# Patient Record
Sex: FEMALE | Race: ASIAN | NOT HISPANIC OR LATINO | Employment: OTHER | URBAN - METROPOLITAN AREA
[De-identification: names, ages, dates, MRNs, and addresses within clinical notes are randomized per-mention and may not be internally consistent; named-entity substitution may affect disease eponyms.]

---

## 2019-09-23 ENCOUNTER — APPOINTMENT (OUTPATIENT)
Dept: CT IMAGING | Facility: HOSPITAL | Age: 79
End: 2019-09-23

## 2019-09-23 ENCOUNTER — HOSPITAL ENCOUNTER (EMERGENCY)
Facility: HOSPITAL | Age: 79
Discharge: HOME | End: 2019-09-23
Attending: EMERGENCY MEDICINE | Admitting: EMERGENCY MEDICINE

## 2019-09-23 VITALS
TEMPERATURE: 98 F | BODY MASS INDEX: 23.91 KG/M2 | HEART RATE: 47 BPM | RESPIRATION RATE: 14 BRPM | SYSTOLIC BLOOD PRESSURE: 149 MMHG | WEIGHT: 134.92 LBS | DIASTOLIC BLOOD PRESSURE: 64 MMHG | OXYGEN SATURATION: 99 % | HEIGHT: 63 IN

## 2019-09-23 DIAGNOSIS — S00.03XA CONTUSION OF SCALP, INITIAL ENCOUNTER: Primary | ICD-10-CM

## 2019-09-23 PROCEDURE — 99284 EMERGENCY DEPT VISIT MOD MDM: CPT

## 2019-09-23 PROCEDURE — 70450 CT HEAD/BRAIN W/O DYE: CPT

## 2019-09-23 RX ORDER — CLONIDINE HYDROCHLORIDE 0.1 MG/1
0.2 TABLET ORAL ONCE
Status: COMPLETED | OUTPATIENT
Start: 2019-09-23 | End: 2019-09-23

## 2019-09-23 RX ADMIN — CLONIDINE HYDROCHLORIDE 0.2 MG: 0.1 TABLET ORAL at 13:55

## 2019-09-23 NOTE — ED NOTES
Pt fell and hit her head before her flight pt is from [facility]. Pt got on her flight and pain is still bad even after 3 days     Boris Betancourt, NISHN  09/23/19 2177

## 2019-09-23 NOTE — ED PROVIDER NOTES
Subjective   History and physical through patient's daughter as the  as the patient does not speak English chief complaint is head injury    78-year-old female who is visiting her daughter from [facility] states she was at home in [facility] 5 days ago when she rolled out of bed and struck her head on a hard floor.  She had no loss of consciousness patient just arrived here after her a several hour flight from [facility].  She still has some pain to her head and a large contusion to her left head and her daughter want to get checked.  There is moderate headache worse with movement better with rest no vomiting no mental status changes but no paralysis is been on the drink walking otherwise function okay denies any neck pain no numbness or tingling.  Patient is not on blood thinners            Review of Systems   Constitutional: Negative for chills and fever.   Eyes: Negative for photophobia and visual disturbance.   Respiratory: Negative for chest tightness and shortness of breath.    Gastrointestinal: Negative for abdominal pain and vomiting.   Genitourinary: Negative for difficulty urinating and dysuria.   Musculoskeletal: Negative for back pain and neck pain.   Neurological: Positive for headaches. Negative for dizziness.   Psychiatric/Behavioral: Negative for agitation and behavioral problems.       Past Medical History:   Diagnosis Date   • Hyperlipidemia    • Hypertension        No Known Allergies    History reviewed. No pertinent surgical history.    History reviewed. No pertinent family history.    Social History     Socioeconomic History   • Marital status:      Spouse name: Not on file   • Number of children: Not on file   • Years of education: Not on file   • Highest education level: Not on file   Tobacco Use   • Smoking status: Never Smoker   • Smokeless tobacco: Never Used   Substance and Sexual Activity   • Alcohol use: No     Frequency: Never   • Drug use: No   • Sexual activity: Defer        Patient is on medications for high blood pressure and cholesterol but she does not have it with her and she does not know the names they are filled in [employer]    Objective   Physical Exam  78-year-old female awake alert no acute distress HEENT extraocular muscles intact pupils equal react there is a large contusion of the left side of her head but no step-off or deformity.  There is no raccoon or adame sign no photophobia disks are sharp there is no surface lumbar spine tenderness noted trachea midline lungs clear no retractions heart regular without murmur and was soft without tenderness extremities and hips pelvis all full range of motion no deformities she is awake alert follows commands motor strength normal Floridalma Coma Scale 15  Procedures           ED Course      Results for orders placed or performed during the hospital encounter of 04/30/19   Comprehensive Metabolic Panel   Result Value Ref Range    Sodium 139 136 - 144 mmol/L    Potassium 3.4 (L) 3.6 - 5.1 mmol/L    Chloride 105 101 - 111 mmol/L    CO2 22 22 - 32 mmol/L    Glucose 86 65 - 99 mg/dL    BUN 19 8 - 20 mg/dL    Creatinine 1.1 (H) 0.4 - 1.0 mg/dl    Calcium 8.8 (L) 8.9 - 10.3 mg/dL    Total Protein 6.3 6.1 - 7.9 g/dL    Albumin 3.3 (L) 3.5 - 4.8 g/dL    Total Bilirubin 0.1 (L) 0.3 - 1.2 mg/dL    Alkaline Phosphatase 49 32 - 91 IU/L    AST (SGOT) 47 (H) 15 - 41 IU/L    ALT (SGPT) 32 14 - 54 IU/L    Anion Gap 15.4 10 - 20    BUN/Creatinine Ratio 17.3 5.4 - 26.2    GFR MDRD Non African American 48 (L) >60 mL/min/1.73m2    GFR MDRD African American 58 (L) >60 mL/min/1.73m2    Globulin 3.0 2.5 - 3.8 G/dL    A/G Ratio 1.1 1.0 - 1.7   CBC & Differential   Result Value Ref Range    WBC 4.9 4.5 - 11.5 10*3/uL    RBC 4.04 4.00 - 5.40 10*6/uL    Hemoglobin 11.5 (L) 12.0 - 15.0 g/dL    Hematocrit 34.1 (L) 35 - 49 %    MCV 84.5 80 - 94 fL    MCH 28.5 26 - 32 pg    MCHC 33.7 32 - 36 g/dL    RDW 15.6 (H) 11.5 - 14.5 %    Platelets 196 150 - 450 10*3/uL     MPV 8.4 7.4 - 10.4 fL    Differential Type AUTO     Neutrophils Absolute 1.8 (L) 2.3 - 8.6 10*3/uL    Lymphocytes Absolute 2.2 0.8 - 4.8 10*3/uL    Monocytes Absolute 0.5 0.1 - 1.3 10*3/uL    Eosinophils Absolute 0.3 0.0 - 0.3 10*3/uL    Basophils Absolute 0.0 0 - 0.2 10*3/uL    Neutrophil Rel % 38 (L) 50 - 75 %    Lymphocyte Rel % 44 (H) 18 - 42 %    Monocyte Rel % 11 2 - 11 %    Eosinophil Rel % 6 (H) 0 - 3 %    Basophil Rel % 1 0 - 2 %    nRBC 0 0 /100[WBCs]    Absolute nRBC 0 10*3/uL     Ct Head Without Contrast    Result Date: 9/23/2019  No acute brain abnormality is appreciated.       Electronically Signed By-Dr. Ramses Arenas MD On:9/23/2019 1:13 PM This report was finalized on [id number] by Dr. Ramses Arenas MD.    Medications - No data to display                MDM  Number of Diagnoses or Management Options  Contusion of scalp, initial encounter:   Diagnosis management comments: Medical decision making.  Patient had the above exam and evaluation the patient CT head without was negative.  Repeat exam blood pressure was still elevated by 218 systolic.  But family states that she takes medication for blood pressure and it is always elevated.  Patient is given clonidine 0.2 mg p.o.  The patient on repeat exam was awake alert Arthur Coma Scale 15.  We talked about the findings and she will be discharged home for outpatient management stable otherwise unremarkable ER course no evidence of hemorrhage or fracture at this time.      Final diagnoses:   Contusion of scalp, initial encounter              Renato Venegas MD  09/23/19 2390

## 2019-09-23 NOTE — DISCHARGE INSTRUCTIONS
Monitor blood pressure closely.  Follow-up with your primary care doctor 1 week for recheck.  Return for increasing pain fever vomiting mental status changes weakness to one side or the other facial asymmetry speech difficulty or any other new or worse problems or concerns

## 2020-11-05 ENCOUNTER — TRANSCRIBE ORDERS (OUTPATIENT)
Dept: RESPIRATORY THERAPY | Facility: HOSPITAL | Age: 80
End: 2020-11-05

## 2020-11-05 DIAGNOSIS — N18.4 CHRONIC KIDNEY DISEASE, STAGE 4 (SEVERE) (HCC): Primary | ICD-10-CM

## 2020-11-11 ENCOUNTER — HOSPITAL ENCOUNTER (OUTPATIENT)
Dept: ULTRASOUND IMAGING | Facility: HOSPITAL | Age: 80
Discharge: HOME | End: 2020-11-11

## 2020-11-11 ENCOUNTER — TRANSCRIBE ORDERS (OUTPATIENT)
Dept: ADMINISTRATIVE | Facility: HOSPITAL | Age: 80
End: 2020-11-11

## 2020-11-11 ENCOUNTER — LAB (OUTPATIENT)
Dept: LAB | Facility: HOSPITAL | Age: 80
End: 2020-11-11

## 2020-11-11 DIAGNOSIS — N18.4 CHRONIC KIDNEY DISEASE, STAGE 4 (SEVERE) (HCC): ICD-10-CM

## 2020-11-11 DIAGNOSIS — N18.4 CHRONIC KIDNEY DISEASE, STAGE IV (SEVERE) (HCC): ICD-10-CM

## 2020-11-11 DIAGNOSIS — N18.4 CHRONIC KIDNEY DISEASE, STAGE IV (SEVERE) (HCC): Primary | ICD-10-CM

## 2020-11-11 LAB
ANION GAP SERPL CALCULATED.3IONS-SCNC: 12 MMOL/L (ref 5–15)
BACTERIA UR QL AUTO: ABNORMAL /HPF
BASOPHILS # BLD AUTO: 0.03 10*3/MM3 (ref 0–0.2)
BASOPHILS NFR BLD AUTO: 0.5 % (ref 0–1.5)
BILIRUB UR QL STRIP: NEGATIVE
BUN SERPL-MCNC: 34 MG/DL (ref 8–23)
BUN/CREAT SERPL: 16.8 (ref 7–25)
CALCIUM SPEC-SCNC: 8.8 MG/DL (ref 8.6–10.5)
CHLORIDE SERPL-SCNC: 106 MMOL/L (ref 98–107)
CLARITY UR: ABNORMAL
CO2 SERPL-SCNC: 25 MMOL/L (ref 22–29)
COLOR UR: YELLOW
CREAT SERPL-MCNC: 2.02 MG/DL (ref 0.57–1)
DEPRECATED RDW RBC AUTO: 46.9 FL (ref 37–54)
EOSINOPHIL # BLD AUTO: 0.21 10*3/MM3 (ref 0–0.4)
EOSINOPHIL NFR BLD AUTO: 3.6 % (ref 0.3–6.2)
ERYTHROCYTE [DISTWIDTH] IN BLOOD BY AUTOMATED COUNT: 14.6 % (ref 12.3–15.4)
GFR SERPL CREATININE-BSD FRML MDRD: 24 ML/MIN/1.73
GFR SERPL CREATININE-BSD FRML MDRD: 29 ML/MIN/1.73
GLUCOSE SERPL-MCNC: 92 MG/DL (ref 65–99)
GLUCOSE UR STRIP-MCNC: NEGATIVE MG/DL
HCT VFR BLD AUTO: 33 % (ref 34–46.6)
HGB BLD-MCNC: 10.2 G/DL (ref 12–15.9)
HGB UR QL STRIP.AUTO: NEGATIVE
HYALINE CASTS UR QL AUTO: ABNORMAL /LPF
IMM GRANULOCYTES # BLD AUTO: 0.02 10*3/MM3 (ref 0–0.05)
IMM GRANULOCYTES NFR BLD AUTO: 0.3 % (ref 0–0.5)
KETONES UR QL STRIP: ABNORMAL
LEUKOCYTE ESTERASE UR QL STRIP.AUTO: ABNORMAL
LYMPHOCYTES # BLD AUTO: 1.95 10*3/MM3 (ref 0.7–3.1)
LYMPHOCYTES NFR BLD AUTO: 33.1 % (ref 19.6–45.3)
MCH RBC QN AUTO: 26.7 PG (ref 26.6–33)
MCHC RBC AUTO-ENTMCNC: 30.9 G/DL (ref 31.5–35.7)
MCV RBC AUTO: 86.4 FL (ref 79–97)
MONOCYTES # BLD AUTO: 0.45 10*3/MM3 (ref 0.1–0.9)
MONOCYTES NFR BLD AUTO: 7.6 % (ref 5–12)
NEUTROPHILS NFR BLD AUTO: 3.23 10*3/MM3 (ref 1.7–7)
NEUTROPHILS NFR BLD AUTO: 54.9 % (ref 42.7–76)
NITRITE UR QL STRIP: NEGATIVE
NRBC BLD AUTO-RTO: 0.2 /100 WBC (ref 0–0.2)
PH UR STRIP.AUTO: 6 [PH] (ref 5–8)
PHOSPHATE SERPL-MCNC: 3.5 MG/DL (ref 2.5–4.5)
PLATELET # BLD AUTO: 252 10*3/MM3 (ref 140–450)
PMV BLD AUTO: 10.5 FL (ref 6–12)
POTASSIUM SERPL-SCNC: 4 MMOL/L (ref 3.5–5.2)
PROT UR QL STRIP: ABNORMAL
PTH-INTACT SERPL-MCNC: 101 PG/ML (ref 15–65)
RBC # BLD AUTO: 3.82 10*6/MM3 (ref 3.77–5.28)
RBC # UR: ABNORMAL /HPF
REF LAB TEST METHOD: ABNORMAL
SODIUM SERPL-SCNC: 143 MMOL/L (ref 136–145)
SP GR UR STRIP: 1.02 (ref 1–1.03)
SQUAMOUS #/AREA URNS HPF: ABNORMAL /HPF
UROBILINOGEN UR QL STRIP: ABNORMAL
WBC # BLD AUTO: 5.89 10*3/MM3 (ref 3.4–10.8)
WBC UR QL AUTO: ABNORMAL /HPF

## 2020-11-11 PROCEDURE — 85025 COMPLETE CBC W/AUTO DIFF WBC: CPT

## 2020-11-11 PROCEDURE — 36415 COLL VENOUS BLD VENIPUNCTURE: CPT

## 2020-11-11 PROCEDURE — 87077 CULTURE AEROBIC IDENTIFY: CPT

## 2020-11-11 PROCEDURE — 80048 BASIC METABOLIC PNL TOTAL CA: CPT

## 2020-11-11 PROCEDURE — 76775 US EXAM ABDO BACK WALL LIM: CPT

## 2020-11-11 PROCEDURE — 87086 URINE CULTURE/COLONY COUNT: CPT

## 2020-11-11 PROCEDURE — 84100 ASSAY OF PHOSPHORUS: CPT

## 2020-11-11 PROCEDURE — 81001 URINALYSIS AUTO W/SCOPE: CPT

## 2020-11-11 PROCEDURE — 87186 SC STD MICRODIL/AGAR DIL: CPT

## 2020-11-11 PROCEDURE — 83970 ASSAY OF PARATHORMONE: CPT

## 2020-11-13 LAB — BACTERIA SPEC AEROBE CULT: ABNORMAL

## 2021-04-15 ENCOUNTER — APPOINTMENT (OUTPATIENT)
Dept: GENERAL RADIOLOGY | Facility: HOSPITAL | Age: 81
End: 2021-04-15

## 2021-04-15 ENCOUNTER — LAB (OUTPATIENT)
Dept: LAB | Facility: HOSPITAL | Age: 81
End: 2021-04-15

## 2021-04-15 ENCOUNTER — TRANSCRIBE ORDERS (OUTPATIENT)
Dept: ADMINISTRATIVE | Facility: HOSPITAL | Age: 81
End: 2021-04-15

## 2021-04-15 ENCOUNTER — HOSPITAL ENCOUNTER (INPATIENT)
Facility: HOSPITAL | Age: 81
LOS: 4 days | Discharge: HOME | End: 2021-04-21
Attending: INTERNAL MEDICINE | Admitting: INTERNAL MEDICINE

## 2021-04-15 ENCOUNTER — APPOINTMENT (OUTPATIENT)
Dept: CT IMAGING | Facility: HOSPITAL | Age: 81
End: 2021-04-15

## 2021-04-15 DIAGNOSIS — R94.31 ABNORMAL EKG: ICD-10-CM

## 2021-04-15 DIAGNOSIS — I50.9 CONGESTIVE HEART FAILURE, UNSPECIFIED HF CHRONICITY, UNSPECIFIED HEART FAILURE TYPE (HCC): ICD-10-CM

## 2021-04-15 DIAGNOSIS — I49.5 SICK SINUS SYNDROME (HCC): Primary | ICD-10-CM

## 2021-04-15 DIAGNOSIS — N18.9 CHRONIC KIDNEY DISEASE, UNSPECIFIED CKD STAGE: ICD-10-CM

## 2021-04-15 DIAGNOSIS — N18.4 CHRONIC KIDNEY DISEASE, STAGE IV (SEVERE) (HCC): Primary | ICD-10-CM

## 2021-04-15 DIAGNOSIS — N18.4 CHRONIC KIDNEY DISEASE, STAGE IV (SEVERE) (HCC): ICD-10-CM

## 2021-04-15 DIAGNOSIS — R11.2 NON-INTRACTABLE VOMITING WITH NAUSEA, UNSPECIFIED VOMITING TYPE: ICD-10-CM

## 2021-04-15 DIAGNOSIS — R06.00 DYSPNEA, UNSPECIFIED TYPE: ICD-10-CM

## 2021-04-15 DIAGNOSIS — R00.1 JUNCTIONAL BRADYCARDIA: ICD-10-CM

## 2021-04-15 PROBLEM — E78.5 HYPERLIPIDEMIA: Chronic | Status: ACTIVE | Noted: 2021-04-15

## 2021-04-15 PROBLEM — N18.30 CKD (CHRONIC KIDNEY DISEASE) STAGE 3, GFR 30-59 ML/MIN (HCC): Chronic | Status: ACTIVE | Noted: 2021-04-15

## 2021-04-15 PROBLEM — E11.9 TYPE 2 DIABETES MELLITUS: Chronic | Status: ACTIVE | Noted: 2021-04-15

## 2021-04-15 PROBLEM — I10 ESSENTIAL HYPERTENSION: Chronic | Status: ACTIVE | Noted: 2021-04-15

## 2021-04-15 PROBLEM — D64.9 ANEMIA: Status: ACTIVE | Noted: 2021-04-15

## 2021-04-15 PROBLEM — I16.1 HYPERTENSIVE EMERGENCY: Status: ACTIVE | Noted: 2021-04-15

## 2021-04-15 LAB
ALBUMIN SERPL-MCNC: 3.5 G/DL (ref 3.5–5.2)
ALP SERPL-CCNC: 91 U/L (ref 39–117)
ALT SERPL W P-5'-P-CCNC: 28 U/L (ref 1–33)
ANION GAP SERPL CALCULATED.3IONS-SCNC: 11.5 MMOL/L (ref 5–15)
AST SERPL-CCNC: 31 U/L (ref 1–32)
B PARAPERT DNA SPEC QL NAA+PROBE: NOT DETECTED
B PERT DNA SPEC QL NAA+PROBE: NOT DETECTED
BACTERIA UR QL AUTO: NORMAL /HPF
BASOPHILS # BLD AUTO: 0.02 10*3/MM3 (ref 0–0.2)
BASOPHILS NFR BLD AUTO: 0.4 % (ref 0–1.5)
BILIRUB CONJ SERPL-MCNC: <0.2 MG/DL (ref 0–0.3)
BILIRUB INDIRECT SERPL-MCNC: NORMAL MG/DL
BILIRUB SERPL-MCNC: 0.3 MG/DL (ref 0–1.2)
BILIRUB UR QL STRIP: NEGATIVE
BUN SERPL-MCNC: 38 MG/DL (ref 8–23)
BUN/CREAT SERPL: 19.6 (ref 7–25)
C PNEUM DNA NPH QL NAA+NON-PROBE: NOT DETECTED
CALCIUM SPEC-SCNC: 9.4 MG/DL (ref 8.6–10.5)
CHLORIDE SERPL-SCNC: 111 MMOL/L (ref 98–107)
CLARITY UR: CLEAR
CO2 SERPL-SCNC: 21.5 MMOL/L (ref 22–29)
COLOR UR: YELLOW
CREAT SERPL-MCNC: 1.94 MG/DL (ref 0.57–1)
DEPRECATED RDW RBC AUTO: 43.5 FL (ref 37–54)
EOSINOPHIL # BLD AUTO: 0.21 10*3/MM3 (ref 0–0.4)
EOSINOPHIL NFR BLD AUTO: 4.2 % (ref 0.3–6.2)
ERYTHROCYTE [DISTWIDTH] IN BLOOD BY AUTOMATED COUNT: 14.1 % (ref 12.3–15.4)
FLUAV SUBTYP SPEC NAA+PROBE: NOT DETECTED
FLUBV RNA ISLT QL NAA+PROBE: NOT DETECTED
GFR SERPL CREATININE-BSD FRML MDRD: 25 ML/MIN/1.73
GFR SERPL CREATININE-BSD FRML MDRD: 30 ML/MIN/1.73
GLUCOSE SERPL-MCNC: 108 MG/DL (ref 65–99)
GLUCOSE UR STRIP-MCNC: ABNORMAL MG/DL
HADV DNA SPEC NAA+PROBE: NOT DETECTED
HCOV 229E RNA SPEC QL NAA+PROBE: NOT DETECTED
HCOV HKU1 RNA SPEC QL NAA+PROBE: NOT DETECTED
HCOV NL63 RNA SPEC QL NAA+PROBE: NOT DETECTED
HCOV OC43 RNA SPEC QL NAA+PROBE: NOT DETECTED
HCT VFR BLD AUTO: 36.4 % (ref 34–46.6)
HGB BLD-MCNC: 11.6 G/DL (ref 12–15.9)
HGB UR QL STRIP.AUTO: NEGATIVE
HMPV RNA NPH QL NAA+NON-PROBE: NOT DETECTED
HOLD SPECIMEN: NORMAL
HPIV1 RNA SPEC QL NAA+PROBE: NOT DETECTED
HPIV2 RNA SPEC QL NAA+PROBE: NOT DETECTED
HPIV3 RNA NPH QL NAA+PROBE: NOT DETECTED
HPIV4 P GENE NPH QL NAA+PROBE: NOT DETECTED
HYALINE CASTS UR QL AUTO: NORMAL /LPF
IMM GRANULOCYTES # BLD AUTO: 0.01 10*3/MM3 (ref 0–0.05)
IMM GRANULOCYTES NFR BLD AUTO: 0.2 % (ref 0–0.5)
KETONES UR QL STRIP: NEGATIVE
LEUKOCYTE ESTERASE UR QL STRIP.AUTO: NEGATIVE
LIPASE SERPL-CCNC: 146 U/L (ref 13–60)
LYMPHOCYTES # BLD AUTO: 2.16 10*3/MM3 (ref 0.7–3.1)
LYMPHOCYTES NFR BLD AUTO: 43.5 % (ref 19.6–45.3)
M PNEUMO IGG SER IA-ACNC: NOT DETECTED
MCH RBC QN AUTO: 27.2 PG (ref 26.6–33)
MCHC RBC AUTO-ENTMCNC: 31.9 G/DL (ref 31.5–35.7)
MCV RBC AUTO: 85.4 FL (ref 79–97)
MONOCYTES # BLD AUTO: 0.32 10*3/MM3 (ref 0.1–0.9)
MONOCYTES NFR BLD AUTO: 6.4 % (ref 5–12)
NEUTROPHILS NFR BLD AUTO: 2.25 10*3/MM3 (ref 1.7–7)
NEUTROPHILS NFR BLD AUTO: 45.3 % (ref 42.7–76)
NITRITE UR QL STRIP: NEGATIVE
NRBC BLD AUTO-RTO: 0 /100 WBC (ref 0–0.2)
NT-PROBNP SERPL-MCNC: 3125 PG/ML (ref 0–1800)
PH UR STRIP.AUTO: 6.5 [PH] (ref 5–8)
PHOSPHATE SERPL-MCNC: 3.7 MG/DL (ref 2.5–4.5)
PLATELET # BLD AUTO: 274 10*3/MM3 (ref 140–450)
PMV BLD AUTO: 10.6 FL (ref 6–12)
POTASSIUM SERPL-SCNC: 4.3 MMOL/L (ref 3.5–5.2)
PROCALCITONIN SERPL-MCNC: 0.08 NG/ML (ref 0–0.25)
PROT SERPL-MCNC: 6.8 G/DL (ref 6–8.5)
PROT UR QL STRIP: ABNORMAL
PTH-INTACT SERPL-MCNC: 53.5 PG/ML (ref 15–65)
RBC # BLD AUTO: 4.26 10*6/MM3 (ref 3.77–5.28)
RBC # UR: NORMAL /HPF
REF LAB TEST METHOD: NORMAL
RHINOVIRUS RNA SPEC NAA+PROBE: NOT DETECTED
RSV RNA NPH QL NAA+NON-PROBE: NOT DETECTED
SARS-COV-2 RNA NPH QL NAA+NON-PROBE: NOT DETECTED
SODIUM SERPL-SCNC: 144 MMOL/L (ref 136–145)
SP GR UR STRIP: 1.01 (ref 1–1.03)
SQUAMOUS #/AREA URNS HPF: NORMAL /HPF
TROPONIN T SERPL-MCNC: 0.02 NG/ML (ref 0–0.03)
UROBILINOGEN UR QL STRIP: ABNORMAL
WBC # BLD AUTO: 4.97 10*3/MM3 (ref 3.4–10.8)
WBC UR QL AUTO: NORMAL /HPF
WHOLE BLOOD HOLD SPECIMEN: NORMAL
WHOLE BLOOD HOLD SPECIMEN: NORMAL

## 2021-04-15 PROCEDURE — G0378 HOSPITAL OBSERVATION PER HR: HCPCS

## 2021-04-15 PROCEDURE — 80048 BASIC METABOLIC PNL TOTAL CA: CPT

## 2021-04-15 PROCEDURE — 85025 COMPLETE CBC W/AUTO DIFF WBC: CPT

## 2021-04-15 PROCEDURE — 84484 ASSAY OF TROPONIN QUANT: CPT | Performed by: NURSE PRACTITIONER

## 2021-04-15 PROCEDURE — 83690 ASSAY OF LIPASE: CPT | Performed by: NURSE PRACTITIONER

## 2021-04-15 PROCEDURE — 93005 ELECTROCARDIOGRAM TRACING: CPT

## 2021-04-15 PROCEDURE — 81001 URINALYSIS AUTO W/SCOPE: CPT

## 2021-04-15 PROCEDURE — 99222 1ST HOSP IP/OBS MODERATE 55: CPT | Performed by: PHYSICIAN ASSISTANT

## 2021-04-15 PROCEDURE — 74176 CT ABD & PELVIS W/O CONTRAST: CPT

## 2021-04-15 PROCEDURE — 99284 EMERGENCY DEPT VISIT MOD MDM: CPT

## 2021-04-15 PROCEDURE — 93005 ELECTROCARDIOGRAM TRACING: CPT | Performed by: INTERNAL MEDICINE

## 2021-04-15 PROCEDURE — 71045 X-RAY EXAM CHEST 1 VIEW: CPT

## 2021-04-15 PROCEDURE — 80076 HEPATIC FUNCTION PANEL: CPT | Performed by: NURSE PRACTITIONER

## 2021-04-15 PROCEDURE — 36415 COLL VENOUS BLD VENIPUNCTURE: CPT

## 2021-04-15 PROCEDURE — 83880 ASSAY OF NATRIURETIC PEPTIDE: CPT | Performed by: NURSE PRACTITIONER

## 2021-04-15 PROCEDURE — 0202U NFCT DS 22 TRGT SARS-COV-2: CPT | Performed by: NURSE PRACTITIONER

## 2021-04-15 PROCEDURE — 25010000002 ONDANSETRON PER 1 MG: Performed by: NURSE PRACTITIONER

## 2021-04-15 PROCEDURE — 25010000002 FUROSEMIDE PER 20 MG: Performed by: NURSE PRACTITIONER

## 2021-04-15 PROCEDURE — 83970 ASSAY OF PARATHORMONE: CPT

## 2021-04-15 PROCEDURE — 84145 PROCALCITONIN (PCT): CPT | Performed by: NURSE PRACTITIONER

## 2021-04-15 PROCEDURE — 84100 ASSAY OF PHOSPHORUS: CPT

## 2021-04-15 RX ORDER — ONDANSETRON 2 MG/ML
4 INJECTION INTRAMUSCULAR; INTRAVENOUS ONCE
Status: COMPLETED | OUTPATIENT
Start: 2021-04-15 | End: 2021-04-15

## 2021-04-15 RX ORDER — ASPIRIN 325 MG
325 TABLET ORAL ONCE
Status: COMPLETED | OUTPATIENT
Start: 2021-04-15 | End: 2021-04-15

## 2021-04-15 RX ORDER — GLIPIZIDE 5 MG/1
2.5 TABLET ORAL DAILY
COMMUNITY

## 2021-04-15 RX ORDER — FUROSEMIDE 10 MG/ML
40 INJECTION INTRAMUSCULAR; INTRAVENOUS ONCE
Status: COMPLETED | OUTPATIENT
Start: 2021-04-15 | End: 2021-04-15

## 2021-04-15 RX ORDER — AMLODIPINE BESYLATE 10 MG/1
10 TABLET ORAL DAILY
COMMUNITY
End: 2022-06-13 | Stop reason: SDUPTHER

## 2021-04-15 RX ORDER — ATORVASTATIN CALCIUM 80 MG/1
80 TABLET, FILM COATED ORAL DAILY
COMMUNITY
End: 2022-06-13 | Stop reason: SDUPTHER

## 2021-04-15 RX ORDER — SODIUM CHLORIDE 0.9 % (FLUSH) 0.9 %
10 SYRINGE (ML) INJECTION AS NEEDED
Status: DISCONTINUED | OUTPATIENT
Start: 2021-04-15 | End: 2021-04-21 | Stop reason: HOSPADM

## 2021-04-15 RX ORDER — HYDRALAZINE HYDROCHLORIDE 25 MG/1
25 TABLET, FILM COATED ORAL 3 TIMES DAILY
COMMUNITY
End: 2021-04-21 | Stop reason: HOSPADM

## 2021-04-15 RX ORDER — LOSARTAN POTASSIUM 100 MG/1
100 TABLET ORAL DAILY
COMMUNITY
End: 2021-11-15

## 2021-04-15 RX ORDER — ASPIRIN 81 MG/1
81 TABLET, CHEWABLE ORAL DAILY
COMMUNITY
End: 2022-06-13 | Stop reason: SDUPTHER

## 2021-04-15 RX ORDER — CLONIDINE HYDROCHLORIDE 0.1 MG/1
0.1 TABLET ORAL ONCE
Status: COMPLETED | OUTPATIENT
Start: 2021-04-15 | End: 2021-04-15

## 2021-04-15 RX ADMIN — ASPIRIN 325 MG ORAL TABLET 325 MG: 325 PILL ORAL at 19:00

## 2021-04-15 RX ADMIN — CLONIDINE HYDROCHLORIDE 0.1 MG: 0.1 TABLET ORAL at 18:42

## 2021-04-15 RX ADMIN — NITROGLYCERIN 1 INCH: 20 OINTMENT TOPICAL at 20:11

## 2021-04-15 RX ADMIN — FUROSEMIDE 40 MG: 10 INJECTION, SOLUTION INTRAMUSCULAR; INTRAVENOUS at 20:11

## 2021-04-15 RX ADMIN — ONDANSETRON 4 MG: 2 INJECTION INTRAMUSCULAR; INTRAVENOUS at 18:42

## 2021-04-15 NOTE — ED PROVIDER NOTES
Subjective   Patient is an 80-year-old female non-English-speaking who presents today with her granddaughter at the bedside.  Granddaughter translates for the patient.  She reports that the patient has complained of having chills, shortness of breath and nausea for the last couple of days.  She states she was here today to have some outpatient labs drawn when she vomited and was then instructed to come to the ED for further evaluation.  Patient denies any pain.  She states her blood pressure has been elevated recently and 1 week ago had adjustments made to her hydralazine from 10 mg 3 times a day to 25 mg 3 times a day.  Granddaughter reports patient has had an increase in fatigue since then as well.  Patient denies any headache dizziness or visual changes.  She denies any chest pain but states she has felt intermittently short of breath.  She denies any abdominal pain but does report nausea and 2 episodes of vomiting today.  She states she has not had a bowel movement in 3 to 4 days.  She denies any hematemesis black or bloody stool.  She denies any fever.  She denies any urinary issues.  She denies any ill contacts or recent travel.          Review of Systems   Constitutional: Positive for chills and fatigue. Negative for fever.   HENT: Negative for congestion.    Eyes: Negative for visual disturbance.   Respiratory: Positive for shortness of breath. Negative for cough.    Cardiovascular: Negative for chest pain and leg swelling.   Gastrointestinal: Positive for constipation, nausea and vomiting. Negative for abdominal pain, blood in stool and diarrhea.   Genitourinary: Negative for decreased urine volume, difficulty urinating, dysuria, frequency and urgency.   Musculoskeletal: Negative for neck pain and neck stiffness.   Skin: Negative for rash.   Neurological: Positive for weakness. Negative for dizziness, facial asymmetry, speech difficulty, light-headedness, numbness and headaches.       Past Medical History:    Diagnosis Date   • Diabetes mellitus (CMS/[facility])    • Hyperlipidemia    • Hypertension        No Known Allergies    History reviewed. No pertinent surgical history.    History reviewed. No pertinent family history.    Social History     Socioeconomic History   • Marital status:      Spouse name: Not on file   • Number of children: Not on file   • Years of education: Not on file   • Highest education level: Not on file   Tobacco Use   • Smoking status: Never Smoker   • Smokeless tobacco: Never Used   Substance and Sexual Activity   • Alcohol use: No   • Drug use: No   • Sexual activity: Defer           Objective   Physical Exam  Vital signs and triage nurse note reviewed.  Constitutional: Awake, alert; well-developed and well-nourished. No acute distress is noted.  HEENT: Normocephalic, atraumatic; pupils are PERRL with intact EOM; oropharynx is pink and moist without exudate or erythema.  No drooling or pooling of oral secretions.  Neck: Supple, full range of motion without pain; no cervical lymphadenopathy. Normal phonation.  Cardiovascular: Regular rate and rhythm, normal S1-S2.  Systolic murmur noted.  Pulmonary: Respiratory effort regular nonlabored, breath sounds clear to auscultation all fields.  Abdomen: Soft, nontender, nondistended with normoactive bowel sounds; no rebound or guarding.  No CVAT.  Musculoskeletal: Independent range of motion of all extremities with no palpable tenderness or edema.  Calves are symmetric and nontender.  Neuro: Alert oriented x3, speech is clear and appropriate, GCS 15.    Skin: Flesh tone, warm, dry, intact; no erythematous or petechial rash or lesion.      Procedures           ED Course      Labs Reviewed   LIPASE - Abnormal; Notable for the following components:       Result Value    Lipase 146 (*)     All other components within normal limits   BNP (IN-HOUSE) - Abnormal; Notable for the following components:    proBNP 3,125.0 (*)     All other components within  "normal limits    Narrative:     Among patients with dyspnea, NT-proBNP is highly sensitive for the detection of acute congestive heart failure. In addition NT-proBNP of <300 pg/ml effectively rules out acute congestive heart failure with 99% negative predictive value.    Results may be falsely decreased if patient taking Biotin.     RESPIRATORY PANEL PCR W/ COVID-19 (SARS-COV-2) VÍCTOR/DAYNA/JADYN/PAD/COR/[facility] IN-HOUSE, NP SWAB IN UTM/VTP, 3-4 HR TAT - Normal    Narrative:     Fact sheet for providers: https://docs.[employer]/wp-content/uploads/VRX3536-7337-RI1.1-EUA-Provider-Fact-Sheet-3.pdf    Fact sheet for patients: https://docs.[employer]/wp-content/uploads/OIZ2129-6189-KD6.1-EUA-Patient-Fact-Sheet-1.pdf    Test performed by PCR.   TROPONIN (IN-HOUSE) - Normal    Narrative:     Troponin T Reference Range:  <= 0.03 ng/mL-   Negative for AMI  >0.03 ng/mL-     Abnormal for myocardial necrosis.  Clinicians would have to utilize clinical acumen, EKG, Troponin and serial changes to determine if it is an Acute Myocardial Infarction or myocardial injury due to an underlying chronic condition.       Results may be falsely decreased if patient taking Biotin.     PROCALCITONIN - Normal    Narrative:     As a Marker for Sepsis (Non-Neonates):     1. <0.5 ng/mL represents a low risk of severe sepsis and/or septic shock.  2. >2 ng/mL represents a high risk of severe sepsis and/or septic shock.    As a Marker for Lower Respiratory Tract Infections that require antibiotic therapy:  PCT on Admission     Antibiotic Therapy             6-12 Hrs later  >0.5                          Strongly Recommended            >0.25 - <0.5             Recommended  0.1 - 0.25                  Discouraged                       Remeasure/reassess PCT  <0.1                         Strongly Discouraged         Remeasure/reassess PCT      As 28 day mortality risk marker: \"Change in Procalcitonin Result\" (>80% or <=80%) if Day 0 (or Day 1) and Day 4 " values are available. Refer to http://www.[facility]-pct-calculator.com/    Change in PCT <=80 %   A decrease of PCT levels below or equal to 80% defines a positive change in PCT test result representing a higher risk for 28-day all-cause mortality of patients diagnosed with severe sepsis or septic shock.    Change in PCT >80 %   A decrease of PCT levels of more than 80% defines a negative change in PCT result representing a lower risk for 28-day all-cause mortality of patients diagnosed with severe sepsis or septic shock.              Results may be falsely decreased if patient taking Biotin.    HEPATIC FUNCTION PANEL   EXTRA TUBES    Narrative:     The following orders were created for panel order Extra Tubes.  Procedure                               Abnormality         Status                     ---------                               -----------         ------                     Light Blue Top[[id number]]                                   Final result               Lavender Top[[id number]]                                     Final result               Gold Top - SST[[id number]]                                   Final result                 Please view results for these tests on the individual orders.   LIGHT BLUE TOP   LAVENDER TOP   GOLD TOP - SST     CT Abdomen Pelvis Without Contrast    Result Date: 4/15/2021  1.Infiltrates are noted throughout the lung bases suggesting developing multifocal pneumonia versus pulmonary edema. Cardiomegaly is noted. 2.No evidence for significant nephrolithiasis. There is no evidence for obstructive uropathy. 3.No evidence for acute abnormality throughout the abdomen or pelvis on this noncontrast exam.  Electronically Signed By-Jim Montano MD On:4/15/2021 7:45 PM This report was finalized on [id number] by  Jim Montano MD.    XR Chest 1 View    Result Date: 4/15/2021  1.No definitive evidence for acute cardiopulmonary process. 2.Note is made of cardiomegaly.  Electronically  Signed By-Jim Montano MD On:4/15/2021 6:47 PM This report was finalized on [id number] by  Jim Montano MD.    Medications   sodium chloride 0.9 % flush 10 mL (has no administration in time range)   ondansetron (ZOFRAN) injection 4 mg (4 mg Intravenous Given 4/15/21 1842)   cloNIDine (CATAPRES) tablet 0.1 mg (0.1 mg Oral Given 4/15/21 1842)   aspirin tablet 325 mg (325 mg Oral Given 4/15/21 1900)   furosemide (LASIX) injection 40 mg (40 mg Intravenous Given 4/15/21 2011)   nitroglycerin (NITROSTAT) ointment 1 inch (1 inch Topical Given 4/15/21 2011)                                          MDM  Number of Diagnoses or Management Options  Abnormal EKG  Chronic kidney disease, unspecified CKD stage  Congestive heart failure, unspecified HF chronicity, unspecified heart failure type (CMS/HCC)  Dyspnea, unspecified type  Non-intractable vomiting with nausea, unspecified vomiting type  Diagnosis management comments: Comorbidities: Hypertension, high cholesterol, diabetes, chronic kidney disease  Differentials: Cardiac ischemia, ACS, CHF, pneumonia, pneumothorax, pleural effusion, Covid;this list is not all inclusive and does not constitute the entirety of considered causes  Discussion with provider:  Radiology interpretation: X-rays reviewed by me and interpreted by radiologist: As above  Lab interpretation: Labs viewed by me significant for: As above    Patient is placed on continuous cardiac monitor.  She had IV established.  She had labs, EKG and chest x-ray obtained.  She was given clonidine for her elevated blood pressure which initially is 215/75.  She is also given Zofran for nausea.     Work-up: EKG reviewed by me, interpreted by Dr. Gresham, shows sinus rhythm with ventricular rate of 72, prolonged IL interval, inferolateral ST depression.  No ST elevation, inverted T waves lateral leads.  CBC collected earlier today was reviewed and is grossly unremarkable.  Normal white blood cell count, normal  differential.  Metabolic panel shows a creatinine of 1.9.  Urinalysis collected earlier today shows no blood or sign of infection.  Troponin 0 0.018.  BNP 3125.  Lipase 146.  Chest x-ray shows cardiomegaly with no other acute abnormality.  CT abdomen pelvis shows infiltrates noted throughout the lung bases suggesting developing multifocal pneumonia versus pulmonary edema.  Cardiomegaly is noted.  No evidence for significant nephrolithiasis.  No obstructive uropathy.  No acute abnormality throughout the abdomen or pelvis.    Patient was given an aspirin.  She was also given a dose of Lasix and had Nitropaste applied.    Her blood pressure did improve with clonidine and is currently 163/62.  Heart rate 74.  She is afebrile.  She is in no acute distress and is resting comfortably.    She will be admitted to the hospital for further observation and treatment.  She was discussed with the hospitalist PA.    Diagnosis and treatment plan discussed with patient.  Patient agreeable to plan.          Amount and/or Complexity of Data Reviewed  Clinical lab tests: ordered and reviewed  Tests in the radiology section of CPT®: ordered and reviewed    Patient Progress  Patient progress: stable      Final diagnoses:   Dyspnea, unspecified type   Non-intractable vomiting with nausea, unspecified vomiting type   Congestive heart failure, unspecified HF chronicity, unspecified heart failure type (CMS/[facility])   Abnormal EKG   Chronic kidney disease, unspecified CKD stage       ED Disposition  ED Disposition     ED Disposition Condition Comment    Decision to Admit  Level of Care: Telemetry [5]   Diagnosis: Dyspnea, unspecified type [[id number]]   Admitting Physician: DAIMON CAMARGO [[id number]]   Attending Physician: DAMION CAMARGO [[id number]]            No follow-up provider specified.       Medication List      No changes were made to your prescriptions during this visit.          Carol Donnelly, CECE  04/15/21 2045

## 2021-04-16 ENCOUNTER — APPOINTMENT (OUTPATIENT)
Dept: CARDIOLOGY | Facility: HOSPITAL | Age: 81
End: 2021-04-16

## 2021-04-16 LAB
ANION GAP SERPL CALCULATED.3IONS-SCNC: 11 MMOL/L (ref 5–15)
BASOPHILS # BLD AUTO: 0 10*3/MM3 (ref 0–0.2)
BASOPHILS NFR BLD AUTO: 0.5 % (ref 0–1.5)
BH CV ECHO MEAS - ACS: 2 CM
BH CV ECHO MEAS - AO MAX PG (FULL): 6.8 MMHG
BH CV ECHO MEAS - AO MAX PG: 24.1 MMHG
BH CV ECHO MEAS - AO MEAN PG (FULL): 2.3 MMHG
BH CV ECHO MEAS - AO MEAN PG: 13.7 MMHG
BH CV ECHO MEAS - AO ROOT AREA (BSA CORRECTED): 2
BH CV ECHO MEAS - AO ROOT AREA: 9.9 CM^2
BH CV ECHO MEAS - AO ROOT DIAM: 3.6 CM
BH CV ECHO MEAS - AO V2 MAX: 245.1 CM/SEC
BH CV ECHO MEAS - AO V2 MEAN: 173.5 CM/SEC
BH CV ECHO MEAS - AO V2 VTI: 48.6 CM
BH CV ECHO MEAS - ASC AORTA: 3.3 CM
BH CV ECHO MEAS - AVA(I,A): 2.7 CM^2
BH CV ECHO MEAS - AVA(I,D): 2.7 CM^2
BH CV ECHO MEAS - AVA(V,A): 2.5 CM^2
BH CV ECHO MEAS - AVA(V,D): 2.5 CM^2
BH CV ECHO MEAS - BSA(HAYCOCK): 1.8 M^2
BH CV ECHO MEAS - BSA: 1.8 M^2
BH CV ECHO MEAS - BZI_BMI: 26.3 KILOGRAMS/M^2
BH CV ECHO MEAS - BZI_METRIC_HEIGHT: 165.1 CM
BH CV ECHO MEAS - BZI_METRIC_WEIGHT: 71.7 KG
BH CV ECHO MEAS - EDV(CUBED): 44.6 ML
BH CV ECHO MEAS - EDV(MOD-SP4): 71.7 ML
BH CV ECHO MEAS - EDV(TEICH): 52.5 ML
BH CV ECHO MEAS - EF(CUBED): 57.1 %
BH CV ECHO MEAS - EF(MOD-SP4): 81 %
BH CV ECHO MEAS - EF(TEICH): 49.7 %
BH CV ECHO MEAS - ESV(CUBED): 19.1 ML
BH CV ECHO MEAS - ESV(MOD-SP4): 13.7 ML
BH CV ECHO MEAS - ESV(TEICH): 26.4 ML
BH CV ECHO MEAS - FS: 24.6 %
BH CV ECHO MEAS - IVS/LVPW: 1
BH CV ECHO MEAS - IVSD: 2.1 CM
BH CV ECHO MEAS - LA DIMENSION(2D): 3.1 CM
BH CV ECHO MEAS - LV DIASTOLIC VOL/BSA (35-75): 40.1 ML/M^2
BH CV ECHO MEAS - LV MASS(C)D: 340.3 GRAMS
BH CV ECHO MEAS - LV MASS(C)DI: 190.2 GRAMS/M^2
BH CV ECHO MEAS - LV MAX PG: 17.3 MMHG
BH CV ECHO MEAS - LV MEAN PG: 11.4 MMHG
BH CV ECHO MEAS - LV SYSTOLIC VOL/BSA (12-30): 7.6 ML/M^2
BH CV ECHO MEAS - LV V1 MAX: 208 CM/SEC
BH CV ECHO MEAS - LV V1 MEAN: 159.5 CM/SEC
BH CV ECHO MEAS - LV V1 VTI: 43.6 CM
BH CV ECHO MEAS - LVIDD: 3.5 CM
BH CV ECHO MEAS - LVIDS: 2.7 CM
BH CV ECHO MEAS - LVOT AREA: 3 CM^2
BH CV ECHO MEAS - LVOT DIAM: 1.9 CM
BH CV ECHO MEAS - LVPWD: 2 CM
BH CV ECHO MEAS - MR MAX PG: 118.7 MMHG
BH CV ECHO MEAS - MR MAX VEL: 524.6 CM/SEC
BH CV ECHO MEAS - MV A MAX VEL: 121.4 CM/SEC
BH CV ECHO MEAS - MV DEC SLOPE: 161.2 CM/SEC^2
BH CV ECHO MEAS - MV DEC TIME: 0.45 SEC
BH CV ECHO MEAS - MV E MAX VEL: 72.9 CM/SEC
BH CV ECHO MEAS - MV E/A: 0.6
BH CV ECHO MEAS - MV MAX PG: 7.6 MMHG
BH CV ECHO MEAS - MV MEAN PG: 1.7 MMHG
BH CV ECHO MEAS - MV V2 MAX: 138 CM/SEC
BH CV ECHO MEAS - MV V2 MEAN: 57.8 CM/SEC
BH CV ECHO MEAS - MV V2 VTI: 33 CM
BH CV ECHO MEAS - MVA(VTI): 3.9 CM^2
BH CV ECHO MEAS - PA MAX PG (FULL): 3.6 MMHG
BH CV ECHO MEAS - PA MAX PG: 7.4 MMHG
BH CV ECHO MEAS - PA MEAN PG (FULL): 1.7 MMHG
BH CV ECHO MEAS - PA MEAN PG: 3.8 MMHG
BH CV ECHO MEAS - PA V2 MAX: 136.2 CM/SEC
BH CV ECHO MEAS - PA V2 MEAN: 91.7 CM/SEC
BH CV ECHO MEAS - PA V2 VTI: 31.4 CM
BH CV ECHO MEAS - PI END-D VEL: 129.5 CM/SEC
BH CV ECHO MEAS - PI MAX PG: 18.5 MMHG
BH CV ECHO MEAS - PI MAX VEL: 214.9 CM/SEC
BH CV ECHO MEAS - RAP SYSTOLE: 3 MMHG
BH CV ECHO MEAS - RV MAX PG: 3.8 MMHG
BH CV ECHO MEAS - RV MEAN PG: 2.1 MMHG
BH CV ECHO MEAS - RV V1 MAX: 97.5 CM/SEC
BH CV ECHO MEAS - RV V1 MEAN: 68.6 CM/SEC
BH CV ECHO MEAS - RV V1 VTI: 19 CM
BH CV ECHO MEAS - RVDD: 2.8 CM
BH CV ECHO MEAS - SI(AO): 269.4 ML/M^2
BH CV ECHO MEAS - SI(CUBED): 14.2 ML/M^2
BH CV ECHO MEAS - SI(LVOT): 72.1 ML/M^2
BH CV ECHO MEAS - SI(MOD-SP4): 32.5 ML/M^2
BH CV ECHO MEAS - SI(TEICH): 14.6 ML/M^2
BH CV ECHO MEAS - SV(AO): 482.1 ML
BH CV ECHO MEAS - SV(CUBED): 25.5 ML
BH CV ECHO MEAS - SV(LVOT): 129.1 ML
BH CV ECHO MEAS - SV(MOD-SP4): 58.1 ML
BH CV ECHO MEAS - SV(TEICH): 26.1 ML
BUN SERPL-MCNC: 35 MG/DL (ref 8–23)
BUN/CREAT SERPL: 17.3 (ref 7–25)
CALCIUM SPEC-SCNC: 8.4 MG/DL (ref 8.6–10.5)
CHLORIDE SERPL-SCNC: 106 MMOL/L (ref 98–107)
CHOLEST SERPL-MCNC: 208 MG/DL (ref 0–200)
CO2 SERPL-SCNC: 22 MMOL/L (ref 22–29)
CREAT SERPL-MCNC: 2.02 MG/DL (ref 0.57–1)
DEPRECATED RDW RBC AUTO: 45.1 FL (ref 37–54)
EOSINOPHIL # BLD AUTO: 0 10*3/MM3 (ref 0–0.4)
EOSINOPHIL NFR BLD AUTO: 1 % (ref 0.3–6.2)
ERYTHROCYTE [DISTWIDTH] IN BLOOD BY AUTOMATED COUNT: 15.2 % (ref 12.3–15.4)
GFR SERPL CREATININE-BSD FRML MDRD: 24 ML/MIN/1.73
GFR SERPL CREATININE-BSD FRML MDRD: 29 ML/MIN/1.73
GLUCOSE BLDC GLUCOMTR-MCNC: 131 MG/DL (ref 70–105)
GLUCOSE BLDC GLUCOMTR-MCNC: 161 MG/DL (ref 70–105)
GLUCOSE BLDC GLUCOMTR-MCNC: 165 MG/DL (ref 70–105)
GLUCOSE BLDC GLUCOMTR-MCNC: 92 MG/DL (ref 70–105)
GLUCOSE BLDC GLUCOMTR-MCNC: 99 MG/DL (ref 70–105)
GLUCOSE SERPL-MCNC: 174 MG/DL (ref 65–99)
HBA1C MFR BLD: 5.9 % (ref 3.5–5.6)
HCT VFR BLD AUTO: 29.5 % (ref 34–46.6)
HDLC SERPL-MCNC: 71 MG/DL (ref 40–60)
HGB BLD-MCNC: 9.6 G/DL (ref 12–15.9)
LDLC SERPL CALC-MCNC: 120 MG/DL (ref 0–100)
LDLC/HDLC SERPL: 1.66 {RATIO}
LYMPHOCYTES # BLD AUTO: 1.2 10*3/MM3 (ref 0.7–3.1)
LYMPHOCYTES NFR BLD AUTO: 30.9 % (ref 19.6–45.3)
MCH RBC QN AUTO: 27.3 PG (ref 26.6–33)
MCHC RBC AUTO-ENTMCNC: 32.4 G/DL (ref 31.5–35.7)
MCV RBC AUTO: 84.3 FL (ref 79–97)
MONOCYTES # BLD AUTO: 0.3 10*3/MM3 (ref 0.1–0.9)
MONOCYTES NFR BLD AUTO: 6.8 % (ref 5–12)
NEUTROPHILS NFR BLD AUTO: 2.3 10*3/MM3 (ref 1.7–7)
NEUTROPHILS NFR BLD AUTO: 60.8 % (ref 42.7–76)
NRBC BLD AUTO-RTO: 0.3 /100 WBC (ref 0–0.2)
PLATELET # BLD AUTO: 199 10*3/MM3 (ref 140–450)
PMV BLD AUTO: 7.7 FL (ref 6–12)
POTASSIUM SERPL-SCNC: 3.9 MMOL/L (ref 3.5–5.2)
QT INTERVAL: 360 MS
QT INTERVAL: 363 MS
RBC # BLD AUTO: 3.5 10*6/MM3 (ref 3.77–5.28)
SODIUM SERPL-SCNC: 139 MMOL/L (ref 136–145)
TRIGL SERPL-MCNC: 94 MG/DL (ref 0–150)
TROPONIN T SERPL-MCNC: 0.02 NG/ML (ref 0–0.03)
VLDLC SERPL-MCNC: 17 MG/DL (ref 5–40)
WBC # BLD AUTO: 3.8 10*3/MM3 (ref 3.4–10.8)

## 2021-04-16 PROCEDURE — 25010000002 ENOXAPARIN PER 10 MG: Performed by: PHYSICIAN ASSISTANT

## 2021-04-16 PROCEDURE — 25010000002 CEFTRIAXONE PER 250 MG: Performed by: INTERNAL MEDICINE

## 2021-04-16 PROCEDURE — 93005 ELECTROCARDIOGRAM TRACING: CPT | Performed by: PHYSICIAN ASSISTANT

## 2021-04-16 PROCEDURE — 94799 UNLISTED PULMONARY SVC/PX: CPT

## 2021-04-16 PROCEDURE — 99232 SBSQ HOSP IP/OBS MODERATE 35: CPT | Performed by: INTERNAL MEDICINE

## 2021-04-16 PROCEDURE — 25010000002 AZITHROMYCIN PER 500 MG: Performed by: INTERNAL MEDICINE

## 2021-04-16 PROCEDURE — 85025 COMPLETE CBC W/AUTO DIFF WBC: CPT | Performed by: PHYSICIAN ASSISTANT

## 2021-04-16 PROCEDURE — G0378 HOSPITAL OBSERVATION PER HR: HCPCS

## 2021-04-16 PROCEDURE — 82962 GLUCOSE BLOOD TEST: CPT

## 2021-04-16 PROCEDURE — 97162 PT EVAL MOD COMPLEX 30 MIN: CPT

## 2021-04-16 PROCEDURE — 83036 HEMOGLOBIN GLYCOSYLATED A1C: CPT | Performed by: PHYSICIAN ASSISTANT

## 2021-04-16 PROCEDURE — 80061 LIPID PANEL: CPT | Performed by: PHYSICIAN ASSISTANT

## 2021-04-16 PROCEDURE — 94640 AIRWAY INHALATION TREATMENT: CPT

## 2021-04-16 PROCEDURE — 93306 TTE W/DOPPLER COMPLETE: CPT

## 2021-04-16 PROCEDURE — 93010 ELECTROCARDIOGRAM REPORT: CPT | Performed by: INTERNAL MEDICINE

## 2021-04-16 PROCEDURE — 80048 BASIC METABOLIC PNL TOTAL CA: CPT | Performed by: PHYSICIAN ASSISTANT

## 2021-04-16 PROCEDURE — 93306 TTE W/DOPPLER COMPLETE: CPT | Performed by: INTERNAL MEDICINE

## 2021-04-16 PROCEDURE — 84484 ASSAY OF TROPONIN QUANT: CPT | Performed by: PHYSICIAN ASSISTANT

## 2021-04-16 PROCEDURE — 25010000002 ONDANSETRON PER 1 MG: Performed by: PHYSICIAN ASSISTANT

## 2021-04-16 RX ORDER — HYDRALAZINE HYDROCHLORIDE 10 MG/1
10 TABLET, FILM COATED ORAL 3 TIMES DAILY
Status: DISCONTINUED | OUTPATIENT
Start: 2021-04-16 | End: 2021-04-17

## 2021-04-16 RX ORDER — ACETAMINOPHEN 160 MG/5ML
650 SOLUTION ORAL EVERY 4 HOURS PRN
Status: DISCONTINUED | OUTPATIENT
Start: 2021-04-16 | End: 2021-04-21 | Stop reason: HOSPADM

## 2021-04-16 RX ORDER — ACETAMINOPHEN 650 MG/1
650 SUPPOSITORY RECTAL EVERY 4 HOURS PRN
Status: DISCONTINUED | OUTPATIENT
Start: 2021-04-16 | End: 2021-04-21 | Stop reason: HOSPADM

## 2021-04-16 RX ORDER — ASPIRIN 81 MG/1
81 TABLET, CHEWABLE ORAL DAILY
Status: DISCONTINUED | OUTPATIENT
Start: 2021-04-16 | End: 2021-04-21 | Stop reason: HOSPADM

## 2021-04-16 RX ORDER — LOSARTAN POTASSIUM 50 MG/1
100 TABLET ORAL DAILY
Status: DISCONTINUED | OUTPATIENT
Start: 2021-04-16 | End: 2021-04-16

## 2021-04-16 RX ORDER — INSULIN LISPRO 100 [IU]/ML
0-9 INJECTION, SOLUTION INTRAVENOUS; SUBCUTANEOUS AS NEEDED
Status: DISCONTINUED | OUTPATIENT
Start: 2021-04-16 | End: 2021-04-21 | Stop reason: HOSPADM

## 2021-04-16 RX ORDER — ONDANSETRON 2 MG/ML
4 INJECTION INTRAMUSCULAR; INTRAVENOUS EVERY 6 HOURS PRN
Status: DISCONTINUED | OUTPATIENT
Start: 2021-04-16 | End: 2021-04-21 | Stop reason: HOSPADM

## 2021-04-16 RX ORDER — ONDANSETRON 4 MG/1
4 TABLET, FILM COATED ORAL EVERY 6 HOURS PRN
Status: DISCONTINUED | OUTPATIENT
Start: 2021-04-16 | End: 2021-04-21 | Stop reason: HOSPADM

## 2021-04-16 RX ORDER — ATORVASTATIN CALCIUM 40 MG/1
80 TABLET, FILM COATED ORAL DAILY
Status: DISCONTINUED | OUTPATIENT
Start: 2021-04-16 | End: 2021-04-21 | Stop reason: HOSPADM

## 2021-04-16 RX ORDER — IPRATROPIUM BROMIDE AND ALBUTEROL SULFATE 2.5; .5 MG/3ML; MG/3ML
3 SOLUTION RESPIRATORY (INHALATION)
Status: DISCONTINUED | OUTPATIENT
Start: 2021-04-16 | End: 2021-04-21 | Stop reason: HOSPADM

## 2021-04-16 RX ORDER — CHOLECALCIFEROL (VITAMIN D3) 125 MCG
5 CAPSULE ORAL NIGHTLY PRN
Status: DISCONTINUED | OUTPATIENT
Start: 2021-04-16 | End: 2021-04-21 | Stop reason: HOSPADM

## 2021-04-16 RX ORDER — NITROGLYCERIN 0.4 MG/1
0.4 TABLET SUBLINGUAL
Status: DISCONTINUED | OUTPATIENT
Start: 2021-04-16 | End: 2021-04-21 | Stop reason: HOSPADM

## 2021-04-16 RX ORDER — NICOTINE POLACRILEX 4 MG
15 LOZENGE BUCCAL
Status: DISCONTINUED | OUTPATIENT
Start: 2021-04-16 | End: 2021-04-21 | Stop reason: HOSPADM

## 2021-04-16 RX ORDER — LABETALOL HYDROCHLORIDE 5 MG/ML
10 INJECTION, SOLUTION INTRAVENOUS ONCE
Status: COMPLETED | OUTPATIENT
Start: 2021-04-16 | End: 2021-04-18

## 2021-04-16 RX ORDER — AZITHROMYCIN 250 MG/1
500 TABLET, FILM COATED ORAL DAILY
Status: DISCONTINUED | OUTPATIENT
Start: 2021-04-17 | End: 2021-04-16

## 2021-04-16 RX ORDER — INSULIN LISPRO 100 [IU]/ML
0-9 INJECTION, SOLUTION INTRAVENOUS; SUBCUTANEOUS
Status: DISCONTINUED | OUTPATIENT
Start: 2021-04-16 | End: 2021-04-21 | Stop reason: HOSPADM

## 2021-04-16 RX ORDER — GLIPIZIDE 5 MG/1
2.5 TABLET ORAL
Status: DISCONTINUED | OUTPATIENT
Start: 2021-04-16 | End: 2021-04-17

## 2021-04-16 RX ORDER — SODIUM CHLORIDE 0.9 % (FLUSH) 0.9 %
10 SYRINGE (ML) INJECTION EVERY 12 HOURS SCHEDULED
Status: DISCONTINUED | OUTPATIENT
Start: 2021-04-16 | End: 2021-04-21 | Stop reason: HOSPADM

## 2021-04-16 RX ORDER — ACETAMINOPHEN 325 MG/1
650 TABLET ORAL EVERY 4 HOURS PRN
Status: DISCONTINUED | OUTPATIENT
Start: 2021-04-16 | End: 2021-04-21 | Stop reason: HOSPADM

## 2021-04-16 RX ORDER — AMLODIPINE BESYLATE 5 MG/1
10 TABLET ORAL DAILY
Status: DISCONTINUED | OUTPATIENT
Start: 2021-04-16 | End: 2021-04-21 | Stop reason: HOSPADM

## 2021-04-16 RX ORDER — SODIUM CHLORIDE 0.9 % (FLUSH) 0.9 %
10 SYRINGE (ML) INJECTION AS NEEDED
Status: DISCONTINUED | OUTPATIENT
Start: 2021-04-16 | End: 2021-04-21 | Stop reason: HOSPADM

## 2021-04-16 RX ORDER — DEXTROSE MONOHYDRATE 25 G/50ML
25 INJECTION, SOLUTION INTRAVENOUS
Status: DISCONTINUED | OUTPATIENT
Start: 2021-04-16 | End: 2021-04-21 | Stop reason: HOSPADM

## 2021-04-16 RX ORDER — HYDRALAZINE HYDROCHLORIDE 25 MG/1
25 TABLET, FILM COATED ORAL 3 TIMES DAILY
Status: DISCONTINUED | OUTPATIENT
Start: 2021-04-16 | End: 2021-04-16

## 2021-04-16 RX ADMIN — HYDRALAZINE HYDROCHLORIDE 10 MG: 10 TABLET, FILM COATED ORAL at 15:39

## 2021-04-16 RX ADMIN — ATORVASTATIN CALCIUM 80 MG: 40 TABLET, FILM COATED ORAL at 10:03

## 2021-04-16 RX ADMIN — IPRATROPIUM BROMIDE AND ALBUTEROL SULFATE 3 ML: 2.5; .5 SOLUTION RESPIRATORY (INHALATION) at 21:08

## 2021-04-16 RX ADMIN — Medication 10 ML: at 01:56

## 2021-04-16 RX ADMIN — IPRATROPIUM BROMIDE AND ALBUTEROL SULFATE 3 ML: 2.5; .5 SOLUTION RESPIRATORY (INHALATION) at 14:43

## 2021-04-16 RX ADMIN — ASPIRIN 81 MG CHEWABLE TABLET 81 MG: 81 TABLET CHEWABLE at 10:03

## 2021-04-16 RX ADMIN — HYDRALAZINE HYDROCHLORIDE 25 MG: 25 TABLET, FILM COATED ORAL at 10:03

## 2021-04-16 RX ADMIN — AMLODIPINE BESYLATE 10 MG: 5 TABLET ORAL at 10:03

## 2021-04-16 RX ADMIN — HYDRALAZINE HYDROCHLORIDE 10 MG: 10 TABLET, FILM COATED ORAL at 20:30

## 2021-04-16 RX ADMIN — SODIUM CHLORIDE 500 MG: 900 INJECTION, SOLUTION INTRAVENOUS at 10:04

## 2021-04-16 RX ADMIN — ENOXAPARIN SODIUM 30 MG: 30 INJECTION SUBCUTANEOUS at 15:40

## 2021-04-16 RX ADMIN — Medication 10 ML: at 10:05

## 2021-04-16 RX ADMIN — CEFTRIAXONE SODIUM 1 G: 1 INJECTION, POWDER, FOR SOLUTION INTRAMUSCULAR; INTRAVENOUS at 12:20

## 2021-04-16 RX ADMIN — Medication 10 ML: at 20:30

## 2021-04-16 RX ADMIN — IPRATROPIUM BROMIDE AND ALBUTEROL SULFATE 3 ML: 2.5; .5 SOLUTION RESPIRATORY (INHALATION) at 11:00

## 2021-04-16 RX ADMIN — GLIPIZIDE 2.5 MG: 5 TABLET ORAL at 10:06

## 2021-04-16 RX ADMIN — ONDANSETRON 4 MG: 2 INJECTION INTRAMUSCULAR; INTRAVENOUS at 10:58

## 2021-04-16 NOTE — PLAN OF CARE
79 yo female, admitting dx of dyspnea and HTN.  Pt admitted and oriented to room and unit.  Pt non-English speaking, family member at bedside at all times for interpretation with success.  Upon arrival to unit, no external s/s of respiratory distress, pt calm and relaxed, O2 sats within normal limits, pt does not take home O2 at home.  Pt's BP on arrival to unit 145/63, close monitoring in place.  Pt resting well in bed, family at bedside, no further acute issues, will continue to monitor and observe.    Problem: Adult Inpatient Plan of Care  Goal: Plan of Care Review  Outcome: Ongoing, Progressing  Flowsheets (Taken 4/16/2021 0810)  Progress: no change  Plan of Care Reviewed With: family     Problem: Hypertension Comorbidity  Goal: Blood Pressure in Desired Range  Outcome: Ongoing, Progressing

## 2021-04-16 NOTE — PROGRESS NOTES
[facility] Medicine Services Daily Progress Note      Hospitalist Team  LOS 1 days      Patient Care Team:  Brian Nazario APRN as PCP - General    Patient Location: 374/1      Subjective   Subjective     Chief Complaint / Subjective  Chief Complaint   Patient presents with   • Shortness of Breath     SOA, Chills, not as active as normal. sleeping alot. sent in by PMDANII kerr at beside to translate.         Brief Synopsis of Hospital Course/HPI    Ms. Woodward is a 80 y.o. female with past medical history of hypertension, diabetes, hyperlipidemia and CKD who presents to [facility] complaining of dyspnea and generalized weakness/lethargy.  Patient is non-English speaking,but  Her   Family   Who  Speaks  English  Is  At   The bedside     She reports that for the past 2 days she has had increasing weakness as well as dyspnea specifically dyspnea on exertion along with 2 episodes of nausea and nonbloody vomiting and occasional sensation of palpitations.  Family reports that patient was recently seen by her nephrologist and found to have poorly controlled hypertension and hydralazine dosing was increased. She denies chest pain, cough or fever, peripheral edema, syncope or near syncope are reported.  They do note some chronic constipation which is unchanged from baseline and patient reports no changes in her urinary habits.  She does not smoke or drink alcohol and patient and family confirm compliance with all of her outpatient medical therapies.     Initial vital signs: Blood pressure: 211/67, heart rate: 72, respirations: 24, O2 sat 99% on room air, temp 97.8.  In the ED patient did have lab significant for troponin of 0.018, proBNP: 3125.0 9 CO2: 21.5, chloride: 111, creatinine: 1.94, BUN: 38, hemoglobin: 11.6. Lipase: 146, procalcitonin: 0.08. UA shows 3+ protein with trace glucose but is otherwise unremarkable. EKG shows sinus rhythm at 72 with some ST depression in lead I, II,  "V4, V5 and V6 with no noted ectopy, what appears to be a left bundle branch block and a QTC of 386 ms with no EKG for comparison.     CT of abdomen and pelvis shows infiltrates noted throughout the lung bases suggesting developing multifocal pneumonia versus pulmonary edema with cardiomegaly also present. No evidence of significant nephrolithiasis or obstructive uropathy is noted. No acute abdominal abnormality is reported. Chest x-ray reported with cardiomegaly but no definitive evidence of cardiopulmonary process. Respiratory viral panel including COVID-19 is negative.    Patient was admitted for further management    Date:: 4/16/2021  Patient seen today at bedside she reports generalized weakness.  She denies any chest pains no shortness of breath.  Blood pressure is better controlled.  Her family who speaks English is at bedside doing translation.  She has no new complaints.        Review of Systems   Constitutional: Positive for malaise/fatigue.   HENT: Negative.    Cardiovascular: Negative.    Respiratory: Negative.    Skin: Negative.    Gastrointestinal: Negative.    Neurological: Negative.          Objective   Objective      Vital Signs  Temp:  [97.8 °F (36.6 °C)-98.7 °F (37.1 °C)] 98.7 °F (37.1 °C)  Heart Rate:  [60-76] 60  Resp:  [14-24] 18  BP: (144-215)/(55-76) 145/56  Oxygen Therapy  SpO2: 93 %  Pulse Oximetry Type: Intermittent  Device (Oxygen Therapy): room air  Flowsheet Rows      First Filed Value   Admission Height  162 cm (63.78\") Documented at 04/15/2021 1816   Admission Weight  68.7 kg (151 lb 7.3 oz) Documented at 04/15/2021 1816        Intake & Output (last 3 days)       04/13 0701 - 04/14 0700 04/14 0701 - 04/15 0700 04/15 0701 - 04/16 0700 04/16 0701 - 04/17 0700            Urine Unmeasured Occurrence   0 x         Lines, Drains & Airways    Active LDAs     Name:   Placement date:   Placement time:   Site:   Days:    Peripheral IV 04/15/21 1835 Right Hand   04/15/21    1835    Hand   less " than 1                  Physical Exam:    Physical Exam  Vitals reviewed.   Constitutional:       Appearance: Normal appearance.   HENT:      Head: Normocephalic and atraumatic.      Mouth/Throat:      Mouth: Mucous membranes are moist.   Eyes:      Pupils: Pupils are equal, round, and reactive to light.   Cardiovascular:      Rate and Rhythm: Normal rate and regular rhythm.      Heart sounds: Normal heart sounds.   Pulmonary:      Effort: Pulmonary effort is normal.      Breath sounds: Normal breath sounds.   Abdominal:      General: Abdomen is flat. Bowel sounds are normal.      Palpations: Abdomen is soft.   Musculoskeletal:         General: Normal range of motion.      Cervical back: Normal range of motion and neck supple.   Skin:     General: Skin is warm and dry.      Capillary Refill: Capillary refill takes less than 2 seconds.   Neurological:      General: No focal deficit present.      Mental Status: She is alert and oriented to person, place, and time. Mental status is at baseline.             Results Review:     I reviewed the patient's new clinical results.      Lab Results (last 24 hours)     Procedure Component Value Units Date/Time    POC Glucose Once [[id number]]  (Normal) Collected: 04/16/21 0719    Specimen: Blood Updated: 04/16/21 0720     Glucose 99 mg/dL      Comment: Serial Number: [id number]:  [id number]       Basic Metabolic Panel [[id number]]  (Abnormal) Collected: 04/16/21 0042    Specimen: Blood Updated: 04/16/21 0122     Glucose 174 mg/dL      BUN 35 mg/dL      Creatinine 2.02 mg/dL      Sodium 139 mmol/L      Potassium 3.9 mmol/L      Chloride 106 mmol/L      CO2 22.0 mmol/L      Calcium 8.4 mg/dL      eGFR  African Amer 29 mL/min/1.73      eGFR Non African Amer 24 mL/min/1.73      BUN/Creatinine Ratio 17.3     Anion Gap 11.0 mmol/L     Narrative:      GFR Normal >60  Chronic Kidney Disease <60  Kidney Failure <15      Lipid Panel [[id number]]  (Abnormal) Collected: 04/16/21 0042     Specimen: Blood Updated: 04/16/21 0122     Total Cholesterol 208 mg/dL      Triglycerides 94 mg/dL      HDL Cholesterol 71 mg/dL      LDL Cholesterol  120 mg/dL      VLDL Cholesterol 17 mg/dL      LDL/HDL Ratio 1.66    Narrative:      Cholesterol Reference Ranges  (U.S. Department of Health and Human Services ATP III Classifications)    Desirable          <200 mg/dL  Borderline High    200-239 mg/dL  High Risk          >240 mg/dL      Triglyceride Reference Ranges  (U.S. Department of Health and Human Services ATP III Classifications)    Normal           <150 mg/dL  Borderline High  150-199 mg/dL  High             200-499 mg/dL  Very High        >500 mg/dL    HDL Reference Ranges  (U.S. Department of Health and Human Services ATP III Classifcations)    Low     <40 mg/dl (major risk factor for CHD)  High    >60 mg/dl ('negative' risk factor for CHD)        LDL Reference Ranges  (U.S. Department of Health and Human Services ATP III Classifcations)    Optimal          <100 mg/dL  Near Optimal     100-129 mg/dL  Borderline High  130-159 mg/dL  High             160-189 mg/dL  Very High        >189 mg/dL    Troponin [[id number]]  (Normal) Collected: 04/16/21 0042    Specimen: Blood Updated: 04/16/21 0122     Troponin T 0.016 ng/mL     Narrative:      Troponin T Reference Range:  <= 0.03 ng/mL-   Negative for AMI  >0.03 ng/mL-     Abnormal for myocardial necrosis.  Clinicians would have to utilize clinical acumen, EKG, Troponin and serial changes to determine if it is an Acute Myocardial Infarction or myocardial injury due to an underlying chronic condition.       Results may be falsely decreased if patient taking Biotin.      CBC & Differential [[id number]]  (Abnormal) Collected: 04/16/21 0042    Specimen: Blood Updated: 04/16/21 0110    Narrative:      The following orders were created for panel order CBC & Differential.  Procedure                               Abnormality         Status                     ---------                                -----------         ------                     CBC Auto Differential[[id number]]        Abnormal            Final result                 Please view results for these tests on the individual orders.    CBC Auto Differential [[id number]]  (Abnormal) Collected: 04/16/21 0042    Specimen: Blood Updated: 04/16/21 0110     WBC 3.80 10*3/mm3      RBC 3.50 10*6/mm3      Hemoglobin 9.6 g/dL      Comment: Result checked         Hematocrit 29.5 %      MCV 84.3 fL      MCH 27.3 pg      MCHC 32.4 g/dL      RDW 15.2 %      RDW-SD 45.1 fl      MPV 7.7 fL      Platelets 199 10*3/mm3      Neutrophil % 60.8 %      Lymphocyte % 30.9 %      Monocyte % 6.8 %      Eosinophil % 1.0 %      Basophil % 0.5 %      Neutrophils, Absolute 2.30 10*3/mm3      Lymphocytes, Absolute 1.20 10*3/mm3      Monocytes, Absolute 0.30 10*3/mm3      Eosinophils, Absolute 0.00 10*3/mm3      Basophils, Absolute 0.00 10*3/mm3      nRBC 0.3 /100 WBC     Hemoglobin A1c [[id number]] Collected: 04/16/21 0042    Specimen: Blood Updated: 04/16/21 0054    POC Glucose Once [[id number]]  (Abnormal) Collected: 04/16/21 0039    Specimen: Blood Updated: 04/16/21 0040     Glucose 161 mg/dL      Comment: Serial Number: [id number]:  [id number]       Procalcitonin [[id number]]  (Normal) Collected: 04/15/21 1836    Specimen: Blood Updated: 04/15/21 2024     Procalcitonin 0.08 ng/mL     Narrative:      As a Marker for Sepsis (Non-Neonates):     1. <0.5 ng/mL represents a low risk of severe sepsis and/or septic shock.  2. >2 ng/mL represents a high risk of severe sepsis and/or septic shock.    As a Marker for Lower Respiratory Tract Infections that require antibiotic therapy:  PCT on Admission     Antibiotic Therapy             6-12 Hrs later  >0.5                          Strongly Recommended            >0.25 - <0.5             Recommended  0.1 - 0.25                  Discouraged                       Remeasure/reassess PCT  <0.1                          "Strongly Discouraged         Remeasure/reassess PCT      As 28 day mortality risk marker: \"Change in Procalcitonin Result\" (>80% or <=80%) if Day 0 (or Day 1) and Day 4 values are available. Refer to [email or web address]-[email or web address]    Change in PCT <=80 %   A decrease of PCT levels below or equal to 80% defines a positive change in PCT test result representing a higher risk for 28-day all-cause mortality of patients diagnosed with severe sepsis or septic shock.    Change in PCT >80 %   A decrease of PCT levels of more than 80% defines a negative change in PCT result representing a lower risk for 28-day all-cause mortality of patients diagnosed with severe sepsis or septic shock.              Results may be falsely decreased if patient taking Biotin.     Respiratory Panel PCR w/COVID-19(SARS-CoV-2) VÍCTOR/DAYNA/JADYN/PAD/COR/MAD/CHIRAG In-House, NP Swab in UTM/VTM, 3-4 HR TAT - Swab, Nasopharynx [[id number]]  (Normal) Collected: 04/15/21 1836    Specimen: Swab from Nasopharynx Updated: 04/15/21 2016     ADENOVIRUS, PCR Not Detected     Coronavirus 229E Not Detected     Coronavirus HKU1 Not Detected     Coronavirus NL63 Not Detected     Coronavirus OC43 Not Detected     COVID19 Not Detected     Human Metapneumovirus Not Detected     Human Rhinovirus/Enterovirus Not Detected     Influenza A PCR Not Detected     Influenza B PCR Not Detected     Parainfluenza Virus 1 Not Detected     Parainfluenza Virus 2 Not Detected     Parainfluenza Virus 3 Not Detected     Parainfluenza Virus 4 Not Detected     RSV, PCR Not Detected     Bordetella pertussis pcr Not Detected     Bordetella parapertussis PCR Not Detected     Chlamydophila pneumoniae PCR Not Detected     Mycoplasma pneumo by PCR Not Detected    Narrative:      Fact sheet for providers: https://docs.[employer]/wp-content/uploads/WAA7716-0815-KR4.1-EUA-Provider-Fact-Sheet-3.pdf    Fact sheet for patients: " https://docs.[employer]/wp-content/uploads/IUY1533-2844-NJ2.1-EUA-Patient-Fact-Sheet-1.pdf    Test performed by PCR.    Extra Tubes [[id number]] Collected: 04/15/21 1836    Specimen: Blood, Venous Line Updated: 04/15/21 1945    Narrative:      The following orders were created for panel order Extra Tubes.  Procedure                               Abnormality         Status                     ---------                               -----------         ------                     Light Blue Top[[id number]]                                   Final result               Lavender Top[[id number]]                                     Final result               Gold Top - SST[[id number]]                                   Final result                 Please view results for these tests on the individual orders.    Lavender Top [[id number]] Collected: 04/15/21 1836    Specimen: Blood Updated: 04/15/21 1945     Extra Tube hold for add-on     Comment: Auto resulted       Light Blue Top [[id number]] Collected: 04/15/21 1836    Specimen: Blood Updated: 04/15/21 1945     Extra Tube hold for add-on     Comment: Auto resulted       Gold Top - SST [[id number]] Collected: 04/15/21 1836    Specimen: Blood Updated: 04/15/21 1945     Extra Tube Hold for add-ons.     Comment: Auto resulted.       BNP [[id number]]  (Abnormal) Collected: 04/15/21 1836    Specimen: Blood Updated: 04/15/21 1904     proBNP 3,125.0 pg/mL     Narrative:      Among patients with dyspnea, NT-proBNP is highly sensitive for the detection of acute congestive heart failure. In addition NT-proBNP of <300 pg/ml effectively rules out acute congestive heart failure with 99% negative predictive value.    Results may be falsely decreased if patient taking Biotin.      Troponin [[id number]]  (Normal) Collected: 04/15/21 1836    Specimen: Blood Updated: 04/15/21 1904     Troponin T 0.018 ng/mL     Narrative:      Troponin T Reference Range:  <= 0.03 ng/mL-   Negative for AMI  >0.03  ng/mL-     Abnormal for myocardial necrosis.  Clinicians would have to utilize clinical acumen, EKG, Troponin and serial changes to determine if it is an Acute Myocardial Infarction or myocardial injury due to an underlying chronic condition.       Results may be falsely decreased if patient taking Biotin.      Hepatic Function Panel [[id number]] Collected: 04/15/21 1836    Specimen: Blood Updated: 04/15/21 1902     Total Protein 6.8 g/dL      Albumin 3.50 g/dL      ALT (SGPT) 28 U/L      AST (SGOT) 31 U/L      Alkaline Phosphatase 91 U/L      Total Bilirubin 0.3 mg/dL      Bilirubin, Direct <0.2 mg/dL      Bilirubin, Indirect --     Comment: Unable to calculate       Lipase [[id number]]  (Abnormal) Collected: 04/15/21 1836    Specimen: Blood Updated: 04/15/21 1902     Lipase 146 U/L         No results found for: HGBA1C            Lab Results   Component Value Date    LIPASE 146 (H) 04/15/2021     Lab Results   Component Value Date    CHOL 208 (H) 04/16/2021    TRIG 94 04/16/2021    HDL 71 (H) 04/16/2021     (H) 04/16/2021       No results found for: INTRAOP, PREDX, FINALDX, COMDX    Microbiology Results (last 10 days)     Procedure Component Value - Date/Time    Respiratory Panel PCR w/COVID-19(SARS-CoV-2) VÍCTOR/DAYNA/JADYN/PAD/COR/MAD/CHIRAG In-House, NP Swab in UTM/VTM, 3-4 HR TAT - Swab, Nasopharynx [[id number]]  (Normal) Collected: 04/15/21 1836    Lab Status: Final result Specimen: Swab from Nasopharynx Updated: 04/15/21 2016     ADENOVIRUS, PCR Not Detected     Coronavirus 229E Not Detected     Coronavirus HKU1 Not Detected     Coronavirus NL63 Not Detected     Coronavirus OC43 Not Detected     COVID19 Not Detected     Human Metapneumovirus Not Detected     Human Rhinovirus/Enterovirus Not Detected     Influenza A PCR Not Detected     Influenza B PCR Not Detected     Parainfluenza Virus 1 Not Detected     Parainfluenza Virus 2 Not Detected     Parainfluenza Virus 3 Not Detected     Parainfluenza Virus 4 Not  Detected     RSV, PCR Not Detected     Bordetella pertussis pcr Not Detected     Bordetella parapertussis PCR Not Detected     Chlamydophila pneumoniae PCR Not Detected     Mycoplasma pneumo by PCR Not Detected    Narrative:      Fact sheet for providers: https://docs."[employer]/wp-content/uploads/EIQ5162-7442-SL9.1-EUA-Provider-Fact-Sheet-3.pdf    Fact sheet for patients: https://docs."[employer]/wp-content/uploads/XTF5634-4849-WP7.1-EUA-Patient-Fact-Sheet-1.pdf    Test performed by PCR.          ECG/EMG Results (most recent)     Procedure Component Value Units Date/Time    ECG 12 Lead [[id number]] Collected: 04/15/21 1848     Updated: 04/15/21 1850     QT Interval 363 ms     Narrative:      HEART RATE= 72  bpm  RR Interval= 832  ms  WI Interval= 226  ms  P Horizontal Axis= -58  deg  P Front Axis= 40  deg  QRSD Interval= 94  ms  QT Interval= 363  ms  QRS Axis= 48  deg  T Wave Axis= 251  deg  - ABNORMAL ECG -  Sinus rhythm  Prolonged WI interval  LVH with secondary repolarization abnormality  No previous ECG available for comparison  Electronically Signed By:   Date and Time of Study: 2021-04-15 18:48:44    ECG 12 Lead [[id number]] Collected: 04/16/21 0305     Updated: 04/16/21 0306     QT Interval 360 ms     Narrative:      HEART RATE= 59  bpm  RR Interval= 1012  ms  WI Interval= 224  ms  P Horizontal Axis= -75  deg  P Front Axis= 50  deg  QRSD Interval= 94  ms  QT Interval= 360  ms  QRS Axis= 63  deg  T Wave Axis= 247  deg  - ABNORMAL ECG -  Sinus bradycardia  Prolonged WI interval  Probable left ventricular hypertrophy  Anterior Q waves, possibly due to LVH  Abnormal T, consider ischemia, diffuse leads  Electronically Signed By:   Date and Time of Study: 2021-04-16 03:05:03                  CT Abdomen Pelvis Without Contrast    Result Date: 4/15/2021  1.Infiltrates are noted throughout the lung bases suggesting developing multifocal pneumonia versus pulmonary edema. Cardiomegaly is noted. 2.No evidence for  significant nephrolithiasis. There is no evidence for obstructive uropathy. 3.No evidence for acute abnormality throughout the abdomen or pelvis on this noncontrast exam.  Electronically Signed By-Jim Montano MD On:4/15/2021 7:45 PM This report was finalized on [id number] by  Jim Montano MD.    XR Chest 1 View    Result Date: 4/15/2021  1.No definitive evidence for acute cardiopulmonary process. 2.Note is made of cardiomegaly.  Electronically Signed By-Jim Montano MD On:4/15/2021 6:47 PM This report was finalized on [id number] by  Jim Montano MD.          Xrays, labs reviewed personally by physician.    Medication Review:   I have reviewed the patient's current medication list      Scheduled Meds  amLODIPine, 10 mg, Oral, Daily  aspirin, 81 mg, Oral, Daily  atorvastatin, 80 mg, Oral, Daily  enoxaparin, 30 mg, Subcutaneous, Q24H  hydrALAZINE, 25 mg, Oral, TID  insulin lispro, 0-9 Units, Subcutaneous, TID AC  labetalol, 10 mg, Intravenous, Once  losartan, 100 mg, Oral, Daily  sodium chloride, 10 mL, Intravenous, Q12H        Meds Infusions       Meds PRN  •  acetaminophen **OR** acetaminophen **OR** acetaminophen  •  dextrose  •  dextrose  •  glucagon (human recombinant)  •  insulin lispro **AND** insulin lispro  •  melatonin  •  nitroglycerin  •  ondansetron **OR** ondansetron  •  [COMPLETED] Insert peripheral IV **AND** sodium chloride  •  sodium chloride        Assessment/Plan   Assessment/Plan     Active Hospital Problems    Diagnosis  POA   • **Hypertensive emergency [I16.1]  Yes   • Hyperlipidemia [E78.5]  Yes   • Essential hypertension [I10]  Yes   • Type 2 diabetes mellitus (CMS/[facility]) [E11.9]  Yes   • Anemia [D64.9]  Yes   • CKD (chronic kidney disease) stage 3, GFR 30-59 ml/min (CMS/[facility]) [N18.30]  Yes   • Dyspnea [R06.00]  Yes      Resolved Hospital Problems   No resolved problems to display.       MEDICAL DECISION MAKING COMPLEXITY BY PROBLEM:       Ms. Woodward is a 80 y.o. female with past  medical history of hypertension, diabetes, hyperlipidemia and CKD who presents to [facility] complaining of dyspnea and generalized weakness/lethargy.  Patient is non-English speaking,but  Her   Family   Who  Speaks  English  Is  At   The bedside     She reports that for the past 2 days she has had increasing weakness as well as dyspnea specifically dyspnea on exertion along with 2 episodes of nausea and nonbloody vomiting and occasional sensation of palpitations.  Family reports that patient was recently seen by her nephrologist and found to have poorly controlled hypertension and hydralazine dosing was increased. She denies chest pain, cough or fever, peripheral edema, syncope or near syncope are reported.  They do note some chronic constipation which is unchanged from baseline and patient reports no changes in her urinary habits.  She does not smoke or drink alcohol and patient and family confirm compliance with all of her outpatient medical therapies.     Initial vital signs: Blood pressure: 211/67, heart rate: 72, respirations: 24, O2 sat 99% on room air, temp 97.8.  In the ED patient did have lab significant for troponin of 0.018, proBNP: 3125.0 9 CO2: 21.5, chloride: 111, creatinine: 1.94, BUN: 38, hemoglobin: 11.6. Lipase: 146, procalcitonin: 0.08. UA shows 3+ protein with trace glucose but is otherwise unremarkable. EKG shows sinus rhythm at 72 with some ST depression in lead I, II, V4, V5 and V6 with no noted ectopy, what appears to be a left bundle branch block and a QTC of 386 ms with no EKG for comparison.     CT of abdomen and pelvis shows infiltrates noted throughout the lung bases suggesting developing multifocal pneumonia versus pulmonary edema with cardiomegaly also present. No evidence of significant nephrolithiasis or obstructive uropathy is noted. No acute abdominal abnormality is reported. Chest x-ray reported with cardiomegaly but no definitive evidence of cardiopulmonary process.  Respiratory viral panel including COVID-19 is negative.    Patient was admitted for further management    Assessment  Hypertensive emergency with dyspnea and recent chest pain   Blood pressure 211/67 on admission  Troponin: 0.018->>0.016  ProBNP: 3125.0  EKG shows sinus rhythm at 72 with some ST depression in lead I, II, V4, V5 and V6 with no noted ectopy, what appears to be a left bundle branch block and a QTC of 386 ms with no EKG for comparison.  Chest x-ray reported with cardiomegaly but no definitive evidence of cardiopulmonary process.   Respiratory viral panel including COVID-19 is negative.  S/P 325 mg aspirin, 0.1 mg clonidine, Lasix and 1 inch of Nitropaste in the ED  Fu  Echo   2 g sodium diet  Resume hydralazine  Continue cardiac monitoring and pulse oximetry  Hold  Losartan in light of CKD     CAP  Chest CT  IMPRESSION:  1.Infiltrates are noted throughout the lung bases suggesting developing  multifocal pneumonia versus pulmonary edema. Cardiomegaly is noted.  2.No evidence for significant nephrolithiasis. There is no evidence for  obstructive uropathy.  3.No evidence for acute abnormality throughout the abdomen or pelvis on  this noncontrast exam.  Start ceftriaxone  And   Azithromycin   DUO  Nebs  Prn  COVID -19  Ruled out   Resp panel negative           Anemia  Hemoglobin: 11.6 with a normal MCV and MCHC (stable from previous admissions)  Monitor CBC      Diabetes mellitus  Well controlled with glucose of 108 on admission  FU  A1c  Resume glipizide at lower dose and monitor  Sliding scale insulin  diabetic diet  monitor AC and HS     ERIN on CKD  Creatinine: 1.94, BUN: 38, eGFR: 25 (creatinine: 2.02 on 11/11/2020)  Losartan on hold  UA shows 3+ protein with trace glucose  S/P 40 mg Lasix in ED  Nephrology cx         Hypertension  Poorly controlled with a blood pressure on admission of 211/67  Continue hydralazine, Norvasc   Losartan on hold due to ERIN on CKD       Hyperlipidemia  Continue  statin        VTE Prophylaxis -   Mechanical Order History:     None      Pharmalogical Order History:      Ordered     Dose Route Frequency Stop    04/16/21 0033  enoxaparin (LOVENOX) syringe 30 mg      30 mg SC Every 24 Hours --                  Code Status -   Code Status and Medical Interventions:   Ordered at: 04/15/21 2147     Code Status:    CPR     Medical Interventions (Level of Support Prior to Arrest):    Full       This patient has been examined wearing appropriate Personal Protective Equipment         Discharge Planning  home        Electronically signed by Nadege Ruiz MD, 04/16/21, 08:54 EDT.  [facility] Team

## 2021-04-16 NOTE — CASE MANAGEMENT/SOCIAL WORK
Discharge Planning Assessment   Zach     Patient Name: Brad Woodward  MRN: [id number]  Today's Date: 4/16/2021    Admit Date: 4/15/2021    Discharge Needs Assessment     Row Name 04/16/21 1415       Living Environment    Lives With  child(geoffrey), adult    Current Living Arrangements  home/apartment/condo    Potentially Unsafe Housing Conditions  unable to assess    Primary Care Provided by  self    Provides Primary Care For  no one    Family Caregiver if Needed  child(geoffrey), adult;grandchild(geoffrey), adult    Quality of Family Relationships  supportive    Able to Return to Prior Arrangements  yes       Resource/Environmental Concerns    Resource/Environmental Concerns  none    Transportation Concerns  car, none       Transition Planning    Patient/Family Anticipated Services at Transition      Transportation Anticipated  family or friend will provide       Discharge Needs Assessment    Readmission Within the Last 30 Days  no previous admission in last 30 days    Equipment Currently Used at Home  cane, straight    Concerns to be Addressed  discharge planning         Plan    Plan  from home with family pending clinical corse    Patient/Family in Agreement with Plan  yes    Plan Comments  spoke to grand daughter paxton in room with ppe(mask and goggles) staying 6 feet and less than 15 mins; per grand daughter she will translate and does not want i pad use for  used; she states that patient is independent at home and lives with her daughter; she states that patient has no dme and has a primary md; she uses pharmacy in [facility] ; barrier to discharge is pending nephrology consult         General Information    Admission Type  observation    Arrived From  emergency department    Preferred Language  Khmer     Used During This Interaction  no grand daughter jone states she will translate for patient and to not use the i-pad to assist         Functional Status    Usual Activity Tolerance  good     Current Activity Tolerance  good       Functional Status, IADL    Medications  independent    Meal Preparation  independent       Carol naegele rn  Case management  Office number [phone]  Cell phone [phone]

## 2021-04-16 NOTE — CONSULTS
RENAL/K[facility]:    Referring Provider: Dr. 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  Her baseline Cr runs around 2.  She presents with N/V, Weakness and fatigue and SBP over 200.  Family states she started feeling poorly after her Hydralazine was increased from 10 TID to 25 TID recently.  She has not been able to keep food down due to severe nausea.  On admission she was given Clonidine and a dose of Lasix.  She has been started on Amlodipine and her home Losartan has been held.  Her BP has improved to the 140's-150's systolic.  She still is having nausea and is fatigued.  No CP.  No hematuria or dysuria.    History  Past Medical History:   Diagnosis Date   • Diabetes mellitus (CMS/[facility])    • Hyperlipidemia    • Hypertension    , History reviewed. No pertinent surgical history., History reviewed. No pertinent family history.,   Social History     Socioeconomic History   • Marital status:      Spouse name: Not on file   • Number of children: Not on file   • Years of education: Not on file   • Highest education level: Not on file   Tobacco Use   • Smoking status: Never Smoker   • Smokeless tobacco: Never Used   Substance and Sexual Activity   • Alcohol use: No   • Drug use: No   • Sexual activity: Defer     E-cigarette/Vaping     E-cigarette/Vaping Substances     E-cigarette/Vaping Devices       ,   Medications Prior to Admission   Medication Sig Dispense Refill Last Dose   • amLODIPine (NORVASC) 10 MG tablet Take 10 mg by mouth Daily.      • aspirin 81 MG chewable tablet Chew 81 mg Daily.      • atorvastatin (LIPITOR) 80 MG tablet Take 80 mg by mouth Daily.      • glipizide (GLUCOTROL) 5 MG tablet Take 2.5 mg by mouth Daily.      • hydrALAZINE (APRESOLINE) 25 MG tablet Take 25 mg by mouth 3 (Three) Times a Day.      • losartan (COZAAR) 100 MG tablet  Take 100 mg by mouth Daily.      , Scheduled Meds:  amLODIPine, 10 mg, Oral, Daily  aspirin, 81 mg, Oral, Daily  atorvastatin, 80 mg, Oral, Daily  cefTRIAXone, 1 g, Intravenous, Q24H  enoxaparin, 30 mg, Subcutaneous, Q24H  glipizide, 2.5 mg, Oral, QAM AC  hydrALAZINE, 10 mg, Oral, TID  insulin lispro, 0-9 Units, Subcutaneous, TID AC  ipratropium-albuterol, 3 mL, Nebulization, 4x Daily - RT  labetalol, 10 mg, Intravenous, Once  sodium chloride, 10 mL, Intravenous, Q12H    , Continuous Infusions:   , PRN Meds:  •  acetaminophen **OR** acetaminophen **OR** acetaminophen  •  dextrose  •  dextrose  •  glucagon (human recombinant)  •  insulin lispro **AND** insulin lispro  •  melatonin  •  nitroglycerin  •  ondansetron **OR** ondansetron  •  [COMPLETED] Insert peripheral IV **AND** sodium chloride  •  sodium chloride and Allergies:  Patient has no known allergies.    Review of Systems  Pertinent items are noted in HPI    Objective     Vital Signs  Temp:  [97.8 °F (36.6 °C)-98.7 °F (37.1 °C)] 98 °F (36.7 °C)  Heart Rate:  [60-76] 60  Resp:  [14-24] 18  BP: (144-215)/(55-76) 157/62    No intake/output data recorded.  No intake/output data recorded.    Physical Exam:  GEN: Awake, NAD  ENT: PERRL, EOMI, MMM  NECK: Supple, no JVD  CHEST: CTAB, no W/R/C  CV: RRR, no M/G/R  ABD: Soft, NT, +BS  SKIN: Warm and Dry  NEURO: CN's intact      Results Review:   I reviewed the patient's new clinical results.    WBC WBC   Date Value Ref Range Status   04/16/2021 3.80 3.40 - 10.80 10*3/mm3 Final   04/15/2021 4.97 3.40 - 10.80 10*3/mm3 Final      HGB Hemoglobin   Date Value Ref Range Status   04/16/2021 9.6 (L) 12.0 - 15.9 g/dL Final     Comment:     Result checked    04/15/2021 11.6 (L) 12.0 - 15.9 g/dL Final      HCT Hematocrit   Date Value Ref Range Status   04/16/2021 29.5 (L) 34.0 - 46.6 % Final   04/15/2021 36.4 34.0 - 46.6 % Final      Platlets No results found for: LABPLAT   MCV MCV   Date Value Ref Range Status   04/16/2021 84.3  79.0 - 97.0 fL Final   04/15/2021 85.4 79.0 - 97.0 fL Final          Sodium Sodium   Date Value Ref Range Status   04/16/2021 139 136 - 145 mmol/L Final   04/15/2021 144 136 - 145 mmol/L Final      Potassium Potassium   Date Value Ref Range Status   04/16/2021 3.9 3.5 - 5.2 mmol/L Final   04/15/2021 4.3 3.5 - 5.2 mmol/L Final      Chloride Chloride   Date Value Ref Range Status   04/16/2021 106 98 - 107 mmol/L Final   04/15/2021 111 (H) 98 - 107 mmol/L Final      CO2 CO2   Date Value Ref Range Status   04/16/2021 22.0 22.0 - 29.0 mmol/L Final   04/15/2021 21.5 (L) 22.0 - 29.0 mmol/L Final      BUN BUN   Date Value Ref Range Status   04/16/2021 35 (H) 8 - 23 mg/dL Final   04/15/2021 38 (H) 8 - 23 mg/dL Final      Creatinine Creatinine   Date Value Ref Range Status   04/16/2021 2.02 (H) 0.57 - 1.00 mg/dL Final   04/15/2021 1.94 (H) 0.57 - 1.00 mg/dL Final      Calcium Calcium   Date Value Ref Range Status   04/16/2021 8.4 (L) 8.6 - 10.5 mg/dL Final   04/15/2021 9.4 8.6 - 10.5 mg/dL Final      PO4 No results found for: CAPO4   Albumin Albumin   Date Value Ref Range Status   04/15/2021 3.50 3.50 - 5.20 g/dL Final      Magnesium No results found for: MG   Uric Acid No results found for: URICACID         amLODIPine, 10 mg, Oral, Daily  aspirin, 81 mg, Oral, Daily  atorvastatin, 80 mg, Oral, Daily  cefTRIAXone, 1 g, Intravenous, Q24H  enoxaparin, 30 mg, Subcutaneous, Q24H  glipizide, 2.5 mg, Oral, QAM AC  hydrALAZINE, 10 mg, Oral, TID  insulin lispro, 0-9 Units, Subcutaneous, TID AC  ipratropium-albuterol, 3 mL, Nebulization, 4x Daily - RT  labetalol, 10 mg, Intravenous, Once  sodium chloride, 10 mL, Intravenous, Q12H           Assessment/Plan     CKD4  HTN emergency  Proteinuria  DM  Weakness  N/V  Anemia    PLAN: Patient's renal function is at baseline and her BP is improving.  I think it is reasonable to cut her Hydralazine back to the 10 mg TID and continue the added Amlodipine.  Her Losartan is on hold and will  consider resuming it tomorrow but do not want to drop her BP too abruptly (diastolic is already in the 60's as well).  Unclear if all her symptoms are related to her BP.  Will follow.    Jim Farooq MD   [facility]  04/16/21  @NOW

## 2021-04-16 NOTE — THERAPY EVALUATION
Patient Name: Brad Woodward  : 1940    MRN: [id number]                              Today's Date: 2021       Admit Date: 4/15/2021    Visit Dx:     ICD-10-CM ICD-9-CM   1. Dyspnea, unspecified type  R06.00 786.09   2. Non-intractable vomiting with nausea, unspecified vomiting type  R11.2 787.01   3. Congestive heart failure, unspecified HF chronicity, unspecified heart failure type (CMS/[facility])  I50.9 428.0   4. Abnormal EKG  R94.31 794.31   5. Chronic kidney disease, unspecified CKD stage  N18.9 585.9     Patient Active Problem List   Diagnosis   • Hyperlipidemia   • Essential hypertension   • Type 2 diabetes mellitus (CMS/[facility])   • Anemia   • CKD (chronic kidney disease) stage 3, GFR 30-59 ml/min (CMS/[facility])   • Hypertensive emergency   • Dyspnea     Past Medical History:   Diagnosis Date   • Diabetes mellitus (CMS/[facility])    • Hyperlipidemia    • Hypertension      History reviewed. No pertinent surgical history.  General Information     Row Name 21 [facility]9          Physical Therapy Time and Intention    Document Type  evaluation  -EL     Mode of Treatment  individual therapy;physical therapy  -     Row Name 21 141          General Information    Patient Profile Reviewed  yes  -EL     Prior Level of Function  independent:;all household mobility;community mobility;ADL's Typically very active  -     Row Name 21 141          Living Environment    Lives With  child(geoffrey), adult;grandchild(geoffrey)  -     Row Name 21 1419          Home Main Entrance    Number of Stairs, Main Entrance  two  -     Row Name 21 1419          Stairs Within Home, Primary    Number of Stairs, Within Home, Primary  ten;other (see comments) To basement  -     Stair Railings, Within Home, Primary  railings safe and in good condition  -     Row Name 21 1419          Cognition    Orientation Status (Cognition)  unable/difficult to assess translating through granddaughter, followed commands appropriately   -EL     Row Name 04/16/21 1419          Safety Issues, Functional Mobility    Impairments Affecting Function (Mobility)  balance;endurance/activity tolerance;strength  -EL       User Key  (r) = Recorded By, (t) = Taken By, (c) = Cosigned By    Initials Name Provider Type    Hernan Landa, PT Physical Therapist        Mobility     Row Name 04/16/21 1424          Bed Mobility    Bed Mobility  supine-sit;sit-supine  -EL     Supine-Sit [place] (Bed Mobility)  minimum assist (75% patient effort)  -EL     Sit-Supine [place] (Bed Mobility)  minimum assist (75% patient effort)  -EL     Row Name 04/16/21 1424          Sit-Stand Transfer    Sit-Stand [place] (Transfers)  maximum assist (25% patient effort)  -EL     Row Name 04/16/21 1424          Gait/Stairs (Locomotion)    [place] Level (Gait)  moderate assist (50% patient effort)  -EL     Distance in Feet (Gait)  30  -EL     Deviations/Abnormal Patterns (Gait)  gait speed decreased;stride length decreased  -EL     Comment (Gait/Stairs)  Noted lateral sway  -EL       User Key  (r) = Recorded By, (t) = Taken By, (c) = Cosigned By    Initials Name Provider Type    Hernan Landa, JOAO Physical Therapist        Obj/Interventions     Row Name 04/16/21 1426          Range of Motion Comprehensive    General Range of Motion  bilateral lower extremity ROM WFL  -     Row Name 04/16/21 1426          Strength Comprehensive (MMT)    General Manual Muscle Testing (MMT) Assessment  lower extremity strength deficits identified  -EL     Comment, General Manual Muscle Testing (MMT) Assessment  Jasvir hips functionally 3/5 difficult to assess due to language barrier. Remainder 4/5 gross  -EL     Row Name 04/16/21 1426          Balance    Balance Assessment  sitting static balance;standing static balance;standing dynamic balance  -EL     Static Sitting Balance  WFL  -EL     Static Standing Balance  mild impairment  -EL     Dynamic Standing Balance  moderate impairment  -EL      Row Name 04/16/21 1426          Sensory Assessment (Somatosensory)    Sensory Assessment (Somatosensory)  sensation intact  -EL       User Key  (r) = Recorded By, (t) = Taken By, (c) = Cosigned By    Initials Name Provider Type    Hernan Landa PT Physical Therapist        Goals/Plan     Row Name 04/16/21 1432          Bed Mobility Goal 1 (PT)    Activity/Assistive Device (Bed Mobility Goal 1, PT)  bed mobility activities, all  -EL     [place] Level/Cues Needed (Bed Mobility Goal 1, PT)  modified independence  -EL     Time Frame (Bed Mobility Goal 1, PT)  long term goal (LTG);2 weeks  -EL     Row Name 04/16/21 1432          Transfer Goal 1 (PT)    Activity/Assistive Device (Transfer Goal 1, PT)  transfers, all  -EL     [place] Level/Cues Needed (Transfer Goal 1, PT)  contact guard assist  -EL     Time Frame (Transfer Goal 1, PT)  long term goal (LTG);2 weeks  -EL     Row Name 04/16/21 1432          Gait Training Goal 1 (PT)    Activity/Assistive Device (Gait Training Goal 1, PT)  gait (walking locomotion)  -EL     [place] Level (Gait Training Goal 1, PT)  contact guard assist  -EL     Time Frame (Gait Training Goal 1, PT)  long term goal (LTG);2 weeks  -EL       User Key  (r) = Recorded By, (t) = Taken By, (c) = Cosigned By    Initials Name Provider Type    Hernan Landa PT Physical Therapist        Clinical Impression     Row Name 04/16/21 1427          Pain    Additional Documentation  Pain Scale: FACES Pre/Post-Treatment (Group)  -EL     Row Name 04/16/21 1427          Pain Scale: FACES Pre/Post-Treatment    Pain: FACES Scale, Pretreatment  0-->no hurt  -EL     Posttreatment Pain Rating  0-->no hurt  -EL     Row Name 04/16/21 1427          Plan of Care Review    Plan of Care Reviewed With  patient;family  -EL     Outcome Summary  Pt is an 81 YO F who lives with family (children and grandchildren). Pt does not speak english, but family at bedside and assist with translation. Pt granddaughter  reports pt is typically very independent, performing all ADLs, ambulation without AD, no recent falls and is active with community mobility. Pt this date requires significant assistance to come to standing and ambulate in room. Pt appears very fatigued and returned self to supine with MIN A for BLE into bed. Recommendation is IP rehab pending progress. PPE worn includes gloves and mask with goggles.  -EL     Row Name 04/16/21 1427          Therapy Assessment/Plan (PT)    Rehab Potential (PT)  good, to achieve stated therapy goals  -EL     Criteria for Skilled Interventions Met (PT)  yes  -EL     Predicted Duration of Therapy Intervention (PT)  Until d/c  -EL     Row Name 04/16/21 1427          Vital Signs    O2 Delivery Pre Treatment  room air  -EL     O2 Delivery Intra Treatment  room air  -EL     O2 Delivery Post Treatment  room air  -EL     Pre Patient Position  Supine  -EL     Intra Patient Position  Standing  -EL     Post Patient Position  Supine  -EL     Row Name 04/16/21 1427          Positioning and Restraints    Pre-Treatment Position  in bed  -EL     Post Treatment Position  bed  -EL     In Bed  notified nsg;supine;call light within reach;encouraged to call for assist;exit alarm on;with family/caregiver  -EL       User Key  (r) = Recorded By, (t) = Taken By, (c) = Cosigned By    Initials Name Provider Type    EL Hernan Hayes, PT Physical Therapist        Outcome Measures     Row Name 04/16/21 1434          How much help from another person do you currently need...    Turning from your back to your side while in flat bed without using bedrails?  3  -EL     Moving from lying on back to sitting on the side of a flat bed without bedrails?  3  -EL     Moving to and from a bed to a chair (including a wheelchair)?  2  -EL     Standing up from a chair using your arms (e.g., wheelchair, bedside chair)?  2  -EL     Climbing 3-5 steps with a railing?  1  -EL     To walk in hospital room?  2  -EL     AM-PAC 6 Clicks  Score (PT)  13  -EL     Row Name 04/16/21 1434          Functional Assessment    Outcome Measure Options  AM-PAC 6 Clicks Basic Mobility (PT)  -EL       User Key  (r) = Recorded By, (t) = Taken By, (c) = Cosigned By    Initials Name Provider Type    EL Hernan Hayes PT Physical Therapist        Physical Therapy Education                 Title: PT OT SLP Therapies (Done)     Topic: Physical Therapy (Done)     Point: Mobility training (Done)     Learning Progress Summary           Patient Acceptance, E,TB, VU by  at 4/16/2021 1436                   Point: Precautions (Done)     Learning Progress Summary           Patient Acceptance, E,TB, VU by  at 4/16/2021 1436                               User Key     Initials Effective Dates Name Provider Type Discipline     06/23/20 -  Hernan Hayes PT Physical Therapist PT              PT Recommendation and Plan  Planned Therapy Interventions (PT): balance training, neuromuscular re-education, bed mobility training, transfer training, gait training, patient/family education, strengthening  Plan of Care Reviewed With: patient, family  Outcome Summary: Pt is an 79 YO F who lives with family (children and grandchildren). Pt does not speak english, but family at bedside and assist with translation. Pt granddaughter reports pt is typically very independent, performing all ADLs, ambulation without AD, no recent falls and is active with community mobility. Pt this date requires significant assistance to come to standing and ambulate in room. Pt appears very fatigued and returned self to supine with MIN A for BLE into bed. Recommendation is IP rehab pending progress. PPE worn includes gloves and mask with goggles.     Time Calculation:   PT Charges     Row Name 04/16/21 1437             Time Calculation    Start Time  1321  -EL      Stop Time  1335  -EL      Time Calculation (min)  14 min  -EL      PT Received On  04/16/21  -EL      PT - Next Appointment  04/18/21  -EL      PT Goal  Re-Cert Due Date  04/30/21  -ADALGISA        User Key  (r) = Recorded By, (t) = Taken By, (c) = Cosigned By    Initials Name Provider Type    Hernan Landa, PT Physical Therapist        Therapy Charges for Today     Code Description Service Date Service Provider Modifiers Qty    69808545319 HC PT EVAL MOD COMPLEXITY 3 4/16/2021 Hernan Hayes, PT GP 1          PT G-Codes  Outcome Measure Options: AM-PAC 6 Clicks Basic Mobility (PT)  AM-PAC 6 Clicks Score (PT): 13    Hernan Hayes PT  4/16/2021

## 2021-04-16 NOTE — H&P
[facility] Medicine Services      Patient Name: Brad Wodoward  : 1940  MRN: [id number]  Primary Care Physician: Brian Nazario APRN  Date of admission: 4/15/2021    Patient Care Team:  Brian Nazario APRN as PCP - General          Subjective   History Present Illness     Chief Complaint:   Chief Complaint   Patient presents with   • Shortness of Breath     SOA, Chills, not as active as normal. sleeping alot. [facility].         Ms. Woodward is a 80 y.o. female with past medical history of hypertension, diabetes, hyperlipidemia and CKD who presents to [facility] complaining of dyspnea and generalized weakness/lethargy.  Patient is non-English speaking with family at bedside interpreting throughout the exam.  She reports that for the past 2 days she has had increasing weakness as well as dyspnea specifically dyspnea on exertion along with 2 episodes of nausea and nonbloody vomiting and occasional sensation of palpitations.  Family reports that patient was recently seen by her nephrologist and found to have poorly controlled hypertension and hydralazine dosing was increased.  They note symptoms seem to have began near the time that dose adjustments were made to her blood pressure medication.  No pain including chest pain, cough or fever, peripheral edema, syncope or near syncope are reported.  They do note some chronic constipation which is unchanged from baseline and patient reports no changes in her urinary habits.  She does not smoke or drink alcohol and patient and family confirm compliance with all of her outpatient medical therapies.    Initial vital signs: Blood pressure: 211/67, heart rate: 72, respirations: 24, O2 sat 99% on room air, temp 97.8.  In the ED patient did have lab significant for troponin of 0.018, proBNP: 3125.0 9 CO2: 21.5, chloride: 111, creatinine: 1.94, BUN: 38, hemoglobin: 11.6. Lipase: 146, procalcitonin:  0.08. UA shows 3+ protein with trace glucose but is otherwise unremarkable. EKG shows sinus rhythm at 72 with some ST depression in lead I, II, V4, V5 and V6 with no noted ectopy, what appears to be a left bundle branch block and a QTC of 386 ms with no EKG for comparison. CT of abdomen and pelvis shows infiltrates noted throughout the lung bases suggesting developing multifocal pneumonia versus pulmonary edema with cardiomegaly also present. No evidence of significant nephrolithiasis or obstructive uropathy is noted. No acute abdominal abnormality is reported. Chest x-ray reported with cardiomegaly but no definitive evidence of cardiopulmonary process. Respiratory viral panel including COVID-19 is negative.    History of Present Illness    Review of Systems   Constitutional: Positive for chills and malaise/fatigue. Negative for fever.   HENT: Negative.    Eyes: Negative.    Cardiovascular: Positive for dyspnea on exertion and palpitations. Negative for chest pain, irregular heartbeat, leg swelling, near-syncope, orthopnea and syncope.   Respiratory: Positive for shortness of breath. Negative for cough and sputum production.    Endocrine: Negative.    Skin: Negative.    Musculoskeletal: Negative.    Gastrointestinal: Positive for constipation, nausea and vomiting. Negative for abdominal pain, diarrhea, hematemesis, hematochezia and melena.   Genitourinary: Negative.    Neurological: Negative.    Psychiatric/Behavioral: Negative.            Personal History     Past Medical History:   Past Medical History:   Diagnosis Date   • Diabetes mellitus (CMS/HCC)    • Hyperlipidemia    • Hypertension        Surgical History:    History reviewed. No pertinent surgical history.        Family History: family history is not on file. Otherwise pertinent FHx was reviewed and unremarkable.     Social History:  reports that she has never smoked. She has never used smokeless tobacco. She reports that she does not drink alcohol and  does not use drugs.      Medications:  Prior to Admission medications    Not on File       Allergies:  No Known Allergies    Objective   Objective     Vital Signs  Temp:  [97.8 °F (36.6 °C)] 97.8 °F (36.6 °C)  Heart Rate:  [70-76] 73  Resp:  [16-24] 16  BP: (163-215)/(55-76) 188/64  SpO2:  [96 %-100 %] 98 %  on   ;      Body mass index is 26.18 kg/m².    Physical Exam  Vitals reviewed.   Constitutional:       General: She is not in acute distress.     Appearance: Normal appearance. She is normal weight. She is not ill-appearing or toxic-appearing.   HENT:      Head: Normocephalic and atraumatic.      Right Ear: External ear normal.      Left Ear: External ear normal.      Nose: Nose normal.      Mouth/Throat:      Mouth: Mucous membranes are moist.   Eyes:      Extraocular Movements: Extraocular movements intact.   Cardiovascular:      Rate and Rhythm: Normal rate and regular rhythm.      Pulses: Normal pulses.      Heart sounds: Murmur heard.     Pulmonary:      Effort: Pulmonary effort is normal. No respiratory distress.      Breath sounds: No wheezing.   Abdominal:      General: Bowel sounds are normal. There is no distension.      Tenderness: There is no abdominal tenderness.   Musculoskeletal:         General: Normal range of motion.      Cervical back: Normal range of motion.   Skin:     General: Skin is warm and dry.   Neurological:      General: No focal deficit present.      Mental Status: She is alert and oriented to person, place, and time.   Psychiatric:         Mood and Affect: Mood normal.         Behavior: Behavior normal.         Thought Content: Thought content normal.         Judgment: Judgment normal.           Results Review:  I have personally reviewed most recent cardiac tracings, lab results and radiology images and interpretations and agree with findings, most notably: Troponin, proBNP, CBC, CMP, lipase, procalcitonin, chest x-ray, CT of abdomen and pelvis, EKG and COVID-19 test.    Results  from last 7 days   Lab Units 04/15/21  1116   WBC 10*3/mm3 4.97   HEMOGLOBIN g/dL 11.6*   HEMATOCRIT % 36.4   PLATELETS 10*3/mm3 274     Results from last 7 days   Lab Units 04/15/21  1836 04/15/21  1116   SODIUM mmol/L  --  144   POTASSIUM mmol/L  --  4.3   CHLORIDE mmol/L  --  111*   CO2 mmol/L  --  21.5*   BUN mg/dL  --  38*   CREATININE mg/dL  --  1.94*   GLUCOSE mg/dL  --  108*   CALCIUM mg/dL  --  9.4   ALT (SGPT) U/L 28  --    AST (SGOT) U/L 31  --    TROPONIN T ng/mL 0.018  --    PROBNP pg/mL 3,125.0*  --    PROCALCITONIN ng/mL 0.08  --      Estimated Creatinine Clearance: 21.9 mL/min (A) (by C-G formula based on SCr of 1.94 mg/dL (H)).  Brief Urine Lab Results  (Last result in the past 365 days)      Color   Clarity   Blood   Leuk Est   Nitrite   Protein   CREAT   Urine HCG        04/15/21 1116 Yellow Clear Negative Negative Negative >=300 mg/dL (3+)               Microbiology Results (last 10 days)     Procedure Component Value - Date/Time    Respiratory Panel PCR w/COVID-19(SARS-CoV-2) VÍCTOR/DAYNA/JADYN/PAD/COR/MAD/CHIRAG In-House, NP Swab in UTM/VTM, 3-4 HR TAT - Swab, Nasopharynx [[id number]]  (Normal) Collected: 04/15/21 1836    Lab Status: Final result Specimen: Swab from Nasopharynx Updated: 04/15/21 2016     ADENOVIRUS, PCR Not Detected     Coronavirus 229E Not Detected     Coronavirus HKU1 Not Detected     Coronavirus NL63 Not Detected     Coronavirus OC43 Not Detected     COVID19 Not Detected     Human Metapneumovirus Not Detected     Human Rhinovirus/Enterovirus Not Detected     Influenza A PCR Not Detected     Influenza B PCR Not Detected     Parainfluenza Virus 1 Not Detected     Parainfluenza Virus 2 Not Detected     Parainfluenza Virus 3 Not Detected     Parainfluenza Virus 4 Not Detected     RSV, PCR Not Detected     Bordetella pertussis pcr Not Detected     Bordetella parapertussis PCR Not Detected     Chlamydophila pneumoniae PCR Not Detected     Mycoplasma pneumo by PCR Not Detected    Narrative:       Fact sheet for providers: https://docs.[employer]/wp-content/uploads/HVK2074-1307-PZ2.1-EUA-Provider-Fact-Sheet-3.pdf    Fact sheet for patients: https://docs.[employer]/wp-content/uploads/NTM0020-0031-JB2.1-EUA-Patient-Fact-Sheet-1.pdf    Test performed by PCR.          ECG/EMG Results (most recent)     Procedure Component Value Units Date/Time    ECG 12 Lead [[id number]] Collected: 04/15/21 1848     Updated: 04/15/21 1850     QT Interval 363 ms     Narrative:      HEART RATE= 72  bpm  RR Interval= 832  ms  SC Interval= 226  ms  P Horizontal Axis= -58  deg  P Front Axis= 40  deg  QRSD Interval= 94  ms  QT Interval= 363  ms  QRS Axis= 48  deg  T Wave Axis= 251  deg  - ABNORMAL ECG -  Sinus rhythm  Prolonged SC interval  LVH with secondary repolarization abnormality  No previous ECG available for comparison  Electronically Signed By:   Date and Time of Study: 2021-04-15 18:48:44                  CT Abdomen Pelvis Without Contrast    Result Date: 4/15/2021  1.Infiltrates are noted throughout the lung bases suggesting developing multifocal pneumonia versus pulmonary edema. Cardiomegaly is noted. 2.No evidence for significant nephrolithiasis. There is no evidence for obstructive uropathy. 3.No evidence for acute abnormality throughout the abdomen or pelvis on this noncontrast exam.  Electronically Signed By-Jim Montano MD On:4/15/2021 7:45 PM This report was finalized on [id number] by  Jim Montano MD.    XR Chest 1 View    Result Date: 4/15/2021  1.No definitive evidence for acute cardiopulmonary process. 2.Note is made of cardiomegaly.  Electronically Signed By-Jim Montano MD On:4/15/2021 6:47 PM This report was finalized on [id number] by  Jim Montano MD.        Estimated Creatinine Clearance: 21.9 mL/min (A) (by C-G formula based on SCr of 1.94 mg/dL (H)).    Assessment/Plan   Assessment/Plan       Active Hospital Problems    Diagnosis  POA   • **Hypertensive emergency [I16.1]  Yes      Priority: High   • Dyspnea [R06.00]  Yes     Priority: High   • Type 2 diabetes mellitus (CMS/[facility]) [E11.9]  Yes     Priority: Medium   • Anemia [D64.9]  Yes     Priority: Medium   • CKD (chronic kidney disease) stage 3, GFR 30-59 ml/min (CMS/[facility]) [N18.30]  Yes     Priority: Medium   • Hyperlipidemia [E78.5]  Yes     Priority: Low   • Essential hypertension [I10]  Yes     Priority: Low      Resolved Hospital Problems   No resolved problems to display.     Hypertensive emergency with dyspnea and recent chest pain  -Patient presents with 2-day history of increasing lethargy/weakness as well as dyspnea, initial blood pressure: 211/67.  Repeat blood pressure following treatment: 174/59  -Troponin: 0.018, trend  -proBNP: 3125.0  -EKG shows sinus rhythm at 72 with some ST depression in lead I, II, V4, V5 and V6 with no noted ectopy, what appears to be a left bundle branch block and a QTC of 386 ms with no EKG for comparison.  -Chest x-ray reported with cardiomegaly but no definitive evidence of cardiopulmonary process. Respiratory viral panel including COVID-19 is negative.  -Patient given 325 mg aspirin, 0.1 mg clonidine, Lasix and 1 inch of Nitropaste in the ED  -Check echocardiogram  -2 g sodium diet  -Monitor strict I's and O's, daily weights  -Recheck EKG at 6 hours  -Continue cardiac monitoring and pulse oximetry  -Labetalol as needed    Anemia  -Hemoglobin: 11.6 with a normal MCV and MCHC (stable from previous admissions)  -Monitor CBC while admitted    Diabetes mellitus  -Well controlled with glucose of 108 on admission  - Check A1c  - Hold glipizide  -Sliding scale insulin  -diabetic diet  -monitor AC and HS    CKD  -Creatinine: 1.94, BUN: 38, eGFR: 25 (creatinine: 2.02 on 11/11/2020)  -Continue losartan for now  -UA shows 3+ protein with trace glucose  -Patient given 40 mg Lasix in ED  - Avoid nephrotoxic medication and IV dye unless urgently needed  - Monitor BMP and I's and O's  -Pt seees Dr. Farooq as an  outpt, may benefit from consult given recent diuresis as well as hypertensive emergency noted above    Hypertension  -Poorly controlled with a blood pressure on admission of 211/67  - Continue hydralazine, Norvasc and losartan  -Acute treatment noted above  - Monitor while admitted    Hyperlipidemia  -Check lipid panel  -Continue statin            VTE Prophylaxis -Lovenox  Mechanical Order History:     None      Pharmalogical Order History:     None          CODE STATUS: Full  There are no questions and answers to display.         I discussed the patient's findings and my recommendations with patient, family and nursing staff.      Signature:Electronically signed by Shaq Shah PA-C, 04/16/21, 3:48 AM EDT.    [facility] Hospitalist Team

## 2021-04-16 NOTE — PLAN OF CARE
Goal Outcome Evaluation:  Plan of Care Reviewed With: patient, family     Outcome Summary: Pt is an 79 YO F who lives with family (children and grandchildren). Pt does not speak english, but family at bedside and assist with translation. Pt granddaughter reports pt is typically very independent, performing all ADLs, ambulation without AD, no recent falls and is active with community mobility. Pt this date requires significant assistance to come to standing and ambulate in room. Pt appears very fatigued and returned self to supine with MIN A for BLE into bed. Recommendation is IP rehab pending progress. PPE worn includes gloves and mask with goggles.

## 2021-04-16 NOTE — PLAN OF CARE
Goal Outcome Evaluation:     Progress: no change  Outcome Summary: Patient admitted to hospital with chest pain and elevated blood pressure. Pt is non english speaking. Ipad  on unit to assist with communication. Family at bedside. All questions and conserns addressed. Will continue with current plan of care.

## 2021-04-17 PROBLEM — I50.20 HFREF (HEART FAILURE WITH REDUCED EJECTION FRACTION) (HCC): Chronic | Status: ACTIVE | Noted: 2021-04-17

## 2021-04-17 PROBLEM — E78.2 MIXED HYPERLIPIDEMIA: Chronic | Status: ACTIVE | Noted: 2021-04-15

## 2021-04-17 PROBLEM — N18.31 ANEMIA DUE TO STAGE 3A CHRONIC KIDNEY DISEASE (HCC): Chronic | Status: ACTIVE | Noted: 2021-04-15

## 2021-04-17 PROBLEM — D63.1 ANEMIA DUE TO STAGE 3A CHRONIC KIDNEY DISEASE: Chronic | Status: ACTIVE | Noted: 2021-04-15

## 2021-04-17 PROBLEM — R06.00 DYSPNEA: Status: ACTIVE | Noted: 2021-04-17

## 2021-04-17 LAB
ANION GAP SERPL CALCULATED.3IONS-SCNC: 9 MMOL/L (ref 5–15)
BASOPHILS # BLD AUTO: 0 10*3/MM3 (ref 0–0.2)
BASOPHILS NFR BLD AUTO: 0.5 % (ref 0–1.5)
BUN SERPL-MCNC: 35 MG/DL (ref 8–23)
BUN/CREAT SERPL: 17.7 (ref 7–25)
CALCIUM SPEC-SCNC: 8.7 MG/DL (ref 8.6–10.5)
CHLORIDE SERPL-SCNC: 106 MMOL/L (ref 98–107)
CO2 SERPL-SCNC: 24 MMOL/L (ref 22–29)
CREAT SERPL-MCNC: 1.98 MG/DL (ref 0.57–1)
DEPRECATED RDW RBC AUTO: 43.3 FL (ref 37–54)
EOSINOPHIL # BLD AUTO: 0.1 10*3/MM3 (ref 0–0.4)
EOSINOPHIL NFR BLD AUTO: 2.4 % (ref 0.3–6.2)
ERYTHROCYTE [DISTWIDTH] IN BLOOD BY AUTOMATED COUNT: 14.6 % (ref 12.3–15.4)
GFR SERPL CREATININE-BSD FRML MDRD: 24 ML/MIN/1.73
GFR SERPL CREATININE-BSD FRML MDRD: 29 ML/MIN/1.73
GLUCOSE BLDC GLUCOMTR-MCNC: 103 MG/DL (ref 70–105)
GLUCOSE BLDC GLUCOMTR-MCNC: 114 MG/DL (ref 70–105)
GLUCOSE BLDC GLUCOMTR-MCNC: 118 MG/DL (ref 70–105)
GLUCOSE BLDC GLUCOMTR-MCNC: 155 MG/DL (ref 70–105)
GLUCOSE SERPL-MCNC: 105 MG/DL (ref 65–99)
HCT VFR BLD AUTO: 28.5 % (ref 34–46.6)
HGB BLD-MCNC: 9.6 G/DL (ref 12–15.9)
HOLD SPECIMEN: NORMAL
LYMPHOCYTES # BLD AUTO: 1.8 10*3/MM3 (ref 0.7–3.1)
LYMPHOCYTES NFR BLD AUTO: 45 % (ref 19.6–45.3)
MCH RBC QN AUTO: 27.8 PG (ref 26.6–33)
MCHC RBC AUTO-ENTMCNC: 33.7 G/DL (ref 31.5–35.7)
MCV RBC AUTO: 82.5 FL (ref 79–97)
MONOCYTES # BLD AUTO: 0.3 10*3/MM3 (ref 0.1–0.9)
MONOCYTES NFR BLD AUTO: 8.3 % (ref 5–12)
NEUTROPHILS NFR BLD AUTO: 1.8 10*3/MM3 (ref 1.7–7)
NEUTROPHILS NFR BLD AUTO: 43.8 % (ref 42.7–76)
NRBC BLD AUTO-RTO: 0.1 /100 WBC (ref 0–0.2)
PLATELET # BLD AUTO: 205 10*3/MM3 (ref 140–450)
PMV BLD AUTO: 8.4 FL (ref 6–12)
POTASSIUM SERPL-SCNC: 3.6 MMOL/L (ref 3.5–5.2)
RBC # BLD AUTO: 3.46 10*6/MM3 (ref 3.77–5.28)
SODIUM SERPL-SCNC: 139 MMOL/L (ref 136–145)
WBC # BLD AUTO: 4.1 10*3/MM3 (ref 3.4–10.8)

## 2021-04-17 PROCEDURE — 99233 SBSQ HOSP IP/OBS HIGH 50: CPT | Performed by: INTERNAL MEDICINE

## 2021-04-17 PROCEDURE — 80048 BASIC METABOLIC PNL TOTAL CA: CPT | Performed by: PHYSICIAN ASSISTANT

## 2021-04-17 PROCEDURE — 82088 ASSAY OF ALDOSTERONE: CPT | Performed by: INTERNAL MEDICINE

## 2021-04-17 PROCEDURE — 94799 UNLISTED PULMONARY SVC/PX: CPT

## 2021-04-17 PROCEDURE — 25010000002 CEFTRIAXONE PER 250 MG: Performed by: INTERNAL MEDICINE

## 2021-04-17 PROCEDURE — 25010000002 ENOXAPARIN PER 10 MG: Performed by: PHYSICIAN ASSISTANT

## 2021-04-17 PROCEDURE — 63710000001 INSULIN LISPRO (HUMAN) PER 5 UNITS: Performed by: PHYSICIAN ASSISTANT

## 2021-04-17 PROCEDURE — 82962 GLUCOSE BLOOD TEST: CPT

## 2021-04-17 PROCEDURE — 84244 ASSAY OF RENIN: CPT | Performed by: INTERNAL MEDICINE

## 2021-04-17 PROCEDURE — 85025 COMPLETE CBC W/AUTO DIFF WBC: CPT | Performed by: PHYSICIAN ASSISTANT

## 2021-04-17 RX ORDER — METOPROLOL TARTRATE 50 MG/1
50 TABLET, FILM COATED ORAL EVERY 12 HOURS SCHEDULED
Status: DISCONTINUED | OUTPATIENT
Start: 2021-04-17 | End: 2021-04-17

## 2021-04-17 RX ORDER — HYDRALAZINE HYDROCHLORIDE 10 MG/1
10 TABLET, FILM COATED ORAL 3 TIMES DAILY
Status: DISCONTINUED | OUTPATIENT
Start: 2021-04-17 | End: 2021-04-18

## 2021-04-17 RX ORDER — HYDRALAZINE HYDROCHLORIDE 25 MG/1
50 TABLET, FILM COATED ORAL 3 TIMES DAILY
Status: DISCONTINUED | OUTPATIENT
Start: 2021-04-17 | End: 2021-04-17

## 2021-04-17 RX ORDER — LOSARTAN POTASSIUM 50 MG/1
100 TABLET ORAL
Status: DISCONTINUED | OUTPATIENT
Start: 2021-04-17 | End: 2021-04-21 | Stop reason: HOSPADM

## 2021-04-17 RX ORDER — METOPROLOL TARTRATE 50 MG/1
100 TABLET, FILM COATED ORAL EVERY 12 HOURS SCHEDULED
Status: DISCONTINUED | OUTPATIENT
Start: 2021-04-17 | End: 2021-04-17

## 2021-04-17 RX ORDER — CHLORTHALIDONE 25 MG/1
50 TABLET ORAL DAILY
Status: DISCONTINUED | OUTPATIENT
Start: 2021-04-17 | End: 2021-04-21 | Stop reason: HOSPADM

## 2021-04-17 RX ORDER — POLYETHYLENE GLYCOL 3350 17 G/17G
17 POWDER, FOR SOLUTION ORAL DAILY
Status: DISCONTINUED | OUTPATIENT
Start: 2021-04-17 | End: 2021-04-18

## 2021-04-17 RX ADMIN — POLYETHYLENE GLYCOL 3350 17 G: 17 POWDER, FOR SOLUTION ORAL at 21:03

## 2021-04-17 RX ADMIN — IPRATROPIUM BROMIDE AND ALBUTEROL SULFATE 3 ML: 2.5; .5 SOLUTION RESPIRATORY (INHALATION) at 08:36

## 2021-04-17 RX ADMIN — IPRATROPIUM BROMIDE AND ALBUTEROL SULFATE 3 ML: 2.5; .5 SOLUTION RESPIRATORY (INHALATION) at 15:37

## 2021-04-17 RX ADMIN — ATORVASTATIN CALCIUM 80 MG: 40 TABLET, FILM COATED ORAL at 09:13

## 2021-04-17 RX ADMIN — ENOXAPARIN SODIUM 30 MG: 30 INJECTION SUBCUTANEOUS at 18:01

## 2021-04-17 RX ADMIN — METOPROLOL TARTRATE 50 MG: 50 TABLET, FILM COATED ORAL at 10:52

## 2021-04-17 RX ADMIN — CANAGLIFLOZIN 100 MG: 300 TABLET, FILM COATED ORAL at 18:01

## 2021-04-17 RX ADMIN — CEFTRIAXONE SODIUM 1 G: 1 INJECTION, POWDER, FOR SOLUTION INTRAMUSCULAR; INTRAVENOUS at 10:52

## 2021-04-17 RX ADMIN — HYDRALAZINE HYDROCHLORIDE 10 MG: 10 TABLET, FILM COATED ORAL at 18:01

## 2021-04-17 RX ADMIN — CHLORTHALIDONE 50 MG: 25 TABLET ORAL at 10:52

## 2021-04-17 RX ADMIN — Medication 10 ML: at 21:03

## 2021-04-17 RX ADMIN — ASPIRIN 81 MG CHEWABLE TABLET 81 MG: 81 TABLET CHEWABLE at 09:13

## 2021-04-17 RX ADMIN — GLIPIZIDE 2.5 MG: 5 TABLET ORAL at 08:30

## 2021-04-17 RX ADMIN — HYDRALAZINE HYDROCHLORIDE 10 MG: 10 TABLET, FILM COATED ORAL at 21:03

## 2021-04-17 RX ADMIN — AMLODIPINE BESYLATE 10 MG: 5 TABLET ORAL at 09:13

## 2021-04-17 RX ADMIN — Medication 10 ML: at 09:14

## 2021-04-17 RX ADMIN — INSULIN LISPRO 2 UNITS: 100 INJECTION, SOLUTION INTRAVENOUS; SUBCUTANEOUS at 18:01

## 2021-04-17 RX ADMIN — IPRATROPIUM BROMIDE AND ALBUTEROL SULFATE 3 ML: 2.5; .5 SOLUTION RESPIRATORY (INHALATION) at 11:50

## 2021-04-17 RX ADMIN — IPRATROPIUM BROMIDE AND ALBUTEROL SULFATE 3 ML: 2.5; .5 SOLUTION RESPIRATORY (INHALATION) at 18:48

## 2021-04-17 RX ADMIN — LOSARTAN POTASSIUM 100 MG: 50 TABLET, FILM COATED ORAL at 10:52

## 2021-04-17 RX ADMIN — HYDRALAZINE HYDROCHLORIDE 10 MG: 10 TABLET, FILM COATED ORAL at 09:13

## 2021-04-17 NOTE — PLAN OF CARE
Goal Outcome Evaluation:     Progress: improving  Outcome Summary: Pt has rested well tonight with no complaints. BP's have remained stable this shift. Will continue to monitor.

## 2021-04-17 NOTE — PLAN OF CARE
Goal Outcome Evaluation:        Outcome Summary: patient had high bp most of the day and asymptomatic bradycardia. MD notified meds adjusted.

## 2021-04-17 NOTE — PROGRESS NOTES
"RENAL/KCC:     LOS: 1 day    Patient Care Team:  Brian Nazario APRN as PCP - General    Chief Complaint:  HTN, CKD3-4    Subjective     Interval History:   Chart reviewed.  BP was much better yesterday afternoon but has jumped up to over 200 this AM.  She is more fatigues and having nausea as a result.  Discussed with family at bedside.    Objective     Vital Sign Min/Max for last 24 hours  Temp  Min: 98 °F (36.7 °C)  Max: 98.6 °F (37 °C)   BP  Min: 114/74  Max: 212/69   Pulse  Min: 58  Max: 73   Resp  Min: 16  Max: 22   SpO2  Min: 93 %  Max: 100 %   No data recorded   Weight  Min: 67.9 kg (149 lb 12.8 oz)  Max: 67.9 kg (149 lb 12.8 oz)     Flowsheet Rows      First Filed Value   Admission Height  162 cm (63.78\") Documented at 04/15/2021 1816   Admission Weight  68.7 kg (151 lb 7.3 oz) Documented at 04/15/2021 1816          No intake/output data recorded.  I/O last 3 completed shifts:  In: 440 [P.O.:440]  Out: -     Physical Exam:  GEN: Awake, NAD  ENT: PERRL, EOMI, MMM  NECK: Supple, no JVD  CHEST: CTAB, no W/R/C  CV: RRR, no M/G/R  ABD: Soft, NT, +BS  SKIN: Warm and Dry  NEURO: CN's intact      WBC WBC   Date Value Ref Range Status   04/17/2021 4.10 3.40 - 10.80 10*3/mm3 Final   04/16/2021 3.80 3.40 - 10.80 10*3/mm3 Final   04/15/2021 4.97 3.40 - 10.80 10*3/mm3 Final      HGB Hemoglobin   Date Value Ref Range Status   04/17/2021 9.6 (L) 12.0 - 15.9 g/dL Final   04/16/2021 9.6 (L) 12.0 - 15.9 g/dL Final     Comment:     Result checked    04/15/2021 11.6 (L) 12.0 - 15.9 g/dL Final      HCT Hematocrit   Date Value Ref Range Status   04/17/2021 28.5 (L) 34.0 - 46.6 % Final   04/16/2021 29.5 (L) 34.0 - 46.6 % Final   04/15/2021 36.4 34.0 - 46.6 % Final      Platlets No results found for: LABPLAT   MCV MCV   Date Value Ref Range Status   04/17/2021 82.5 79.0 - 97.0 fL Final   04/16/2021 84.3 79.0 - 97.0 fL Final   04/15/2021 85.4 79.0 - 97.0 fL Final          Sodium Sodium   Date Value Ref Range Status   04/17/2021 " 139 136 - 145 mmol/L Final   04/16/2021 139 136 - 145 mmol/L Final   04/15/2021 144 136 - 145 mmol/L Final      Potassium Potassium   Date Value Ref Range Status   04/17/2021 3.6 3.5 - 5.2 mmol/L Final   04/16/2021 3.9 3.5 - 5.2 mmol/L Final   04/15/2021 4.3 3.5 - 5.2 mmol/L Final      Chloride Chloride   Date Value Ref Range Status   04/17/2021 106 98 - 107 mmol/L Final   04/16/2021 106 98 - 107 mmol/L Final   04/15/2021 111 (H) 98 - 107 mmol/L Final      CO2 CO2   Date Value Ref Range Status   04/17/2021 24.0 22.0 - 29.0 mmol/L Final   04/16/2021 22.0 22.0 - 29.0 mmol/L Final   04/15/2021 21.5 (L) 22.0 - 29.0 mmol/L Final      BUN BUN   Date Value Ref Range Status   04/17/2021 35 (H) 8 - 23 mg/dL Final   04/16/2021 35 (H) 8 - 23 mg/dL Final   04/15/2021 38 (H) 8 - 23 mg/dL Final      Creatinine Creatinine   Date Value Ref Range Status   04/17/2021 1.98 (H) 0.57 - 1.00 mg/dL Final   04/16/2021 2.02 (H) 0.57 - 1.00 mg/dL Final   04/15/2021 1.94 (H) 0.57 - 1.00 mg/dL Final      Calcium Calcium   Date Value Ref Range Status   04/17/2021 8.7 8.6 - 10.5 mg/dL Final   04/16/2021 8.4 (L) 8.6 - 10.5 mg/dL Final   04/15/2021 9.4 8.6 - 10.5 mg/dL Final      PO4 No results found for: CAPO4   Albumin Albumin   Date Value Ref Range Status   04/15/2021 3.50 3.50 - 5.20 g/dL Final      Magnesium No results found for: MG   Uric Acid No results found for: URICACID        Results Review:     I reviewed the patient's new clinical results.    amLODIPine, 10 mg, Oral, Daily  aspirin, 81 mg, Oral, Daily  atorvastatin, 80 mg, Oral, Daily  cefTRIAXone, 1 g, Intravenous, Q24H  chlorthalidone, 50 mg, Oral, Daily  enoxaparin, 30 mg, Subcutaneous, Q24H  glipizide, 2.5 mg, Oral, QAM AC  hydrALAZINE, 50 mg, Oral, TID  insulin lispro, 0-9 Units, Subcutaneous, TID AC  ipratropium-albuterol, 3 mL, Nebulization, 4x Daily - RT  labetalol, 10 mg, Intravenous, Once  losartan, 100 mg, Oral, Q24H  metoprolol tartrate, 100 mg, Oral, Q12H  sodium  chloride, 10 mL, Intravenous, Q12H           Medication Review: Reviewed    Assessment/Plan     CKD4  HTN emergency  Proteinuria  DM  Weakness  N/V  Anemia    Plan: Renal function is stable at baseline.  BP has jumped back up this morning.  Will resume home Losartan.  Keep Hydralazine at current dose as patient and family concerned that when it was increased it made her feel poorly.  Will check work-up for resistant HTN.    Jim Farooq MD   [facility]  04/17/21  10:39 EDT

## 2021-04-17 NOTE — PROGRESS NOTES
[facility] Medicine Services  INPATIENT PROGRESS NOTE  374/1   Hospitalist Team  LOS 1 days      Patient Care Team:  Brian Nazario APRN as PCP - General        Chief Complaint / Subjective  Chief Complaint   Patient presents with   • Shortness of Breath     SOA, Chills, not as active as normal. sleeping alot. [facility]       HPI (4 hpi elements or status of 3 chronic)  Ms. Woodward is a 80 y.o. female with past medical history of hypertension, diabetes, hyperlipidemia and CKD who presents to [facility] complaining of dyspnea and generalized weakness/lethargy.  Patient is non-English speaking with family at bedside interpreting throughout the exam.  She reports that for the past 2 days she has had increasing weakness as well as dyspnea specifically dyspnea on exertion along with 2 episodes of nausea and nonbloody vomiting and occasional sensation of palpitations.  Family reports that patient was recently seen by her nephrologist and found to have poorly controlled hypertension and hydralazine dosing was increased.  They note symptoms seem to have began near the time that dose adjustments were made to her blood pressure medication.  No pain including chest pain, cough or fever, peripheral edema, syncope or near syncope are reported.  They do note some chronic constipation which is unchanged from baseline and patient reports no changes in her urinary habits.  She does not smoke or drink alcohol and patient and family confirm compliance with all of her outpatient medical therapies.     Initial vital signs: Blood pressure: 211/67, heart rate: 72, respirations: 24, O2 sat 99% on room air, temp 97.8.  In the ED patient did have lab significant for troponin of 0.018, proBNP: 3125.0 9 CO2: 21.5, chloride: 111, creatinine: 1.94, BUN: 38, hemoglobin: 11.6. Lipase: 146, procalcitonin: 0.08. UA shows 3+ protein with trace glucose but is otherwise  unremarkable. EKG shows sinus rhythm at 72 with some ST depression in lead I, II, V4, V5 and V6 with no noted ectopy, what appears to be a left bundle branch block and a QTC of 386 ms with no EKG for comparison. CT of abdomen and pelvis shows infiltrates noted throughout the lung bases suggesting developing multifocal pneumonia versus pulmonary edema with cardiomegaly also present. No evidence of significant nephrolithiasis or obstructive uropathy is noted. No acute abdominal abnormality is reported. Chest x-ray reported with cardiomegaly but no definitive evidence of cardiopulmonary process. Respiratory viral panel including COVID-19 is negative.     Noted.  Renal function at baseline.  Diabetic  Non-smoker        History taken from: chart family    ROS  Constitutional: Positive for chills and malaise/fatigue. Negative for fever.   HENT: Negative.    Eyes: Negative.    Cardiovascular: Positive for dyspnea on exertion and palpitations. Negative for chest pain, irregular heartbeat, leg swelling, near-syncope, orthopnea and syncope.   Respiratory: Positive for shortness of breath. Negative for cough and sputum production.    Endocrine: Negative.    Skin: Negative.    Musculoskeletal: Negative.    Gastrointestinal: Positive for constipation, nausea and vomiting. Negative for abdominal pain, diarrhea, hematemesis, hematochezia and melena.   Genitourinary: Negative.    Neurological: Negative.    Psychiatric/Behavioral: Negative.               History reviewed. No pertinent family history.    Past Medical History:   Diagnosis Date   • Diabetes mellitus (CMS/HCC)    • Hyperlipidemia    • Hypertension        Social History     Socioeconomic History   • Marital status:      Spouse name: Not on file   • Number of children: Not on file   • Years of education: Not on file   • Highest education level: Not on file   Tobacco Use   • Smoking status: Never Smoker   • Smokeless tobacco: Never Used   Substance and Sexual  "Activity   • Alcohol use: No   • Drug use: No   • Sexual activity: Defer       Prior to Admission medications    Medication Sig Start Date End Date Taking? Authorizing Provider   amLODIPine (NORVASC) 10 MG tablet Take 10 mg by mouth Daily.   Yes Harry Hernández MD   aspirin 81 MG chewable tablet Chew 81 mg Daily.   Yes Harry Hernández MD   atorvastatin (LIPITOR) 80 MG tablet Take 80 mg by mouth Daily.   Yes Harry Hernández MD   glipizide (GLUCOTROL) 5 MG tablet Take 2.5 mg by mouth Daily.   Yes Harry Hernández MD   hydrALAZINE (APRESOLINE) 25 MG tablet Take 25 mg by mouth 3 (Three) Times a Day.   Yes Harry Hernández MD   losartan (COZAAR) 100 MG tablet Take 100 mg by mouth Daily.   Yes Harry Hernández MD        Objective    Physical Exam     Vital Signs  Temp:  [98.2 °F (36.8 °C)-98.6 °F (37 °C)] 98.6 °F (37 °C)  Heart Rate:  [58-73] 73  Resp:  [16-22] 16  BP: (114-212)/(62-74) 198/68    Oxygen Therapy  SpO2: 97 %  Pulse Oximetry Type: Intermittent  Device (Oxygen Therapy): room air  Flowsheet Rows      First Filed Value   Admission Height  162 cm (63.78\") Documented at 04/15/2021 1816   Admission Weight  68.7 kg (151 lb 7.3 oz) Documented at 04/15/2021 1816        Weight change: 2.968 kg (6 lb 8.7 oz)   Intake & Output (last 3 days)       04/14 0701 - 04/15 0700 04/15 0701 - 04/16 0700 04/16 0701 - 04/17 0700 04/17 0701 - 04/18 0700    P.O.   440     Total Intake(mL/kg)   440 (6.5)     Net   +440             Urine Unmeasured Occurrence  0 x          Lines, Drains & Airways    Active LDAs     Name:   Placement date:   Placement time:   Site:   Days:    Peripheral IV 04/15/21 1835 Right Hand   04/15/21    1835    Hand   1                Physical Exam:    Physical Exam  Vitals and nursing note reviewed.   Constitutional:       General: She is not in acute distress.     Appearance: Normal appearance. She is well-developed. She is not ill-appearing, toxic-appearing or diaphoretic. "   HENT:      Head: Normocephalic and atraumatic.      Right Ear: Ear canal and external ear normal.      Left Ear: Ear canal and external ear normal.      Nose: Nose normal. No congestion or rhinorrhea.      Mouth/Throat:      Mouth: Mucous membranes are moist.      Pharynx: No oropharyngeal exudate.   Eyes:      General: No scleral icterus.        Right eye: No discharge.         Left eye: No discharge.      Extraocular Movements: Extraocular movements intact.      Conjunctiva/sclera: Conjunctivae normal.      Pupils: Pupils are equal, round, and reactive to light.   Neck:      Thyroid: No thyromegaly.      Vascular: No carotid bruit or JVD.      Trachea: No tracheal deviation.   Cardiovascular:      Rate and Rhythm: Normal rate and regular rhythm.      Pulses: Normal pulses.      Heart sounds: Normal heart sounds. No murmur heard.   No friction rub. No gallop.    Pulmonary:      Effort: Pulmonary effort is normal. No respiratory distress.      Breath sounds: Normal breath sounds. No stridor. No wheezing, rhonchi or rales.   Chest:      Chest wall: No tenderness.   Abdominal:      General: Bowel sounds are normal. There is no distension.      Palpations: Abdomen is soft. There is no mass.      Tenderness: There is no abdominal tenderness. There is no guarding or rebound.      Hernia: No hernia is present.   Musculoskeletal:         General: No swelling, tenderness, deformity or signs of injury. Normal range of motion.      Cervical back: Normal range of motion and neck supple. No rigidity. No muscular tenderness.      Right lower leg: No edema.      Left lower leg: No edema.   Lymphadenopathy:      Cervical: No cervical adenopathy.   Skin:     General: Skin is warm and dry.      Coloration: Skin is not jaundiced or pale.      Findings: No bruising, erythema or rash.   Neurological:      General: No focal deficit present.      Mental Status: She is alert and oriented to person, place, and time. Mental status is at  baseline.      Cranial Nerves: No cranial nerve deficit.      Sensory: No sensory deficit.      Motor: No weakness or abnormal muscle tone.      Coordination: Coordination normal.   Psychiatric:         Mood and Affect: Mood normal.         Behavior: Behavior normal.         Thought Content: Thought content normal.         Judgment: Judgment normal.               PT Recommendation and Plan             Procedures:    * No surgery found *     Assessment/Plan with Problem wise       Active Hospital Problems    Diagnosis  POA   • **Hypertensive emergency [I16.1]  Yes     Priority: High   • HFrEF (heart failure with reduced ejection fraction) (CMS/[facility]) [I50.20]  Yes     Priority: Medium   • Mixed hyperlipidemia [E78.2]  Yes   • Essential hypertension [I10]  Yes   • Type 2 diabetes mellitus (CMS/[facility]) [E11.9]  Yes   • Anemia due to stage 3a chronic kidney disease (CMS/[facility]) [N18.31, D63.1]  Yes   • CKD (chronic kidney disease) stage 3, GFR 30-59 ml/min (CMS/[facility]) [N18.30]  Yes      Resolved Hospital Problems   No resolved problems to display.        Estimated Creatinine Clearance: 24.3 mL/min (A) (by C-G formula based on SCr of 1.98 mg/dL (H)).    Code Status and Medical Interventions:   Ordered at: 04/15/21 2147     Code Status:    CPR     Medical Interventions (Level of Support Prior to Arrest):    Full       MEDICAL DECISION MAKING COMPLEXITY BY PROBLEM:       Hypertension:  Continue home medications   Options include-  beta-blockers  Calcium channel blockers  ACE inhibitor  Vasodilators  Low-dose diuretics  PRN medications have not been shown to affect outcomes-to be avoided  Secondary hypertension work-up in progress      CHF:  Diuresis-loop diuretics  Spironolactone if tolerated  Thiazides if tolerated  ACE inhibitor if tolerated  Beta-blocker  Possible SGLT2 inhibitor trial if EF<40%  Check echocardiogram      Renal failure/insufficiency/injury:  Hydration  Supportive treatment  Cut down or hold ACE inhibitors  Avoid  nephrotoxic medications   Address diuretics  Add canagliflozin to replace sulfonylurea    Anemia due to CKD  Supportive      Hyperlipidemia:  Check lipid panel  Statins if indicated  Add Ezetimibe if appropriate  Only Icosapent Ethyl (omega-3) demonstrated efficacy in Hypertriglyceridemia.    I see no evidence of pneumonia.  CT changes probably result of her CHF        Care coordination with nursing, Dr. Farooq, pharmacy regarding her current medications and current care.  Discussed with family at bedside.  EM more than 35 minutes.  More than 50% spent on care coordination and counseling.  04/17/21 11:35 AM EDT.    Plan for disposition:  anticipate pt will need 30 days or less of rehab        SLP OP Goals     Row Name 04/16/21 1437          Time Calculation    PT Goal Re-Cert Due Date  04/30/21  -ADALGISA       User Key  (r) = Recorded By, (t) = Taken By, (c) = Cosigned By    Initials Name Provider Type    Hernan Landa, PT Physical Therapist          Continued Care and Services - Admitted Since 4/15/2021    Coordination has not been started for this encounter.        Historical & Objective Data     Results Review:    I reviewed the patient's new lab and radiology results. 04/17/21     Results from last 7 days   Lab Units 04/17/21  0438 04/16/21  0042 04/15/21  1116   WBC 10*3/mm3 4.10 3.80 4.97   HEMOGLOBIN g/dL 9.6* 9.6* 11.6*   HEMATOCRIT % 28.5* 29.5* 36.4   MCV fL 82.5 84.3 85.4   MCH pg 27.8 27.3 27.2   MPV fL 8.4 7.7 10.6   RDW % 14.6 15.2 14.1   PLATELETS 10*3/mm3 205 199 274     Results from last 7 days   Lab Units 04/17/21  0438 04/16/21  0042 04/15/21  1836 04/15/21  1116   SODIUM mmol/L 139 139  --  144   POTASSIUM mmol/L 3.6 3.9  --  4.3   CHLORIDE mmol/L 106 106  --  111*   CO2 mmol/L 24.0 22.0  --  21.5*   BUN mg/dL 35* 35*  --  38*   CREATININE mg/dL 1.98* 2.02*  --  1.94*   CALCIUM mg/dL 8.7 8.4*  --  9.4   BILIRUBIN mg/dL  --   --  0.3  --    ALK PHOS U/L  --   --  91  --    ALT (SGPT) U/L  --   --  28   --    AST (SGOT) U/L  --   --  31  --    GLUCOSE mg/dL 105* 174*  --  108*     Inflammatory Biomarkers        Invalid input(s): ESR, D-DIMER QUANTITATIVE,  PROCALCITONIN,  alllabslink(lactate:5)@ )@  Lab Results   Component Value Date    PHOS 3.7 04/15/2021     Hemoglobin A1C   Date Value Ref Range Status   04/16/2021 5.9 (H) 3.5 - 5.6 % Final     Lab Results   Component Value Date    CHOL 208 (H) 04/16/2021    TRIG 94 04/16/2021    HDL 71 (H) 04/16/2021     (H) 04/16/2021     Lab Results   Component Value Date    LIPASE 146 (H) 04/15/2021               Pathology  No results found for: INTRAOP, PREDX, FINALDX, COMDX  COVID19   Date Value Ref Range Status   04/15/2021 Not Detected Not Detected - Ref. Range Final        Microbiology Results (last 10 days)     Procedure Component Value - Date/Time    Respiratory Panel PCR w/COVID-19(SARS-CoV-2) V[facility]CTOR/DAYNA/JADYN/PAD/COR/MAD/[facility]IRAG In-House, NP Swab in UTM/VTM, 3-4 HR TAT - Swab, Nasopharynx [[id number]]  (Normal) Collected: 04/15/21 1836    Lab Status: Final result Specimen: Swab from Nasopharynx Updated: 04/15/21 2016     ADENOVIRUS, PCR Not Detected     Coronavirus 229E Not Detected     Coronavirus HKU1 Not Detected     Coronavirus NL63 Not Detected     Coronavirus OC43 Not Detected     COVID19 Not Detected     Human Metapneumovirus Not Detected     Human Rhinovirus/Enterovirus Not Detected     Influenza A PCR Not Detected     Influenza B PCR Not Detected     Parainfluenza Virus 1 Not Detected     Parainfluenza Virus 2 Not Detected     Parainfluenza Virus 3 Not Detected     Parainfluenza Virus 4 Not Detected     RSV, PCR Not Detected     Bordetella pertussis pcr Not Detected     Bordetella parapertussis PCR Not Detected     Chlamydophila pneumoniae PCR Not Detected     Mycoplasma pneumo by PCR Not Detected    Narrative:      Fact sheet for providers: https://docs.[employer]/wp-content/uploads/BIR7783-9725-GH9.1-EUA-Provider-Fact-Sheet-3.pdf    Fact sheet for  patients: https://docs.[employer]/wp-content/uploads/KRG0017-8883-JG4.1-EUA-Patient-Fact-Sheet-1.pdf    Test performed by PCR.          ECG/EMG Results (most recent)     Procedure Component Value Units Date/Time    ECG 12 Lead [[id number]] Collected: 04/15/21 1848     Updated: 04/16/21 1549     QT Interval 363 ms     Narrative:      HEART RATE= 72  bpm  RR Interval= 832  ms  SC Interval= 226  ms  P Horizontal Axis= -58  deg  P Front Axis= 40  deg  QRSD Interval= 94  ms  QT Interval= 363  ms  QRS Axis= 48  deg  T Wave Axis= 251  deg  - ABNORMAL ECG -  Sinus rhythm  inferolat st depression  Prolonged SC interval  LVH with secondary repolarization abnormality  No previous ECG available for comparison  Electronically Signed By: Mitchel Gresham (Virgilio) 16-Apr-2021 15:49:31  Date and Time of Study: 2021-04-15 18:48:44    Adult Transthoracic Echo Complete W/ Cont if Necessary Per Protocol [[id number]] Collected: 04/16/21 0902     Updated: 04/16/21 2019     BSA 1.8 m^2      RVIDd 2.8 cm      IVSd 2.1 cm      LVIDd 3.5 cm      LVIDs 2.7 cm      LVPWd 2.0 cm      IVS/LVPW 1.0     FS 24.6 %      EDV(Teich) 52.5 ml      ESV(Teich) 26.4 ml      EF(Teich) 49.7 %      EDV(cubed) 44.6 ml      ESV(cubed) 19.1 ml      EF(cubed) 57.1 %      LV mass(C)d 340.3 grams      LV mass(C)dI 190.2 grams/m^2      SV(Teich) 26.1 ml      SI(Teich) 14.6 ml/m^2      SV(cubed) 25.5 ml      SI(cubed) 14.2 ml/m^2      Ao root diam 3.6 cm      Ao root area 9.9 cm^2      ACS 2.0 cm      asc Aorta Diam 3.3 cm      LVOT diam 1.9 cm      LVOT area 3.0 cm^2      EDV(MOD-sp4) 71.7 ml      ESV(MOD-sp4) 13.7 ml      EF(MOD-sp4) 81.0 %      SV(MOD-sp4) 58.1 ml      SI(MOD-sp4) 32.5 ml/m^2      Ao root area (BSA corrected) 2.0     LV Curry Vol (BSA corrected) 40.1 ml/m^2      LV Sys Vol (BSA corrected) 7.6 ml/m^2      MV E max joy 72.9 cm/sec      MV A max joy 121.4 cm/sec      MV E/A 0.6     MV V2 max 138.0 cm/sec      MV max PG 7.6 mmHg      MV V2 mean 57.8 cm/sec       MV mean PG 1.7 mmHg      MV V2 VTI 33.0 cm      MVA(VTI) 3.9 cm^2      MV dec slope 161.2 cm/sec^2      MV dec time 0.45 sec      Ao pk joy 245.1 cm/sec      Ao max PG 24.1 mmHg      Ao max PG (full) 6.8 mmHg      Ao V2 mean 173.5 cm/sec      Ao mean PG 13.7 mmHg      Ao mean PG (full) 2.3 mmHg      Ao V2 VTI 48.6 cm      WINSTON(I,A) 2.7 cm^2      WINSTON(I,D) 2.7 cm^2      WINSTON(V,A) 2.5 cm^2      WINSTON(V,D) 2.5 cm^2      LV V1 max PG 17.3 mmHg      LV V1 mean PG 11.4 mmHg      LV V1 max 208.0 cm/sec      LV V1 mean 159.5 cm/sec      LV V1 VTI 43.6 cm      MR max joy 524.6 cm/sec      MR max .7 mmHg      SV(Ao) 482.1 ml      SI(Ao) 269.4 ml/m^2      SV(LVOT) 129.1 ml      SI(LVOT) 72.1 ml/m^2      PA V2 max 136.2 cm/sec      PA max PG 7.4 mmHg      PA max PG (full) 3.6 mmHg      PA V2 mean 91.7 cm/sec      PA mean PG 3.8 mmHg      PA mean PG (full) 1.7 mmHg      PA V2 VTI 31.4 cm      PI end-d joy 129.5 cm/sec      PI max joy 214.9 cm/sec      PI max PG 18.5 mmHg      RV V1 max PG 3.8 mmHg      RV V1 mean PG 2.1 mmHg      RV V1 max 97.5 cm/sec      RV V1 mean 68.6 cm/sec      RV V1 VTI 19.0 cm      RAP systole 3.0 mmHg       CV ECHO ISRAEL - BZI_BMI 26.3 kilograms/m^2       CV ECHO [place] - BSA(HAYCOCK) 1.8 m^2       CV ECHO [place]RA[place] - BZI_METRIC_WEIGHT 71.7 kg       CV ECHO [place] - BZI_METRIC_HEIGHT 165.1 cm      LA dimension(2D) 3.1 cm     Narrative:      · Left ventricular ejection fraction appears to be 61 - 65%.  · Left ventricular wall thickness is consistent with moderate basal   asymmetric hypertrophy.  · The right ventricular cavity is mildly dilated.  · Mild to moderate aortic valve stenosis is present.  · Moderate mitral valve regurgitation is present.  · No pericardial effusion noted       ECG 12 Lead [[id number]] Collected: 04/16/21 0305     Updated: 04/16/21 2102     QT Interval 360 ms     Narrative:      HEART RATE= 59  bpm  RR Interval= 1012  ms  NJ Interval= 224  ms  P Horizontal Axis= -75  deg  P  Front Axis= 50  deg  QRSD Interval= 94  ms  QT Interval= 360  ms  QRS Axis= 63  deg  T Wave Axis= 247  deg  - ABNORMAL ECG -  Sinus bradycardia  Prolonged NY interval  Probable left ventricular hypertrophy  Anterior Q waves, possibly due to LVH  Abnormal T, consider ischemia, diffuse leads  When compared with ECG of 15-Apr-2021 18:48:44,  New or worsened ischemia or infarction  Electronically Signed By: Zane Aldridge ([facility]) 16-Apr-2021 20:59:53  Date and Time of Study: 2021-04-16 03:05:03              Results for orders placed during the hospital encounter of 04/15/21    Adult Transthoracic Echo Complete W/ Cont if Necessary Per Protocol    Interpretation Summary  · Left ventricular ejection fraction appears to be 61 - 65%.  · Left ventricular wall thickness is consistent with moderate basal asymmetric hypertrophy.  · The right ventricular cavity is mildly dilated.  · Mild to moderate aortic valve stenosis is present.  · Moderate mitral valve regurgitation is present.  · No pericardial effusion noted      CT Abdomen Pelvis Without Contrast    Result Date: 4/15/2021  1.Infiltrates are noted throughout the lung bases suggesting developing multifocal pneumonia versus pulmonary edema. Cardiomegaly is noted. 2.No evidence for significant nephrolithiasis. There is no evidence for obstructive uropathy. 3.No evidence for acute abnormality throughout the abdomen or pelvis on this noncontrast exam.  Electronically Signed By-Jim Montano MD On:4/15/2021 7:45 PM This report was finalized on [id number] by  Jim Montano MD.    XR Chest 1 View    Result Date: 4/15/2021  1.No definitive evidence for acute cardiopulmonary process. 2.Note is made of cardiomegaly.  Electronically Signed By-Jim Montano MD On:4/15/2021 6:47 PM This report was finalized on [id number] by  Jim Montano MD.      I personally reviewed patient's x-ray films and my findings are: Chest x-ray film reviewed cardiomegaly.  Some congestion.  More  evident on CT scan.  No infiltrates    I personally reviewed patient's EKG strip and my findings are: 0    Medication Review:   I have reviewed the patient's current medication list 04/17/21     Scheduled Meds  amLODIPine, 10 mg, Oral, Daily  aspirin, 81 mg, Oral, Daily  atorvastatin, 80 mg, Oral, Daily  Canagliflozin, 100 mg, Oral, Daily  chlorthalidone, 50 mg, Oral, Daily  enoxaparin, 30 mg, Subcutaneous, Q24H  hydrALAZINE, 10 mg, Oral, TID  insulin lispro, 0-9 Units, Subcutaneous, TID AC  ipratropium-albuterol, 3 mL, Nebulization, 4x Daily - RT  labetalol, 10 mg, Intravenous, Once  losartan, 100 mg, Oral, Q24H  metoprolol tartrate, 50 mg, Oral, Q12H  sodium chloride, 10 mL, Intravenous, Q12H        Meds Infusions       DVT prophylaxis  Mechanical Order History:     None      Pharmalogical Order History:      Ordered     Dose Route Frequency Stop    04/16/21 0033  enoxaparin (LOVENOX) syringe 30 mg      30 mg SC Every 24 Hours --                Meds PRN  •  acetaminophen **OR** acetaminophen **OR** acetaminophen  •  dextrose  •  dextrose  •  glucagon (human recombinant)  •  insulin lispro **AND** insulin lispro  •  melatonin  •  nitroglycerin  •  ondansetron **OR** ondansetron  •  [COMPLETED] Insert peripheral IV **AND** sodium chloride  •  sodium chloride      Rick Sánchez MD  04/17/21  11:39 EDT

## 2021-04-18 ENCOUNTER — APPOINTMENT (OUTPATIENT)
Dept: CARDIOLOGY | Facility: HOSPITAL | Age: 81
End: 2021-04-18

## 2021-04-18 LAB
ANION GAP SERPL CALCULATED.3IONS-SCNC: 9 MMOL/L (ref 5–15)
BASOPHILS # BLD AUTO: 0 10*3/MM3 (ref 0–0.2)
BASOPHILS NFR BLD AUTO: 0.7 % (ref 0–1.5)
BH CV ECHO MEAS - DIST REN A PSV LEFT: 97 CM/S
BH CV ECHO MEAS - MID REN A PSV LEFT: 87 CM/S
BH CV ECHO MEAS - PROX REN A PSV LEFT: 124 CM/S
BH CV VAS RENAL AORTIC MID PSV: 96 CM/S
BH CV XLRA MEAS - KID L LEFT: 9.7 CM
BH CV XLRA MEAS DIST REN A PSV RIGHT: 65 CM/S
BH CV XLRA MEAS KID L RIGHT: 8.7 CM
BH CV XLRA MEAS KID W RIGHT: 4.8 CM
BH CV XLRA MEAS MID REN A PSV RIGHT: 90 CM/S
BH CV XLRA MEAS PROX REN A PSV RIGHT: 72 CM/S
BH CV XLRA MEAS RAR LEFT: 1.29
BH CV XLRA MEAS RAR RIGHT: 0.94
BUN SERPL-MCNC: 31 MG/DL (ref 8–23)
BUN/CREAT SERPL: 16.5 (ref 7–25)
CALCIUM SPEC-SCNC: 9.1 MG/DL (ref 8.6–10.5)
CHLORIDE SERPL-SCNC: 106 MMOL/L (ref 98–107)
CO2 SERPL-SCNC: 25 MMOL/L (ref 22–29)
CORTIS SERPL-MCNC: 9.13 MCG/DL
CREAT SERPL-MCNC: 1.88 MG/DL (ref 0.57–1)
DEPRECATED RDW RBC AUTO: 44.2 FL (ref 37–54)
EOSINOPHIL # BLD AUTO: 0.2 10*3/MM3 (ref 0–0.4)
EOSINOPHIL NFR BLD AUTO: 3.6 % (ref 0.3–6.2)
ERYTHROCYTE [DISTWIDTH] IN BLOOD BY AUTOMATED COUNT: 14.9 % (ref 12.3–15.4)
GFR SERPL CREATININE-BSD FRML MDRD: 26 ML/MIN/1.73
GFR SERPL CREATININE-BSD FRML MDRD: 31 ML/MIN/1.73
GLUCOSE BLDC GLUCOMTR-MCNC: 106 MG/DL (ref 70–105)
GLUCOSE BLDC GLUCOMTR-MCNC: 121 MG/DL (ref 70–105)
GLUCOSE BLDC GLUCOMTR-MCNC: 161 MG/DL (ref 70–105)
GLUCOSE BLDC GLUCOMTR-MCNC: 241 MG/DL (ref 70–105)
GLUCOSE SERPL-MCNC: 119 MG/DL (ref 65–99)
HCT VFR BLD AUTO: 30.3 % (ref 34–46.6)
HGB BLD-MCNC: 9.7 G/DL (ref 12–15.9)
LEFT KIDNEY WIDTH: 4.6 CM
LYMPHOCYTES # BLD AUTO: 1.9 10*3/MM3 (ref 0.7–3.1)
LYMPHOCYTES NFR BLD AUTO: 36.4 % (ref 19.6–45.3)
MCH RBC QN AUTO: 27 PG (ref 26.6–33)
MCHC RBC AUTO-ENTMCNC: 32.1 G/DL (ref 31.5–35.7)
MCV RBC AUTO: 84.1 FL (ref 79–97)
MONOCYTES # BLD AUTO: 0.5 10*3/MM3 (ref 0.1–0.9)
MONOCYTES NFR BLD AUTO: 9.3 % (ref 5–12)
NEUTROPHILS NFR BLD AUTO: 2.6 10*3/MM3 (ref 1.7–7)
NEUTROPHILS NFR BLD AUTO: 50 % (ref 42.7–76)
NRBC BLD AUTO-RTO: 0.1 /100 WBC (ref 0–0.2)
PLATELET # BLD AUTO: 191 10*3/MM3 (ref 140–450)
PMV BLD AUTO: 8.2 FL (ref 6–12)
POTASSIUM SERPL-SCNC: 3.7 MMOL/L (ref 3.5–5.2)
QT INTERVAL: 422 MS
RBC # BLD AUTO: 3.6 10*6/MM3 (ref 3.77–5.28)
SODIUM SERPL-SCNC: 140 MMOL/L (ref 136–145)
TSH SERPL DL<=0.05 MIU/L-ACNC: 2.94 UIU/ML (ref 0.27–4.2)
WBC # BLD AUTO: 5.3 10*3/MM3 (ref 3.4–10.8)

## 2021-04-18 PROCEDURE — 83835 ASSAY OF METANEPHRINES: CPT | Performed by: INTERNAL MEDICINE

## 2021-04-18 PROCEDURE — 85025 COMPLETE CBC W/AUTO DIFF WBC: CPT | Performed by: PHYSICIAN ASSISTANT

## 2021-04-18 PROCEDURE — 80048 BASIC METABOLIC PNL TOTAL CA: CPT | Performed by: PHYSICIAN ASSISTANT

## 2021-04-18 PROCEDURE — 82533 TOTAL CORTISOL: CPT | Performed by: INTERNAL MEDICINE

## 2021-04-18 PROCEDURE — 82962 GLUCOSE BLOOD TEST: CPT

## 2021-04-18 PROCEDURE — 94799 UNLISTED PULMONARY SVC/PX: CPT

## 2021-04-18 PROCEDURE — 93975 VASCULAR STUDY: CPT

## 2021-04-18 PROCEDURE — 25010000002 ENOXAPARIN PER 10 MG: Performed by: PHYSICIAN ASSISTANT

## 2021-04-18 PROCEDURE — 81050 URINALYSIS VOLUME MEASURE: CPT | Performed by: INTERNAL MEDICINE

## 2021-04-18 PROCEDURE — 99233 SBSQ HOSP IP/OBS HIGH 50: CPT | Performed by: INTERNAL MEDICINE

## 2021-04-18 PROCEDURE — 93005 ELECTROCARDIOGRAM TRACING: CPT | Performed by: INTERNAL MEDICINE

## 2021-04-18 PROCEDURE — 99222 1ST HOSP IP/OBS MODERATE 55: CPT | Performed by: INTERNAL MEDICINE

## 2021-04-18 PROCEDURE — 82384 ASSAY THREE CATECHOLAMINES: CPT | Performed by: INTERNAL MEDICINE

## 2021-04-18 PROCEDURE — 84443 ASSAY THYROID STIM HORMONE: CPT | Performed by: INTERNAL MEDICINE

## 2021-04-18 RX ORDER — CLONIDINE HYDROCHLORIDE 0.1 MG/1
0.2 TABLET ORAL EVERY 8 HOURS SCHEDULED
Status: DISCONTINUED | OUTPATIENT
Start: 2021-04-18 | End: 2021-04-18

## 2021-04-18 RX ORDER — CLONIDINE HYDROCHLORIDE 0.1 MG/1
0.1 TABLET ORAL EVERY 8 HOURS SCHEDULED
Status: DISCONTINUED | OUTPATIENT
Start: 2021-04-18 | End: 2021-04-18

## 2021-04-18 RX ORDER — DOCUSATE SODIUM 100 MG/1
100 CAPSULE, LIQUID FILLED ORAL NIGHTLY PRN
Status: DISCONTINUED | OUTPATIENT
Start: 2021-04-18 | End: 2021-04-21 | Stop reason: HOSPADM

## 2021-04-18 RX ORDER — CLONIDINE HYDROCHLORIDE 0.1 MG/1
0.2 TABLET ORAL EVERY 12 HOURS SCHEDULED
Status: DISCONTINUED | OUTPATIENT
Start: 2021-04-18 | End: 2021-04-21 | Stop reason: HOSPADM

## 2021-04-18 RX ORDER — CLONIDINE HYDROCHLORIDE 0.1 MG/1
0.3 TABLET ORAL EVERY 8 HOURS SCHEDULED
Status: DISCONTINUED | OUTPATIENT
Start: 2021-04-18 | End: 2021-04-18

## 2021-04-18 RX ORDER — POLYETHYLENE GLYCOL 3350 17 G/17G
17 POWDER, FOR SOLUTION ORAL 2 TIMES DAILY
Status: DISCONTINUED | OUTPATIENT
Start: 2021-04-18 | End: 2021-04-21 | Stop reason: HOSPADM

## 2021-04-18 RX ORDER — MAGNESIUM CARB/ALUMINUM HYDROX 105-160MG
296 TABLET,CHEWABLE ORAL ONCE
Status: COMPLETED | OUTPATIENT
Start: 2021-04-18 | End: 2021-04-18

## 2021-04-18 RX ADMIN — AMLODIPINE BESYLATE 10 MG: 5 TABLET ORAL at 08:40

## 2021-04-18 RX ADMIN — LOSARTAN POTASSIUM 100 MG: 50 TABLET, FILM COATED ORAL at 08:40

## 2021-04-18 RX ADMIN — POLYETHYLENE GLYCOL 3350 17 G: 17 POWDER, FOR SOLUTION ORAL at 08:40

## 2021-04-18 RX ADMIN — Medication 10 ML: at 08:40

## 2021-04-18 RX ADMIN — METOPROLOL TARTRATE 25 MG: 25 TABLET, FILM COATED ORAL at 08:40

## 2021-04-18 RX ADMIN — Medication 296 ML: at 13:03

## 2021-04-18 RX ADMIN — CLONIDINE HYDROCHLORIDE 0.2 MG: 0.1 TABLET ORAL at 22:53

## 2021-04-18 RX ADMIN — IPRATROPIUM BROMIDE AND ALBUTEROL SULFATE 3 ML: 2.5; .5 SOLUTION RESPIRATORY (INHALATION) at 19:44

## 2021-04-18 RX ADMIN — IPRATROPIUM BROMIDE AND ALBUTEROL SULFATE 3 ML: 2.5; .5 SOLUTION RESPIRATORY (INHALATION) at 12:26

## 2021-04-18 RX ADMIN — CHLORTHALIDONE 50 MG: 25 TABLET ORAL at 08:40

## 2021-04-18 RX ADMIN — HYDRALAZINE HYDROCHLORIDE 10 MG: 10 TABLET, FILM COATED ORAL at 06:02

## 2021-04-18 RX ADMIN — ATORVASTATIN CALCIUM 80 MG: 40 TABLET, FILM COATED ORAL at 08:40

## 2021-04-18 RX ADMIN — CLONIDINE HYDROCHLORIDE 0.1 MG: 0.1 TABLET ORAL at 08:40

## 2021-04-18 RX ADMIN — CLONIDINE HYDROCHLORIDE 0.2 MG: 0.1 TABLET ORAL at 09:40

## 2021-04-18 RX ADMIN — Medication 10 ML: at 22:56

## 2021-04-18 RX ADMIN — ASPIRIN 81 MG CHEWABLE TABLET 81 MG: 81 TABLET CHEWABLE at 08:40

## 2021-04-18 RX ADMIN — CANAGLIFLOZIN 100 MG: 300 TABLET, FILM COATED ORAL at 08:40

## 2021-04-18 RX ADMIN — ENOXAPARIN SODIUM 30 MG: 30 INJECTION SUBCUTANEOUS at 17:03

## 2021-04-18 RX ADMIN — LABETALOL 20 MG/4 ML (5 MG/ML) INTRAVENOUS SYRINGE 10 MG: at 04:16

## 2021-04-18 RX ADMIN — IPRATROPIUM BROMIDE AND ALBUTEROL SULFATE 3 ML: 2.5; .5 SOLUTION RESPIRATORY (INHALATION) at 16:45

## 2021-04-18 RX ADMIN — POLYETHYLENE GLYCOL 3350 17 G: 17 POWDER, FOR SOLUTION ORAL at 22:56

## 2021-04-18 RX ADMIN — IPRATROPIUM BROMIDE AND ALBUTEROL SULFATE 3 ML: 2.5; .5 SOLUTION RESPIRATORY (INHALATION) at 08:39

## 2021-04-18 NOTE — CONSULTS
Referring Provider: Hospitalist  Reason for Consultation: Weakness and lethargy    Patient Care Team:  Brian Nazario APRN as PCP - General    Chief complaint weakness    Subjective .     History of present illness:  Brad Woodward is a 80 y.o. female with history of diabetes hypertension hyperlipidemia presented to the hospital complains of shortness of breath and weakness and lethargy.  Most of the history was obtained from the family through interpretation.  Patient does not speak English and hence family helped interpretation.  Patient was noted to have weakness and fatigue and also shortness of breath with exertion for the last few days.  Patient has been taking her medicines for her diabetes and hypertension.  Her blood pressure was not very well controlled.  She has developed renal insufficiency and is seen by the nephrologist.  Patient medications have been adjusted.  She is not having any chest pains.  No palpitations or dizziness or syncope.  No swelling of the feet.  In the ER patient was noted to have a BUN of 38 and a creatinine of 1.9.  Patient had an EKG which showed nonspecific ST segment abnormality.  She was Covid negative.  She had CT of the chest and abdomen and pelvis which showed probable pneumonia and was treated with antibiotics.  Patient blood pressure was high and was placed on beta-blockers and heart rates were very low and hence cardiology consultation was called.    Review of Systems   Constitutional: Positive for malaise/fatigue. Negative for fever.   HENT: Negative for ear pain and nosebleeds.    Eyes: Negative for blurred vision and double vision.   Cardiovascular: Negative for chest pain, dyspnea on exertion and palpitations.   Respiratory: Positive for shortness of breath. Negative for cough.    Skin: Negative for rash.   Musculoskeletal: Negative for joint pain.   Gastrointestinal: Negative for abdominal pain, nausea and vomiting.   Neurological: Negative for focal weakness and  headaches.   Psychiatric/Behavioral: Negative for depression. The patient is not nervous/anxious.    All other systems reviewed and are negative.      History  Past Medical History:   Diagnosis Date   • Diabetes mellitus (CMS/HCC)    • Hyperlipidemia    • Hypertension        History reviewed. No pertinent surgical history.    History reviewed. No pertinent family history.    Social History     Tobacco Use   • Smoking status: Never Smoker   • Smokeless tobacco: Never Used   Substance Use Topics   • Alcohol use: No   • Drug use: No        Medications Prior to Admission   Medication Sig Dispense Refill Last Dose   • amLODIPine (NORVASC) 10 MG tablet Take 10 mg by mouth Daily.      • aspirin 81 MG chewable tablet Chew 81 mg Daily.      • atorvastatin (LIPITOR) 80 MG tablet Take 80 mg by mouth Daily.      • glipizide (GLUCOTROL) 5 MG tablet Take 2.5 mg by mouth Daily.      • hydrALAZINE (APRESOLINE) 25 MG tablet Take 25 mg by mouth 3 (Three) Times a Day.      • losartan (COZAAR) 100 MG tablet Take 100 mg by mouth Daily.            Patient has no known allergies.    Scheduled Meds:amLODIPine, 10 mg, Oral, Daily  aspirin, 81 mg, Oral, Daily  atorvastatin, 80 mg, Oral, Daily  Canagliflozin, 100 mg, Oral, Daily  chlorthalidone, 50 mg, Oral, Daily  cloNIDine, 0.2 mg, Oral, Q12H  enoxaparin, 30 mg, Subcutaneous, Q24H  insulin lispro, 0-9 Units, Subcutaneous, TID AC  ipratropium-albuterol, 3 mL, Nebulization, 4x Daily - RT  losartan, 100 mg, Oral, Q24H  polyethylene glycol, 17 g, Oral, BID  sodium chloride, 10 mL, Intravenous, Q12H      Continuous Infusions:   PRN Meds:.•  acetaminophen **OR** acetaminophen **OR** acetaminophen  •  dextrose  •  dextrose  •  docusate sodium  •  glucagon (human recombinant)  •  insulin lispro **AND** insulin lispro  •  melatonin  •  nitroglycerin  •  ondansetron **OR** ondansetron  •  [COMPLETED] Insert peripheral IV **AND** sodium chloride  •  sodium chloride    Objective     VITAL  "SIGNS  Vitals:    04/18/21 1226 04/18/21 1230 04/18/21 1645 04/18/21 1648   BP:       BP Location:       Patient Position:       Pulse: (!) 46 (!) 42 (!) 44 (!) 43   Resp: 18 18 16 16   Temp:       TempSrc:       SpO2: 98%  100% 100%   Weight:       Height:           Flowsheet Rows      First Filed Value   Admission Height  162 cm (63.78\") Documented at 04/15/2021 1816   Admission Weight  68.7 kg (151 lb 7.3 oz) Documented at 04/15/2021 1816           TELEMETRY: Sinus bradycardia with nonspecific ST segment abnormality    Physical Exam:  Constitutional:       Appearance: Well-developed.   Eyes:      General: No scleral icterus.     Conjunctiva/sclera: Conjunctivae normal.      Pupils: Pupils are equal, round, and reactive to light.   HENT:      Head: Normocephalic and atraumatic.   Neck:      Vascular: No carotid bruit or JVD.   Pulmonary:      Effort: Pulmonary effort is normal.      Breath sounds: Normal breath sounds. No wheezing. No rales.   Cardiovascular:      Normal rate. Regular rhythm.   Pulses:     Intact distal pulses.   Abdominal:      General: Bowel sounds are normal.      Palpations: Abdomen is soft.   Musculoskeletal: Normal range of motion.      Cervical back: Normal range of motion and neck supple. Skin:     General: Skin is warm and dry.      Findings: No rash.   Neurological:      Mental Status: Alert.      Comments: No focal deficits          Results Review:   I reviewed the patient's new clinical results.  Lab Results (last 24 hours)     Procedure Component Value Units Date/Time    POC Glucose Once [[id number]]  (Abnormal) Collected: 04/18/21 1625    Specimen: Blood Updated: 04/18/21 1628     Glucose 121 mg/dL      Comment: Serial Number: [id number]:  [id number]       POC Glucose Once [[id number]]  (Abnormal) Collected: 04/18/21 1141    Specimen: Blood Updated: 04/18/21 1142     Glucose 161 mg/dL      Comment: Serial Number: [id number]:  [id number]       POC Glucose Once [[id number]]  " (Abnormal) Collected: 04/18/21 0711    Specimen: Blood Updated: 04/18/21 0713     Glucose 106 mg/dL      Comment: Serial Number: [id number]:  [id number]       Cortisol [[id number]] Collected: 04/18/21 0515    Specimen: Blood Updated: 04/18/21 0636     Cortisol 9.13 mcg/dL     Narrative:      Cortisol Reference Ranges:    Cortisol 6AM - 10AM Range: 6.02-18.40 mcg/dl  Cortisol 4PM - 8PM Range: 2.68-10.50 mcg/dl      Results may be falsely increased if patient taking Biotin.      Basic Metabolic Panel [[id number]]  (Abnormal) Collected: 04/18/21 0515    Specimen: Blood Updated: 04/18/21 0601     Glucose 119 mg/dL      BUN 31 mg/dL      Creatinine 1.88 mg/dL      Sodium 140 mmol/L      Potassium 3.7 mmol/L      Comment: Slight hemolysis detected by analyzer. Results may be affected.        Chloride 106 mmol/L      CO2 25.0 mmol/L      Calcium 9.1 mg/dL      eGFR   Amer 31 mL/min/1.73      eGFR Non African Amer 26 mL/min/1.73      BUN/Creatinine Ratio 16.5     Anion Gap 9.0 mmol/L     Narrative:      GFR Normal >60  Chronic Kidney Disease <60  Kidney Failure <15      CBC & Differential [[id number]]  (Abnormal) Collected: 04/18/21 0515    Specimen: Blood Updated: 04/18/21 0532    Narrative:      The following orders were created for panel order CBC & Differential.  Procedure                               Abnormality         Status                     ---------                               -----------         ------                     CBC Auto Differential[[id number]]        Abnormal            Final result                 Please view results for these tests on the individual orders.    CBC Auto Differential [[id number]]  (Abnormal) Collected: 04/18/21 0515    Specimen: Blood Updated: 04/18/21 0532     WBC 5.30 10*3/mm3      RBC 3.60 10*6/mm3      Hemoglobin 9.7 g/dL      Hematocrit 30.3 %      MCV 84.1 fL      MCH 27.0 pg      MCHC 32.1 g/dL      RDW 14.9 %      RDW-SD 44.2 fl      MPV 8.2 fL      Platelets 191  10*3/mm3      Neutrophil % 50.0 %      Lymphocyte % 36.4 %      Monocyte % 9.3 %      Eosinophil % 3.6 %      Basophil % 0.7 %      Neutrophils, Absolute 2.60 10*3/mm3      Lymphocytes, Absolute 1.90 10*3/mm3      Monocytes, Absolute 0.50 10*3/mm3      Eosinophils, Absolute 0.20 10*3/mm3      Basophils, Absolute 0.00 10*3/mm3      nRBC 0.1 /100 WBC           Imaging Results (Last 24 Hours)     ** No results found for the last 24 hours. **          EKG      I personally viewed and interpreted the patient's EKG/Telemetry data:    ECHOCARDIOGRAM:      STRESS MYOVIEW:    CARDIAC CATHETERIZATION:    OTHER:         Assessment/Plan     Principal Problem:    Hypertensive emergency  Active Problems:    Mixed hyperlipidemia    Essential hypertension    Type 2 diabetes mellitus (CMS/[facility])    Anemia due to stage 3a chronic kidney disease (CMS/[facility])    CKD (chronic kidney disease) stage 3, GFR 30-59 ml/min (CMS/HCC)    HFrEF (heart failure with reduced ejection fraction) (CMS/HCC)    Dyspnea  Bradycardia    Patient presented with shortness of breath and fatigue and tiredness  Patient is ruled out for MI by EKG and enzymes  Patient had echocardiogram which showed mild to moderate aortic stenosis and moderate mitral regurgitation  Patient blood pressure was high and hence she was started on beta-blockers but she developed significant bradycardia and hence the beta-blockers will be stopped   her heart rates will be watched  Patient's electrolytes were within normal limits but will have a TSH level also checked.  Patient's renal function is being watched by the nephrologist      I discussed the patients findings and my recommendations with patient and nurse    Zane Aldridge MD  04/18/21  19:12 EDT

## 2021-04-18 NOTE — PROGRESS NOTES
"RENAL/KCC:     LOS: 2 days    Patient Care Team:  Brian Nazario APRN as PCP - General    Chief Complaint:  HTN, CKD3-4    Subjective     Interval History:   Chart reviewed.  BP back up this AM.  No BM for several days.  Having nausea.  No HA or vision changes.    Objective     Vital Sign Min/Max for last 24 hours  Temp  Min: 98.8 °F (37.1 °C)  Max: 99 °F (37.2 °C)   BP  Min: 170/57  Max: 229/75   Pulse  Min: 47  Max: 94   Resp  Min: 16  Max: 18   SpO2  Min: 93 %  Max: 100 %   No data recorded   Weight  Min: 70.9 kg (156 lb 4.8 oz)  Max: 70.9 kg (156 lb 4.8 oz)     Flowsheet Rows      First Filed Value   Admission Height  162 cm (63.78\") Documented at 04/15/2021 1816   Admission Weight  68.7 kg (151 lb 7.3 oz) Documented at 04/15/2021 1816          No intake/output data recorded.  I/O last 3 completed shifts:  In: 590 [P.O.:590]  Out: 600 [Urine:600]    Physical Exam:  GEN: Awake, NAD  ENT: PERRL, EOMI, MMM  NECK: Supple, no JVD  CHEST: CTAB, no W/R/C  CV: RRR, no M/G/R  ABD: Soft, NT, +BS  SKIN: Warm and Dry  NEURO: CN's intact      WBC WBC   Date Value Ref Range Status   04/18/2021 5.30 3.40 - 10.80 10*3/mm3 Final   04/17/2021 4.10 3.40 - 10.80 10*3/mm3 Final   04/16/2021 3.80 3.40 - 10.80 10*3/mm3 Final   04/15/2021 4.97 3.40 - 10.80 10*3/mm3 Final      HGB Hemoglobin   Date Value Ref Range Status   04/18/2021 9.7 (L) 12.0 - 15.9 g/dL Final   04/17/2021 9.6 (L) 12.0 - 15.9 g/dL Final   04/16/2021 9.6 (L) 12.0 - 15.9 g/dL Final     Comment:     Result checked    04/15/2021 11.6 (L) 12.0 - 15.9 g/dL Final      HCT Hematocrit   Date Value Ref Range Status   04/18/2021 30.3 (L) 34.0 - 46.6 % Final   04/17/2021 28.5 (L) 34.0 - 46.6 % Final   04/16/2021 29.5 (L) 34.0 - 46.6 % Final   04/15/2021 36.4 34.0 - 46.6 % Final      Platlets No results found for: LABPLAT   MCV MCV   Date Value Ref Range Status   04/18/2021 84.1 79.0 - 97.0 fL Final   04/17/2021 82.5 79.0 - 97.0 fL Final   04/16/2021 84.3 79.0 - 97.0 fL " Final   04/15/2021 85.4 79.0 - 97.0 fL Final          Sodium Sodium   Date Value Ref Range Status   04/18/2021 140 136 - 145 mmol/L Final   04/17/2021 139 136 - 145 mmol/L Final   04/16/2021 139 136 - 145 mmol/L Final   04/15/2021 144 136 - 145 mmol/L Final      Potassium Potassium   Date Value Ref Range Status   04/18/2021 3.7 3.5 - 5.2 mmol/L Final     Comment:     Slight hemolysis detected by analyzer. Results may be affected.   04/17/2021 3.6 3.5 - 5.2 mmol/L Final   04/16/2021 3.9 3.5 - 5.2 mmol/L Final   04/15/2021 4.3 3.5 - 5.2 mmol/L Final      Chloride Chloride   Date Value Ref Range Status   04/18/2021 106 98 - 107 mmol/L Final   04/17/2021 106 98 - 107 mmol/L Final   04/16/2021 106 98 - 107 mmol/L Final   04/15/2021 111 (H) 98 - 107 mmol/L Final      CO2 CO2   Date Value Ref Range Status   04/18/2021 25.0 22.0 - 29.0 mmol/L Final   04/17/2021 24.0 22.0 - 29.0 mmol/L Final   04/16/2021 22.0 22.0 - 29.0 mmol/L Final   04/15/2021 21.5 (L) 22.0 - 29.0 mmol/L Final      BUN BUN   Date Value Ref Range Status   04/18/2021 31 (H) 8 - 23 mg/dL Final   04/17/2021 35 (H) 8 - 23 mg/dL Final   04/16/2021 35 (H) 8 - 23 mg/dL Final   04/15/2021 38 (H) 8 - 23 mg/dL Final      Creatinine Creatinine   Date Value Ref Range Status   04/18/2021 1.88 (H) 0.57 - 1.00 mg/dL Final   04/17/2021 1.98 (H) 0.57 - 1.00 mg/dL Final   04/16/2021 2.02 (H) 0.57 - 1.00 mg/dL Final   04/15/2021 1.94 (H) 0.57 - 1.00 mg/dL Final      Calcium Calcium   Date Value Ref Range Status   04/18/2021 9.1 8.6 - 10.5 mg/dL Final   04/17/2021 8.7 8.6 - 10.5 mg/dL Final   04/16/2021 8.4 (L) 8.6 - 10.5 mg/dL Final   04/15/2021 9.4 8.6 - 10.5 mg/dL Final      PO4 No results found for: CAPO4   Albumin Albumin   Date Value Ref Range Status   04/15/2021 3.50 3.50 - 5.20 g/dL Final      Magnesium No results found for: MG   Uric Acid No results found for: URICACID        Results Review:     I reviewed the patient's new clinical results.    amLODIPine, 10 mg,  Oral, Daily  aspirin, 81 mg, Oral, Daily  atorvastatin, 80 mg, Oral, Daily  Canagliflozin, 100 mg, Oral, Daily  chlorthalidone, 50 mg, Oral, Daily  cloNIDine, 0.2 mg, Oral, Q8H  enoxaparin, 30 mg, Subcutaneous, Q24H  insulin lispro, 0-9 Units, Subcutaneous, TID AC  ipratropium-albuterol, 3 mL, Nebulization, 4x Daily - RT  losartan, 100 mg, Oral, Q24H  metoprolol tartrate, 25 mg, Oral, Q12H  polyethylene glycol, 17 g, Oral, Daily  sodium chloride, 10 mL, Intravenous, Q12H           Medication Review: Reviewed    Assessment/Plan     CKD4  HTN emergency  Proteinuria  DM  Weakness  N/V  Anemia    Plan: Renal function is stable at baseline (or below).  BP has jumped back up this morning.  D/C Hydralazine as family not wanting to titrate it anyway.  Start Clonidine and titrate as needed.  Checking work-up for resistant HTN.  Agree with Invokana in setting of proteinuric DN.  Bowel regimen per primary.    Jim Farooq MD   [facility]  04/18/21  10:02 EDT

## 2021-04-18 NOTE — PROGRESS NOTES
[facility] Medicine Services  INPATIENT PROGRESS NOTE  374/1   Hospitalist Team  LOS 2 days      Patient Care Team:  Brian Nazario APRN as PCP - General        Chief Complaint / Subjective  Chief Complaint   Patient presents with   • Shortness of Breath     SOA, Chills, not as active as normal. sleeping alot. [facility]       HPI (4 hpi elements or status of 3 chronic)  Ms. Woodward is a 80 y.o. female with past medical history of hypertension, diabetes, hyperlipidemia and CKD who presents to [facility] complaining of dyspnea and generalized weakness/lethargy.  Patient is non-English speaking with family at bedside interpreting throughout the exam.  She reports that for the past 2 days she has had increasing weakness as well as dyspnea specifically dyspnea on exertion along with 2 episodes of nausea and nonbloody vomiting and occasional sensation of palpitations.  Family reports that patient was recently seen by her nephrologist and found to have poorly controlled hypertension and hydralazine dosing was increased.  They note symptoms seem to have began near the time that dose adjustments were made to her blood pressure medication.  No pain including chest pain, cough or fever, peripheral edema, syncope or near syncope are reported.  They do note some chronic constipation which is unchanged from baseline and patient reports no changes in her urinary habits.  She does not smoke or drink alcohol and patient and family confirm compliance with all of her outpatient medical therapies.     Initial vital signs: Blood pressure: 211/67, heart rate: 72, respirations: 24, O2 sat 99% on room air, temp 97.8.  In the ED patient did have lab significant for troponin of 0.018, proBNP: 3125.0 9 CO2: 21.5, chloride: 111, creatinine: 1.94, BUN: 38, hemoglobin: 11.6. Lipase: 146, procalcitonin: 0.08. UA shows 3+ protein with trace glucose but is otherwise  unremarkable. EKG shows sinus rhythm at 72 with some ST depression in lead I, II, V4, V5 and V6 with no noted ectopy, what appears to be a left bundle branch block and a QTC of 386 ms with no EKG for comparison. CT of abdomen and pelvis shows infiltrates noted throughout the lung bases suggesting developing multifocal pneumonia versus pulmonary edema with cardiomegaly also present. No evidence of significant nephrolithiasis or obstructive uropathy is noted. No acute abdominal abnormality is reported. Chest x-ray reported with cardiomegaly but no definitive evidence of cardiopulmonary process. Respiratory viral panel including COVID-19 is negative.     Noted.  Renal function at baseline.  Diabetic  Non-smoker    Nausea 04/18/21, 11:03 AM EDT      Shortness of Breath          History taken from: chart family    Review of Systems   Respiratory: Positive for shortness of breath.      Constitutional: Positive for chills and malaise/fatigue. Negative for fever.   HENT: Negative.    Eyes: Negative.    Cardiovascular: Positive for dyspnea on exertion and palpitations. Negative for chest pain, irregular heartbeat, leg swelling, near-syncope, orthopnea and syncope.   Respiratory: Positive for shortness of breath. Negative for cough and sputum production.    Endocrine: Negative.    Skin: Negative.    Musculoskeletal: Negative.    Gastrointestinal: Positive for constipation, nausea and vomiting. Negative for abdominal pain, diarrhea, hematemesis, hematochezia and melena.   Genitourinary: Negative.    Neurological: Negative.    Psychiatric/Behavioral: Negative.     Noted          History reviewed. No pertinent family history.    Past Medical History:   Diagnosis Date   • Diabetes mellitus (CMS/HCC)    • Hyperlipidemia    • Hypertension        Social History     Socioeconomic History   • Marital status:      Spouse name: Not on file   • Number of children: Not on file   • Years of education: Not on file   • Highest  "education level: Not on file   Tobacco Use   • Smoking status: Never Smoker   • Smokeless tobacco: Never Used   Substance and Sexual Activity   • Alcohol use: No   • Drug use: No   • Sexual activity: Defer       Prior to Admission medications    Medication Sig Start Date End Date Taking? Authorizing Provider   amLODIPine (NORVASC) 10 MG tablet Take 10 mg by mouth Daily.   Yes Harry Hernández MD   aspirin 81 MG chewable tablet Chew 81 mg Daily.   Yes Harry Hernández MD   atorvastatin (LIPITOR) 80 MG tablet Take 80 mg by mouth Daily.   Yes Harry Hernández MD   glipizide (GLUCOTROL) 5 MG tablet Take 2.5 mg by mouth Daily.   Yes Harry Hernández MD   hydrALAZINE (APRESOLINE) 25 MG tablet Take 25 mg by mouth 3 (Three) Times a Day.   Yes Harry Hernández MD   losartan (COZAAR) 100 MG tablet Take 100 mg by mouth Daily.   Yes Harry Hernández MD        Objective    Physical Exam     Vital Signs  Temp:  [98.8 °F (37.1 °C)-99 °F (37.2 °C)] 99 °F (37.2 °C)  Heart Rate:  [43-94] 43  Resp:  [16-18] 18  BP: (149-229)/(56-76) 149/62    Oxygen Therapy  SpO2: 100 %  Pulse Oximetry Type: Intermittent  Device (Oxygen Therapy): room air  Flowsheet Rows      First Filed Value   Admission Height  162 cm (63.78\") Documented at 04/15/2021 1816   Admission Weight  68.7 kg (151 lb 7.3 oz) Documented at 04/15/2021 1816        Weight change: -0.771 kg (-1 lb 11.2 oz)   Intake & Output (last 3 days)       04/15 0701 - 04/16 0700 04/16 0701 - 04/17 0700 04/17 0701 - 04/18 0700 04/18 0701 - 04/19 0700    P.O.  440 350     Total Intake(mL/kg)  440 (6.5) 350 (4.9)     Urine (mL/kg/hr)   600 (0.4)     Total Output   600     Net  +440 -250             Urine Unmeasured Occurrence 0 x           Lines, Drains & Airways    Active LDAs     Name:   Placement date:   Placement time:   Site:   Days:    Peripheral IV 04/15/21 1835 Right Hand   04/15/21    1835    Hand   1                Physical Exam:    Physical Exam  Vitals " and nursing note reviewed.   Constitutional:       General: She is not in acute distress.     Appearance: Normal appearance. She is well-developed. She is not ill-appearing, toxic-appearing or diaphoretic.   HENT:      Head: Normocephalic and atraumatic.      Right Ear: Ear canal and external ear normal.      Left Ear: Ear canal and external ear normal.      Nose: Nose normal. No congestion or rhinorrhea.      Mouth/Throat:      Mouth: Mucous membranes are moist.      Pharynx: No oropharyngeal exudate.   Eyes:      General: No scleral icterus.        Right eye: No discharge.         Left eye: No discharge.      Extraocular Movements: Extraocular movements intact.      Conjunctiva/sclera: Conjunctivae normal.      Pupils: Pupils are equal, round, and reactive to light.   Neck:      Thyroid: No thyromegaly.      Vascular: No carotid bruit or JVD.      Trachea: No tracheal deviation.   Cardiovascular:      Rate and Rhythm: Normal rate and regular rhythm.      Pulses: Normal pulses.      Heart sounds: Normal heart sounds. No murmur heard.   No friction rub. No gallop.    Pulmonary:      Effort: Pulmonary effort is normal. No respiratory distress.      Breath sounds: Normal breath sounds. No stridor. No wheezing, rhonchi or rales.   Chest:      Chest wall: No tenderness.   Abdominal:      General: Bowel sounds are normal. There is no distension.      Palpations: Abdomen is soft. There is no mass.      Tenderness: There is no abdominal tenderness. There is no guarding or rebound.      Hernia: No hernia is present.   Musculoskeletal:         General: No swelling, tenderness, deformity or signs of injury. Normal range of motion.      Cervical back: Normal range of motion and neck supple. No rigidity. No muscular tenderness.      Right lower leg: No edema.      Left lower leg: No edema.   Lymphadenopathy:      Cervical: No cervical adenopathy.   Skin:     General: Skin is warm and dry.      Coloration: Skin is not jaundiced  or pale.      Findings: No bruising, erythema or rash.   Neurological:      General: No focal deficit present.      Mental Status: She is alert and oriented to person, place, and time. Mental status is at baseline.      Cranial Nerves: No cranial nerve deficit.      Sensory: No sensory deficit.      Motor: No weakness or abnormal muscle tone.      Coordination: Coordination normal.   Psychiatric:         Mood and Affect: Mood normal.         Behavior: Behavior normal.         Thought Content: Thought content normal.         Judgment: Judgment normal.     Reviewed, no change in above data from the prior day.          PT Recommendation and Plan             Procedures:    * No surgery found *     Assessment/Plan with Problem wise       Active Hospital Problems    Diagnosis  POA   • **Hypertensive emergency [I16.1]  Yes     Priority: High   • HFrEF (heart failure with reduced ejection fraction) (CMS/[facility], located within [facility]) [I50.20]  Yes     Priority: Medium   • Dyspnea [R06.00]  Yes   • Mixed hyperlipidemia [E78.2]  Yes   • Essential hypertension [I10]  Yes   • Type 2 diabetes mellitus (CMS/[facility], located within [facility]) [E11.9]  Yes   • Anemia due to stage 3a chronic kidney disease (CMS/[facility], located within [facility]) [N18.31, D63.1]  Yes   • CKD (chronic kidney disease) stage 3, GFR 30-59 ml/min (CMS/[facility]AC, located within [facility]) [N18.30]  Yes      Resolved Hospital Problems   No resolved problems to display.        Estimated Creatinine Clearance: 23.6 mL/min (A) (by C-G formula based on SCr of 1.88 mg/dL (H)).    Code Status and Medical Interventions:   Ordered at: 04/15/21 3332     Code Status:    CPR     Medical Interventions (Level of Support Prior to Arrest):    Full       MEDICAL DECISION MAKING COMPLEXITY BY PROBLEM:       Hypertension:  Continue home medications   Options include-  beta-blockers  Calcium channel blockers  ACE inhibitor  Vasodilators  Low-dose diuretics  PRN medications have not been shown to affect outcomes-to be avoided  Secondary hypertension work-up in progress      CHF:  Diuresis-loop  diuretics  Spironolactone if tolerated  Thiazides if tolerated  ACE inhibitor if tolerated  Beta-blocker  Possible SGLT2 inhibitor trial if EF<40%  Check echocardiogram      Renal failure/insufficiency/injury:  Hydration  Supportive treatment  Cut down or hold ACE inhibitors  Avoid nephrotoxic medications   Address diuretics  Add canagliflozin to replace sulfonylurea    Anemia due to CKD  Supportive      Hyperlipidemia:  Check lipid panel  Statins if indicated  Add Ezetimibe if appropriate  Only Icosapent Ethyl (omega-3) demonstrated efficacy in Hypertriglyceridemia.    I see no evidence of pneumonia.  CT changes probably result of her CHF        Care coordination with nursing, Dr. Farooq, pharmacy regarding her current medications and current care.  Discussed with family at bedside.  EM more than 35 minutes.  More than 50% spent on care coordination and counseling.  04/17/21 11:35 AM EDT.    Blood pressure still uncontrolled.  Care coordination with Dr. Farooq and nursing.  Discussed with daughter at bedside.  Regarding uncontrolled blood pressure.  EM 35 minutes more than 50% on care coordination and counseling    Plan for disposition:  anticipate pt will need 30 days or less of rehab            Continued Care and Services - Admitted Since 4/15/2021    Coordination has not been started for this encounter.        Historical & Objective Data     Results Review:    I reviewed the patient's new lab and radiology results. 04/18/21     Results from last 7 days   Lab Units 04/18/21  0515 04/17/21  0438 04/16/21  0042   WBC 10*3/mm3 5.30 4.10 3.80   HEMOGLOBIN g/dL 9.7* 9.6* 9.6*   HEMATOCRIT % 30.3* 28.5* 29.5*   MCV fL 84.1 82.5 84.3   MCH pg 27.0 27.8 27.3   MPV fL 8.2 8.4 7.7   RDW % 14.9 14.6 15.2   PLATELETS 10*3/mm3 191 205 199     Results from last 7 days   Lab Units 04/18/21  0515 04/17/21  0438 04/16/21  0042 04/15/21  1836   SODIUM mmol/L 140 139 139  --    POTASSIUM mmol/L 3.7 3.6 3.9  --    CHLORIDE mmol/L  106 106 106  --    CO2 mmol/L 25.0 24.0 22.0  --    BUN mg/dL 31* 35* 35*  --    CREATININE mg/dL 1.88* 1.98* 2.02*  --    CALCIUM mg/dL 9.1 8.7 8.4*  --    BILIRUBIN mg/dL  --   --   --  0.3   ALK PHOS U/L  --   --   --  91   ALT (SGPT) U/L  --   --   --  28   AST (SGOT) U/L  --   --   --  31   GLUCOSE mg/dL 119* 105* 174*  --      Inflammatory Biomarkers        Invalid input(s): ESR, D-DIMER QUANTITATIVE,  PROCALCITONIN,  alllabslink(lactate:5)@ )@  Lab Results   Component Value Date    PHOS 3.7 04/15/2021     Hemoglobin A1C   Date Value Ref Range Status   04/16/2021 5.9 (H) 3.5 - 5.6 % Final     Lab Results   Component Value Date    CHOL 208 (H) 04/16/2021    TRIG 94 04/16/2021    HDL 71 (H) 04/16/2021     (H) 04/16/2021     Lab Results   Component Value Date    LIPASE 146 (H) 04/15/2021               Pathology  No results found for: INTRAOP, PREDX, FINALDX, COMDX  COVID19   Date Value Ref Range Status   04/15/2021 Not Detected Not Detected - Ref. Range Final        Microbiology Results (last 10 days)     Procedure Component Value - Date/Time    Respiratory Panel PCR w/COVID-19(SARS-CoV-2) VÍCTOR/DAYNA/JADYN/PAD/COR/MAD/CHIRAG In-House, NP Swab in [facility], 3-4 HR TAT - Swab, Nasopharynx [[id number]]  (Normal) Collected: 04/15/21 1836    Lab Status: Final result Specimen: Swab from Nasopharynx Updated: 04/15/21 2016     ADENOVIRUS, PCR Not Detected     Coronavirus 229E Not Detected     Coronavirus HKU1 Not Detected     Coronavirus NL63 Not Detected     Coronavirus OC43 Not Detected     COVID19 Not Detected     Human Metapneumovirus Not Detected     Human Rhinovirus/Enterovirus Not Detected     Influenza A PCR Not Detected     Influenza B PCR Not Detected     Parainfluenza Virus 1 Not Detected     Parainfluenza Virus 2 Not Detected     Parainfluenza Virus 3 Not Detected     Parainfluenza Virus 4 Not Detected     RSV, PCR Not Detected     Bordetella pertussis pcr Not Detected     Bordetella parapertussis PCR Not  Detected     Chlamydophila pneumoniae PCR Not Detected     Mycoplasma pneumo by PCR Not Detected    Narrative:      Fact sheet for providers: https://docs.[employer]/wp-content/uploads/JYJ6764-8914-HC9.1-EUA-Provider-Fact-Sheet-3.pdf    Fact sheet for patients: https://docs.[employer]/wp-content/uploads/ZAJ2249-4403-MF8.1-EUA-Patient-Fact-Sheet-1.pdf    Test performed by PCR.          ECG/EMG Results (most recent)     Procedure Component Value Units Date/Time    ECG 12 Lead [[id number]] Collected: 04/15/21 1848     Updated: 04/16/21 1549     QT Interval 363 ms     Narrative:      HEART RATE= 72  bpm  RR Interval= 832  ms  NY Interval= 226  ms  P Horizontal Axis= -58  deg  P Front Axis= 40  deg  QRSD Interval= 94  ms  QT Interval= 363  ms  QRS Axis= 48  deg  T Wave Axis= 251  deg  - ABNORMAL ECG -  Sinus rhythm  inferolat st depression  Prolonged NY interval  LVH with secondary repolarization abnormality  No previous ECG available for comparison  Electronically Signed By: Mitchel Gresham (Virgilio) 16-Apr-2021 15:49:31  Date and Time of Study: 2021-04-15 18:48:44    Adult Transthoracic Echo Complete W/ Cont if Necessary Per Protocol [[id number]] Collected: 04/16/21 0902     Updated: 04/16/21 2019     BSA 1.8 m^2      RVIDd 2.8 cm      IVSd 2.1 cm      LVIDd 3.5 cm      LVIDs 2.7 cm      LVPWd 2.0 cm      IVS/LVPW 1.0     FS 24.6 %      EDV(Teich) 52.5 ml      ESV(Teich) 26.4 ml      EF(Teich) 49.7 %      EDV(cubed) 44.6 ml      ESV(cubed) 19.1 ml      EF(cubed) 57.1 %      LV mass(C)d 340.3 grams      LV mass(C)dI 190.2 grams/m^2      SV(Teich) 26.1 ml      SI(Teich) 14.6 ml/m^2      SV(cubed) 25.5 ml      SI(cubed) 14.2 ml/m^2      Ao root diam 3.6 cm      Ao root area 9.9 cm^2      ACS 2.0 cm      asc Aorta Diam 3.3 cm      LVOT diam 1.9 cm      LVOT area 3.0 cm^2      EDV(MOD-sp4) 71.7 ml      ESV(MOD-sp4) 13.7 ml      EF(MOD-sp4) 81.0 %      SV(MOD-sp4) 58.1 ml      SI(MOD-sp4) 32.5 ml/m^2      Ao root area (BSA  corrected) 2.0     LV Curry Vol (BSA corrected) 40.1 ml/m^2      LV Sys Vol (BSA corrected) 7.6 ml/m^2      MV E max joy 72.9 cm/sec      MV A max joy 121.4 cm/sec      MV E/A 0.6     MV V2 max 138.0 cm/sec      MV max PG 7.6 mmHg      MV V2 mean 57.8 cm/sec      MV mean PG 1.7 mmHg      MV V2 VTI 33.0 cm      MVA(VTI) 3.9 cm^2      MV dec slope 161.2 cm/sec^2      MV dec time 0.45 sec      Ao pk joy 245.1 cm/sec      Ao max PG 24.1 mmHg      Ao max PG (full) 6.8 mmHg      Ao V2 mean 173.5 cm/sec      Ao mean PG 13.7 mmHg      Ao mean PG (full) 2.3 mmHg      Ao V2 VTI 48.6 cm      WINSTON(I,A) 2.7 cm^2      WINSTON(I,D) 2.7 cm^2      WINSTON(V,A) 2.5 cm^2      WINSTON(V,D) 2.5 cm^2      LV V1 max PG 17.3 mmHg      LV V1 mean PG 11.4 mmHg      LV V1 max 208.0 cm/sec      LV V1 mean 159.5 cm/sec      LV V1 VTI 43.6 cm      MR max joy 524.6 cm/sec      MR max .7 mmHg      SV(Ao) 482.1 ml      SI(Ao) 269.4 ml/m^2      SV(LVOT) 129.1 ml      SI(LVOT) 72.1 ml/m^2      PA V2 max 136.2 cm/sec      PA max PG 7.4 mmHg      PA max PG (full) 3.6 mmHg      PA V2 mean 91.7 cm/sec      PA mean PG 3.8 mmHg      PA mean PG (full) 1.7 mmHg      PA V2 VTI 31.4 cm      PI end-d joy 129.5 cm/sec      PI max joy 214.9 cm/sec      PI max PG 18.5 mmHg      RV V1 max PG 3.8 mmHg      RV V1 mean PG 2.1 mmHg      RV V1 max 97.5 cm/sec      RV V1 mean 68.6 cm/sec      RV V1 VTI 19.0 cm      RAP systole 3.0 mmHg       CV ECHO [place]RA[place] - BZI_BMI 26.3 kilograms/m^2       CV [facility]CHO [place] - BSA[facility] 1.8 m^2       CV ECHO [place]RA[place] - BZI_METRIC_WEIGHT 71.7 kg      BH CV ECHO [place] - BZI_METRIC_HEIGHT 165.1 cm      LA dimension(2D) 3.1 cm     Narrative:      · Left ventricular ejection fraction appears to be 61 - 65%.  · Left ventricular wall thickness is consistent with moderate basal   asymmetric hypertrophy.  · The right ventricular cavity is mildly dilated.  · Mild to moderate aortic valve stenosis is present.  · Moderate mitral valve regurgitation is  present.  · No pericardial effusion noted       ECG 12 Lead [[id number]] Collected: 04/16/21 0305     Updated: 04/16/21 2102     QT Interval 360 ms     Narrative:      HEART RATE= 59  bpm  RR Interval= 1012  ms  OH Interval= 224  ms  P Horizontal Axis= -75  deg  P Front Axis= 50  deg  QRSD Interval= 94  ms  QT Interval= 360  ms  QRS Axis= 63  deg  T Wave Axis= 247  deg  - ABNORMAL ECG -  Sinus bradycardia  Prolonged OH interval  Probable left ventricular hypertrophy  Anterior Q waves, possibly due to LVH  Abnormal T, consider ischemia, diffuse leads  When compared with ECG of 15-Apr-2021 18:48:44,  New or worsened ischemia or infarction  Electronically Signed By: Zane Aldridge ([facility]) 16-Apr-2021 20:59:53  Date and Time of Study: 2021-04-16 03:05:03          Results for orders placed during the hospital encounter of 04/15/21    Duplex Renal Artery - Bilateral Complete CAR    Interpretation Summary  · Normal right renal artery.  · Normal left renal artery.  · Difficult study due to patient tolerance and body habitus.      Results for orders placed during the hospital encounter of 04/15/21    Adult Transthoracic Echo Complete W/ Cont if Necessary Per Protocol    Interpretation Summary  · Left ventricular ejection fraction appears to be 61 - 65%.  · Left ventricular wall thickness is consistent with moderate basal asymmetric hypertrophy.  · The right ventricular cavity is mildly dilated.  · Mild to moderate aortic valve stenosis is present.  · Moderate mitral valve regurgitation is present.  · No pericardial effusion noted      CT Abdomen Pelvis Without Contrast    Result Date: 4/15/2021  1.Infiltrates are noted throughout the lung bases suggesting developing multifocal pneumonia versus pulmonary edema. Cardiomegaly is noted. 2.No evidence for significant nephrolithiasis. There is no evidence for obstructive uropathy. 3.No evidence for acute abnormality throughout the abdomen or pelvis on this noncontrast exam.   Electronically Signed By-Jim Montano MD On:4/15/2021 7:45 PM This report was finalized on [id number] by  Jim Montano MD.    XR Chest 1 View    Result Date: 4/15/2021  1.No definitive evidence for acute cardiopulmonary process. 2.Note is made of cardiomegaly.  Electronically Signed By-Jim Montano MD On:4/15/2021 6:47 PM This report was finalized on [id number] by  Jim Montano MD.      I personally reviewed patient's x-ray films and my findings are: Chest x-ray film reviewed cardiomegaly.  Some congestion.  More evident on CT scan.  No infiltrates    I personally reviewed patient's EKG strip and my findings are: 0    Medication Review:   I have reviewed the patient's current medication list 04/18/21     Scheduled Meds  amLODIPine, 10 mg, Oral, Daily  aspirin, 81 mg, Oral, Daily  atorvastatin, 80 mg, Oral, Daily  Canagliflozin, 100 mg, Oral, Daily  chlorthalidone, 50 mg, Oral, Daily  cloNIDine, 0.2 mg, Oral, Q8H  enoxaparin, 30 mg, Subcutaneous, Q24H  insulin lispro, 0-9 Units, Subcutaneous, TID AC  ipratropium-albuterol, 3 mL, Nebulization, 4x Daily - RT  losartan, 100 mg, Oral, Q24H  metoprolol tartrate, 25 mg, Oral, Q12H  polyethylene glycol, 17 g, Oral, BID  sodium chloride, 10 mL, Intravenous, Q12H        Meds Infusions       DVT prophylaxis  Mechanical Order History:     None      Pharmalogical Order History:      Ordered     Dose Route Frequency Stop    04/16/21 0033  enoxaparin (LOVENOX) syringe 30 mg      30 mg SC Every 24 Hours --                Meds PRN  •  acetaminophen **OR** acetaminophen **OR** acetaminophen  •  dextrose  •  dextrose  •  glucagon (human recombinant)  •  insulin lispro **AND** insulin lispro  •  melatonin  •  nitroglycerin  •  ondansetron **OR** ondansetron  •  [COMPLETED] Insert peripheral IV **AND** sodium chloride  •  sodium chloride      Rick Sánchez MD  04/18/21  11:03 EDT

## 2021-04-18 NOTE — PLAN OF CARE
Goal Outcome Evaluation:  Plan of Care Reviewed With: patient     Outcome Summary: BP's still remain elevated overnight

## 2021-04-18 NOTE — PLAN OF CARE
Goal Outcome Evaluation:        Outcome Summary: patients BP was still elevated through most of the morning. MD ordered new meds. BP is better however heart rate is now low with a rhythm change. MD notified and cardiology consult ordered.

## 2021-04-18 NOTE — PROGRESS NOTES
Nutrition Services    Patient Name: Brad Woodward  YOB: 1940  MRN: [id number]  Admission date: 4/15/2021    PPE Documentation        PPE Worn By Provider mask and eye protection   PPE Worn By Patient  none     PROGRESS NOTE      Encounter Information: 4/18: Met with patient's family at bedside, patient sleeping. Family states patient eats very well at home and is very active, goes on walks outside and is independent. Family is currently providing food from home for patient. Discussed current diet and that [facility] could accommodate requests, family would like to provide her food as they know exactly what she likes. Encouraged family to let [facility] know how we could help with food components including beverages and sides.     Family states patient eats very well at home and has not had any weight changes (current weight is up from what family report as UBW, 140's).     Denies any chewing/swallowing issues. Only concern was that she has not had a BM in 5 days per their report. Noted bowel meds have been added, monitor.       PO Diet: Diet Cardiac, Diabetic/Consistent Carbs; 2gm Na+; Diabetic - Consistent Carb   PO Supplements: -    PO Intake:  -100% x2 meals provided by family       Labs (reviewed below): -BUN/Creat elev       GI Function:  -last BM 4/13, bowel meds added       Nutrition Intervention: Continue current diet with family providing food per their request.    [facility] to accommodate requests as able.    No current interventions, will follow up for full assessment.       Results from last 7 days   Lab Units 04/18/21  0515 04/17/21  0438 04/16/21  0042 04/15/21  1836   SODIUM mmol/L 140 139 139  --    POTASSIUM mmol/L 3.7 3.6 3.9  --    CHLORIDE mmol/L 106 106 106  --    CO2 mmol/L 25.0 24.0 22.0  --    BUN mg/dL 31* 35* 35*  --    CREATININE mg/dL 1.88* 1.98* 2.02*  --    CALCIUM mg/dL 9.1 8.7 8.4*  --    BILIRUBIN mg/dL  --   --   --  0.3   ALK PHOS U/L  --   --   --  91   ALT (SGPT) U/L  --   --   --  28    AST (SGOT) U/L  --   --   --  31   GLUCOSE mg/dL 119* 105* 174*  --      Results from last 7 days   Lab Units 04/18/21  0515 04/16/21  0042 04/15/21  1116   PHOSPHORUS mg/dL  --   --  3.7   HEMOGLOBIN g/dL 9.7* 9.6* 11.6*   HEMATOCRIT % 30.3* 29.5* 36.4   TRIGLYCERIDES mg/dL  --  94  --      COVID19   Date Value Ref Range Status   04/15/2021 Not Detected Not Detected - Ref. Range Final     Lab Results   Component Value Date    HGBA1C 5.9 (H) 04/16/2021       RD to follow up per protocol.    Electronically signed by:  Saritha Churchill RD  04/18/21 15:27 EDT

## 2021-04-19 LAB
ANION GAP SERPL CALCULATED.3IONS-SCNC: 12 MMOL/L (ref 5–15)
BASOPHILS # BLD AUTO: 0 10*3/MM3 (ref 0–0.2)
BASOPHILS NFR BLD AUTO: 0.7 % (ref 0–1.5)
BUN SERPL-MCNC: 37 MG/DL (ref 8–23)
BUN/CREAT SERPL: 16.7 (ref 7–25)
CALCIUM SPEC-SCNC: 8.4 MG/DL (ref 8.6–10.5)
CHLORIDE SERPL-SCNC: 102 MMOL/L (ref 98–107)
CO2 SERPL-SCNC: 23 MMOL/L (ref 22–29)
CREAT SERPL-MCNC: 2.21 MG/DL (ref 0.57–1)
DEPRECATED RDW RBC AUTO: 41.6 FL (ref 37–54)
EOSINOPHIL # BLD AUTO: 0.3 10*3/MM3 (ref 0–0.4)
EOSINOPHIL NFR BLD AUTO: 5.6 % (ref 0.3–6.2)
ERYTHROCYTE [DISTWIDTH] IN BLOOD BY AUTOMATED COUNT: 14.3 % (ref 12.3–15.4)
GFR SERPL CREATININE-BSD FRML MDRD: 21 ML/MIN/1.73
GFR SERPL CREATININE-BSD FRML MDRD: 26 ML/MIN/1.73
GLUCOSE BLDC GLUCOMTR-MCNC: 103 MG/DL (ref 70–105)
GLUCOSE BLDC GLUCOMTR-MCNC: 136 MG/DL (ref 70–105)
GLUCOSE BLDC GLUCOMTR-MCNC: 146 MG/DL (ref 70–105)
GLUCOSE BLDC GLUCOMTR-MCNC: 204 MG/DL (ref 70–105)
GLUCOSE SERPL-MCNC: 108 MG/DL (ref 65–99)
HCT VFR BLD AUTO: 29.5 % (ref 34–46.6)
HGB BLD-MCNC: 9.8 G/DL (ref 12–15.9)
LYMPHOCYTES # BLD AUTO: 2.5 10*3/MM3 (ref 0.7–3.1)
LYMPHOCYTES NFR BLD AUTO: 44.9 % (ref 19.6–45.3)
MCH RBC QN AUTO: 27.4 PG (ref 26.6–33)
MCHC RBC AUTO-ENTMCNC: 33.2 G/DL (ref 31.5–35.7)
MCV RBC AUTO: 82.5 FL (ref 79–97)
MONOCYTES # BLD AUTO: 0.6 10*3/MM3 (ref 0.1–0.9)
MONOCYTES NFR BLD AUTO: 10.6 % (ref 5–12)
NEUTROPHILS NFR BLD AUTO: 2.1 10*3/MM3 (ref 1.7–7)
NEUTROPHILS NFR BLD AUTO: 38.2 % (ref 42.7–76)
NRBC BLD AUTO-RTO: 0.1 /100 WBC (ref 0–0.2)
PLATELET # BLD AUTO: 163 10*3/MM3 (ref 140–450)
PMV BLD AUTO: 8.3 FL (ref 6–12)
POTASSIUM SERPL-SCNC: 3.3 MMOL/L (ref 3.5–5.2)
RBC # BLD AUTO: 3.57 10*6/MM3 (ref 3.77–5.28)
SODIUM SERPL-SCNC: 137 MMOL/L (ref 136–145)
WBC # BLD AUTO: 5.6 10*3/MM3 (ref 3.4–10.8)

## 2021-04-19 PROCEDURE — 82962 GLUCOSE BLOOD TEST: CPT

## 2021-04-19 PROCEDURE — 97116 GAIT TRAINING THERAPY: CPT

## 2021-04-19 PROCEDURE — 80048 BASIC METABOLIC PNL TOTAL CA: CPT | Performed by: PHYSICIAN ASSISTANT

## 2021-04-19 PROCEDURE — 25010000002 ENOXAPARIN PER 10 MG: Performed by: PHYSICIAN ASSISTANT

## 2021-04-19 PROCEDURE — 85025 COMPLETE CBC W/AUTO DIFF WBC: CPT | Performed by: PHYSICIAN ASSISTANT

## 2021-04-19 PROCEDURE — 99232 SBSQ HOSP IP/OBS MODERATE 35: CPT | Performed by: INTERNAL MEDICINE

## 2021-04-19 PROCEDURE — 94799 UNLISTED PULMONARY SVC/PX: CPT

## 2021-04-19 PROCEDURE — 63710000001 INSULIN LISPRO (HUMAN) PER 5 UNITS: Performed by: PHYSICIAN ASSISTANT

## 2021-04-19 RX ORDER — POTASSIUM CHLORIDE 20 MEQ/1
20 TABLET, EXTENDED RELEASE ORAL ONCE
Status: COMPLETED | OUTPATIENT
Start: 2021-04-19 | End: 2021-04-19

## 2021-04-19 RX ADMIN — ASPIRIN 81 MG CHEWABLE TABLET 81 MG: 81 TABLET CHEWABLE at 10:06

## 2021-04-19 RX ADMIN — POTASSIUM CHLORIDE 20 MEQ: 1500 TABLET, EXTENDED RELEASE ORAL at 17:33

## 2021-04-19 RX ADMIN — LOSARTAN POTASSIUM 100 MG: 50 TABLET, FILM COATED ORAL at 10:06

## 2021-04-19 RX ADMIN — Medication 10 ML: at 20:24

## 2021-04-19 RX ADMIN — INSULIN LISPRO 4 UNITS: 100 INJECTION, SOLUTION INTRAVENOUS; SUBCUTANEOUS at 17:30

## 2021-04-19 RX ADMIN — Medication 10 ML: at 10:08

## 2021-04-19 RX ADMIN — CLONIDINE HYDROCHLORIDE 0.2 MG: 0.1 TABLET ORAL at 10:06

## 2021-04-19 RX ADMIN — CLONIDINE HYDROCHLORIDE 0.2 MG: 0.1 TABLET ORAL at 21:59

## 2021-04-19 RX ADMIN — IPRATROPIUM BROMIDE AND ALBUTEROL SULFATE 3 ML: 2.5; .5 SOLUTION RESPIRATORY (INHALATION) at 14:46

## 2021-04-19 RX ADMIN — CHLORTHALIDONE 50 MG: 25 TABLET ORAL at 10:06

## 2021-04-19 RX ADMIN — IPRATROPIUM BROMIDE AND ALBUTEROL SULFATE 3 ML: 2.5; .5 SOLUTION RESPIRATORY (INHALATION) at 07:23

## 2021-04-19 RX ADMIN — ENOXAPARIN SODIUM 30 MG: 30 INJECTION SUBCUTANEOUS at 17:33

## 2021-04-19 RX ADMIN — IPRATROPIUM BROMIDE AND ALBUTEROL SULFATE 3 ML: 2.5; .5 SOLUTION RESPIRATORY (INHALATION) at 19:52

## 2021-04-19 RX ADMIN — POLYETHYLENE GLYCOL 3350 17 G: 17 POWDER, FOR SOLUTION ORAL at 20:24

## 2021-04-19 RX ADMIN — ATORVASTATIN CALCIUM 80 MG: 40 TABLET, FILM COATED ORAL at 10:06

## 2021-04-19 RX ADMIN — IPRATROPIUM BROMIDE AND ALBUTEROL SULFATE 3 ML: 2.5; .5 SOLUTION RESPIRATORY (INHALATION) at 11:26

## 2021-04-19 RX ADMIN — AMLODIPINE BESYLATE 10 MG: 5 TABLET ORAL at 10:06

## 2021-04-19 RX ADMIN — CANAGLIFLOZIN 100 MG: 300 TABLET, FILM COATED ORAL at 10:08

## 2021-04-19 NOTE — H&P (VIEW-ONLY)
"Referring Provider: hospitalist    Reason for follow-up: bradycardia     Patient Care Team:  Brian Nazario APRN as PCP - General    Subjective . No symptoms    Objective  Lying in bed comfortably     Review of Systems   Constitutional: Negative for fever and malaise/fatigue.   Cardiovascular: Negative for chest pain, dyspnea on exertion and palpitations.   Respiratory: Negative for cough and shortness of breath.    Skin: Negative for rash.   Gastrointestinal: Negative for abdominal pain, nausea and vomiting.   Neurological: Negative for focal weakness and headaches.   All other systems reviewed and are negative.      Patient has no known allergies.    Scheduled Meds:amLODIPine, 10 mg, Oral, Daily  aspirin, 81 mg, Oral, Daily  atorvastatin, 80 mg, Oral, Daily  Canagliflozin, 100 mg, Oral, Daily  chlorthalidone, 50 mg, Oral, Daily  cloNIDine, 0.2 mg, Oral, Q12H  enoxaparin, 30 mg, Subcutaneous, Q24H  insulin lispro, 0-9 Units, Subcutaneous, TID AC  ipratropium-albuterol, 3 mL, Nebulization, 4x Daily - RT  losartan, 100 mg, Oral, Q24H  polyethylene glycol, 17 g, Oral, BID  sodium chloride, 10 mL, Intravenous, Q12H      Continuous Infusions:   PRN Meds:.•  acetaminophen **OR** acetaminophen **OR** acetaminophen  •  dextrose  •  dextrose  •  docusate sodium  •  glucagon (human recombinant)  •  insulin lispro **AND** insulin lispro  •  melatonin  •  nitroglycerin  •  ondansetron **OR** ondansetron  •  [COMPLETED] Insert peripheral IV **AND** sodium chloride  •  sodium chloride        VITAL SIGNS  Vitals:    04/19/21 1131 04/19/21 1203 04/19/21 1446 04/19/21 1452   BP:  164/61     BP Location:  Right arm     Patient Position:  Lying     Pulse: (!) 39 (!) 42 (!) 42 (!) 40   Resp: 16 16 16    Temp:  97.3 °F (36.3 °C)     TempSrc:  Oral     SpO2:  96% 95%    Weight:       Height:           Flowsheet Rows      First Filed Value   Admission Height  162 cm (63.78\") Documented at 04/15/2021 1816   Admission Weight  68.7 kg " (151 lb 7.3 oz) Documented at 04/15/2021 1816           TELEMETRY: Sinus bradycardia    Physical Exam:  Constitutional:       Appearance: Well-developed.   Eyes:      General: No scleral icterus.     Conjunctiva/sclera: Conjunctivae normal.   HENT:      Head: Normocephalic and atraumatic.   Neck:      Vascular: No carotid bruit or JVD.   Pulmonary:      Effort: Pulmonary effort is normal.      Breath sounds: Normal breath sounds. No wheezing. No rales.   Cardiovascular:      Normal rate. Regular rhythm.   Pulses:     Intact distal pulses.   Abdominal:      General: Bowel sounds are normal.      Palpations: Abdomen is soft.   Musculoskeletal:      Cervical back: Normal range of motion and neck supple. Skin:     General: Skin is warm and dry.      Findings: No rash.   Neurological:      Mental Status: Alert.          Results Review:   I reviewed the patient's new clinical results.  Lab Results (last 24 hours)     Procedure Component Value Units Date/Time    POC Glucose Once [[id number]]  (Abnormal) Collected: 04/19/21 1630    Specimen: Blood Updated: 04/19/21 1631     Glucose 204 mg/dL      Comment: Serial Number: [id number]:  [id number]       POC Glucose Once [[id number]]  (Abnormal) Collected: 04/19/21 1200    Specimen: Blood Updated: 04/19/21 1204     Glucose 146 mg/dL      Comment: Serial Number: [id number]:  [id number]       POC Glucose Once [[id number]]  (Normal) Collected: 04/19/21 0718    Specimen: Blood Updated: 04/19/21 0719     Glucose 103 mg/dL      Comment: Serial Number: [id number]:  [id number]       Basic Metabolic Panel [[id number]]  (Abnormal) Collected: 04/19/21 0327    Specimen: Blood Updated: 04/19/21 0500     Glucose 108 mg/dL      BUN 37 mg/dL      Creatinine 2.21 mg/dL      Sodium 137 mmol/L      Potassium 3.3 mmol/L      Chloride 102 mmol/L      CO2 23.0 mmol/L      Calcium 8.4 mg/dL      eGFR  African Amer 26 mL/min/1.73      eGFR Non African Amer 21 mL/min/1.73       BUN/Creatinine Ratio 16.7     Anion Gap 12.0 mmol/L     Narrative:      GFR Normal >60  Chronic Kidney Disease <60  Kidney Failure <15      CBC & Differential [[id number]]  (Abnormal) Collected: 04/19/21 0327    Specimen: Blood Updated: 04/19/21 0436    Narrative:      The following orders were created for panel order CBC & Differential.  Procedure                               Abnormality         Status                     ---------                               -----------         ------                     CBC Auto Differential[[id number]]        Abnormal            Final result                 Please view results for these tests on the individual orders.    CBC Auto Differential [[id number]]  (Abnormal) Collected: 04/19/21 0327    Specimen: Blood Updated: 04/19/21 0436     WBC 5.60 10*3/mm3      RBC 3.57 10*6/mm3      Hemoglobin 9.8 g/dL      Hematocrit 29.5 %      MCV 82.5 fL      MCH 27.4 pg      MCHC 33.2 g/dL      RDW 14.3 %      RDW-SD 41.6 fl      MPV 8.3 fL      Platelets 163 10*3/mm3      Neutrophil % 38.2 %      Lymphocyte % 44.9 %      Monocyte % 10.6 %      Eosinophil % 5.6 %      Basophil % 0.7 %      Neutrophils, Absolute 2.10 10*3/mm3      Lymphocytes, Absolute 2.50 10*3/mm3      Monocytes, Absolute 0.60 10*3/mm3      Eosinophils, Absolute 0.30 10*3/mm3      Basophils, Absolute 0.00 10*3/mm3      nRBC 0.1 /100 WBC     Metanephrines, Urine, 24 Hour - Urine, Clean Catch [[id number]] Collected: 04/18/21 2321    Specimen: 24 Hour Urine from Urine, Clean Catch Updated: 04/18/21 2321    Catecholamines, Fractionated, Urine, 24 Hour - Urine, Clean Catch [[id number]] Collected: 04/18/21 2320    Specimen: 24 Hour Urine from Urine, Clean Catch Updated: 04/18/21 2320          Imaging Results (Last 24 Hours)     ** No results found for the last 24 hours. **          EKG      I personally viewed and interpreted the patient's EKG/Telemetry data:    ECHOCARDIOGRAM:    STRESS MYOVIEW:    CARDIAC  CATHETERIZATION:    OTHER:         Assessment/Plan     Principal Problem:    Hypertensive emergency  Active Problems:    Mixed hyperlipidemia    Essential hypertension    Type 2 diabetes mellitus (CMS/[facility])    Anemia due to stage 3a chronic kidney disease (CMS/HCC)    CKD (chronic kidney disease) stage 3, GFR 30-59 ml/min (CMS/HCC)    HFrEF (heart failure with reduced ejection fraction) (CMS/HCC)    Dyspnea       Patient presented with lethargy weakness and shortness of breath and had very high blood pressure  Patient blood pressure is getting better with the multiple medications  However patient has significant bradycardia with beta-blockers and his beta-blockers have been stopped and the bradycardia did not improve so far  We will watch the rhythm  Patient also is developing acute renal failure and the diuretics will be held at this time  Echocardiogram showed normal LV systolic function but with mild to moderate valvular heart disease    I discussed the patients findings and my recommendations with patient and nurse    Zane Aldridge MD  04/19/21  19:09 EDT

## 2021-04-19 NOTE — CASE MANAGEMENT/SOCIAL WORK
Discharge Planning Assessment   Zach     Patient Name: Brad Woodward  MRN: [id number]  Today's Date: 4/19/2021    Admit Date: 4/15/2021          Plan    Plan  return home with family    Patient/Family in Agreement with Plan  yes    Plan Comments  spoke to grand matta in room with patient staying 6 feet away and less than 15 mins; grand daughter does not want to use  device; she states that patient is to return home with family and denies any needs for discharge       Carol naegele rn  Case management  Office number [phone]  Cell phone [phone]

## 2021-04-19 NOTE — PLAN OF CARE
Goal Outcome Evaluation:     Bed mobility - Independent  Supine to sit  Transfers - Independent  Ambulation - 50 feet Supervision (pt in her room) no ad needed.       Pt would benefit from Home with family assist at discharge from facility and requires no DME at discharge.   Pt desires Home with family assist at discharge. Pt cooperative; agreeable to therapeutic recommendations and plan of care.

## 2021-04-19 NOTE — PROGRESS NOTES
"RENAL/KCC:     LOS: 3 days    Patient Care Team:  Brian Nazario APRN as PCP - General    Chief Complaint:  HTN, CKD3-4    Subjective     Interval History:   Chart reviewed.  bp improving, tolerating po, denies any nausea or cp today   Son at bedside       ROS   No chest pain/palpataions   No cough congestion   No syncope or seizure   No rash   No diarrhea/vomiting/melena             Objective     Vital Sign Min/Max for last 24 hours  Temp  Min: 97.3 °F (36.3 °C)  Max: 97.6 °F (36.4 °C)   BP  Min: 158/69  Max: 164/61   Pulse  Min: 39  Max: 52   Resp  Min: 16  Max: 20   SpO2  Min: 95 %  Max: 100 %   No data recorded   Weight  Min: 69.9 kg (154 lb 3.2 oz)  Max: 69.9 kg (154 lb 3.2 oz)     Flowsheet Rows      First Filed Value   Admission Height  162 cm (63.78\") Documented at 04/15/2021 1816   Admission Weight  68.7 kg (151 lb 7.3 oz) Documented at 04/15/2021 1816          I/O this shift:  In: 120 [P.O.:120]  Out: -   I/O last 3 completed shifts:  In: 360 [P.O.:360]  Out: 600 [Urine:600]    Physical Exam:  Alert follows commands  NAD   eomi no icterus   Moist mucus membranes   No jvd reg s1s2 no murmur   Clear bilaterally no rales/rhonchi/wheeze   Soft NTND + bs   5/5 strength bilaterally  trace edema   Warm dry no rash   Normal mood and judgement         WBC WBC   Date Value Ref Range Status   04/19/2021 5.60 3.40 - 10.80 10*3/mm3 Final   04/18/2021 5.30 3.40 - 10.80 10*3/mm3 Final   04/17/2021 4.10 3.40 - 10.80 10*3/mm3 Final      HGB Hemoglobin   Date Value Ref Range Status   04/19/2021 9.8 (L) 12.0 - 15.9 g/dL Final   04/18/2021 9.7 (L) 12.0 - 15.9 g/dL Final   04/17/2021 9.6 (L) 12.0 - 15.9 g/dL Final      HCT Hematocrit   Date Value Ref Range Status   04/19/2021 29.5 (L) 34.0 - 46.6 % Final   04/18/2021 30.3 (L) 34.0 - 46.6 % Final   04/17/2021 28.5 (L) 34.0 - 46.6 % Final      Platlets No results found for: LABPLAT   MCV MCV   Date Value Ref Range Status   04/19/2021 82.5 79.0 - 97.0 fL Final   04/18/2021 " 84.1 79.0 - 97.0 fL Final   04/17/2021 82.5 79.0 - 97.0 fL Final          Sodium Sodium   Date Value Ref Range Status   04/19/2021 137 136 - 145 mmol/L Final   04/18/2021 140 136 - 145 mmol/L Final   04/17/2021 139 136 - 145 mmol/L Final      Potassium Potassium   Date Value Ref Range Status   04/19/2021 3.3 (L) 3.5 - 5.2 mmol/L Final   04/18/2021 3.7 3.5 - 5.2 mmol/L Final     Comment:     Slight hemolysis detected by analyzer. Results may be affected.   04/17/2021 3.6 3.5 - 5.2 mmol/L Final      Chloride Chloride   Date Value Ref Range Status   04/19/2021 102 98 - 107 mmol/L Final   04/18/2021 106 98 - 107 mmol/L Final   04/17/2021 106 98 - 107 mmol/L Final      CO2 CO2   Date Value Ref Range Status   04/19/2021 23.0 22.0 - 29.0 mmol/L Final   04/18/2021 25.0 22.0 - 29.0 mmol/L Final   04/17/2021 24.0 22.0 - 29.0 mmol/L Final      BUN BUN   Date Value Ref Range Status   04/19/2021 37 (H) 8 - 23 mg/dL Final   04/18/2021 31 (H) 8 - 23 mg/dL Final   04/17/2021 35 (H) 8 - 23 mg/dL Final      Creatinine Creatinine   Date Value Ref Range Status   04/19/2021 2.21 (H) 0.57 - 1.00 mg/dL Final   04/18/2021 1.88 (H) 0.57 - 1.00 mg/dL Final   04/17/2021 1.98 (H) 0.57 - 1.00 mg/dL Final      Calcium Calcium   Date Value Ref Range Status   04/19/2021 8.4 (L) 8.6 - 10.5 mg/dL Final   04/18/2021 9.1 8.6 - 10.5 mg/dL Final   04/17/2021 8.7 8.6 - 10.5 mg/dL Final      PO4 No results found for: CAPO4   Albumin No results found for: ALBUMIN   Magnesium No results found for: MG   Uric Acid No results found for: URICACID        Results Review:     I reviewed the patient's new clinical results.    amLODIPine, 10 mg, Oral, Daily  aspirin, 81 mg, Oral, Daily  atorvastatin, 80 mg, Oral, Daily  Canagliflozin, 100 mg, Oral, Daily  chlorthalidone, 50 mg, Oral, Daily  cloNIDine, 0.2 mg, Oral, Q12H  enoxaparin, 30 mg, Subcutaneous, Q24H  insulin lispro, 0-9 Units, Subcutaneous, TID AC  ipratropium-albuterol, 3 mL, Nebulization, 4x Daily -  RT  losartan, 100 mg, Oral, Q24H  polyethylene glycol, 17 g, Oral, BID  sodium chloride, 10 mL, Intravenous, Q12H           Medication Review: Reviewed    Assessment/Plan     CKD4  HTN emergency  Proteinuria  DM  Weakness  N/V  Anemia    Plan:   Cr slightly up today at 2.2 but still below baseline   bp improved with clonidine   Continue on Invokana, norvasc, and cozaar   Keep even with oral intake   Workup for resistant HTN pending   Give 20meq Kcl today     Dose all medications for GFR <50   Minimize all nephrotoxic agents including IV dye and NSAIDs  Monitor electrolytes and volume   Call with any questions or concerns   Greta Hills MD   Office phone number [phone]  Answering service phone number: [phone]          Greta Hills MD   [facility]  04/19/21  13:45 EDT

## 2021-04-19 NOTE — THERAPY TREATMENT NOTE
.Subjective: Pt agreeable to therapeutic plan of care.    Objective:     Bed mobility - Independent  Supine to sit  Transfers - Independent  Ambulation - 50 feet Supervision (pt in her room) no ad needed.     Pain: 0 VAS  Education: Provided education on importance of mobility and skilled verbal / tactile cueing throughout intervention.     Assessment: Brad Woodward presents with functional mobility impairments which indicate the need for skilled intervention. Pt's daughter interpreted for pt.  Pt feeling significantly better today.  Pt transfered and ambulated independently today.  Pt has met PT mobility goals. No more need for IP rehab. Pt will be safe to return home with her family.  Tolerating session today without incident. Will continue to follow and progress as tolerated.     Plan/Recommendations:   Pt would benefit from Home with family assist at discharge from facility and requires no DME at discharge.   Pt desires Home with family assist at discharge. Pt cooperative; agreeable to therapeutic recommendations and plan of care.     Basic Mobility 6-click:  Rollin = Total, A lot = 2, A little = 3; 4 = None  Supine>Sit:   1 = Total, A lot = 2, A little = 3; 4 = None   Sit>Stand with arms:  1 = Total, A lot = 2, A little = 3; 4 = None  Bed>Chair:   1 = Total, A lot = 2, A little = 3; 4 = None  Ambulate in room:  1 = Total, A lot = 2, A little = 3; 4 = None  3-5 Steps with railin = Total, A lot = 2, A little = 3; 4 = None  Score: 22    Modified Russel: 2 = Slight disability (Able to look after own affairs without assistance, but unable to carry out all previous activities )2    Post-Tx Position: In bathroom and Call light and personal items within reach  Pt's daughter present  PPE: gloves, surgical mask, eyewear protection

## 2021-04-19 NOTE — PLAN OF CARE
Goal Outcome Evaluation:  Plan of Care Reviewed With: patient  Progress: no change  Outcome Summary: Pt slept well throughout the night. HR continues to remain in lower range. Continue to monitor heart rate. No complaints at this time. Will continue to monitor.

## 2021-04-19 NOTE — PAYOR COMM NOTE
"PA FORM WITH CLINICALS FOR INPATIENT PRECERT AUTHORIZATION. PLEASE SEE PA FORM AT THE END OF THIS FAX. THANKS      Alie Montoya (80 y.o. Female) 1940  MEMBER ID # [id number]      FROM THE ER IN OBSERVATION STATUS ON 04/15/2021 AND CHANGED TO INPATIENT ON 04/17/2021.      AUTHORIZATION PENDING:   PLEASE CALL OR FAX DETERMINATION TO CONTACT BELOW. THANK YOU.        Monika De Jesus RN MSN  /UR  [facility]  [phone] office  [phone] fax  [email or web address]    [facility]  NPI: [id number]  Tax: 676-        Alie Montoya (80 y.o. Female)     Date of Birth Social Security Number Address Home Phone MRN    1940  [address] [phone] [id number]    Congregational Marital Status          Samar[employer]        Admission Date Admission Type Admitting Provider Attending Provider Department, Room/Bed    4/15/21 Emergency Leonarda Yepez MD Lackey, Diana, MD [facility] 3C MEDICAL INPATIENT, 374/1    Discharge Date Discharge Disposition Discharge Destination                       Attending Provider: Leonarda Yepez MD    Allergies: No Known Allergies    Isolation: None   Infection: None   Code Status: CPR    Ht: 165.1 cm (65\")   Wt: 69.9 kg (154 lb 3.2 oz)    Admission Cmt: None   Principal Problem: Hypertensive emergency [I16.1]                 Active Insurance as of 4/15/2021     Primary Coverage     Payor Plan Insurance Group Employer/Plan Group    [facility] -[place] [employer] - [id number]      Payor Plan Address Payor Plan Phone Number Payor Plan Fax Number Effective Dates    [address]   1/1/2019 - None Entered    [facility] [phone]       Subscriber Name Subscriber Birth Date Member ID       ALIE MONTOYA 1940 [id number]                 Emergency Contacts      (Rel.) Home Phone Work Phone Mobile Phone    JADYN RODRIGUEZ (Son) -- -- [phone]    MOSES GARCIA (Grandchild) [phone] -- -- "    CARLITO RODRIGUEZ (Grandchild) [phone] -- [phone]        21 1143  Inpatient Admission Once    Completed   Level of Care: Med/Surg    Diagnosis: Dyspnea, unspecified type [8457169]    Admitting Physician: NADEGE RUIZ [[id number]]    Attending Physician: NADEGE RUIZ [[id number]]    Certification: I Certify That Inpatient Hospital Services Are Medically Necessary For Greater Than 2 Midnights        21 1142         04/15/21 2045  Initiate Observation Status Once    Completed   Level of Care: Telemetry    Diagnosis: Dyspnea, unspecified type [8992218]    Admitting Physician: DAMION CAMARGO [[id number]]    Attending Physician: DAMION CAMARGO [[id number]]             Criteria Review: [place]      Patient's blood pressure elevated again 21.   212/69   198/68   Medication changes per Hospitalist and Nephrology.   INPT order placed per MD 21 @ 11:42.   Needs submitted 21.      Hypertension RRG  RRG: M-197-RRG ([facility])  Link to Codes  Admission is indicated for 1 or more of the following:  Hypertensive emergency indicated by SBP greater than 180 mm Hg or DBP greater than 120 mm Hg with evidence of acute or worsening target organ damage, as indicated by 1 or more of the following:  Heart failure              History & Physical      Shaq Shah PA-C at 04/15/21 2031     Attestation signed by Nadege Ruiz MD at 21 0854    Patient was admitted yesterday  She was evaluated today by me at bedside  All labs and images were reviewed by me  I Agree with assessment and plan                        [facility] Medicine Services      Patient Name: Brad Woodward  : 1940  MRN: [id number]  Primary Care Physician: Brian Nazario APRN  Date of admission: 4/15/2021    Patient Care Team:  Brian Nazario APRN as PCP - General          Subjective   History Present Illness     Chief Complaint:   Chief Complaint   Patient presents with   • Shortness of Breath     SOA,  Chills, not as active as normal. sleeping alot. sent in by STEFANO kerr at beside to translate.         Ms. Woodward is a 80 y.o. female with past medical history of hypertension, diabetes, hyperlipidemia and CKD who presents to [facility] complaining of dyspnea and generalized weakness/lethargy.  Patient is non-English speaking with family at bedside interpreting throughout the exam.  She reports that for the past 2 days she has had increasing weakness as well as dyspnea specifically dyspnea on exertion along with 2 episodes of nausea and nonbloody vomiting and occasional sensation of palpitations.  Family reports that patient was recently seen by her nephrologist and found to have poorly controlled hypertension and hydralazine dosing was increased.  They note symptoms seem to have began near the time that dose adjustments were made to her blood pressure medication.  No pain including chest pain, cough or fever, peripheral edema, syncope or near syncope are reported.  They do note some chronic constipation which is unchanged from baseline and patient reports no changes in her urinary habits.  She does not smoke or drink alcohol and patient and family confirm compliance with all of her outpatient medical therapies.    Initial vital signs: Blood pressure: 211/67, heart rate: 72, respirations: 24, O2 sat 99% on room air, temp 97.8.  In the ED patient did have lab significant for troponin of 0.018, proBNP: 3125.0 9 CO2: 21.5, chloride: 111, creatinine: 1.94, BUN: 38, hemoglobin: 11.6. Lipase: 146, procalcitonin: 0.08. UA shows 3+ protein with trace glucose but is otherwise unremarkable. EKG shows sinus rhythm at 72 with some ST depression in lead I, II, V4, V5 and V6 with no noted ectopy, what appears to be a left bundle branch block and a QTC of 386 ms with no EKG for comparison. CT of abdomen and pelvis shows infiltrates noted throughout the lung bases suggesting developing multifocal pneumonia versus  pulmonary edema with cardiomegaly also present. No evidence of significant nephrolithiasis or obstructive uropathy is noted. No acute abdominal abnormality is reported. Chest x-ray reported with cardiomegaly but no definitive evidence of cardiopulmonary process. Respiratory viral panel including COVID-19 is negative.    History of Present Illness    Review of Systems   Constitutional: Positive for chills and malaise/fatigue. Negative for fever.   HENT: Negative.    Eyes: Negative.    Cardiovascular: Positive for dyspnea on exertion and palpitations. Negative for chest pain, irregular heartbeat, leg swelling, near-syncope, orthopnea and syncope.   Respiratory: Positive for shortness of breath. Negative for cough and sputum production.    Endocrine: Negative.    Skin: Negative.    Musculoskeletal: Negative.    Gastrointestinal: Positive for constipation, nausea and vomiting. Negative for abdominal pain, diarrhea, hematemesis, hematochezia and melena.   Genitourinary: Negative.    Neurological: Negative.    Psychiatric/Behavioral: Negative.            Personal History     Past Medical History:   Past Medical History:   Diagnosis Date   • Diabetes mellitus (CMS/[facility])    • Hyperlipidemia    • Hypertension        Surgical History:    History reviewed. No pertinent surgical history.        Family History: family history is not on file. Otherwise pertinent FHx was reviewed and unremarkable.     Social History:  reports that she has never smoked. She has never used smokeless tobacco. She reports that she does not drink alcohol and does not use drugs.      Medications:  Prior to Admission medications    Not on File       Allergies:  No Known Allergies    Objective   Objective     Vital Signs  Temp:  [97.8 °F (36.6 °C)] 97.8 °F (36.6 °C)  Heart Rate:  [70-76] 73  Resp:  [16-24] 16  BP: (163-215)/(55-76) 188/64  SpO2:  [96 %-100 %] 98 %  on   ;      Body mass index is 26.18 kg/m².    Physical Exam  Vitals reviewed.    Constitutional:       General: She is not in acute distress.     Appearance: Normal appearance. She is normal weight. She is not ill-appearing or toxic-appearing.   HENT:      Head: Normocephalic and atraumatic.      Right Ear: External ear normal.      Left Ear: External ear normal.      Nose: Nose normal.      Mouth/Throat:      Mouth: Mucous membranes are moist.   Eyes:      Extraocular Movements: Extraocular movements intact.   Cardiovascular:      Rate and Rhythm: Normal rate and regular rhythm.      Pulses: Normal pulses.      Heart sounds: Murmur heard.     Pulmonary:      Effort: Pulmonary effort is normal. No respiratory distress.      Breath sounds: No wheezing.   Abdominal:      General: Bowel sounds are normal. There is no distension.      Tenderness: There is no abdominal tenderness.   Musculoskeletal:         General: Normal range of motion.      Cervical back: Normal range of motion.   Skin:     General: Skin is warm and dry.   Neurological:      General: No focal deficit present.      Mental Status: She is alert and oriented to person, place, and time.   Psychiatric:         Mood and Affect: Mood normal.         Behavior: Behavior normal.         Thought Content: Thought content normal.         Judgment: Judgment normal.           Results Review:  I have personally reviewed most recent cardiac tracings, lab results and radiology images and interpretations and agree with findings, most notably: Troponin, proBNP, CBC, CMP, lipase, procalcitonin, chest x-ray, CT of abdomen and pelvis, EKG and COVID-19 test.    Results from last 7 days   Lab Units 04/15/21  1116   WBC 10*3/mm3 4.97   HEMOGLOBIN g/dL 11.6*   HEMATOCRIT % 36.4   PLATELETS 10*3/mm3 274     Results from last 7 days   Lab Units 04/15/21  1836 04/15/21  1116   SODIUM mmol/L  --  144   POTASSIUM mmol/L  --  4.3   CHLORIDE mmol/L  --  111*   CO2 mmol/L  --  21.5*   BUN mg/dL  --  38*   CREATININE mg/dL  --  1.94*   GLUCOSE mg/dL  --  108*    CALCIUM mg/dL  --  9.4   ALT (SGPT) U/L 28  --    AST (SGOT) U/L 31  --    TROPONIN T ng/mL 0.018  --    PROBNP pg/mL 3,125.0*  --    PROCALCITONIN ng/mL 0.08  --      Estimated Creatinine Clearance: 21.9 mL/min (A) (by C-G formula based on SCr of 1.94 mg/dL (H)).  Brief Urine Lab Results  (Last result in the past 365 days)      Color   Clarity   Blood   Leuk Est   Nitrite   Protein   CREAT   Urine HCG        04/15/21 1116 Yellow Clear Negative Negative Negative >=300 mg/dL (3+)               Microbiology Results (last 10 days)     Procedure Component Value - Date/Time    Respiratory Panel PCR w/COVID-19(SARS-CoV-2) VÍCTOR/ADYNA/JADYN/PAD/COR/MAD/CHIRAG In-House, NP Swab in UTM/VTM, 3-4 HR TAT - Swab, Nasopharynx [[id number]]  (Normal) Collected: 04/15/21 1836    Lab Status: Final result Specimen: Swab from Nasopharynx Updated: 04/15/21 2016     ADENOVIRUS, PCR Not Detected     Coronavirus 229E Not Detected     Coronavirus HKU1 Not Detected     Coronavirus NL63 Not Detected     Coronavirus OC43 Not Detected     COVID19 Not Detected     Human Metapneumovirus Not Detected     Human Rhinovirus/Enterovirus Not Detected     Influenza A PCR Not Detected     Influenza B PCR Not Detected     Parainfluenza Virus 1 Not Detected     Parainfluenza Virus 2 Not Detected     Parainfluenza Virus 3 Not Detected     Parainfluenza Virus 4 Not Detected     RSV, PCR Not Detected     Bordetella pertussis pcr Not Detected     Bordetella parapertussis PCR Not Detected     Chlamydophila pneumoniae PCR Not Detected     Mycoplasma pneumo by PCR Not Detected    Narrative:      Fact sheet for providers: https://docs.[employer]/wp-content/uploads/XDH2093-7449-BP0.1-EUA-Provider-Fact-Sheet-3.pdf    Fact sheet for patients: https://docs.[employer]/wp-content/uploads/UES9304-6984-SE1.1-EUA-Patient-Fact-Sheet-1.pdf    Test performed by PCR.          ECG/EMG Results (most recent)     Procedure Component Value Units Date/Time    ECG 12 Lead [[id number]]  Collected: 04/15/21 1848     Updated: 04/15/21 1850     QT Interval 363 ms     Narrative:      HEART RATE= 72  bpm  RR Interval= 832  ms  AR Interval= 226  ms  P Horizontal Axis= -58  deg  P Front Axis= 40  deg  QRSD Interval= 94  ms  QT Interval= 363  ms  QRS Axis= 48  deg  T Wave Axis= 251  deg  - ABNORMAL ECG -  Sinus rhythm  Prolonged AR interval  LVH with secondary repolarization abnormality  No previous ECG available for comparison  Electronically Signed By:   Date and Time of Study: 2021-04-15 18:48:44                  CT Abdomen Pelvis Without Contrast    Result Date: 4/15/2021  1.Infiltrates are noted throughout the lung bases suggesting developing multifocal pneumonia versus pulmonary edema. Cardiomegaly is noted. 2.No evidence for significant nephrolithiasis. There is no evidence for obstructive uropathy. 3.No evidence for acute abnormality throughout the abdomen or pelvis on this noncontrast exam.  Electronically Signed By-Jim Montano MD On:4/15/2021 7:45 PM This report was finalized on [id number] by  Jim Montano MD.    XR Chest 1 View    Result Date: 4/15/2021  1.No definitive evidence for acute cardiopulmonary process. 2.Note is made of cardiomegaly.  Electronically Signed By-Jim Montano MD On:4/15/2021 6:47 PM This report was finalized on [id number] by  Jim Montano MD.        Estimated Creatinine Clearance: 21.9 mL/min (A) (by C-G formula based on SCr of 1.94 mg/dL (H)).    Assessment/Plan   Assessment/Plan       Active Hospital Problems    Diagnosis  POA   • **Hypertensive emergency [I16.1]  Yes     Priority: High   • Dyspnea [R06.00]  Yes     Priority: High   • Type 2 diabetes mellitus (CMS/HCC) [E11.9]  Yes     Priority: Medium   • Anemia [D64.9]  Yes     Priority: Medium   • CKD (chronic kidney disease) stage 3, GFR 30-59 ml/min (CMS/HCC) [N18.30]  Yes     Priority: Medium   • Hyperlipidemia [E78.5]  Yes     Priority: Low   • Essential hypertension [I10]  Yes     Priority: Low       Resolved Hospital Problems   No resolved problems to display.     Hypertensive emergency with dyspnea and recent chest pain  -Patient presents with 2-day history of increasing lethargy/weakness as well as dyspnea, initial blood pressure: 211/67.  Repeat blood pressure following treatment: 174/59  -Troponin: 0.018, trend  -proBNP: 3125.0  -EKG shows sinus rhythm at 72 with some ST depression in lead I, II, V4, V5 and V6 with no noted ectopy, what appears to be a left bundle branch block and a QTC of 386 ms with no EKG for comparison.  -Chest x-ray reported with cardiomegaly but no definitive evidence of cardiopulmonary process. Respiratory viral panel including COVID-19 is negative.  -Patient given 325 mg aspirin, 0.1 mg clonidine, Lasix and 1 inch of Nitropaste in the ED  -Check echocardiogram  -2 g sodium diet  -Monitor strict I's and O's, daily weights  -Recheck EKG at 6 hours  -Continue cardiac monitoring and pulse oximetry  -Labetalol as needed    Anemia  -Hemoglobin: 11.6 with a normal MCV and MCHC (stable from previous admissions)  -Monitor CBC while admitted    Diabetes mellitus  -Well controlled with glucose of 108 on admission  - Check A1c  - Hold glipizide  -Sliding scale insulin  -diabetic diet  -monitor AC and HS    CKD  -Creatinine: 1.94, BUN: 38, eGFR: 25 (creatinine: 2.02 on 11/11/2020)  -Continue losartan for now  -UA shows 3+ protein with trace glucose  -Patient given 40 mg Lasix in ED  - Avoid nephrotoxic medication and IV dye unless urgently needed  - Monitor BMP and I's and O's  -Pt seees Dr. Farooq as an outpt, may benefit from consult given recent diuresis as well as hypertensive emergency noted above    Hypertension  -Poorly controlled with a blood pressure on admission of 211/67  - Continue hydralazine, Norvasc and losartan  -Acute treatment noted above  - Monitor while admitted    Hyperlipidemia  -Check lipid panel  -Continue statin            VTE Prophylaxis -Lovenox  Mechanical Order  History:     None      Pharmalogical Order History:     None          CODE STATUS: Full  There are no questions and answers to display.         I discussed the patient's findings and my recommendations with patient, family and nursing staff.      Signature:Electronically signed by Shaq Shah PA-C, 04/16/21, 3:48 AM EDT.    [facility] Hospitalist Team          Electronically signed by Nadege Ruiz MD at 04/16/21 0854          Emergency Department Notes      Carol Donnelly APRN at 04/15/21 1835     Attestation signed by Renato Venegas MD at 04/16/21 0031          For this patient encounter, I reviewed the NP or PA documentation, treatment plan, and medical decision making. Renato Venegas MD 4/16/2021 00:31 EDT                  Subjective   Patient is an 80-year-old female non-English-speaking who presents today with her granddaughter at the bedside.  Granddaughter translates for the patient.  She reports that the patient has complained of having chills, shortness of breath and nausea for the last couple of days.  She states she was here today to have some outpatient labs drawn when she vomited and was then instructed to come to the ED for further evaluation.  Patient denies any pain.  She states her blood pressure has been elevated recently and 1 week ago had adjustments made to her hydralazine from 10 mg 3 times a day to 25 mg 3 times a day.  Granddaughter reports patient has had an increase in fatigue since then as well.  Patient denies any headache dizziness or visual changes.  She denies any chest pain but states she has felt intermittently short of breath.  She denies any abdominal pain but does report nausea and 2 episodes of vomiting today.  She states she has not had a bowel movement in 3 to 4 days.  She denies any hematemesis black or bloody stool.  She denies any fever.  She denies any urinary issues.  She denies any ill contacts or recent travel.          Review of Systems    Constitutional: Positive for chills and fatigue. Negative for fever.   HENT: Negative for congestion.    Eyes: Negative for visual disturbance.   Respiratory: Positive for shortness of breath. Negative for cough.    Cardiovascular: Negative for chest pain and leg swelling.   Gastrointestinal: Positive for constipation, nausea and vomiting. Negative for abdominal pain, blood in stool and diarrhea.   Genitourinary: Negative for decreased urine volume, difficulty urinating, dysuria, frequency and urgency.   Musculoskeletal: Negative for neck pain and neck stiffness.   Skin: Negative for rash.   Neurological: Positive for weakness. Negative for dizziness, facial asymmetry, speech difficulty, light-headedness, numbness and headaches.       Past Medical History:   Diagnosis Date   • Diabetes mellitus (CMS/[facility])    • Hyperlipidemia    • Hypertension        No Known Allergies    History reviewed. No pertinent surgical history.    History reviewed. No pertinent family history.    Social History     Socioeconomic History   • Marital status:      Spouse name: Not on file   • Number of children: Not on file   • Years of education: Not on file   • Highest education level: Not on file   Tobacco Use   • Smoking status: Never Smoker   • Smokeless tobacco: Never Used   Substance and Sexual Activity   • Alcohol use: No   • Drug use: No   • Sexual activity: Defer           Objective   Physical Exam  Vital signs and triage nurse note reviewed.  Constitutional: Awake, alert; well-developed and well-nourished. No acute distress is noted.  HEENT: Normocephalic, atraumatic; pupils are PERRL with intact EOM; oropharynx is pink and moist without exudate or erythema.  No drooling or pooling of oral secretions.  Neck: Supple, full range of motion without pain; no cervical lymphadenopathy. Normal phonation.  Cardiovascular: Regular rate and rhythm, normal S1-S2.  Systolic murmur noted.  Pulmonary: Respiratory effort regular nonlabored,  breath sounds clear to auscultation all fields.  Abdomen: Soft, nontender, nondistended with normoactive bowel sounds; no rebound or guarding.  No CVAT.  Musculoskeletal: Independent range of motion of all extremities with no palpable tenderness or edema.  Calves are symmetric and nontender.  Neuro: Alert oriented x3, speech is clear and appropriate, GCS 15.    Skin: Flesh tone, warm, dry, intact; no erythematous or petechial rash or lesion.      Procedures          ED Course      Labs Reviewed   LIPASE - Abnormal; Notable for the following components:       Result Value    Lipase 146 (*)     All other components within normal limits   BNP (IN-HOUSE) - Abnormal; Notable for the following components:    proBNP 3,125.0 (*)     All other components within normal limits    Narrative:     Among patients with dyspnea, NT-proBNP is highly sensitive for the detection of acute congestive heart failure. In addition NT-proBNP of <300 pg/ml effectively rules out acute congestive heart failure with 99% negative predictive value.    Results may be falsely decreased if patient taking Biotin.     RESPIRATORY PANEL PCR W/ COVID-19 (SARS-COV-2) VÍCTOR/DAYNA/JADYN/PAD/COR/MAD/[facility]AG IN-HOUSE, NP SWAB IN [facility] [place], 3-4 HR TAT - Normal    Narrative:     Fact sheet for providers: https://docs.[employer]/wp-content/uploads/UHU3025-0856-LQ5.1-EUA-Provider-Fact-Sheet-3.pdf    Fact sheet for patients: https://docs.[employer]/wp-content/uploads/TIM3547-1046-SN0.1-EUA-Patient-Fact-Sheet-1.pdf    Test performed by PCR.   TROPONIN (IN-HOUSE) - Normal    Narrative:     Troponin T Reference Range:  <= 0.03 ng/mL-   Negative for AMI  >0.03 ng/mL-     Abnormal for myocardial necrosis.  Clinicians would have to utilize clinical acumen, EKG, Troponin and serial changes to determine if it is an Acute Myocardial Infarction or myocardial injury due to an underlying chronic condition.       Results may be falsely decreased if patient taking Biotin.    "  PROCALCITONIN - Normal    Narrative:     As a Marker for Sepsis (Non-Neonates):     1. <0.5 ng/mL represents a low risk of severe sepsis and/or septic shock.  2. >2 ng/mL represents a high risk of severe sepsis and/or septic shock.    As a Marker for Lower Respiratory Tract Infections that require antibiotic therapy:  PCT on Admission     Antibiotic Therapy             6-12 Hrs later  >0.5                          Strongly Recommended            >0.25 - <0.5             Recommended  0.1 - 0.25                  Discouraged                       Remeasure/reassess PCT  <0.1                         Strongly Discouraged         Remeasure/reassess PCT      As 28 day mortality risk marker: \"Change in Procalcitonin Result\" (>80% or <=80%) if Day 0 (or Day 1) and Day 4 values are available. Refer to http://www.GFRANQpct-calculator.com/    Change in PCT <=80 %   A decrease of PCT levels below or equal to 80% defines a positive change in PCT test result representing a higher risk for 28-day all-cause mortality of patients diagnosed with severe sepsis or septic shock.    Change in PCT >80 %   A decrease of PCT levels of more than 80% defines a negative change in PCT result representing a lower risk for 28-day all-cause mortality of patients diagnosed with severe sepsis or septic shock.              Results may be falsely decreased if patient taking Biotin.    HEPATIC FUNCTION PANEL   EXTRA TUBES    Narrative:     The following orders were created for panel order Extra Tubes.  Procedure                               Abnormality         Status                     ---------                               -----------         ------                     Light Blue Top[[id number]]                                   Final result               Lavender Top[[id number]]                                     Final result               Gold Top - [facility][[id number]]                                   Final result                 Please view results " for these tests on the individual orders.   LIGHT BLUE TOP   LAVENDER TOP   GOLD TOP - SST     CT Abdomen Pelvis Without Contrast    Result Date: 4/15/2021  1.Infiltrates are noted throughout the lung bases suggesting developing multifocal pneumonia versus pulmonary edema. Cardiomegaly is noted. 2.No evidence for significant nephrolithiasis. There is no evidence for obstructive uropathy. 3.No evidence for acute abnormality throughout the abdomen or pelvis on this noncontrast exam.  Electronically Signed By-Jim Montano MD On:4/15/2021 7:45 PM This report was finalized on [id number] by  Jim Montano MD.    XR Chest 1 View    Result Date: 4/15/2021  1.No definitive evidence for acute cardiopulmonary process. 2.Note is made of cardiomegaly.  Electronically Signed By-Jim Montano MD On:4/15/2021 6:47 PM This report was finalized on [id number] by  Jim Montano MD.    Medications   sodium chloride 0.9 % flush 10 mL (has no administration in time range)   ondansetron (ZOFRAN) injection 4 mg (4 mg Intravenous Given 4/15/21 1842)   cloNIDine (CATAPRES) tablet 0.1 mg (0.1 mg Oral Given 4/15/21 1842)   aspirin tablet 325 mg (325 mg Oral Given 4/15/21 1900)   furosemide (LASIX) injection 40 mg (40 mg Intravenous Given 4/15/21 2011)   nitroglycerin (NITROSTAT) ointment 1 inch (1 inch Topical Given 4/15/21 2011)                                          MDM  Number of Diagnoses or Management Options  Abnormal EKG  Chronic kidney disease, unspecified CKD stage  Congestive heart failure, unspecified HF chronicity, unspecified heart failure type (CMS/[facility])  Dyspnea, unspecified type  Non-intractable vomiting with nausea, unspecified vomiting type  Diagnosis management comments: Comorbidities: Hypertension, high cholesterol, diabetes, chronic kidney disease  Differentials: Cardiac ischemia, ACS, CHF, pneumonia, pneumothorax, pleural effusion, Covid;this list is not all inclusive and does not constitute the entirety of  considered causes  Discussion with provider:  Radiology interpretation: X-rays reviewed by me and interpreted by radiologist: As above  Lab interpretation: Labs viewed by me significant for: As above    Patient is placed on continuous cardiac monitor.  She had IV established.  She had labs, EKG and chest x-ray obtained.  She was given clonidine for her elevated blood pressure which initially is 215/75.  She is also given Zofran for nausea.     Work-up: EKG reviewed by me, interpreted by Dr. Gresham, shows sinus rhythm with ventricular rate of 72, prolonged NC interval, inferolateral ST depression.  No ST elevation, inverted T waves lateral leads.  CBC collected earlier today was reviewed and is grossly unremarkable.  Normal white blood cell count, normal differential.  Metabolic panel shows a creatinine of 1.9.  Urinalysis collected earlier today shows no blood or sign of infection.  Troponin 0 0.018.  BNP 3125.  Lipase 146.  Chest x-ray shows cardiomegaly with no other acute abnormality.  CT abdomen pelvis shows infiltrates noted throughout the lung bases suggesting developing multifocal pneumonia versus pulmonary edema.  Cardiomegaly is noted.  No evidence for significant nephrolithiasis.  No obstructive uropathy.  No acute abnormality throughout the abdomen or pelvis.    Patient was given an aspirin.  She was also given a dose of Lasix and had Nitropaste applied.    Her blood pressure did improve with clonidine and is currently 163/62.  Heart rate 74.  She is afebrile.  She is in no acute distress and is resting comfortably.    She will be admitted to the hospital for further observation and treatment.  She was discussed with the hospitalist PA.    Diagnosis and treatment plan discussed with patient.  Patient agreeable to plan.          Amount and/or Complexity of Data Reviewed  Clinical lab tests: ordered and reviewed  Tests in the radiology section of CPT®: ordered and reviewed    Patient Progress  Patient  progress: stable      Final diagnoses:   Dyspnea, unspecified type   Non-intractable vomiting with nausea, unspecified vomiting type   Congestive heart failure, unspecified HF chronicity, unspecified heart failure type (CMS/HCC)   Abnormal EKG   Chronic kidney disease, unspecified CKD stage       ED Disposition  ED Disposition     ED Disposition Condition Comment    Decision to Admit  Level of Care: Telemetry [5]   Diagnosis: Dyspnea, unspecified type [[id number]]   Admitting Physician: DAMION CAMARGO [[id number]]   Attending Physician: DAMION CAMARGO [[id number]]            No follow-up provider specified.       Medication List      No changes were made to your prescriptions during this visit.          Carol Donnelly APRN  04/15/21 2045      Electronically signed by Renato Venegas MD at 04/16/21 0031          Physician Progress Notes (all)      Rick Sánchez MD at 04/18/21 1103                [facility] Medicine Services  INPATIENT PROGRESS NOTE  374/1   Hospitalist Team  LOS 2 days      Patient Care Team:  Brian Nazario APRN as PCP - General        Chief Complaint / Subjective  Chief Complaint   Patient presents with   • Shortness of Breath     SOA, Chills, not as active as normal. sleeping alot. sent in by STEFANO kerr at beside to translate.       HPI (4 hpi elements or status of 3 chronic)  Ms. Woodward is a 80 y.o. female with past medical history of hypertension, diabetes, hyperlipidemia and CKD who presents to [facility] complaining of dyspnea and generalized weakness/lethargy.  Patient is non-English speaking with family at bedside interpreting throughout the exam.  She reports that for the past 2 days she has had increasing weakness as well as dyspnea specifically dyspnea on exertion along with 2 episodes of nausea and nonbloody vomiting and occasional sensation of palpitations.  Family reports that patient was recently seen by her nephrologist and found to have poorly controlled  hypertension and hydralazine dosing was increased.  They note symptoms seem to have began near the time that dose adjustments were made to her blood pressure medication.  No pain including chest pain, cough or fever, peripheral edema, syncope or near syncope are reported.  They do note some chronic constipation which is unchanged from baseline and patient reports no changes in her urinary habits.  She does not smoke or drink alcohol and patient and family confirm compliance with all of her outpatient medical therapies.     Initial vital signs: Blood pressure: 211/67, heart rate: 72, respirations: 24, O2 sat 99% on room air, temp 97.8.  In the ED patient did have lab significant for troponin of 0.018, proBNP: 3125.0 9 CO2: 21.5, chloride: 111, creatinine: 1.94, BUN: 38, hemoglobin: 11.6. Lipase: 146, procalcitonin: 0.08. UA shows 3+ protein with trace glucose but is otherwise unremarkable. EKG shows sinus rhythm at 72 with some ST depression in lead I, II, V4, V5 and V6 with no noted ectopy, what appears to be a left bundle branch block and a QTC of 386 ms with no EKG for comparison. CT of abdomen and pelvis shows infiltrates noted throughout the lung bases suggesting developing multifocal pneumonia versus pulmonary edema with cardiomegaly also present. No evidence of significant nephrolithiasis or obstructive uropathy is noted. No acute abdominal abnormality is reported. Chest x-ray reported with cardiomegaly but no definitive evidence of cardiopulmonary process. Respiratory viral panel including COVID-19 is negative.     Noted.  Renal function at baseline.  Diabetic  Non-smoker    Nausea 04/18/21, 11:03 AM EDT      Shortness of Breath          History taken from: chart family    Review of Systems   Respiratory: Positive for shortness of breath.      Constitutional: Positive for chills and malaise/fatigue. Negative for fever.   HENT: Negative.    Eyes: Negative.    Cardiovascular: Positive for dyspnea on exertion  and palpitations. Negative for chest pain, irregular heartbeat, leg swelling, near-syncope, orthopnea and syncope.   Respiratory: Positive for shortness of breath. Negative for cough and sputum production.    Endocrine: Negative.    Skin: Negative.    Musculoskeletal: Negative.    Gastrointestinal: Positive for constipation, nausea and vomiting. Negative for abdominal pain, diarrhea, hematemesis, hematochezia and melena.   Genitourinary: Negative.    Neurological: Negative.    Psychiatric/Behavioral: Negative.     Noted          History reviewed. No pertinent family history.    Past Medical History:   Diagnosis Date   • Diabetes mellitus (CMS/[facility])    • Hyperlipidemia    • Hypertension        Social History     Socioeconomic History   • Marital status:      Spouse name: Not on file   • Number of children: Not on file   • Years of education: Not on file   • Highest education level: Not on file   Tobacco Use   • Smoking status: Never Smoker   • Smokeless tobacco: Never Used   Substance and Sexual Activity   • Alcohol use: No   • Drug use: No   • Sexual activity: Defer       Prior to Admission medications    Medication Sig Start Date End Date Taking? Authorizing Provider   amLODIPine (NORVASC) 10 MG tablet Take 10 mg by mouth Daily.   Yes Harry Hernández MD   aspirin 81 MG chewable tablet Chew 81 mg Daily.   Yes Harry Hernández MD   atorvastatin (LIPITOR) 80 MG tablet Take 80 mg by mouth Daily.   Yes Harry Hernández MD   glipizide (GLUCOTROL) 5 MG tablet Take 2.5 mg by mouth Daily.   Yes Harry Hernández MD   hydrALAZINE (APRESOLINE) 25 MG tablet Take 25 mg by mouth 3 (Three) Times a Day.   Yes Harry Hernández MD   losartan (COZAAR) 100 MG tablet Take 100 mg by mouth Daily.   Yes Harry Hernández MD        Objective    Physical Exam     Vital Signs  Temp:  [98.8 °F (37.1 °C)-99 °F (37.2 °C)] 99 °F (37.2 °C)  Heart Rate:  [43-94] 43  Resp:  [16-18] 18  BP: (149-229)/(56-76)  "149/62    Oxygen Therapy  SpO2: 100 %  Pulse Oximetry Type: Intermittent  Device (Oxygen Therapy): room air  Flowsheet Rows      First Filed Value   Admission Height  162 cm (63.78\") Documented at 04/15/2021 1816   Admission Weight  68.7 kg (151 lb 7.3 oz) Documented at 04/15/2021 1816        Weight change: -0.771 kg (-1 lb 11.2 oz)   Intake & Output (last 3 days)       04/15 0701 - 04/16 0700 04/16 0701 - 04/17 0700 04/17 0701 - 04/18 0700 04/18 0701 - 04/19 0700    P.O.  440 350     Total Intake(mL/kg)  440 (6.5) 350 (4.9)     Urine (mL/kg/hr)   600 (0.4)     Total Output   600     Net  +440 -250             Urine Unmeasured Occurrence 0 x           Lines, Drains & Airways    Active LDAs     Name:   Placement date:   Placement time:   Site:   Days:    Peripheral IV 04/15/21 1835 Right Hand   04/15/21    1835    Hand   1                Physical Exam:    Physical Exam  Vitals and nursing note reviewed.   Constitutional:       General: She is not in acute distress.     Appearance: Normal appearance. She is well-developed. She is not ill-appearing, toxic-appearing or diaphoretic.   HENT:      Head: Normocephalic and atraumatic.      Right Ear: Ear canal and external ear normal.      Left Ear: Ear canal and external ear normal.      Nose: Nose normal. No congestion or rhinorrhea.      Mouth/Throat:      Mouth: Mucous membranes are moist.      Pharynx: No oropharyngeal exudate.   Eyes:      General: No scleral icterus.        Right eye: No discharge.         Left eye: No discharge.      Extraocular Movements: Extraocular movements intact.      Conjunctiva/sclera: Conjunctivae normal.      Pupils: Pupils are equal, round, and reactive to light.   Neck:      Thyroid: No thyromegaly.      Vascular: No carotid bruit or JVD.      Trachea: No tracheal deviation.   Cardiovascular:      Rate and Rhythm: Normal rate and regular rhythm.      Pulses: Normal pulses.      Heart sounds: Normal heart sounds. No murmur heard.   No " friction rub. No gallop.    Pulmonary:      Effort: Pulmonary effort is normal. No respiratory distress.      Breath sounds: Normal breath sounds. No stridor. No wheezing, rhonchi or rales.   Chest:      Chest wall: No tenderness.   Abdominal:      General: Bowel sounds are normal. There is no distension.      Palpations: Abdomen is soft. There is no mass.      Tenderness: There is no abdominal tenderness. There is no guarding or rebound.      Hernia: No hernia is present.   Musculoskeletal:         General: No swelling, tenderness, deformity or signs of injury. Normal range of motion.      Cervical back: Normal range of motion and neck supple. No rigidity. No muscular tenderness.      Right lower leg: No edema.      Left lower leg: No edema.   Lymphadenopathy:      Cervical: No cervical adenopathy.   Skin:     General: Skin is warm and dry.      Coloration: Skin is not jaundiced or pale.      Findings: No bruising, erythema or rash.   Neurological:      General: No focal deficit present.      Mental Status: She is alert and oriented to person, place, and time. Mental status is at baseline.      Cranial Nerves: No cranial nerve deficit.      Sensory: No sensory deficit.      Motor: No weakness or abnormal muscle tone.      Coordination: Coordination normal.   Psychiatric:         Mood and Affect: Mood normal.         Behavior: Behavior normal.         Thought Content: Thought content normal.         Judgment: Judgment normal.     Reviewed, no change in above data from the prior day.          PT Recommendation and Plan             Procedures:    * No surgery found *     Assessment/Plan with Problem wise       Active Hospital Problems    Diagnosis  POA   • **Hypertensive emergency [I16.1]  Yes     Priority: High   • HFrEF (heart failure with reduced ejection fraction) (CMS/HCC) [I50.20]  Yes     Priority: Medium   • Dyspnea [R06.00]  Yes   • Mixed hyperlipidemia [E78.2]  Yes   • Essential hypertension [I10]  Yes   •  Type 2 diabetes mellitus (CMS/[facility]) [E11.9]  Yes   • Anemia due to stage 3a chronic kidney disease (CMS/[facility]) [N18.31, D63.1]  Yes   • CKD (chronic kidney disease) stage 3, GFR 30-59 ml/min (CMS/[facility]) [N18.30]  Yes      Resolved Hospital Problems   No resolved problems to display.        Estimated Creatinine Clearance: 23.6 mL/min (A) (by C-G formula based on SCr of 1.88 mg/dL (H)).    Code Status and Medical Interventions:   Ordered at: 04/15/21 2149     Code Status:    CPR     Medical Interventions (Level of Support Prior to Arrest):    Full       MEDICAL DECISION MAKING COMPLEXITY BY PROBLEM:       Hypertension:  Continue home medications   Options include-  beta-blockers  Calcium channel blockers  ACE inhibitor  Vasodilators  Low-dose diuretics  PRN medications have not been shown to affect outcomes-to be avoided  Secondary hypertension work-up in progress      CHF:  Diuresis-loop diuretics  Spironolactone if tolerated  Thiazides if tolerated  ACE inhibitor if tolerated  Beta-blocker  Possible SGLT2 inhibitor trial if EF<40%  Check echocardiogram      Renal failure/insufficiency/injury:  Hydration  Supportive treatment  Cut down or hold ACE inhibitors  Avoid nephrotoxic medications   Address diuretics  Add canagliflozin to replace sulfonylurea    Anemia due to CKD  Supportive      Hyperlipidemia:  Check lipid panel  Statins if indicated  Add Ezetimibe if appropriate  Only Icosapent Ethyl (omega-3) demonstrated efficacy in Hypertriglyceridemia.    I see no evidence of pneumonia.  CT changes probably result of her CHF        Care coordination with nursing, Dr. Farooq, pharmacy regarding her current medications and current care.  Discussed with family at bedside.  EM more than 35 minutes.  More than 50% spent on care coordination and counseling.  04/17/21 11:35 AM EDT.    Blood pressure still uncontrolled.  Care coordination with Dr. Farooq and nursing.  Discussed with daughter at bedside.  Regarding uncontrolled  blood pressure.  EM 35 minutes more than 50% on care coordination and counseling    Plan for disposition:  anticipate pt will need 30 days or less of rehab            Continued Care and Services - Admitted Since 4/15/2021    Coordination has not been started for this encounter.        Historical & Objective Data     Results Review:    I reviewed the patient's new lab and radiology results. 04/18/21     Results from last 7 days   Lab Units 04/18/21 0515 04/17/21 0438 04/16/21 0042   WBC 10*3/mm3 5.30 4.10 3.80   HEMOGLOBIN g/dL 9.7* 9.6* 9.6*   HEMATOCRIT % 30.3* 28.5* 29.5*   MCV fL 84.1 82.5 84.3   MCH pg 27.0 27.8 27.3   MPV fL 8.2 8.4 7.7   RDW % 14.9 14.6 15.2   PLATELETS 10*3/mm3 191 205 199     Results from last 7 days   Lab Units 04/18/21  0515 04/17/21  0438 04/16/21  0042 04/15/21  1836   SODIUM mmol/L 140 139 139  --    POTASSIUM mmol/L 3.7 3.6 3.9  --    CHLORIDE mmol/L 106 106 106  --    CO2 mmol/L 25.0 24.0 22.0  --    BUN mg/dL 31* 35* 35*  --    CREATININE mg/dL 1.88* 1.98* 2.02*  --    CALCIUM mg/dL 9.1 8.7 8.4*  --    BILIRUBIN mg/dL  --   --   --  0.3   ALK PHOS U/L  --   --   --  91   ALT (SGPT) U/L  --   --   --  28   AST (SGOT) U/L  --   --   --  31   GLUCOSE mg/dL 119* 105* 174*  --      Inflammatory Biomarkers        Invalid input(s): ESR, D-DIMER QUANTITATIVE,  PROCALCITONIN,  alllabslink(lactate:5)@ )@  Lab Results   Component Value Date    PHOS 3.7 04/15/2021     Hemoglobin A1C   Date Value Ref Range Status   04/16/2021 5.9 (H) 3.5 - 5.6 % Final     Lab Results   Component Value Date    CHOL 208 (H) 04/16/2021    TRIG 94 04/16/2021    HDL 71 (H) 04/16/2021     (H) 04/16/2021     Lab Results   Component Value Date    LIPASE 146 (H) 04/15/2021               Pathology  No results found for: INTRAOP, PREDX, FINALDX, COMDX  COVID19   Date Value Ref Range Status   04/15/2021 Not Detected Not Detected - Ref. Range Final        Microbiology Results (last 10 days)     Procedure Component  Value - Date/Time    Respiratory Panel PCR w/COVID-19(SARS-CoV-2) V[facility]CTOR/DAYNA/JADYN/PAD/[facility] In-House, NP Swab in UTM/VTM, 3-4 HR TAT - Swab, Nasopharynx [[id number]]  (Normal) Collected: 04/15/21 1836    Lab Status: Final result Specimen: Swab from Nasopharynx Updated: 04/15/21 2016     ADENOVIRUS, PCR Not Detected     Coronavirus 229E Not Detected     Coronavirus HKU1 Not Detected     Coronavirus NL63 Not Detected     Coronavirus OC43 Not Detected     COVID19 Not Detected     Human Metapneumovirus Not Detected     Human Rhinovirus/Enterovirus Not Detected     Influenza A PCR Not Detected     Influenza B PCR Not Detected     Parainfluenza Virus 1 Not Detected     Parainfluenza Virus 2 Not Detected     Parainfluenza Virus 3 Not Detected     Parainfluenza Virus 4 Not Detected     RSV, PCR Not Detected     Bordetella pertussis pcr Not Detected     Bordetella parapertussis PCR Not Detected     Chlamydophila pneumoniae PCR Not Detected     Mycoplasma pneumo by PCR Not Detected    Narrative:      Fact sheet for providers: https://docs.[employer]/wp-content/uploads/PQV9192-1447-CE7.1-EUA-Provider-Fact-Sheet-3.pdf    Fact sheet for patients: https://docs.[employer]/wp-content/uploads/BTE5040-8074-UR0.1-EUA-Patient-Fact-Sheet-1.pdf    Test performed by PCR.          ECG/EMG Results (most recent)     Procedure Component Value Units Date/Time    ECG 12 Lead [[id number]] Collected: 04/15/21 1848     Updated: 04/16/21 1549     QT Interval 363 ms     Narrative:      HEART RATE= 72  bpm  RR Interval= 832  ms  MS Interval= 226  ms  P Horizontal Axis= -58  deg  P Front Axis= 40  deg  QRSD Interval= 94  ms  QT Interval= 363  ms  QRS Axis= 48  deg  T Wave Axis= 251  deg  - ABNORMAL ECG -  Sinus rhythm  inferolat st depression  Prolonged MS interval  LVH with secondary repolarization abnormality  No previous ECG available for comparison  Electronically Signed By: Mitchel Gresham (Jadyn) 16-Apr-2021 15:49:31  Date and Time of Study:  2021-04-15 18:48:44    Adult Transthoracic Echo Complete W/ Cont if Necessary Per Protocol [[id number]] Collected: 04/16/21 0902     Updated: 04/16/21 2019     BSA 1.8 m^2      RVIDd 2.8 cm      IVSd 2.1 cm      LVIDd 3.5 cm      LVIDs 2.7 cm      LVPWd 2.0 cm      IVS/LVPW 1.0     FS 24.6 %      EDV(Teich) 52.5 ml      ESV(Teich) 26.4 ml      EF(Teich) 49.7 %      EDV(cubed) 44.6 ml      ESV(cubed) 19.1 ml      EF(cubed) 57.1 %      LV mass(C)d 340.3 grams      LV mass(C)dI 190.2 grams/m^2      SV(Teich) 26.1 ml      SI(Teich) 14.6 ml/m^2      SV(cubed) 25.5 ml      SI(cubed) 14.2 ml/m^2      Ao root diam 3.6 cm      Ao root area 9.9 cm^2      ACS 2.0 cm      asc Aorta Diam 3.3 cm      LVOT diam 1.9 cm      LVOT area 3.0 cm^2      EDV(MOD-sp4) 71.7 ml      ESV(MOD-sp4) 13.7 ml      EF(MOD-sp4) 81.0 %      SV(MOD-sp4) 58.1 ml      SI(MOD-sp4) 32.5 ml/m^2      Ao root area (BSA corrected) 2.0     LV Curry Vol (BSA corrected) 40.1 ml/m^2      LV Sys Vol (BSA corrected) 7.6 ml/m^2      MV E max joy 72.9 cm/sec      MV A max joy 121.4 cm/sec      MV E/A 0.6     MV V2 max 138.0 cm/sec      MV max PG 7.6 mmHg      MV V2 mean 57.8 cm/sec      MV mean PG 1.7 mmHg      MV V2 VTI 33.0 cm      MVA(VTI) 3.9 cm^2      MV dec slope 161.2 cm/sec^2      MV dec time 0.45 sec      Ao pk joy 245.1 cm/sec      Ao max PG 24.1 mmHg      Ao max PG (full) 6.8 mmHg      Ao V2 mean 173.5 cm/sec      Ao mean PG 13.7 mmHg      Ao mean PG (full) 2.3 mmHg      Ao V2 VTI 48.6 cm      WINSTON(I,A) 2.7 cm^2      WINSTON(I,D) 2.7 cm^2      WINSTON(V,A) 2.5 cm^2      WINSTON(V,D) 2.5 cm^2      LV V1 max PG 17.3 mmHg      LV V1 mean PG 11.4 mmHg      LV V1 max 208.0 cm/sec      LV V1 mean 159.5 cm/sec      LV V1 VTI 43.6 cm      MR max joy 524.6 cm/sec      MR max .7 mmHg      SV(Ao) 482.1 ml      SI(Ao) 269.4 ml/m^2      SV(LVOT) 129.1 ml      SI(LVOT) 72.1 ml/m^2      PA V2 max 136.2 cm/sec      PA max PG 7.4 mmHg      PA max PG (full) 3.6 mmHg      PA V2 mean  91.7 cm/sec      PA mean PG 3.8 mmHg      PA mean PG (full) 1.7 mmHg      PA V2 VTI 31.4 cm      PI end-d joy 129.5 cm/sec      PI max joy 214.9 cm/sec      PI max PG 18.5 mmHg      RV V1 max PG 3.8 mmHg      RV V1 mean PG 2.1 mmHg      RV V1 max 97.5 cm/sec      RV V1 mean 68.6 cm/sec      RV V1 VTI 19.0 cm      RAP systole 3.0 mmHg       CV ECHO ISRA[place] - BZI_BMI 26.3 kilograms/m^2       CV ECHO [place] - BSA(HAYCOCK) 1.8 m^2       CV ECHO [place] - BZI_METRIC_WEIGHT 71.7 kg       CV ECHO [place] - BZI_METRIC_HEIGHT 165.1 cm      LA dimension(2D) 3.1 cm     Narrative:      · Left ventricular ejection fraction appears to be 61 - 65%.  · Left ventricular wall thickness is consistent with moderate basal   asymmetric hypertrophy.  · The right ventricular cavity is mildly dilated.  · Mild to moderate aortic valve stenosis is present.  · Moderate mitral valve regurgitation is present.  · No pericardial effusion noted       ECG 12 Lead [[id number]] Collected: 04/16/21 0305     Updated: 04/16/21 2102     QT Interval 360 ms     Narrative:      HEART RATE= 59  bpm  RR Interval= 1012  ms  MN Interval= 224  ms  P Horizontal Axis= -75  deg  P Front Axis= 50  deg  QRSD Interval= 94  ms  QT Interval= 360  ms  QRS Axis= 63  deg  T Wave Axis= 247  deg  - ABNORMAL ECG -  Sinus bradycardia  Prolonged MN interval  Probable left ventricular hypertrophy  Anterior Q waves, possibly due to LVH  Abnormal T, consider ischemia, diffuse leads  When compared with ECG of 15-Apr-2021 18:48:44,  New or worsened ischemia or infarction  Electronically Signed By: Zane Aldridge ([facility]) 16-Apr-2021 20:59:53  Date and Time of Study: 2021-04-16 03:05:03          Results for orders placed during the hospital encounter of 04/15/21    Duplex Renal Artery - Bilateral Complete CAR    Interpretation Summary  · Normal right renal artery.  · Normal left renal artery.  · Difficult study due to patient tolerance and body habitus.      Results for orders placed during  the hospital encounter of 04/15/21    Adult Transthoracic Echo Complete W/ Cont if Necessary Per Protocol    Interpretation Summary  · Left ventricular ejection fraction appears to be 61 - 65%.  · Left ventricular wall thickness is consistent with moderate basal asymmetric hypertrophy.  · The right ventricular cavity is mildly dilated.  · Mild to moderate aortic valve stenosis is present.  · Moderate mitral valve regurgitation is present.  · No pericardial effusion noted      CT Abdomen Pelvis Without Contrast    Result Date: 4/15/2021  1.Infiltrates are noted throughout the lung bases suggesting developing multifocal pneumonia versus pulmonary edema. Cardiomegaly is noted. 2.No evidence for significant nephrolithiasis. There is no evidence for obstructive uropathy. 3.No evidence for acute abnormality throughout the abdomen or pelvis on this noncontrast exam.  Electronically Signed By-Jim Montano MD On:4/15/2021 7:45 PM This report was finalized on [id number] by  Jim Montano MD.    XR Chest 1 View    Result Date: 4/15/2021  1.No definitive evidence for acute cardiopulmonary process. 2.Note is made of cardiomegaly.  Electronically Signed By-Jim Montano MD On:4/15/2021 6:47 PM This report was finalized on [id number] by  Jim Montano MD.      I personally reviewed patient's x-ray films and my findings are: Chest x-ray film reviewed cardiomegaly.  Some congestion.  More evident on CT scan.  No infiltrates    I personally reviewed patient's EKG strip and my findings are: 0    Medication Review:   I have reviewed the patient's current medication list 04/18/21     Scheduled Meds  amLODIPine, 10 mg, Oral, Daily  aspirin, 81 mg, Oral, Daily  atorvastatin, 80 mg, Oral, Daily  Canagliflozin, 100 mg, Oral, Daily  chlorthalidone, 50 mg, Oral, Daily  cloNIDine, 0.2 mg, Oral, Q8H  enoxaparin, 30 mg, Subcutaneous, Q24H  insulin lispro, 0-9 Units, Subcutaneous, TID AC  ipratropium-albuterol, 3 mL, Nebulization, 4x  "Daily - RT  losartan, 100 mg, Oral, Q24H  metoprolol tartrate, 25 mg, Oral, Q12H  polyethylene glycol, 17 g, Oral, BID  sodium chloride, 10 mL, Intravenous, Q12H        Meds Infusions       DVT prophylaxis  Mechanical Order History:     None      Pharmalogical Order History:      Ordered     Dose Route Frequency Stop    04/16/21 0033  enoxaparin (LOVENOX) syringe 30 mg      30 mg SC Every 24 Hours --                Meds PRN  •  acetaminophen **OR** acetaminophen **OR** acetaminophen  •  dextrose  •  dextrose  •  glucagon (human recombinant)  •  insulin lispro **AND** insulin lispro  •  melatonin  •  nitroglycerin  •  ondansetron **OR** ondansetron  •  [COMPLETED] Insert peripheral IV **AND** sodium chloride  •  sodium chloride      Rick Sánchez MD  04/18/21  11:03 EDT      Electronically signed by Rick Sánchez MD at 04/18/21 1104     Jim Farooq MD at 04/18/21 1002          RENAL/KCC:     LOS: 2 days    Patient Care Team:  Brian Nazario APRN as PCP - General    Chief Complaint:  HTN, CKD3-4    Subjective     Interval History:   Chart reviewed.  BP back up this AM.  No BM for several days.  Having nausea.  No HA or vision changes.    Objective     Vital Sign Min/Max for last 24 hours  Temp  Min: 98.8 °F (37.1 °C)  Max: 99 °F (37.2 °C)   BP  Min: 170/57  Max: 229/75   Pulse  Min: 47  Max: 94   Resp  Min: 16  Max: 18   SpO2  Min: 93 %  Max: 100 %   No data recorded   Weight  Min: 70.9 kg (156 lb 4.8 oz)  Max: 70.9 kg (156 lb 4.8 oz)     Flowsheet Rows      First Filed Value   Admission Height  162 cm (63.78\") Documented at 04/15/2021 1816   Admission Weight  68.7 kg (151 lb 7.3 oz) Documented at 04/15/2021 1816          No intake/output data recorded.  I/O last 3 completed shifts:  In: 590 [P.O.:590]  Out: 600 [Urine:600]    Physical Exam:  GEN: Awake, NAD  ENT: PERRL, EOMI, MMM  NECK: Supple, no JVD  CHEST: CTAB, no W/R/C  CV: RRR, no M/G/R  ABD: Soft, NT, +BS  SKIN: Warm and Dry  NEURO: CN's " intact      WBC WBC   Date Value Ref Range Status   04/18/2021 5.30 3.40 - 10.80 10*3/mm3 Final   04/17/2021 4.10 3.40 - 10.80 10*3/mm3 Final   04/16/2021 3.80 3.40 - 10.80 10*3/mm3 Final   04/15/2021 4.97 3.40 - 10.80 10*3/mm3 Final      HGB Hemoglobin   Date Value Ref Range Status   04/18/2021 9.7 (L) 12.0 - 15.9 g/dL Final   04/17/2021 9.6 (L) 12.0 - 15.9 g/dL Final   04/16/2021 9.6 (L) 12.0 - 15.9 g/dL Final     Comment:     Result checked    04/15/2021 11.6 (L) 12.0 - 15.9 g/dL Final      HCT Hematocrit   Date Value Ref Range Status   04/18/2021 30.3 (L) 34.0 - 46.6 % Final   04/17/2021 28.5 (L) 34.0 - 46.6 % Final   04/16/2021 29.5 (L) 34.0 - 46.6 % Final   04/15/2021 36.4 34.0 - 46.6 % Final      Platlets No results found for: LABPLAT   MCV MCV   Date Value Ref Range Status   04/18/2021 84.1 79.0 - 97.0 fL Final   04/17/2021 82.5 79.0 - 97.0 fL Final   04/16/2021 84.3 79.0 - 97.0 fL Final   04/15/2021 85.4 79.0 - 97.0 fL Final          Sodium Sodium   Date Value Ref Range Status   04/18/2021 140 136 - 145 mmol/L Final   04/17/2021 139 136 - 145 mmol/L Final   04/16/2021 139 136 - 145 mmol/L Final   04/15/2021 144 136 - 145 mmol/L Final      Potassium Potassium   Date Value Ref Range Status   04/18/2021 3.7 3.5 - 5.2 mmol/L Final     Comment:     Slight hemolysis detected by analyzer. Results may be affected.   04/17/2021 3.6 3.5 - 5.2 mmol/L Final   04/16/2021 3.9 3.5 - 5.2 mmol/L Final   04/15/2021 4.3 3.5 - 5.2 mmol/L Final      Chloride Chloride   Date Value Ref Range Status   04/18/2021 106 98 - 107 mmol/L Final   04/17/2021 106 98 - 107 mmol/L Final   04/16/2021 106 98 - 107 mmol/L Final   04/15/2021 111 (H) 98 - 107 mmol/L Final      CO2 CO2   Date Value Ref Range Status   04/18/2021 25.0 22.0 - 29.0 mmol/L Final   04/17/2021 24.0 22.0 - 29.0 mmol/L Final   04/16/2021 22.0 22.0 - 29.0 mmol/L Final   04/15/2021 21.5 (L) 22.0 - 29.0 mmol/L Final      BUN BUN   Date Value Ref Range Status   04/18/2021 31  (H) 8 - 23 mg/dL Final   04/17/2021 35 (H) 8 - 23 mg/dL Final   04/16/2021 35 (H) 8 - 23 mg/dL Final   04/15/2021 38 (H) 8 - 23 mg/dL Final      Creatinine Creatinine   Date Value Ref Range Status   04/18/2021 1.88 (H) 0.57 - 1.00 mg/dL Final   04/17/2021 1.98 (H) 0.57 - 1.00 mg/dL Final   04/16/2021 2.02 (H) 0.57 - 1.00 mg/dL Final   04/15/2021 1.94 (H) 0.57 - 1.00 mg/dL Final      Calcium Calcium   Date Value Ref Range Status   04/18/2021 9.1 8.6 - 10.5 mg/dL Final   04/17/2021 8.7 8.6 - 10.5 mg/dL Final   04/16/2021 8.4 (L) 8.6 - 10.5 mg/dL Final   04/15/2021 9.4 8.6 - 10.5 mg/dL Final      PO4 No results found for: CAPO4   Albumin Albumin   Date Value Ref Range Status   04/15/2021 3.50 3.50 - 5.20 g/dL Final      Magnesium No results found for: MG   Uric Acid No results found for: URICACID        Results Review:     I reviewed the patient's new clinical results.    amLODIPine, 10 mg, Oral, Daily  aspirin, 81 mg, Oral, Daily  atorvastatin, 80 mg, Oral, Daily  Canagliflozin, 100 mg, Oral, Daily  chlorthalidone, 50 mg, Oral, Daily  cloNIDine, 0.2 mg, Oral, Q8H  enoxaparin, 30 mg, Subcutaneous, Q24H  insulin lispro, 0-9 Units, Subcutaneous, TID AC  ipratropium-albuterol, 3 mL, Nebulization, 4x Daily - RT  losartan, 100 mg, Oral, Q24H  metoprolol tartrate, 25 mg, Oral, Q12H  polyethylene glycol, 17 g, Oral, Daily  sodium chloride, 10 mL, Intravenous, Q12H           Medication Review: Reviewed    Assessment/Plan     CKD4  HTN emergency  Proteinuria  DM  Weakness  N/V  Anemia    Plan: Renal function is stable at baseline (or below).  BP has jumped back up this morning.  D/C Hydralazine as family not wanting to titrate it anyway.  Start Clonidine and titrate as needed.  Checking work-up for resistant HTN.  Agree with Invokana in setting of proteinuric DN.  Bowel regimen per primary.    Jim Farooq MD   [facility]  04/18/21  10:02 EDT      Electronically signed by Jim Farooq MD at  04/18/21 1005     Rick Sánchez MD at 04/17/21 1135                [facility]  INPATIENT PROGRESS NOTE  374/1   Hospitalist Team  LOS 1 days      Patient Care Team:  Brian Nazario APRN as PCP - General        Chief Complaint / Subjective  Chief Complaint   Patient presents with   • Shortness of Breath     SOA, Chills, not as active as normal. sleeping alot. sent in by [facility] at beside to translate.       HPI (4 hpi elements or status of 3 chronic)  Ms. Woodward is a 80 y.o. female with past medical history of hypertension, diabetes, hyperlipidemia and CKD who [facility] to [facility] complaining of dyspnea and generalized weakness/lethargy.  Patient is non-English speaking with family at bedside interpreting throughout the exam.  She reports that for the past 2 days she has had increasing weakness as well as dyspnea specifically dyspnea on exertion along with 2 episodes of nausea and nonbloody vomiting and occasional sensation of palpitations.  Family reports that patient was recently seen by her nephrologist and found to have poorly controlled hypertension and hydralazine dosing was increased.  They note symptoms seem to have began near the time that dose adjustments were made to her blood pressure medication.  No pain including chest pain, cough or fever, peripheral edema, syncope or near syncope are reported.  They do note some chronic constipation which is unchanged from baseline and patient reports no changes in her urinary habits.  She does not smoke or drink alcohol and patient and family confirm compliance with all of her outpatient medical therapies.     Initial vital signs: Blood pressure: 211/67, heart rate: 72, respirations: 24, O2 sat 99% on room air, temp 97.8.  In the ED patient did have lab significant for troponin of 0.018, proBNP: 3125.0 9 CO2: 21.5, chloride: 111, creatinine: 1.94, BUN: 38, hemoglobin: 11.6. Lipase: 146, procalcitonin: 0.08. UA shows  3+ protein with trace glucose but is otherwise unremarkable. EKG shows sinus rhythm at 72 with some ST depression in lead I, II, V4, V5 and V6 with no noted ectopy, what appears to be a left bundle branch block and a QTC of 386 ms with no EKG for comparison. CT of abdomen and pelvis shows infiltrates noted throughout the lung bases suggesting developing multifocal pneumonia versus pulmonary edema with cardiomegaly also present. No evidence of significant nephrolithiasis or obstructive uropathy is noted. No acute abdominal abnormality is reported. Chest x-ray reported with cardiomegaly but no definitive evidence of cardiopulmonary process. Respiratory viral panel including COVID-19 is negative.     Noted.  Renal function at baseline.  Diabetic  Non-smoker        History taken from: chart family    ROS  Constitutional: Positive for chills and malaise/fatigue. Negative for fever.   HENT: Negative.    Eyes: Negative.    Cardiovascular: Positive for dyspnea on exertion and palpitations. Negative for chest pain, irregular heartbeat, leg swelling, near-syncope, orthopnea and syncope.   Respiratory: Positive for shortness of breath. Negative for cough and sputum production.    Endocrine: Negative.    Skin: Negative.    Musculoskeletal: Negative.    Gastrointestinal: Positive for constipation, nausea and vomiting. Negative for abdominal pain, diarrhea, hematemesis, hematochezia and melena.   Genitourinary: Negative.    Neurological: Negative.    Psychiatric/Behavioral: Negative.               History reviewed. No pertinent family history.    Past Medical History:   Diagnosis Date   • Diabetes mellitus (CMS/HCC)    • Hyperlipidemia    • Hypertension        Social History     Socioeconomic History   • Marital status:      Spouse name: Not on file   • Number of children: Not on file   • Years of education: Not on file   • Highest education level: Not on file   Tobacco Use   • Smoking status: Never Smoker   • Smokeless  "tobacco: Never Used   Substance and Sexual Activity   • Alcohol use: No   • Drug use: No   • Sexual activity: Defer       Prior to Admission medications    Medication Sig Start Date End Date Taking? Authorizing Provider   amLODIPine (NORVASC) 10 MG tablet Take 10 mg by mouth Daily.   Yes Harry Hernández MD   aspirin 81 MG chewable tablet Chew 81 mg Daily.   Yes Harry Hernández MD   atorvastatin (LIPITOR) 80 MG tablet Take 80 mg by mouth Daily.   Yes Harry Hernández MD   glipizide (GLUCOTROL) 5 MG tablet Take 2.5 mg by mouth Daily.   Yes Harry Hernández MD   hydrALAZINE (APRESOLINE) 25 MG tablet Take 25 mg by mouth 3 (Three) Times a Day.   Yes Harry Hernández MD   losartan (COZAAR) 100 MG tablet Take 100 mg by mouth Daily.   Yes Harry Hernández MD        Objective    Physical Exam     Vital Signs  Temp:  [98.2 °F (36.8 °C)-98.6 °F (37 °C)] 98.6 °F (37 °C)  Heart Rate:  [58-73] 73  Resp:  [16-22] 16  BP: (114-212)/(62-74) 198/68    Oxygen Therapy  SpO2: 97 %  Pulse Oximetry Type: Intermittent  Device (Oxygen Therapy): room air  Flowsheet Rows      First Filed Value   Admission Height  162 cm (63.78\") Documented at 04/15/2021 1816   Admission Weight  68.7 kg (151 lb 7.3 oz) Documented at 04/15/2021 1816        Weight change: 2.968 kg (6 lb 8.7 oz)   Intake & Output (last 3 days)       04/14 0701 - 04/15 0700 04/15 0701 - 04/16 0700 04/16 0701 - 04/17 0700 04/17 0701 - 04/18 0700    P.O.   440     Total Intake(mL/kg)   440 (6.5)     Net   +440             Urine Unmeasured Occurrence  0 x          Lines, Drains & Airways    Active LDAs     Name:   Placement date:   Placement time:   Site:   Days:    Peripheral IV 04/15/21 1835 Right Hand   04/15/21    1835    Hand   1                Physical Exam:    Physical Exam  Vitals and nursing note reviewed.   Constitutional:       General: She is not in acute distress.     Appearance: Normal appearance. She is well-developed. She is not " ill-appearing, toxic-appearing or diaphoretic.   HENT:      Head: Normocephalic and atraumatic.      Right Ear: Ear canal and external ear normal.      Left Ear: Ear canal and external ear normal.      Nose: Nose normal. No congestion or rhinorrhea.      Mouth/Throat:      Mouth: Mucous membranes are moist.      Pharynx: No oropharyngeal exudate.   Eyes:      General: No scleral icterus.        Right eye: No discharge.         Left eye: No discharge.      Extraocular Movements: Extraocular movements intact.      Conjunctiva/sclera: Conjunctivae normal.      Pupils: Pupils are equal, round, and reactive to light.   Neck:      Thyroid: No thyromegaly.      Vascular: No carotid bruit or JVD.      Trachea: No tracheal deviation.   Cardiovascular:      Rate and Rhythm: Normal rate and regular rhythm.      Pulses: Normal pulses.      Heart sounds: Normal heart sounds. No murmur heard.   No friction rub. No gallop.    Pulmonary:      Effort: Pulmonary effort is normal. No respiratory distress.      Breath sounds: Normal breath sounds. No stridor. No wheezing, rhonchi or rales.   Chest:      Chest wall: No tenderness.   Abdominal:      General: Bowel sounds are normal. There is no distension.      Palpations: Abdomen is soft. There is no mass.      Tenderness: There is no abdominal tenderness. There is no guarding or rebound.      Hernia: No hernia is present.   Musculoskeletal:         General: No swelling, tenderness, deformity or signs of injury. Normal range of motion.      Cervical back: Normal range of motion and neck supple. No rigidity. No muscular tenderness.      Right lower leg: No edema.      Left lower leg: No edema.   Lymphadenopathy:      Cervical: No cervical adenopathy.   Skin:     General: Skin is warm and dry.      Coloration: Skin is not jaundiced or pale.      Findings: No bruising, erythema or rash.   Neurological:      General: No focal deficit present.      Mental Status: She is alert and oriented to  person, place, and time. Mental status is at baseline.      Cranial Nerves: No cranial nerve deficit.      Sensory: No sensory deficit.      Motor: No weakness or abnormal muscle tone.      Coordination: Coordination normal.   Psychiatric:         Mood and Affect: Mood normal.         Behavior: Behavior normal.         Thought Content: Thought content normal.         Judgment: Judgment normal.               PT Recommendation and Plan             Procedures:    * No surgery found *     Assessment/Plan with Problem wise       Active Hospital Problems    Diagnosis  POA   • **Hypertensive emergency [I16.1]  Yes     Priority: High   • HFrEF (heart failure with reduced ejection fraction) (CMS/[facility]) [I50.20]  Yes     Priority: Medium   • Mixed hyperlipidemia [E78.2]  Yes   • Essential hypertension [I10]  Yes   • Type 2 diabetes mellitus (CMS/[facility]) [E11.9]  Yes   • Anemia due to stage 3a chronic kidney disease (CMS/[facility]) [N18.31, D63.1]  Yes   • CKD (chronic kidney disease) stage 3, GFR 30-59 ml/min (CMS/[facility]) [N18.30]  Yes      Resolved Hospital Problems   No resolved problems to display.        Estimated Creatinine Clearance: 24.3 mL/min (A) (by C-G formula based on SCr of 1.98 mg/dL (H)).    Code Status and Medical Interventions:   Ordered at: 04/15/21 8854     Code Status:    CPR     Medical Interventions (Level of Support Prior to Arrest):    Full       MEDICAL DECISION MAKING COMPLEXITY BY PROBLEM:       Hypertension:  Continue home medications   Options include-  beta-blockers  Calcium channel blockers  ACE inhibitor  Vasodilators  Low-dose diuretics  PRN medications have not been shown to affect outcomes-to be avoided  Secondary hypertension work-up in progress      CHF:  Diuresis-loop diuretics  Spironolactone if tolerated  Thiazides if tolerated  ACE inhibitor if tolerated  Beta-blocker  Possible SGLT2 inhibitor trial if EF<40%  Check echocardiogram      Renal failure/insufficiency/injury:  Hydration  Supportive  treatment  Cut down or hold ACE inhibitors  Avoid nephrotoxic medications   Address diuretics  Add canagliflozin to replace sulfonylurea    Anemia due to CKD  Supportive      Hyperlipidemia:  Check lipid panel  Statins if indicated  Add Ezetimibe if appropriate  Only Icosapent Ethyl (omega-3) demonstrated efficacy in Hypertriglyceridemia.    I see no evidence of pneumonia.  CT changes probably result of her CHF        Care coordination with nursing, Dr. Farooq, pharmacy regarding her current medications and current care.  Discussed with family at bedside.  EM more than 35 minutes.  More than 50% spent on care coordination and counseling.  04/17/21 11:35 AM EDT.    Plan for disposition:  anticipate pt will need 30 days or less of rehab        SLP OP Goals     Row Name 04/16/21 1437          Time Calculation    PT Goal Re-Cert Due Date  04/30/21  -ADALGISA       User Key  (r) = Recorded By, (t) = Taken By, (c) = Cosigned By    Initials Name Provider Type    Hernan Landa, PT Physical Therapist          Continued Care and Services - Admitted Since 4/15/2021    Coordination has not been started for this encounter.        Historical & Objective Data     Results Review:    I reviewed the patient's new lab and radiology results. 04/17/21     Results from last 7 days   Lab Units 04/17/21  0438 04/16/21  0042 04/15/21  1116   WBC 10*3/mm3 4.10 3.80 4.97   HEMOGLOBIN g/dL 9.6* 9.6* 11.6*   HEMATOCRIT % 28.5* 29.5* 36.4   MCV fL 82.5 84.3 85.4   MCH pg 27.8 27.3 27.2   MPV fL 8.4 7.7 10.6   RDW % 14.6 15.2 14.1   PLATELETS 10*3/mm3 205 199 274     Results from last 7 days   Lab Units 04/17/21  0438 04/16/21  0042 04/15/21  1836 04/15/21  1116   SODIUM mmol/L 139 139  --  144   POTASSIUM mmol/L 3.6 3.9  --  4.3   CHLORIDE mmol/L 106 106  --  111*   CO2 mmol/L 24.0 22.0  --  21.5*   BUN mg/dL 35* 35*  --  38*   CREATININE mg/dL 1.98* 2.02*  --  1.94*   CALCIUM mg/dL 8.7 8.4*  --  9.4   BILIRUBIN mg/dL  --   --  0.3  --    ALK PHOS  U/L  --   --  91  --    ALT (SGPT) U/L  --   --  28  --    AST (SGOT) U/L  --   --  31  --    GLUCOSE mg/dL 105* 174*  --  108*     Inflammatory Biomarkers        Invalid input(s): ESR, D-DIMER QUANTITATIVE,  PROCALCITONIN,  alllabslink(lactate:5)@ )@  Lab Results   Component Value Date    PHOS 3.7 04/15/2021     Hemoglobin A1C   Date Value Ref Range Status   04/16/2021 5.9 (H) 3.5 - 5.6 % Final     Lab Results   Component Value Date    CHOL 208 (H) 04/16/2021    TRIG 94 04/16/2021    HDL 71 (H) 04/16/2021     (H) 04/16/2021     Lab Results   Component Value Date    LIPASE 146 (H) 04/15/2021               Pathology  No results found for: INTRAOP, PREDX, FINALDX, COMDX  COVID19   Date Value Ref Range Status   04/15/2021 Not Detected Not Detected - Ref. Range Final        Microbiology Results (last 10 days)     Procedure Component Value - Date/Time    Respiratory Panel PCR w/COVID-19(SARS-CoV-2) VÍCTOR/DAYNA/JADYN/PAD/COR/M[facility] In-House, NP Swab in UTM/VTM, 3-4 HR TAT - Swab, Nasopharynx [[id number]]  (Normal) Collected: 04/15/21 1836    Lab Status: Final result Specimen: Swab from Nasopharynx Updated: 04/15/21 2016     ADENOVIRUS, PCR Not Detected     Coronavirus 229E Not Detected     Coronavirus HKU1 Not Detected     Coronavirus NL63 Not Detected     Coronavirus OC43 Not Detected     COVID19 Not Detected     Human Metapneumovirus Not Detected     Human Rhinovirus/Enterovirus Not Detected     Influenza A PCR Not Detected     Influenza B PCR Not Detected     Parainfluenza Virus 1 Not Detected     Parainfluenza Virus 2 Not Detected     Parainfluenza Virus 3 Not Detected     Parainfluenza Virus 4 Not Detected     RSV, PCR Not Detected     Bordetella pertussis pcr Not Detected     Bordetella parapertussis PCR Not Detected     Chlamydophila pneumoniae PCR Not Detected     Mycoplasma pneumo by PCR Not Detected    Narrative:      Fact sheet for providers:  https://docs.[employer]/wp-content/uploads/ZDQ7523-0957-DU4.1-EUA-Provider-Fact-Sheet-3.pdf    Fact sheet for patients: https://docs.[employer]/wp-content/uploads/TWD3962-5210-SA0.1-EUA-Patient-Fact-Sheet-1.pdf    Test performed by PCR.          ECG/EMG Results (most recent)     Procedure Component Value Units Date/Time    ECG 12 Lead [[id number]] Collected: 04/15/21 1848     Updated: 04/16/21 1549     QT Interval 363 ms     Narrative:      HEART RATE= 72  bpm  RR Interval= 832  ms  NH Interval= 226  ms  P Horizontal Axis= -58  deg  P Front Axis= 40  deg  QRSD Interval= 94  ms  QT Interval= 363  ms  QRS Axis= 48  deg  T Wave Axis= 251  deg  - ABNORMAL ECG -  Sinus rhythm  inferolat st depression  Prolonged NH interval  LVH with secondary repolarization abnormality  No previous ECG available for comparison  Electronically Signed By: Mitchel Gresham ([facility]) 16-Apr-2021 15:49:31  Date and Time of Study: 2021-04-15 18:48:44    Adult Transthoracic Echo Complete W/ Cont if Necessary Per Protocol [[id number]] Collected: 04/16/21 0902     Updated: 04/16/21 2019     BSA 1.8 m^2      RVIDd 2.8 cm      IVSd 2.1 cm      LVIDd 3.5 cm      LVIDs 2.7 cm      LVPWd 2.0 cm      IVS/LVPW 1.0     FS 24.6 %      EDV(Teich) 52.5 ml      ESV(Teich) 26.4 ml      EF(Teich) 49.7 %      EDV(cubed) 44.6 ml      ESV(cubed) 19.1 ml      EF(cubed) 57.1 %      LV mass(C)d 340.3 grams      LV mass(C)dI 190.2 grams/m^2      SV(Teich) 26.1 ml      SI(Teich) 14.6 ml/m^2      SV(cubed) 25.5 ml      SI(cubed) 14.2 ml/m^2      Ao root diam 3.6 cm      Ao root area 9.9 cm^2      ACS 2.0 cm      asc Aorta Diam 3.3 cm      LVOT diam 1.9 cm      LVOT area 3.0 cm^2      EDV(MOD-sp4) 71.7 ml      ESV(MOD-sp4) 13.7 ml      EF(MOD-sp4) 81.0 %      SV(MOD-sp4) 58.1 ml      SI(MOD-sp4) 32.5 ml/m^2      Ao root area (BSA corrected) 2.0     LV Curry Vol (BSA corrected) 40.1 ml/m^2      LV Sys Vol (BSA corrected) 7.6 ml/m^2      MV E max joy 72.9 cm/sec      MV A  max joy 121.4 cm/sec      MV E/A 0.6     MV V2 max 138.0 cm/sec      MV max PG 7.6 mmHg      MV V2 mean 57.8 cm/sec      MV mean PG 1.7 mmHg      MV V2 VTI 33.0 cm      MVA(VTI) 3.9 cm^2      MV dec slope 161.2 cm/sec^2      MV dec time 0.45 sec      Ao pk joy 245.1 cm/sec      Ao max PG 24.1 mmHg      Ao max PG (full) 6.8 mmHg      Ao V2 mean 173.5 cm/sec      Ao mean PG 13.7 mmHg      Ao mean PG (full) 2.3 mmHg      Ao V2 VTI 48.6 cm      WINSTON(I,A) 2.7 cm^2      WINSTON(I,D) 2.7 cm^2      WINSTON(V,A) 2.5 cm^2      WINSTON(V,D) 2.5 cm^2      LV V1 max PG 17.3 mmHg      LV V1 mean PG 11.4 mmHg      LV V1 max 208.0 cm/sec      LV V1 mean 159.5 cm/sec      LV V1 VTI 43.6 cm      MR max joy 524.6 cm/sec      MR max .7 mmHg      SV(Ao) 482.1 ml      SI(Ao) 269.4 ml/m^2      SV(LVOT) 129.1 ml      SI(LVOT) 72.1 ml/m^2      PA V2 max 136.2 cm/sec      PA max PG 7.4 mmHg      PA max PG (full) 3.6 mmHg      PA V2 mean 91.7 cm/sec      PA mean PG 3.8 mmHg      PA mean PG (full) 1.7 mmHg      PA V2 VTI 31.4 cm      PI end-d joy 129.5 cm/sec      PI max joy 214.9 cm/sec      PI max PG 18.5 mmHg      RV V1 max PG 3.8 mmHg      RV V1 mean PG 2.1 mmHg      RV V1 max 97.5 cm/sec      RV V1 mean 68.6 cm/sec      RV V1 VTI 19.0 cm      RAP systole 3.0 mmHg       CV ECHO ISRAEL - BZI_BMI 26.3 kilograms/m^2       CV ECHO [place] - BSA([facility]) 1.8 m^2       CV ECHO ISAREL - BZI_METRIC_WEIGHT 71.7 kg       CV ECHO [place] - BZI_METRIC_HEIGHT 165.1 cm      LA dimension(2D) 3.1 cm     Narrative:      · Left ventricular ejection fraction appears to be 61 - 65%.  · Left ventricular wall thickness is consistent with moderate basal   asymmetric hypertrophy.  · The right ventricular cavity is mildly dilated.  · Mild to moderate aortic valve stenosis is present.  · Moderate mitral valve regurgitation is present.  · No pericardial effusion noted       ECG 12 Lead [[id number]] Collected: 04/16/21 0305     Updated: 04/16/21 2102     QT Interval 360 ms      Narrative:      HEART RATE= 59  bpm  RR Interval= 1012  ms  DC Interval= 224  ms  P Horizontal Axis= -75  deg  P Front Axis= 50  deg  QRSD Interval= 94  ms  QT Interval= 360  ms  QRS Axis= 63  deg  T Wave Axis= 247  deg  - ABNORMAL ECG -  Sinus bradycardia  Prolonged DC interval  Probable left ventricular hypertrophy  Anterior Q waves, possibly due to LVH  Abnormal T, consider ischemia, diffuse leads  When compared with ECG of 15-Apr-2021 18:48:44,  New or worsened ischemia or infarction  Electronically Signed By: Zane Aldridge ([facility]) 16-Apr-2021 20:59:53  Date and Time of Study: 2021-04-16 03:05:03              Results for orders placed during the hospital encounter of 04/15/21    Adult Transthoracic Echo Complete W/ Cont if Necessary Per Protocol    Interpretation Summary  · Left ventricular ejection fraction appears to be 61 - 65%.  · Left ventricular wall thickness is consistent with moderate basal asymmetric hypertrophy.  · The right ventricular cavity is mildly dilated.  · Mild to moderate aortic valve stenosis is present.  · Moderate mitral valve regurgitation is present.  · No pericardial effusion noted      CT Abdomen Pelvis Without Contrast    Result Date: 4/15/2021  1.Infiltrates are noted throughout the lung bases suggesting developing multifocal pneumonia versus pulmonary edema. Cardiomegaly is noted. 2.No evidence for significant nephrolithiasis. There is no evidence for obstructive uropathy. 3.No evidence for acute abnormality throughout the abdomen or pelvis on this noncontrast exam.  Electronically Signed By-Jim Montano MD On:4/15/2021 7:45 PM This report was finalized on [id number] by  Jim Montano MD.    XR Chest 1 View    Result Date: 4/15/2021  1.No definitive evidence for acute cardiopulmonary process. 2.Note is made of cardiomegaly.  Electronically Signed By-Jim Montano MD On:4/15/2021 6:47 PM This report was finalized on [id number] by  Jim Montano MD.      I personally  reviewed patient's x-ray films and my findings are: Chest x-ray film reviewed cardiomegaly.  Some congestion.  More evident on CT scan.  No infiltrates    I personally reviewed patient's EKG strip and my findings are: 0    Medication Review:   I have reviewed the patient's current medication list 04/17/21     Scheduled Meds  amLODIPine, 10 mg, Oral, Daily  aspirin, 81 mg, Oral, Daily  atorvastatin, 80 mg, Oral, Daily  Canagliflozin, 100 mg, Oral, Daily  chlorthalidone, 50 mg, Oral, Daily  enoxaparin, 30 mg, Subcutaneous, Q24H  hydrALAZINE, 10 mg, Oral, TID  insulin lispro, 0-9 Units, Subcutaneous, TID AC  ipratropium-albuterol, 3 mL, Nebulization, 4x Daily - RT  labetalol, 10 mg, Intravenous, Once  losartan, 100 mg, Oral, Q24H  metoprolol tartrate, 50 mg, Oral, Q12H  sodium chloride, 10 mL, Intravenous, Q12H        Meds Infusions       DVT prophylaxis  Mechanical Order History:     None      Pharmalogical Order History:      Ordered     Dose Route Frequency Stop    04/16/21 0033  enoxaparin (LOVENOX) syringe 30 mg      30 mg SC Every 24 Hours --                Meds PRN  •  acetaminophen **OR** acetaminophen **OR** acetaminophen  •  dextrose  •  dextrose  •  glucagon (human recombinant)  •  insulin lispro **AND** insulin lispro  •  melatonin  •  nitroglycerin  •  ondansetron **OR** ondansetron  •  [COMPLETED] Insert peripheral IV **AND** sodium chloride  •  sodium chloride      Rick Sánchez MD  04/17/21  11:39 EDT      Electronically signed by Rick Sánchez MD at 04/18/21 1103     Jim Farooq MD at 04/17/21 1039          RENAL/KCC:     LOS: 1 day    Patient Care Team:  Brian Nazario APRN as PCP - General    Chief Complaint:  HTN, CKD3-4    Subjective     Interval History:   Chart reviewed.  BP was much better yesterday afternoon but has jumped up to over 200 this AM.  She is more fatigues and having nausea as a result.  Discussed with family at bedside.    Objective     Vital Sign Min/Max for last 24  "hours  Temp  Min: 98 °F (36.7 °C)  Max: 98.6 °F (37 °C)   BP  Min: 114/74  Max: 212/69   Pulse  Min: 58  Max: 73   Resp  Min: 16  Max: 22   SpO2  Min: 93 %  Max: 100 %   No data recorded   Weight  Min: 67.9 kg (149 lb 12.8 oz)  Max: 67.9 kg (149 lb 12.8 oz)     Flowsheet Rows      First Filed Value   Admission Height  162 cm (63.78\") Documented at 04/15/2021 1816   Admission Weight  68.7 kg (151 lb 7.3 oz) Documented at 04/15/2021 1816          No intake/output data recorded.  I/O last 3 completed shifts:  In: 440 [P.O.:440]  Out: -     Physical Exam:  GEN: Awake, NAD  ENT: PERRL, EOMI, MMM  NECK: Supple, no JVD  CHEST: CTAB, no W/R/C  CV: RRR, no M/G/R  ABD: Soft, NT, +BS  SKIN: Warm and Dry  NEURO: CN's intact      WBC WBC   Date Value Ref Range Status   04/17/2021 4.10 3.40 - 10.80 10*3/mm3 Final   04/16/2021 3.80 3.40 - 10.80 10*3/mm3 Final   04/15/2021 4.97 3.40 - 10.80 10*3/mm3 Final      HGB Hemoglobin   Date Value Ref Range Status   04/17/2021 9.6 (L) 12.0 - 15.9 g/dL Final   04/16/2021 9.6 (L) 12.0 - 15.9 g/dL Final     Comment:     Result checked    04/15/2021 11.6 (L) 12.0 - 15.9 g/dL Final      HCT Hematocrit   Date Value Ref Range Status   04/17/2021 28.5 (L) 34.0 - 46.6 % Final   04/16/2021 29.5 (L) 34.0 - 46.6 % Final   04/15/2021 36.4 34.0 - 46.6 % Final      Platlets No results found for: LABPLAT   MCV MCV   Date Value Ref Range Status   04/17/2021 82.5 79.0 - 97.0 fL Final   04/16/2021 84.3 79.0 - 97.0 fL Final   04/15/2021 85.4 79.0 - 97.0 fL Final          Sodium Sodium   Date Value Ref Range Status   04/17/2021 139 136 - 145 mmol/L Final   04/16/2021 139 136 - 145 mmol/L Final   04/15/2021 144 136 - 145 mmol/L Final      Potassium Potassium   Date Value Ref Range Status   04/17/2021 3.6 3.5 - 5.2 mmol/L Final   04/16/2021 3.9 3.5 - 5.2 mmol/L Final   04/15/2021 4.3 3.5 - 5.2 mmol/L Final      Chloride Chloride   Date Value Ref Range Status   04/17/2021 106 98 - 107 mmol/L Final   04/16/2021 106 " 98 - 107 mmol/L Final   04/15/2021 111 (H) 98 - 107 mmol/L Final      CO2 CO2   Date Value Ref Range Status   04/17/2021 24.0 22.0 - 29.0 mmol/L Final   04/16/2021 22.0 22.0 - 29.0 mmol/L Final   04/15/2021 21.5 (L) 22.0 - 29.0 mmol/L Final      BUN BUN   Date Value Ref Range Status   04/17/2021 35 (H) 8 - 23 mg/dL Final   04/16/2021 35 (H) 8 - 23 mg/dL Final   04/15/2021 38 (H) 8 - 23 mg/dL Final      Creatinine Creatinine   Date Value Ref Range Status   04/17/2021 1.98 (H) 0.57 - 1.00 mg/dL Final   04/16/2021 2.02 (H) 0.57 - 1.00 mg/dL Final   04/15/2021 1.94 (H) 0.57 - 1.00 mg/dL Final      Calcium Calcium   Date Value Ref Range Status   04/17/2021 8.7 8.6 - 10.5 mg/dL Final   04/16/2021 8.4 (L) 8.6 - 10.5 mg/dL Final   04/15/2021 9.4 8.6 - 10.5 mg/dL Final      PO4 No results found for: CAPO4   Albumin Albumin   Date Value Ref Range Status   04/15/2021 3.50 3.50 - 5.20 g/dL Final      Magnesium No results found for: MG   Uric Acid No results found for: URICACID        Results Review:     I reviewed the patient's new clinical results.    amLODIPine, 10 mg, Oral, Daily  aspirin, 81 mg, Oral, Daily  atorvastatin, 80 mg, Oral, Daily  cefTRIAXone, 1 g, Intravenous, Q24H  chlorthalidone, 50 mg, Oral, Daily  enoxaparin, 30 mg, Subcutaneous, Q24H  glipizide, 2.5 mg, Oral, QAM AC  hydrALAZINE, 50 mg, Oral, TID  insulin lispro, 0-9 Units, Subcutaneous, TID AC  ipratropium-albuterol, 3 mL, Nebulization, 4x Daily - RT  labetalol, 10 mg, Intravenous, Once  losartan, 100 mg, Oral, Q24H  metoprolol tartrate, 100 mg, Oral, Q12H  sodium chloride, 10 mL, Intravenous, Q12H           Medication Review: Reviewed    Assessment/Plan     CKD4  HTN emergency  Proteinuria  DM  Weakness  N/V  Anemia    Plan: Renal function is stable at baseline.  BP has jumped back up this morning.  Will resume home Losartan.  Keep Hydralazine at current dose as patient and family concerned that when it was increased it made her feel poorly.  Will check  work-up for resistant HTN.    Jim Farooq MD   [facility]  04/17/21  10:39 EDT        Electronically signed by Jim Farooq MD at 04/17/21 104     Nadege Ruiz MD at 04/16/21 0854                [facility] Medicine Services Daily Progress Note      Hospitalist Team  LOS 1 days      Patient Care Team:  Brian Nazario APRN as PCP - General    Patient Location: [facility]/1      Subjective   Subjective     Chief Complaint / Subjective  Chief Complaint   Patient presents with   • Shortness of Breath     SOA, Chills, not as active as normal. sleeping alot. sent in by PMDANII kerr at beside to translate.         Brief Synopsis of Hospital Course/HPI    Ms. Woodward is a 80 y.o. female with past medical history of hypertension, diabetes, hyperlipidemia and CKD who presents to [facility] complaining of dyspnea and generalized weakness/lethargy.  Patient is non-English speaking,but  Her   Family   Who  Speaks  English  Is  At   The bedside     She reports that for the past 2 days she has had increasing weakness as well as dyspnea specifically dyspnea on exertion along with 2 episodes of nausea and nonbloody vomiting and occasional sensation of palpitations.  Family reports that patient was recently seen by her nephrologist and found to have poorly controlled hypertension and hydralazine dosing was increased. She denies chest pain, cough or fever, peripheral edema, syncope or near syncope are reported.  They do note some chronic constipation which is unchanged from baseline and patient reports no changes in her urinary habits.  She does not smoke or drink alcohol and patient and family confirm compliance with all of her outpatient medical therapies.     Initial vital signs: Blood pressure: 211/67, heart rate: 72, respirations: 24, O2 sat 99% on room air, temp 97.8.  In the ED patient did have lab significant for troponin of 0.018, proBNP: 3125.0 9 CO2:  "21.5, chloride: 111, creatinine: 1.94, BUN: 38, hemoglobin: 11.6. Lipase: 146, procalcitonin: 0.08. UA shows 3+ protein with trace glucose but is otherwise unremarkable. EKG shows sinus rhythm at 72 with some ST depression in lead I, II, V4, V5 and V6 with no noted ectopy, what appears to be a left bundle branch block and a QTC of 386 ms with no EKG for comparison.     CT of abdomen and pelvis shows infiltrates noted throughout the lung bases suggesting developing multifocal pneumonia versus pulmonary edema with cardiomegaly also present. No evidence of significant nephrolithiasis or obstructive uropathy is noted. No acute abdominal abnormality is reported. Chest x-ray reported with cardiomegaly but no definitive evidence of cardiopulmonary process. Respiratory viral panel including COVID-19 is negative.    Patient was admitted for further management    Date:: 4/16/2021  Patient seen today at bedside she reports generalized weakness.  She denies any chest pains no shortness of breath.  Blood pressure is better controlled.  Her family who speaks English is at bedside doing translation.  She has no new complaints.        Review of Systems   Constitutional: Positive for malaise/fatigue.   HENT: Negative.    Cardiovascular: Negative.    Respiratory: Negative.    Skin: Negative.    Gastrointestinal: Negative.    Neurological: Negative.          Objective   Objective      Vital Signs  Temp:  [97.8 °F (36.6 °C)-98.7 °F (37.1 °C)] 98.7 °F (37.1 °C)  Heart Rate:  [60-76] 60  Resp:  [14-24] 18  BP: (144-215)/(55-76) 145/56  Oxygen Therapy  SpO2: 93 %  Pulse Oximetry Type: Intermittent  Device (Oxygen Therapy): room air  Flowsheet Rows      First Filed Value   Admission Height  162 cm (63.78\") Documented at 04/15/2021 1816   Admission Weight  68.7 kg (151 lb 7.3 oz) Documented at 04/15/2021 1816        Intake & Output (last 3 days)       04/13 0701 - 04/14 0700 04/14 0701 - 04/15 0700 04/15 0701 - 04/16 0700 04/16 0701 - 04/17 " 0700            Urine Unmeasured Occurrence   0 x         Lines, Drains & Airways    Active LDAs     Name:   Placement date:   Placement time:   Site:   Days:    Peripheral IV 04/15/21 1835 Right Hand   04/15/21    1835    Hand   less than 1                  Physical Exam:    Physical Exam  Vitals reviewed.   Constitutional:       Appearance: Normal appearance.   HENT:      Head: Normocephalic and atraumatic.      Mouth/Throat:      Mouth: Mucous membranes are moist.   Eyes:      Pupils: Pupils are equal, round, and reactive to light.   Cardiovascular:      Rate and Rhythm: Normal rate and regular rhythm.      Heart sounds: Normal heart sounds.   Pulmonary:      Effort: Pulmonary effort is normal.      Breath sounds: Normal breath sounds.   Abdominal:      General: Abdomen is flat. Bowel sounds are normal.      Palpations: Abdomen is soft.   Musculoskeletal:         General: Normal range of motion.      Cervical back: Normal range of motion and neck supple.   Skin:     General: Skin is warm and dry.      Capillary Refill: Capillary refill takes less than 2 seconds.   Neurological:      General: No focal deficit present.      Mental Status: She is alert and oriented to person, place, and time. Mental status is at baseline.             Results Review:     I reviewed the patient's new clinical results.      Lab Results (last 24 hours)     Procedure Component Value Units Date/Time    POC Glucose Once [[id number]]  (Normal) Collected: 04/16/21 0719    Specimen: Blood Updated: 04/16/21 0720     Glucose 99 mg/dL      Comment: Serial Number: [id number]:  [id number]       Basic Metabolic Panel [[id number]]  (Abnormal) Collected: 04/16/21 0042    Specimen: Blood Updated: 04/16/21 0122     Glucose 174 mg/dL      BUN 35 mg/dL      Creatinine 2.02 mg/dL      Sodium 139 mmol/L      Potassium 3.9 mmol/L      Chloride 106 mmol/L      CO2 22.0 mmol/L      Calcium 8.4 mg/dL      eGFR  African Amer 29 mL/min/1.73      eGFR Non   Amer 24 mL/min/1.73      BUN/Creatinine Ratio 17.3     Anion Gap 11.0 mmol/L     Narrative:      GFR Normal >60  Chronic Kidney Disease <60  Kidney Failure <15      Lipid Panel [[id number]]  (Abnormal) Collected: 04/16/21 0042    Specimen: Blood Updated: 04/16/21 0122     Total Cholesterol 208 mg/dL      Triglycerides 94 mg/dL      HDL Cholesterol 71 mg/dL      LDL Cholesterol  120 mg/dL      VLDL Cholesterol 17 mg/dL      LDL/HDL Ratio 1.66    Narrative:      Cholesterol Reference Ranges  (U.S. Department of Health and Human Services ATP III Classifications)    Desirable          <200 mg/dL  Borderline High    200-239 mg/dL  High Risk          >240 mg/dL      Triglyceride Reference Ranges  (U.S. Department of Health and Human Services ATP III Classifications)    Normal           <150 mg/dL  Borderline High  150-199 mg/dL  High             200-499 mg/dL  Very High        >500 mg/dL    HDL Reference Ranges  (U.S. Department of Health and Human Services ATP III Classifcations)    Low     <40 mg/dl (major risk factor for CHD)  High    >60 mg/dl ('negative' risk factor for CHD)        LDL Reference Ranges  (U.S. Department of Health and Human Services ATP III Classifcations)    Optimal          <100 mg/dL  Near Optimal     100-129 mg/dL  Borderline High  130-159 mg/dL  High             160-189 mg/dL  Very High        >189 mg/dL    Troponin [[id number]]  (Normal) Collected: 04/16/21 0042    Specimen: Blood Updated: 04/16/21 0122     Troponin T 0.016 ng/mL     Narrative:      Troponin T Reference Range:  <= 0.03 ng/mL-   Negative for AMI  >0.03 ng/mL-     Abnormal for myocardial necrosis.  Clinicians would have to utilize clinical acumen, EKG, Troponin and serial changes to determine if it is an Acute Myocardial Infarction or myocardial injury due to an underlying chronic condition.       Results may be falsely decreased if patient taking Biotin.      CBC & Differential [[id number]]  (Abnormal) Collected: 04/16/21  0042    Specimen: Blood Updated: 04/16/21 0110    Narrative:      The following orders were created for panel order CBC & Differential.  Procedure                               Abnormality         Status                     ---------                               -----------         ------                     CBC Auto Differential[[id number]]        Abnormal            Final result                 Please view results for these tests on the individual orders.    CBC Auto Differential [[id number]]  (Abnormal) Collected: 04/16/21 0042    Specimen: Blood Updated: 04/16/21 0110     WBC 3.80 10*3/mm3      RBC 3.50 10*6/mm3      Hemoglobin 9.6 g/dL      Comment: Result checked         Hematocrit 29.5 %      MCV 84.3 fL      MCH 27.3 pg      MCHC 32.4 g/dL      RDW 15.2 %      RDW-SD 45.1 fl      MPV 7.7 fL      Platelets 199 10*3/mm3      Neutrophil % 60.8 %      Lymphocyte % 30.9 %      Monocyte % 6.8 %      Eosinophil % 1.0 %      Basophil % 0.5 %      Neutrophils, Absolute 2.30 10*3/mm3      Lymphocytes, Absolute 1.20 10*3/mm3      Monocytes, Absolute 0.30 10*3/mm3      Eosinophils, Absolute 0.00 10*3/mm3      Basophils, Absolute 0.00 10*3/mm3      nRBC 0.3 /100 WBC     Hemoglobin A1c [[id number]] Collected: 04/16/21 0042    Specimen: Blood Updated: 04/16/21 0054    POC Glucose Once [[id number]]  (Abnormal) Collected: 04/16/21 0039    Specimen: Blood Updated: 04/16/21 0040     Glucose 161 mg/dL      Comment: Serial Number: [id number]:  [id number]       Procalcitonin [[id number]]  (Normal) Collected: 04/15/21 1836    Specimen: Blood Updated: 04/15/21 2024     Procalcitonin 0.08 ng/mL     Narrative:      As a Marker for Sepsis (Non-Neonates):     1. <0.5 ng/mL represents a low risk of severe sepsis and/or septic shock.  2. >2 ng/mL represents a high risk of severe sepsis and/or septic shock.    As a Marker for Lower Respiratory Tract Infections that require antibiotic therapy:  PCT on Admission     Antibiotic Therapy      "        6-12 Hrs later  >0.5                          Strongly Recommended            >0.25 - <0.5             Recommended  0.1 - 0.25                  Discouraged                       Remeasure/reassess PCT  <0.1                         Strongly Discouraged         Remeasure/reassess PCT      As 28 day mortality risk marker: \"Change in Procalcitonin Result\" (>80% or <=80%) if Day 0 (or Day 1) and Day 4 values are available. Refer to http://www.[facility] GuidesJackson [facility]-pct-calculator.com/    Change in PCT <=80 %   A decrease of PCT levels below or equal to 80% defines a positive change in PCT test result representing a higher risk for 28-day all-cause mortality of patients diagnosed with severe sepsis or septic shock.    Change in PCT >80 %   A decrease of PCT levels of more than 80% defines a negative change in PCT result representing a lower risk for 28-day all-cause mortality of patients diagnosed with severe sepsis or septic shock.              Results may be falsely decreased if patient taking Biotin.     Respiratory Panel PCR w/COVID-19(SARS-CoV-2) VÍCTOR/DAYNA/JADYN/PAD/COR/MAD/CHIRAG In-House, NP Swab in UTM/VTM, 3-4 HR TAT - Swab, Nasopharynx [[id number]]  (Normal) Collected: 04/15/21 1836    Specimen: Swab from Nasopharynx Updated: 04/15/21 2016     ADENOVIRUS, PCR Not Detected     Coronavirus 229E Not Detected     Coronavirus HKU1 Not Detected     Coronavirus NL63 Not Detected     Coronavirus OC43 Not Detected     COVID19 Not Detected     Human Metapneumovirus Not Detected     Human Rhinovirus/Enterovirus Not Detected     Influenza A PCR Not Detected     Influenza B PCR Not Detected     Parainfluenza Virus 1 Not Detected     Parainfluenza Virus 2 Not Detected     Parainfluenza Virus 3 Not Detected     Parainfluenza Virus 4 Not Detected     RSV, PCR Not Detected     Bordetella pertussis pcr Not Detected     Bordetella parapertussis PCR Not Detected     Chlamydophila pneumoniae PCR Not Detected     Mycoplasma pneumo by PCR Not " Detected    Narrative:      Fact sheet for providers: https://docs.trustedsafe/wp-content/uploads/JZB9025-0185-QD8.1-EUA-Provider-Fact-Sheet-3.pdf    Fact sheet for patients: https://docs.trustedsafe/wp-content/uploads/XHI3889-5739-PJ4.1-EUA-Patient-Fact-Sheet-1.pdf    Test performed by PCR.    Extra Tubes [[id number]] Collected: 04/15/21 1836    Specimen: Blood, Venous Line Updated: 04/15/21 1945    Narrative:      The following orders were created for panel order Extra Tubes.  Procedure                               Abnormality         Status                     ---------                               -----------         ------                     Light Blue Top[[id number]]                                   Final result               Lavender Top[[id number]]                                     Final result               Gold Top - SST[[id number]]                                   Final result                 Please view results for these tests on the individual orders.    Lavender Top [[id number]] Collected: 04/15/21 1836    Specimen: Blood Updated: 04/15/21 1945     Extra Tube hold for add-on     Comment: Auto resulted       Light Blue Top [[id number]] Collected: 04/15/21 1836    Specimen: Blood Updated: 04/15/21 1945     Extra Tube hold for add-on     Comment: Auto resulted       Gold Top - SST [[id number]] Collected: 04/15/21 1836    Specimen: Blood Updated: 04/15/21 1945     Extra Tube Hold for add-ons.     Comment: Auto resulted.       BNP [[id number]]  (Abnormal) Collected: 04/15/21 1836    Specimen: Blood Updated: 04/15/21 1904     proBNP 3,125.0 pg/mL     Narrative:      Among patients with dyspnea, NT-proBNP is highly sensitive for the detection of acute congestive heart failure. In addition NT-proBNP of <300 pg/ml effectively rules out acute congestive heart failure with 99% negative predictive value.    Results may be falsely decreased if patient taking Biotin.      Troponin [[id number]]  (Normal) Collected:  04/15/21 1836    Specimen: Blood Updated: 04/15/21 1904     Troponin T 0.018 ng/mL     Narrative:      Troponin T Reference Range:  <= 0.03 ng/mL-   Negative for AMI  >0.03 ng/mL-     Abnormal for myocardial necrosis.  Clinicians would have to utilize clinical acumen, EKG, Troponin and serial changes to determine if it is an Acute Myocardial Infarction or myocardial injury due to an underlying chronic condition.       Results may be falsely decreased if patient taking Biotin.      Hepatic Function Panel [[id number]] Collected: 04/15/21 1836    Specimen: Blood Updated: 04/15/21 1902     Total Protein 6.8 g/dL      Albumin 3.50 g/dL      ALT (SGPT) 28 U/L      AST (SGOT) 31 U/L      Alkaline Phosphatase 91 U/L      Total Bilirubin 0.3 mg/dL      Bilirubin, Direct <0.2 mg/dL      Bilirubin, Indirect --     Comment: Unable to calculate       Lipase [[id number]]  (Abnormal) Collected: 04/15/21 1836    Specimen: Blood Updated: 04/15/21 1902     Lipase 146 U/L         No results found for: HGBA1C            Lab Results   Component Value Date    LIPASE 146 (H) 04/15/2021     Lab Results   Component Value Date    CHOL 208 (H) 04/16/2021    TRIG 94 04/16/2021    HDL 71 (H) 04/16/2021     (H) 04/16/2021       No results found for: INTRAOP, PREDX, FINALDX, COMDX    Microbiology Results (last 10 days)     Procedure Component Value - Date/Time    Respiratory Panel PCR w/COVID-19(SARS-CoV-2) VÍCTOR/DAYNA/JADYN/PAD/COR/MAD/CHIRAG In-House, NP Swab in UTM/VTM, 3-4 HR TAT - Swab, Nasopharynx [[id number]]  (Normal) Collected: 04/15/21 1836    Lab Status: Final result Specimen: Swab from Nasopharynx Updated: 04/15/21 2016     ADENOVIRUS, PCR Not Detected     Coronavirus 229E Not Detected     Coronavirus HKU1 Not Detected     Coronavirus NL63 Not Detected     Coronavirus OC43 Not Detected     COVID19 Not Detected     Human Metapneumovirus Not Detected     Human Rhinovirus/Enterovirus Not Detected     Influenza A PCR Not Detected     Influenza  B PCR Not Detected     Parainfluenza Virus 1 Not Detected     Parainfluenza Virus 2 Not Detected     Parainfluenza Virus 3 Not Detected     Parainfluenza Virus 4 Not Detected     RSV, PCR Not Detected     Bordetella pertussis pcr Not Detected     Bordetella parapertussis PCR Not Detected     Chlamydophila pneumoniae PCR Not Detected     Mycoplasma pneumo by PCR Not Detected    Narrative:      Fact sheet for providers: https://docs.[employer]/wp-content/uploads/AZX9362-8167-UY6.1-EUA-Provider-Fact-Sheet-3.pdf    Fact sheet for patients: https://docs.[employer]/wp-content/uploads/WQG5406-3317-QO1.1-EUA-Patient-Fact-Sheet-1.pdf    Test performed by PCR.          ECG/EMG Results (most recent)     Procedure Component Value Units Date/Time    ECG 12 Lead [[id number]] Collected: 04/15/21 1848     Updated: 04/15/21 1850     QT Interval 363 ms     Narrative:      HEART RATE= 72  bpm  RR Interval= 832  ms  RI Interval= 226  ms  P Horizontal Axis= -58  deg  P Front Axis= 40  deg  QRSD Interval= 94  ms  QT Interval= 363  ms  QRS Axis= 48  deg  T Wave Axis= 251  deg  - ABNORMAL ECG -  Sinus rhythm  Prolonged RI interval  LVH with secondary repolarization abnormality  No previous ECG available for comparison  Electronically Signed By:   Date and Time of Study: 2021-04-15 18:48:44    ECG 12 Lead [[id number]] Collected: 04/16/21 0305     Updated: 04/16/21 0306     QT Interval 360 ms     Narrative:      HEART RATE= 59  bpm  RR Interval= 1012  ms  RI Interval= 224  ms  P Horizontal Axis= -75  deg  P Front Axis= 50  deg  QRSD Interval= 94  ms  QT Interval= 360  ms  QRS Axis= 63  deg  T Wave Axis= 247  deg  - ABNORMAL ECG -  Sinus bradycardia  Prolonged RI interval  Probable left ventricular hypertrophy  Anterior Q waves, possibly due to LVH  Abnormal T, consider ischemia, diffuse leads  Electronically Signed By:   Date and Time of Study: 2021-04-16 03:05:03                  CT Abdomen Pelvis Without Contrast    Result Date:  4/15/2021  1.Infiltrates are noted throughout the lung bases suggesting developing multifocal pneumonia versus pulmonary edema. Cardiomegaly is noted. 2.No evidence for significant nephrolithiasis. There is no evidence for obstructive uropathy. 3.No evidence for acute abnormality throughout the abdomen or pelvis on this noncontrast exam.  Electronically Signed By-Jim Montano MD On:4/15/2021 7:45 PM This report was finalized on [id number] by  Jim Montano MD.    XR Chest 1 View    Result Date: 4/15/2021  1.No definitive evidence for acute cardiopulmonary process. 2.Note is made of cardiomegaly.  Electronically Signed By-Jim Montano MD On:4/15/2021 6:47 PM This report was finalized on [id number] by  Jim Montano MD.          Xrays, labs reviewed personally by physician.    Medication Review:   I have reviewed the patient's current medication list      Scheduled Meds  amLODIPine, 10 mg, Oral, Daily  aspirin, 81 mg, Oral, Daily  atorvastatin, 80 mg, Oral, Daily  enoxaparin, 30 mg, Subcutaneous, Q24H  hydrALAZINE, 25 mg, Oral, TID  insulin lispro, 0-9 Units, Subcutaneous, TID AC  labetalol, 10 mg, Intravenous, Once  losartan, 100 mg, Oral, Daily  sodium chloride, 10 mL, Intravenous, Q12H        Meds Infusions       Meds PRN  •  acetaminophen **OR** acetaminophen **OR** acetaminophen  •  dextrose  •  dextrose  •  glucagon (human recombinant)  •  insulin lispro **AND** insulin lispro  •  melatonin  •  nitroglycerin  •  ondansetron **OR** ondansetron  •  [COMPLETED] Insert peripheral IV **AND** sodium chloride  •  sodium chloride        Assessment/Plan   Assessment/Plan     Active Hospital Problems    Diagnosis  POA   • **Hypertensive emergency [I16.1]  Yes   • Hyperlipidemia [E78.5]  Yes   • Essential hypertension [I10]  Yes   • Type 2 diabetes mellitus (CMS/HCC) [E11.9]  Yes   • Anemia [D64.9]  Yes   • CKD (chronic kidney disease) stage 3, GFR 30-59 ml/min (CMS/HCC) [N18.30]  Yes   • Dyspnea [R06.00]   Yes      Resolved Hospital Problems   No resolved problems to display.       MEDICAL DECISION MAKING COMPLEXITY BY PROBLEM:       Ms. Woodward is a 80 y.o. female with past medical history of hypertension, diabetes, hyperlipidemia and CKD who presents to [facility] complaining of dyspnea and generalized weakness/lethargy.  Patient is non-English speaking,but  Her   Family   Who  Speaks  English  Is  At   The bedside     She reports that for the past 2 days she has had increasing weakness as well as dyspnea specifically dyspnea on exertion along with 2 episodes of nausea and nonbloody vomiting and occasional sensation of palpitations.  Family reports that patient was recently seen by her nephrologist and found to have poorly controlled hypertension and hydralazine dosing was increased. She denies chest pain, cough or fever, peripheral edema, syncope or near syncope are reported.  They do note some chronic constipation which is unchanged from baseline and patient reports no changes in her urinary habits.  She does not smoke or drink alcohol and patient and family confirm compliance with all of her outpatient medical therapies.     Initial vital signs: Blood pressure: 211/67, heart rate: 72, respirations: 24, O2 sat 99% on room air, temp 97.8.  In the ED patient did have lab significant for troponin of 0.018, proBNP: 3125.0 9 CO2: 21.5, chloride: 111, creatinine: 1.94, BUN: 38, hemoglobin: 11.6. Lipase: 146, procalcitonin: 0.08. UA shows 3+ protein with trace glucose but is otherwise unremarkable. EKG shows sinus rhythm at 72 with some ST depression in lead I, II, V4, V5 and V6 with no noted ectopy, what appears to be a left bundle branch block and a QTC of 386 ms with no EKG for comparison.     CT of abdomen and pelvis shows infiltrates noted throughout the lung bases suggesting developing multifocal pneumonia versus pulmonary edema with cardiomegaly also present. No evidence of significant nephrolithiasis or  obstructive uropathy is noted. No acute abdominal abnormality is reported. Chest x-ray reported with cardiomegaly but no definitive evidence of cardiopulmonary process. Respiratory viral panel including COVID-19 is negative.    Patient was admitted for further management    Assessment  Hypertensive emergency with dyspnea and recent chest pain   Blood pressure 211/67 on admission  Troponin: 0.018->>0.016  ProBNP: 3125.0  EKG shows sinus rhythm at 72 with some ST depression in lead I, II, V4, V5 and V6 with no noted ectopy, what appears to be a left bundle branch block and a QTC of 386 ms with no EKG for comparison.  Chest x-ray reported with cardiomegaly but no definitive evidence of cardiopulmonary process.   Respiratory viral panel including COVID-19 is negative.  S/P 325 mg aspirin, 0.1 mg clonidine, Lasix and 1 inch of Nitropaste in the ED  Fu  Echo   2 g sodium diet  Resume hydralazine  Continue cardiac monitoring and pulse oximetry  Hold  Losartan in light of CKD     CAP  Chest CT  IMPRESSION:  1.Infiltrates are noted throughout the lung bases suggesting developing  multifocal pneumonia versus pulmonary edema. Cardiomegaly is noted.  2.No evidence for significant nephrolithiasis. There is no evidence for  obstructive uropathy.  3.No evidence for acute abnormality throughout the abdomen or pelvis on  this noncontrast exam.  Start ceftriaxone  And   Azithromycin   DUO  Nebs  Prn  COVID -19  Ruled out   Resp panel negative           Anemia  Hemoglobin: 11.6 with a normal MCV and MCHC (stable from previous admissions)  Monitor CBC      Diabetes mellitus  Well controlled with glucose of 108 on admission  FU  A1c  Resume glipizide at lower dose and monitor  Sliding scale insulin  diabetic diet  monitor AC and HS     ERIN on CKD  Creatinine: 1.94, BUN: 38, eGFR: 25 (creatinine: 2.02 on 11/11/2020)  Losartan on hold  UA shows 3+ protein with trace glucose  S/P 40 mg Lasix in ED  Nephrology  cx         Hypertension  Poorly controlled with a blood pressure on admission of 211/67  Continue hydralazine, Norvasc   Losartan on hold due to ERIN on CKD       Hyperlipidemia  Continue statin        VTE Prophylaxis -   Mechanical Order History:     None      Pharmalogical Order History:      Ordered     Dose Route Frequency Stop    04/16/21 0033  enoxaparin (LOVENOX) syringe 30 mg      30 mg SC Every 24 Hours --                  Code Status -   Code Status and Medical Interventions:   Ordered at: 04/15/21 2147     Code Status:    CPR     Medical Interventions (Level of Support Prior to Arrest):    Full       This patient has been examined wearing appropriate Personal Protective Equipment         Discharge Planning  home        Electronically signed by Nadege Ruiz MD, 04/16/21, 08:54 EDT.  [facility] Hospitalist Team        Electronically signed by Nadege Ruiz MD at 04/16/21 0910          Consult Notes (last 24 hours) (Notes from 04/18/21 1214 through 04/19/21 1214)      Zane Aldridge MD at 04/18/21 1911      Consult Orders    1. Inpatient Cardiology Consult [[id number]] ordered by Rick Sánchez MD at 04/18/21 1514                 Referring Provider: Hospitalist  Reason for Consultation: Weakness and lethargy    Patient Care Team:  Brian Nazario APRN as PCP - General    Chief complaint weakness    Subjective .     History of present illness:  Brad Woodward is a 80 y.o. female with history of diabetes hypertension hyperlipidemia presented to the hospital complains of shortness of breath and weakness and lethargy.  Most of the history was obtained from the family through interpretation.  Patient does not speak English and hence family helped interpretation.  Patient was noted to have weakness and fatigue and also shortness of breath with exertion for the last few days.  Patient has been taking her medicines for her diabetes and hypertension.  Her blood pressure was not very well controlled.  She has  developed renal insufficiency and is seen by the nephrologist.  Patient medications have been adjusted.  She is not having any chest pains.  No palpitations or dizziness or syncope.  No swelling of the feet.  In the ER patient was noted to have a BUN of 38 and a creatinine of 1.9.  Patient had an EKG which showed nonspecific ST segment abnormality.  She was Covid negative.  She had CT of the chest and abdomen and pelvis which showed probable pneumonia and was treated with antibiotics.  Patient blood pressure was high and was placed on beta-blockers and heart rates were very low and hence cardiology consultation was called.    Review of Systems   Constitutional: Positive for malaise/fatigue. Negative for fever.   HENT: Negative for ear pain and nosebleeds.    Eyes: Negative for blurred vision and double vision.   Cardiovascular: Negative for chest pain, dyspnea on exertion and palpitations.   Respiratory: Positive for shortness of breath. Negative for cough.    Skin: Negative for rash.   Musculoskeletal: Negative for joint pain.   Gastrointestinal: Negative for abdominal pain, nausea and vomiting.   Neurological: Negative for focal weakness and headaches.   Psychiatric/Behavioral: Negative for depression. The patient is not nervous/anxious.    All other systems reviewed and are negative.      History  Past Medical History:   Diagnosis Date   • Diabetes mellitus (CMS/HCC)    • Hyperlipidemia    • Hypertension        History reviewed. No pertinent surgical history.    History reviewed. No pertinent family history.    Social History     Tobacco Use   • Smoking status: Never Smoker   • Smokeless tobacco: Never Used   Substance Use Topics   • Alcohol use: No   • Drug use: No        Medications Prior to Admission   Medication Sig Dispense Refill Last Dose   • amLODIPine (NORVASC) 10 MG tablet Take 10 mg by mouth Daily.      • aspirin 81 MG chewable tablet Chew 81 mg Daily.      • atorvastatin (LIPITOR) 80 MG tablet Take 80  "mg by mouth Daily.      • glipizide (GLUCOTROL) 5 MG tablet Take 2.5 mg by mouth Daily.      • hydrALAZINE (APRESOLINE) 25 MG tablet Take 25 mg by mouth 3 (Three) Times a Day.      • losartan (COZAAR) 100 MG tablet Take 100 mg by mouth Daily.            Patient has no known allergies.    Scheduled Meds:amLODIPine, 10 mg, Oral, Daily  aspirin, 81 mg, Oral, Daily  atorvastatin, 80 mg, Oral, Daily  Canagliflozin, 100 mg, Oral, Daily  chlorthalidone, 50 mg, Oral, Daily  cloNIDine, 0.2 mg, Oral, Q12H  enoxaparin, 30 mg, Subcutaneous, Q24H  insulin lispro, 0-9 Units, Subcutaneous, TID AC  ipratropium-albuterol, 3 mL, Nebulization, 4x Daily - RT  losartan, 100 mg, Oral, Q24H  polyethylene glycol, 17 g, Oral, BID  sodium chloride, 10 mL, Intravenous, Q12H      Continuous Infusions:   PRN Meds:.•  acetaminophen **OR** acetaminophen **OR** acetaminophen  •  dextrose  •  dextrose  •  docusate sodium  •  glucagon (human recombinant)  •  insulin lispro **AND** insulin lispro  •  melatonin  •  nitroglycerin  •  ondansetron **OR** ondansetron  •  [COMPLETED] Insert peripheral IV **AND** sodium chloride  •  sodium chloride    Objective     VITAL SIGNS  Vitals:    04/18/21 1226 04/18/21 1230 04/18/21 1645 04/18/21 1648   BP:       BP Location:       Patient Position:       Pulse: (!) 46 (!) 42 (!) 44 (!) 43   Resp: 18 18 16 16   Temp:       TempSrc:       SpO2: 98%  100% 100%   Weight:       Height:           Flowsheet Rows      First Filed Value   Admission Height  162 cm (63.78\") Documented at 04/15/2021 1816   Admission Weight  68.7 kg (151 lb 7.3 oz) Documented at 04/15/2021 1816           TELEMETRY: Sinus bradycardia with nonspecific ST segment abnormality    Physical Exam:  Constitutional:       Appearance: Well-developed.   Eyes:      General: No scleral icterus.     Conjunctiva/sclera: Conjunctivae normal.      Pupils: Pupils are equal, round, and reactive to light.   HENT:      Head: Normocephalic and atraumatic.   Neck:    "   Vascular: No carotid bruit or JVD.   Pulmonary:      Effort: Pulmonary effort is normal.      Breath sounds: Normal breath sounds. No wheezing. No rales.   Cardiovascular:      Normal rate. Regular rhythm.   Pulses:     Intact distal pulses.   Abdominal:      General: Bowel sounds are normal.      Palpations: Abdomen is soft.   Musculoskeletal: Normal range of motion.      Cervical back: Normal range of motion and neck supple. Skin:     General: Skin is warm and dry.      Findings: No rash.   Neurological:      Mental Status: Alert.      Comments: No focal deficits          Results Review:   I reviewed the patient's new clinical results.  Lab Results (last 24 hours)     Procedure Component Value Units Date/Time    POC Glucose Once [[id number]]  (Abnormal) Collected: 04/18/21 1625    Specimen: Blood Updated: 04/18/21 1628     Glucose 121 mg/dL      Comment: Serial Number: [id number]:  [id number]       POC Glucose Once [[id number]]  (Abnormal) Collected: 04/18/21 1141    Specimen: Blood Updated: 04/18/21 1142     Glucose 161 mg/dL      Comment: Serial Number: [id number]:  [id number]       POC Glucose Once [[id number]]  (Abnormal) Collected: 04/18/21 0711    Specimen: Blood Updated: 04/18/21 0713     Glucose 106 mg/dL      Comment: Serial Number: [id number]:  [id number]       Cortisol [[id number]] Collected: 04/18/21 0515    Specimen: Blood Updated: 04/18/21 0636     Cortisol 9.13 mcg/dL     Narrative:      Cortisol Reference Ranges:    Cortisol 6AM - 10AM Range: 6.02-18.40 mcg/dl  Cortisol 4PM - 8PM Range: 2.68-10.50 mcg/dl      Results may be falsely increased if patient taking Biotin.      Basic Metabolic Panel [[id number]]  (Abnormal) Collected: 04/18/21 0515    Specimen: Blood Updated: 04/18/21 0601     Glucose 119 mg/dL      BUN 31 mg/dL      Creatinine 1.88 mg/dL      Sodium 140 mmol/L      Potassium 3.7 mmol/L      Comment: Slight hemolysis detected by analyzer. Results may be affected.         Chloride 106 mmol/L      CO2 25.0 mmol/L      Calcium 9.1 mg/dL      eGFR   Amer 31 mL/min/1.73      eGFR Non African Amer 26 mL/min/1.73      BUN/Creatinine Ratio 16.5     Anion Gap 9.0 mmol/L     Narrative:      GFR Normal >60  Chronic Kidney Disease <60  Kidney Failure <15      CBC & Differential [[id number]]  (Abnormal) Collected: 04/18/21 0515    Specimen: Blood Updated: 04/18/21 0532    Narrative:      The following orders were created for panel order CBC & Differential.  Procedure                               Abnormality         Status                     ---------                               -----------         ------                     CBC Auto Differential[[id number]]        Abnormal            Final result                 Please view results for these tests on the individual orders.    CBC Auto Differential [[id number]]  (Abnormal) Collected: 04/18/21 0515    Specimen: Blood Updated: 04/18/21 0532     WBC 5.30 10*3/mm3      RBC 3.60 10*6/mm3      Hemoglobin 9.7 g/dL      Hematocrit 30.3 %      MCV 84.1 fL      MCH 27.0 pg      MCHC 32.1 g/dL      RDW 14.9 %      RDW-SD 44.2 fl      MPV 8.2 fL      Platelets 191 10*3/mm3      Neutrophil % 50.0 %      Lymphocyte % 36.4 %      Monocyte % 9.3 %      Eosinophil % 3.6 %      Basophil % 0.7 %      Neutrophils, Absolute 2.60 10*3/mm3      Lymphocytes, Absolute 1.90 10*3/mm3      Monocytes, Absolute 0.50 10*3/mm3      Eosinophils, Absolute 0.20 10*3/mm3      Basophils, Absolute 0.00 10*3/mm3      nRBC 0.1 /100 WBC           Imaging Results (Last 24 Hours)     ** No results found for the last 24 hours. **          EKG      I personally viewed and interpreted the patient's EKG/Telemetry data:    ECHOCARDIOGRAM:      STRESS MYOVIEW:    CARDIAC CATHETERIZATION:    OTHER:         Assessment/Plan     Principal Problem:    Hypertensive emergency  Active Problems:    Mixed hyperlipidemia    Essential hypertension    Type 2 diabetes mellitus (CMS/HCC)    Anemia due  to stage 3a chronic kidney disease (CMS/[facility])    CKD (chronic kidney disease) stage 3, GFR 30-59 ml/min (CMS/[facility])    HFrEF (heart failure with reduced ejection fraction) (CMS/[facility])    Dyspnea  Bradycardia    Patient presented with shortness of breath and fatigue and tiredness  Patient is ruled out for MI by EKG and enzymes  Patient had echocardiogram which showed mild to moderate aortic stenosis and moderate mitral regurgitation  Patient blood pressure was high and hence she was started on beta-blockers but she developed significant bradycardia and hence the beta-blockers will be stopped   her heart rates will be watched  Patient's electrolytes were within normal limits but will have a TSH level also checked.  Patient's renal function is being watched by the nephrologist      I discussed the patients findings and my recommendations with patient and nurse    Zane Aldridge MD  04/18/21  19:12 EDT              Electronically signed by Zane Aldridge MD at 04/18/21 7261

## 2021-04-19 NOTE — SIGNIFICANT NOTE
Pt d/c from PT at this time, due to significant improvement, return to baseline function. Will require new orders if status changes.   Hernan Hayes PT, DPT

## 2021-04-19 NOTE — PROGRESS NOTES
[facility] Medicine Services  INPATIENT PROGRESS NOTE  374/1   Hospitalist Team  LOS 3 days      Patient Care Team:  Brian Nazario APRN as PCP - General        Chief Complaint / Subjective  Chief Complaint   Patient presents with   • Shortness of Breath     SOA, Chills, not as active as normal. sleeping alot. [facility]       HPI (4 hpi elements or status of 3 chronic)  Ms. Woodward is a 80 y.o. female with past medical history of hypertension, diabetes, hyperlipidemia and CKD who presents to [facility] complaining of dyspnea and generalized weakness/lethargy.  Patient is non-English speaking with family at bedside interpreting throughout the exam.  She reports that for the past 2 days she has had increasing weakness as well as dyspnea specifically dyspnea on exertion along with 2 episodes of nausea and nonbloody vomiting and occasional sensation of palpitations.  Family reports that patient was recently seen by her nephrologist and found to have poorly controlled hypertension and hydralazine dosing was increased.  They note symptoms seem to have began near the time that dose adjustments were made to her blood pressure medication.  No pain including chest pain, cough or fever, peripheral edema, syncope or near syncope are reported.  They do note some chronic constipation which is unchanged from baseline and patient reports no changes in her urinary habits.  She does not smoke or drink alcohol and patient and family confirm compliance with all of her outpatient medical therapies.     Today the patient's daughter is at the bedside at the time of my visit.  She states that her mother is feeling much better.  She reports that she has been better able to walk and get around with minimal assistance.  She does remain rather bradycardic.         Nausea 04/18/21, 11:03 AM EDT      Shortness of Breath          History taken from: chart family    Review of  Systems   Respiratory: Positive for shortness of breath.      Constitutional: Positive for chills and malaise/fatigue. Negative for fever.   HENT: Negative.    Eyes: Negative.    Cardiovascular: Positive for dyspnea on exertion and palpitations. Negative for chest pain, irregular heartbeat, leg swelling, near-syncope, orthopnea and syncope.   Respiratory: Positive for shortness of breath. Negative for cough and sputum production.    Endocrine: Negative.    Skin: Negative.    Musculoskeletal: Negative.  Improved ability to ambulate.  Gastrointestinal: Positive for constipation, nausea and vomiting. Negative for abdominal pain, diarrhea, hematemesis, hematochezia and melena.   Genitourinary: Negative.    Neurological: Negative.    Psychiatric/Behavioral: Negative.     Noted          History reviewed. No pertinent family history.    Past Medical History:   Diagnosis Date   • Diabetes mellitus (CMS/[facility])    • Hyperlipidemia    • Hypertension        Social History     Socioeconomic History   • Marital status:      Spouse name: Not on file   • Number of children: Not on file   • Years of education: Not on file   • Highest education level: Not on file   Tobacco Use   • Smoking status: Never Smoker   • Smokeless tobacco: Never Used   Substance and Sexual Activity   • Alcohol use: No   • Drug use: No   • Sexual activity: Defer       Prior to Admission medications    Medication Sig Start Date End Date Taking? Authorizing Provider   amLODIPine (NORVASC) 10 MG tablet Take 10 mg by mouth Daily.   Yes ProviderHarry MD   aspirin 81 MG chewable tablet Chew 81 mg Daily.   Yes ProviderHarry MD   atorvastatin (LIPITOR) 80 MG tablet Take 80 mg by mouth Daily.   Yes Harry Hernández MD   glipizide (GLUCOTROL) 5 MG tablet Take 2.5 mg by mouth Daily.   Yes Harry Hernández MD   hydrALAZINE (APRESOLINE) 25 MG tablet Take 25 mg by mouth 3 (Three) Times a Day.   Yes ProviderHarry MD   losartan (COZAAR)  "100 MG tablet Take 100 mg by mouth Daily.   Yes Provider, MD Harry        Objective    Physical Exam     Vital Signs  Temp:  [97.3 °F (36.3 °C)-97.6 °F (36.4 °C)] 97.3 °F (36.3 °C)  Heart Rate:  [39-52] 40  Resp:  [16-20] 16  BP: (158-164)/(61-72) 164/61    Oxygen Therapy  SpO2: 95 %  Pulse Oximetry Type: Intermittent  Device (Oxygen Therapy): room air  Flowsheet Rows      First Filed Value   Admission Height  162 cm (63.78\") Documented at 04/15/2021 1816   Admission Weight  68.7 kg (151 lb 7.3 oz) Documented at 04/15/2021 1816        Weight change: -0.953 kg (-2 lb 1.6 oz)   Intake & Output (last 3 days)       04/17 0701 - 04/18 0700 04/18 0701 - 04/19 0700 04/19 0701 - 04/20 0700    P.O. 350 360 240    Total Intake(mL/kg) 350 (4.9) 360 (5.2) 240 (3.4)    Urine (mL/kg/hr) 600 (0.4)      Total Output 600      Net -250 +360 +240           Urine Unmeasured Occurrence  2 x 4 x    Stool Unmeasured Occurrence  1 x 1 x        Lines, Drains & Airways    Active LDAs     Name:   Placement date:   Placement time:   Site:   Days:    Peripheral IV 04/15/21 1835 Right Hand   04/15/21    1835    Hand   1                Physical Exam:    Physical Exam  Vitals and nursing note reviewed.   Constitutional:       General: She is not in acute distress.     Appearance: Normal appearance. She is well-developed. She is not ill-appearing, toxic-appearing or diaphoretic.   HENT:      Head: Normocephalic and atraumatic.      Right Ear: Ear canal and external ear normal.      Left Ear: Ear canal and external ear normal.      Nose: Nose normal. No congestion or rhinorrhea.      Mouth/Throat:      Mouth: Mucous membranes are moist.      Pharynx: No oropharyngeal exudate.   Eyes:      General: No scleral icterus.        Right eye: No discharge.         Left eye: No discharge.      Extraocular Movements: Extraocular movements intact.      Conjunctiva/sclera: Conjunctivae normal.      Pupils: Pupils are equal, round, and reactive to light. "   Neck:      Thyroid: No thyromegaly.      Vascular: No carotid bruit or JVD.      Trachea: No tracheal deviation.   Cardiovascular:      Rate and Rhythm: Normal rate and regular rhythm.      Pulses: Normal pulses.      Heart sounds: Normal heart sounds. No murmur heard.   No friction rub. No gallop.    Pulmonary:      Effort: Pulmonary effort is normal. No respiratory distress.      Breath sounds: Normal breath sounds. No stridor. No wheezing, rhonchi or rales.   Chest:      Chest wall: No tenderness.   Abdominal:      General: Bowel sounds are normal. There is no distension.      Palpations: Abdomen is soft. There is no mass.      Tenderness: There is no abdominal tenderness. There is no guarding or rebound.      Hernia: No hernia is present.   Musculoskeletal:         General: No swelling, tenderness, deformity or signs of injury. Normal range of motion.      Cervical back: Normal range of motion and neck supple. No rigidity. No muscular tenderness.      Right lower leg: No edema.      Left lower leg: No edema.      Comments: Patient seems more active than previously listed.   Lymphadenopathy:      Cervical: No cervical adenopathy.   Skin:     General: Skin is warm and dry.      Coloration: Skin is not jaundiced or pale.      Findings: No bruising, erythema or rash.   Neurological:      General: No focal deficit present.      Mental Status: She is alert and oriented to person, place, and time. Mental status is at baseline.      Cranial Nerves: No cranial nerve deficit.      Sensory: No sensory deficit.      Motor: No weakness or abnormal muscle tone.      Coordination: Coordination normal.   Psychiatric:         Mood and Affect: Mood normal.         Behavior: Behavior normal.         Thought Content: Thought content normal.         Judgment: Judgment normal.     Reviewed, no change in above data from the prior day.          PT Recommendation and Plan             Procedures:    * No surgery found *      Assessment/Plan with Problem wise       Active Hospital Problems    Diagnosis  POA   • **Hypertensive emergency [I16.1]  Yes   • HFrEF (heart failure with reduced ejection fraction) (CMS/[facility]) [I50.20]  Yes   • Dyspnea [R06.00]  Yes   • Mixed hyperlipidemia [E78.2]  Yes   • Essential hypertension [I10]  Yes   • Type 2 diabetes mellitus (CMS/[facility]) [E11.9]  Yes   • Anemia due to stage 3a chronic kidney disease (CMS/[facility]) [N18.31, D63.1]  Yes   • CKD (chronic kidney disease) stage 3, GFR 30-59 ml/min (CMS/[facility]) [N18.30]  Yes      Resolved Hospital Problems   No resolved problems to display.        Estimated Creatinine Clearance: 19.9 mL/min (A) (by C-G formula based on SCr of 2.21 mg/dL (H)).    Code Status and Medical Interventions:   Ordered at: 04/15/21 1739     Code Status:    CPR     Medical Interventions (Level of Support Prior to Arrest):    Full       MEDICAL DECISION MAKING COMPLEXITY BY PROBLEM:       1.  Hypertension:  -Discontinue beta-blockers  -Appreciate cardiology's help  -Consider discontinuing either the Catapres or the Norvasc as this is an alpha-blocker as well as a calcium channel blocker and could also be the etiology of her bradycardia.    -Her hypertension seems fairly well controlled.    2.  CHF:  -The patient's ejection fraction is 61 to 65%  -The patient has mild to moderate both mitral and aortic regurgitation.  -Improved bilateral lower extremity edema.    3.  Renal failure/insufficiency/injury:  -Cautious hydration       -Decrease IV fluids to 50 mL an hour    4.  Anemia due to CKD  -Supportive  -Monitor BMP daily    5.  Hyperlipidemia:  -Check lipid panel  -Patient is already on Lipitor        Plan for disposition:  anticipate pt will need 30 days or less of rehab            Continued Care and Services - Admitted Since 4/15/2021    Coordination has not been started for this encounter.        Historical & Objective Data     Results Review:    I reviewed the patient's new lab and radiology  results. 04/19/21     Results from last 7 days   Lab Units 04/19/21 0327 04/18/21 0515 04/17/21  0438   WBC 10*3/mm3 5.60 5.30 4.10   HEMOGLOBIN g/dL 9.8* 9.7* 9.6*   HEMATOCRIT % 29.5* 30.3* 28.5*   MCV fL 82.5 84.1 82.5   MCH pg 27.4 27.0 27.8   MPV fL 8.3 8.2 8.4   RDW % 14.3 14.9 14.6   PLATELETS 10*3/mm3 163 191 205     Results from last 7 days   Lab Units 04/19/21 0327 04/18/21 0515 04/17/21  0438 04/15/21  1836   SODIUM mmol/L 137 140 139  --    POTASSIUM mmol/L 3.3* 3.7 3.6  --    CHLORIDE mmol/L 102 106 106  --    CO2 mmol/L 23.0 25.0 24.0  --    BUN mg/dL 37* 31* 35*  --    CREATININE mg/dL 2.21* 1.88* 1.98*  --    CALCIUM mg/dL 8.4* 9.1 8.7  --    BILIRUBIN mg/dL  --   --   --  0.3   ALK PHOS U/L  --   --   --  91   ALT (SGPT) U/L  --   --   --  28   AST (SGOT) U/L  --   --   --  31   GLUCOSE mg/dL 108* 119* 105*  --      Inflammatory Biomarkers        Invalid input(s): ESR, D-DIMER QUANTITATIVE,  PROCALCITONIN,  alllabslink(lactate:5)@ )@  Lab Results   Component Value Date    PHOS 3.7 04/15/2021     No results found for: HGBA1C  Lab Results   Component Value Date    CHOL 208 (H) 04/16/2021    TRIG 94 04/16/2021    HDL 71 (H) 04/16/2021     (H) 04/16/2021     Lab Results   Component Value Date    LIPASE 146 (H) 04/15/2021               Pathology  No results found for: INTRAOP, PREDX, FINALDX, COMDX  COVID19   Date Value Ref Range Status   04/15/2021 Not Detected Not Detected - Ref. Range Final        Microbiology Results (last 10 days)     Procedure Component Value - Date/Time    Respiratory Panel PCR w/COVID-19(SARS-CoV-2) VÍCTOR/DAYNA/JADYN/PAD/COR/MAD/[facility]IR[facility] In-House, NP Swab in UTM/VTM, 3-4 HR TAT - Swab, Nasopharynx [[id number]]  (Normal) Collected: 04/15/21 1836    Lab Status: Final result Specimen: Swab from Nasopharynx Updated: 04/15/21 2016     ADENOVIRUS, PCR Not Detected     Coronavirus 229E Not Detected     Coronavirus HKU1 Not Detected     Coronavirus NL63 Not Detected     Coronavirus OC43  Not Detected     COVID19 Not Detected     Human Metapneumovirus Not Detected     Human Rhinovirus/Enterovirus Not Detected     Influenza A PCR Not Detected     Influenza B PCR Not Detected     Parainfluenza Virus 1 Not Detected     Parainfluenza Virus 2 Not Detected     Parainfluenza Virus 3 Not Detected     Parainfluenza Virus 4 Not Detected     RSV, PCR Not Detected     Bordetella pertussis pcr Not Detected     Bordetella parapertussis PCR Not Detected     Chlamydophila pneumoniae PCR Not Detected     Mycoplasma pneumo by PCR Not Detected    Narrative:      Fact sheet for providers: https://docs.[employer]/wp-content/uploads/KUE9018-5399-CG2.1-EUA-Provider-Fact-Sheet-3.pdf    Fact sheet for patients: https://docs.[employer]/wp-content/uploads/OPL2435-7047-CH3.1-EUA-Patient-Fact-Sheet-1.pdf    Test performed by PCR.          ECG/EMG Results (most recent)     Procedure Component Value Units Date/Time    ECG 12 Lead [[id number]] Collected: 04/15/21 1848     Updated: 04/16/21 1549     QT Interval 363 ms     Narrative:      HEART RATE= 72  bpm  RR Interval= 832  ms  SC Interval= 226  ms  P Horizontal Axis= -58  deg  P Front Axis= 40  deg  QRSD Interval= 94  ms  QT Interval= 363  ms  QRS Axis= 48  deg  T Wave Axis= 251  deg  - ABNORMAL ECG -  Sinus rhythm  inferolat st depression  Prolonged SC interval  LVH with secondary repolarization abnormality  No previous ECG available for comparison  Electronically Signed By: Mitchel Gresham ([facility]) 16-Apr-2021 15:49:31  Date and Time of Study: 2021-04-15 18:48:44    Adult Transthoracic Echo Complete W/ Cont if Necessary Per Protocol [[id number]] Collected: 04/16/21 0902     Updated: 04/16/21 2019     BSA 1.8 m^2      RVIDd 2.8 cm      IVSd 2.1 cm      LVIDd 3.5 cm      LVIDs 2.7 cm      LVPWd 2.0 cm      IVS/LVPW 1.0     FS 24.6 %      EDV(Teich) 52.5 ml      ESV(Teich) 26.4 ml      EF(Teich) 49.7 %      EDV(cubed) 44.6 ml      ESV(cubed) 19.1 ml      EF(cubed) 57.1 %      LV  mass(C)d 340.3 grams      LV mass(C)dI 190.2 grams/m^2      SV(Teich) 26.1 ml      SI(Teich) 14.6 ml/m^2      SV(cubed) 25.5 ml      SI(cubed) 14.2 ml/m^2      Ao root diam 3.6 cm      Ao root area 9.9 cm^2      ACS 2.0 cm      asc Aorta Diam 3.3 cm      LVOT diam 1.9 cm      LVOT area 3.0 cm^2      EDV(MOD-sp4) 71.7 ml      ESV(MOD-sp4) 13.7 ml      EF(MOD-sp4) 81.0 %      SV(MOD-sp4) 58.1 ml      SI(MOD-sp4) 32.5 ml/m^2      Ao root area (BSA corrected) 2.0     LV Curry Vol (BSA corrected) 40.1 ml/m^2      LV Sys Vol (BSA corrected) 7.6 ml/m^2      MV E max joy 72.9 cm/sec      MV A max joy 121.4 cm/sec      MV E/A 0.6     MV V2 max 138.0 cm/sec      MV max PG 7.6 mmHg      MV V2 mean 57.8 cm/sec      MV mean PG 1.7 mmHg      MV V2 VTI 33.0 cm      MVA(VTI) 3.9 cm^2      MV dec slope 161.2 cm/sec^2      MV dec time 0.45 sec      Ao pk joy 245.1 cm/sec      Ao max PG 24.1 mmHg      Ao max PG (full) 6.8 mmHg      Ao V2 mean 173.5 cm/sec      Ao mean PG 13.7 mmHg      Ao mean PG (full) 2.3 mmHg      Ao V2 VTI 48.6 cm      WINSTON(I,A) 2.7 cm^2      WINSTON(I,D) 2.7 cm^2      WINSTON(V,A) 2.5 cm^2      WINSTON(V,D) 2.5 cm^2      LV V1 max PG 17.3 mmHg      LV V1 mean PG 11.4 mmHg      LV V1 max 208.0 cm/sec      LV V1 mean 159.5 cm/sec      LV V1 VTI 43.6 cm      MR max joy 524.6 cm/sec      MR max .7 mmHg      SV(Ao) 482.1 ml      SI(Ao) 269.4 ml/m^2      SV(LVOT) 129.1 ml      SI(LVOT) 72.1 ml/m^2      PA V2 max 136.2 cm/sec      PA max PG 7.4 mmHg      PA max PG (full) 3.6 mmHg      PA V2 mean 91.7 cm/sec      PA mean PG 3.8 mmHg      PA mean PG (full) 1.7 mmHg      PA V2 VTI 31.4 cm      PI end-d joy 129.5 cm/sec      PI max joy 214.9 cm/sec      PI max PG 18.5 mmHg      RV V1 max PG 3.8 mmHg      RV V1 mean PG 2.1 mmHg      RV V1 max 97.5 cm/sec      RV V1 mean 68.6 cm/sec      RV V1 VTI 19.0 cm      RAP systole 3.0 mmHg       CV ECHO [place] - BZI_BMI 26.3 kilograms/m^2       CV ECHO [place] - BSA(HAYCOCK) 1.8 m^2       CV  ECHO [place] - BZI_METRIC_WEIGHT 71.7 kg      BH CV ECHO [place] - BZI_METRIC_HEIGHT 165.1 cm      LA dimension(2D) 3.1 cm     Narrative:      · Left ventricular ejection fraction appears to be 61 - 65%.  · Left ventricular wall thickness is consistent with moderate basal   asymmetric hypertrophy.  · The right ventricular cavity is mildly dilated.  · Mild to moderate aortic valve stenosis is present.  · Moderate mitral valve regurgitation is present.  · No pericardial effusion noted       ECG 12 Lead [[id number]] Collected: 04/16/21 0305     Updated: 04/16/21 2102     QT Interval 360 ms     Narrative:      HEART RATE= 59  bpm  RR Interval= 1012  ms  IN Interval= 224  ms  P Horizontal Axis= -75  deg  P Front Axis= 50  deg  QRSD Interval= 94  ms  QT Interval= 360  ms  QRS Axis= 63  deg  T Wave Axis= 247  deg  - ABNORMAL ECG -  Sinus bradycardia  Prolonged IN interval  Probable left ventricular hypertrophy  Anterior Q waves, possibly due to LVH  Abnormal T, consider ischemia, diffuse leads  When compared with ECG of 15-Apr-2021 18:48:44,  New or worsened ischemia or infarction  Electronically Signed By: Zane Aldridge ([facility]) 16-Apr-2021 20:59:53  Date and Time of Study: 2021-04-16 03:05:03    ECG 12 Lead [[id number]] Collected: 04/18/21 1230     Updated: 04/18/21 1233     QT Interval 422 ms     Narrative:      HEART RATE= 42  bpm  RR Interval= 1424  ms  IN Interval=   ms  P Horizontal Axis=   deg  P Front Axis=   deg  QRSD Interval= 89  ms  QT Interval= 422  ms  QRS Axis= 50  deg  T Wave Axis= 213  deg  - ABNORMAL ECG -  Junctional rhythm  LVH with secondary repolarization abnormality  When compared with ECG of 16-Apr-2021 3:05:03,  Significant repolarization change  Electronically Signed By:   Date and Time of Study: 2021-04-18 12:30:40          Results for orders placed during the hospital encounter of 04/15/21    Duplex Renal Artery - Bilateral Complete CAR    Interpretation Summary  · Normal right renal artery.  · Normal  left renal artery.  · Difficult study due to patient tolerance and body habitus.      Results for orders placed during the hospital encounter of 04/15/21    Adult Transthoracic Echo Complete W/ Cont if Necessary Per Protocol    Interpretation Summary  · Left ventricular ejection fraction appears to be 61 - 65%.  · Left ventricular wall thickness is consistent with moderate basal asymmetric hypertrophy.  · The right ventricular cavity is mildly dilated.  · Mild to moderate aortic valve stenosis is present.  · Moderate mitral valve regurgitation is present.  · No pericardial effusion noted      CT Abdomen Pelvis Without Contrast    Result Date: 4/15/2021  1.Infiltrates are noted throughout the lung bases suggesting developing multifocal pneumonia versus pulmonary edema. Cardiomegaly is noted. 2.No evidence for significant nephrolithiasis. There is no evidence for obstructive uropathy. 3.No evidence for acute abnormality throughout the abdomen or pelvis on this noncontrast exam.  Electronically Signed By-Jim Montano MD On:4/15/2021 7:45 PM This report was finalized on [id number] by  Jim Montano MD.    XR Chest 1 View    Result Date: 4/15/2021  1.No definitive evidence for acute cardiopulmonary process. 2.Note is made of cardiomegaly.  Electronically Signed By-Jim Montano MD On:4/15/2021 6:47 PM This report was finalized on [id number] by  Jim Montano MD.      I personally reviewed patient's x-ray films and my findings are: Chest x-ray film reviewed cardiomegaly.  Some congestion.  More evident on CT scan.  No infiltrates    I personally reviewed patient's EKG strip and my findings are: 0    Medication Review:   I have reviewed the patient's current medication list 04/19/21     Scheduled Meds  amLODIPine, 10 mg, Oral, Daily  aspirin, 81 mg, Oral, Daily  atorvastatin, 80 mg, Oral, Daily  Canagliflozin, 100 mg, Oral, Daily  chlorthalidone, 50 mg, Oral, Daily  cloNIDine, 0.2 mg, Oral, Q12H  enoxaparin,  30 mg, Subcutaneous, Q24H  insulin lispro, 0-9 Units, Subcutaneous, TID AC  ipratropium-albuterol, 3 mL, Nebulization, 4x Daily - RT  losartan, 100 mg, Oral, Q24H  polyethylene glycol, 17 g, Oral, BID  sodium chloride, 10 mL, Intravenous, Q12H        Meds Infusions       DVT prophylaxis  Mechanical Order History:     None      Pharmalogical Order History:      Ordered     Dose Route Frequency Stop    04/16/21 0033  enoxaparin (LOVENOX) syringe 30 mg      30 mg SC Every 24 Hours --                Meds PRN  •  acetaminophen **OR** acetaminophen **OR** acetaminophen  •  dextrose  •  dextrose  •  docusate sodium  •  glucagon (human recombinant)  •  insulin lispro **AND** insulin lispro  •  melatonin  •  nitroglycerin  •  ondansetron **OR** ondansetron  •  [COMPLETED] Insert peripheral IV **AND** sodium chloride  •  sodium chloride      Leonarda Yepez MD  04/19/21  19:23 EDT

## 2021-04-19 NOTE — PROGRESS NOTES
"Referring Provider: hospitalist    Reason for follow-up: bradycardia     Patient Care Team:  Brian Nazario APRN as PCP - General    Subjective . No symptoms    Objective  Lying in bed comfortably     Review of Systems   Constitutional: Negative for fever and malaise/fatigue.   Cardiovascular: Negative for chest pain, dyspnea on exertion and palpitations.   Respiratory: Negative for cough and shortness of breath.    Skin: Negative for rash.   Gastrointestinal: Negative for abdominal pain, nausea and vomiting.   Neurological: Negative for focal weakness and headaches.   All other systems reviewed and are negative.      Patient has no known allergies.    Scheduled Meds:amLODIPine, 10 mg, Oral, Daily  aspirin, 81 mg, Oral, Daily  atorvastatin, 80 mg, Oral, Daily  Canagliflozin, 100 mg, Oral, Daily  chlorthalidone, 50 mg, Oral, Daily  cloNIDine, 0.2 mg, Oral, Q12H  enoxaparin, 30 mg, Subcutaneous, Q24H  insulin lispro, 0-9 Units, Subcutaneous, TID AC  ipratropium-albuterol, 3 mL, Nebulization, 4x Daily - RT  losartan, 100 mg, Oral, Q24H  polyethylene glycol, 17 g, Oral, BID  sodium chloride, 10 mL, Intravenous, Q12H      Continuous Infusions:   PRN Meds:.•  acetaminophen **OR** acetaminophen **OR** acetaminophen  •  dextrose  •  dextrose  •  docusate sodium  •  glucagon (human recombinant)  •  insulin lispro **AND** insulin lispro  •  melatonin  •  nitroglycerin  •  ondansetron **OR** ondansetron  •  [COMPLETED] Insert peripheral IV **AND** sodium chloride  •  sodium chloride        VITAL SIGNS  Vitals:    04/19/21 1131 04/19/21 1203 04/19/21 1446 04/19/21 1452   BP:  164/61     BP Location:  Right arm     Patient Position:  Lying     Pulse: (!) 39 (!) 42 (!) 42 (!) 40   Resp: 16 16 16    Temp:  97.3 °F (36.3 °C)     TempSrc:  Oral     SpO2:  96% 95%    Weight:       Height:           Flowsheet Rows      First Filed Value   Admission Height  162 cm (63.78\") Documented at 04/15/2021 1816   Admission Weight  68.7 kg " (151 lb 7.3 oz) Documented at 04/15/2021 1816           TELEMETRY: Sinus bradycardia    Physical Exam:  Constitutional:       Appearance: Well-developed.   Eyes:      General: No scleral icterus.     Conjunctiva/sclera: Conjunctivae normal.   HENT:      Head: Normocephalic and atraumatic.   Neck:      Vascular: No carotid bruit or JVD.   Pulmonary:      Effort: Pulmonary effort is normal.      Breath sounds: Normal breath sounds. No wheezing. No rales.   Cardiovascular:      Normal rate. Regular rhythm.   Pulses:     Intact distal pulses.   Abdominal:      General: Bowel sounds are normal.      Palpations: Abdomen is soft.   Musculoskeletal:      Cervical back: Normal range of motion and neck supple. Skin:     General: Skin is warm and dry.      Findings: No rash.   Neurological:      Mental Status: Alert.          Results Review:   I reviewed the patient's new clinical results.  Lab Results (last 24 hours)     Procedure Component Value Units Date/Time    POC Glucose Once [[id number]]  (Abnormal) Collected: 04/19/21 1630    Specimen: Blood Updated: 04/19/21 1631     Glucose 204 mg/dL      Comment: Serial Number: [id number]:  [id number]       POC Glucose Once [[id number]]  (Abnormal) Collected: 04/19/21 1200    Specimen: Blood Updated: 04/19/21 1204     Glucose 146 mg/dL      Comment: Serial Number: [id number]:  [id number]       POC Glucose Once [[id number]]  (Normal) Collected: 04/19/21 0718    Specimen: Blood Updated: 04/19/21 0719     Glucose 103 mg/dL      Comment: Serial Number: [id number]:  [id number]       Basic Metabolic Panel [[id number]]  (Abnormal) Collected: 04/19/21 0327    Specimen: Blood Updated: 04/19/21 0500     Glucose 108 mg/dL      BUN 37 mg/dL      Creatinine 2.21 mg/dL      Sodium 137 mmol/L      Potassium 3.3 mmol/L      Chloride 102 mmol/L      CO2 23.0 mmol/L      Calcium 8.4 mg/dL      eGFR  African Amer 26 mL/min/1.73      eGFR Non African Amer 21 mL/min/1.73       BUN/Creatinine Ratio 16.7     Anion Gap 12.0 mmol/L     Narrative:      GFR Normal >60  Chronic Kidney Disease <60  Kidney Failure <15      CBC & Differential [[id number]]  (Abnormal) Collected: 04/19/21 0327    Specimen: Blood Updated: 04/19/21 0436    Narrative:      The following orders were created for panel order CBC & Differential.  Procedure                               Abnormality         Status                     ---------                               -----------         ------                     CBC Auto Differential[[id number]]        Abnormal            Final result                 Please view results for these tests on the individual orders.    CBC Auto Differential [[id number]]  (Abnormal) Collected: 04/19/21 0327    Specimen: Blood Updated: 04/19/21 0436     WBC 5.60 10*3/mm3      RBC 3.57 10*6/mm3      Hemoglobin 9.8 g/dL      Hematocrit 29.5 %      MCV 82.5 fL      MCH 27.4 pg      MCHC 33.2 g/dL      RDW 14.3 %      RDW-SD 41.6 fl      MPV 8.3 fL      Platelets 163 10*3/mm3      Neutrophil % 38.2 %      Lymphocyte % 44.9 %      Monocyte % 10.6 %      Eosinophil % 5.6 %      Basophil % 0.7 %      Neutrophils, Absolute 2.10 10*3/mm3      Lymphocytes, Absolute 2.50 10*3/mm3      Monocytes, Absolute 0.60 10*3/mm3      Eosinophils, Absolute 0.30 10*3/mm3      Basophils, Absolute 0.00 10*3/mm3      nRBC 0.1 /100 WBC     Metanephrines, Urine, 24 Hour - Urine, Clean Catch [[id number]] Collected: 04/18/21 2321    Specimen: 24 Hour Urine from Urine, Clean Catch Updated: 04/18/21 2321    Catecholamines, Fractionated, Urine, 24 Hour - Urine, Clean Catch [[id number]] Collected: 04/18/21 2320    Specimen: 24 Hour Urine from Urine, Clean Catch Updated: 04/18/21 2320          Imaging Results (Last 24 Hours)     ** No results found for the last 24 hours. **          EKG      I personally viewed and interpreted the patient's EKG/Telemetry data:    ECHOCARDIOGRAM:    STRESS MYOVIEW:    CARDIAC  CATHETERIZATION:    OTHER:         Assessment/Plan     Principal Problem:    Hypertensive emergency  Active Problems:    Mixed hyperlipidemia    Essential hypertension    Type 2 diabetes mellitus (CMS/[facility])    Anemia due to stage 3a chronic kidney disease (CMS/HCC)    CKD (chronic kidney disease) stage 3, GFR 30-59 ml/min (CMS/HCC)    HFrEF (heart failure with reduced ejection fraction) (CMS/HCC)    Dyspnea       Patient presented with lethargy weakness and shortness of breath and had very high blood pressure  Patient blood pressure is getting better with the multiple medications  However patient has significant bradycardia with beta-blockers and his beta-blockers have been stopped and the bradycardia did not improve so far  We will watch the rhythm  Patient also is developing acute renal failure and the diuretics will be held at this time  Echocardiogram showed normal LV systolic function but with mild to moderate valvular heart disease    I discussed the patients findings and my recommendations with patient and nurse    Zane Aldridge MD  04/19/21  19:09 EDT

## 2021-04-20 ENCOUNTER — APPOINTMENT (OUTPATIENT)
Dept: GENERAL RADIOLOGY | Facility: HOSPITAL | Age: 81
End: 2021-04-20

## 2021-04-20 PROBLEM — I49.5 SICK SINUS SYNDROME (HCC): Status: ACTIVE | Noted: 2021-04-15

## 2021-04-20 PROBLEM — R00.1 JUNCTIONAL BRADYCARDIA: Status: ACTIVE | Noted: 2021-04-15

## 2021-04-20 LAB
ANION GAP SERPL CALCULATED.3IONS-SCNC: 10 MMOL/L (ref 5–15)
BASOPHILS # BLD AUTO: 0 10*3/MM3 (ref 0–0.2)
BASOPHILS NFR BLD AUTO: 0.6 % (ref 0–1.5)
BUN SERPL-MCNC: 36 MG/DL (ref 8–23)
BUN/CREAT SERPL: 17.1 (ref 7–25)
CALCIUM SPEC-SCNC: 8.4 MG/DL (ref 8.6–10.5)
CHLORIDE SERPL-SCNC: 101 MMOL/L (ref 98–107)
CO2 SERPL-SCNC: 25 MMOL/L (ref 22–29)
CREAT SERPL-MCNC: 2.1 MG/DL (ref 0.57–1)
DEPRECATED RDW RBC AUTO: 44.2 FL (ref 37–54)
EOSINOPHIL # BLD AUTO: 0.5 10*3/MM3 (ref 0–0.4)
EOSINOPHIL NFR BLD AUTO: 8.9 % (ref 0.3–6.2)
ERYTHROCYTE [DISTWIDTH] IN BLOOD BY AUTOMATED COUNT: 14.7 % (ref 12.3–15.4)
GFR SERPL CREATININE-BSD FRML MDRD: 23 ML/MIN/1.73
GFR SERPL CREATININE-BSD FRML MDRD: 27 ML/MIN/1.73
GLUCOSE BLDC GLUCOMTR-MCNC: 107 MG/DL (ref 70–105)
GLUCOSE BLDC GLUCOMTR-MCNC: 128 MG/DL (ref 70–105)
GLUCOSE BLDC GLUCOMTR-MCNC: 175 MG/DL (ref 70–105)
GLUCOSE SERPL-MCNC: 109 MG/DL (ref 65–99)
HCT VFR BLD AUTO: 28.3 % (ref 34–46.6)
HGB BLD-MCNC: 9.3 G/DL (ref 12–15.9)
LYMPHOCYTES # BLD AUTO: 1.9 10*3/MM3 (ref 0.7–3.1)
LYMPHOCYTES NFR BLD AUTO: 37.5 % (ref 19.6–45.3)
MAGNESIUM SERPL-MCNC: 2.9 MG/DL (ref 1.6–2.4)
MCH RBC QN AUTO: 27.5 PG (ref 26.6–33)
MCHC RBC AUTO-ENTMCNC: 32.8 G/DL (ref 31.5–35.7)
MCV RBC AUTO: 83.8 FL (ref 79–97)
MONOCYTES # BLD AUTO: 0.5 10*3/MM3 (ref 0.1–0.9)
MONOCYTES NFR BLD AUTO: 9.6 % (ref 5–12)
NEUTROPHILS NFR BLD AUTO: 2.2 10*3/MM3 (ref 1.7–7)
NEUTROPHILS NFR BLD AUTO: 43.4 % (ref 42.7–76)
NRBC BLD AUTO-RTO: 0.1 /100 WBC (ref 0–0.2)
PLATELET # BLD AUTO: 173 10*3/MM3 (ref 140–450)
PMV BLD AUTO: 7.9 FL (ref 6–12)
POTASSIUM SERPL-SCNC: 3.7 MMOL/L (ref 3.5–5.2)
RBC # BLD AUTO: 3.38 10*6/MM3 (ref 3.77–5.28)
SODIUM SERPL-SCNC: 136 MMOL/L (ref 136–145)
WBC # BLD AUTO: 5.1 10*3/MM3 (ref 3.4–10.8)

## 2021-04-20 PROCEDURE — C1898 LEAD, PMKR, OTHER THAN TRANS: HCPCS | Performed by: INTERNAL MEDICINE

## 2021-04-20 PROCEDURE — 93005 ELECTROCARDIOGRAM TRACING: CPT | Performed by: INTERNAL MEDICINE

## 2021-04-20 PROCEDURE — 94799 UNLISTED PULMONARY SVC/PX: CPT

## 2021-04-20 PROCEDURE — 02H63JZ INSERTION OF PACEMAKER LEAD INTO RIGHT ATRIUM, PERCUTANEOUS APPROACH: ICD-10-PCS | Performed by: INTERNAL MEDICINE

## 2021-04-20 PROCEDURE — C1894 INTRO/SHEATH, NON-LASER: HCPCS | Performed by: INTERNAL MEDICINE

## 2021-04-20 PROCEDURE — 25010000002 MIDAZOLAM PER 1 MG: Performed by: INTERNAL MEDICINE

## 2021-04-20 PROCEDURE — 0 IOPAMIDOL PER 1 ML: Performed by: INTERNAL MEDICINE

## 2021-04-20 PROCEDURE — 85025 COMPLETE CBC W/AUTO DIFF WBC: CPT | Performed by: INTERNAL MEDICINE

## 2021-04-20 PROCEDURE — 80048 BASIC METABOLIC PNL TOTAL CA: CPT | Performed by: PHYSICIAN ASSISTANT

## 2021-04-20 PROCEDURE — 99231 SBSQ HOSP IP/OBS SF/LOW 25: CPT | Performed by: INTERNAL MEDICINE

## 2021-04-20 PROCEDURE — C1785 PMKR, DUAL, RATE-RESP: HCPCS | Performed by: INTERNAL MEDICINE

## 2021-04-20 PROCEDURE — 02HK3JZ INSERTION OF PACEMAKER LEAD INTO RIGHT VENTRICLE, PERCUTANEOUS APPROACH: ICD-10-PCS | Performed by: INTERNAL MEDICINE

## 2021-04-20 PROCEDURE — 33208 INSRT HEART PM ATRIAL & VENT: CPT | Performed by: INTERNAL MEDICINE

## 2021-04-20 PROCEDURE — 0JH606Z INSERTION OF PACEMAKER, DUAL CHAMBER INTO CHEST SUBCUTANEOUS TISSUE AND FASCIA, OPEN APPROACH: ICD-10-PCS | Performed by: INTERNAL MEDICINE

## 2021-04-20 PROCEDURE — 83735 ASSAY OF MAGNESIUM: CPT | Performed by: INTERNAL MEDICINE

## 2021-04-20 PROCEDURE — 82962 GLUCOSE BLOOD TEST: CPT

## 2021-04-20 PROCEDURE — 99291 CRITICAL CARE FIRST HOUR: CPT | Performed by: INTERNAL MEDICINE

## 2021-04-20 PROCEDURE — 99153 MOD SED SAME PHYS/QHP EA: CPT | Performed by: INTERNAL MEDICINE

## 2021-04-20 PROCEDURE — 94760 N-INVAS EAR/PLS OXIMETRY 1: CPT

## 2021-04-20 PROCEDURE — B5171ZZ FLUOROSCOPY OF LEFT SUBCLAVIAN VEIN USING LOW OSMOLAR CONTRAST: ICD-10-PCS | Performed by: INTERNAL MEDICINE

## 2021-04-20 PROCEDURE — 99233 SBSQ HOSP IP/OBS HIGH 50: CPT | Performed by: INTERNAL MEDICINE

## 2021-04-20 PROCEDURE — 71045 X-RAY EXAM CHEST 1 VIEW: CPT

## 2021-04-20 PROCEDURE — 25010000002 VANCOMYCIN 1 G RECONSTITUTED SOLUTION 1 EACH VIAL: Performed by: INTERNAL MEDICINE

## 2021-04-20 PROCEDURE — 99152 MOD SED SAME PHYS/QHP 5/>YRS: CPT | Performed by: INTERNAL MEDICINE

## 2021-04-20 PROCEDURE — 63710000001 INSULIN LISPRO (HUMAN) PER 5 UNITS: Performed by: PHYSICIAN ASSISTANT

## 2021-04-20 PROCEDURE — 25010000002 FENTANYL CITRATE (PF) 100 MCG/2ML SOLUTION: Performed by: INTERNAL MEDICINE

## 2021-04-20 DEVICE — IMPLANTABLE DEVICE: Type: IMPLANTABLE DEVICE | Site: CHEST | Status: FUNCTIONAL

## 2021-04-20 DEVICE — PACE/SENSE LEAD
Type: IMPLANTABLE DEVICE | Site: CHEST | Status: FUNCTIONAL
Brand: INGEVITY™+

## 2021-04-20 DEVICE — PACEMAKER
Type: IMPLANTABLE DEVICE | Site: CHEST | Status: FUNCTIONAL
Brand: ACCOLADE™ MRI DR

## 2021-04-20 RX ORDER — FENTANYL CITRATE 50 UG/ML
INJECTION, SOLUTION INTRAMUSCULAR; INTRAVENOUS AS NEEDED
Status: DISCONTINUED | OUTPATIENT
Start: 2021-04-20 | End: 2021-04-20 | Stop reason: HOSPADM

## 2021-04-20 RX ORDER — HYDROCODONE BITARTRATE AND ACETAMINOPHEN 5; 325 MG/1; MG/1
1 TABLET ORAL EVERY 4 HOURS PRN
Status: DISCONTINUED | OUTPATIENT
Start: 2021-04-20 | End: 2021-04-21 | Stop reason: HOSPADM

## 2021-04-20 RX ORDER — SODIUM CHLORIDE 0.9 % (FLUSH) 0.9 %
10 SYRINGE (ML) INJECTION AS NEEDED
Status: DISCONTINUED | OUTPATIENT
Start: 2021-04-20 | End: 2021-04-21 | Stop reason: HOSPADM

## 2021-04-20 RX ORDER — SODIUM CHLORIDE 0.9 % (FLUSH) 0.9 %
3 SYRINGE (ML) INJECTION EVERY 12 HOURS SCHEDULED
Status: DISCONTINUED | OUTPATIENT
Start: 2021-04-20 | End: 2021-04-21 | Stop reason: HOSPADM

## 2021-04-20 RX ORDER — ENALAPRILAT 2.5 MG/2ML
2.5 INJECTION INTRAVENOUS ONCE
Status: COMPLETED | OUTPATIENT
Start: 2021-04-20 | End: 2021-04-20

## 2021-04-20 RX ORDER — LIDOCAINE HYDROCHLORIDE 20 MG/ML
INJECTION, SOLUTION INFILTRATION; PERINEURAL AS NEEDED
Status: DISCONTINUED | OUTPATIENT
Start: 2021-04-20 | End: 2021-04-20 | Stop reason: HOSPADM

## 2021-04-20 RX ORDER — MIDAZOLAM HYDROCHLORIDE 1 MG/ML
INJECTION INTRAMUSCULAR; INTRAVENOUS AS NEEDED
Status: DISCONTINUED | OUTPATIENT
Start: 2021-04-20 | End: 2021-04-20 | Stop reason: HOSPADM

## 2021-04-20 RX ADMIN — AMLODIPINE BESYLATE 10 MG: 5 TABLET ORAL at 09:24

## 2021-04-20 RX ADMIN — CLONIDINE HYDROCHLORIDE 0.2 MG: 0.1 TABLET ORAL at 09:23

## 2021-04-20 RX ADMIN — POLYETHYLENE GLYCOL 3350 17 G: 17 POWDER, FOR SOLUTION ORAL at 22:29

## 2021-04-20 RX ADMIN — CLONIDINE HYDROCHLORIDE 0.2 MG: 0.1 TABLET ORAL at 22:29

## 2021-04-20 RX ADMIN — IPRATROPIUM BROMIDE AND ALBUTEROL SULFATE 3 ML: 2.5; .5 SOLUTION RESPIRATORY (INHALATION) at 19:35

## 2021-04-20 RX ADMIN — CHLORTHALIDONE 50 MG: 25 TABLET ORAL at 09:24

## 2021-04-20 RX ADMIN — IPRATROPIUM BROMIDE AND ALBUTEROL SULFATE 3 ML: 2.5; .5 SOLUTION RESPIRATORY (INHALATION) at 11:05

## 2021-04-20 RX ADMIN — INSULIN LISPRO 2 UNITS: 100 INJECTION, SOLUTION INTRAVENOUS; SUBCUTANEOUS at 12:39

## 2021-04-20 RX ADMIN — Medication 10 ML: at 09:25

## 2021-04-20 RX ADMIN — Medication 10 ML: at 22:29

## 2021-04-20 RX ADMIN — ASPIRIN 81 MG CHEWABLE TABLET 81 MG: 81 TABLET CHEWABLE at 09:25

## 2021-04-20 RX ADMIN — SODIUM CHLORIDE 1000 MG: 900 INJECTION, SOLUTION INTRAVENOUS at 16:28

## 2021-04-20 RX ADMIN — ENALAPRILAT 2.5 MG: 2.5 INJECTION INTRAVENOUS at 02:41

## 2021-04-20 RX ADMIN — Medication 3 ML: at 22:29

## 2021-04-20 RX ADMIN — IPRATROPIUM BROMIDE AND ALBUTEROL SULFATE 3 ML: 2.5; .5 SOLUTION RESPIRATORY (INHALATION) at 15:23

## 2021-04-20 RX ADMIN — LOSARTAN POTASSIUM 100 MG: 50 TABLET, FILM COATED ORAL at 09:25

## 2021-04-20 RX ADMIN — IPRATROPIUM BROMIDE AND ALBUTEROL SULFATE 3 ML: 2.5; .5 SOLUTION RESPIRATORY (INHALATION) at 07:58

## 2021-04-20 RX ADMIN — CANAGLIFLOZIN 100 MG: 300 TABLET, FILM COATED ORAL at 09:23

## 2021-04-20 RX ADMIN — ATORVASTATIN CALCIUM 80 MG: 40 TABLET, FILM COATED ORAL at 09:24

## 2021-04-20 NOTE — PROGRESS NOTES
Referring Provider: hospitalist    Reason for follow-up: bradycardia     Patient Care Team:  Brian Nazario APRN as PCP - General    Subjective . No symptoms    Objective  Lying in bed comfortably     Review of Systems   Constitutional: Negative for fever and malaise/fatigue.   HENT: Negative for ear pain and nosebleeds.    Eyes: Negative for blurred vision and double vision.   Cardiovascular: Negative for chest pain, dyspnea on exertion and palpitations.   Respiratory: Negative for cough and shortness of breath.    Skin: Negative for rash.   Musculoskeletal: Negative for joint pain.   Gastrointestinal: Negative for abdominal pain, nausea and vomiting.   Neurological: Negative for focal weakness and headaches.   Psychiatric/Behavioral: Negative for depression. The patient is not nervous/anxious.    All other systems reviewed and are negative.      Patient has no known allergies.    Scheduled Meds:amLODIPine, 10 mg, Oral, Daily  aspirin, 81 mg, Oral, Daily  atorvastatin, 80 mg, Oral, Daily  Canagliflozin, 100 mg, Oral, Daily  chlorthalidone, 50 mg, Oral, Daily  cloNIDine, 0.2 mg, Oral, Q12H  enoxaparin, 30 mg, Subcutaneous, Q24H  insulin lispro, 0-9 Units, Subcutaneous, TID AC  ipratropium-albuterol, 3 mL, Nebulization, 4x Daily - RT  losartan, 100 mg, Oral, Q24H  polyethylene glycol, 17 g, Oral, BID  sodium chloride, 10 mL, Intravenous, Q12H      Continuous Infusions:   PRN Meds:.•  acetaminophen **OR** acetaminophen **OR** acetaminophen  •  dextrose  •  dextrose  •  docusate sodium  •  glucagon (human recombinant)  •  insulin lispro **AND** insulin lispro  •  melatonin  •  nitroglycerin  •  ondansetron **OR** ondansetron  •  [COMPLETED] Insert peripheral IV **AND** sodium chloride  •  sodium chloride        VITAL SIGNS  Vitals:    04/20/21 0918 04/20/21 1021 04/20/21 1105 04/20/21 1108   BP: 180/73      BP Location: Right arm      Patient Position: Lying      Pulse: 50 (!) 42 (!) 43 (!) 44   Resp: 16  18 18  "  Temp: 97.6 °F (36.4 °C)      TempSrc: Oral      SpO2: 97%  100%    Weight:       Height:           Flowsheet Rows      First Filed Value   Admission Height  162 cm (63.78\") Documented at 04/15/2021 1816   Admission Weight  68.7 kg (151 lb 7.3 oz) Documented at 04/15/2021 1816           TELEMETRY: Sinus bradycardia    Physical Exam:  Constitutional:       Appearance: Well-developed.   Eyes:      General: No scleral icterus.     Conjunctiva/sclera: Conjunctivae normal.      Pupils: Pupils are equal, round, and reactive to light.   HENT:      Head: Normocephalic and atraumatic.   Neck:      Vascular: No carotid bruit or JVD.   Pulmonary:      Effort: Pulmonary effort is normal.      Breath sounds: Normal breath sounds. No wheezing. No rales.   Cardiovascular:      Normal rate. Regular rhythm.      Murmurs: There is a systolic murmur.   Pulses:     Intact distal pulses.   Abdominal:      General: Bowel sounds are normal.      Palpations: Abdomen is soft.   Musculoskeletal: Normal range of motion.      Cervical back: Normal range of motion and neck supple. Skin:     General: Skin is warm and dry.      Findings: No rash.   Neurological:      Mental Status: Alert.      Comments: No focal deficits          Results Review:   I reviewed the patient's new clinical results.  Lab Results (last 24 hours)     Procedure Component Value Units Date/Time    POC Glucose Once [[id number]]  (Abnormal) Collected: 04/20/21 1132    Specimen: Blood Updated: 04/20/21 1142     Glucose 175 mg/dL      Comment: Serial Number: [id number]:  [id number]       POC Glucose Once [[id number]]  (Abnormal) Collected: 04/20/21 0732    Specimen: Blood Updated: 04/20/21 0733     Glucose 107 mg/dL      Comment: Serial Number: [id number]:  [id number]       Basic Metabolic Panel [[id number]]  (Abnormal) Collected: 04/20/21 0532    Specimen: Blood Updated: 04/20/21 0630     Glucose 109 mg/dL      BUN 36 mg/dL      Creatinine 2.10 mg/dL      Sodium 136 " mmol/L      Potassium 3.7 mmol/L      Chloride 101 mmol/L      CO2 25.0 mmol/L      Calcium 8.4 mg/dL      eGFR  African Amer 27 mL/min/1.73      eGFR Non African Amer 23 mL/min/1.73      BUN/Creatinine Ratio 17.1     Anion Gap 10.0 mmol/L     Narrative:      GFR Normal >60  Chronic Kidney Disease <60  Kidney Failure <15      Magnesium [[id number]]  (Abnormal) Collected: 04/20/21 0532    Specimen: Blood Updated: 04/20/21 0630     Magnesium 2.9 mg/dL     CBC & Differential [[id number]]  (Abnormal) Collected: 04/20/21 0532    Specimen: Blood Updated: 04/20/21 0601    Narrative:      The following orders were created for panel order CBC & Differential.  Procedure                               Abnormality         Status                     ---------                               -----------         ------                     CBC Auto Differential[[id number]]        Abnormal            Final result                 Please view results for these tests on the individual orders.    CBC Auto Differential [[id number]]  (Abnormal) Collected: 04/20/21 0532    Specimen: Blood Updated: 04/20/21 0601     WBC 5.10 10*3/mm3      RBC 3.38 10*6/mm3      Hemoglobin 9.3 g/dL      Hematocrit 28.3 %      MCV 83.8 fL      MCH 27.5 pg      MCHC 32.8 g/dL      RDW 14.7 %      RDW-SD 44.2 fl      MPV 7.9 fL      Platelets 173 10*3/mm3      Neutrophil % 43.4 %      Lymphocyte % 37.5 %      Monocyte % 9.6 %      Eosinophil % 8.9 %      Basophil % 0.6 %      Neutrophils, Absolute 2.20 10*3/mm3      Lymphocytes, Absolute 1.90 10*3/mm3      Monocytes, Absolute 0.50 10*3/mm3      Eosinophils, Absolute 0.50 10*3/mm3      Basophils, Absolute 0.00 10*3/mm3      nRBC 0.1 /100 WBC     POC Glucose Once [[id number]]  (Abnormal) Collected: 04/19/21 2119    Specimen: Blood Updated: 04/19/21 2120     Glucose 136 mg/dL      Comment: Serial Number: [id number]:  [id number]             Imaging Results (Last 24 Hours)     ** No results found for the last 24  hours. **          EKG      I personally viewed and interpreted the patient's EKG/Telemetry data:    ECHOCARDIOGRAM:    STRESS MYOVIEW:    CARDIAC CATHETERIZATION:    OTHER:         Assessment/Plan     Principal Problem:    Hypertensive emergency  Active Problems:    Mixed hyperlipidemia    Essential hypertension    Type 2 diabetes mellitus (CMS/[facility])    Anemia due to stage 3a chronic kidney disease (CMS/[facility])    CKD (chronic kidney disease) stage 3, GFR 30-59 ml/min (CMS/[facility])    HFrEF (heart failure with reduced ejection fraction) (CMS/[facility])    Dyspnea       Patient presented with lethargy weakness and shortness of breath and had very high blood pressure  Patient blood pressure is getting better with the multiple medications  However patient has significant bradycardia with beta-blockers and his beta-blockers have been stopped and the bradycardia did not improve so far  Patient will be seen by Dr. Navarrete for possible pacemaker placement  Discussed with patient and family and they are agreeing for pacemaker if needed.  Patient also has acute renal failure and is followed by the nephrologist  Echocardiogram showed normal LV systolic function but with mild to moderate valvular heart disease    I discussed the patients findings and my recommendations with patient and nurse    Zane Aldridge MD  04/20/21  12:42 EDT

## 2021-04-20 NOTE — PROGRESS NOTES
Referring Provider: Leonarda Yepez MD    Reason for follow-up:  Severe bradycardia  Junctional bradycardia with heart rates of 40/min.     Patient Care Team:  Brian Nazario APRN as PCP - General    Subjective .      ROS  Patient is extremely lethargic and not able to provide much history.  Most of the history was obtained from patient's son at bedside.  Also patient does not speak English.    History  Past Medical History:   Diagnosis Date   • Diabetes mellitus (CMS/HCC)    • Hyperlipidemia    • Hypertension        History reviewed. No pertinent surgical history.    History reviewed. No pertinent family history.    Social History     Tobacco Use   • Smoking status: Never Smoker   • Smokeless tobacco: Never Used   Substance Use Topics   • Alcohol use: No   • Drug use: No        Medications Prior to Admission   Medication Sig Dispense Refill Last Dose   • amLODIPine (NORVASC) 10 MG tablet Take 10 mg by mouth Daily.      • aspirin 81 MG chewable tablet Chew 81 mg Daily.      • atorvastatin (LIPITOR) 80 MG tablet Take 80 mg by mouth Daily.      • glipizide (GLUCOTROL) 5 MG tablet Take 2.5 mg by mouth Daily.      • hydrALAZINE (APRESOLINE) 25 MG tablet Take 25 mg by mouth 3 (Three) Times a Day.      • losartan (COZAAR) 100 MG tablet Take 100 mg by mouth Daily.          Allergies  Patient has no known allergies.    Scheduled Meds:amLODIPine, 10 mg, Oral, Daily  [MAR Hold] aspirin, 81 mg, Oral, Daily  [MAR Hold] atorvastatin, 80 mg, Oral, Daily  [MAR Hold] Canagliflozin, 100 mg, Oral, Daily  [MAR Hold] chlorthalidone, 50 mg, Oral, Daily  cloNIDine, 0.2 mg, Oral, Q12H  [MAR Hold] enoxaparin, 30 mg, Subcutaneous, Q24H  [MAR Hold] insulin lispro, 0-9 Units, Subcutaneous, TID AC  [MAR Hold] ipratropium-albuterol, 3 mL, Nebulization, 4x Daily - RT  losartan, 100 mg, Oral, Q24H  [MAR Hold] polyethylene glycol, 17 g, Oral, BID  [MAR Hold] sodium chloride, 10 mL, Intravenous, Q12H      Continuous Infusions:   PRN Meds:.•   "[MAR Hold] acetaminophen **OR** [MAR Hold] acetaminophen **OR** [MAR Hold] acetaminophen  •  [MAR Hold] dextrose  •  [MAR Hold] dextrose  •  [MAR Hold] docusate sodium  •  [MAR Hold] glucagon (human recombinant)  •  [MAR Hold] insulin lispro **AND** [MAR Hold] insulin lispro  •  [MAR Hold] melatonin  •  [MAR Hold] nitroglycerin  •  [MAR Hold] ondansetron **OR** [MAR Hold] ondansetron  •  [COMPLETED] Insert peripheral IV **AND** [MAR Hold] sodium chloride  •  [MAR Hold] sodium chloride    Objective     VITAL SIGNS  Vitals:    04/20/21 1523 04/20/21 1528 04/20/21 1655 04/20/21 1803   BP:   174/68 170/64   BP Location:       Patient Position:       Pulse: (!) 42 (!) 41 55 60   Resp: 20 20 20 20   Temp:       TempSrc:       SpO2: 97%  100% 100%   Weight:       Height:           Flowsheet Rows      First Filed Value   Admission Height  162 cm (63.78\") Documented at 04/15/2021 1816   Admission Weight  68.7 kg (151 lb 7.3 oz) Documented at 04/15/2021 1816            Intake/Output Summary (Last 24 hours) at 4/20/2021 1828  Last data filed at 4/20/2021 1021  Gross per 24 hour   Intake 240 ml   Output --   Net 240 ml        TELEMETRY: Junctional bradycardia rate of 35 to 40/min.  Physical Exam:  The patient is extremely lethargic and weak.  Vital signs as noted above.  Head and neck revealed no carotid bruits or jugular venous distention.  No thyromegaly or lymphadenopathy is present  Lungs clear.  No wheezing.  Breath sounds are normal bilaterally.  Heart normal first and second heart sounds.  No murmur. No precordial rub is present.  No gallop is present.  Abdomen soft and nontender.  No organomegaly is present.  Extremities with good peripheral pulses without any pedal edema.  Skin warm and dry.  Musculoskeletal system is grossly normal  CNS-extremely weak and lethargic.    Results Review:   I reviewed the patient's new clinical results.  Lab Results (last 24 hours)     Procedure Component Value Units Date/Time    POC " Glucose Once [[id number]]  (Abnormal) Collected: 04/20/21 1132    Specimen: Blood Updated: 04/20/21 1142     Glucose 175 mg/dL      Comment: Serial Number: [id number]:  [id number]       POC Glucose Once [[id number]]  (Abnormal) Collected: 04/20/21 0732    Specimen: Blood Updated: 04/20/21 0733     Glucose 107 mg/dL      Comment: Serial Number: [id number]:  [id number]       Basic Metabolic Panel [[id number]]  (Abnormal) Collected: 04/20/21 0532    Specimen: Blood Updated: 04/20/21 0630     Glucose 109 mg/dL      BUN 36 mg/dL      Creatinine 2.10 mg/dL      Sodium 136 mmol/L      Potassium 3.7 mmol/L      Chloride 101 mmol/L      CO2 25.0 mmol/L      Calcium 8.4 mg/dL      eGFR  African Amer 27 mL/min/1.73      eGFR Non African Amer 23 mL/min/1.73      BUN/Creatinine Ratio 17.1     Anion Gap 10.0 mmol/L     Narrative:      GFR Normal >60  Chronic Kidney Disease <60  Kidney Failure <15      Magnesium [[id number]]  (Abnormal) Collected: 04/20/21 0532    Specimen: Blood Updated: 04/20/21 0630     Magnesium 2.9 mg/dL     CBC & Differential [[id number]]  (Abnormal) Collected: 04/20/21 0532    Specimen: Blood Updated: 04/20/21 0601    Narrative:      The following orders were created for panel order CBC & Differential.  Procedure                               Abnormality         Status                     ---------                               -----------         ------                     CBC Auto Differential[[id number]]        Abnormal            Final result                 Please view results for these tests on the individual orders.    CBC Auto Differential [[id number]]  (Abnormal) Collected: 04/20/21 0532    Specimen: Blood Updated: 04/20/21 0601     WBC 5.10 10*3/mm3      RBC 3.38 10*6/mm3      Hemoglobin 9.3 g/dL      Hematocrit 28.3 %      MCV 83.8 fL      MCH 27.5 pg      MCHC 32.8 g/dL      RDW 14.7 %      RDW-SD 44.2 fl      MPV 7.9 fL      Platelets 173 10*3/mm3      Neutrophil % 43.4 %      Lymphocyte %  37.5 %      Monocyte % 9.6 %      Eosinophil % 8.9 %      Basophil % 0.6 %      Neutrophils, Absolute 2.20 10*3/mm3      Lymphocytes, Absolute 1.90 10*3/mm3      Monocytes, Absolute 0.50 10*3/mm3      Eosinophils, Absolute 0.50 10*3/mm3      Basophils, Absolute 0.00 10*3/mm3      nRBC 0.1 /100 WBC           Imaging Results (Last 24 Hours)     ** No results found for the last 24 hours. **      LAB RESULTS (LAST 7 DAYS)    CBC  Results from last 7 days   Lab Units 04/20/21  0532 04/19/21  0327 04/18/21  0515 04/17/21  0438 04/16/21  0042 04/15/21  1116   WBC 10*3/mm3 5.10 5.60 5.30 4.10 3.80 4.97   RBC 10*6/mm3 3.38* 3.57* 3.60* 3.46* 3.50* 4.26   HEMOGLOBIN g/dL 9.3* 9.8* 9.7* 9.6* 9.6* 11.6*   HEMATOCRIT % 28.3* 29.5* 30.3* 28.5* 29.5* 36.4   MCV fL 83.8 82.5 84.1 82.5 84.3 85.4   PLATELETS 10*3/mm3 173 163 191 205 199 274       BMP  Results from last 7 days   Lab Units 04/20/21  0532 04/19/21 0327 04/18/21  0515 04/17/21  0438 04/16/21  0042 04/15/21  1116   SODIUM mmol/L 136 137 140 139 139 144   POTASSIUM mmol/L 3.7 3.3* 3.7 3.6 3.9 4.3   CHLORIDE mmol/L 101 102 106 106 106 111*   CO2 mmol/L 25.0 23.0 25.0 24.0 22.0 21.5*   BUN mg/dL 36* 37* 31* 35* 35* 38*   CREATININE mg/dL 2.10* 2.21* 1.88* 1.98* 2.02* 1.94*   GLUCOSE mg/dL 109* 108* 119* 105* 174* 108*   MAGNESIUM mg/dL 2.9*  --   --   --   --   --    PHOSPHORUS mg/dL  --   --   --   --   --  3.7       CMP   Results from last 7 days   Lab Units 04/20/21  0532 04/19/21  0327 04/18/21  0515 04/17/21  0438 04/16/21  0042 04/15/21  1836 04/15/21  1116   SODIUM mmol/L 136 137 140 139 139  --  144   POTASSIUM mmol/L 3.7 3.3* 3.7 3.6 3.9  --  4.3   CHLORIDE mmol/L 101 102 106 106 106  --  111*   CO2 mmol/L 25.0 23.0 25.0 24.0 22.0  --  21.5*   BUN mg/dL 36* 37* 31* 35* 35*  --  38*   CREATININE mg/dL 2.10* 2.21* 1.88* 1.98* 2.02*  --  1.94*   GLUCOSE mg/dL 109* 108* 119* 105* 174*  --  108*   ALBUMIN g/dL  --   --   --   --   --  3.50  --    BILIRUBIN mg/dL  --    --   --   --   --  0.3  --    ALK PHOS U/L  --   --   --   --   --  91  --    AST (SGOT) U/L  --   --   --   --   --  31  --    ALT (SGPT) U/L  --   --   --   --   --  28  --    LIPASE U/L  --   --   --   --   --  146*  --          BNP        TROPONIN  Results from last 7 days   Lab Units 04/16/21  0042   TROPONIN T ng/mL 0.016       CoAg        Creatinine Clearance  Estimated Creatinine Clearance: 21.2 mL/min (A) (by C-G formula based on SCr of 2.1 mg/dL (H)).    ABG        Radiology  No radiology results for the last day            EKG      I personally viewed and interpreted the patient's EKG/Telemetry data:    ECHOCARDIOGRAM:    Results for orders placed during the hospital encounter of 04/15/21    Adult Transthoracic Echo Complete W/ Cont if Necessary Per Protocol    Interpretation Summary  · Left ventricular ejection fraction appears to be 61 - 65%.  · Left ventricular wall thickness is consistent with moderate basal asymmetric hypertrophy.  · The right ventricular cavity is mildly dilated.  · Mild to moderate aortic valve stenosis is present.  · Moderate mitral valve regurgitation is present.  · No pericardial effusion noted          STRESS MYOVIEW:    Cardiolite (Tc-99m Sestamibi) stress test    CARDIAC CATHETERIZATION:            OTHER:         Assessment/Plan     Principal Problem:    Hypertensive emergency  Active Problems:    Sick sinus syndrome (CMS/[facility])    Junctional bradycardia    Mixed hyperlipidemia    Essential hypertension    Type 2 diabetes mellitus (CMS/[facility])    Anemia due to stage 3a chronic kidney disease (CMS/[facility])    CKD (chronic kidney disease) stage 3, GFR 30-59 ml/min (CMS/[facility])    HFrEF (heart failure with reduced ejection fraction) (CMS/[facility])    Dyspnea      Patient has history of hypertension diabetes dyslipidemia and CKD.  She presented to the hospital with severe weakness and lethargy.  Patient does not speak English.  Most of the history is obtained through patient's son who is at  bedside.  Patient was noted to have severe bradycardia and beta-blocker was discontinued.  However patient continued to be bradycardic with heart rates of 30 to 40/min with sinus and junctional bradycardia.  I was asked to see the patient and discussed regarding permanent dual-chamber pacemaker implantation by primary cardiologist Dr. Aldridge.  Chart was reviewed and patient has been off any bradycardia producing medications.  Patient has significant weakness and tiredness probably due to bradycardia.  I have discussed the risks and benefits pros and cons of the procedure with patient's son at bedside and he interpreted my conversation with the patient.  They have consented to proceed with permanent dual-chamber pacemaker implantation.  Patient also has severe and difficult to control hypertension and would benefit from beta-blocker.  Patient cannot be on increased ACE inhibitor due to renal dysfunction.  Patient is already on 10 mg of Norvasc and losartan 100 mg.  Further plan will depend on patient's progress.    Critical care time of 32 minutes were spent providing above service discussing and coordinating care with other physicians.        Yovany Navarrete MD  04/20/21  18:28 EDT

## 2021-04-20 NOTE — CASE MANAGEMENT/SOCIAL WORK
Discharge Planning Assessment   Zach     Patient Name: Brad Woodward  MRN: [id number]  Today's Date: 4/20/2021    Admit Date: 4/15/2021          Plan    Plan Comments  barrier to discharge is pending cardiology consult with possible pacemaker needed       Carol naegele rn  Case management  Office number [phone]  Cell phone [phone]

## 2021-04-20 NOTE — PROGRESS NOTES
[facility] Medicine Services  INPATIENT PROGRESS NOTE  Pool/Cath   Hospitalist Team  LOS 4 days      Patient Care Team:  Brian Nazario APRN as PCP - General        Chief Complaint / Subjective  Chief Complaint   Patient presents with   • Shortness of Breath     SOA, Chills, not as active as normal. sleeping alot. sent in by PMDANII kerr at beside to translate.       HPI (4 hpi elements or status of 3 chronic)  Ms. Woodward is a 80 y.o. female with past medical history of hypertension, diabetes, hyperlipidemia and CKD who presents to [facility] complaining of dyspnea and generalized weakness/lethargy.  Patient is non-English speaking with family at bedside interpreting throughout the exam.  She reports that for the past 2 days she has had increasing weakness as well as dyspnea specifically dyspnea on exertion along with 2 episodes of nausea and nonbloody vomiting and occasional sensation of palpitations.  Family reports that patient was recently seen by her nephrologist and found to have poorly controlled hypertension and hydralazine dosing was increased.  They note symptoms seem to have began near the time that dose adjustments were made to her blood pressure medication.  No pain including chest pain, cough or fever, peripheral edema, syncope or near syncope are reported.  They do note some chronic constipation which is unchanged from baseline and patient reports no changes in her urinary habits.  She does not smoke or drink alcohol and patient and family confirm compliance with all of her outpatient medical therapies.     Today the patient's daughter is at the bedside at the time of my visit.  She states that her mother is feeling much better.  She reports that she has been better able to walk and get around with minimal assistance.  She does remain rather bradycardic.         Nausea 04/18/21, 11:03 AM EDT  4/28/2021  Today the patient is going for pacemaker placement later on in  the afternoon.  The family is all around her and she says that she is doing well.  No new issues overnight.      Shortness of Breath          History taken from: chart family    Review of Systems   Respiratory: Positive for shortness of breath.      Constitutional: Positive for chills and malaise/fatigue. Negative for fever.   HENT: Negative.    Eyes: Negative.    Cardiovascular: Positive for dyspnea on exertion and palpitations. Negative for chest pain, irregular heartbeat, leg swelling, near-syncope, orthopnea and syncope.   Respiratory: Positive for shortness of breath. Negative for cough and sputum production.    Endocrine: Negative.    Skin: Negative.    Musculoskeletal: Negative.  Improved ability to ambulate.  Gastrointestinal: Positive for constipation, nausea and vomiting. Negative for abdominal pain, diarrhea, hematemesis, hematochezia and melena.   Genitourinary: Negative.    Neurological: Negative.    Psychiatric/Behavioral: Negative.     Noted          History reviewed. No pertinent family history.    Past Medical History:   Diagnosis Date   • Diabetes mellitus (CMS/Formerly [facility])    • Hyperlipidemia    • Hypertension        Social History     Socioeconomic History   • Marital status:      Spouse name: Not on file   • Number of children: Not on file   • Years of education: Not on file   • Highest education level: Not on file   Tobacco Use   • Smoking status: Never Smoker   • Smokeless tobacco: Never Used   Substance and Sexual Activity   • Alcohol use: No   • Drug use: No   • Sexual activity: Defer       Prior to Admission medications    Medication Sig Start Date End Date Taking? Authorizing Provider   amLODIPine (NORVASC) 10 MG tablet Take 10 mg by mouth Daily.   Yes Harry Hernández MD   aspirin 81 MG chewable tablet Chew 81 mg Daily.   Yes ProviderHarry MD   atorvastatin (LIPITOR) 80 MG tablet Take 80 mg by mouth Daily.   Yes Harry Hernández MD   glipizide (GLUCOTROL) 5 MG tablet Take  "2.5 mg by mouth Daily.   Yes ProviderHarry MD   hydrALAZINE (APRESOLINE) 25 MG tablet Take 25 mg by mouth 3 (Three) Times a Day.   Yes Harry Hernández MD   losartan (COZAAR) 100 MG tablet Take 100 mg by mouth Daily.   Yes Edgar, MD Harry        Objective    Physical Exam     Vital Signs  Temp:  [95 °F (35 °C)-97.6 °F (36.4 °C)] 97.6 °F (36.4 °C)  Heart Rate:  [40-55] 55  Resp:  [16-20] 20  BP: (133-186)/(66-75) 174/68    Oxygen Therapy  SpO2: 100 %  Pulse Oximetry Type: Intermittent  Device (Oxygen Therapy): room air  Flowsheet Rows      First Filed Value   Admission Height  162 cm (63.78\") Documented at 04/15/2021 1816   Admission Weight  68.7 kg (151 lb 7.3 oz) Documented at 04/15/2021 1816        Weight change: 1.497 kg (3 lb 4.8 oz)   Intake & Output (last 3 days)       04/17 0701 - 04/18 0700 04/18 0701 - 04/19 0700 04/19 0701 - 04/20 0700 04/20 0701 - 04/21 0700    P.O. 350 360 240 240    Total Intake(mL/kg) 350 (4.9) 360 (5.2) 240 (3.4) 240 (3.4)    Urine (mL/kg/hr) 600 (0.4)       Total Output 600       Net -250 +360 +240 +240            Urine Unmeasured Occurrence  2 x 4 x     Stool Unmeasured Occurrence  1 x 1 x         Lines, Drains & Airways    Active LDAs     Name:   Placement date:   Placement time:   Site:   Days:    Peripheral IV 04/15/21 1835 Right Hand   04/15/21    1835    Hand   1                Physical Exam:    Physical Exam  Vitals and nursing note reviewed.   Constitutional:       General: She is not in acute distress.     Appearance: Normal appearance. She is well-developed. She is not ill-appearing, toxic-appearing or diaphoretic.   HENT:      Head: Normocephalic and atraumatic.      Right Ear: Ear canal and external ear normal.      Left Ear: Ear canal and external ear normal.      Nose: Nose normal. No congestion or rhinorrhea.      Mouth/Throat:      Mouth: Mucous membranes are moist.      Pharynx: No oropharyngeal exudate.   Eyes:      General: No scleral icterus. "        Right eye: No discharge.         Left eye: No discharge.      Extraocular Movements: Extraocular movements intact.      Conjunctiva/sclera: Conjunctivae normal.      Pupils: Pupils are equal, round, and reactive to light.   Neck:      Thyroid: No thyromegaly.      Vascular: No carotid bruit or JVD.      Trachea: No tracheal deviation.   Cardiovascular:      Rate and Rhythm: Normal rate and regular rhythm.      Pulses: Normal pulses.      Heart sounds: Normal heart sounds. No murmur heard.   No friction rub. No gallop.    Pulmonary:      Effort: Pulmonary effort is normal. No respiratory distress.      Breath sounds: Normal breath sounds. No stridor. No wheezing, rhonchi or rales.   Chest:      Chest wall: No tenderness.   Abdominal:      General: Bowel sounds are normal. There is no distension.      Palpations: Abdomen is soft. There is no mass.      Tenderness: There is no abdominal tenderness. There is no guarding or rebound.      Hernia: No hernia is present.   Musculoskeletal:         General: No swelling, tenderness, deformity or signs of injury. Normal range of motion.      Cervical back: Normal range of motion and neck supple. No rigidity. No muscular tenderness.      Right lower leg: No edema.      Left lower leg: No edema.      Comments: Patient seems more active than previously listed.   Lymphadenopathy:      Cervical: No cervical adenopathy.   Skin:     General: Skin is warm and dry.      Coloration: Skin is not jaundiced or pale.      Findings: No bruising, erythema or rash.   Neurological:      General: No focal deficit present.      Mental Status: She is alert and oriented to person, place, and time. Mental status is at baseline.      Cranial Nerves: No cranial nerve deficit.      Sensory: No sensory deficit.      Motor: No weakness or abnormal muscle tone.      Coordination: Coordination normal.   Psychiatric:         Mood and Affect: Mood normal.         Behavior: Behavior normal.          Thought Content: Thought content normal.         Judgment: Judgment normal.     Reviewed, no change in above data from the prior day.          PT Recommendation and Plan             Procedures:    Procedure(s):  Pacemaker DC new (N/A)     Assessment/Plan with Problem wise       Active Hospital Problems    Diagnosis  POA   • **Hypertensive emergency [I16.1]  Yes   • HFrEF (heart failure with reduced ejection fraction) (CMS/Formerly [facility]) [I50.20]  Yes   • Dyspnea [R06.00]  Yes   • Mixed hyperlipidemia [E78.2]  Yes   • Essential hypertension [I10]  Yes   • Type 2 diabetes mellitus (CMS/[facility]ly [facility]) [E11.9]  Yes   • Anemia due to stage 3a chronic kidney disease (CMS/Formerly [facility]) [N18.31, D63.1]  Yes   • CKD (chronic kidney disease) stage 3, GFR 30-59 ml/min (CMS/Formerly [facility]) [N18.30]  Yes   • Sick sinus syndrome (CMS/Formerly [facility]) [I49.5]  Unknown   • Junctional bradycardia [R00.1]  Unknown      Resolved Hospital Problems   No resolved problems to display.        Estimated Creatinine Clearance: 21.2 mL/min (A) (by C-G formula based on SCr of 2.1 mg/dL (H)).    Code Status and Medical Interventions:   Ordered at: 04/15/21 0914     Code Status:    CPR     Medical Interventions (Level of Support Prior to Arrest):    Full       MEDICAL DECISION MAKING COMPLEXITY BY PROBLEM:       1.  Hypertension:  -Discontinue beta-blockers  -Appreciate cardiology's help  -Consider discontinuing either the Catapres or the Norvasc as this is an alpha-blocker as well as a calcium channel blocker and could also be the etiology of her bradycardia.    -Her hypertension seems fairly well controlled.    2.  CHF:  -The patient's ejection fraction is 61 to 65%  -The patient has mild to moderate both mitral and aortic regurgitation.  -Improved bilateral lower extremity edema.    3.  Renal failure/insufficiency/injury:  -Cautious hydration       -Decrease IV fluids to 50 mL an hour    4.  Anemia due to CKD  -Supportive  -Monitor BMP daily    5.  Hyperlipidemia:  -Check lipid panel  -Patient  is already on Lipitor    6.  Bradycardia  -patient is in need of a pacemaker  -To the OR later today for placement    Plan for disposition:  anticipate pt will need 30 days or less of rehab            Continued Care and Services - Admitted Since 4/15/2021    Coordination has not been started for this encounter.        Historical & Objective Data     Results Review:    I reviewed the patient's new lab and radiology results. 04/20/21     Results from last 7 days   Lab Units 04/20/21  0532 04/19/21 0327 04/18/21 0515   WBC 10*3/mm3 5.10 5.60 5.30   HEMOGLOBIN g/dL 9.3* 9.8* 9.7*   HEMATOCRIT % 28.3* 29.5* 30.3*   MCV fL 83.8 82.5 84.1   MCH pg 27.5 27.4 27.0   MPV fL 7.9 8.3 8.2   RDW % 14.7 14.3 14.9   PLATELETS 10*3/mm3 173 163 191     Results from last 7 days   Lab Units 04/20/21  0532 04/19/21 0327 04/18/21  0515 04/15/21  1836   SODIUM mmol/L 136 137 140  --    POTASSIUM mmol/L 3.7 3.3* 3.7  --    CHLORIDE mmol/L 101 102 106  --    CO2 mmol/L 25.0 23.0 25.0  --    BUN mg/dL 36* 37* 31*  --    CREATININE mg/dL 2.10* 2.21* 1.88*  --    CALCIUM mg/dL 8.4* 8.4* 9.1  --    MAGNESIUM mg/dL 2.9*  --   --   --    BILIRUBIN mg/dL  --   --   --  0.3   ALK PHOS U/L  --   --   --  91   ALT (SGPT) U/L  --   --   --  28   AST (SGOT) U/L  --   --   --  31   GLUCOSE mg/dL 109* 108* 119*  --      Inflammatory Biomarkers        Invalid input(s): ESR, D-DIMER QUANTITATIVE,  PROCALCITONIN,  alllabslink(lactate:5)@ )@  Lab Results   Component Value Date    PHOS 3.7 04/15/2021     No results found for: HGBA1C  Lab Results   Component Value Date    CHOL 208 (H) 04/16/2021    TRIG 94 04/16/2021    HDL 71 (H) 04/16/2021     (H) 04/16/2021     Lab Results   Component Value Date    LIPASE 146 (H) 04/15/2021               Pathology  No results found for: INTRAOP, PREDX, FINALDX, COMDX  COVID19   Date Value Ref Range Status   04/15/2021 Not Detected Not Detected - Ref. Range Final        Microbiology Results (last 10 days)      Procedure Component Value - Date/Time    Respiratory Panel PCR w/COVID-19(SARS-CoV-2) V[facility]CTOR/DAYNA/JADYN/PAD/COR/MAD/[facility]IRAG In-House, NP Swab in UTM/VTM, 3-4 HR TAT - Swab, Nasopharynx [[id number]]  (Normal) Collected: 04/15/21 1836    Lab Status: Final result Specimen: Swab from Nasopharynx Updated: 04/15/21 2016     ADENOVIRUS, PCR Not Detected     Coronavirus 229E Not Detected     Coronavirus HKU1 Not Detected     Coronavirus NL63 Not Detected     Coronavirus OC43 Not Detected     COVID19 Not Detected     Human Metapneumovirus Not Detected     Human Rhinovirus/Enterovirus Not Detected     Influenza A PCR Not Detected     Influenza B PCR Not Detected     Parainfluenza Virus 1 Not Detected     Parainfluenza Virus 2 Not Detected     Parainfluenza Virus 3 Not Detected     Parainfluenza Virus 4 Not Detected     RSV, PCR Not Detected     Bordetella pertussis pcr Not Detected     Bordetella parapertussis PCR Not Detected     Chlamydophila pneumoniae PCR Not Detected     Mycoplasma pneumo by PCR Not Detected    Narrative:      Fact sheet for providers: https://docs.[employer]/wp-content/uploads/SLQ3125-7570-IA7.1-EUA-Provider-Fact-Sheet-3.pdf    Fact sheet for patients: https://docs.[employer]/wp-content/uploads/TBY4784-0822-PP0.1-EUA-Patient-Fact-Sheet-1.pdf    Test performed by PCR.          ECG/EMG Results (most recent)     Procedure Component Value Units Date/Time    ECG 12 Lead [[id number]] Collected: 04/15/21 1848     Updated: 04/16/21 1549     QT Interval 363 ms     Narrative:      HEART RATE= 72  bpm  RR Interval= 832  ms  WI Interval= 226  ms  P Horizontal Axis= -58  deg  P Front Axis= 40  deg  QRSD Interval= 94  ms  QT Interval= 363  ms  QRS Axis= 48  deg  T Wave Axis= 251  deg  - ABNORMAL ECG -  Sinus rhythm  inferolat st depression  Prolonged WI interval  LVH with secondary repolarization abnormality  No previous ECG available for comparison  Electronically Signed By: Mitchel Gresham (Jadyn) 16-Apr-2021 15:49:31  Date  and Time of Study: 2021-04-15 18:48:44    Adult Transthoracic Echo Complete W/ Cont if Necessary Per Protocol [[id number]] Collected: 04/16/21 0902     Updated: 04/16/21 2019     BSA 1.8 m^2      RVIDd 2.8 cm      IVSd 2.1 cm      LVIDd 3.5 cm      LVIDs 2.7 cm      LVPWd 2.0 cm      IVS/LVPW 1.0     FS 24.6 %      EDV(Teich) 52.5 ml      ESV(Teich) 26.4 ml      EF(Teich) 49.7 %      EDV(cubed) 44.6 ml      ESV(cubed) 19.1 ml      EF(cubed) 57.1 %      LV mass(C)d 340.3 grams      LV mass(C)dI 190.2 grams/m^2      SV(Teich) 26.1 ml      SI(Teich) 14.6 ml/m^2      SV(cubed) 25.5 ml      SI(cubed) 14.2 ml/m^2      Ao root diam 3.6 cm      Ao root area 9.9 cm^2      ACS 2.0 cm      asc Aorta Diam 3.3 cm      LVOT diam 1.9 cm      LVOT area 3.0 cm^2      EDV(MOD-sp4) 71.7 ml      ESV(MOD-sp4) 13.7 ml      EF(MOD-sp4) 81.0 %      SV(MOD-sp4) 58.1 ml      SI(MOD-sp4) 32.5 ml/m^2      Ao root area (BSA corrected) 2.0     LV Curry Vol (BSA corrected) 40.1 ml/m^2      LV Sys Vol (BSA corrected) 7.6 ml/m^2      MV E max joy 72.9 cm/sec      MV A max joy 121.4 cm/sec      MV E/A 0.6     MV V2 max 138.0 cm/sec      MV max PG 7.6 mmHg      MV V2 mean 57.8 cm/sec      MV mean PG 1.7 mmHg      MV V2 VTI 33.0 cm      MVA(VTI) 3.9 cm^2      MV dec slope 161.2 cm/sec^2      MV dec time 0.45 sec      Ao pk joy 245.1 cm/sec      Ao max PG 24.1 mmHg      Ao max PG (full) 6.8 mmHg      Ao V2 mean 173.5 cm/sec      Ao mean PG 13.7 mmHg      Ao mean PG (full) 2.3 mmHg      Ao V2 VTI 48.6 cm      WINSTON(I,A) 2.7 cm^2      WINSTON(I,D) 2.7 cm^2      WINSTON(V,A) 2.5 cm^2      WINSTON(V,D) 2.5 cm^2      LV V1 max PG 17.3 mmHg      LV V1 mean PG 11.4 mmHg      LV V1 max 208.0 cm/sec      LV V1 mean 159.5 cm/sec      LV V1 VTI 43.6 cm      MR max joy 524.6 cm/sec      MR max .7 mmHg      SV(Ao) 482.1 ml      SI(Ao) 269.4 ml/m^2      SV(LVOT) 129.1 ml      SI(LVOT) 72.1 ml/m^2      PA V2 max 136.2 cm/sec      PA max PG 7.4 mmHg      PA max PG (full) 3.6  mmHg      PA V2 mean 91.7 cm/sec      PA mean PG 3.8 mmHg      PA mean PG (full) 1.7 mmHg      PA V2 VTI 31.4 cm      PI end-d joy 129.5 cm/sec      PI max joy 214.9 cm/sec      PI max PG 18.5 mmHg      RV V1 max PG 3.8 mmHg      RV V1 mean PG 2.1 mmHg      RV V1 max 97.5 cm/sec      RV V1 mean 68.6 cm/sec      RV V1 VTI 19.0 cm      RAP systole 3.0 mmHg       CV ECHO ISRAEL - BZI_BMI 26.3 kilograms/m^2       CV ECHO [place]RA[place] - BSA(HAYCO) 1.8 m^2       CV ECHO [place]RA[place] - BZI_METRIC_WEIGHT 71.7 kg       CV ECHO [place] - BZI_METRIC_HEIGHT 165.1 cm      LA dimension(2D) 3.1 cm     Narrative:      · Left ventricular ejection fraction appears to be 61 - 65%.  · Left ventricular wall thickness is consistent with moderate basal   asymmetric hypertrophy.  · The right ventricular cavity is mildly dilated.  · Mild to moderate aortic valve stenosis is present.  · Moderate mitral valve regurgitation is present.  · No pericardial effusion noted       ECG 12 Lead [[id number]] Collected: 04/16/21 0305     Updated: 04/16/21 2102     QT Interval 360 ms     Narrative:      HEART RATE= 59  bpm  RR Interval= 1012  ms  AK Interval= 224  ms  P Horizontal Axis= -75  deg  P Front Axis= 50  deg  QRSD Interval= 94  ms  QT Interval= 360  ms  QRS Axis= 63  deg  T Wave Axis= 247  deg  - ABNORMAL ECG -  Sinus bradycardia  Prolonged AK interval  Probable left ventricular hypertrophy  Anterior Q waves, possibly due to LVH  Abnormal T, consider ischemia, diffuse leads  When compared with ECG of 15-Apr-2021 18:48:44,  New or worsened ischemia or infarction  Electronically Signed By: Zane Aldridge ([facility]) 16-Apr-2021 20:59:53  Date and Time of Study: 2021-04-16 03:05:03    ECG 12 Lead [[id number]] Collected: 04/18/21 1230     Updated: 04/18/21 1233     QT Interval 422 ms     Narrative:      HEART RATE= 42  bpm  RR Interval= 1424  ms  AK Interval=   ms  P Horizontal Axis=   deg  P Front Axis=   deg  QRSD Interval= 89  ms  QT Interval= 422  ms  QRS Axis= 50   deg  T Wave Axis= 213  deg  - ABNORMAL ECG -  Junctional rhythm  LVH with secondary repolarization abnormality  When compared with ECG of 16-Apr-2021 3:05:03,  Significant repolarization change  Electronically Signed By:   Date and Time of Study: 2021-04-18 12:30:40    EP/CRM Study [[id number]] Resulted: 04/20/21 1648     Updated: 04/20/21 1648          Results for orders placed during the hospital encounter of 04/15/21    Duplex Renal Artery - Bilateral Complete CAR    Interpretation Summary  · Normal right renal artery.  · Normal left renal artery.  · Difficult study due to patient tolerance and body habitus.      Results for orders placed during the hospital encounter of 04/15/21    Adult Transthoracic Echo Complete W/ Cont if Necessary Per Protocol    Interpretation Summary  · Left ventricular ejection fraction appears to be 61 - 65%.  · Left ventricular wall thickness is consistent with moderate basal asymmetric hypertrophy.  · The right ventricular cavity is mildly dilated.  · Mild to moderate aortic valve stenosis is present.  · Moderate mitral valve regurgitation is present.  · No pericardial effusion noted      CT Abdomen Pelvis Without Contrast    Result Date: 4/15/2021  1.Infiltrates are noted throughout the lung bases suggesting developing multifocal pneumonia versus pulmonary edema. Cardiomegaly is noted. 2.No evidence for significant nephrolithiasis. There is no evidence for obstructive uropathy. 3.No evidence for acute abnormality throughout the abdomen or pelvis on this noncontrast exam.  Electronically Signed By-Jim Montano MD On:4/15/2021 7:45 PM This report was finalized on [id number] by  Jim Montano MD.    XR Chest 1 View    Result Date: 4/15/2021  1.No definitive evidence for acute cardiopulmonary process. 2.Note is made of cardiomegaly.  Electronically Signed By-Jim Montano MD On:4/15/2021 6:47 PM This report was finalized on [id number] by  Jim Montano MD.      I personally  reviewed patient's x-ray films and my findings are: Chest x-ray film reviewed cardiomegaly.  Some congestion.  More evident on CT scan.  No infiltrates    I personally reviewed patient's EKG strip and my findings are: 0    Medication Review:   I have reviewed the patient's current medication list 04/20/21     Scheduled Meds  amLODIPine, 10 mg, Oral, Daily  [MAR Hold] aspirin, 81 mg, Oral, Daily  [MAR Hold] atorvastatin, 80 mg, Oral, Daily  [MAR Hold] Canagliflozin, 100 mg, Oral, Daily  [MAR Hold] chlorthalidone, 50 mg, Oral, Daily  cloNIDine, 0.2 mg, Oral, Q12H  [MAR Hold] enoxaparin, 30 mg, Subcutaneous, Q24H  [MAR Hold] insulin lispro, 0-9 Units, Subcutaneous, TID AC  [MAR Hold] ipratropium-albuterol, 3 mL, Nebulization, 4x Daily - RT  losartan, 100 mg, Oral, Q24H  [MAR Hold] polyethylene glycol, 17 g, Oral, BID  [MAR Hold] sodium chloride, 10 mL, Intravenous, Q12H  vancomycin, 1,000 mg, Intravenous, Once        Meds Infusions       DVT prophylaxis  Mechanical Order History:     None      Pharmalogical Order History:      Ordered     Dose Route Frequency Stop    04/16/21 0033  enoxaparin (LOVENOX) syringe 30 mg      30 mg SC Every 24 Hours --                Meds PRN  •  [MAR Hold] acetaminophen **OR** [MAR Hold] acetaminophen **OR** [MAR Hold] acetaminophen  •  [MAR Hold] dextrose  •  [MAR Hold] dextrose  •  [MAR Hold] docusate sodium  •  fentaNYL citrate (PF)  •  [MAR Hold] glucagon (human recombinant)  •  [MAR Hold] insulin lispro **AND** [MAR Hold] insulin lispro  •  [MAR Hold] melatonin  •  midazolam  •  [MAR Hold] nitroglycerin  •  [MAR Hold] ondansetron **OR** [MAR Hold] ondansetron  •  [COMPLETED] Insert peripheral IV **AND** [MAR Hold] sodium chloride  •  [MAR Hold] sodium chloride      Leonarda Yepez MD  04/20/21  17:18 EDT

## 2021-04-20 NOTE — PROGRESS NOTES
[facility]   Progress Note    Patient Name: Brad Woodward  : 1940  MRN: [id number]  Primary Care Physician: Brian Nazario APRN  Date of admission: 4/15/2021    Subjective   Subjective     Chief Complaint: ckd IV    History of Present Illness        Review of Systems    Objective   Objective     Vitals:  Temp:  [95 °F (35 °C)-97.6 °F (36.4 °C)] 95 °F (35 °C)  Heart Rate:  [39-52] 43  Resp:  [16-20] 16  BP: (133-186)/(61-75) 154/66    Physical Exam   General Appearance:  In no acute distress.    HEENT: Normal HEENT exam.     Extremities: .  There is no deformity, effusion or dependent edema.    Neurological: Patient is alert  Skin:  Warm and dry.  No rash.       Result Review    Result Review:  I have personally reviewed the results from the time of this admission to 21 7:22 AM EDT and agree with these findings:  []  Laboratory  []  Microbiology  []  Radiology  []  EKG/Telemetry   []  Cardiology/Vascular   []  Pathology  []  Old records  []  Other:      Assessment/Plan   Assessment / Plan     Brief Patient Summary:  Brad Woodward is a 80 y.o. female who has ckd IV admitted with hypertension    Active Hospital Problems:  Active Hospital Problems    Diagnosis    • **Hypertensive emergency    • HFrEF (heart failure with reduced ejection fraction) (CMS/Formerly [facility])    • Dyspnea    • Mixed hyperlipidemia    • Essential hypertension    • Type 2 diabetes mellitus (CMS/[facility])    • Anemia due to stage 3a chronic kidney disease (CMS/[facility]ly [facility])    • CKD (chronic kidney disease) stage 3, GFR 30-59 ml/min (CMS/Formerly [facility])        Plan:   CKD4  HTN emergency  Proteinuria  DM  Weakness  N/V  Anemia    Patient seen and examined. Family at bedside. Labs reviewed. Renal function improved and below baseline. Possible pacemaker today. Will f/u with labs in am    CODE STATUS:    Code Status and Medical Interventions:   Ordered at: 04/15/21 8375     Code Status:    CPR     Medical Interventions (Level of Support Prior to Arrest):    Full            Electronically signed by Christy Low MD, 04/20/21, 7:22 AM EDT.

## 2021-04-21 VITALS
HEART RATE: 61 BPM | WEIGHT: 156.53 LBS | SYSTOLIC BLOOD PRESSURE: 151 MMHG | TEMPERATURE: 97.7 F | HEIGHT: 65 IN | BODY MASS INDEX: 26.08 KG/M2 | RESPIRATION RATE: 18 BRPM | DIASTOLIC BLOOD PRESSURE: 58 MMHG | OXYGEN SATURATION: 99 %

## 2021-04-21 LAB
ANION GAP SERPL CALCULATED.3IONS-SCNC: 10 MMOL/L (ref 5–15)
BASOPHILS # BLD AUTO: 0 10*3/MM3 (ref 0–0.2)
BASOPHILS NFR BLD AUTO: 0.5 % (ref 0–1.5)
BUN SERPL-MCNC: 41 MG/DL (ref 8–23)
BUN/CREAT SERPL: 20.4 (ref 7–25)
CALCIUM SPEC-SCNC: 7.7 MG/DL (ref 8.6–10.5)
CHLORIDE SERPL-SCNC: 102 MMOL/L (ref 98–107)
CO2 SERPL-SCNC: 26 MMOL/L (ref 22–29)
CREAT SERPL-MCNC: 2.01 MG/DL (ref 0.57–1)
DEPRECATED RDW RBC AUTO: 42 FL (ref 37–54)
EOSINOPHIL # BLD AUTO: 0.3 10*3/MM3 (ref 0–0.4)
EOSINOPHIL NFR BLD AUTO: 4.7 % (ref 0.3–6.2)
ERYTHROCYTE [DISTWIDTH] IN BLOOD BY AUTOMATED COUNT: 14.5 % (ref 12.3–15.4)
GFR SERPL CREATININE-BSD FRML MDRD: 24 ML/MIN/1.73
GFR SERPL CREATININE-BSD FRML MDRD: 29 ML/MIN/1.73
GLUCOSE BLDC GLUCOMTR-MCNC: 104 MG/DL (ref 70–105)
GLUCOSE BLDC GLUCOMTR-MCNC: 149 MG/DL (ref 70–105)
GLUCOSE SERPL-MCNC: 103 MG/DL (ref 65–99)
HCT VFR BLD AUTO: 27.7 % (ref 34–46.6)
HGB BLD-MCNC: 9.4 G/DL (ref 12–15.9)
LYMPHOCYTES # BLD AUTO: 1 10*3/MM3 (ref 0.7–3.1)
LYMPHOCYTES NFR BLD AUTO: 17.5 % (ref 19.6–45.3)
MCH RBC QN AUTO: 27.5 PG (ref 26.6–33)
MCHC RBC AUTO-ENTMCNC: 34 G/DL (ref 31.5–35.7)
MCV RBC AUTO: 80.9 FL (ref 79–97)
MONOCYTES # BLD AUTO: 0.5 10*3/MM3 (ref 0.1–0.9)
MONOCYTES NFR BLD AUTO: 8.5 % (ref 5–12)
NEUTROPHILS NFR BLD AUTO: 4 10*3/MM3 (ref 1.7–7)
NEUTROPHILS NFR BLD AUTO: 68.8 % (ref 42.7–76)
NRBC BLD AUTO-RTO: 0.1 /100 WBC (ref 0–0.2)
PLATELET # BLD AUTO: 176 10*3/MM3 (ref 140–450)
PMV BLD AUTO: 8.1 FL (ref 6–12)
POTASSIUM SERPL-SCNC: 3.4 MMOL/L (ref 3.5–5.2)
RBC # BLD AUTO: 3.43 10*6/MM3 (ref 3.77–5.28)
SODIUM SERPL-SCNC: 138 MMOL/L (ref 136–145)
WBC # BLD AUTO: 5.8 10*3/MM3 (ref 3.4–10.8)

## 2021-04-21 PROCEDURE — 25010000002 VANCOMYCIN 1 G RECONSTITUTED SOLUTION 1 EACH VIAL: Performed by: INTERNAL MEDICINE

## 2021-04-21 PROCEDURE — 97535 SELF CARE MNGMENT TRAINING: CPT

## 2021-04-21 PROCEDURE — 85025 COMPLETE CBC W/AUTO DIFF WBC: CPT | Performed by: INTERNAL MEDICINE

## 2021-04-21 PROCEDURE — 99232 SBSQ HOSP IP/OBS MODERATE 35: CPT | Performed by: INTERNAL MEDICINE

## 2021-04-21 PROCEDURE — 82962 GLUCOSE BLOOD TEST: CPT

## 2021-04-21 PROCEDURE — 99239 HOSP IP/OBS DSCHRG MGMT >30: CPT | Performed by: INTERNAL MEDICINE

## 2021-04-21 PROCEDURE — 94799 UNLISTED PULMONARY SVC/PX: CPT

## 2021-04-21 PROCEDURE — 97166 OT EVAL MOD COMPLEX 45 MIN: CPT

## 2021-04-21 PROCEDURE — 80048 BASIC METABOLIC PNL TOTAL CA: CPT | Performed by: INTERNAL MEDICINE

## 2021-04-21 RX ORDER — CLONIDINE HYDROCHLORIDE 0.2 MG/1
0.2 TABLET ORAL EVERY 12 HOURS SCHEDULED
Qty: 60 TABLET | Refills: 0 | Status: SHIPPED | OUTPATIENT
Start: 2021-04-21

## 2021-04-21 RX ORDER — POTASSIUM CHLORIDE 20 MEQ/1
20 TABLET, EXTENDED RELEASE ORAL ONCE
Status: COMPLETED | OUTPATIENT
Start: 2021-04-21 | End: 2021-04-21

## 2021-04-21 RX ORDER — NITROGLYCERIN 0.4 MG/1
0.4 TABLET SUBLINGUAL
Qty: 25 TABLET | Refills: 0 | Status: SHIPPED | OUTPATIENT
Start: 2021-04-21

## 2021-04-21 RX ORDER — HYDROCODONE BITARTRATE AND ACETAMINOPHEN 5; 325 MG/1; MG/1
1 TABLET ORAL EVERY 4 HOURS PRN
Qty: 10 TABLET | Refills: 0 | Status: SHIPPED | OUTPATIENT
Start: 2021-04-21 | End: 2021-04-27

## 2021-04-21 RX ORDER — CHLORTHALIDONE 50 MG/1
50 TABLET ORAL DAILY
Qty: 30 TABLET | Refills: 0 | Status: SHIPPED | OUTPATIENT
Start: 2021-04-22 | End: 2021-05-22

## 2021-04-21 RX ADMIN — ASPIRIN 81 MG CHEWABLE TABLET 81 MG: 81 TABLET CHEWABLE at 09:21

## 2021-04-21 RX ADMIN — POTASSIUM CHLORIDE 20 MEQ: 1500 TABLET, EXTENDED RELEASE ORAL at 09:30

## 2021-04-21 RX ADMIN — Medication 10 ML: at 09:21

## 2021-04-21 RX ADMIN — IPRATROPIUM BROMIDE AND ALBUTEROL SULFATE 3 ML: 2.5; .5 SOLUTION RESPIRATORY (INHALATION) at 10:28

## 2021-04-21 RX ADMIN — CLONIDINE HYDROCHLORIDE 0.2 MG: 0.1 TABLET ORAL at 09:21

## 2021-04-21 RX ADMIN — CHLORTHALIDONE 50 MG: 25 TABLET ORAL at 09:20

## 2021-04-21 RX ADMIN — ACETAMINOPHEN 650 MG: 325 TABLET ORAL at 02:15

## 2021-04-21 RX ADMIN — LOSARTAN POTASSIUM 100 MG: 50 TABLET, FILM COATED ORAL at 09:21

## 2021-04-21 RX ADMIN — IPRATROPIUM BROMIDE AND ALBUTEROL SULFATE 3 ML: 2.5; .5 SOLUTION RESPIRATORY (INHALATION) at 15:09

## 2021-04-21 RX ADMIN — Medication 3 ML: at 09:22

## 2021-04-21 RX ADMIN — SODIUM CHLORIDE 1000 MG: 900 INJECTION, SOLUTION INTRAVENOUS at 09:21

## 2021-04-21 RX ADMIN — POLYETHYLENE GLYCOL 3350 17 G: 17 POWDER, FOR SOLUTION ORAL at 09:22

## 2021-04-21 RX ADMIN — ATORVASTATIN CALCIUM 80 MG: 40 TABLET, FILM COATED ORAL at 09:20

## 2021-04-21 RX ADMIN — IPRATROPIUM BROMIDE AND ALBUTEROL SULFATE 3 ML: 2.5; .5 SOLUTION RESPIRATORY (INHALATION) at 07:14

## 2021-04-21 RX ADMIN — CANAGLIFLOZIN 100 MG: 300 TABLET, FILM COATED ORAL at 09:20

## 2021-04-21 RX ADMIN — AMLODIPINE BESYLATE 10 MG: 5 TABLET ORAL at 09:21

## 2021-04-21 NOTE — PLAN OF CARE
Goal Outcome Evaluation:  Plan of Care Reviewed With: patient, daughter, son, grandchild(geoffrey)     Outcome Summary: Pt is 81 yo female brought to hospital with hypertensive emergency, nausea, and dyspnea. Pt with bradycardia, and now s/p pacer placement 4/20. At baseline, pt resides with family and is indepenedent with ADLs. Pt supine in bed and family states only concern is constipation. Edu on importance of OOB activity and impact on digestive system. Family provides Min A for bed moblity, and CGA for ambulation to BR. Family very eager to provide Min-Mod A for ADLs for patient, and state she will have 24/7 care at home. OT provided education on importance of compliance with pacemaker precautions, use of gait belt for balance/safety, and importance of OOB activity. Family very receptive to education, and ask appropriate questions.  Anticipate d/c home with family this date, and pt states no concerns. No cognitive assessment completed due to language barrier, but family acts as  PRN.

## 2021-04-21 NOTE — PLAN OF CARE
Goal Outcome Evaluation:  Plan of Care Reviewed With: patient  Progress: improving  Outcome Summary: Patient doing well recovering from pacemaker yesterday. Doctors have signed off. Patient should discharge home today. Vitals stable. Will monitor.

## 2021-04-21 NOTE — CASE MANAGEMENT/SOCIAL WORK
Continued Stay Note  NATHANIEL Serrano     Patient Name: Brad Woodward  MRN: [id number]  Today's Date: 4/21/2021    Admit Date: 4/15/2021    Discharge Plan     Row Name 04/21/21 1305       Plan    Plan  DC Plan: Return home with family.    Plan Comments  s/p pacemaker placement 4/20      Chart review only.             Dipika Dunbar RN

## 2021-04-21 NOTE — DISCHARGE SUMMARY
[facility]  DISCHARGE SUMMARY        Prepared For PCP:  Brian Nazario APRN    Patient Name: Brad Woodward  : 1940  MRN: [id number]      Date of Admission:   4/15/2021    Date of Discharge:  2021    Length of stay:  LOS: 5 days     Hospital Course     Presenting Problem:   Abnormal EKG [R94.31]  Dyspnea, unspecified type [R06.00]  Non-intractable vomiting with nausea, unspecified vomiting type [R11.2]  Chronic kidney disease, unspecified CKD stage [N18.9]  Congestive heart failure, unspecified HF chronicity, unspecified heart failure type (CMS/HCC) [I50.9]      Active Hospital Problems    Diagnosis  POA   • **Hypertensive emergency [I16.1]  Yes     Priority: High   • Mixed hyperlipidemia [E78.2]  Yes     Priority: High   • Essential hypertension [I10]  Yes     Priority: High   • Type 2 diabetes mellitus (CMS/[facility]) [E11.9]  Yes     Priority: High   • CKD (chronic kidney disease) stage 3, GFR 30-59 ml/min (CMS/[facility]) [N18.30]  Yes     Priority: High   • Sick sinus syndrome (CMS/[facility]) [I49.5]  Unknown     Priority: High   • HFrEF (heart failure with reduced ejection fraction) (CMS/[facility]) [I50.20]  Yes     Priority: Medium   • Anemia due to stage 3a chronic kidney disease (CMS/HCC) [N18.31, D63.1]  Yes     Priority: Medium   • Dyspnea [R06.00]  Yes   • Junctional bradycardia [R00.1]  Unknown      Resolved Hospital Problems   No resolved problems to display.     Hospital Course:  Brad Woodward is a 80 y.o. female with a past history of hypertension, diabetes type 2, hyperlipidemia as well as chronic kidney disease who presented with shortness of breath and increased weakness and lethargy.  The patient was found to have sick sinus syndrome and was evaluated for a pacemaker during her hospital stay.  It was felt that she would benefit from that and underwent pacemaker placement on 2021.  The patient did well with the procedure and transferred back to the PCU for overnight  care.  In the morning the patient was doing extremely well and was cleared by cardiology for discharge in the afternoon.  The patient was not complaining of any pain and overall she was doing well.  The patient also had significant hypertension and her hypertensive medications were adjusted while she was in the hospital.    The patient is a full code at the time of discharge.  The patient on is on a consistent carbohydrate cardiac diet.  The patient should check her blood sugars before every meal and at bedtime.  The patient had pending studies of renin aldosterone, metanephrines and catecholamines that had not reported at the time of discharge.  The patient is to follow-up with nephrology Dr. Farooq who will review those with the patient once they become available.  The patient should follow-up with Dr. Aldridge as per his instruction within the next 1 to 2 days.  The patient should follow-up with her primary care provider in 3 to 5 days.  The patient's medications are as listed in that section of this report.  The patient is discharged in satisfactory condition.          Recommendation for Outpatient Providers:             Reasons For Change In Medications and Indications for New Medications:        Day of Discharge     HPI:   Patient is feeling very well today.    Vital Signs:   Temp:  [97.7 °F (36.5 °C)-98 °F (36.7 °C)] 97.7 °F (36.5 °C)  Heart Rate:  [41-62] 62  Resp:  [15-20] 18  BP: (143-174)/(50-68) 151/58     Physical Exam:  Physical Exam  Vitals and nursing note reviewed.   Constitutional:       General: She is not in acute distress.     Appearance: Normal appearance. She is well-developed. She is not ill-appearing, toxic-appearing or diaphoretic.      Comments: The patient is very thin and frail in her appearance.  Family is surrounding her and appears to care for her a great deal.   HENT:      Head: Normocephalic and atraumatic.      Right Ear: Ear canal and external ear normal.      Left Ear: Ear canal  and external ear normal.      Nose: Nose normal. No congestion or rhinorrhea.      Mouth/Throat:      Mouth: Mucous membranes are moist.      Pharynx: No oropharyngeal exudate.   Eyes:      General: No scleral icterus.        Right eye: No discharge.         Left eye: No discharge.      Extraocular Movements: Extraocular movements intact.      Conjunctiva/sclera: Conjunctivae normal.      Pupils: Pupils are equal, round, and reactive to light.   Neck:      Thyroid: No thyromegaly.      Vascular: No carotid bruit or JVD.      Trachea: No tracheal deviation.   Cardiovascular:      Rate and Rhythm: Normal rate and regular rhythm.      Pulses: Normal pulses.      Heart sounds: Normal heart sounds. No murmur heard.   No friction rub. No gallop.    Pulmonary:      Effort: Pulmonary effort is normal. No respiratory distress.      Breath sounds: Normal breath sounds. No stridor. No wheezing, rhonchi or rales.   Chest:      Chest wall: No tenderness.   Abdominal:      General: Bowel sounds are normal. There is no distension.      Palpations: Abdomen is soft. There is no mass.      Tenderness: There is no abdominal tenderness. There is no guarding or rebound.      Hernia: No hernia is present.   Musculoskeletal:         General: No swelling, tenderness, deformity or signs of injury. Normal range of motion.      Cervical back: Normal range of motion and neck supple. No rigidity. No muscular tenderness.      Right lower leg: No edema.      Left lower leg: No edema.   Lymphadenopathy:      Cervical: No cervical adenopathy.   Skin:     General: Skin is warm and dry.      Coloration: Skin is not jaundiced or pale.      Findings: No bruising, erythema or rash.   Neurological:      General: No focal deficit present.      Mental Status: She is alert and oriented to person, place, and time. Mental status is at baseline.      Cranial Nerves: No cranial nerve deficit.      Sensory: No sensory deficit.      Motor: No weakness or abnormal  muscle tone.      Coordination: Coordination normal.   Psychiatric:         Mood and Affect: Mood normal.         Behavior: Behavior normal.         Thought Content: Thought content normal.         Judgment: Judgment normal.         Pertinent  and/or Most Recent Results     Results from last 7 days   Lab Units 04/21/21  0347 04/20/21  0532 04/19/21  0327 04/18/21  0515 04/17/21  0438 04/16/21  0042 04/15/21  1116   WBC 10*3/mm3 5.80 5.10 5.60 5.30 4.10 3.80 4.97   HEMOGLOBIN g/dL 9.4* 9.3* 9.8* 9.7* 9.6* 9.6* 11.6*   HEMATOCRIT % 27.7* 28.3* 29.5* 30.3* 28.5* 29.5* 36.4   PLATELETS 10*3/mm3 176 173 163 191 205 199 274   SODIUM mmol/L 138 136 137 140 139 139 144   POTASSIUM mmol/L 3.4* 3.7 3.3* 3.7 3.6 3.9 4.3   CHLORIDE mmol/L 102 101 102 106 106 106 111*   CO2 mmol/L 26.0 25.0 23.0 25.0 24.0 22.0 21.5*   BUN mg/dL 41* 36* 37* 31* 35* 35* 38*   CREATININE mg/dL 2.01* 2.10* 2.21* 1.88* 1.98* 2.02* 1.94*   GLUCOSE mg/dL 103* 109* 108* 119* 105* 174* 108*   CALCIUM mg/dL 7.7* 8.4* 8.4* 9.1 8.7 8.4* 9.4     Results from last 7 days   Lab Units 04/15/21  1836   BILIRUBIN mg/dL 0.3   ALK PHOS U/L 91   ALT (SGPT) U/L 28   AST (SGOT) U/L 31     Results from last 7 days   Lab Units 04/16/21  0042   CHOLESTEROL mg/dL 208*   TRIGLYCERIDES mg/dL 94   HDL CHOL mg/dL 71*     Results from last 7 days   Lab Units 04/18/21  0515 04/16/21  0042 04/15/21  1836   TSH uIU/mL 2.940  --   --    CORTISOL mcg/dL 9.13  --   --    HEMOGLOBIN A1C %  --  5.9*  --    PROBNP pg/mL  --   --  3,125.0*   TROPONIN T ng/mL  --  0.016 0.018   PROCALCITONIN ng/mL  --   --  0.08       Brief Urine Lab Results  (Last result in the past 365 days)      Color   Clarity   Blood   Leuk Est   Nitrite   Protein   CREAT   Urine HCG        04/15/21 1116 Yellow Clear Negative Negative Negative >=300 mg/dL (3+)               Microbiology Results Abnormal     Procedure Component Value - Date/Time    Respiratory Panel PCR w/COVID-19(SARS-CoV-2)  V[facility]CTOR/DAYNA/JADYN/[facility] In-House, NP Swab in UTM/VTM, 3-4 HR TAT - Swab, Nasopharynx [[id number]]  (Normal) Collected: 04/15/21 1836    Lab Status: Final result Specimen: Swab from Nasopharynx Updated: 04/15/21 2016     ADENOVIRUS, PCR Not Detected     Coronavirus 229E Not Detected     Coronavirus HKU1 Not Detected     Coronavirus NL63 Not Detected     Coronavirus OC43 Not Detected     COVID19 Not Detected     Human Metapneumovirus Not Detected     Human Rhinovirus/Enterovirus Not Detected     Influenza A PCR Not Detected     Influenza B PCR Not Detected     Parainfluenza Virus 1 Not Detected     Parainfluenza Virus 2 Not Detected     Parainfluenza Virus 3 Not Detected     Parainfluenza Virus 4 Not Detected     RSV, PCR Not Detected     Bordetella pertussis pcr Not Detected     Bordetella parapertussis PCR Not Detected     Chlamydophila pneumoniae PCR Not Detected     Mycoplasma pneumo by PCR Not Detected    Narrative:      Fact sheet for providers: https://docs.Andela/wp-content/uploads/OXN1294-7771-ML3.1-EUA-Provider-Fact-Sheet-3.pdf    Fact sheet for patients: https://docs.Andela/wp-content/uploads/ONK7110-0272-IN6.1-EUA-Patient-Fact-Sheet-1.pdf    Test performed by PCR.          CT Abdomen Pelvis Without Contrast    Result Date: 4/15/2021  Impression: 1.Infiltrates are noted throughout the lung bases suggesting developing multifocal pneumonia versus pulmonary edema. Cardiomegaly is noted. 2.No evidence for significant nephrolithiasis. There is no evidence for obstructive uropathy. 3.No evidence for acute abnormality throughout the abdomen or pelvis on this noncontrast exam.  Electronically Signed By-Jim Montano MD On:4/15/2021 7:45 PM This report was finalized on [id number] by  Jim Montano MD.    XR Chest 1 View    Result Date: 4/20/2021  Impression: 1. Status post left-sided AICD placement. 2. No pneumothorax. 3. Stable cardiomegaly.  Electronically Signed By-Austin Kendrick MD  On:4/20/2021 7:41 PM This report was finalized on [id number] by  Austin Kendrick MD.    XR Chest 1 View    Result Date: 4/15/2021  Impression: 1.No definitive evidence for acute cardiopulmonary process. 2.Note is made of cardiomegaly.  Electronically Signed By-Jim Montano MD On:4/15/2021 6:47 PM This report was finalized on [id number] by  Jim Montano MD.      Results for orders placed during the hospital encounter of 04/15/21    Duplex Renal Artery - Bilateral Complete CAR    Interpretation Summary  · Normal right renal artery.  · Normal left renal artery.  · Difficult study due to patient tolerance and body habitus.      Results for orders placed during the hospital encounter of 04/15/21    Duplex Renal Artery - Bilateral Complete CAR    Interpretation Summary  · Normal right renal artery.  · Normal left renal artery.  · Difficult study due to patient tolerance and body habitus.      Results for orders placed during the hospital encounter of 04/15/21    Adult Transthoracic Echo Complete W/ Cont if Necessary Per Protocol    Interpretation Summary  · Left ventricular ejection fraction appears to be 61 - 65%.  · Left ventricular wall thickness is consistent with moderate basal asymmetric hypertrophy.  · The right ventricular cavity is mildly dilated.  · Mild to moderate aortic valve stenosis is present.  · Moderate mitral valve regurgitation is present.  · No pericardial effusion noted              Test Results Pending at Discharge  Pending Labs     Order Current Status    Aldosterone / Renin Ratio In process    Catecholamines, Fractionated, Urine, 24 Hour - Urine, Clean Catch In process    Metanephrines, Urine, 24 Hour - Urine, Clean Catch In process            Procedures Performed  Procedure(s):  Pacemaker DC new         Consults:   Consults     Date and Time Order Name Status Description    4/18/2021  3:14 PM Inpatient Cardiology Consult Completed     4/16/2021  9:07 AM Inpatient Nephrology Consult  Completed     4/16/2021  8:54 AM Inpatient Nephrology Consult      4/15/2021  8:18 PM Hospitalist (on-call MD unless specified) Completed             Discharge Details        Discharge Medications      New Medications      Instructions Start Date   Canagliflozin 300 MG tablet tablet  Commonly known as: INVOKANA   100 mg, Oral, Daily   Start Date: April 22, 2021     chlorthalidone 50 MG tablet  Commonly known as: HYGROTEN   50 mg, Oral, Daily   Start Date: April 22, 2021     cloNIDine 0.2 MG tablet  Commonly known as: CATAPRES   0.2 mg, Oral, Every 12 Hours Scheduled      HYDROcodone-acetaminophen 5-325 MG per tablet  Commonly known as: NORCO   1 tablet, Oral, Every 4 Hours PRN      nitroglycerin 0.4 MG SL tablet  Commonly known as: NITROSTAT   0.4 mg, Sublingual, Every 5 Minutes PRN, Take no more than 3 doses in 15 minutes.         Continue These Medications      Instructions Start Date   amLODIPine 10 MG tablet  Commonly known as: NORVASC   10 mg, Oral, Daily      aspirin 81 MG chewable tablet   81 mg, Oral, Daily      atorvastatin 80 MG tablet  Commonly known as: LIPITOR   80 mg, Oral, Daily      glipizide 5 MG tablet  Commonly known as: GLUCOTROL   2.5 mg, Oral, Daily      losartan 100 MG tablet  Commonly known as: COZAAR   100 mg, Oral, Daily         Stop These Medications    hydrALAZINE 25 MG tablet  Commonly known as: APRESOLINE            No Known Allergies      Discharge Disposition:  Home or Self Care    Diet:  Hospital:  Diet Order   Procedures   • Diet Cardiac, Diabetic/Consistent Carbs; 2gm Na+; Diabetic - Consistent Carb         Discharge Activity:   Activity Instructions     Activity as Tolerated              CODE STATUS:    Code Status and Medical Interventions:   Ordered at: 04/15/21 2147     Code Status:    CPR     Medical Interventions (Level of Support Prior to Arrest):    Full         Follow-up Appointments  Future Appointments   Date Time Provider Department Center   5/3/2021  1:00 PM [facility]   5/3/2021  1:20 PM Zane Aldridge MD [facility]       Additional Instructions for the Follow-ups that You Need to Schedule     Discharge Follow-up with PCP   As directed       Currently Documented PCP:    Brian Nazario APRN    PCP Phone Number:    [phone]     Follow Up Details: in 3-5 days         Discharge Follow-up with Specialty: follow up with cardiology within the week for a pacemaker check   As directed      Specialty: follow up with cardiology within the week for a pacemaker check                 Condition on Discharge:      Stable      This patient has been examined wearing appropriate Personal Protective Equipment and discussed with hospital infection control department. 04/21/21      Electronically signed by Leonarda Yepez MD, 04/21/21, 3:09 PM EDT.      Time: I spent  35  minutes on this discharge activity which included face-to-face encounter with the patient/reviewing the data in the system/coordination of the care with the nursing staff as well as consultants/documentation/entering orders.

## 2021-04-21 NOTE — PROGRESS NOTES
Referring Provider: hospitalist    Reason for follow-up: bradycardia     Patient Care Team:  Brian Nazario APRN as PCP - General    Subjective . No symptoms    Objective  Lying in bed comfortably     Review of Systems   Constitutional: Negative for fever and malaise/fatigue.   HENT: Negative for ear pain and nosebleeds.    Eyes: Negative for blurred vision and double vision.   Cardiovascular: Negative for chest pain, dyspnea on exertion and palpitations.   Respiratory: Negative for cough and shortness of breath.    Skin: Negative for rash.   Musculoskeletal: Negative for joint pain.   Gastrointestinal: Negative for abdominal pain, nausea and vomiting.   Neurological: Negative for focal weakness and headaches.   Psychiatric/Behavioral: Negative for depression. The patient is not nervous/anxious.    All other systems reviewed and are negative.      Patient has no known allergies.    Scheduled Meds:amLODIPine, 10 mg, Oral, Daily  aspirin, 81 mg, Oral, Daily  atorvastatin, 80 mg, Oral, Daily  Canagliflozin, 100 mg, Oral, Daily  chlorthalidone, 50 mg, Oral, Daily  cloNIDine, 0.2 mg, Oral, Q12H  enoxaparin, 30 mg, Subcutaneous, Q24H  insulin lispro, 0-9 Units, Subcutaneous, TID AC  ipratropium-albuterol, 3 mL, Nebulization, 4x Daily - RT  losartan, 100 mg, Oral, Q24H  polyethylene glycol, 17 g, Oral, BID  sodium chloride, 10 mL, Intravenous, Q12H  sodium chloride, 3 mL, Intravenous, Q12H      Continuous Infusions:   PRN Meds:.•  acetaminophen **OR** acetaminophen **OR** acetaminophen  •  dextrose  •  dextrose  •  docusate sodium  •  glucagon (human recombinant)  •  HYDROcodone-acetaminophen  •  insulin lispro **AND** insulin lispro  •  melatonin  •  nitroglycerin  •  ondansetron **OR** ondansetron  •  [COMPLETED] Insert peripheral IV **AND** sodium chloride  •  sodium chloride  •  sodium chloride        VITAL SIGNS  Vitals:    04/21/21 0447 04/21/21 0500 04/21/21 0714 04/21/21 0717   BP:  151/58     BP Location:   "Right arm     Patient Position:  Lying     Pulse:  61 61 60   Resp:  18 16 16   Temp:  97.7 °F (36.5 °C)     TempSrc:  Oral     SpO2:  98% 98% 97%   Weight: 71 kg (156 lb 8.4 oz)      Height:           Flowsheet Rows      First Filed Value   Admission Height  162 cm (63.78\") Documented at 04/15/2021 1816   Admission Weight  68.7 kg (151 lb 7.3 oz) Documented at 04/15/2021 1816           TELEMETRY: Sinus bradycardia    Physical Exam:  Constitutional:       Appearance: Well-developed.   Eyes:      General: No scleral icterus.     Conjunctiva/sclera: Conjunctivae normal.      Pupils: Pupils are equal, round, and reactive to light.   HENT:      Head: Normocephalic and atraumatic.   Neck:      Vascular: No carotid bruit or JVD.   Pulmonary:      Effort: Pulmonary effort is normal.      Breath sounds: Normal breath sounds. No wheezing. No rales.   Cardiovascular:      Normal rate. Regular rhythm.      Murmurs: There is a systolic murmur.   Pulses:     Intact distal pulses.   Abdominal:      General: Bowel sounds are normal.      Palpations: Abdomen is soft.   Musculoskeletal: Normal range of motion.      Cervical back: Normal range of motion and neck supple. Skin:     General: Skin is warm and dry.      Findings: No rash.   Neurological:      Mental Status: Alert.      Comments: No focal deficits          Results Review:   I reviewed the patient's new clinical results.  Lab Results (last 24 hours)     Procedure Component Value Units Date/Time    POC Glucose Once [[id number]]  (Normal) Collected: 04/21/21 0740    Specimen: Blood Updated: 04/21/21 0942     Glucose 104 mg/dL      Comment: Serial Number: [id number]:  [id number]       Basic Metabolic Panel [[id number]]  (Abnormal) Collected: 04/21/21 0347    Specimen: Blood Updated: 04/21/21 0525     Glucose 103 mg/dL      BUN 41 mg/dL      Creatinine 2.01 mg/dL      Sodium 138 mmol/L      Potassium 3.4 mmol/L      Chloride 102 mmol/L      CO2 26.0 mmol/L      Calcium 7.7 " mg/dL      eGFR  African Amer 29 mL/min/1.73      eGFR Non African Amer 24 mL/min/1.73      BUN/Creatinine Ratio 20.4     Anion Gap 10.0 mmol/L     Narrative:      GFR Normal >60  Chronic Kidney Disease <60  Kidney Failure <15      CBC & Differential [[id number]]  (Abnormal) Collected: 04/21/21 0347    Specimen: Blood Updated: 04/21/21 0459    Narrative:      The following orders were created for panel order CBC & Differential.  Procedure                               Abnormality         Status                     ---------                               -----------         ------                     CBC Auto Differential[[id number]]        Abnormal            Final result                 Please view results for these tests on the individual orders.    CBC Auto Differential [[id number]]  (Abnormal) Collected: 04/21/21 0347    Specimen: Blood Updated: 04/21/21 0459     WBC 5.80 10*3/mm3      RBC 3.43 10*6/mm3      Hemoglobin 9.4 g/dL      Hematocrit 27.7 %      MCV 80.9 fL      MCH 27.5 pg      MCHC 34.0 g/dL      RDW 14.5 %      RDW-SD 42.0 fl      MPV 8.1 fL      Platelets 176 10*3/mm3      Neutrophil % 68.8 %      Lymphocyte % 17.5 %      Monocyte % 8.5 %      Eosinophil % 4.7 %      Basophil % 0.5 %      Neutrophils, Absolute 4.00 10*3/mm3      Lymphocytes, Absolute 1.00 10*3/mm3      Monocytes, Absolute 0.50 10*3/mm3      Eosinophils, Absolute 0.30 10*3/mm3      Basophils, Absolute 0.00 10*3/mm3      nRBC 0.1 /100 WBC     POC Glucose Once [[id number]]  (Abnormal) Collected: 04/20/21 2006    Specimen: Blood Updated: 04/20/21 2008     Glucose 128 mg/dL      Comment: Serial Number: [id number]:  [id number]             Imaging Results (Last 24 Hours)     Procedure Component Value Units Date/Time    XR Chest 1 View [[id number]] Collected: 04/20/21 1937     Updated: 04/20/21 1943    Narrative:         DATE OF EXAM:   4/20/2021 7:24 PM     PROCEDURE:   XR CHEST 1 VW-     INDICATIONS:   Post ICD / Pacer Implant;  I49.5-Sick sinus syndrome; R06.00-Dyspnea,  unspecified; R11.2-Nausea with vomiting, unspecified; I50.9-Heart  failure, unspecified; R94.31-Abnormal electrocardiogram (ECG) (EKG);  N18.9-Chronic kidney disease, unspecified; R00.1-Bradycardia,  unspecified     COMPARISON:  04/15/2021     TECHNIQUE:   Portable chest radiograph.     FINDINGS:    Heart is enlarged. Interval placement of a left-sided AICD leads  overlying the right atrium and right ventricle. No pneumothorax.  Negative for pleural effusion. Osseous structures are intact.       Impression:      1. Status post left-sided AICD placement.  2. No pneumothorax.  3. Stable cardiomegaly.     Electronically Signed By-Austin Kendrick MD On:4/20/2021 7:41 PM  This report was finalized on [id number] by  Austin Kendrick MD.          EKG      I personally viewed and interpreted the patient's EKG/Telemetry data:    ECHOCARDIOGRAM:    STRESS MYOVIEW:    CARDIAC CATHETERIZATION:    OTHER:         Assessment/Plan     Principal Problem:    Hypertensive emergency  Active Problems:    Mixed hyperlipidemia    Essential hypertension    Type 2 diabetes mellitus (CMS/[facility])    Anemia due to stage 3a chronic kidney disease (CMS/[facility])    CKD (chronic kidney disease) stage 3, GFR 30-59 ml/min (CMS/[facility])    HFrEF (heart failure with reduced ejection fraction) (CMS/[facility])    Dyspnea    Sick sinus syndrome (CMS/[facility])    Junctional bradycardia       Patient presented with lethargy weakness and shortness of breath and had very high blood pressure  Patient blood pressure is getting better with the multiple medications  However patient has significant bradycardia with beta-blockers and his beta-blockers have been stopped and the bradycardia did not improve so far  Patient had a permanent pacemaker placed and is doing well at this time  Discussed with patient and family and they are agreeing for pacemaker if needed.  Patient also has acute renal failure and is followed by the  nephrologist  Echocardiogram showed normal LV systolic function but with mild to moderate valvular heart disease  We will follow her in the office  I discussed the patients findings and my recommendations with patient and nurse    Zane Aldridge MD  04/21/21  10:15 EDT

## 2021-04-21 NOTE — THERAPY EVALUATION
Patient Name: Brad Woodward  : 1940    MRN: [id number]                              Today's Date: 2021       Admit Date: 4/15/2021    Visit Dx:     ICD-10-CM ICD-9-CM   1. Sick sinus syndrome (CMS/HCC)  I49.5 427.81   2. Dyspnea, unspecified type  R06.00 786.09   3. Non-intractable vomiting with nausea, unspecified vomiting type  R11.2 787.01   4. Congestive heart failure, unspecified HF chronicity, unspecified heart failure type (CMS/HCC)  I50.9 428.0   5. Abnormal EKG  R94.31 794.31   6. Chronic kidney disease, unspecified CKD stage  N18.9 585.9   7. Junctional bradycardia  R00.1 427.89     Patient Active Problem List   Diagnosis   • Mixed hyperlipidemia   • Essential hypertension   • Type 2 diabetes mellitus (CMS/[facility])   • Anemia due to stage 3a chronic kidney disease (CMS/[facility])   • CKD (chronic kidney disease) stage 3, GFR 30-59 ml/min (CMS/[facility])   • Hypertensive emergency   • HFrEF (heart failure with reduced ejection fraction) (CMS/[facility])   • Dyspnea   • Sick sinus syndrome (CMS/[facility])   • Junctional bradycardia     Past Medical History:   Diagnosis Date   • Diabetes mellitus (CMS/[facility])    • Hyperlipidemia    • Hypertension      Past Surgical History:   Procedure Laterality Date   • CARDIAC ELECTROPHYSIOLOGY PROCEDURE N/A 2021    Procedure: Pacemaker DC new;  Surgeon: Yovany Navarrete MD;  Location: [facility] INVASIVE LOCATION;  Service: Cardiovascular;  Laterality: N/A;     General Information     Row Name 21 1536          OT Time and Intention    Document Type  evaluation  -ES     Mode of Treatment  occupational therapy  -ES     Row Name 21 1536          General Information    Patient Profile Reviewed  yes  -ES     Prior Level of Function  independent:;ADL's  -ES     Existing Precautions/Restrictions  fall;pacemaker  -ES     Barriers to Rehab  language barrier  -ES     Row Name 21 1536          Occupational Profile    Reason for Services/Referral (Occupational Profile)  Pt is 80  yo female brought to hospital with hypertensive emergency, nausea, and dyspnea. Pt with bradycardia, and now s/p pacer placement 4/20. At baseline, pt resides with family and is indepenedent with ADLs.  -ES     Row Name 04/21/21 1536          Living Environment    Lives With  child(geoffrey), adult;grandchild(geoffrey)  -ES     Row Name 04/21/21 1536          Home Main Entrance    Number of Stairs, Main Entrance  two  -ES     Row Name 04/21/21 1536          Stairs Within Home, Primary    Number of Stairs, Within Home, Primary  ten lower level  -ES     Row Name 04/21/21 1536          Cognition    Orientation Status (Cognition)  unable/difficult to assess unable to assess secondary to language barrier  -ES     Row Name 04/21/21 1536          Safety Issues, Functional Mobility    Impairments Affecting Function (Mobility)  range of motion (ROM)  -ES       User Key  (r) = Recorded By, (t) = Taken By, (c) = Cosigned By    Initials Name Provider Type    ES Monika Jack OT Occupational Therapist          Mobility/ADL's     Row Name 04/21/21 1542          Bed Mobility    Supine-Sit [place] (Bed Mobility)  minimum assist (75% patient effort)  -ES     Sit-Supine [place] (Bed Mobility)  minimum assist (75% patient effort)  -ES     Comment (Bed Mobility)  Family provides assistance  -ES     Row Name 04/21/21 1542          Transfers    Transfers  sit-stand transfer;toilet transfer;bed-chair transfer  -ES     Bed-Chair [place] (Transfers)  minimum assist (75% patient effort)  -ES     Sit-Stand [place] (Transfers)  minimum assist (75% patient effort)  -ES     [place] Level (Toilet Transfer)  minimum assist (75% patient effort)  -ES     Row Name 04/21/21 1542          Activities of Daily Living    BADL Assessment/Intervention  upper body dressing;lower body dressing;toileting  -ES     Row Name 04/21/21 1542          Upper Body Dressing Assessment/Training    [place] Level (Upper Body Dressing)  minimum  assist (75% patient effort)  -ES     Row Name 04/21/21 1542          Lower Body Dressing Assessment/Training    [place] Level (Lower Body Dressing)  moderate assist (50% patient effort)  -ES     Row Name 04/21/21 1542          Toileting Assessment/Training    [place] Level (Toileting)  minimum assist (75% patient effort)  -ES       User Key  (r) = Recorded By, (t) = Taken By, (c) = Cosigned By    Initials Name Provider Type    Monika Townsend OT Occupational Therapist        Obj/Interventions     Row Name 04/21/21 1542          Sensory Assessment (Somatosensory)    Sensory Assessment (Somatosensory)  sensation intact  -ES     Row Name 04/21/21 1542          Range of Motion Comprehensive    General Range of Motion  bilateral lower extremity ROM WFL  -ES     Comment, General Range of Motion  RUE WFL. LUE limited by pacer precautions  -ES     Row Name 04/21/21 1542          Balance    Balance Assessment  sitting static balance;sitting dynamic balance;sit to stand dynamic balance;standing dynamic balance;standing static balance  -ES     Static Sitting Balance  WNL  -ES     Dynamic Sitting Balance  WNL  -ES     Static Standing Balance  mild impairment  -ES     Dynamic Standing Balance  mild impairment  -ES       User Key  (r) = Recorded By, (t) = Taken By, (c) = Cosigned By    Initials Name Provider Type    Monika Townsend OT Occupational Therapist        Goals/Plan     Row Name 04/21/21 1551          Bathing Goal 1 (OT)    Activity/Device (Bathing Goal 1, OT)  bathing skills, all  -ES     [place] Level/Cues Needed (Bathing Goal 1, OT)  modified independence  -ES     Time Frame (Bathing Goal 1, OT)  2 weeks  -ES     Row Name 04/21/21 1555          Dressing Goal 1 (OT)    Activity/Device (Dressing Goal 1, OT)  dressing skills, all  -ES     [place]/Cues Needed (Dressing Goal 1, OT)  modified independence  -ES     Time Frame (Dressing Goal 1, OT)  2 weeks  -ES     Row Name 04/21/21 0754           Toileting Goal 1 (OT)    Activity/Device (Toileting Goal 1, OT)  toileting skills, all  -ES     [place] Level/Cues Needed (Toileting Goal 1, OT)  modified independence  -ES     Time Frame (Toileting Goal 1, OT)  2 weeks  -ES     Row Name 04/21/21 1555          Therapy Assessment/Plan (OT)    Planned Therapy Interventions (OT)  activity tolerance training;BADL retraining;functional balance retraining;manual therapy/joint mobilization;neuromuscular control/coordination retraining;strengthening exercise;occupation/activity based interventions;orthotic fabrication/fitting/training;patient/caregiver education/training  -ES       User Key  (r) = Recorded By, (t) = Taken By, (c) = Cosigned By    Initials Name Provider Type    Monika Townsend OT Occupational Therapist        Clinical Impression     Row Name 04/21/21 1546          Pain Scale: FACES Pre/Post-Treatment    Pain: FACES Scale, Pretreatment  0-->no hurt  -ES     Posttreatment Pain Rating  0-->no hurt  -ES     Row Name 04/21/21 1542          Plan of Care Review    Plan of Care Reviewed With  patient;daughter;son;grandchild(geoffrey)  -ES     Outcome Summary  Pt is 81 yo female brought to hospital with hypertensive emergency, nausea, and dyspnea. Pt with bradycardia, and now s/p pacer placement 4/20. At baseline, pt resides with family and is indepenedent with ADLs. Pt supine in bed and family states only concern is constipation. Edu on importance of OOB activity and impact on digestive system. Family provides Min A for bed moblity, and CGA for ambulation to BR. Family very eager to provide Min-Mod A for ADLs for patient, and state she will have 24/7 care at home. OT provided education on importance of compliance with pacemaker precautions, use of gait belt for balance/safety, and importance of OOB activity. Family very receptive to education, and ask appropriate questions.  Anticipate d/c home with family this date, and pt states no concerns. No  cognitive assessment completed due to language barrier, but family acts as  PRN.  -ES     Row Name 04/21/21 1548          Therapy Assessment/Plan (OT)    Criteria for Skilled Therapeutic Interventions Met (OT)  yes  -ES     Therapy Frequency (OT)  3 times/wk  -ES     Row Name 04/21/21 1548          Therapy Plan Review/Discharge Plan (OT)    Anticipated Discharge Disposition (OT)  home with 24/7 care  -ES     Row Name 04/21/21 1548          Vital Signs    O2 Delivery Pre Treatment  room air  -ES     O2 Delivery Intra Treatment  room air  -ES     O2 Delivery Post Treatment  room air  -ES     Pre Patient Position  Supine  -ES     Intra Patient Position  Standing  -ES     Post Patient Position  Sitting  -ES     Row Name 04/21/21 1548          Positioning and Restraints    Pre-Treatment Position  in bed  -ES     Post Treatment Position  -- EOB with family present  -ES       User Key  (r) = Recorded By, (t) = Taken By, (c) = Cosigned By    Initials Name Provider Type    Monika Townsend OT Occupational Therapist        Outcome Measures     Row Name 04/21/21 6646          How much help from another is currently needed...    Putting on and taking off regular lower body clothing?  2  -ES     Bathing (including washing, rinsing, and drying)  2  -ES     Toileting (which includes using toilet bed pan or urinal)  3  -ES     Putting on and taking off regular upper body clothing  3  -ES     Taking care of personal grooming (such as brushing teeth)  4  -ES     Eating meals  4  -ES     AM-PAC 6 Clicks Score (OT)  18  -ES     Row Name 04/21/21 1555          Functional Assessment    Outcome Measure Options  AM-PAC 6 Clicks Daily Activity (OT)  -ES       User Key  (r) = Recorded By, (t) = Taken By, (c) = Cosigned By    Initials Name Provider Type    Monika Townsend OT Occupational Therapist        Occupational Therapy Education                 Title: PT OT SLP Therapies (Done)     Topic: Occupational Therapy  (Done)     Point: ADL training (Done)     Description:   Instruct learner(s) on proper safety adaptation and remediation techniques during self care or transfers.   Instruct in proper use of assistive devices.              Learning Progress Summary           Patient Acceptance, E,TB, VU by ES at 4/21/2021 1556   Family Acceptance, E,TB, VU by ES at 4/21/2021 1556                   Point: Precautions (Done)     Description:   Instruct learner(s) on prescribed precautions during self-care and functional transfers.              Learning Progress Summary           Patient Acceptance, E,TB, VU by ES at 4/21/2021 1556   Family Acceptance, E,TB, VU by ES at 4/21/2021 1556                   Point: Body mechanics (Done)     Description:   Instruct learner(s) on proper positioning and spine alignment during self-care, functional mobility activities and/or exercises.              Learning Progress Summary           Patient Acceptance, E,TB, VU by ES at 4/21/2021 1556   Family Acceptance, E,TB, VU by ES at 4/21/2021 1556                               User Key     Initials Effective Dates Name Provider Type Discipline     03/01/19 -  Monika Jack OT Occupational Therapist OT              OT Recommendation and Plan  Planned Therapy Interventions (OT): activity tolerance training, BADL retraining, functional balance retraining, manual therapy/joint mobilization, neuromuscular control/coordination retraining, strengthening exercise, occupation/activity based interventions, orthotic fabrication/fitting/training, patient/caregiver education/training  Therapy Frequency (OT): 3 times/wk  Plan of Care Review  Plan of Care Reviewed With: patient, daughter, son, grandchild(geoffrey)  Outcome Summary: Pt is 81 yo female brought to hospital with hypertensive emergency, nausea, and dyspnea. Pt with bradycardia, and now s/p pacer placement 4/20. At baseline, pt resides with family and is indepenedent with ADLs. Pt supine in bed and family  states only concern is constipation. Edu on importance of OOB activity and impact on digestive system. Family provides Min A for bed moblity, and CGA for ambulation to BR. Family very eager to provide Min-Mod A for ADLs for patient, and state she will have 24/7 care at home. OT provided education on importance of compliance with pacemaker precautions, use of gait belt for balance/safety, and importance of OOB activity. Family very receptive to education, and ask appropriate questions.  Anticipate d/c home with family this date, and pt states no concerns. No cognitive assessment completed due to language barrier, but family acts as  PRN.     Time Calculation:   Time Calculation- OT     Row Name 04/21/21 1555             Time Calculation- OT    OT Start Time  1501  -ES      OT Stop Time  1529  -ES      OT Time Calculation (min)  28 min  -ES      Total Timed Code Minutes- OT  10 minute(s)  -ES      OT Received On  04/21/21  -ES      OT - Next Appointment  04/23/21  -ES      OT Goal Re-Cert Due Date  05/05/21  -ES        User Key  (r) = Recorded By, (t) = Taken By, (c) = Cosigned By    Initials Name Provider Type    ES Monika Jack OT Occupational Therapist        Therapy Charges for Today     Code Description Service Date Service Provider Modifiers Qty    94900639576 HC OT SELF CARE/MGMT/TRAIN EA 15 MIN 4/21/2021 Monika Jack OT GO 1    [id number]  OT EVAL MOD COMPLEXITY 3 4/21/2021 Monika Jack OT GO 1               Monika Jack OT  4/21/2021

## 2021-04-21 NOTE — PROGRESS NOTES
"RENAL/KCC:     LOS: 5 days    Patient Care Team:  Brian Nazario APRN as PCP - General    Chief Complaint:  HTN, CKD3-4    Subjective     Interval History:   Chart reviewed.  S/P Pacemaker and patient feeling much better.  BP at goal for advanced age this morning.    Objective     Vital Sign Min/Max for last 24 hours  Temp  Min: 97.7 °F (36.5 °C)  Max: 98 °F (36.7 °C)   BP  Min: 143/65  Max: 174/68   Pulse  Min: 41  Max: 61   Resp  Min: 15  Max: 20   SpO2  Min: 97 %  Max: 100 %   Flow (L/min)  Min: 2  Max: 2   Weight  Min: 71 kg (156 lb 8.4 oz)  Max: 71 kg (156 lb 8.4 oz)     Flowsheet Rows      First Filed Value   Admission Height  162 cm (63.78\") Documented at 04/15/2021 1816   Admission Weight  68.7 kg (151 lb 7.3 oz) Documented at 04/15/2021 1816          No intake/output data recorded.  I/O last 3 completed shifts:  In: 480 [P.O.:480]  Out: 700 [Urine:700]    Physical Exam:  GEN: Awake, NAD  ENT: PERRL, EOMI, MMM  NECK: Supple, no JVD  CHEST: CTAB, no W/R/C  CV: RRR, no M/G/R  ABD: Soft, NT, +BS  SKIN: Warm and Dry  NEURO: CN's intact      WBC WBC   Date Value Ref Range Status   04/21/2021 5.80 3.40 - 10.80 10*3/mm3 Final   04/20/2021 5.10 3.40 - 10.80 10*3/mm3 Final   04/19/2021 5.60 3.40 - 10.80 10*3/mm3 Final      HGB Hemoglobin   Date Value Ref Range Status   04/21/2021 9.4 (L) 12.0 - 15.9 g/dL Final   04/20/2021 9.3 (L) 12.0 - 15.9 g/dL Final   04/19/2021 9.8 (L) 12.0 - 15.9 g/dL Final      HCT Hematocrit   Date Value Ref Range Status   04/21/2021 27.7 (L) 34.0 - 46.6 % Final   04/20/2021 28.3 (L) 34.0 - 46.6 % Final   04/19/2021 29.5 (L) 34.0 - 46.6 % Final      Platlets No results found for: LABPLAT   MCV MCV   Date Value Ref Range Status   04/21/2021 80.9 79.0 - 97.0 fL Final   04/20/2021 83.8 79.0 - 97.0 fL Final   04/19/2021 82.5 79.0 - 97.0 fL Final          Sodium Sodium   Date Value Ref Range Status   04/21/2021 138 136 - 145 mmol/L Final   04/20/2021 136 136 - 145 mmol/L Final   04/19/2021 137 " 136 - 145 mmol/L Final      Potassium Potassium   Date Value Ref Range Status   04/21/2021 3.4 (L) 3.5 - 5.2 mmol/L Final   04/20/2021 3.7 3.5 - 5.2 mmol/L Final   04/19/2021 3.3 (L) 3.5 - 5.2 mmol/L Final      Chloride Chloride   Date Value Ref Range Status   04/21/2021 102 98 - 107 mmol/L Final   04/20/2021 101 98 - 107 mmol/L Final   04/19/2021 102 98 - 107 mmol/L Final      CO2 CO2   Date Value Ref Range Status   04/21/2021 26.0 22.0 - 29.0 mmol/L Final   04/20/2021 25.0 22.0 - 29.0 mmol/L Final   04/19/2021 23.0 22.0 - 29.0 mmol/L Final      BUN BUN   Date Value Ref Range Status   04/21/2021 41 (H) 8 - 23 mg/dL Final   04/20/2021 36 (H) 8 - 23 mg/dL Final   04/19/2021 37 (H) 8 - 23 mg/dL Final      Creatinine Creatinine   Date Value Ref Range Status   04/21/2021 2.01 (H) 0.57 - 1.00 mg/dL Final   04/20/2021 2.10 (H) 0.57 - 1.00 mg/dL Final   04/19/2021 2.21 (H) 0.57 - 1.00 mg/dL Final      Calcium Calcium   Date Value Ref Range Status   04/21/2021 7.7 (L) 8.6 - 10.5 mg/dL Final   04/20/2021 8.4 (L) 8.6 - 10.5 mg/dL Final   04/19/2021 8.4 (L) 8.6 - 10.5 mg/dL Final      PO4 No results found for: CAPO4   Albumin No results found for: ALBUMIN   Magnesium Magnesium   Date Value Ref Range Status   04/20/2021 2.9 (H) 1.6 - 2.4 mg/dL Final      Uric Acid No results found for: URICACID        Results Review:     I reviewed the patient's new clinical results.    amLODIPine, 10 mg, Oral, Daily  aspirin, 81 mg, Oral, Daily  atorvastatin, 80 mg, Oral, Daily  Canagliflozin, 100 mg, Oral, Daily  chlorthalidone, 50 mg, Oral, Daily  cloNIDine, 0.2 mg, Oral, Q12H  enoxaparin, 30 mg, Subcutaneous, Q24H  insulin lispro, 0-9 Units, Subcutaneous, TID AC  ipratropium-albuterol, 3 mL, Nebulization, 4x Daily - RT  losartan, 100 mg, Oral, Q24H  polyethylene glycol, 17 g, Oral, BID  sodium chloride, 10 mL, Intravenous, Q12H  sodium chloride, 3 mL, Intravenous, Q12H  vancomycin, 1,000 mg, Intravenous, Once           Medication Review:  Reviewed    Assessment/Plan     CKD4  HTN emergency  Hypokalemia  Symptomatic bradycardia - s/p pacemaker  Proteinuria  DM  Weakness  N/V  Anemia    Plan: Renal function is stable at baseline.  BP at goal for advanced age. Feels much better s/p pacemaker. Catecholamines and Metanephrines as well as Yao/Renin pending.  These can be followed up outpatient. Replace K. Will follow.      Jim Farooq MD   [facility]  04/21/21  09:18 EDT

## 2021-04-21 NOTE — PLAN OF CARE
Goal Outcome Evaluation:  Plan of Care Reviewed With: patient, daughter  Progress: improving  Outcome Summary: Patient transferred to PCU after Pacemaker placement. Daughter at bedside and attentive to patient. Patient remains in a paced rhythm. Will continue to monitor.

## 2021-04-22 ENCOUNTER — TELEPHONE (OUTPATIENT)
Dept: CARDIOLOGY | Facility: CLINIC | Age: 81
End: 2021-04-22

## 2021-04-22 ENCOUNTER — READMISSION MANAGEMENT (OUTPATIENT)
Dept: CALL CENTER | Facility: HOSPITAL | Age: 81
End: 2021-04-22

## 2021-04-22 NOTE — CASE MANAGEMENT/SOCIAL WORK
Case Management Discharge Note                Selected Continued Care - Discharged on 4/21/2021 Admission date: 4/15/2021 - Discharge disposition: Home or Self Care                     Final Discharge Disposition Code: 01 - home or self-care

## 2021-04-22 NOTE — PAYOR COMM NOTE
"Discharge notification for case# [id number]    --------  THANK YOU,    LASHAUN Holly, RN  Utilization Review  [facility]  Phone: [phone]  Fax: [phone]      NPI: [id number]  Tax ID: [id number]    Alie Montoya (80 y.o. Female)     Date of Birth Social Security Number Address Home Phone MRN    1940  [facility] [place] [address] [phone] [id number]    Shinto Marital Status          Episcopalian        Admission Date Admission Type Admitting Provider Attending Provider Department, Room/Bed    4/15/21 Emergency Leonarda Yepez MD  [facility] CARE,     Discharge Date Discharge Disposition Discharge Destination        2021 Home or Self Care              Attending Provider: (none)   Allergies: No Known Allergies    Isolation: None   Infection: None   Code Status: Prior    Ht: 165.1 cm (65\")   Wt: 71 kg (156 lb 8.4 oz)    Admission Cmt: None   Principal Problem: Hypertensive emergency [I16.1]                 Active Insurance as of 4/15/2021     Primary Coverage     Payor Plan Insurance Group Employer/Plan Group    [employer] [id number]      Payor Plan Address Payor Plan Phone Number Payor Plan Fax Number Effective Dates    [address]   2019 - None Entered    [facility] [address]       Subscriber Name Subscriber Birth Date Member ID       ALIE MONTOYA 1940 [id number]                 Emergency Contacts      (Rel.) Home Phone Work Phone Mobile Phone    JADYN RODRIGUEZ (Son) -- -- [phone]    MOSES GARCIA (Grandchild) [phone] -- --    CARLITO RODRIGUEZ (Grandchild) [phone] -- [phone]               Discharge Summary      Leonarda Yepez MD at 21 1509                [facility] Medicine Services  DISCHARGE SUMMARY        Prepared For PCP:  Brian Nazario APRN    Patient Name: Alie Montoya  : 1940  MRN: [id number]      Date of Admission:   " 4/15/2021    Date of Discharge:  4/21/2021    Length of stay:  LOS: 5 days     Hospital Course     Presenting Problem:   Abnormal EKG [R94.31]  Dyspnea, unspecified type [R06.00]  Non-intractable vomiting with nausea, unspecified vomiting type [R11.2]  Chronic kidney disease, unspecified CKD stage [N18.9]  Congestive heart failure, unspecified HF chronicity, unspecified heart failure type (CMS/[facility]) [I50.9]      Active Hospital Problems    Diagnosis  POA   • **Hypertensive emergency [I16.1]  Yes     Priority: High   • Mixed hyperlipidemia [E78.2]  Yes     Priority: High   • Essential hypertension [I10]  Yes     Priority: High   • Type 2 diabetes mellitus (CMS/[facility]) [E11.9]  Yes     Priority: High   • CKD (chronic kidney disease) stage 3, GFR 30-59 ml/min (CMS/[facility]) [N18.30]  Yes     Priority: High   • Sick sinus syndrome (CMS/[facility]) [I49.5]  Unknown     Priority: High   • HFrEF (heart failure with reduced ejection fraction) (CMS/[facility]) [I50.20]  Yes     Priority: Medium   • Anemia due to stage 3a chronic kidney disease (CMS/[facility]) [N18.31, D63.1]  Yes     Priority: Medium   • Dyspnea [R06.00]  Yes   • Junctional bradycardia [R00.1]  Unknown      Resolved Hospital Problems   No resolved problems to display.     Hospital Course:  Brad Woodward is a 80 y.o. female with a past history of hypertension, diabetes type 2, hyperlipidemia as well as chronic kidney disease who presented with shortness of breath and increased weakness and lethargy.  The patient was found to have sick sinus syndrome and was evaluated for a pacemaker during her hospital stay.  It was felt that she would benefit from that and underwent pacemaker placement on 4/20/2021.  The patient did well with the procedure and transferred back to the PCU for overnight care.  In the morning the patient was doing extremely well and was cleared by cardiology for discharge in the afternoon.  The patient was not complaining of any pain and overall she was doing well.  The patient  also had significant hypertension and her hypertensive medications were adjusted while she was in the hospital.    The patient is a full code at the time of discharge.  The patient on is on a consistent carbohydrate cardiac diet.  The patient should check her blood sugars before every meal and at bedtime.  The patient had pending studies of renin aldosterone, metanephrines and catecholamines that had not reported at the time of discharge.  The patient is to follow-up with nephrology Dr. Farooq who will review those with the patient once they become available.  The patient should follow-up with Dr. Aldridge as per his instruction within the next 1 to 2 days.  The patient should follow-up with her primary care provider in 3 to 5 days.  The patient's medications are as listed in that section of this report.  The patient is discharged in satisfactory condition.          Recommendation for Outpatient Providers:             Reasons For Change In Medications and Indications for New Medications:        Day of Discharge     HPI:   Patient is feeling very well today.    Vital Signs:   Temp:  [97.7 °F (36.5 °C)-98 °F (36.7 °C)] 97.7 °F (36.5 °C)  Heart Rate:  [41-62] 62  Resp:  [15-20] 18  BP: (143-174)/(50-68) 151/58     Physical Exam:  Physical Exam  Vitals and nursing note reviewed.   Constitutional:       General: She is not in acute distress.     Appearance: Normal appearance. She is well-developed. She is not ill-appearing, toxic-appearing or diaphoretic.      Comments: The patient is very thin and frail in her appearance.  Family is surrounding her and appears to care for her a great deal.   HENT:      Head: Normocephalic and atraumatic.      Right Ear: Ear canal and external ear normal.      Left Ear: Ear canal and external ear normal.      Nose: Nose normal. No congestion or rhinorrhea.      Mouth/Throat:      Mouth: Mucous membranes are moist.      Pharynx: No oropharyngeal exudate.   Eyes:      General: No scleral  icterus.        Right eye: No discharge.         Left eye: No discharge.      Extraocular Movements: Extraocular movements intact.      Conjunctiva/sclera: Conjunctivae normal.      Pupils: Pupils are equal, round, and reactive to light.   Neck:      Thyroid: No thyromegaly.      Vascular: No carotid bruit or JVD.      Trachea: No tracheal deviation.   Cardiovascular:      Rate and Rhythm: Normal rate and regular rhythm.      Pulses: Normal pulses.      Heart sounds: Normal heart sounds. No murmur heard.   No friction rub. No gallop.    Pulmonary:      Effort: Pulmonary effort is normal. No respiratory distress.      Breath sounds: Normal breath sounds. No stridor. No wheezing, rhonchi or rales.   Chest:      Chest wall: No tenderness.   Abdominal:      General: Bowel sounds are normal. There is no distension.      Palpations: Abdomen is soft. There is no mass.      Tenderness: There is no abdominal tenderness. There is no guarding or rebound.      Hernia: No hernia is present.   Musculoskeletal:         General: No swelling, tenderness, deformity or signs of injury. Normal range of motion.      Cervical back: Normal range of motion and neck supple. No rigidity. No muscular tenderness.      Right lower leg: No edema.      Left lower leg: No edema.   Lymphadenopathy:      Cervical: No cervical adenopathy.   Skin:     General: Skin is warm and dry.      Coloration: Skin is not jaundiced or pale.      Findings: No bruising, erythema or rash.   Neurological:      General: No focal deficit present.      Mental Status: She is alert and oriented to person, place, and time. Mental status is at baseline.      Cranial Nerves: No cranial nerve deficit.      Sensory: No sensory deficit.      Motor: No weakness or abnormal muscle tone.      Coordination: Coordination normal.   Psychiatric:         Mood and Affect: Mood normal.         Behavior: Behavior normal.         Thought Content: Thought content normal.         Judgment:  Judgment normal.         Pertinent  and/or Most Recent Results     Results from last 7 days   Lab Units 04/21/21  0347 04/20/21  0532 04/19/21  0327 04/18/21  0515 04/17/21  0438 04/16/21  0042 04/15/21  1116   WBC 10*3/mm3 5.80 5.10 5.60 5.30 4.10 3.80 4.97   HEMOGLOBIN g/dL 9.4* 9.3* 9.8* 9.7* 9.6* 9.6* 11.6*   HEMATOCRIT % 27.7* 28.3* 29.5* 30.3* 28.5* 29.5* 36.4   PLATELETS 10*3/mm3 176 173 163 191 205 199 274   SODIUM mmol/L 138 136 137 140 139 139 144   POTASSIUM mmol/L 3.4* 3.7 3.3* 3.7 3.6 3.9 4.3   CHLORIDE mmol/L 102 101 102 106 106 106 111*   CO2 mmol/L 26.0 25.0 23.0 25.0 24.0 22.0 21.5*   BUN mg/dL 41* 36* 37* 31* 35* 35* 38*   CREATININE mg/dL 2.01* 2.10* 2.21* 1.88* 1.98* 2.02* 1.94*   GLUCOSE mg/dL 103* 109* 108* 119* 105* 174* 108*   CALCIUM mg/dL 7.7* 8.4* 8.4* 9.1 8.7 8.4* 9.4     Results from last 7 days   Lab Units 04/15/21  1836   BILIRUBIN mg/dL 0.3   ALK PHOS U/L 91   ALT (SGPT) U/L 28   AST (SGOT) U/L 31     Results from last 7 days   Lab Units 04/16/21  0042   CHOLESTEROL mg/dL 208*   TRIGLYCERIDES mg/dL 94   HDL CHOL mg/dL 71*     Results from last 7 days   Lab Units 04/18/21  0515 04/16/21  0042 04/15/21  1836   TSH uIU/mL 2.940  --   --    CORTISOL mcg/dL 9.13  --   --    HEMOGLOBIN A1C %  --  5.9*  --    PROBNP pg/mL  --   --  3,125.0*   TROPONIN T ng/mL  --  0.016 0.018   PROCALCITONIN ng/mL  --   --  0.08       Brief Urine Lab Results  (Last result in the past 365 days)      Color   Clarity   Blood   Leuk Est   Nitrite   Protein   CREAT   Urine HCG        04/15/21 1116 Yellow Clear Negative Negative Negative >=300 mg/dL (3+)               Microbiology Results Abnormal     Procedure Component Value - Date/Time    Respiratory Panel PCR w/COVID-19(SARS-CoV-2) VÍCTOR/DAYNA/JADYN/PAD/[facility] In-House, NP Swab in [place], 3-4 HR TAT - Swab, Nasopharynx [[id number]]  (Normal) Collected: 04/15/21 1836    Lab Status: Final result Specimen: Swab from Nasopharynx Updated: 04/15/21 2016      ADENOVIRUS, PCR Not Detected     Coronavirus 229E Not Detected     Coronavirus HKU1 Not Detected     Coronavirus NL63 Not Detected     Coronavirus OC43 Not Detected     COVID19 Not Detected     Human Metapneumovirus Not Detected     Human Rhinovirus/Enterovirus Not Detected     Influenza A PCR Not Detected     Influenza B PCR Not Detected     Parainfluenza Virus 1 Not Detected     Parainfluenza Virus 2 Not Detected     Parainfluenza Virus 3 Not Detected     Parainfluenza Virus 4 Not Detected     RSV, PCR Not Detected     Bordetella pertussis pcr Not Detected     Bordetella parapertussis PCR Not Detected     Chlamydophila pneumoniae PCR Not Detected     Mycoplasma pneumo by PCR Not Detected    Narrative:      Fact sheet for providers: https://docs.[employer]/wp-content/uploads/PFZ8482-3278-VF4.1-EUA-Provider-Fact-Sheet-3.pdf    Fact sheet for patients: https://docs.[employer]/wp-content/uploads/JXR7678-6986-OP5.1-EUA-Patient-Fact-Sheet-1.pdf    Test performed by PCR.          CT Abdomen Pelvis Without Contrast    Result Date: 4/15/2021  Impression: 1.Infiltrates are noted throughout the lung bases suggesting developing multifocal pneumonia versus pulmonary edema. Cardiomegaly is noted. 2.No evidence for significant nephrolithiasis. There is no evidence for obstructive uropathy. 3.No evidence for acute abnormality throughout the abdomen or pelvis on this noncontrast exam.  Electronically Signed By-Jim Montano MD On:4/15/2021 7:45 PM This report was finalized on [id number] by  Jim Montano MD.    XR Chest 1 View    Result Date: 4/20/2021  Impression: 1. Status post left-sided AICD placement. 2. No pneumothorax. 3. Stable cardiomegaly.  Electronically Signed By-Austin Kendrick MD On:4/20/2021 7:41 PM This report was finalized on [id number] by  Austin Kendrick MD.    XR Chest 1 View    Result Date: 4/15/2021  Impression: 1.No definitive evidence for acute cardiopulmonary process. 2.Note is made of  cardiomegaly.  Electronically Signed By-Jim Montano MD On:4/15/2021 6:47 PM This report was finalized on [id number] by  Jim Montano MD.      Results for orders placed during the hospital encounter of 04/15/21    Duplex Renal Artery - Bilateral Complete CAR    Interpretation Summary  · Normal right renal artery.  · Normal left renal artery.  · Difficult study due to patient tolerance and body habitus.      Results for orders placed during the hospital encounter of 04/15/21    Duplex Renal Artery - Bilateral Complete CAR    Interpretation Summary  · Normal right renal artery.  · Normal left renal artery.  · Difficult study due to patient tolerance and body habitus.      Results for orders placed during the hospital encounter of 04/15/21    Adult Transthoracic Echo Complete W/ Cont if Necessary Per Protocol    Interpretation Summary  · Left ventricular ejection fraction appears to be 61 - 65%.  · Left ventricular wall thickness is consistent with moderate basal asymmetric hypertrophy.  · The right ventricular cavity is mildly dilated.  · Mild to moderate aortic valve stenosis is present.  · Moderate mitral valve regurgitation is present.  · No pericardial effusion noted              Test Results Pending at Discharge  Pending Labs     Order Current Status    Aldosterone / Renin Ratio In process    Catecholamines, Fractionated, Urine, 24 Hour - Urine, Clean Catch In process    Metanephrines, Urine, 24 Hour - Urine, Clean Catch In process            Procedures Performed  Procedure(s):  Pacemaker DC new         Consults:   Consults     Date and Time Order Name Status Description    4/18/2021  3:14 PM Inpatient Cardiology Consult Completed     4/16/2021  9:07 AM Inpatient Nephrology Consult Completed     4/16/2021  8:54 AM Inpatient Nephrology Consult      4/15/2021  8:18 PM Hospitalist (on-call MD unless specified) Completed             Discharge Details        Discharge Medications      New Medications       Instructions Start Date   Canagliflozin 300 MG tablet tablet  Commonly known as: INVOKANA   100 mg, Oral, Daily   Start Date: April 22, 2021     chlorthalidone 50 MG tablet  Commonly known as: HYGROTEN   50 mg, Oral, Daily   Start Date: April 22, 2021     cloNIDine 0.2 MG tablet  Commonly known as: CATAPRES   0.2 mg, Oral, Every 12 Hours Scheduled      HYDROcodone-acetaminophen 5-325 MG per tablet  Commonly known as: NORCO   1 tablet, Oral, Every 4 Hours PRN      nitroglycerin 0.4 MG SL tablet  Commonly known as: NITROSTAT   0.4 mg, Sublingual, Every 5 Minutes PRN, Take no more than 3 doses in 15 minutes.         Continue These Medications      Instructions Start Date   amLODIPine 10 MG tablet  Commonly known as: NORVASC   10 mg, Oral, Daily      aspirin 81 MG chewable tablet   81 mg, Oral, Daily      atorvastatin 80 MG tablet  Commonly known as: LIPITOR   80 mg, Oral, Daily      glipizide 5 MG tablet  Commonly known as: GLUCOTROL   2.5 mg, Oral, Daily      losartan 100 MG tablet  Commonly known as: COZAAR   100 mg, Oral, Daily         Stop These Medications    hydrALAZINE 25 MG tablet  Commonly known as: APRESOLINE            No Known Allergies      Discharge Disposition:  Home or Self Care    Diet:  Hospital:  Diet Order   Procedures   • Diet Cardiac, Diabetic/Consistent Carbs; 2gm Na+; Diabetic - Consistent Carb         Discharge Activity:   Activity Instructions     Activity as Tolerated              CODE STATUS:    Code Status and Medical Interventions:   Ordered at: 04/15/21 2147     Code Status:    CPR     Medical Interventions (Level of Support Prior to Arrest):    Full         Follow-up Appointments  Future Appointments   Date Time Provider Department Center   5/3/2021  1:00 PM LUIS MANUEL VALENCIA [facility]   5/3/2021  1:20 PM Zane Aldridge MD [facility]       Additional Instructions for the Follow-ups that You Need to Schedule     Discharge Follow-up with PCP   As  directed       Currently Documented PCP:    Brian Nazario APRN    PCP Phone Number:    [phone]     Follow Up Details: in 3-5 days         Discharge Follow-up with Specialty: follow up with cardiology within the week for a pacemaker check   As directed      Specialty: follow up with cardiology within the week for a pacemaker check                 Condition on Discharge:      Stable      This patient has been examined wearing appropriate Personal Protective Equipment and discussed with hospital infection control department. 04/21/21      Electronically signed by Leonarda Yepez MD, 04/21/21, 3:09 PM EDT.      Time: I spent  35  minutes on this discharge activity which included face-to-face encounter with the patient/reviewing the data in the system/coordination of the care with the nursing staff as well as consultants/documentation/entering orders.      Electronically signed by Leonarda Yepez MD at 04/21/21 9482

## 2021-04-22 NOTE — OUTREACH NOTE
Prep Survey      Responses   [facility] facility patient discharged from?  [facility]   Is LACE score < 7 ?  No   Emergency Room discharge w/ pulse ox?  No   Eligibility  Readm Mgmt   Discharge diagnosis  SSS/pacemaker,  HTN   Does the patient have one of the following disease processes/diagnoses(primary or secondary)?  General Surgery   Does the patient have Home health ordered?  No   Is there a DME ordered?  No   Comments regarding appointments  see AVS   Medication alerts for this patient  see AVS   General alerts for this patient  HX CHF   Prep survey completed?  Yes          Tara Mejia RN

## 2021-04-22 NOTE — TELEPHONE ENCOUNTER
Have her come to the office and check the site please.  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  Likely nothing to do with the pacemaker as you have indicated

## 2021-04-22 NOTE — TELEPHONE ENCOUNTER
Granddaughter called, patient D/C from hospital s/p PM placement. She sneezed x 4 and is having chills. Unable to assess temperature at home. Her pulse is strong at 74 bpm. Advised her HR is normal and family is very concerned about her. Advised PCP or ER for evaluation, symptoms do not sound PM related.

## 2021-04-23 ENCOUNTER — READMISSION MANAGEMENT (OUTPATIENT)
Dept: CALL CENTER | Facility: HOSPITAL | Age: 81
End: 2021-04-23

## 2021-04-23 LAB — QT INTERVAL: 461 MS

## 2021-04-23 NOTE — OUTREACH NOTE
General Surgery Week 1 Survey      Responses   [facility] patient discharged from?  [facility]   Does the patient have one of the following disease processes/diagnoses(primary or secondary)?  General Surgery   Week 1 attempt successful?  Yes   Call start time  1621   Call end time  1627   General alerts for this patient  Patient does not speak English. Son Dany speaks English.    Discharge diagnosis  SSS/pacemaker,  HTN   Meds reviewed with patient/caregiver?  Yes   Is the patient having any side effects they believe may be caused by any medication additions or changes?  No   Does the patient have all medications related to this admission filled (includes all antibiotics, pain medications, etc.)  No   What is keeping the patient from filling the prescriptions?  Financial   Nursing Interventions  Nurse provided patient education   Notified Case Management  Script issues   Is the patient taking all medications as directed (includes completed medication regime)?  No   What is preventing the patient from taking all medications as directed?  Other [Insurance will not cover Invokana. Son will speak with PCP at appt on 4/26/21.]   Nursing Interventions  Nurse provided patient education   Does the patient have a follow up appointment scheduled with their surgeon?  Yes [5/3/21]   Has the patient kept scheduled appointments due by today?  N/A   Comments  PCP appt is on 4/26/21   Psychosocial issues?  Yes   Psychosocial comments  Does not speak English   Did the patient receive a copy of their discharge instructions?  Yes   Nursing interventions  Reviewed instructions with patient   What is the patient's perception of their health status since discharge?  Improving   Nursing interventions  Nurse provided patient education   Is the patient /caregiver able to teach back basic post-op care?  Lifting as instructed by MD in discharge instructions   Is the patient/caregiver able to teach back signs and symptoms of incisional  infection?  Fever   Is the patient/caregiver able to teach back steps to recovery at home?  Rest and rebuild strength, gradually increase activity, Set small, achievable goals for return to baseline health   If the patient is a current smoker, are they able to teach back resources for cessation?  Not a smoker   Is the patient/caregiver able to teach back the hierarchy of who to call/visit for symptoms/problems? PCP, Specialist, Home health nurse, Urgent Care, ED, 911  Yes   Week 1 call completed?  Yes          Janet Catalan RN

## 2021-04-23 NOTE — TELEPHONE ENCOUNTER
"Spoke to family, patient is doing \"great\" today. No chills or sneezing, heart beat is normal. States incision site looks good as well. Sees PCP on Monday.  "

## 2021-04-24 LAB
METANEPH 24H UR-MRATE: 27 UG/24 HR (ref 36–209)
METANEPHS 24H UR-MCNC: 36 UG/L
NORMETANEPHRINE 24H UR-MCNC: 121 UG/L
NORMETANEPHRINE 24H UR-MRATE: 91 UG/24 HR (ref 131–612)

## 2021-04-25 LAB
ALDOST SERPL-MCNC: 7.2 NG/DL (ref 0–30)
ALDOST/RENIN PLAS-RTO: >43.1 {RATIO} (ref 0–30)
RENIN PLAS-CCNC: <0.167 NG/ML/HR (ref 0.17–5.38)

## 2021-04-26 NOTE — NURSING NOTE
Invokana was discontinued on Friday by Dr. Yepez, Patient can speak with PCP or endocrinologist to decide which medication is best. I personally [facility] and [facility] to [facility] the [facility]. Ivonne -Duong

## 2021-04-27 LAB
DOPAMINE 24H UR-MRATE: 26 UG/24 HR (ref 0–510)
DOPAMINE UR-MCNC: 35 UG/L
EPINEPH 24H UR-MRATE: 0 UG/24 HR (ref 0–20)
EPINEPH UR-MCNC: <1 UG/L
NOREPINEPH 24H UR-MRATE: 8 UG/24 HR (ref 0–135)
NOREPINEPH UR-MCNC: 10 UG/L

## 2021-04-29 ENCOUNTER — TRANSCRIBE ORDERS (OUTPATIENT)
Dept: ADMINISTRATIVE | Facility: HOSPITAL | Age: 81
End: 2021-04-29

## 2021-04-29 ENCOUNTER — LAB (OUTPATIENT)
Dept: LAB | Facility: HOSPITAL | Age: 81
End: 2021-04-29

## 2021-04-29 DIAGNOSIS — N18.4 CHRONIC KIDNEY DISEASE, STAGE IV (SEVERE) (HCC): ICD-10-CM

## 2021-04-29 DIAGNOSIS — N18.4 CHRONIC KIDNEY DISEASE, STAGE IV (SEVERE) (HCC): Primary | ICD-10-CM

## 2021-04-29 LAB
ANION GAP SERPL CALCULATED.3IONS-SCNC: 10.1 MMOL/L (ref 5–15)
BACTERIA UR QL AUTO: ABNORMAL /HPF
BASOPHILS # BLD AUTO: 0.03 10*3/MM3 (ref 0–0.2)
BASOPHILS NFR BLD AUTO: 0.5 % (ref 0–1.5)
BILIRUB UR QL STRIP: NEGATIVE
BUN SERPL-MCNC: 46 MG/DL (ref 8–23)
BUN/CREAT SERPL: 22.9 (ref 7–25)
CALCIUM SPEC-SCNC: 9 MG/DL (ref 8.6–10.5)
CHLORIDE SERPL-SCNC: 102 MMOL/L (ref 98–107)
CLARITY UR: CLEAR
CO2 SERPL-SCNC: 27.9 MMOL/L (ref 22–29)
COLOR UR: YELLOW
CREAT SERPL-MCNC: 2.01 MG/DL (ref 0.57–1)
DEPRECATED RDW RBC AUTO: 43.5 FL (ref 37–54)
EOSINOPHIL # BLD AUTO: 0.42 10*3/MM3 (ref 0–0.4)
EOSINOPHIL NFR BLD AUTO: 7.1 % (ref 0.3–6.2)
ERYTHROCYTE [DISTWIDTH] IN BLOOD BY AUTOMATED COUNT: 14.1 % (ref 12.3–15.4)
GFR SERPL CREATININE-BSD FRML MDRD: 24 ML/MIN/1.73
GFR SERPL CREATININE-BSD FRML MDRD: 29 ML/MIN/1.73
GLUCOSE SERPL-MCNC: 174 MG/DL (ref 65–99)
GLUCOSE UR STRIP-MCNC: ABNORMAL MG/DL
HCT VFR BLD AUTO: 33.6 % (ref 34–46.6)
HGB BLD-MCNC: 10.6 G/DL (ref 12–15.9)
HGB UR QL STRIP.AUTO: NEGATIVE
HYALINE CASTS UR QL AUTO: ABNORMAL /LPF
IMM GRANULOCYTES # BLD AUTO: 0.02 10*3/MM3 (ref 0–0.05)
IMM GRANULOCYTES NFR BLD AUTO: 0.3 % (ref 0–0.5)
KETONES UR QL STRIP: NEGATIVE
LEUKOCYTE ESTERASE UR QL STRIP.AUTO: ABNORMAL
LYMPHOCYTES # BLD AUTO: 1.66 10*3/MM3 (ref 0.7–3.1)
LYMPHOCYTES NFR BLD AUTO: 27.9 % (ref 19.6–45.3)
MCH RBC QN AUTO: 27 PG (ref 26.6–33)
MCHC RBC AUTO-ENTMCNC: 31.5 G/DL (ref 31.5–35.7)
MCV RBC AUTO: 85.5 FL (ref 79–97)
MONOCYTES # BLD AUTO: 0.49 10*3/MM3 (ref 0.1–0.9)
MONOCYTES NFR BLD AUTO: 8.2 % (ref 5–12)
NEUTROPHILS NFR BLD AUTO: 3.32 10*3/MM3 (ref 1.7–7)
NEUTROPHILS NFR BLD AUTO: 56 % (ref 42.7–76)
NITRITE UR QL STRIP: NEGATIVE
NRBC BLD AUTO-RTO: 0 /100 WBC (ref 0–0.2)
PH UR STRIP.AUTO: 6 [PH] (ref 5–8)
PHOSPHATE SERPL-MCNC: 4 MG/DL (ref 2.5–4.5)
PLATELET # BLD AUTO: 278 10*3/MM3 (ref 140–450)
PMV BLD AUTO: 10.8 FL (ref 6–12)
POTASSIUM SERPL-SCNC: 3.9 MMOL/L (ref 3.5–5.2)
PROT UR QL STRIP: ABNORMAL
PTH-INTACT SERPL-MCNC: 72.4 PG/ML (ref 15–65)
RBC # BLD AUTO: 3.93 10*6/MM3 (ref 3.77–5.28)
RBC # UR: ABNORMAL /HPF
REF LAB TEST METHOD: ABNORMAL
SODIUM SERPL-SCNC: 140 MMOL/L (ref 136–145)
SP GR UR STRIP: 1.01 (ref 1–1.03)
SQUAMOUS #/AREA URNS HPF: ABNORMAL /HPF
UROBILINOGEN UR QL STRIP: ABNORMAL
WBC # BLD AUTO: 5.94 10*3/MM3 (ref 3.4–10.8)
WBC UR QL AUTO: ABNORMAL /HPF

## 2021-04-29 PROCEDURE — 84100 ASSAY OF PHOSPHORUS: CPT

## 2021-04-29 PROCEDURE — 83970 ASSAY OF PARATHORMONE: CPT

## 2021-04-29 PROCEDURE — 81001 URINALYSIS AUTO W/SCOPE: CPT

## 2021-04-29 PROCEDURE — 36415 COLL VENOUS BLD VENIPUNCTURE: CPT

## 2021-04-29 PROCEDURE — 85025 COMPLETE CBC W/AUTO DIFF WBC: CPT

## 2021-04-29 PROCEDURE — 87086 URINE CULTURE/COLONY COUNT: CPT

## 2021-04-29 PROCEDURE — 80048 BASIC METABOLIC PNL TOTAL CA: CPT

## 2021-04-30 LAB — BACTERIA SPEC AEROBE CULT: NORMAL

## 2021-05-01 ENCOUNTER — READMISSION MANAGEMENT (OUTPATIENT)
Dept: CALL CENTER | Facility: HOSPITAL | Age: 81
End: 2021-05-01

## 2021-05-01 NOTE — OUTREACH NOTE
General Surgery Week 2 Survey      Responses   [facility] patient discharged from?  [facility]   Does the patient have one of the following disease processes/diagnoses(primary or secondary)?  General Surgery   Week 2 attempt successful?  Yes   Call start time  1626   Call end time  1633   Discharge diagnosis  SSS/pacemaker,  HTN   Person spoke with today (if not patient) and relationship  jose j Saenz reviewed with patient/caregiver?  Yes   Is the patient having any side effects they believe may be caused by any medication additions or changes?  No   Does the patient have all medications related to this admission filled (includes all antibiotics, pain medications, etc.)  No [insurance did not cover Invokana, discussed with MD, pt does not need, will not be replaced]   Nursing Interventions  No intervention needed   Is the patient taking all medications as directed (includes completed medication regime)?  Yes   Does the patient have a follow up appointment scheduled with their surgeon?  Yes   Has the patient kept scheduled appointments due by today?  Yes   Has home health visited the patient within 72 hours of discharge?  N/A   Psychosocial issues?  No   Did the patient receive a copy of their discharge instructions?  Yes   Nursing interventions  Reviewed instructions with patient   What is the patient's perception of their health status since discharge?  Improving   Nursing interventions  Nurse provided patient education   Is the patient /caregiver able to teach back basic post-op care?  Take showers only when approved by MD-sponge bathe until then, No tub bath, swimming, or hot tub until instructed by MD, Keep incision areas clean,dry and protected, Do not remove steri-strips, Lifting as instructed by MD in discharge instructions   Is the patient/caregiver able to teach back signs and symptoms of incisional infection?  Increased redness, swelling or pain at the incisonal site, Incisional warmth, Fever,  Increased drainage or bleeding, Pus or odor from incision   Is the patient/caregiver able to teach back steps to recovery at home?  Set small, achievable goals for return to baseline health, Rest and rebuild strength, gradually increase activity, Eat a well-balance diet   Is the patient/caregiver able to teach back the hierarchy of who to call/visit for symptoms/problems? PCP, Specialist, Home health nurse, Urgent Care, ED, 911  Yes   Additional teach back comments  son states pt improves every day, appetite good, strength not all the way back yet   Week 2 call completed?  Yes          Leonarda Bruce, RN

## 2021-05-03 ENCOUNTER — CLINICAL SUPPORT NO REQUIREMENTS (OUTPATIENT)
Dept: CARDIOLOGY | Facility: CLINIC | Age: 81
End: 2021-05-03

## 2021-05-03 ENCOUNTER — OFFICE VISIT (OUTPATIENT)
Dept: CARDIOLOGY | Facility: CLINIC | Age: 81
End: 2021-05-03

## 2021-05-03 VITALS
TEMPERATURE: 97.4 F | SYSTOLIC BLOOD PRESSURE: 185 MMHG | HEART RATE: 61 BPM | BODY MASS INDEX: 24.99 KG/M2 | HEIGHT: 65 IN | WEIGHT: 150 LBS | DIASTOLIC BLOOD PRESSURE: 103 MMHG

## 2021-05-03 DIAGNOSIS — Z95.0 PRESENCE OF CARDIAC PACEMAKER: ICD-10-CM

## 2021-05-03 DIAGNOSIS — I49.5 SICK SINUS SYNDROME (HCC): Primary | ICD-10-CM

## 2021-05-03 DIAGNOSIS — E11.9 TYPE 2 DIABETES MELLITUS WITHOUT COMPLICATION, WITHOUT LONG-TERM CURRENT USE OF INSULIN (HCC): ICD-10-CM

## 2021-05-03 DIAGNOSIS — I10 ESSENTIAL HYPERTENSION: ICD-10-CM

## 2021-05-03 DIAGNOSIS — Z95.0 PACEMAKER: ICD-10-CM

## 2021-05-03 DIAGNOSIS — E78.2 MIXED HYPERLIPIDEMIA: Primary | ICD-10-CM

## 2021-05-03 DIAGNOSIS — I49.5 SICK SINUS SYNDROME (HCC): ICD-10-CM

## 2021-05-03 DIAGNOSIS — R00.1 JUNCTIONAL BRADYCARDIA: ICD-10-CM

## 2021-05-03 PROCEDURE — 93280 PM DEVICE PROGR EVAL DUAL: CPT | Performed by: INTERNAL MEDICINE

## 2021-05-03 PROCEDURE — 99214 OFFICE O/P EST MOD 30 MIN: CPT | Performed by: INTERNAL MEDICINE

## 2021-05-03 RX ORDER — MONTELUKAST SODIUM 10 MG/1
10 TABLET ORAL DAILY
COMMUNITY
Start: 2021-03-31 | End: 2021-11-15

## 2021-05-03 NOTE — PROGRESS NOTES
"    Subjective:     Encounter Date:05/03/2021      Patient ID: Brad Woodward is a 80 y.o. female.    Chief Complaint:  History of Present Illness 80-year-old female with history of hyperlipidemia hypertension sick sinus syndrome status post pacemaker placement diabetes presents to my office for follow-up.  Patient is doing very well without any signs of chest pain or shortness of breath at rest on exertion.  No complaints any PND orthopnea.  No palpitation dizziness syncope or swelling of the feet.  She says that she is feeling better.  She is able to walk well.  Her pacemaker is working very well.    The following portions of the patient's history were reviewed and updated as appropriate: allergies, current medications, past family history, past medical history, past social history, past surgical history and problem list.  Past Medical History:   Diagnosis Date   • Diabetes mellitus (CMS/HCC)    • Hyperlipidemia    • Hypertension      Past Surgical History:   Procedure Laterality Date   • CARDIAC ELECTROPHYSIOLOGY PROCEDURE N/A 4/20/2021    Procedure: Pacemaker DC new;  Surgeon: Yovany Navarrete MD;  Location: [facility] CATH INVASIVE LOCATION;  Service: Cardiovascular;  Laterality: N/A;   Current outpatient and discharge medications have been reconciled for the patient.  Reviewed by: Zane Aldridge MD    BP (!) 185/103   Pulse 61   Temp 97.4 °F (36.3 °C)   Ht 165.1 cm (65\")   Wt 68 kg (150 lb)   BMI 24.96 kg/m²   History reviewed. No pertinent family history.    Current Outpatient Medications:   •  amLODIPine (NORVASC) 10 MG tablet, Take 10 mg by mouth Daily., Disp: , Rfl:   •  aspirin 81 MG chewable tablet, Chew 81 mg Daily., Disp: , Rfl:   •  atorvastatin (LIPITOR) 80 MG tablet, Take 80 mg by mouth Daily., Disp: , Rfl:   •  chlorthalidone (HYGROTEN) 50 MG tablet, Take 1 tablet by mouth Daily for 30 days., Disp: 30 tablet, Rfl: 0  •  cloNIDine (CATAPRES) 0.2 MG tablet, Take 1 tablet by mouth Every 12 (Twelve) Hours., " Disp: 60 tablet, Rfl: 0  •  glipizide (GLUCOTROL) 5 MG tablet, Take 2.5 mg by mouth Daily., Disp: , Rfl:   •  losartan (COZAAR) 100 MG tablet, Take 100 mg by mouth Daily., Disp: , Rfl:   •  montelukast (SINGULAIR) 10 MG tablet, Take 10 mg by mouth Daily., Disp: , Rfl:   •  nitroglycerin (NITROSTAT) 0.4 MG SL tablet, Place 1 tablet under the tongue Every 5 (Five) Minutes As Needed for Chest Pain (Only if SBP Greater Than 100). Take no more than 3 doses in 15 minutes., Disp: 25 tablet, Rfl: 0  No Known Allergies  Social History     Socioeconomic History   • Marital status:      Spouse name: Not on file   • Number of children: Not on file   • Years of education: Not on file   • Highest education level: Not on file   Tobacco Use   • Smoking status: Never Smoker   • Smokeless tobacco: Never Used   Substance and Sexual Activity   • Alcohol use: No   • Drug use: No   • Sexual activity: Defer     Review of Systems   Constitutional: Positive for malaise/fatigue. Negative for fever.   Cardiovascular: Negative for chest pain, dyspnea on exertion, leg swelling and palpitations.   Respiratory: Negative for cough and shortness of breath.    Skin: Negative for rash.   Gastrointestinal: Negative for abdominal pain, nausea and vomiting.   Neurological: Negative for focal weakness, headaches, light-headedness and numbness.   All other systems reviewed and are negative.             Objective:     Constitutional:       Appearance: Well-developed.   Eyes:      General: No scleral icterus.     Conjunctiva/sclera: Conjunctivae normal.   HENT:      Head: Normocephalic and atraumatic.   Neck:      Vascular: No carotid bruit or JVD.   Pulmonary:      Effort: Pulmonary effort is normal.      Breath sounds: Normal breath sounds. No wheezing. No rales.   Cardiovascular:      Normal rate. Regular rhythm.   Pulses:     Intact distal pulses.   Abdominal:      General: Bowel sounds are normal.      Palpations: Abdomen is soft.    Musculoskeletal:      Cervical back: Normal range of motion and neck supple. Skin:     General: Skin is warm and dry.      Findings: No rash.   Neurological:      Mental Status: Alert.       Procedures    Lab Review:         MDM  1.  Sick sinus syndrome status post pacemaker placement  Patient's pacemaker is working very well and is doing well now after the pacemaker is placed  2.  Hypertension  Patient blood pressure slightly high and she is on amlodipine chlorthalidone clonidine losartan and I will start her on low-dose beta-blockers if her blood pressure still high  3.  Hyperlipidemia  Patient lipid levels are followed by the primary care doctor and she is on statins  4.  Diabetes  Patient is on oral medicines and followed by the primary care doctor.

## 2021-05-11 ENCOUNTER — READMISSION MANAGEMENT (OUTPATIENT)
Dept: CALL CENTER | Facility: HOSPITAL | Age: 81
End: 2021-05-11

## 2021-05-11 NOTE — OUTREACH NOTE
"General Surgery Week 3 Survey      Responses   [facility] patient discharged from?  [facility]   Does the patient have one of the following disease processes/diagnoses(primary or secondary)?  General Surgery   Week 3 attempt successful?  Yes   Call start time  1315   Call end time  1321   General alerts for this patient  Patient does not speak English. Son Dany speaks English.    Discharge diagnosis  SSS/pacemaker,  HTN   Is patient permission given to speak with other caregiver?  Yes   Person spoke with today (if not patient) and relationship  Dany, son   Is the patient taking all medications as directed (includes completed medication regime)?  Yes   Does the patient have a follow up appointment scheduled with their surgeon?  Yes   Has the patient kept scheduled appointments due by today?  Yes   Has home health visited the patient within 72 hours of discharge?  N/A   Psychosocial issues?  No   Psychosocial comments  Does not speak English   What is the patient's perception of their health status since discharge?  Improving   Is the patient/caregiver able to teach back steps to recovery at home?  Set small, achievable goals for return to baseline health, Rest and rebuild strength, gradually increase activity, Make a list of questions for surgeon's appointment   If the patient is a current smoker, are they able to teach back resources for cessation?  Not a smoker   Additional teach back comments  States she is doing \"great\".  Energy is getting better every day.  He monitors her vitals 2xday.  He states she is going to travel back to [facility] for awhile then will come back here.  Advised to contact her doctors and to see if a 3 month prescription is possible so she will not be without meds.  Explained to look at bottles to see which drs prescribed medications.   Week 3 call completed?  Yes   Revoked  No further contact(revokes)-requires comment   Is the patient interested in additional calls from an ambulatory " ?  NOTE:  applies to high risk patients requiring additional follow-up.  No   Graduated/Revoked comments  Son to contact drs to get 3 month refills due to pt is going to travel to [facility]          Melissa Canales LPN

## 2021-07-20 PROCEDURE — 93294 REM INTERROG EVL PM/LDLS PM: CPT | Performed by: INTERNAL MEDICINE

## 2021-07-20 PROCEDURE — 93296 REM INTERROG EVL PM/IDS: CPT | Performed by: INTERNAL MEDICINE

## 2021-11-01 ENCOUNTER — TELEPHONE (OUTPATIENT)
Dept: CARDIOLOGY | Facility: CLINIC | Age: 81
End: 2021-11-01

## 2021-11-01 NOTE — TELEPHONE ENCOUNTER
The 15th would be our first available day, you can move patient up that day with appointment with us and Katya

## 2021-11-01 NOTE — TELEPHONE ENCOUNTER
PATIENT HAD PM PLACED IN MAY AND LEFT FOR [facility], HAS HAD DIFFICULTY RAISING ARM ABOVE SHOULDER, DISCOMFORT FOR THE PAST FEW MONTHS. CAN WE SEE HER SOONER THAN THE 18TH?

## 2021-11-04 ENCOUNTER — TELEPHONE (OUTPATIENT)
Dept: CARDIOLOGY | Facility: CLINIC | Age: 81
End: 2021-11-04

## 2021-11-04 NOTE — TELEPHONE ENCOUNTER
Had message from patients granddaughter. Patient  is experiencing really bad cold chills. Called back, and asked if they had contacted to PCP and they said no. They have apt with PCP 11/9/21. I asked if they thought the cold chills were heart related and they were not sure.  They just wanted this on her record for her upcoming apt and to see if Dr. Aldridge had any advice?

## 2021-11-09 ENCOUNTER — HOSPITAL ENCOUNTER (OUTPATIENT)
Dept: GENERAL RADIOLOGY | Facility: HOSPITAL | Age: 81
Discharge: HOME | End: 2021-11-09

## 2021-11-09 ENCOUNTER — TRANSCRIBE ORDERS (OUTPATIENT)
Dept: ADMINISTRATIVE | Facility: HOSPITAL | Age: 81
End: 2021-11-09

## 2021-11-09 DIAGNOSIS — M25.512 BILATERAL SHOULDER PAIN, UNSPECIFIED CHRONICITY: ICD-10-CM

## 2021-11-09 DIAGNOSIS — M25.511 BILATERAL SHOULDER PAIN, UNSPECIFIED CHRONICITY: ICD-10-CM

## 2021-11-09 DIAGNOSIS — M25.512 BILATERAL SHOULDER PAIN, UNSPECIFIED CHRONICITY: Primary | ICD-10-CM

## 2021-11-09 DIAGNOSIS — M25.511 BILATERAL SHOULDER PAIN, UNSPECIFIED CHRONICITY: Primary | ICD-10-CM

## 2021-11-09 PROCEDURE — 73030 X-RAY EXAM OF SHOULDER: CPT

## 2021-11-15 ENCOUNTER — CLINICAL SUPPORT NO REQUIREMENTS (OUTPATIENT)
Dept: CARDIOLOGY | Facility: CLINIC | Age: 81
End: 2021-11-15

## 2021-11-15 ENCOUNTER — OFFICE VISIT (OUTPATIENT)
Dept: CARDIOLOGY | Facility: CLINIC | Age: 81
End: 2021-11-15

## 2021-11-15 VITALS
HEIGHT: 65 IN | SYSTOLIC BLOOD PRESSURE: 114 MMHG | OXYGEN SATURATION: 99 % | DIASTOLIC BLOOD PRESSURE: 64 MMHG | BODY MASS INDEX: 25.16 KG/M2 | WEIGHT: 151 LBS | HEART RATE: 60 BPM

## 2021-11-15 DIAGNOSIS — I49.5 SICK SINUS SYNDROME (HCC): Primary | ICD-10-CM

## 2021-11-15 DIAGNOSIS — R00.1 JUNCTIONAL BRADYCARDIA: ICD-10-CM

## 2021-11-15 DIAGNOSIS — E78.2 MIXED HYPERLIPIDEMIA: ICD-10-CM

## 2021-11-15 DIAGNOSIS — E11.9 TYPE 2 DIABETES MELLITUS WITHOUT COMPLICATION, WITHOUT LONG-TERM CURRENT USE OF INSULIN (HCC): ICD-10-CM

## 2021-11-15 DIAGNOSIS — Z95.0 PACEMAKER: ICD-10-CM

## 2021-11-15 DIAGNOSIS — I10 ESSENTIAL HYPERTENSION: ICD-10-CM

## 2021-11-15 PROCEDURE — 93294 REM INTERROG EVL PM/LDLS PM: CPT | Performed by: INTERNAL MEDICINE

## 2021-11-15 PROCEDURE — 93296 REM INTERROG EVL PM/IDS: CPT | Performed by: INTERNAL MEDICINE

## 2021-11-15 PROCEDURE — 99214 OFFICE O/P EST MOD 30 MIN: CPT | Performed by: INTERNAL MEDICINE

## 2021-11-15 PROCEDURE — 93280 PM DEVICE PROGR EVAL DUAL: CPT | Performed by: INTERNAL MEDICINE

## 2021-11-15 RX ORDER — CHLORTHALIDONE 50 MG/1
50 TABLET ORAL DAILY
COMMUNITY

## 2021-11-15 RX ORDER — HYDRALAZINE HYDROCHLORIDE 25 MG/1
25 TABLET, FILM COATED ORAL 3 TIMES DAILY
COMMUNITY
End: 2022-06-13 | Stop reason: SDUPTHER

## 2021-11-15 NOTE — PROGRESS NOTES
"    Subjective:     Encounter Date:11/15/2021      Patient ID: Brad Woodward is a 81 y.o. female.    Chief Complaint:  History of Present Illness 81-year-old female with history of sick sinus syndrome status post pacemaker placement history of hypertension hyperlipidemia and diabetes presents to my office for follow-up.  Patient is currently stable without any symptoms of chest pain or shortness of breath at rest on exertion.  No complains any PND orthopnea.  No palpitation dizziness syncope or swelling of the feet but she is taking meds regularly her pacemaker is working very well.  She does not smoke.    The following portions of the patient's history were reviewed and updated as appropriate: allergies, current medications, past family history, past medical history, past social history, past surgical history and problem list.  Past Medical History:   Diagnosis Date   • Diabetes mellitus (HCC)    • Hyperlipidemia    • Hypertension      Past Surgical History:   Procedure Laterality Date   • CARDIAC ELECTROPHYSIOLOGY PROCEDURE N/A 4/20/2021    Procedure: Pacemaker DC new;  Surgeon: Yovany Navarrete MD;  Location: [facility] CATH INVASIVE LOCATION;  Service: Cardiovascular;  Laterality: N/A;     /64 (BP Location: Left arm, Patient Position: Sitting, Cuff Size: Adult)   Pulse 60   Ht 165.1 cm (65\")   Wt 68.5 kg (151 lb)   SpO2 99%   Breastfeeding No   BMI 25.13 kg/m²   Family History   Family history unknown: Yes       Current Outpatient Medications:   •  amLODIPine (NORVASC) 10 MG tablet, Take 10 mg by mouth Daily., Disp: , Rfl:   •  aspirin 81 MG chewable tablet, Chew 81 mg Daily., Disp: , Rfl:   •  atorvastatin (LIPITOR) 80 MG tablet, Take 80 mg by mouth Daily., Disp: , Rfl:   •  chlorthalidone (HYGROTEN) 50 MG tablet, Take 50 mg by mouth Daily., Disp: , Rfl:   •  cloNIDine (CATAPRES) 0.2 MG tablet, Take 1 tablet by mouth Every 12 (Twelve) Hours., Disp: 60 tablet, Rfl: 0  •  glipizide (GLUCOTROL) 5 MG tablet, " Take 2.5 mg by mouth Daily., Disp: , Rfl:   •  hydrALAZINE (APRESOLINE) 25 MG tablet, Take 25 mg by mouth 3 (Three) Times a Day., Disp: , Rfl:   •  nitroglycerin (NITROSTAT) 0.4 MG SL tablet, Place 1 tablet under the tongue Every 5 (Five) Minutes As Needed for Chest Pain (Only if SBP Greater Than 100). Take no more than 3 doses in 15 minutes., Disp: 25 tablet, Rfl: 0  •  chlorthalidone (HYGROTEN) 50 MG tablet, Take 1 tablet by mouth Daily for 30 days., Disp: 30 tablet, Rfl: 0  No Known Allergies  Social History     Socioeconomic History   • Marital status:    Tobacco Use   • Smoking status: Never Smoker   • Smokeless tobacco: Never Used   Vaping Use   • Vaping Use: Never used   Substance and Sexual Activity   • Alcohol use: No   • Drug use: No   • Sexual activity: Defer     Review of Systems   Constitutional: Positive for malaise/fatigue. Negative for fever.   Cardiovascular: Negative for chest pain, dyspnea on exertion and palpitations.   Respiratory: Negative for cough and shortness of breath.    Skin: Negative for rash.   Gastrointestinal: Negative for abdominal pain, nausea and vomiting.   Neurological: Negative for focal weakness and headaches.   All other systems reviewed and are negative.             Objective:     Constitutional:       Appearance: Well-developed.   Eyes:      General: No scleral icterus.     Conjunctiva/sclera: Conjunctivae normal.   HENT:      Head: Normocephalic and atraumatic.   Neck:      Vascular: No carotid bruit or JVD.   Pulmonary:      Effort: Pulmonary effort is normal.      Breath sounds: Normal breath sounds. No wheezing. No rales.   Cardiovascular:      Normal rate. Regular rhythm.   Pulses:     Intact distal pulses.   Abdominal:      General: Bowel sounds are normal.      Palpations: Abdomen is soft.   Musculoskeletal:      Cervical back: Normal range of motion and neck supple. Skin:     General: Skin is warm and dry.      Findings: No rash.   Neurological:      Mental  Status: Alert.       Procedures    Lab Review:         MDM  1.  Sick sinus syndrome status post pacemaker placement  Patient's pacemaker is working very well  2.  Hypertension  Patient blood pressure currently stable on hydralazine and amlodipine  3.  Hyperlipidemia  Patient is on Lipitor and the lipid levels are well within normal meds  4.  Diabetes  Patient is on oral medicines and followed by the primary care doctor.      Patient's previous medical records, labs, and EKG were reviewed and discussed with the patient at today's visit.

## 2021-11-29 ENCOUNTER — LAB (OUTPATIENT)
Dept: LAB | Facility: HOSPITAL | Age: 81
End: 2021-11-29

## 2021-11-29 ENCOUNTER — TRANSCRIBE ORDERS (OUTPATIENT)
Dept: ADMINISTRATIVE | Facility: HOSPITAL | Age: 81
End: 2021-11-29

## 2021-11-29 DIAGNOSIS — N18.4 CKD (CHRONIC KIDNEY DISEASE), STAGE IV (HCC): Primary | ICD-10-CM

## 2021-11-29 DIAGNOSIS — N18.4 CKD (CHRONIC KIDNEY DISEASE), STAGE IV (HCC): ICD-10-CM

## 2021-11-29 LAB
ANION GAP SERPL CALCULATED.3IONS-SCNC: 10.5 MMOL/L (ref 5–15)
BACTERIA UR QL AUTO: ABNORMAL /HPF
BASOPHILS # BLD AUTO: 0.02 10*3/MM3 (ref 0–0.2)
BASOPHILS NFR BLD AUTO: 0.4 % (ref 0–1.5)
BILIRUB UR QL STRIP: NEGATIVE
BUN SERPL-MCNC: 44 MG/DL (ref 8–23)
BUN/CREAT SERPL: 23.4 (ref 7–25)
CALCIUM SPEC-SCNC: 9.7 MG/DL (ref 8.6–10.5)
CHLORIDE SERPL-SCNC: 104 MMOL/L (ref 98–107)
CLARITY UR: ABNORMAL
CO2 SERPL-SCNC: 25.5 MMOL/L (ref 22–29)
COLOR UR: YELLOW
CREAT SERPL-MCNC: 1.88 MG/DL (ref 0.57–1)
CREAT UR-MCNC: 75.2 MG/DL
DEPRECATED RDW RBC AUTO: 43.6 FL (ref 37–54)
EOSINOPHIL # BLD AUTO: 0.17 10*3/MM3 (ref 0–0.4)
EOSINOPHIL NFR BLD AUTO: 3.1 % (ref 0.3–6.2)
ERYTHROCYTE [DISTWIDTH] IN BLOOD BY AUTOMATED COUNT: 13.6 % (ref 12.3–15.4)
GFR SERPL CREATININE-BSD FRML MDRD: 26 ML/MIN/1.73
GFR SERPL CREATININE-BSD FRML MDRD: 31 ML/MIN/1.73
GLUCOSE SERPL-MCNC: 136 MG/DL (ref 65–99)
GLUCOSE UR STRIP-MCNC: NEGATIVE MG/DL
HCT VFR BLD AUTO: 35.6 % (ref 34–46.6)
HGB BLD-MCNC: 11.3 G/DL (ref 12–15.9)
HGB UR QL STRIP.AUTO: NEGATIVE
HYALINE CASTS UR QL AUTO: ABNORMAL /LPF
IMM GRANULOCYTES # BLD AUTO: 0.02 10*3/MM3 (ref 0–0.05)
IMM GRANULOCYTES NFR BLD AUTO: 0.4 % (ref 0–0.5)
KETONES UR QL STRIP: NEGATIVE
LEUKOCYTE ESTERASE UR QL STRIP.AUTO: ABNORMAL
LYMPHOCYTES # BLD AUTO: 1.94 10*3/MM3 (ref 0.7–3.1)
LYMPHOCYTES NFR BLD AUTO: 35.7 % (ref 19.6–45.3)
MCH RBC QN AUTO: 27.9 PG (ref 26.6–33)
MCHC RBC AUTO-ENTMCNC: 31.7 G/DL (ref 31.5–35.7)
MCV RBC AUTO: 87.9 FL (ref 79–97)
MONOCYTES # BLD AUTO: 0.36 10*3/MM3 (ref 0.1–0.9)
MONOCYTES NFR BLD AUTO: 6.6 % (ref 5–12)
NEUTROPHILS NFR BLD AUTO: 2.93 10*3/MM3 (ref 1.7–7)
NEUTROPHILS NFR BLD AUTO: 53.8 % (ref 42.7–76)
NITRITE UR QL STRIP: POSITIVE
NRBC BLD AUTO-RTO: 0 /100 WBC (ref 0–0.2)
PH UR STRIP.AUTO: 6.5 [PH] (ref 5–8)
PHOSPHATE SERPL-MCNC: 3.3 MG/DL (ref 2.5–4.5)
PLATELET # BLD AUTO: 280 10*3/MM3 (ref 140–450)
PMV BLD AUTO: 10.1 FL (ref 6–12)
POTASSIUM SERPL-SCNC: 3.6 MMOL/L (ref 3.5–5.2)
PROT ?TM UR-MCNC: 93.5 MG/DL
PROT UR QL STRIP: ABNORMAL
PROT/CREAT UR: 1243.4 MG/G CREA (ref 0–200)
PTH-INTACT SERPL-MCNC: 78.9 PG/ML (ref 15–65)
RBC # BLD AUTO: 4.05 10*6/MM3 (ref 3.77–5.28)
RBC # UR STRIP: ABNORMAL /HPF
REF LAB TEST METHOD: ABNORMAL
SODIUM SERPL-SCNC: 140 MMOL/L (ref 136–145)
SP GR UR STRIP: 1.01 (ref 1–1.03)
SQUAMOUS #/AREA URNS HPF: ABNORMAL /HPF
UROBILINOGEN UR QL STRIP: ABNORMAL
WBC # UR STRIP: ABNORMAL /HPF
WBC NRBC COR # BLD: 5.44 10*3/MM3 (ref 3.4–10.8)

## 2021-11-29 PROCEDURE — 87186 SC STD MICRODIL/AGAR DIL: CPT

## 2021-11-29 PROCEDURE — 36415 COLL VENOUS BLD VENIPUNCTURE: CPT

## 2021-11-29 PROCEDURE — 83970 ASSAY OF PARATHORMONE: CPT

## 2021-11-29 PROCEDURE — 84100 ASSAY OF PHOSPHORUS: CPT

## 2021-11-29 PROCEDURE — 81001 URINALYSIS AUTO W/SCOPE: CPT

## 2021-11-29 PROCEDURE — 87086 URINE CULTURE/COLONY COUNT: CPT

## 2021-11-29 PROCEDURE — 85025 COMPLETE CBC W/AUTO DIFF WBC: CPT

## 2021-11-29 PROCEDURE — 80048 BASIC METABOLIC PNL TOTAL CA: CPT

## 2021-11-29 PROCEDURE — 82570 ASSAY OF URINE CREATININE: CPT

## 2021-11-29 PROCEDURE — 87077 CULTURE AEROBIC IDENTIFY: CPT

## 2021-11-29 PROCEDURE — 84156 ASSAY OF PROTEIN URINE: CPT

## 2021-12-01 LAB — BACTERIA SPEC AEROBE CULT: ABNORMAL

## 2022-05-17 ENCOUNTER — TRANSCRIBE ORDERS (OUTPATIENT)
Dept: ADMINISTRATIVE | Facility: HOSPITAL | Age: 82
End: 2022-05-17

## 2022-05-17 ENCOUNTER — LAB (OUTPATIENT)
Dept: LAB | Facility: HOSPITAL | Age: 82
End: 2022-05-17

## 2022-05-17 DIAGNOSIS — N18.4 CHRONIC KIDNEY DISEASE, STAGE IV (SEVERE): Primary | ICD-10-CM

## 2022-05-17 DIAGNOSIS — N18.4 CHRONIC KIDNEY DISEASE, STAGE IV (SEVERE): ICD-10-CM

## 2022-05-17 LAB
ANION GAP SERPL CALCULATED.3IONS-SCNC: 10.5 MMOL/L (ref 5–15)
BACTERIA UR QL AUTO: ABNORMAL /HPF
BASOPHILS # BLD AUTO: 0.02 10*3/MM3 (ref 0–0.2)
BASOPHILS NFR BLD AUTO: 0.3 % (ref 0–1.5)
BILIRUB UR QL STRIP: NEGATIVE
BUN SERPL-MCNC: 41 MG/DL (ref 8–23)
BUN/CREAT SERPL: 20.8 (ref 7–25)
CALCIUM SPEC-SCNC: 9.2 MG/DL (ref 8.6–10.5)
CHLORIDE SERPL-SCNC: 102 MMOL/L (ref 98–107)
CLARITY UR: CLEAR
CO2 SERPL-SCNC: 22.5 MMOL/L (ref 22–29)
COLOR UR: YELLOW
CREAT SERPL-MCNC: 1.97 MG/DL (ref 0.57–1)
DEPRECATED RDW RBC AUTO: 46.9 FL (ref 37–54)
EGFRCR SERPLBLD CKD-EPI 2021: 25.1 ML/MIN/1.73
EOSINOPHIL # BLD AUTO: 0.15 10*3/MM3 (ref 0–0.4)
EOSINOPHIL NFR BLD AUTO: 2.1 % (ref 0.3–6.2)
ERYTHROCYTE [DISTWIDTH] IN BLOOD BY AUTOMATED COUNT: 14.8 % (ref 12.3–15.4)
GLUCOSE SERPL-MCNC: 97 MG/DL (ref 65–99)
GLUCOSE UR STRIP-MCNC: NEGATIVE MG/DL
HCT VFR BLD AUTO: 33.1 % (ref 34–46.6)
HGB BLD-MCNC: 10.3 G/DL (ref 12–15.9)
HGB UR QL STRIP.AUTO: NEGATIVE
HYALINE CASTS UR QL AUTO: ABNORMAL /LPF
IMM GRANULOCYTES # BLD AUTO: 0.02 10*3/MM3 (ref 0–0.05)
IMM GRANULOCYTES NFR BLD AUTO: 0.3 % (ref 0–0.5)
KETONES UR QL STRIP: NEGATIVE
LEUKOCYTE ESTERASE UR QL STRIP.AUTO: ABNORMAL
LYMPHOCYTES # BLD AUTO: 2.54 10*3/MM3 (ref 0.7–3.1)
LYMPHOCYTES NFR BLD AUTO: 35.4 % (ref 19.6–45.3)
MCH RBC QN AUTO: 27.2 PG (ref 26.6–33)
MCHC RBC AUTO-ENTMCNC: 31.1 G/DL (ref 31.5–35.7)
MCV RBC AUTO: 87.3 FL (ref 79–97)
MONOCYTES # BLD AUTO: 0.5 10*3/MM3 (ref 0.1–0.9)
MONOCYTES NFR BLD AUTO: 7 % (ref 5–12)
NEUTROPHILS NFR BLD AUTO: 3.95 10*3/MM3 (ref 1.7–7)
NEUTROPHILS NFR BLD AUTO: 54.9 % (ref 42.7–76)
NITRITE UR QL STRIP: POSITIVE
NRBC BLD AUTO-RTO: 0.1 /100 WBC (ref 0–0.2)
PH UR STRIP.AUTO: 6 [PH] (ref 5–8)
PHOSPHATE SERPL-MCNC: 3.1 MG/DL (ref 2.5–4.5)
PLATELET # BLD AUTO: 258 10*3/MM3 (ref 140–450)
PMV BLD AUTO: 10.2 FL (ref 6–12)
POTASSIUM SERPL-SCNC: 4.1 MMOL/L (ref 3.5–5.2)
PROT UR QL STRIP: ABNORMAL
PTH-INTACT SERPL-MCNC: 71.1 PG/ML (ref 15–65)
RBC # BLD AUTO: 3.79 10*6/MM3 (ref 3.77–5.28)
RBC # UR STRIP: ABNORMAL /HPF
REF LAB TEST METHOD: ABNORMAL
SODIUM SERPL-SCNC: 135 MMOL/L (ref 136–145)
SP GR UR STRIP: 1.01 (ref 1–1.03)
SQUAMOUS #/AREA URNS HPF: ABNORMAL /HPF
UROBILINOGEN UR QL STRIP: ABNORMAL
WBC # UR STRIP: ABNORMAL /HPF
WBC NRBC COR # BLD: 7.18 10*3/MM3 (ref 3.4–10.8)

## 2022-05-17 PROCEDURE — 87077 CULTURE AEROBIC IDENTIFY: CPT

## 2022-05-17 PROCEDURE — 80048 BASIC METABOLIC PNL TOTAL CA: CPT

## 2022-05-17 PROCEDURE — 36415 COLL VENOUS BLD VENIPUNCTURE: CPT

## 2022-05-17 PROCEDURE — 85025 COMPLETE CBC W/AUTO DIFF WBC: CPT

## 2022-05-17 PROCEDURE — 87086 URINE CULTURE/COLONY COUNT: CPT

## 2022-05-17 PROCEDURE — 87186 SC STD MICRODIL/AGAR DIL: CPT

## 2022-05-17 PROCEDURE — 83970 ASSAY OF PARATHORMONE: CPT

## 2022-05-17 PROCEDURE — 81001 URINALYSIS AUTO W/SCOPE: CPT

## 2022-05-17 PROCEDURE — 84100 ASSAY OF PHOSPHORUS: CPT

## 2022-05-19 LAB — BACTERIA SPEC AEROBE CULT: ABNORMAL

## 2022-05-25 ENCOUNTER — TRANSCRIBE ORDERS (OUTPATIENT)
Dept: ADMINISTRATIVE | Facility: HOSPITAL | Age: 82
End: 2022-05-25

## 2022-05-25 DIAGNOSIS — Z12.31 VISIT FOR SCREENING MAMMOGRAM: Primary | ICD-10-CM

## 2022-06-01 ENCOUNTER — HOSPITAL ENCOUNTER (OUTPATIENT)
Dept: MAMMOGRAPHY | Facility: HOSPITAL | Age: 82
Discharge: HOME | End: 2022-06-01
Admitting: NURSE PRACTITIONER

## 2022-06-01 DIAGNOSIS — Z12.31 VISIT FOR SCREENING MAMMOGRAM: ICD-10-CM

## 2022-06-01 PROCEDURE — 77067 SCR MAMMO BI INCL CAD: CPT

## 2022-06-01 PROCEDURE — 77063 BREAST TOMOSYNTHESIS BI: CPT

## 2022-06-13 ENCOUNTER — CLINICAL SUPPORT NO REQUIREMENTS (OUTPATIENT)
Dept: CARDIOLOGY | Facility: CLINIC | Age: 82
End: 2022-06-13

## 2022-06-13 ENCOUNTER — OFFICE VISIT (OUTPATIENT)
Dept: CARDIOLOGY | Facility: CLINIC | Age: 82
End: 2022-06-13

## 2022-06-13 VITALS
HEART RATE: 60 BPM | HEIGHT: 65 IN | OXYGEN SATURATION: 97 % | SYSTOLIC BLOOD PRESSURE: 116 MMHG | WEIGHT: 144.5 LBS | BODY MASS INDEX: 24.07 KG/M2 | DIASTOLIC BLOOD PRESSURE: 69 MMHG

## 2022-06-13 DIAGNOSIS — Z95.0 PACEMAKER: ICD-10-CM

## 2022-06-13 DIAGNOSIS — I49.5 SICK SINUS SYNDROME: Primary | ICD-10-CM

## 2022-06-13 DIAGNOSIS — I10 ESSENTIAL HYPERTENSION: ICD-10-CM

## 2022-06-13 DIAGNOSIS — E11.9 TYPE 2 DIABETES MELLITUS WITHOUT COMPLICATION, WITHOUT LONG-TERM CURRENT USE OF INSULIN: ICD-10-CM

## 2022-06-13 DIAGNOSIS — E78.2 MIXED HYPERLIPIDEMIA: ICD-10-CM

## 2022-06-13 PROCEDURE — 99214 OFFICE O/P EST MOD 30 MIN: CPT | Performed by: INTERNAL MEDICINE

## 2022-06-13 PROCEDURE — 93280 PM DEVICE PROGR EVAL DUAL: CPT | Performed by: INTERNAL MEDICINE

## 2022-06-13 RX ORDER — HYDRALAZINE HYDROCHLORIDE 25 MG/1
25 TABLET, FILM COATED ORAL 3 TIMES DAILY
Qty: 540 TABLET | Refills: 2 | Status: SHIPPED | OUTPATIENT
Start: 2022-06-13

## 2022-06-13 RX ORDER — ASPIRIN 81 MG/1
81 TABLET, CHEWABLE ORAL DAILY
Qty: 180 TABLET | Refills: 2 | Status: SHIPPED | OUTPATIENT
Start: 2022-06-13

## 2022-06-13 RX ORDER — ATORVASTATIN CALCIUM 80 MG/1
80 TABLET, FILM COATED ORAL DAILY
Qty: 180 TABLET | Refills: 2 | Status: SHIPPED | OUTPATIENT
Start: 2022-06-13

## 2022-06-13 RX ORDER — AMLODIPINE BESYLATE 10 MG/1
10 TABLET ORAL DAILY
Qty: 180 TABLET | Refills: 2 | Status: SHIPPED | OUTPATIENT
Start: 2022-06-13

## 2022-06-13 NOTE — PROGRESS NOTES
"    Subjective:     Encounter Date:06/13/2022      Patient ID: Brad Woodward is a 81 y.o. female.    Chief Complaint:  History of Present Illness 81-year-old female with history of sick sinus syndrome status post pacemaker placement history of diabetes hypertension hyperlipidemia presents to office for follow-up.  Patient is currently stable without concerns of chest pain or shortness of breath at rest on exertion but no complains any PND orthopnea.  No palpitation dizziness syncope or swelling of the feet which she is taking her medicines regularly.  Her pacemaker is working very well.    The following portions of the patient's history were reviewed and updated as appropriate: allergies, current medications, past family history, past medical history, past social history, past surgical history and problem list.  Past Medical History:   Diagnosis Date   • Diabetes mellitus (HCC)    • Hyperlipidemia    • Hypertension      Past Surgical History:   Procedure Laterality Date   • CARDIAC ELECTROPHYSIOLOGY PROCEDURE N/A 4/20/2021    Procedure: Pacemaker DC new;  Surgeon: Yovany Navarrete MD;  Location: [facility] CATH INVASIVE LOCATION;  Service: Cardiovascular;  Laterality: N/A;     /69 (BP Location: Left arm, Patient Position: Sitting, Cuff Size: Adult)   Pulse 60   Ht 165.1 cm (65\")   Wt 65.5 kg (144 lb 8 oz)   SpO2 97%   BMI 24.05 kg/m²   Family History   Family history unknown: Yes       Current Outpatient Medications:   •  amLODIPine (NORVASC) 10 MG tablet, Take 1 tablet by mouth Daily., Disp: 180 tablet, Rfl: 2  •  aspirin 81 MG chewable tablet, Chew 1 tablet Daily., Disp: 180 tablet, Rfl: 2  •  atorvastatin (LIPITOR) 80 MG tablet, Take 1 tablet by mouth Daily., Disp: 180 tablet, Rfl: 2  •  chlorthalidone (HYGROTEN) 50 MG tablet, Take 50 mg by mouth Daily., Disp: , Rfl:   •  cloNIDine (CATAPRES) 0.2 MG tablet, Take 1 tablet by mouth Every 12 (Twelve) Hours., Disp: 60 tablet, Rfl: 0  •  glipizide (GLUCOTROL) 5 MG " tablet, Take 2.5 mg by mouth Daily., Disp: , Rfl:   •  hydrALAZINE (APRESOLINE) 25 MG tablet, Take 1 tablet by mouth 3 (Three) Times a Day., Disp: 540 tablet, Rfl: 2  •  nitroglycerin (NITROSTAT) 0.4 MG SL tablet, Place 1 tablet under the tongue Every 5 (Five) Minutes As Needed for Chest Pain (Only if SBP Greater Than 100). Take no more than 3 doses in 15 minutes., Disp: 25 tablet, Rfl: 0  •  chlorthalidone (HYGROTEN) 50 MG tablet, Take 1 tablet by mouth Daily for 30 days., Disp: 30 tablet, Rfl: 0  No Known Allergies  Social History     Socioeconomic History   • Marital status:    Tobacco Use   • Smoking status: Never Smoker   • Smokeless tobacco: Never Used   Vaping Use   • Vaping Use: Never used   Substance and Sexual Activity   • Alcohol use: No   • Drug use: No   • Sexual activity: Defer     Review of Systems   Constitutional: Positive for malaise/fatigue. Negative for fever.   Cardiovascular: Negative for chest pain, dyspnea on exertion and palpitations.   Respiratory: Negative for cough and shortness of breath.    Skin: Negative for rash.   Gastrointestinal: Negative for abdominal pain, nausea and vomiting.   Neurological: Negative for focal weakness and headaches.   All other systems reviewed and are negative.             Objective:     Constitutional:       Appearance: Well-developed.   Eyes:      General: No scleral icterus.     Conjunctiva/sclera: Conjunctivae normal.   HENT:      Head: Normocephalic and atraumatic.   Neck:      Vascular: No carotid bruit or JVD.   Pulmonary:      Effort: Pulmonary effort is normal.      Breath sounds: Normal breath sounds. No wheezing. No rales.   Cardiovascular:      Normal rate. Regular rhythm.      Murmurs: There is a systolic murmur.   Pulses:     Intact distal pulses.   Abdominal:      General: Bowel sounds are normal.      Palpations: Abdomen is soft.   Musculoskeletal:      Cervical back: Normal range of motion and neck supple. Skin:     General: Skin is  warm and dry.      Findings: No rash.   Neurological:      Mental Status: Alert.       Procedures    Lab Review:         MDM  1.  Symptomatic bradycardia status post pacemaker placement  Patient's pacemaker is working very well  2.  Hypertension  Patient blood pressure currently stable on amlodipine and clonidine  3.  Hyperlipidemia  Patient is on atorvastatin the lipid levels are well within normal limits  4.  Diabetes  Patient is on oral medicines and followed by the primary care doctor    Patient's previous medical records, labs, and EKG were reviewed and discussed with the patient at today's visit.

## 2023-01-19 ENCOUNTER — TELEPHONE (OUTPATIENT)
Dept: CARDIOLOGY | Facility: CLINIC | Age: 83
End: 2023-01-19
Payer: MEDICARE

## 2023-02-27 PROCEDURE — 93296 REM INTERROG EVL PM/IDS: CPT | Performed by: INTERNAL MEDICINE

## 2023-02-27 PROCEDURE — 93294 REM INTERROG EVL PM/LDLS PM: CPT | Performed by: INTERNAL MEDICINE

## 2023-03-20 ENCOUNTER — CLINICAL SUPPORT NO REQUIREMENTS (OUTPATIENT)
Dept: CARDIOLOGY | Facility: CLINIC | Age: 83
End: 2023-03-20
Payer: MEDICARE

## 2023-03-20 ENCOUNTER — OFFICE VISIT (OUTPATIENT)
Dept: CARDIOLOGY | Facility: CLINIC | Age: 83
End: 2023-03-20
Payer: MEDICARE

## 2023-03-20 VITALS
DIASTOLIC BLOOD PRESSURE: 74 MMHG | BODY MASS INDEX: 23.9 KG/M2 | OXYGEN SATURATION: 98 % | WEIGHT: 140 LBS | SYSTOLIC BLOOD PRESSURE: 133 MMHG | HEART RATE: 64 BPM | HEIGHT: 64 IN

## 2023-03-20 DIAGNOSIS — I10 ESSENTIAL HYPERTENSION: ICD-10-CM

## 2023-03-20 DIAGNOSIS — R00.1 JUNCTIONAL BRADYCARDIA: ICD-10-CM

## 2023-03-20 DIAGNOSIS — E11.9 TYPE 2 DIABETES MELLITUS WITHOUT COMPLICATION, WITHOUT LONG-TERM CURRENT USE OF INSULIN: ICD-10-CM

## 2023-03-20 DIAGNOSIS — E78.2 MIXED HYPERLIPIDEMIA: ICD-10-CM

## 2023-03-20 DIAGNOSIS — I49.5 SICK SINUS SYNDROME: Primary | ICD-10-CM

## 2023-03-20 DIAGNOSIS — Z95.0 PACEMAKER: ICD-10-CM

## 2023-03-20 PROCEDURE — 3075F SYST BP GE 130 - 139MM HG: CPT | Performed by: INTERNAL MEDICINE

## 2023-03-20 PROCEDURE — 1160F RVW MEDS BY RX/DR IN RCRD: CPT | Performed by: INTERNAL MEDICINE

## 2023-03-20 PROCEDURE — 3078F DIAST BP <80 MM HG: CPT | Performed by: INTERNAL MEDICINE

## 2023-03-20 PROCEDURE — 93280 PM DEVICE PROGR EVAL DUAL: CPT | Performed by: INTERNAL MEDICINE

## 2023-03-20 PROCEDURE — 1159F MED LIST DOCD IN RCRD: CPT | Performed by: INTERNAL MEDICINE

## 2023-03-20 PROCEDURE — 99214 OFFICE O/P EST MOD 30 MIN: CPT | Performed by: INTERNAL MEDICINE

## 2023-03-20 NOTE — PROGRESS NOTES
"    Subjective:     Encounter Date:03/20/2023      Patient ID: Brad Woodward is a 82 y.o. female.    Chief Complaint:  History of Present Illness 82-year-old female with history of sick sinus syndrome status post pacemaker placement history of hypertension hyperlipidemia diabetes presents to the office for follow-up.  Patient is currently stable without any symptoms of chest pain or shortness of breath at rest on exertion.  No complains any PND orthopnea.  No palpitations but has some dizziness on occasion no syncope or swelling of the feet which is taking her medicines regularly.  Her pacemaker is working very well    The following portions of the patient's history were reviewed and updated as appropriate: allergies, current medications, past family history, past medical history, past social history, past surgical history and problem list.  Past Medical History:   Diagnosis Date   • Diabetes mellitus (HCC)    • Hyperlipidemia    • Hypertension      Past Surgical History:   Procedure Laterality Date   • CARDIAC ELECTROPHYSIOLOGY PROCEDURE N/A 4/20/2021    Procedure: Pacemaker DC new;  Surgeon: Yovany Navarrete MD;  Location: [facility] CATH INVASIVE LOCATION;  Service: Cardiovascular;  Laterality: N/A;     /74   Pulse 64   Ht 162.6 cm (64\")   Wt 63.5 kg (140 lb)   SpO2 98%   BMI 24.03 kg/m²   Family History   Family history unknown: Yes       Current Outpatient Medications:   •  amLODIPine (NORVASC) 10 MG tablet, Take 1 tablet by mouth Daily., Disp: 180 tablet, Rfl: 2  •  aspirin 81 MG chewable tablet, Chew 1 tablet Daily., Disp: 180 tablet, Rfl: 2  •  atorvastatin (LIPITOR) 80 MG tablet, Take 1 tablet by mouth Daily., Disp: 180 tablet, Rfl: 2  •  chlorthalidone (HYGROTEN) 50 MG tablet, Take 1 tablet by mouth Daily., Disp: , Rfl:   •  cloNIDine (CATAPRES) 0.2 MG tablet, Take 1 tablet by mouth Every 12 (Twelve) Hours., Disp: 60 tablet, Rfl: 0  •  glipizide (GLUCOTROL) 5 MG tablet, Take 2.5 mg by mouth Daily., " Disp: , Rfl:   •  hydrALAZINE (APRESOLINE) 25 MG tablet, Take 1 tablet by mouth 3 (Three) Times a Day., Disp: 540 tablet, Rfl: 2  •  nitroglycerin (NITROSTAT) 0.4 MG SL tablet, Place 1 tablet under the tongue Every 5 (Five) Minutes As Needed for Chest Pain (Only if SBP Greater Than 100). Take no more than 3 doses in 15 minutes., Disp: 25 tablet, Rfl: 0  •  chlorthalidone (HYGROTEN) 50 MG tablet, Take 1 tablet by mouth Daily for 30 days., Disp: 30 tablet, Rfl: 0  No Known Allergies  Social History     Socioeconomic History   • Marital status:    Tobacco Use   • Smoking status: Never   • Smokeless tobacco: Never   Vaping Use   • Vaping Use: Never used   Substance and Sexual Activity   • Alcohol use: No   • Drug use: No   • Sexual activity: Defer     Review of Systems   Constitutional: Negative for malaise/fatigue.   Cardiovascular: Negative for chest pain, dyspnea on exertion, leg swelling and palpitations.   Respiratory: Negative for cough and shortness of breath.    Gastrointestinal: Negative for abdominal pain, nausea and vomiting.   Neurological: Positive for dizziness and light-headedness. Negative for focal weakness, headaches and numbness.   All other systems reviewed and are negative.             Objective:     Constitutional:       Appearance: Well-developed.   Eyes:      General: No scleral icterus.     Conjunctiva/sclera: Conjunctivae normal.   HENT:      Head: Normocephalic and atraumatic.   Neck:      Vascular: No carotid bruit or JVD.   Pulmonary:      Effort: Pulmonary effort is normal.      Breath sounds: Normal breath sounds. No wheezing. No rales.   Cardiovascular:      Normal rate. Regular rhythm.      Murmurs: There is a systolic murmur.   Pulses:     Intact distal pulses.   Abdominal:      General: Bowel sounds are normal.      Palpations: Abdomen is soft.   Musculoskeletal:      Cervical back: Normal range of motion and neck supple. Skin:     General: Skin is warm and dry.      Findings:  No rash.   Neurological:      Mental Status: Alert.       Procedures    Lab Review:         MDM  1.  Sick sinus syndrome  Patient had pacemaker placement is working very well with occasional atrial arrhythmias which only last 5 to 6 seconds  2.  Hypertension  Patient blood pressure is stable but he is on multiple medications and hence I will stop the clonidine at this time  3.  Diabetes  Patient is on oral medicines and followed by the primary care doctor  4.  Hyperlipidemia  Patient is on statins and the lipid levels are well within normal limits      Patient's previous medical records, labs, and EKG were reviewed and discussed with the patient at today's visit.

## 2024-03-21 ENCOUNTER — TRANSCRIBE ORDERS (OUTPATIENT)
Dept: ADMINISTRATIVE | Facility: HOSPITAL | Age: 84
End: 2024-03-21
Payer: MEDICARE

## 2024-03-21 ENCOUNTER — LAB (OUTPATIENT)
Dept: LAB | Facility: HOSPITAL | Age: 84
End: 2024-03-21
Payer: MEDICARE

## 2024-03-21 DIAGNOSIS — N18.4 CHRONIC KIDNEY DISEASE, STAGE IV (SEVERE): Primary | ICD-10-CM

## 2024-03-21 DIAGNOSIS — N18.4 CHRONIC KIDNEY DISEASE, STAGE IV (SEVERE): ICD-10-CM

## 2024-03-21 LAB
ANION GAP SERPL CALCULATED.3IONS-SCNC: 10.7 MMOL/L (ref 5–15)
BACTERIA UR QL AUTO: ABNORMAL /HPF
BASOPHILS # BLD AUTO: 0.03 10*3/MM3 (ref 0–0.2)
BASOPHILS NFR BLD AUTO: 0.7 % (ref 0–1.5)
BILIRUB UR QL STRIP: NEGATIVE
BUN SERPL-MCNC: 42 MG/DL (ref 8–23)
BUN/CREAT SERPL: 18.7 (ref 7–25)
CALCIUM SPEC-SCNC: 9.1 MG/DL (ref 8.6–10.5)
CHLORIDE SERPL-SCNC: 106 MMOL/L (ref 98–107)
CLARITY UR: ABNORMAL
CO2 SERPL-SCNC: 24.3 MMOL/L (ref 22–29)
COLOR UR: YELLOW
CREAT SERPL-MCNC: 2.25 MG/DL (ref 0.57–1)
CREAT UR-MCNC: 80.8 MG/DL
DEPRECATED RDW RBC AUTO: 42.7 FL (ref 37–54)
EGFRCR SERPLBLD CKD-EPI 2021: 21.2 ML/MIN/1.73
EOSINOPHIL # BLD AUTO: 0.36 10*3/MM3 (ref 0–0.4)
EOSINOPHIL NFR BLD AUTO: 7.9 % (ref 0.3–6.2)
ERYTHROCYTE [DISTWIDTH] IN BLOOD BY AUTOMATED COUNT: 13.8 % (ref 12.3–15.4)
GLUCOSE SERPL-MCNC: 169 MG/DL (ref 65–99)
GLUCOSE UR STRIP-MCNC: NEGATIVE MG/DL
HCT VFR BLD AUTO: 31.7 % (ref 34–46.6)
HGB BLD-MCNC: 9.7 G/DL (ref 12–15.9)
HGB UR QL STRIP.AUTO: ABNORMAL
HOLD SPECIMEN: NORMAL
HYALINE CASTS UR QL AUTO: ABNORMAL /LPF
IMM GRANULOCYTES # BLD AUTO: 0.01 10*3/MM3 (ref 0–0.05)
IMM GRANULOCYTES NFR BLD AUTO: 0.2 % (ref 0–0.5)
KETONES UR QL STRIP: NEGATIVE
LEUKOCYTE ESTERASE UR QL STRIP.AUTO: ABNORMAL
LYMPHOCYTES # BLD AUTO: 1.72 10*3/MM3 (ref 0.7–3.1)
LYMPHOCYTES NFR BLD AUTO: 37.6 % (ref 19.6–45.3)
MCH RBC QN AUTO: 26.1 PG (ref 26.6–33)
MCHC RBC AUTO-ENTMCNC: 30.6 G/DL (ref 31.5–35.7)
MCV RBC AUTO: 85.2 FL (ref 79–97)
MONOCYTES # BLD AUTO: 0.36 10*3/MM3 (ref 0.1–0.9)
MONOCYTES NFR BLD AUTO: 7.9 % (ref 5–12)
NEUTROPHILS NFR BLD AUTO: 2.09 10*3/MM3 (ref 1.7–7)
NEUTROPHILS NFR BLD AUTO: 45.7 % (ref 42.7–76)
NITRITE UR QL STRIP: NEGATIVE
NRBC BLD AUTO-RTO: 0 /100 WBC (ref 0–0.2)
PH UR STRIP.AUTO: 6.5 [PH] (ref 5–8)
PHOSPHATE SERPL-MCNC: 3.8 MG/DL (ref 2.5–4.5)
PLATELET # BLD AUTO: 218 10*3/MM3 (ref 140–450)
PMV BLD AUTO: 10.2 FL (ref 6–12)
POTASSIUM SERPL-SCNC: 3.7 MMOL/L (ref 3.5–5.2)
PROT ?TM UR-MCNC: 270.3 MG/DL
PROT UR QL STRIP: ABNORMAL
PROT/CREAT UR: 3345.3 MG/G CREA (ref 0–200)
PTH-INTACT SERPL-MCNC: 95.4 PG/ML (ref 15–65)
RBC # BLD AUTO: 3.72 10*6/MM3 (ref 3.77–5.28)
RBC # UR STRIP: ABNORMAL /HPF
REF LAB TEST METHOD: ABNORMAL
SODIUM SERPL-SCNC: 141 MMOL/L (ref 136–145)
SP GR UR STRIP: 1.01 (ref 1–1.03)
SQUAMOUS #/AREA URNS HPF: ABNORMAL /HPF
UROBILINOGEN UR QL STRIP: ABNORMAL
WBC # UR STRIP: ABNORMAL /HPF
WBC NRBC COR # BLD AUTO: 4.57 10*3/MM3 (ref 3.4–10.8)

## 2024-03-21 PROCEDURE — 87086 URINE CULTURE/COLONY COUNT: CPT

## 2024-03-21 PROCEDURE — 87077 CULTURE AEROBIC IDENTIFY: CPT

## 2024-03-21 PROCEDURE — 87186 SC STD MICRODIL/AGAR DIL: CPT

## 2024-03-21 PROCEDURE — 83970 ASSAY OF PARATHORMONE: CPT

## 2024-03-21 PROCEDURE — 82570 ASSAY OF URINE CREATININE: CPT

## 2024-03-21 PROCEDURE — 84100 ASSAY OF PHOSPHORUS: CPT

## 2024-03-21 PROCEDURE — 85025 COMPLETE CBC W/AUTO DIFF WBC: CPT

## 2024-03-21 PROCEDURE — 36415 COLL VENOUS BLD VENIPUNCTURE: CPT

## 2024-03-21 PROCEDURE — 81001 URINALYSIS AUTO W/SCOPE: CPT

## 2024-03-21 PROCEDURE — 80048 BASIC METABOLIC PNL TOTAL CA: CPT

## 2024-03-21 PROCEDURE — 84156 ASSAY OF PROTEIN URINE: CPT

## 2024-03-23 LAB — BACTERIA SPEC AEROBE CULT: ABNORMAL

## 2024-04-04 ENCOUNTER — OFFICE VISIT (OUTPATIENT)
Dept: CARDIOLOGY | Facility: CLINIC | Age: 84
End: 2024-04-04
Payer: MEDICARE

## 2024-04-04 ENCOUNTER — CLINICAL SUPPORT NO REQUIREMENTS (OUTPATIENT)
Dept: CARDIOLOGY | Facility: CLINIC | Age: 84
End: 2024-04-04
Payer: MEDICARE

## 2024-04-04 VITALS
HEIGHT: 64 IN | WEIGHT: 143 LBS | BODY MASS INDEX: 24.41 KG/M2 | HEART RATE: 62 BPM | OXYGEN SATURATION: 99 % | SYSTOLIC BLOOD PRESSURE: 145 MMHG | DIASTOLIC BLOOD PRESSURE: 71 MMHG

## 2024-04-04 DIAGNOSIS — I49.5 SICK SINUS SYNDROME: ICD-10-CM

## 2024-04-04 DIAGNOSIS — E78.00 PURE HYPERCHOLESTEROLEMIA: ICD-10-CM

## 2024-04-04 DIAGNOSIS — R00.1 JUNCTIONAL BRADYCARDIA: ICD-10-CM

## 2024-04-04 DIAGNOSIS — E11.9 TYPE 2 DIABETES MELLITUS WITHOUT COMPLICATION, WITHOUT LONG-TERM CURRENT USE OF INSULIN: ICD-10-CM

## 2024-04-04 DIAGNOSIS — I10 PRIMARY HYPERTENSION: Primary | ICD-10-CM

## 2024-04-04 DIAGNOSIS — Z95.0 PACEMAKER: Primary | ICD-10-CM

## 2024-04-04 DIAGNOSIS — Z95.0 PRESENCE OF CARDIAC PACEMAKER: ICD-10-CM

## 2024-04-04 RX ORDER — AMLODIPINE BESYLATE 10 MG/1
10 TABLET ORAL DAILY
Qty: 270 TABLET | Refills: 1 | Status: SHIPPED | OUTPATIENT
Start: 2024-04-04

## 2024-04-04 RX ORDER — ATORVASTATIN CALCIUM 80 MG/1
80 TABLET, FILM COATED ORAL DAILY
Qty: 270 TABLET | Refills: 1 | Status: SHIPPED | OUTPATIENT
Start: 2024-04-04

## 2024-04-04 RX ORDER — HYDRALAZINE HYDROCHLORIDE 25 MG/1
25 TABLET, FILM COATED ORAL 3 TIMES DAILY
Qty: 540 TABLET | Refills: 2 | Status: SHIPPED | OUTPATIENT
Start: 2024-04-04

## 2024-04-04 RX ORDER — ASPIRIN 81 MG/1
81 TABLET, CHEWABLE ORAL DAILY
Qty: 270 TABLET | Refills: 2 | Status: SHIPPED | OUTPATIENT
Start: 2024-04-04

## 2024-04-04 NOTE — PROGRESS NOTES
"    Subjective:     Encounter Date:04/04/2024      Patient ID: Brad Woodward is a 83 y.o. female.    Chief Complaint:  History of Present Illness 83-year-old male with history of sick sinus syndrome status post pacemaker placement history of hypertension hyperlipidemia and diabetes presents to my office for a follow-up.  Patient is currently stable without any symptoms of chest pain or shortness of breath at rest on exertion.  No complaint of any PND or orthopnea.  No palpitation dizziness syncope or swelling of the feet.  Patient is taking all her medicines regularly.  Patient does not smoke.    The following portions of the patient's history were reviewed and updated as appropriate: allergies, current medications, past family history, past medical history, past social history, past surgical history, and problem list.  Past Medical History:   Diagnosis Date    Diabetes mellitus     Hyperlipidemia     Hypertension      Past Surgical History:   Procedure Laterality Date    CARDIAC ELECTROPHYSIOLOGY PROCEDURE N/A 4/20/2021    Procedure: Pacemaker DC new;  Surgeon: Yovany Navarrete MD;  Location: [facility] INVASIVE LOCATION;  Service: Cardiovascular;  Laterality: N/A;     /71 Comment: RECHECK  Pulse 62   Ht 162.6 cm (64\")   Wt 64.9 kg (143 lb)   SpO2 99%   BMI 24.55 kg/m²   Family History   Family history unknown: Yes       Current Outpatient Medications:     amLODIPine (NORVASC) 10 MG tablet, Take 1 tablet by mouth Daily., Disp: 270 tablet, Rfl: 1    aspirin 81 MG chewable tablet, Chew 1 tablet Daily., Disp: 270 tablet, Rfl: 2    atorvastatin (LIPITOR) 80 MG tablet, Take 1 tablet by mouth Daily., Disp: 270 tablet, Rfl: 1    chlorthalidone (HYGROTEN) 50 MG tablet, Take 1 tablet by mouth Daily., Disp: , Rfl:     Cyanocobalamin (VITAMIN B-12 PO), Take  by mouth., Disp: , Rfl:     glipizide (GLUCOTROL) 5 MG tablet, Take 0.5 tablets by mouth Daily., Disp: , Rfl:     hydrALAZINE (APRESOLINE) 25 MG tablet, Take 1 " tablet by mouth 3 (Three) Times a Day., Disp: 540 tablet, Rfl: 2    nitroglycerin (NITROSTAT) 0.4 MG SL tablet, Place 1 tablet under the tongue Every 5 (Five) Minutes As Needed for Chest Pain (Only if SBP Greater Than 100). Take no more than 3 doses in 15 minutes., Disp: 25 tablet, Rfl: 0    chlorthalidone (HYGROTEN) 50 MG tablet, Take 1 tablet by mouth Daily for 30 days., Disp: 30 tablet, Rfl: 0    cloNIDine (CATAPRES) 0.2 MG tablet, Take 1 tablet by mouth Every 12 (Twelve) Hours. (Patient not taking: Reported on 4/4/2024), Disp: 60 tablet, Rfl: 0  No Known Allergies  Social History     Socioeconomic History    Marital status:    Tobacco Use    Smoking status: Never     Passive exposure: Never    Smokeless tobacco: Never   Vaping Use    Vaping status: Never Used   Substance and Sexual Activity    Alcohol use: No    Drug use: No    Sexual activity: Defer     Review of Systems   Constitutional: Negative for malaise/fatigue.   Cardiovascular:  Negative for chest pain, dyspnea on exertion, leg swelling and palpitations.   Respiratory:  Negative for cough and shortness of breath.    Gastrointestinal:  Negative for abdominal pain, nausea and vomiting.   Neurological:  Negative for dizziness, focal weakness, headaches, light-headedness and numbness.   All other systems reviewed and are negative.             Objective:     Constitutional:       Appearance: Well-developed.   Eyes:      General: No scleral icterus.     Conjunctiva/sclera: Conjunctivae normal.   HENT:      Head: Normocephalic and atraumatic.   Neck:      Vascular: No carotid bruit or JVD.   Pulmonary:      Effort: Pulmonary effort is normal.      Breath sounds: Normal breath sounds. No wheezing. No rales.   Cardiovascular:      Normal rate. Regular rhythm.   Pulses:     Intact distal pulses.   Abdominal:      General: Bowel sounds are normal.      Palpations: Abdomen is soft.   Musculoskeletal:      Cervical back: Normal range of motion and neck  supple. Skin:     General: Skin is warm and dry.      Findings: No rash.   Neurological:      Mental Status: Alert.       Procedures    Lab Review:         MDM    #1 sick sinus syndrome  Patient had a pacemaker placement which is working very well without any problems  2.  Hypertension  Patient blood pressure currently stable on hydralazine clonidine and amlodipine  3.  Hyperlipidemia  Patient is on Lipitor and the lipid levels are well within normal limits  4.  Diabetes  Patient is on oral medicines and followed by the primary care doctor.    Patient's previous medical records, labs, and EKG were reviewed and discussed with the patient at today's visit.

## 2025-01-08 ENCOUNTER — TELEPHONE (OUTPATIENT)
Dept: CARDIOLOGY | Facility: CLINIC | Age: 85
End: 2025-01-08
Payer: MEDICARE

## 2025-02-06 NOTE — PLAN OF CARE
Copied from CRM #[id number]. Topic: MW Messaging - MW Patient Request  >> Feb 6, 2025  3:55 PM Josefa KIRKPATRICK wrote:  Lonnie Barbosa called requesting to send a general message to clinician.   Verified issue is NOT regarding a symptom(s) requiring routine or emergent triage. Verified another message template type and CRM does not apply.    Selected 'Wrap Up CRM' and created new Telephone Encounter after clicking 'Convert to Clinical Call'. Selected appropriate Reason for Call.  Sent Pt message template and routed as routine priority per Clinician KB page to appropriate clinician pool. Readback full message.    -- DO NOT REPLY / DO NOT REPLY ALL --  -- This inbox is not monitored. If this was sent to the wrong provider or department, reroute message to P [facility] Reroute pool. --  -- Message is from Engagement Center Operations ([facility]) --  Reason for Appointment Message: Caller wants sooner back-to-back appointment for Family members named Frederick Bobsvetlana  offered other approved options    Reason for Visit: Medication follow up. She is asking that the appointment be set up together with his brother who also has a medication follow up for the same medication on 03/10. Please advise. Thanks     Is the patient currently scheduled? No    Preferred time to be seen: 03/10 at 1:40 with his brother.    Caller Information       Contact Date/Time Type Contact Phone/Fax    02/06/2025 03:51 PM CST Phone (Incoming) Lonnie Barbosa [phone]            Alternative phone number: none     Can a detailed message be left?  Yes - Voicemail   Patient has been advised the message will be addressed within 2-3 business days            Goal Outcome Evaluation:   Patient went for Pacemaker around 1600, she transferred to [facility].

## 2025-03-18 ENCOUNTER — TELEPHONE (OUTPATIENT)
Dept: CARDIOLOGY | Facility: CLINIC | Age: 85
End: 2025-03-18
Payer: MEDICARE

## 2025-03-18 NOTE — TELEPHONE ENCOUNTER
Hub staff attempted to follow warm transfer process and was unsuccessful     Caller: ELLA    Relationship to patient:SON    Best call back number: [phone]    Patient is needing: TO [facility] DEVICE CHECK AND APPT WITH DR EVERETT

## 2025-04-03 ENCOUNTER — TRANSCRIBE ORDERS (OUTPATIENT)
Dept: ADMINISTRATIVE | Facility: HOSPITAL | Age: 85
End: 2025-04-03
Payer: MEDICARE

## 2025-04-03 ENCOUNTER — LAB (OUTPATIENT)
Dept: LAB | Facility: HOSPITAL | Age: 85
End: 2025-04-03
Payer: MEDICARE

## 2025-04-03 DIAGNOSIS — N18.4 CHRONIC KIDNEY DISEASE, STAGE IV (SEVERE): ICD-10-CM

## 2025-04-03 DIAGNOSIS — N18.4 CHRONIC KIDNEY DISEASE, STAGE IV (SEVERE): Primary | ICD-10-CM

## 2025-04-03 LAB
ANION GAP SERPL CALCULATED.3IONS-SCNC: 13.7 MMOL/L (ref 5–15)
BACTERIA UR QL AUTO: ABNORMAL /HPF
BASOPHILS # BLD AUTO: 0.03 10*3/MM3 (ref 0–0.2)
BASOPHILS NFR BLD AUTO: 0.5 % (ref 0–1.5)
BILIRUB UR QL STRIP: NEGATIVE
BUN SERPL-MCNC: 40 MG/DL (ref 8–23)
BUN/CREAT SERPL: 14.6 (ref 7–25)
CALCIUM SPEC-SCNC: 9.1 MG/DL (ref 8.6–10.5)
CHLORIDE SERPL-SCNC: 104 MMOL/L (ref 98–107)
CLARITY UR: CLEAR
CO2 SERPL-SCNC: 22.3 MMOL/L (ref 22–29)
COLOR UR: YELLOW
CREAT SERPL-MCNC: 2.74 MG/DL (ref 0.57–1)
CREAT UR-MCNC: 55 MG/DL
DEPRECATED RDW RBC AUTO: 46.9 FL (ref 37–54)
EGFRCR SERPLBLD CKD-EPI 2021: 16.6 ML/MIN/1.73
EOSINOPHIL # BLD AUTO: 0.42 10*3/MM3 (ref 0–0.4)
EOSINOPHIL NFR BLD AUTO: 6.8 % (ref 0.3–6.2)
ERYTHROCYTE [DISTWIDTH] IN BLOOD BY AUTOMATED COUNT: 15 % (ref 12.3–15.4)
GLUCOSE SERPL-MCNC: 103 MG/DL (ref 65–99)
GLUCOSE UR STRIP-MCNC: NEGATIVE MG/DL
HCT VFR BLD AUTO: 25.9 % (ref 34–46.6)
HGB BLD-MCNC: 8.2 G/DL (ref 12–15.9)
HGB UR QL STRIP.AUTO: NEGATIVE
HYALINE CASTS UR QL AUTO: ABNORMAL /LPF
IMM GRANULOCYTES # BLD AUTO: 0.01 10*3/MM3 (ref 0–0.05)
IMM GRANULOCYTES NFR BLD AUTO: 0.2 % (ref 0–0.5)
KETONES UR QL STRIP: NEGATIVE
LEUKOCYTE ESTERASE UR QL STRIP.AUTO: ABNORMAL
LYMPHOCYTES # BLD AUTO: 1.93 10*3/MM3 (ref 0.7–3.1)
LYMPHOCYTES NFR BLD AUTO: 31.1 % (ref 19.6–45.3)
MCH RBC QN AUTO: 27 PG (ref 26.6–33)
MCHC RBC AUTO-ENTMCNC: 31.7 G/DL (ref 31.5–35.7)
MCV RBC AUTO: 85.2 FL (ref 79–97)
MONOCYTES # BLD AUTO: 0.47 10*3/MM3 (ref 0.1–0.9)
MONOCYTES NFR BLD AUTO: 7.6 % (ref 5–12)
NEUTROPHILS NFR BLD AUTO: 3.34 10*3/MM3 (ref 1.7–7)
NEUTROPHILS NFR BLD AUTO: 53.8 % (ref 42.7–76)
NITRITE UR QL STRIP: NEGATIVE
NRBC BLD AUTO-RTO: 0 /100 WBC (ref 0–0.2)
PH UR STRIP.AUTO: 7 [PH] (ref 5–8)
PHOSPHATE SERPL-MCNC: 3.9 MG/DL (ref 2.5–4.5)
PLATELET # BLD AUTO: 243 10*3/MM3 (ref 140–450)
PMV BLD AUTO: 9.3 FL (ref 6–12)
POTASSIUM SERPL-SCNC: 3.7 MMOL/L (ref 3.5–5.2)
PROT ?TM UR-MCNC: 319 MG/DL
PROT UR QL STRIP: ABNORMAL
PROT/CREAT UR: 5800 MG/G CREA (ref 0–200)
PTH-INTACT SERPL-MCNC: 166 PG/ML (ref 15–65)
RBC # BLD AUTO: 3.04 10*6/MM3 (ref 3.77–5.28)
RBC # UR STRIP: ABNORMAL /HPF
REF LAB TEST METHOD: ABNORMAL
SODIUM SERPL-SCNC: 140 MMOL/L (ref 136–145)
SP GR UR STRIP: 1.01 (ref 1–1.03)
SQUAMOUS #/AREA URNS HPF: ABNORMAL /HPF
UROBILINOGEN UR QL STRIP: ABNORMAL
WBC # UR STRIP: ABNORMAL /HPF
WBC NRBC COR # BLD AUTO: 6.2 10*3/MM3 (ref 3.4–10.8)

## 2025-04-03 PROCEDURE — 82570 ASSAY OF URINE CREATININE: CPT

## 2025-04-03 PROCEDURE — 87077 CULTURE AEROBIC IDENTIFY: CPT

## 2025-04-03 PROCEDURE — 84156 ASSAY OF PROTEIN URINE: CPT

## 2025-04-03 PROCEDURE — 85025 COMPLETE CBC W/AUTO DIFF WBC: CPT

## 2025-04-03 PROCEDURE — 84100 ASSAY OF PHOSPHORUS: CPT

## 2025-04-03 PROCEDURE — 36415 COLL VENOUS BLD VENIPUNCTURE: CPT

## 2025-04-03 PROCEDURE — 81001 URINALYSIS AUTO W/SCOPE: CPT

## 2025-04-03 PROCEDURE — 83970 ASSAY OF PARATHORMONE: CPT

## 2025-04-03 PROCEDURE — 87186 SC STD MICRODIL/AGAR DIL: CPT

## 2025-04-03 PROCEDURE — 87086 URINE CULTURE/COLONY COUNT: CPT

## 2025-04-03 PROCEDURE — 80048 BASIC METABOLIC PNL TOTAL CA: CPT

## 2025-04-05 LAB — BACTERIA SPEC AEROBE CULT: ABNORMAL

## 2025-04-15 ENCOUNTER — TRANSCRIBE ORDERS (OUTPATIENT)
Dept: LAB | Facility: HOSPITAL | Age: 85
End: 2025-04-15
Payer: MEDICARE

## 2025-04-15 ENCOUNTER — HOSPITAL ENCOUNTER (INPATIENT)
Facility: HOSPITAL | Age: 85
LOS: 8 days | Discharge: HOME | End: 2025-04-24
Attending: FAMILY MEDICINE | Admitting: FAMILY MEDICINE
Payer: MEDICAID

## 2025-04-15 ENCOUNTER — APPOINTMENT (OUTPATIENT)
Dept: GENERAL RADIOLOGY | Facility: HOSPITAL | Age: 85
End: 2025-04-15
Payer: MEDICAID

## 2025-04-15 ENCOUNTER — LAB (OUTPATIENT)
Dept: LAB | Facility: HOSPITAL | Age: 85
End: 2025-04-15
Payer: MEDICARE

## 2025-04-15 DIAGNOSIS — E87.6 HYPOKALEMIA: ICD-10-CM

## 2025-04-15 DIAGNOSIS — R53.1 GENERALIZED WEAKNESS: Primary | ICD-10-CM

## 2025-04-15 DIAGNOSIS — R06.00 DYSPNEA, UNSPECIFIED TYPE: ICD-10-CM

## 2025-04-15 DIAGNOSIS — N18.4 CHRONIC KIDNEY DISEASE, STAGE IV (SEVERE): Primary | ICD-10-CM

## 2025-04-15 DIAGNOSIS — R11.0 NAUSEA: ICD-10-CM

## 2025-04-15 DIAGNOSIS — N18.4 CHRONIC KIDNEY DISEASE, STAGE IV (SEVERE): ICD-10-CM

## 2025-04-15 DIAGNOSIS — I16.1 HYPERTENSIVE EMERGENCY: ICD-10-CM

## 2025-04-15 DIAGNOSIS — D63.1 ANEMIA DUE TO CHRONIC KIDNEY DISEASE, UNSPECIFIED CKD STAGE: ICD-10-CM

## 2025-04-15 DIAGNOSIS — N18.9 ANEMIA DUE TO CHRONIC KIDNEY DISEASE, UNSPECIFIED CKD STAGE: ICD-10-CM

## 2025-04-15 DIAGNOSIS — R07.89 CHEST TIGHTNESS: ICD-10-CM

## 2025-04-15 PROBLEM — D64.9 ANEMIA: Status: ACTIVE | Noted: 2025-04-15

## 2025-04-15 LAB
25(OH)D3 SERPL-MCNC: 29.5 NG/ML (ref 30–100)
ABO GROUP BLD: NORMAL
ALBUMIN SERPL-MCNC: 3.7 G/DL (ref 3.5–5.2)
ALBUMIN/GLOB SERPL: 1.3 G/DL
ALP SERPL-CCNC: 90 U/L (ref 39–117)
ALT SERPL W P-5'-P-CCNC: 53 U/L (ref 1–33)
ANION GAP SERPL CALCULATED.3IONS-SCNC: 10.2 MMOL/L (ref 5–15)
ANION GAP SERPL CALCULATED.3IONS-SCNC: 11.5 MMOL/L (ref 5–15)
ANION GAP SERPL CALCULATED.3IONS-SCNC: 12 MMOL/L (ref 5–15)
AST SERPL-CCNC: 79 U/L (ref 1–32)
B PARAPERT DNA SPEC QL NAA+PROBE: NOT DETECTED
B PERT DNA SPEC QL NAA+PROBE: NOT DETECTED
BACTERIA UR QL AUTO: ABNORMAL /HPF
BASOPHILS # BLD AUTO: 0.02 10*3/MM3 (ref 0–0.2)
BASOPHILS NFR BLD AUTO: 0.5 % (ref 0–1.5)
BILIRUB SERPL-MCNC: 0.3 MG/DL (ref 0–1.2)
BILIRUB UR QL STRIP: NEGATIVE
BLD GP AB SCN SERPL QL: NEGATIVE
BUN SERPL-MCNC: 39 MG/DL (ref 8–23)
BUN/CREAT SERPL: 12.9 (ref 7–25)
BUN/CREAT SERPL: 12.9 (ref 7–25)
BUN/CREAT SERPL: 13.1 (ref 7–25)
C PNEUM DNA NPH QL NAA+NON-PROBE: NOT DETECTED
CALCIUM SPEC-SCNC: 7.9 MG/DL (ref 8.6–10.5)
CALCIUM SPEC-SCNC: 8.5 MG/DL (ref 8.6–10.5)
CALCIUM SPEC-SCNC: 8.5 MG/DL (ref 8.6–10.5)
CHLORIDE SERPL-SCNC: 91 MMOL/L (ref 98–107)
CHLORIDE SERPL-SCNC: 93 MMOL/L (ref 98–107)
CHLORIDE SERPL-SCNC: 95 MMOL/L (ref 98–107)
CLARITY UR: CLEAR
CO2 SERPL-SCNC: 19.5 MMOL/L (ref 22–29)
CO2 SERPL-SCNC: 19.8 MMOL/L (ref 22–29)
CO2 SERPL-SCNC: 20 MMOL/L (ref 22–29)
COLOR UR: YELLOW
CREAT SERPL-MCNC: 2.98 MG/DL (ref 0.57–1)
CREAT SERPL-MCNC: 3.02 MG/DL (ref 0.57–1)
CREAT SERPL-MCNC: 3.03 MG/DL (ref 0.57–1)
CREAT UR-MCNC: 50.1 MG/DL
DEPRECATED RDW RBC AUTO: 41.2 FL (ref 37–54)
EGFRCR SERPLBLD CKD-EPI 2021: 14.7 ML/MIN/1.73
EGFRCR SERPLBLD CKD-EPI 2021: 14.8 ML/MIN/1.73
EGFRCR SERPLBLD CKD-EPI 2021: 15 ML/MIN/1.73
EOSINOPHIL # BLD AUTO: 0.15 10*3/MM3 (ref 0–0.4)
EOSINOPHIL NFR BLD AUTO: 3.6 % (ref 0.3–6.2)
ERYTHROCYTE [DISTWIDTH] IN BLOOD BY AUTOMATED COUNT: 14.1 % (ref 12.3–15.4)
FERRITIN SERPL-MCNC: 226 NG/ML (ref 13–150)
FLUAV RNA RESP QL NAA+PROBE: NOT DETECTED
FLUAV SUBTYP SPEC NAA+PROBE: NOT DETECTED
FLUBV RNA ISLT QL NAA+PROBE: NOT DETECTED
FLUBV RNA RESP QL NAA+PROBE: NOT DETECTED
GEN 5 1HR TROPONIN T REFLEX: 67 NG/L
GLOBULIN UR ELPH-MCNC: 2.9 GM/DL
GLUCOSE BLDC GLUCOMTR-MCNC: 125 MG/DL (ref 70–105)
GLUCOSE SERPL-MCNC: 112 MG/DL (ref 65–99)
GLUCOSE SERPL-MCNC: 119 MG/DL (ref 65–99)
GLUCOSE SERPL-MCNC: 127 MG/DL (ref 65–99)
GLUCOSE UR STRIP-MCNC: NEGATIVE MG/DL
HADV DNA SPEC NAA+PROBE: NOT DETECTED
HBA1C MFR BLD: 5.4 % (ref 4.8–5.6)
HCOV 229E RNA SPEC QL NAA+PROBE: NOT DETECTED
HCOV HKU1 RNA SPEC QL NAA+PROBE: NOT DETECTED
HCOV NL63 RNA SPEC QL NAA+PROBE: NOT DETECTED
HCOV OC43 RNA SPEC QL NAA+PROBE: NOT DETECTED
HCT VFR BLD AUTO: 21.3 % (ref 34–46.6)
HCT VFR BLD AUTO: 24.4 % (ref 34–46.6)
HGB BLD-MCNC: 7.2 G/DL (ref 12–15.9)
HGB BLD-MCNC: 8.1 G/DL (ref 12–15.9)
HGB UR QL STRIP.AUTO: NEGATIVE
HMPV RNA NPH QL NAA+NON-PROBE: NOT DETECTED
HOLD SPECIMEN: NORMAL
HPIV1 RNA ISLT QL NAA+PROBE: NOT DETECTED
HPIV2 RNA SPEC QL NAA+PROBE: NOT DETECTED
HPIV3 RNA NPH QL NAA+PROBE: NOT DETECTED
HPIV4 P GENE NPH QL NAA+PROBE: NOT DETECTED
HYALINE CASTS UR QL AUTO: ABNORMAL /LPF
IMM GRANULOCYTES # BLD AUTO: 0.01 10*3/MM3 (ref 0–0.05)
IMM GRANULOCYTES NFR BLD AUTO: 0.2 % (ref 0–0.5)
IRON 24H UR-MRATE: 61 MCG/DL (ref 37–145)
IRON SATN MFR SERPL: 20 % (ref 20–50)
KETONES UR QL STRIP: NEGATIVE
LEUKOCYTE ESTERASE UR QL STRIP.AUTO: NEGATIVE
LYMPHOCYTES # BLD AUTO: 1.59 10*3/MM3 (ref 0.7–3.1)
LYMPHOCYTES NFR BLD AUTO: 38.7 % (ref 19.6–45.3)
M PNEUMO IGG SER IA-ACNC: NOT DETECTED
MAGNESIUM SERPL-MCNC: 1.6 MG/DL (ref 1.6–2.4)
MCH RBC QN AUTO: 27.5 PG (ref 26.6–33)
MCHC RBC AUTO-ENTMCNC: 33.8 G/DL (ref 31.5–35.7)
MCV RBC AUTO: 81.3 FL (ref 79–97)
MONOCYTES # BLD AUTO: 0.49 10*3/MM3 (ref 0.1–0.9)
MONOCYTES NFR BLD AUTO: 11.9 % (ref 5–12)
NEUTROPHILS NFR BLD AUTO: 1.85 10*3/MM3 (ref 1.7–7)
NEUTROPHILS NFR BLD AUTO: 45.1 % (ref 42.7–76)
NITRITE UR QL STRIP: NEGATIVE
NRBC BLD AUTO-RTO: 0 /100 WBC (ref 0–0.2)
NT-PROBNP SERPL-MCNC: 5094 PG/ML (ref 0–1800)
OSMOLALITY UR: 205 MOSM/KG (ref 300–800)
PH UR STRIP.AUTO: 6.5 [PH] (ref 5–8)
PHOSPHATE SERPL-MCNC: 4.2 MG/DL (ref 2.5–4.5)
PLATELET # BLD AUTO: 228 10*3/MM3 (ref 140–450)
PMV BLD AUTO: 9 FL (ref 6–12)
POTASSIUM SERPL-SCNC: 3.3 MMOL/L (ref 3.5–5.2)
POTASSIUM SERPL-SCNC: 3.4 MMOL/L (ref 3.5–5.2)
POTASSIUM SERPL-SCNC: 3.7 MMOL/L (ref 3.5–5.2)
PROT ?TM UR-MCNC: 266 MG/DL
PROT SERPL-MCNC: 6.6 G/DL (ref 6–8.5)
PROT UR QL STRIP: ABNORMAL
PROT/CREAT UR: 5309.4 MG/G CREA (ref 0–200)
PTH-INTACT SERPL-MCNC: 179 PG/ML (ref 15–65)
RBC # BLD AUTO: 2.62 10*6/MM3 (ref 3.77–5.28)
RBC # UR STRIP: ABNORMAL /HPF
REF LAB TEST METHOD: ABNORMAL
RH BLD: POSITIVE
RHINOVIRUS RNA SPEC NAA+PROBE: NOT DETECTED
RSV RNA NPH QL NAA+NON-PROBE: NOT DETECTED
RSV RNA RESP QL NAA+PROBE: NOT DETECTED
SARS-COV-2 RNA RESP QL NAA+PROBE: NOT DETECTED
SARS-COV-2 RNA RESP QL NAA+PROBE: NOT DETECTED
SODIUM SERPL-SCNC: 121 MMOL/L (ref 136–145)
SODIUM SERPL-SCNC: 124 MMOL/L (ref 136–145)
SODIUM SERPL-SCNC: 127 MMOL/L (ref 136–145)
SODIUM UR-SCNC: 29 MMOL/L
SP GR UR STRIP: 1.01 (ref 1–1.03)
SQUAMOUS #/AREA URNS HPF: ABNORMAL /HPF
T&S EXPIRATION DATE: NORMAL
T4 FREE SERPL-MCNC: 1.23 NG/DL (ref 0.93–1.7)
TIBC SERPL-MCNC: 310 MCG/DL (ref 298–536)
TRANSFERRIN SERPL-MCNC: 208 MG/DL (ref 200–360)
TROPONIN T % DELTA: -3
TROPONIN T NUMERIC DELTA: -2 NG/L
TROPONIN T SERPL HS-MCNC: 69 NG/L
TSH SERPL DL<=0.05 MIU/L-ACNC: 6.21 UIU/ML (ref 0.27–4.2)
UROBILINOGEN UR QL STRIP: ABNORMAL
WBC # UR STRIP: ABNORMAL /HPF
WBC NRBC COR # BLD AUTO: 4.11 10*3/MM3 (ref 3.4–10.8)
WHOLE BLOOD HOLD COAG: NORMAL

## 2025-04-15 PROCEDURE — 82306 VITAMIN D 25 HYDROXY: CPT

## 2025-04-15 PROCEDURE — 99285 EMERGENCY DEPT VISIT HI MDM: CPT

## 2025-04-15 PROCEDURE — 84466 ASSAY OF TRANSFERRIN: CPT

## 2025-04-15 PROCEDURE — 84484 ASSAY OF TROPONIN QUANT: CPT

## 2025-04-15 PROCEDURE — 83735 ASSAY OF MAGNESIUM: CPT

## 2025-04-15 PROCEDURE — 86900 BLOOD TYPING SEROLOGIC ABO: CPT

## 2025-04-15 PROCEDURE — 83880 ASSAY OF NATRIURETIC PEPTIDE: CPT

## 2025-04-15 PROCEDURE — 80048 BASIC METABOLIC PNL TOTAL CA: CPT

## 2025-04-15 PROCEDURE — 84156 ASSAY OF PROTEIN URINE: CPT

## 2025-04-15 PROCEDURE — 82570 ASSAY OF URINE CREATININE: CPT

## 2025-04-15 PROCEDURE — 86901 BLOOD TYPING SEROLOGIC RH(D): CPT

## 2025-04-15 PROCEDURE — G0378 HOSPITAL OBSERVATION PER HR: HCPCS

## 2025-04-15 PROCEDURE — P9016 RBC LEUKOCYTES REDUCED: HCPCS

## 2025-04-15 PROCEDURE — 83540 ASSAY OF IRON: CPT

## 2025-04-15 PROCEDURE — 83970 ASSAY OF PARATHORMONE: CPT

## 2025-04-15 PROCEDURE — 84439 ASSAY OF FREE THYROXINE: CPT

## 2025-04-15 PROCEDURE — 93005 ELECTROCARDIOGRAM TRACING: CPT | Performed by: FAMILY MEDICINE

## 2025-04-15 PROCEDURE — 83036 HEMOGLOBIN GLYCOSYLATED A1C: CPT

## 2025-04-15 PROCEDURE — 71045 X-RAY EXAM CHEST 1 VIEW: CPT

## 2025-04-15 PROCEDURE — 25010000002 ONDANSETRON PER 1 MG

## 2025-04-15 PROCEDURE — 0202U NFCT DS 22 TRGT SARS-COV-2: CPT

## 2025-04-15 PROCEDURE — 36430 TRANSFUSION BLD/BLD COMPNT: CPT

## 2025-04-15 PROCEDURE — 25010000002 EPOETIN ALFA-EPBX 10000 UNIT/ML SOLUTION: Performed by: INTERNAL MEDICINE

## 2025-04-15 PROCEDURE — 86923 COMPATIBILITY TEST ELECTRIC: CPT

## 2025-04-15 PROCEDURE — 81001 URINALYSIS AUTO W/SCOPE: CPT

## 2025-04-15 PROCEDURE — 84300 ASSAY OF URINE SODIUM: CPT | Performed by: INTERNAL MEDICINE

## 2025-04-15 PROCEDURE — 84100 ASSAY OF PHOSPHORUS: CPT

## 2025-04-15 PROCEDURE — 85018 HEMOGLOBIN: CPT

## 2025-04-15 PROCEDURE — 93005 ELECTROCARDIOGRAM TRACING: CPT

## 2025-04-15 PROCEDURE — 80050 GENERAL HEALTH PANEL: CPT

## 2025-04-15 PROCEDURE — 36415 COLL VENOUS BLD VENIPUNCTURE: CPT

## 2025-04-15 PROCEDURE — 83935 ASSAY OF URINE OSMOLALITY: CPT | Performed by: INTERNAL MEDICINE

## 2025-04-15 PROCEDURE — 86850 RBC ANTIBODY SCREEN: CPT

## 2025-04-15 PROCEDURE — 85014 HEMATOCRIT: CPT

## 2025-04-15 PROCEDURE — 82728 ASSAY OF FERRITIN: CPT

## 2025-04-15 PROCEDURE — 87637 SARSCOV2&INF A&B&RSV AMP PRB: CPT | Performed by: FAMILY MEDICINE

## 2025-04-15 PROCEDURE — 82948 REAGENT STRIP/BLOOD GLUCOSE: CPT

## 2025-04-15 RX ORDER — INSULIN LISPRO 100 [IU]/ML
2-7 INJECTION, SOLUTION INTRAVENOUS; SUBCUTANEOUS
Status: DISCONTINUED | OUTPATIENT
Start: 2025-04-15 | End: 2025-04-24 | Stop reason: HOSPADM

## 2025-04-15 RX ORDER — ACETAMINOPHEN 160 MG/5ML
650 SOLUTION ORAL EVERY 4 HOURS PRN
Status: DISCONTINUED | OUTPATIENT
Start: 2025-04-15 | End: 2025-04-24 | Stop reason: HOSPADM

## 2025-04-15 RX ORDER — NITROGLYCERIN 0.4 MG/1
0.4 TABLET SUBLINGUAL
Status: DISCONTINUED | OUTPATIENT
Start: 2025-04-15 | End: 2025-04-24 | Stop reason: HOSPADM

## 2025-04-15 RX ORDER — DEXTROSE MONOHYDRATE 25 G/50ML
25 INJECTION, SOLUTION INTRAVENOUS
Status: DISCONTINUED | OUTPATIENT
Start: 2025-04-15 | End: 2025-04-24 | Stop reason: HOSPADM

## 2025-04-15 RX ORDER — BISACODYL 5 MG/1
5 TABLET, DELAYED RELEASE ORAL DAILY PRN
Status: DISCONTINUED | OUTPATIENT
Start: 2025-04-15 | End: 2025-04-24 | Stop reason: HOSPADM

## 2025-04-15 RX ORDER — POTASSIUM CHLORIDE 1500 MG/1
20 TABLET, EXTENDED RELEASE ORAL ONCE
Status: COMPLETED | OUTPATIENT
Start: 2025-04-15 | End: 2025-04-15

## 2025-04-15 RX ORDER — GLIPIZIDE 2.5 MG/1
5 TABLET ORAL DAILY
COMMUNITY
End: 2025-04-24 | Stop reason: HOSPADM

## 2025-04-15 RX ORDER — AMOXICILLIN 250 MG
2 CAPSULE ORAL 2 TIMES DAILY PRN
Status: DISCONTINUED | OUTPATIENT
Start: 2025-04-15 | End: 2025-04-24 | Stop reason: HOSPADM

## 2025-04-15 RX ORDER — ONDANSETRON 2 MG/ML
4 INJECTION INTRAMUSCULAR; INTRAVENOUS ONCE
Status: COMPLETED | OUTPATIENT
Start: 2025-04-15 | End: 2025-04-15

## 2025-04-15 RX ORDER — IBUPROFEN 600 MG/1
1 TABLET ORAL
Status: DISCONTINUED | OUTPATIENT
Start: 2025-04-15 | End: 2025-04-24 | Stop reason: HOSPADM

## 2025-04-15 RX ORDER — BISACODYL 10 MG
10 SUPPOSITORY, RECTAL RECTAL DAILY PRN
Status: DISCONTINUED | OUTPATIENT
Start: 2025-04-15 | End: 2025-04-24 | Stop reason: HOSPADM

## 2025-04-15 RX ORDER — NICOTINE POLACRILEX 4 MG
15 LOZENGE BUCCAL
Status: DISCONTINUED | OUTPATIENT
Start: 2025-04-15 | End: 2025-04-24 | Stop reason: HOSPADM

## 2025-04-15 RX ORDER — HYDRALAZINE HYDROCHLORIDE 50 MG/1
50 TABLET, FILM COATED ORAL 3 TIMES DAILY
COMMUNITY
End: 2025-04-24 | Stop reason: HOSPADM

## 2025-04-15 RX ORDER — HYDRALAZINE HYDROCHLORIDE 25 MG/1
50 TABLET, FILM COATED ORAL 3 TIMES DAILY
Status: DISCONTINUED | OUTPATIENT
Start: 2025-04-15 | End: 2025-04-17

## 2025-04-15 RX ORDER — POLYETHYLENE GLYCOL 3350 17 G/17G
17 POWDER, FOR SOLUTION ORAL DAILY PRN
Status: DISCONTINUED | OUTPATIENT
Start: 2025-04-15 | End: 2025-04-24 | Stop reason: HOSPADM

## 2025-04-15 RX ORDER — AMLODIPINE BESYLATE 5 MG/1
10 TABLET ORAL
Status: DISCONTINUED | OUTPATIENT
Start: 2025-04-16 | End: 2025-04-24 | Stop reason: HOSPADM

## 2025-04-15 RX ORDER — ACETAMINOPHEN 650 MG/1
650 SUPPOSITORY RECTAL EVERY 4 HOURS PRN
Status: DISCONTINUED | OUTPATIENT
Start: 2025-04-15 | End: 2025-04-24 | Stop reason: HOSPADM

## 2025-04-15 RX ORDER — ACETAMINOPHEN 325 MG/1
650 TABLET ORAL EVERY 4 HOURS PRN
Status: DISCONTINUED | OUTPATIENT
Start: 2025-04-15 | End: 2025-04-24 | Stop reason: HOSPADM

## 2025-04-15 RX ORDER — POTASSIUM CHLORIDE 1.5 G/1.58G
20 POWDER, FOR SOLUTION ORAL ONCE
Status: DISCONTINUED | OUTPATIENT
Start: 2025-04-15 | End: 2025-04-15

## 2025-04-15 RX ADMIN — HYDRALAZINE HYDROCHLORIDE 50 MG: 25 TABLET ORAL at 23:00

## 2025-04-15 RX ADMIN — ONDANSETRON 4 MG: 2 INJECTION, SOLUTION INTRAMUSCULAR; INTRAVENOUS at 14:46

## 2025-04-15 RX ADMIN — POTASSIUM CHLORIDE 20 MEQ: 1500 TABLET, EXTENDED RELEASE ORAL at 20:57

## 2025-04-15 RX ADMIN — EPOETIN ALFA-EPBX 20000 UNITS: 10000 INJECTION, SOLUTION INTRAVENOUS; SUBCUTANEOUS at 20:57

## 2025-04-15 NOTE — H&P
[facility] Medicine Services  History & Physical    Patient Name: Brad Woodward  : 1940  MRN: [id number]  Primary Care Physician:  Brian Nazario APRN  Date of admission: 4/15/2025  Date and Time of Service: 4/15/2025 at 1650    Subjective      Chief Complaint: nausea, weakness    History of Present Illness: Brad Woodward is a 84 y.o. female with a CMH of DM, HTN, HLD, CKD who presented to [facility] on 4/15/2025 with family due to complaints of nausea and generalized weakness.  Patient was resting comfortably during encounter, patient son at bedside with his wife were primary historians.  Per family, patient had nausea, chills, weakness that started earlier today.  Patient family reports patient did complain of neck pain briefly yesterday however they attributed the pain to hypertension.  Denies fevers at home.  Reports patient was complaining of mild shortness of breath as well.  States they came to the hospital for outpatient labs and when they were getting labs drawn patient continued to complain of weakness and shortness of breath as well as nausea and chills.  Patient family reports they left to go home and en route to the house they decided to turn around and return to the ED for evaluation.  Patient family reports patient bowel movements have been her regular which for the past few years has been every 3 to 4 days.  Patient family denies any dark stools and denies abdominal pain complaints from patient.  No signs of bleeding per family.    On ED evaluation, vitals stable. Labs remarkable for UA with presence of protein and WBC.  CBC remarkable for hemoglobin 7.2, hematocrit 21.3, RBC 2.62.  PTH intact 179.  Random protein creatinine ratio shows 5309.4.  Initial troponin 69, reflex 67.  proBNP 5094.  TSH 6.210, free T4 within normal limits.  CMP in ED shows creatinine 2.98, BUN 39, sodium 124, potassium 3.3, ALT 53, AST 79.  Ferritin 226.  Iron profile shows within normal limits.  RVP  "negative.  Chest x-ray negative for acute findings.  EKG on arrival shows \"sinus rhythm, right bundle branch block, prolonged QT interval\".  Patient did receive IV Zofran in the ED.  ED provider spoke with the primary nephrologist Dr. Farooq who ordered to give 1 unit packed red blood cells and reassess electrolytes and kidney function posttransfusion. Hospitalist service to admit for further management.      Review of Systems   Constitutional:  Positive for chills and fatigue.   Respiratory:  Positive for shortness of breath.    Cardiovascular:  Negative for chest pain.   Gastrointestinal:  Positive for nausea. Negative for constipation, diarrhea and vomiting.   Neurological:  Positive for weakness. Negative for dizziness and headaches.       Personal History     Past Medical History:   Diagnosis Date    Diabetes mellitus     Hyperlipidemia     Hypertension        Past Surgical History:   Procedure Laterality Date    CARDIAC ELECTROPHYSIOLOGY PROCEDURE N/A 4/20/2021    Procedure: Pacemaker DC new;  Surgeon: Yovany Navarrete MD;  Location: [facility] CATH INVASIVE LOCATION;  Service: Cardiovascular;  Laterality: N/A;       Family History: Family history is unknown by patient. Otherwise pertinent FHx was reviewed and not pertinent to current issue.    Social History:  reports that she has never smoked. She has never been exposed to tobacco smoke. She has never used smokeless tobacco. She reports that she does not drink alcohol and does not use drugs.    Home Medications:  Prior to Admission Medications       Prescriptions Last Dose Informant Patient Reported? Taking?    amLODIPine (NORVASC) 10 MG tablet   No Yes    Take 1 tablet by mouth Daily.    aspirin 81 MG chewable tablet   No Yes    Chew 1 tablet Daily.    atorvastatin (LIPITOR) 80 MG tablet   No Yes    Take 1 tablet by mouth Daily.    chlorthalidone (HYGROTEN) 50 MG tablet   Yes Yes    Take 1 tablet by mouth Daily.    Cyanocobalamin (VITAMIN B-12 PO)   Yes Yes "    Take 1 tablet by mouth Daily.    hydrALAZINE (APRESOLINE) 25 MG tablet   No No    Take 1 tablet by mouth 3 (Three) Times a Day.    nitroglycerin (NITROSTAT) 0.4 MG SL tablet   No No    Place 1 tablet under the tongue Every 5 (Five) Minutes As Needed for Chest Pain (Only if SBP Greater Than 100). Take no more than 3 doses in 15 minutes.              Allergies:  No Known Allergies    Objective      Vitals:   Temp:  [98.1 °F (36.7 °C)-98.4 °F (36.9 °C)] 98.1 °F (36.7 °C)  Heart Rate:  [73-84] 76  Resp:  [15-17] 15  BP: (147-164)/(58-69) 162/59  Body mass index is 24.86 kg/m².  Physical Exam  General: 83 yo female, well nourished, no acute distress.   HENT: Normocephalic, normal hearing, moist oral mucosa, no scleral icterus.  Neck: Supple, nontender, no carotid bruits, no JVD, no LAD.  Lungs:  nonlabored respiration.  Heart: RRR.  Abdomen: Soft, nontender, nondistended.  Musculoskeletal: Normal range of motion and strength, no tenderness or swelling.  Skin: Skin is warm, dry and pink, no rashes or lesions.  Psychiatric: Cooperative, appropriate mood and affect.      Diagnostic Data:  Lab Results (last 24 hours)       Procedure Component Value Units Date/Time    T4, Free [[id number]]  (Normal) Collected: 04/15/25 1330    Specimen: Blood Updated: 04/15/25 1545     Free T4 1.23 ng/dL     High Sensitivity Troponin T 1Hr [[id number]]  (Abnormal) Collected: 04/15/25 1448    Specimen: Blood Updated: 04/15/25 1513     HS Troponin T 67 ng/L      Troponin T Numeric Delta -2 ng/L      Troponin T % Delta -3    Narrative:      High Sensitive Troponin T Reference Range:  <14.0 ng/L- Negative Female for AMI  <22.0 ng/L- Negative Male for AMI  >=14 - Abnormal Female indicating possible myocardial injury.  >=22 - Abnormal Male indicating possible myocardial injury.   Clinicians would have to utilize clinical acumen, EKG, Troponin, and serial changes to determine if it is an Acute Myocardial Infarction or myocardial injury due to an  underlying chronic condition.         High Sensitivity Troponin T [[id number]]  (Abnormal) Collected: 04/15/25 1330    Specimen: Blood Updated: 04/15/25 1428     HS Troponin T 69 ng/L     Narrative:      High Sensitive Troponin T Reference Range:  <14.0 ng/L- Negative Female for AMI  <22.0 ng/L- Negative Male for AMI  >=14 - Abnormal Female indicating possible myocardial injury.  >=22 - Abnormal Male indicating possible myocardial injury.   Clinicians would have to utilize clinical acumen, EKG, Troponin, and serial changes to determine if it is an Acute Myocardial Infarction or myocardial injury due to an underlying chronic condition.         Comprehensive Metabolic Panel [[id number]]  (Abnormal) Collected: 04/15/25 1330    Specimen: Blood Updated: 04/15/25 1425     Glucose 119 mg/dL      BUN 39 mg/dL      Creatinine 2.98 mg/dL      Sodium 124 mmol/L      Potassium 3.3 mmol/L      Chloride 93 mmol/L      CO2 19.5 mmol/L      Calcium 8.5 mg/dL      Total Protein 6.6 g/dL      Albumin 3.7 g/dL      ALT (SGPT) 53 U/L      AST (SGOT) 79 U/L      Alkaline Phosphatase 90 U/L      Total Bilirubin 0.3 mg/dL      Globulin 2.9 gm/dL      A/G Ratio 1.3 g/dL      BUN/Creatinine Ratio 13.1     Anion Gap 11.5 mmol/L      eGFR 15.0 mL/min/1.73     Narrative:      GFR Categories in Chronic Kidney Disease (CKD)      GFR Category          GFR (mL/min/1.73)    Interpretation  G1                     90 or greater         Normal or high (1)  G2                      60-89                Mild decrease (1)  G3a                   45-59                Mild to moderate decrease  G3b                   30-44                Moderate to severe decrease  G4                    15-29                Severe decrease  G5                    14 or less           Kidney failure          (1)In the absence of evidence of kidney disease, neither GFR category G1 or G2 fulfill the criteria for CKD.    eGFR calculation 2021 CKD-EPI creatinine equation, which does  not include race as a factor    Respiratory Panel PCR w/COVID-19(SARS-CoV-2) V[facility]CTOR/DAYNA/JADYN/PAD/COR/CHRIAG In-House, NP Swab in UTM/VTM, 2 HR TAT - Swab, Nasopharynx [[id number]]  (Normal) Collected: 04/15/25 1331    Specimen: Swab from Nasopharynx Updated: 04/15/25 1425     ADENOVIRUS, PCR Not Detected     Coronavirus 229E Not Detected     Coronavirus HKU1 Not Detected     Coronavirus NL63 Not Detected     Coronavirus OC43 Not Detected     COVID19 Not Detected     Human Metapneumovirus Not Detected     Human Rhinovirus/Enterovirus Not Detected     Influenza A PCR Not Detected     Influenza B PCR Not Detected     Parainfluenza Virus 1 Not Detected     Parainfluenza Virus 2 Not Detected     Parainfluenza Virus 3 Not Detected     Parainfluenza Virus 4 Not Detected     RSV, PCR Not Detected     Bordetella pertussis pcr Not Detected     Bordetella parapertussis PCR Not Detected     Chlamydophila pneumoniae PCR Not Detected     Mycoplasma pneumo by PCR Not Detected    Narrative:      In the setting of a positive respiratory panel with a viral infection PLUS a negative procalcitonin without other underlying concern for bacterial infection, consider observing off antibiotics or discontinuation of antibiotics and continue supportive care. If the respiratory panel is positive for atypical bacterial infection (Bordetella pertussis, Chlamydophila pneumoniae, or Mycoplasma pneumoniae), consider antibiotic de-escalation to target atypical bacterial infection.    BNP [[id number]]  (Abnormal) Collected: 04/15/25 1330    Specimen: Blood Updated: 04/15/25 1425     proBNP 5,094.0 pg/mL     Narrative:      This assay is used as an aid in the diagnosis of individuals suspected of having heart failure. It can be used as an aid in the diagnosis of acute decompensated heart failure (ADHF) in patients presenting with signs and symptoms of ADHF to the emergency department (ED). In addition, NT-proBNP of <300 pg/mL indicates ADHF is not  likely.    Age Range Result Interpretation  NT-proBNP Concentration (pg/mL:      <50             Positive            >450                   Gray                 300-450                    Negative             <300    50-75           Positive            >900                  Gray                300-900                  Negative            <300      >75             Positive            >1800                  Gray                300-1800                  Negative            <300    TSH Rfx On Abnormal To Free T4 [[id number]]  (Abnormal) Collected: 04/15/25 1330    Specimen: Blood Updated: 04/15/25 1425     TSH 6.210 uIU/mL     Magnesium [[id number]]  (Normal) Collected: 04/15/25 1330    Specimen: Blood Updated: 04/15/25 1425     Magnesium 1.6 mg/dL     Extra Tubes [[id number]] Collected: 04/15/25 1330    Specimen: Blood, Venous Line Updated: 04/15/25 1345    Narrative:      The following orders were created for panel order Extra Tubes.  Procedure                               Abnormality         Status                     ---------                               -----------         ------                     Gold Top - SST[[id number]]                                   Final result               Light Blue Top[[id number]]                                   Final result                 Please view results for these tests on the individual orders.    Gold Top - SST [[id number]] Collected: 04/15/25 1330    Specimen: Blood Updated: 04/15/25 1345     Extra Tube Hold for add-ons.     Comment: Auto resulted.       Light Blue Top [[id number]] Collected: 04/15/25 1330    Specimen: Blood Updated: 04/15/25 1345     Extra Tube Hold for add-ons.     Comment: Auto resulted       CBC & Differential [[id number]]  (Abnormal) Collected: 04/15/25 1330    Specimen: Blood Updated: 04/15/25 1340    Narrative:      The following orders were created for panel order CBC & Differential.  Procedure                               Abnormality         Status                      ---------                               -----------         ------                     CBC Auto Differential[[id number]]        Abnormal            Final result                 Please view results for these tests on the individual orders.    CBC Auto Differential [[id number]]  (Abnormal) Collected: 04/15/25 1330    Specimen: Blood Updated: 04/15/25 1340     WBC 4.11 10*3/mm3      RBC 2.62 10*6/mm3      Hemoglobin 7.2 g/dL      Hematocrit 21.3 %      MCV 81.3 fL      MCH 27.5 pg      MCHC 33.8 g/dL      RDW 14.1 %      RDW-SD 41.2 fl      MPV 9.0 fL      Platelets 228 10*3/mm3      Neutrophil % 45.1 %      Lymphocyte % 38.7 %      Monocyte % 11.9 %      Eosinophil % 3.6 %      Basophil % 0.5 %      Immature Grans % 0.2 %      Neutrophils, Absolute 1.85 10*3/mm3      Lymphocytes, Absolute 1.59 10*3/mm3      Monocytes, Absolute 0.49 10*3/mm3      Eosinophils, Absolute 0.15 10*3/mm3      Basophils, Absolute 0.02 10*3/mm3      Immature Grans, Absolute 0.01 10*3/mm3      nRBC 0.0 /100 WBC              Imaging Results (Last 24 Hours)       Procedure Component Value Units Date/Time    XR Chest 1 View [[id number]] Collected: 04/15/25 1349     Updated: 04/15/25 1352    Narrative:      XR CHEST 1 VW    Date of Exam: 4/15/2025 1:43 PM EDT    Indication: Chest pain, dyspnea, weakness    Comparison: Chest radiograph 4/20/2021    Findings:  Stable cardiomegaly. Multichamber AICD. Aortic atherosclerotic disease. Negative for lobar consolidation, edema, large effusion or pneumothorax. Degenerative related osseous change.      Impression:      Impression:  No active pulmonary process. Stable cardiomegaly since 2021.      Electronically Signed: Samuel Villa MD    4/15/2025 1:49 PM EDT    Workstation ID: [id number]              Assessment & Plan        This is a 84 y.o. female with:    Active and Resolved Problems  Active Hospital Problems    Diagnosis  POA    **Anemia [D64.9]  Yes      Resolved Hospital Problems   No  resolved problems to display.       Anemia  Electrolyte imbalances  ERIN on CKD  Weakness  Hgb 7.2, hct 21.3  Cr 2.98, BUN 39, Sodium 124, K+ 3.3  Serial H&H and BMP ordered  Follows with Dr. Farooq outpatient  Per Dr. Farooq, give 1 unit PRBC and recheck labs  Appreciate nephrology assistance  Transfuse 1 unit PRBCs  RVP negative  CXR negative  PT/OT to eval  EKG with QT prolongation, avoid medications that further prolong QT  Caution restarting home meds with ERIN as well as QT prolongation, close review once meds are reconciled    Recent UTI  Was prescribed Keflex from Dr. Farooq outpatient on 4/10/2025 for urine culture + e coli  Continue keflex    Hypertension   Sick Sinus Syndrome  BP stable  S/P pacemaker- follows with Dr. Aldridge outpatient  Resume home medications once reconciled and appropriate    Hyperlipidemia  Continue statin    Diabetes Mellitus   A1c pending  Low SSI   Consistent carb diet  Hypoglycemia protocol   Hold home PO meds  ACHS accucheck        CODE STATUS was discussed with family at bedside, per family patient desires to be a full code at this time.  CODE STATUS was updated in the system    VTE Prophylaxis:  Mechanical VTE prophylaxis orders are present.        The patient desires to be as follows:    CODE STATUS:    Code Status (Patient has no pulse and is not breathing): CPR (Attempt to Resuscitate)  Medical Interventions (Patient has pulse or is breathing): Full Support  Level Of Support Discussed With: Health Care Surrogate   Next of Kin (If No Surrogate)        Dany Go, who can be contacted at [phone], is the designated person to make medical decisions on the patient's behalf if She is incapable of doing so. This was clarified with patient and/or next of kin on 4/15/2025 during the course of this H&P.    Admission Status:  I believe this patient meets observation status.    Expected Length of Stay: <2 midnights    PDMP and Medication Dispenses via Sidebar reviewed and  consistent with patient reported medications.    I discussed the patient's findings and my recommendations with patient and family.      Signature:     This document has been electronically signed by CECE Jones on April 15, 2025 17:35 EDT   [facility]

## 2025-04-15 NOTE — ED PROVIDER NOTES
Subjective   History of Present Illness  Patient is an 84-year-old female with PMH of DM2, HLD, HTN presenting to the ED with family for generalized weakness and dyspnea is been worsening today.  Family reports generalized weakness over the past few days states that, on quickly has been progressively worse today.  Patient also reports worsening dyspnea since this morning.  She reports chills, feels cold however feels slightly sweaty.  She reports nausea last night had several episodes of dry heaving, no vomiting.  She also reports slight tightness in the center of her chest rating it a 2 out of 10.  Patient denies any fever, cough, congestion, vomiting, abdominal pain, diarrhea, constipation, or dysuria.        Review of Systems   Constitutional:  Positive for chills and diaphoresis. Negative for fever.   HENT:  Negative for congestion.    Respiratory:  Positive for shortness of breath. Negative for cough.    Cardiovascular:  Positive for chest pain.   Gastrointestinal:  Positive for nausea. Negative for abdominal pain, constipation, diarrhea and vomiting.   Genitourinary:  Negative for dysuria.       Past Medical History:   Diagnosis Date    Diabetes mellitus     Hyperlipidemia     Hypertension        No Known Allergies    Past Surgical History:   Procedure Laterality Date    CARDIAC ELECTROPHYSIOLOGY PROCEDURE N/A 4/20/2021    Procedure: Pacemaker DC new;  Surgeon: Yovany Navarrete MD;  Location: [facility] INVASIVE LOCATION;  Service: Cardiovascular;  Laterality: N/A;       Family History   Family history unknown: Yes       Social History     Socioeconomic History    Marital status:    Tobacco Use    Smoking status: Never     Passive exposure: Never    Smokeless tobacco: Never   Vaping Use    Vaping status: Never Used   Substance and Sexual Activity    Alcohol use: No    Drug use: No    Sexual activity: Defer           Objective   Physical Exam  Constitutional:       Appearance: She is ill-appearing.  "  HENT:      Head: Normocephalic.      Mouth/Throat:      Mouth: Mucous membranes are moist.   Eyes:      Extraocular Movements: Extraocular movements intact.   Cardiovascular:      Rate and Rhythm: Normal rate and regular rhythm.      Pulses: Normal pulses.      Heart sounds: Normal heart sounds.   Pulmonary:      Effort: Pulmonary effort is normal.      Breath sounds: Normal breath sounds.   Abdominal:      General: Abdomen is flat. Bowel sounds are normal.      Palpations: Abdomen is soft.      Tenderness: There is no abdominal tenderness.   Musculoskeletal:         General: Normal range of motion.      Cervical back: Normal range of motion.      Right lower leg: No edema.      Left lower leg: No edema.   Skin:     General: Skin is warm and dry.      Capillary Refill: Capillary refill takes less than 2 seconds.   Neurological:      General: No focal deficit present.      Mental Status: She is alert and oriented to person, place, and time.      Motor: No weakness.   Psychiatric:         Mood and Affect: Mood normal.         Behavior: Behavior normal.         Procedures           ED Course  ED Course as of 04/15/25 1634   Tue Apr 15, 2025   1545 Spoke with Dr. Farooq who recommended giving patient 1 unit of blood. [EC]   1628 Spoke with Anuja ZHAO with hospitalist group who agreed to admit patient. [EC]      ED Course User Index  [EC] Luci Isaacs PA-C      /69   Pulse 73   Temp 98.4 °F (36.9 °C) (Oral)   Resp 17   Ht 162.6 cm (64\")   Wt 65.7 kg (144 lb 13.5 oz)   SpO2 96%   BMI 24.86 kg/m²   Labs Reviewed   TROPONIN - Abnormal; Notable for the following components:       Result Value    HS Troponin T 69 (*)     All other components within normal limits    Narrative:     High Sensitive Troponin T Reference Range:  <14.0 ng/L- Negative Female for AMI  <22.0 ng/L- Negative Male for AMI  >=14 - Abnormal Female indicating possible myocardial injury.  >=22 - Abnormal Male indicating possible myocardial " injury.   Clinicians would have to utilize clinical acumen, EKG, Troponin, and serial changes to determine if it is an Acute Myocardial Infarction or myocardial injury due to an underlying chronic condition.        COMPREHENSIVE METABOLIC PANEL - Abnormal; Notable for the following components:    Glucose 119 (*)     BUN 39 (*)     Creatinine 2.98 (*)     Sodium 124 (*)     Potassium 3.3 (*)     Chloride 93 (*)     CO2 19.5 (*)     Calcium 8.5 (*)     ALT (SGPT) 53 (*)     AST (SGOT) 79 (*)     eGFR 15.0 (*)     All other components within normal limits    Narrative:     GFR Categories in Chronic Kidney Disease (CKD)      GFR Category          GFR (mL/min/1.73)    Interpretation  G1                     90 or greater         Normal or high (1)  G2                      60-89                Mild decrease (1)  G3a                   45-59                Mild to moderate decrease  G3b                   30-44                Moderate to severe decrease  G4                    15-29                Severe decrease  G5                    14 or less           Kidney failure          (1)In the absence of evidence of kidney disease, neither GFR category G1 or G2 fulfill the criteria for CKD.    eGFR calculation 2021 CKD-EPI creatinine equation, which does not include race as a factor   BNP (IN-HOUSE) - Abnormal; Notable for the following components:    proBNP 5,094.0 (*)     All other components within normal limits    Narrative:     This assay is used as an aid in the diagnosis of individuals suspected of having heart failure. It can be used as an aid in the diagnosis of acute decompensated heart failure (ADHF) in patients presenting with signs and symptoms of ADHF to the emergency department (ED). In addition, NT-proBNP of <300 pg/mL indicates ADHF is not likely.    Age Range Result Interpretation  NT-proBNP Concentration (pg/mL:      <50             Positive            >450                   Gray                 300-450                     Negative             <300    50-75           Positive            >900                  Gray                300-900                  Negative            <300      >75             Positive            >1800                  Gray                300-1800                  Negative            <300   TSH RFX ON ABNORMAL TO FREE T4 - Abnormal; Notable for the following components:    TSH 6.210 (*)     All other components within normal limits   CBC WITH AUTO DIFFERENTIAL - Abnormal; Notable for the following components:    RBC 2.62 (*)     Hemoglobin 7.2 (*)     Hematocrit 21.3 (*)     All other components within normal limits   HIGH SENSITIVITIY TROPONIN T 1HR - Abnormal; Notable for the following components:    HS Troponin T 67 (*)     All other components within normal limits    Narrative:     High Sensitive Troponin T Reference Range:  <14.0 ng/L- Negative Female for AMI  <22.0 ng/L- Negative Male for AMI  >=14 - Abnormal Female indicating possible myocardial injury.  >=22 - Abnormal Male indicating possible myocardial injury.   Clinicians would have to utilize clinical acumen, EKG, Troponin, and serial changes to determine if it is an Acute Myocardial Infarction or myocardial injury due to an underlying chronic condition.        RESPIRATORY PANEL PCR W/ COVID-19 (SARS-COV-2), NP SWAB IN UTM/VTP, 2 HR TAT - Normal    Narrative:     In the setting of a positive respiratory panel with a viral infection PLUS a negative procalcitonin without other underlying concern for bacterial infection, consider observing off antibiotics or discontinuation of antibiotics and continue supportive care. If the respiratory panel is positive for atypical bacterial infection (Bordetella pertussis, Chlamydophila pneumoniae, or Mycoplasma pneumoniae), consider antibiotic de-escalation to target atypical bacterial infection.   MAGNESIUM - Normal   T4, FREE - Normal   TYPE AND SCREEN   PREPARE RBC   CBC AND DIFFERENTIAL    Narrative:      The following orders were created for panel order CBC & Differential.  Procedure                               Abnormality         Status                     ---------                               -----------         ------                     CBC Auto Differential[[id number]]        Abnormal            Final result                 Please view results for these tests on the individual orders.   EXTRA TUBES    Narrative:     The following orders were created for panel order Extra Tubes.  Procedure                               Abnormality         Status                     ---------                               -----------         ------                     Gold Top - SST[[id number]]                                   Final result               Light Blue Top[[id number]]                                   Final result                 Please view results for these tests on the individual orders.   GOLD TOP - SST   LIGHT BLUE TOP     Medications   Potassium Replacement - Follow Nurse / BPA Driven Protocol (has no administration in time range)   ondansetron (ZOFRAN) injection 4 mg (4 mg Intravenous Given 4/15/25 1446)     XR Chest 1 View  Result Date: 4/15/2025  Impression: No active pulmonary process. Stable cardiomegaly since 2021. Electronically Signed: Samuel Villa MD  4/15/2025 1:49 PM EDT  Workstation ID: [id number]                                                     Medical Decision Making  Chart review: 4/10/25 Dr. Farooq nephrology: History of Present Illness: Brad Woodward is a 84 y.o. female presenting as a follow-up for CKD and HTN. She has a h/o HTN and PPM placement. She continues to feel well except for some fatigue. Her Cr had remained stable at 1.8-2 but was slightly higher at 2.2-2.3 at last visit and is now up to 2.7 (eGFR down to 16). She has a h/o DM and is on Glipizide. No NSAID's. She is on Hydralazine, Amlodipine, Losartan, Clonidine and Chlorthalidone. Past U/S was normal. She denies any  dysuria.  PLAN: Cr jumped to 2.7 from 2.2-2.3 from 1.8-2. Rx for UTI. Renal U/S OK. Keep off Metformin. BP reviewed - above goal - will titrate Hydralazine to 50 mg TID. OK to continue other BP meds as they are. Discussed NSAID avoidance. Dicussed dialysis modalities. Will see back soon (one month).    Patient presented to the ED for the above complaint.    Patient underwent the above exam and evaluation.    While in the ED patient was placed in a gown and IV was established.  EKG, chest x-ray, blood work was obtained to assess for arrhythmia, MI, electrolyte abnormality, dehydration, infection, thyroid dysfunction.  Patient was given IV Zofran.  Patient noted to have hemoglobin of 7.2, spoke with Dr. Farooq nephrologist who recommended giving patient 1 unit of PRBCs, this was ordered.  Upon reevaluation patient is resting comfortably, vital stable.  Discussed findings with patient and family at bedside.  Educated patient we will be admitting her for further treatment and evaluation.  Patient voiced understanding, agreeable dispo plan.  Spoke with Anuja ZHAO with hospitalist group who agreed to admit patient.    EKG independently interpreted by Dr. Shi showing sinus rhythm, right bundle branch block, prolonged QT interval, rate 82, OK interval 169, QTc 527, compared to previous EKG 4/20/2021 showing atrial paced rhythm, rate 60.  Labs were independently interpreted by myself and deemed remarkable for the following: WBC 4.11, hemoglobin 7.2, hematocrit 21.3, glucose 119, creatinine 2.98, sodium 124, potassium 3.3, CO2 19.5, calcium 8.5, BNP 5094, TSH 6.21, free T41.23, mag 1.6, initial opponent 69, repeat troponin 67, respiratory panel negative.  Chest x-ray independently interpreted by radiologist and reviewed by myself showing: No acute findings.    Appropriate PPE was worn during each patient encounter.    Note Disclaimer: At [facility], we believe that sharing information builds trust and better  relationships. You are receiving this note because you are receiving care at [facility] or recently visited. It is possible you will see health information before a provider has talked with you about it. This kind of information can be easy to misunderstand. To help you fully understand what it means for your health, we urge you to discuss this note with your provider.        Problems Addressed:  Anemia due to chronic kidney disease, unspecified CKD stage: complicated acute illness or injury  Chest tightness: complicated acute illness or injury  Dyspnea, unspecified type: complicated acute illness or injury  Generalized weakness: complicated acute illness or injury  Hypokalemia: complicated acute illness or injury  Nausea: complicated acute illness or injury    Amount and/or Complexity of Data Reviewed  Labs: ordered.  Radiology: ordered.    Risk  OTC drugs.  Prescription drug management.  Decision regarding hospitalization.        Final diagnoses:   Generalized weakness   Anemia due to chronic kidney disease, unspecified CKD stage   Hypokalemia   Dyspnea, unspecified type   Nausea   Chest tightness       ED Disposition  ED Disposition       ED Disposition   Decision to Admit    Condition   --    Comment   Level of Care: Telemetry [5]   Admitting Physician: CHRISTI BAILEY [[id number]]   Attending Physician: CHRISTI BAILEY [[id number]]                 No follow-up provider specified.       Medication List      No changes were made to your prescriptions during this visit.            Luci Isaacs PA-C  04/15/25 8400

## 2025-04-15 NOTE — LETTER
[place] [address] [facility] [place] [address]  [phone]        April 17, 2025      Patient: Brad Woodward  YOB: 1940  MRN:              [id number]    Dear Brad,     This letter serves as documentation that you are hospitalized and currently under the   care of, Dr. Harjinder Guzman, at [facility] for the treatment of Chronic Kidney Disease, Stage 4. Your treatment includes Hemodialysis and medical surveillance where you will undergo periodic blood tests and scans.     Sincerely,       Harjinder Guzman MD  [facility]

## 2025-04-15 NOTE — Clinical Note
[facility] CASE MANAGEMENT  [facility] [facility] [place] [address]  [phone]  [phone]        April 24, 2025      Patient: Brad Woodward  YOB: 1940  Date of Visit: 4/15/2025      ***        Ventura Mccormick

## 2025-04-15 NOTE — Clinical Note
Level of Care: Telemetry [5]   Admitting Physician: CHRISTI BAILEY [[id number]]   Attending Physician: CHRISTI BAILEY [[id number]]

## 2025-04-16 LAB
ANION GAP SERPL CALCULATED.3IONS-SCNC: 10.4 MMOL/L (ref 5–15)
ANION GAP SERPL CALCULATED.3IONS-SCNC: 10.7 MMOL/L (ref 5–15)
BACTERIA SPEC AEROBE CULT: NO GROWTH
BASOPHILS # BLD AUTO: 0.01 10*3/MM3 (ref 0–0.2)
BASOPHILS NFR BLD AUTO: 0.2 % (ref 0–1.5)
BH BB BLOOD EXPIRATION DATE: NORMAL
BH BB BLOOD TYPE BARCODE: 7300
BH BB DISPENSE STATUS: NORMAL
BH BB PRODUCT CODE: NORMAL
BH BB UNIT NUMBER: NORMAL
BUN SERPL-MCNC: 40 MG/DL (ref 8–23)
BUN SERPL-MCNC: 41 MG/DL (ref 8–23)
BUN/CREAT SERPL: 13.6 (ref 7–25)
BUN/CREAT SERPL: 13.8 (ref 7–25)
CALCIUM SPEC-SCNC: 7.1 MG/DL (ref 8.6–10.5)
CALCIUM SPEC-SCNC: 8.1 MG/DL (ref 8.6–10.5)
CHLORIDE SERPL-SCNC: 92 MMOL/L (ref 98–107)
CHLORIDE SERPL-SCNC: 95 MMOL/L (ref 98–107)
CO2 SERPL-SCNC: 18.3 MMOL/L (ref 22–29)
CO2 SERPL-SCNC: 20.6 MMOL/L (ref 22–29)
CREAT SERPL-MCNC: 2.89 MG/DL (ref 0.57–1)
CREAT SERPL-MCNC: 3.02 MG/DL (ref 0.57–1)
CROSSMATCH INTERPRETATION: NORMAL
DEPRECATED RDW RBC AUTO: 44.5 FL (ref 37–54)
EGFRCR SERPLBLD CKD-EPI 2021: 14.8 ML/MIN/1.73
EGFRCR SERPLBLD CKD-EPI 2021: 15.6 ML/MIN/1.73
EOSINOPHIL # BLD AUTO: 0.08 10*3/MM3 (ref 0–0.4)
EOSINOPHIL NFR BLD AUTO: 1.7 % (ref 0.3–6.2)
ERYTHROCYTE [DISTWIDTH] IN BLOOD BY AUTOMATED COUNT: 14.4 % (ref 12.3–15.4)
GLUCOSE BLDC GLUCOMTR-MCNC: 107 MG/DL (ref 70–105)
GLUCOSE BLDC GLUCOMTR-MCNC: 126 MG/DL (ref 70–105)
GLUCOSE BLDC GLUCOMTR-MCNC: 141 MG/DL (ref 70–105)
GLUCOSE BLDC GLUCOMTR-MCNC: 167 MG/DL (ref 70–105)
GLUCOSE SERPL-MCNC: 114 MG/DL (ref 65–99)
GLUCOSE SERPL-MCNC: 167 MG/DL (ref 65–99)
HCT VFR BLD AUTO: 25.4 % (ref 34–46.6)
HCT VFR BLD AUTO: 26.1 % (ref 34–46.6)
HGB BLD-MCNC: 8.5 G/DL (ref 12–15.9)
HGB BLD-MCNC: 8.7 G/DL (ref 12–15.9)
IMM GRANULOCYTES # BLD AUTO: 0.03 10*3/MM3 (ref 0–0.05)
IMM GRANULOCYTES NFR BLD AUTO: 0.6 % (ref 0–0.5)
LYMPHOCYTES # BLD AUTO: 1.4 10*3/MM3 (ref 0.7–3.1)
LYMPHOCYTES NFR BLD AUTO: 30.2 % (ref 19.6–45.3)
MAGNESIUM SERPL-MCNC: 1.7 MG/DL (ref 1.6–2.4)
MCH RBC QN AUTO: 28.1 PG (ref 26.6–33)
MCHC RBC AUTO-ENTMCNC: 33.3 G/DL (ref 31.5–35.7)
MCV RBC AUTO: 84.2 FL (ref 79–97)
MONOCYTES # BLD AUTO: 0.53 10*3/MM3 (ref 0.1–0.9)
MONOCYTES NFR BLD AUTO: 11.4 % (ref 5–12)
NEUTROPHILS NFR BLD AUTO: 2.59 10*3/MM3 (ref 1.7–7)
NEUTROPHILS NFR BLD AUTO: 55.9 % (ref 42.7–76)
NRBC BLD AUTO-RTO: 0 /100 WBC (ref 0–0.2)
PHOSPHATE SERPL-MCNC: 4 MG/DL (ref 2.5–4.5)
PLATELET # BLD AUTO: 214 10*3/MM3 (ref 140–450)
PMV BLD AUTO: 9.6 FL (ref 6–12)
POTASSIUM SERPL-SCNC: 3.6 MMOL/L (ref 3.5–5.2)
POTASSIUM SERPL-SCNC: 3.6 MMOL/L (ref 3.5–5.2)
QT INTERVAL: 452 MS
QTC INTERVAL: 527 MS
RBC # BLD AUTO: 3.1 10*6/MM3 (ref 3.77–5.28)
SODIUM SERPL-SCNC: 123 MMOL/L (ref 136–145)
SODIUM SERPL-SCNC: 124 MMOL/L (ref 136–145)
UNIT  ABO: NORMAL
UNIT  RH: NORMAL
WBC NRBC COR # BLD AUTO: 4.64 10*3/MM3 (ref 3.4–10.8)

## 2025-04-16 PROCEDURE — 84100 ASSAY OF PHOSPHORUS: CPT | Performed by: INTERNAL MEDICINE

## 2025-04-16 PROCEDURE — 97166 OT EVAL MOD COMPLEX 45 MIN: CPT

## 2025-04-16 PROCEDURE — 80048 BASIC METABOLIC PNL TOTAL CA: CPT

## 2025-04-16 PROCEDURE — 82948 REAGENT STRIP/BLOOD GLUCOSE: CPT

## 2025-04-16 PROCEDURE — 25810000003 SODIUM CHLORIDE 0.9 % SOLUTION: Performed by: INTERNAL MEDICINE

## 2025-04-16 PROCEDURE — 83735 ASSAY OF MAGNESIUM: CPT | Performed by: INTERNAL MEDICINE

## 2025-04-16 PROCEDURE — 85025 COMPLETE CBC W/AUTO DIFF WBC: CPT

## 2025-04-16 PROCEDURE — 85014 HEMATOCRIT: CPT | Performed by: FAMILY MEDICINE

## 2025-04-16 PROCEDURE — 63710000001 INSULIN LISPRO (HUMAN) PER 5 UNITS

## 2025-04-16 PROCEDURE — 85018 HEMOGLOBIN: CPT | Performed by: FAMILY MEDICINE

## 2025-04-16 PROCEDURE — 97162 PT EVAL MOD COMPLEX 30 MIN: CPT

## 2025-04-16 PROCEDURE — 80048 BASIC METABOLIC PNL TOTAL CA: CPT | Performed by: FAMILY MEDICINE

## 2025-04-16 RX ORDER — SODIUM CHLORIDE 9 MG/ML
100 INJECTION, SOLUTION INTRAVENOUS CONTINUOUS
Status: DISCONTINUED | OUTPATIENT
Start: 2025-04-16 | End: 2025-04-17

## 2025-04-16 RX ORDER — SODIUM CHLORIDE 9 MG/ML
75 INJECTION, SOLUTION INTRAVENOUS CONTINUOUS
Status: DISCONTINUED | OUTPATIENT
Start: 2025-04-16 | End: 2025-04-16

## 2025-04-16 RX ORDER — DIPHENHYDRAMINE HYDROCHLORIDE 25 MG/1
25 CAPSULE ORAL 3 TIMES DAILY PRN
Status: DISCONTINUED | OUTPATIENT
Start: 2025-04-16 | End: 2025-04-24 | Stop reason: HOSPADM

## 2025-04-16 RX ADMIN — HYDRALAZINE HYDROCHLORIDE 50 MG: 25 TABLET ORAL at 20:33

## 2025-04-16 RX ADMIN — Medication 25 MG: at 22:32

## 2025-04-16 RX ADMIN — HYDRALAZINE HYDROCHLORIDE 50 MG: 25 TABLET ORAL at 08:34

## 2025-04-16 RX ADMIN — SODIUM CHLORIDE 100 ML/HR: 9 INJECTION, SOLUTION INTRAVENOUS at 19:06

## 2025-04-16 RX ADMIN — AMLODIPINE BESYLATE 10 MG: 5 TABLET ORAL at 08:34

## 2025-04-16 RX ADMIN — HYDRALAZINE HYDROCHLORIDE 50 MG: 25 TABLET ORAL at 15:49

## 2025-04-16 RX ADMIN — INSULIN LISPRO 2 UNITS: 100 INJECTION, SOLUTION INTRAVENOUS; SUBCUTANEOUS at 20:33

## 2025-04-16 RX ADMIN — SODIUM CHLORIDE 100 ML/HR: 9 INJECTION, SOLUTION INTRAVENOUS at 08:34

## 2025-04-16 NOTE — THERAPY EVALUATION
Patient Name: Brad Woodward  : 1940    MRN: [id number]                              Today's Date: 2025       Admit Date: 4/15/2025    Visit Dx:     ICD-10-CM ICD-9-CM   1. Generalized weakness  R53.1 780.79   2. Anemia due to chronic kidney disease, unspecified CKD stage  N18.9 285.21    D63.1    3. Hypokalemia  E87.6 276.8   4. Dyspnea, unspecified type  R06.00 786.09   5. Nausea  R11.0 787.02   6. Chest tightness  R07.89 786.59     Patient Active Problem List   Diagnosis    Mixed hyperlipidemia    Essential hypertension    Type 2 diabetes mellitus    Anemia due to stage 3a chronic kidney disease    CKD (chronic kidney disease) stage 3, GFR 30-59 ml/min    Hypertensive emergency    HFrEF (heart failure with reduced ejection fraction)    Dyspnea    Sick sinus syndrome    Junctional bradycardia    Pacemaker    Anemia     Past Medical History:   Diagnosis Date    Diabetes mellitus     Hyperlipidemia     Hypertension      Past Surgical History:   Procedure Laterality Date    CARDIAC ELECTROPHYSIOLOGY PROCEDURE N/A 2021    Procedure: Pacemaker DC new;  Surgeon: Yovany Navarrete MD;  Location: [facility] INVASIVE LOCATION;  Service: Cardiovascular;  Laterality: N/A;      General Information       Row Name 25          OT Time and Intention    Document Type evaluation  -LS     Mode of Treatment occupational therapy  -LS       Row Name 25          General Information    Prior Level of Function independent:;ADL's;all household mobility  No AD used. Assisted as needed for ADLs  -LS     Existing Precautions/Restrictions fall  -LS     Barriers to Rehab medically complex;language barrier  -LS       Row Name 25          Living Environment    Current Living Arrangements home  -LS     People in Home child(geoffrey), adult  -LS       Row Name 25 1703          Home Main Entrance    Number of Stairs, Main Entrance two  -LS       Row Name 25          Cognition    Orientation  Status (Cognition) oriented to;person;place;situation  -[facility] Name 04/16/25 1703          Safety Issues/Impairments Affecting Functional Mobility    Impairments Affecting Function (Mobility) balance;endurance/activity tolerance  -               User Key  (r) = Recorded By, (t) = Taken By, (c) = Cosigned By      Initials Name Provider Type    Thai Ambriz OT Occupational Therapist                     Mobility/ADL's       Row [facility] 04/16/25 1704          Bed Mobility    Bed Mobility bed mobility (all) activities  -     All Activities, [place] (Bed Mobility) contact guard  -LS       Row Name 04/16/25 1704          Transfers    Transfers sit-stand transfer;bed-chair transfer  -LS       Row Name 04/16/25 1704          Bed-Chair Transfer    Bed-Chair [place] (Transfers) minimum assist (75% patient effort)  -LS       Row Name 04/16/25 1704          Sit-Stand Transfer    Sit-Stand [place] (Transfers) minimum assist (75% patient effort)  -LS       Row Name 04/16/25 1704          Activities of Daily Living    BADL Assessment/Intervention lower body dressing  -LS       Row Name 04/16/25 1704          Lower Body Dressing Assessment/Training    [place] Level (Lower Body Dressing) lower body dressing skills;doff;don;socks;set up  -               User Key  (r) = Recorded By, (t) = Taken By, (c) = Cosigned By      Initials Name Provider Type    Thai Ambriz OT Occupational Therapist                   Obj/Interventions       [facility] Name 04/16/25 1704          Sensory Assessment (Somatosensory)    Sensory Assessment (Somatosensory) sensation intact  -LS       Row Name 04/16/25 1704          Range of Motion Comprehensive    General Range of Motion no range of motion deficits identified  -LS       Row Name 04/16/25 1704          Strength Comprehensive (MMT)    General Manual Muscle Testing (MMT) Assessment no strength deficits identified  -LS       Row Name 04/16/25 1704          Balance     Balance Assessment sitting static balance;sitting dynamic balance;standing static balance;standing dynamic balance  -LS     Static Sitting Balance standby assist  -LS     Dynamic Sitting Balance standby assist  -LS     Position, Sitting Balance sitting edge of bed;unsupported  -LS     Static Standing Balance minimal assist  -LS     Dynamic Standing Balance minimal assist  -LS     Position/Device Used, Standing Balance supported  -LS               User Key  (r) = Recorded By, (t) = Taken By, (c) = Cosigned By      Initials Name Provider Type    Thai Ambriz OT Occupational Therapist                   Goals/Plan       Row Name 04/16/25 1711          Bed Mobility Goal 1 (OT)    Activity/Assistive Device (Bed Mobility Goal 1, OT) bed mobility activities, all  -LS     [place] Level/Cues Needed (Bed Mobility Goal 1, OT) modified independence  -LS     Time Frame (Bed Mobility Goal 1, OT) long term goal (LTG);2 weeks  -       Row Name 04/16/25 1711          Transfer Goal 1 (OT)    Activity/Assistive Device (Transfer Goal 1, OT) transfers, all  -LS     [place] Level/Cues Needed (Transfer Goal 1, OT) modified independence  -LS     Time Frame (Transfer Goal 1, OT) long term goal (LTG);2 weeks  -       Row Name 04/16/25 1711          Dressing Goal 1 (OT)    Activity/Device (Dressing Goal 1, OT) dressing skills, all  -LS     [place]/Cues Needed (Dressing Goal 1, OT) modified independence  -LS     Time Frame (Dressing Goal 1, OT) long term goal (LTG);2 weeks  -       Row Name 04/16/25 1711          Toileting Goal 1 (OT)    Activity/Device (Toileting Goal 1, OT) toileting skills, all  -LS     [place] Level/Cues Needed (Toileting Goal 1, OT) modified independence  -LS     Time Frame (Toileting Goal 1, OT) long term goal (LTG);2 weeks  -       Row Name 04/16/25 1711          Therapy Assessment/Plan (OT)    Planned Therapy Interventions (OT) activity tolerance training;BADL retraining;functional  balance retraining;IADL retraining;occupation/activity based interventions;ROM/therapeutic exercise;strengthening exercise;transfer/mobility retraining;patient/caregiver education/training  -               User Key  (r) = Recorded By, (t) = Taken By, (c) = Cosigned By      Initials Name Provider Type    Thai Ambriz OT Occupational Therapist                   Clinical Impression       Row Name 04/16/25 1706          Pain Assessment    Pretreatment Pain Rating 0/10 - no pain  -LS     Posttreatment Pain Rating 0/10 - no pain  -LS       Row Name 04/16/25 1703          Plan of Care Review    Plan of Care Reviewed With patient;son  -LS     Outcome Evaluation 84 y.o. female with a CMH of DM, HTN, HLD, CKD who presented to [facility] on 4/15/20[facility] with family due to complaints of nausea and generalized weakness. Labs indicated HGB 7.2, Cr 2.98, BUN 39, Sodium 124, K+ 3.3. Pt dx with anemia and ERIN on CKD. Pt does not speak English, thus  services were used with  number: [id number]. This date, pt asleep on arrival but easily aroused. She was A&Ox3. She and burton together provided history. Pt lives with her son 6 months of the year here in [place], and the other 6 months is spent in [facility]. She is with her son at this time in a H with 1 TOSHA. She is generally IND with ADLs and mobility without the need for AD, though does endorse the use of a shower chair for safety with bathing tasks. Someone is with her at all times. This date, she is fatigued but agreeable to participate in OT eval. She comed to EOB with CGA. Setup provided for lower body dressing, able to doff and don socks without difficulty. She has good ROM and strength in the BUEs. Min A provided to stand and ambulate a short distance from bed to chair. OT anticipates that she will progress well and be safe for dc home with assist when stable. Recommending HHOT.  -LS       Row Name 04/16/25 1709          Therapy Assessment/Plan  (OT)    Rehab Potential (OT) good  -LS     Criteria for Skilled Therapeutic Interventions Met (OT) yes;skilled treatment is necessary  -LS     Therapy Frequency (OT) 3 times/wk  -LS     Predicted Duration of Therapy Intervention (OT) until dc  -LS       Row Name 04/16/25 1705          Therapy Plan Review/Discharge Plan (OT)    Anticipated Discharge Disposition (OT) home with assist;home with home health  -LS       Row Name 04/16/25 1705          Vital Signs    Intra Systolic BP Rehab 179  -LS     Intra Treatment Diastolic BP 69  -LS     O2 Delivery Pre Treatment room air  -LS     O2 Delivery Intra Treatment room air  -LS     O2 Delivery Post Treatment room air  -LS     Pre Patient Position Supine  -LS     Intra Patient Position Standing  -LS     Post Patient Position Sitting  -LS       Row Name 04/16/25 1705          Positioning and Restraints    Pre-Treatment Position in bed  -LS     Post Treatment Position chair  -LS     In Chair notified nsg;reclined;call light within reach;encouraged to call for assist;exit alarm on  -LS               User Key  (r) = Recorded By, (t) = Taken By, (c) = Cosigned By      Initials Name Provider Type    LS Thai Solomon, SKYLA Occupational Therapist                   Outcome Measures       Row Name 04/16/25 1711          How much help from another is currently needed...    Putting on and taking off regular lower body clothing? 3  -LS     Bathing (including washing, rinsing, and drying) 3  -LS     Toileting (which includes using toilet bed pan or urinal) 3  -LS     Putting on and taking off regular upper body clothing 3  -LS     Taking care of personal grooming (such as brushing teeth) 4  -LS     Eating meals 4  -LS     AM-PAC 6 Clicks Score (OT) 20  -LS       Row Name 04/16/25 0818          How much help from another person do you currently need...    Turning from your back to your side while in flat bed without using bedrails? 3  -RM     Moving from lying on back to sitting on the side  of a flat bed without bedrails? 3  -RM     Moving to and from a bed to a chair (including a wheelchair)? 3  -RM     Standing up from a chair using your arms (e.g., wheelchair, bedside chair)? 3  -RM     Climbing 3-5 steps with a railing? 2  -RM     To walk in hospital room? 3  -RM     AM-PAC 6 Clicks Score (PT) 17  -RM       Row Name 04/16/25 1711          Functional Assessment    Outcome Measure Options AM-PAC 6 Clicks Daily Activity (OT)  -               User Key  (r) = Recorded By, (t) = Taken By, (c) = Cosigned By      Initials Name Provider Type     Janet Latif LPN Licensed Nurse    Thai Ambriz OT Occupational Therapist                    Occupational Therapy Education       Title: PT OT SLP Therapies (Done)       Topic: Occupational Therapy (Done)       Point: ADL training (Done)       Learning Progress Summary            Patient Acceptance, E,TB, VU by  at 4/16/2025 1712   Family Acceptance, E,TB, VU by  at 4/16/2025 1712                      Point: Precautions (Done)       Learning Progress Summary            Patient Acceptance, E,TB, VU by  at 4/16/2025 1712   Family Acceptance, E,TB, VU by  at 4/16/2025 1712                      Point: Body mechanics (Done)       Learning Progress Summary            Patient Acceptance, E,TB, VU by  at 4/16/2025 1712   Family Acceptance, E,TB, VU by  at 4/16/2025 1712                                      User Key       Initials Effective Dates Name Provider Type Discipline     09/22/22 -  Thai Solomon OT Occupational Therapist OT                  OT Recommendation and Plan  Planned Therapy Interventions (OT): activity tolerance training, BADL retraining, functional balance retraining, IADL retraining, occupation/activity based interventions, ROM/therapeutic exercise, strengthening exercise, transfer/mobility retraining, patient/caregiver education/training  Therapy Frequency (OT): 3 times/wk  Plan of Care Review  Plan of Care Reviewed With:  patient, son  Outcome Evaluation: 84 y.o. female with a CMH of DM, HTN, HLD, CKD who presented to [facility] on 4/15/20[facility] with family due to complaints of nausea and generalized weakness. Labs indicated HGB 7.2, Cr 2.98, BUN 39, Sodium 124, K+ 3.3. Pt dx with anemia and ERIN on CKD. Pt does not speak English, thus  services were used with  number: [id number]. This date, pt asleep on arrival but easily aroused. She was A&Ox3. She and burton together provided history. Pt lives with her son 6 months of the year here in [place], and the other 6 months is spent in [facility]. She is with her son at this time in a [facility] with 1 TOSHA. She is generally IND with ADLs and mobility without the need for AD, though does endorse the use of a shower chair for safety with bathing tasks. Someone is with her at all times. This date, she is fatigued but agreeable to participate in OT eval. She comed to EOB with CGA. Setup provided for lower body dressing, able to doff and don socks without difficulty. She has good ROM and strength in the BUEs. Min A provided to stand and ambulate a short distance from bed to chair. OT anticipates that she will progress well and be safe for dc home with assist when stable. Recommending HHOT.     Time Calculation:         Time Calculation- OT       Row Name 04/16/25 1712             Time Calculation- OT    OT Start Time 1100  -LS      OT Stop Time 1130  -LS      OT Time Calculation (min) 30 min  -LS      OT Received On 04/16/25  -LS      OT - Next Appointment 04/18/25  -      OT Goal Re-Cert Due Date 04/30/25  -LS         Untimed Charges    OT Eval/Re-eval Minutes 30  -LS         Total Minutes    Untimed Charges Total Minutes 30  -LS       Total Minutes 30  -LS                User Key  (r) = Recorded By, (t) = Taken By, (c) = Cosigned By      Initials Name Provider Type    Thai Ambriz OT Occupational Therapist                  Therapy Charges for Today       Code Description  Service Date Service Provider Modifiers Qty    12409063576 HC OT EVAL MOD COMPLEXITY 4 4/16/2025 Thai Solomon OT GO 1                 Thai Solomon OT  4/16/2025

## 2025-04-16 NOTE — PLAN OF CARE
Goal Outcome Evaluation:  Plan of Care Reviewed With: patient, son           Outcome Evaluation: 84 y.o. female with a CMH of DM, HTN, HLD, CKD who presented to [facility] on 4/15/2025 with family due to complaints of nausea and generalized weakness. Labs indicated HGB 7.2, Cr 2.98, BUN 39, Sodium 124, K+ 3.3. Pt dx with anemia and ERIN on CKD. Pt does not speak English, thus  services were used with  number: [id number]. This date, pt asleep on arrival but easily aroused. She was A&Ox3. She and burton together provided history. Pt lives with her son 6 months of the year here in [place], and the other 6 months is spent in [facility]. She is with her son at this time in a [facility] with 1 TOSHA. She is generally IND with ADLs and mobility without the need for AD, though does endorse the use of a shower chair for safety with bathing tasks. Someone is with her at all times. This date, she is fatigued but agreeable to participate in OT eval. She comed to EOB with CGA. Setup provided for lower body dressing, able to doff and don socks without difficulty. She has good ROM and strength in the BUEs. Min A provided to stand and ambulate a short distance from bed to chair. OT anticipates that she will progress well and be safe for dc home with assist when stable. Recommending HHOT.    Anticipated Discharge Disposition (OT): home with assist, home with home health

## 2025-04-16 NOTE — CASE MANAGEMENT/SOCIAL WORK
Discharge Planning Assessment   Zach     Patient Name: Brad Woodward  MRN: [id number]  Today's Date: 4/16/2025    Admit Date: 4/15/2025    Plan: [place] PLAN: Routine home with son. (Watch for outpatient HD)     Discharge Needs Assessment       Row Name 04/16/25 1326       Living Environment    People in Home child(geoffrey), adult    Name(s) of People in Home Lives with son    Current Living Arrangements home    Potentially Unsafe Housing Conditions none    In the past 12 months has the electric, gas, oil, or water company threatened to shut off services in your home? No    Primary Care Provided by self    Provides Primary Care For no one    Family Caregiver if Needed spouse    Quality of Family Relationships helpful;involved;supportive    Able to Return to Prior Arrangements yes       Resource/Environmental Concerns    Resource/Environmental Concerns none    Transportation Concerns none       Transportation Needs    In the past 12 months, has lack of transportation kept you from medical appointments or from getting medications? no    In the past 12 months, has lack of transportation kept you from meetings, work, or from getting things needed for daily living? No       Food Insecurity    Within the past 12 months, you worried that your food would run out before you got the money to buy more. Never true    Within the past 12 months, the food you bought just didn't last and you didn't have money to get more. Never true       Transition Planning    Patient/Family Anticipates Transition to home with family    Patient/Family Anticipated Services at Transition none    Transportation Anticipated car, drives self;family or friend will provide       Discharge Needs Assessment    Readmission Within the Last 30 Days no previous admission in last 30 days    Equipment Currently Used at Home none    Anticipated Changes Related to Illness none    Equipment Needed After Discharge none                   Discharge Plan       Row Name 04/16/25  1327       Plan    Plan DC PLAN: Routine home with son. (Watch for outpatient HD)    Patient/Family in Agreement with Plan yes    Plan Comments CM met with patient and son at bedside, from routine home with son. Independent with ADL's no DME. PCP is Aravind, Pharmacy is [facility] agreeable to Meds to Beds. Able to afford medications and denies any issues with food or utilities. Denies any transportation issues, son will provide. Denies any HHC or SNF needs. Denies any concerns about return home. Watch for possible outpatient Hemodiaylsis needs. IVF infusing, pending clinical course. , Elevated BUN/CRT.                    Expected Discharge Date and Time       Expected Discharge Date Expected Discharge Time    Apr 17, 2025            Demographic Summary       Row Name 04/16/25 1325       General Information    Admission Type inpatient    Arrived From emergency department    Required Notices Provided Important Message from Medicare    Referral Source admission list    Reason for Consult discharge planning    Preferred Language English       Contact Information    Permission Granted to Share Info With     Contact Information Obtained for                    Functional Status       Row Name 04/16/25 1325       Functional Status    Usual Activity Tolerance excellent    Current Activity Tolerance excellent       Physical Activity    On average, how many days per week do you engage in moderate to strenuous exercise (like a brisk walk)? 0 days    On average, how many minutes do you engage in exercise at this level? 0 min    Number of minutes of exercise per week 0       Assessment of Health Literacy    How often do you have someone help you read hospital materials? Sometimes    How often do you have problems learning about your medical condition because of difficulty understanding written information? Sometimes    How often do you have a problem understanding what is told to you about your medical  condition? Sometimes       Functional Status, IADL    Medications independent    Meal Preparation independent    Housekeeping independent    Laundry independent    Shopping independent       Mental Status    General Appearance WDL WDL       Mental Status Summary    Recent Changes in Mental Status/Cognitive Functioning no changes                  Met with patient in room wearing PPE:     Maintained distance greater than six feet and spent less than 15 minutes in the room.    Laquita Talley RN   Case Management  [phone]

## 2025-04-16 NOTE — CASE MANAGEMENT/SOCIAL WORK
Social Drivers of Health     Tobacco Use: Low Risk  (4/15/2025)    Patient History     Smoking Tobacco Use: Never     Smokeless Tobacco Use: Never     Passive Exposure: Never   Alcohol Use: Not At Risk (4/16/2025)    AUDIT-C     Frequency of Alcohol Consumption: Never     Average Number of Drinks: Patient does not drink     Frequency of Binge Drinking: Never   Financial Resource Strain: Low Risk  (4/16/2025)    Overall Financial Resource Strain (CARDIA)     Difficulty of Paying Living Expenses: Not very hard   Food Insecurity: No Food Insecurity (4/16/2025)    Hunger Vital Sign     Worried About Running Out of Food in the Last Year: Never true     Ran Out of Food in the Last Year: Never true   Transportation Needs: No Transportation Needs (4/16/2025)    PRAPARE - Transportation     Lack of Transportation (Medical): No     Lack of Transportation (Non-Medical): No   Physical Activity: Inactive (4/16/2025)    Exercise Vital Sign     Days of Exercise per Week: 0 days     Minutes of Exercise per Session: 0 min   Stress: No Stress Concern Present (4/16/2025)    [employer] [place] [place] [employer] - Occupational Stress Questionnaire     Feeling of Stress : Not at all   Social Connections: Not At Risk (4/16/2025)    Family and Community Support     Help with Day-to-Day Activities: I don't need any help     Lonely or Isolated: Rarely   Interpersonal Safety: Not At Risk (4/16/2025)    Abuse Screen     Unsafe at Home or Work/School: no     Feels Threatened by Someone?: no     Does Anyone Keep You from Contacting Others or Doint Things Outside the Home?: no     Physical Sign of Abuse Present: no   Depression: Not at risk (4/16/2025)    PHQ-2     PHQ-2 Score: 0   Housing Stability: Not At Risk (4/16/2025)    Housing Stability     Current Living Arrangements: home     Potentially Unsafe Housing Conditions: none   Utilities: Not At Risk (4/16/2025)    [facility] Utilities     Threatened with loss of utilities: No   Health Literacy:  Not At Risk (4/16/2025)    Education     Help with school or training?: No     Preferred Language: English   Employment: Not At Risk (4/16/2025)    Employment     Do you want help finding or keeping work or a job?: I do not need or want help   Disabilities: Not At Risk (4/16/2025)    Disabilities     Concentrating, Remembering, or Making Decisions Difficulty: no     Doing Errands Independently Difficulty: no

## 2025-04-16 NOTE — PROGRESS NOTES
[facility] MEDICINE SERVICE  DAILY PROGRESS NOTE    NAME: Brad Woodward  : 1940  MRN: [id number]      LOS: 0 days     PROVIDER OF SERVICE: Harjinder Guzman MD    Chief Complaint: Anemia    Subjective:     Interval History:  History taken from: patient    Patient seen and examined at bedside with son present, states that she is feeling better today        Review of Systems: Negative except described above  Review of Systems    Objective:     Vital Signs  Temp:  [97.1 °F (36.2 °C)-98.4 °F (36.9 °C)] 97.5 °F (36.4 °C)  Heart Rate:  [65-84] 74  Resp:  [12-17] 16  BP: (103-185)/(58-78) 145/62   Body mass index is 24.86 kg/m².    Physical Exam  Physical Exam  Constitutional:       Comments: NAD    Cardiovascular:      Comments:  RRR, S1 & S2   Pulmonary:      Comments:  Lungs CTA   Abdominal:      Comments:  ABD soft, NT            Diagnostic Data    Results from last 7 days   Lab Units 25  0336 04/15/25  2204 04/15/25  1330   WBC 10*3/mm3  --   --  4.11   HEMOGLOBIN g/dL 8.5*   < > 7.2*   HEMATOCRIT % 25.4*   < > 21.3*   PLATELETS 10*3/mm3  --   --  228   GLUCOSE mg/dL 114*   < > 119*   CREATININE mg/dL 3.02*   < > 2.98*   BUN mg/dL 41*   < > 39*   SODIUM mmol/L 123*   < > 124*   POTASSIUM mmol/L 3.6   < > 3.3*   AST (SGOT) U/L  --   --  79*   ALT (SGPT) U/L  --   --  53*   ALK PHOS U/L  --   --  90   BILIRUBIN mg/dL  --   --  0.3   ANION GAP mmol/L 10.4   < > 11.5    < > = values in this interval not displayed.       XR Chest 1 View  Result Date: 4/15/2025  Impression: No active pulmonary process. Stable cardiomegaly since . Electronically Signed: Samuel Villa MD  4/15/2025 1:49 PM EDT  Workstation ID: [id number]        I reviewed the patient's new clinical results.    Assessment/Plan:     Active and Resolved Problems  Active Hospital Problems    Diagnosis  POA    **Anemia [D64.9]  Yes      Resolved Hospital Problems   No resolved problems to display.       Anemia  Electrolyte  imbalances  ERIN on CKD  Weakness  Per Dr. Farooq, give 1 unit PRBC and recheck labs  Appreciate nephrology assistance  RVP negative  CXR negative  PT/OT to eval  EKG with QT prolongation, avoid medications that further prolong QT  Caution restarting home meds with ERIN as well as QT prolongation, close review once meds are reconciled  Nephrology following, started patient on IV fluids and checking 24-hour urine for creatinine clearance    Hypertension   Sick Sinus Syndrome  BP stable  S/P pacemaker- follows with Dr. Aldridge outpatient  Resume home medications once reconciled and appropriate     Hyperlipidemia  Continue statin     Diabetes Mellitus   Low SSI   Consistent carb diet  Hypoglycemia protocol   Hold home PO meds  ACHS accucheck    VTE Prophylaxis:  Mechanical VTE prophylaxis orders are present.             Disposition Planning:        Anticipated Date of Discharge: tbd         Time: 35 minutes     Code Status and Medical Interventions: CPR (Attempt to Resuscitate); Full Support   Ordered at: 04/15/25 1734     Code Status (Patient has no pulse and is not breathing):    CPR (Attempt to Resuscitate)     Medical Interventions (Patient has pulse or is breathing):    Full Support     Level Of Support Discussed With:    Health Care Surrogate       Next of Kin (If No Surrogate)       Signature: Electronically signed by Harjinder Guzman MD, 04/16/25, 12:02 EDT.  [facility] Hospitalist Team

## 2025-04-16 NOTE — CONSULTS
RENAL/KCC:    Referring Provider: Dr. Trevino  Reason for Consultation: ERIN/CKD4    Subjective     Chief complaint: N/V, Weakness    History of present illness:  Patient is an 83 yo [place] female with h/o advanced CKD from HTN who follows with me in the office.  Her CKD has been progressive recently: Cr was 1.8-2 then increased to 2.3 in 2024 and then bumped to 2.7 earlier this month.  She had no complaints of N/V or decreased appetite when seen on the office last week.  She started feeling poorly a couple days ago with N/V and decreased appetite.  She presented to the ER with these complaints and her Cr was 3 with Na 127.  Hgb was 7.2.  She has received blood and is feeling some better this AM.  Discussed with family at bedside.  She is making urine.  We discussed dialysis in the office last week but no definitive plans were made.      History  Past Medical History:   Diagnosis Date    Diabetes mellitus     Hyperlipidemia     Hypertension    ,   Past Surgical History:   Procedure Laterality Date    CARDIAC ELECTROPHYSIOLOGY PROCEDURE N/A 4/20/2021    Procedure: Pacemaker DC new;  Surgeon: Yovany Navarrete MD;  [facility]: [facility] INVAS[facility] LOC[facility];  Service: Cardiovascular;  Laterality: N/A;   ,   Family History   Family history unknown: Yes   ,   Social History     Socioeconomic History    Marital status:    Tobacco Use    Smoking status: Never     Passive exposure: Never    Smokeless tobacco: Never   Vaping Use    Vaping status: Never Used   Substance and Sexual Activity    Alcohol use: No    Drug use: No    Sexual activity: Defer     E-cigarette/Vaping    E-cigarette/Vaping Use Never User     Passive Exposure No     Counseling Given No      E-cigarette/Vaping Substances    Nicotine No     THC No     CBD No     Flavoring No      E-cigarette/Vaping Devices    Disposable No     Pre-filled or Refillable Cartridge No     Refillable Tank No     Pre-filled Pod No          ,   Medications Prior to Admission    Medication Sig Dispense Refill Last Dose/Taking    amLODIPine (NORVASC) 10 MG tablet Take 1 tablet by mouth Daily. 270 tablet 1 Taking    aspirin 81 MG chewable tablet Chew 1 tablet Daily. 270 tablet 2 Taking    atorvastatin (LIPITOR) 80 MG tablet Take 1 tablet by mouth Daily. 270 tablet 1 Taking    cephalexin (KEFLEX) 250 MG capsule Take 1 capsule by mouth 2 (Two) Times a Day.   4/15/2025 Morning    chlorthalidone (HYGROTEN) 50 MG tablet Take 1 tablet by mouth Daily.   Taking    Cyanocobalamin (VITAMIN B-12 PO) Take 1 tablet by mouth Daily.   Taking    glipiZIDE 2.5 MG tablet Take 5 mg by mouth Daily.   Taking    hydrALAZINE (APRESOLINE) 50 MG tablet Take 1 tablet by mouth 3 (Three) Times a Day. In the morning, evening and bedtime.   Taking   , Scheduled Meds:  amLODIPine, 10 mg, Oral, Q24H  hydrALAZINE, 50 mg, Oral, TID  insulin lispro, 2-7 Units, Subcutaneous, 4x Daily AC & at Bedtime   , Continuous Infusions:  sodium chloride, 100 mL/hr   , PRN Meds:    acetaminophen **OR** acetaminophen **OR** acetaminophen    senna-docusate sodium **AND** polyethylene glycol **AND** bisacodyl **AND** bisacodyl    Calcium Replacement - Follow Nurse / BPA Driven Protocol    dextrose    dextrose    glucagon (human recombinant)    Magnesium Low Dose Replacement - Follow Nurse / BPA Driven Protocol    melatonin    nitroglycerin    Phosphorus Replacement - Follow Nurse / BPA Driven Protocol    Potassium Replacement - Follow Nurse / BPA Driven Protocol, and Allergies:  Patient has no known allergies.    Review of Systems  Pertinent items are noted in HPI    Objective     Vital Signs  Temp:  [97.7 °F (36.5 °C)-98.4 °F (36.9 °C)] 97.8 °F (36.6 °C)  Heart Rate:  [65-84] 69  Resp:  [12-17] 17  BP: (147-185)/(58-69) 169/69    No intake/output data recorded.  I/O last 3 completed shifts:  In: 453.8 [Blood:453.8]  Out: -     Physical Exam:  GEN: Awake, NAD  ENT: PERRL, EOMI, MMM  NECK: Supple, no JVD  CHEST: CTAB, no W/R/C  CV: RRR, no  "M/G/R  ABD: Soft, NT, +BS  SKIN: Warm and Dry  NEURO: CN's intact      Results Review:   I reviewed the patient's new clinical results.    WBC WBC   Date Value Ref Range Status   04/15/2025 4.11 3.40 - 10.80 10*3/mm3 Final      HGB Hemoglobin   Date Value Ref Range Status   04/16/2025 8.5 (L) 12.0 - 15.9 g/dL Final   04/15/2025 8.1 (L) 12.0 - 15.9 g/dL Final   04/15/2025 7.2 (L) 12.0 - 15.9 g/dL Final      HCT Hematocrit   Date Value Ref Range Status   04/16/2025 25.4 (L) 34.0 - 46.6 % Final   04/15/2025 24.4 (L) 34.0 - 46.6 % Final   04/15/2025 21.3 (L) 34.0 - 46.6 % Final      Platlets No results found for: \"LABPLAT\"   MCV MCV   Date Value Ref Range Status   04/15/2025 81.3 79.0 - 97.0 fL Final          Sodium Sodium   Date Value Ref Range Status   04/16/2025 123 (L) 136 - 145 mmol/L Final   04/15/2025 121 (L) 136 - 145 mmol/L Final   04/15/2025 124 (L) 136 - 145 mmol/L Final   04/15/2025 127 (L) 136 - 145 mmol/L Final      Potassium Potassium   Date Value Ref Range Status   04/16/2025 3.6 3.5 - 5.2 mmol/L Final   04/15/2025 3.4 (L) 3.5 - 5.2 mmol/L Final   04/15/2025 3.3 (L) 3.5 - 5.2 mmol/L Final   04/15/2025 3.7 3.5 - 5.2 mmol/L Final      Chloride Chloride   Date Value Ref Range Status   04/16/2025 92 (L) 98 - 107 mmol/L Final   04/15/2025 91 (L) 98 - 107 mmol/L Final   04/15/2025 93 (L) 98 - 107 mmol/L Final   04/15/2025 95 (L) 98 - 107 mmol/L Final      CO2 CO2   Date Value Ref Range Status   04/16/2025 20.6 (L) 22.0 - 29.0 mmol/L Final   04/15/2025 19.8 (L) 22.0 - 29.0 mmol/L Final   04/15/2025 19.5 (L) 22.0 - 29.0 mmol/L Final   04/15/2025 20.0 (L) 22.0 - 29.0 mmol/L Final      BUN BUN   Date Value Ref Range Status   04/16/2025 41 (H) 8 - 23 mg/dL Final   04/15/2025 39 (H) 8 - 23 mg/dL Final   04/15/2025 39 (H) 8 - 23 mg/dL Final   04/15/2025 39 (H) 8 - 23 mg/dL Final      Creatinine Creatinine   Date Value Ref Range Status   04/16/2025 3.02 (H) 0.57 - 1.00 mg/dL Final   04/15/2025 3.02 (H) 0.57 - 1.00 " "mg/dL Final   04/15/2025 2.98 (H) 0.57 - 1.00 mg/dL Final   04/15/2025 3.03 (H) 0.57 - 1.00 mg/dL Final      Calcium Calcium   Date Value Ref Range Status   04/16/2025 8.1 (L) 8.6 - 10.5 mg/dL Final   04/15/2025 7.9 (L) 8.6 - 10.5 mg/dL Final   04/15/2025 8.5 (L) 8.6 - 10.5 mg/dL Final   04/15/2025 8.5 (L) 8.6 - 10.5 mg/dL Final      PO4 No results found for: \"CAPO4\"   Albumin Albumin   Date Value Ref Range Status   04/15/2025 3.7 3.5 - 5.2 g/dL Final      Magnesium Magnesium   Date Value Ref Range Status   04/15/2025 1.6 1.6 - 2.4 mg/dL Final      Uric Acid No results found for: \"URICACID\"         amLODIPine, 10 mg, Oral, Q24H  hydrALAZINE, 50 mg, Oral, TID  insulin lispro, 2-7 Units, Subcutaneous, 4x Daily AC & at Bedtime      sodium chloride, 100 mL/hr        Assessment & Plan     1) 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  S/P transfusion and EPO.  Will provide IVF today and reassess in AM.  If renal function and electrolytes improved then can continue discussions regarding dialysis in the outpatient setting.  Will also check 24 hour urine for Cr clearance to get better assessment of residual kidney function to help in decision making.  Monitor BP - have room to titrate Hydralazine.  Will need to continue EPO outpatient.  Will follow closely.        Jim Farooq MD  [facility]  04/16/25  @NOW          "

## 2025-04-16 NOTE — PLAN OF CARE
Goal Outcome Evaluation:  Plan of Care Reviewed With: patient, family        Progress: no change  Outcome Evaluation: Slept at intervals. No c/o discomfort. Family member at bedside. Will continue to monitor.

## 2025-04-16 NOTE — PLAN OF CARE
Problem: Fall Injury Risk  Goal: Absence of Fall and Fall-Related Injury  Outcome: Progressing  Intervention: Identify and Manage Contributors  Recent Flowsheet Documentation  Taken 4/16/2025 1200 by Janet Latif LPN  Medication Review/Management: medications reviewed  Taken 4/16/2025 0818 by Janet Latif LPN  Medication Review/Management: medications reviewed  Intervention: Promote Injury-Free Environment  Recent Flowsheet Documentation  Taken 4/16/2025 1400 by Janet Latif LPN  Safety Promotion/Fall Prevention:   activity supervised   assistive device/personal items within reach   clutter free environment maintained   lighting adjusted   room organization consistent   safety round/check completed  Taken 4/16/2025 1200 by Janet Latif LPN  Safety Promotion/Fall Prevention:   activity supervised   assistive device/personal items within reach   clutter free environment maintained   lighting adjusted   room organization consistent   safety round/check completed  Taken 4/16/2025 1015 by Janet Latif LPN  Safety Promotion/Fall Prevention:   activity supervised   assistive device/personal items within reach   clutter free environment maintained   lighting adjusted   room organization consistent   safety round/check completed  Taken 4/16/2025 0818 by Janet Latif LPN  Safety Promotion/Fall Prevention:   activity supervised   assistive device/personal items within reach   clutter free environment maintained   lighting adjusted   room organization consistent   safety round/check completed     Problem: Fall Injury Risk  Goal: Absence of Fall and Fall-Related Injury  Outcome: Progressing  Intervention: Identify and Manage Contributors  Recent Flowsheet Documentation  Taken 4/16/2025 1200 by Janet Latif LPN  Medication Review/Management: medications reviewed  Taken 4/16/2025 0818 by Janet Latif LPN  Medication Review/Management: medications reviewed  Intervention: Promote Injury-Free  Environment  Recent Flowsheet Documentation  Taken 4/16/2025 1400 by Janet Latif LPN  Safety Promotion/Fall Prevention:   activity supervised   assistive device/personal items within reach   clutter free environment maintained   lighting adjusted   room organization consistent   safety round/check completed  Taken 4/16/2025 1200 by Janet Latif LPN  Safety Promotion/Fall Prevention:   activity supervised   assistive device/personal items within reach   clutter free environment maintained   lighting adjusted   room organization consistent   safety round/check completed  Taken 4/16/2025 1015 by Janet Latif LPN  Safety Promotion/Fall Prevention:   activity supervised   assistive device/personal items within reach   clutter free environment maintained   lighting adjusted   room organization consistent   safety round/check completed  Taken 4/16/2025 0818 by Janet Latif LPN  Safety Promotion/Fall Prevention:   activity supervised   assistive device/personal items within reach   clutter free environment maintained   lighting adjusted   room organization consistent   safety round/check completed   Goal Outcome Evaluation:        Patient worked with therapy this shift. Patient shows no s/s of pain or discomfort. Patient family brings food in for patient due to patient not a fan of [employer] per family. Patient has no sob or cough noted. Call light within reach.

## 2025-04-16 NOTE — THERAPY EVALUATION
Patient Name: Brad Woodward  : 1940    MRN: [id number]                              Today's Date: 2025       Admit Date: 4/15/2025    Visit Dx:     ICD-10-CM ICD-9-CM   1. Generalized weakness  R53.1 780.79   2. Anemia due to chronic kidney disease, unspecified CKD stage  N18.9 285.21    D63.1    3. Hypokalemia  E87.6 276.8   4. Dyspnea, unspecified type  R06.00 786.09   5. Nausea  R11.0 787.02   6. Chest tightness  R07.89 786.59     Patient Active Problem List   Diagnosis    Mixed hyperlipidemia    Essential hypertension    Type 2 diabetes mellitus    Anemia due to stage 3a chronic kidney disease    CKD (chronic kidney disease) stage 3, GFR 30-59 ml/min    Hypertensive emergency    HFrEF (heart failure with reduced ejection fraction)    Dyspnea    Sick sinus syndrome    Junctional bradycardia    Pacemaker    Anemia     Past Medical History:   Diagnosis Date    Diabetes mellitus     Hyperlipidemia     Hypertension      Past Surgical History:   Procedure Laterality Date    CARDIAC ELECTROPHYSIOLOGY PROCEDURE N/A 2021    Procedure: Pacemaker DC new;  Surgeon: Yovany Navarrete MD;  Location: [facility] INVASIVE LOCATION;  Service: Cardiovascular;  Laterality: N/A;      General Information       Row Name 25 152          Physical Therapy Time and Intention    Document Type evaluation  -SS     Mode of Treatment physical therapy  -SS       Row Name 25 152       General Information    Patient Profile Reviewed -- yes  -SS    Prior Level of Function --  has assist from family as needed  -SS --      Row Name 25 152          Living Environment    Current Living Arrangements home  -SS     People in Home child(geoffrey), adult  -SS       Row Name 25 152          Home Main Entrance    Number of Stairs, Main Entrance two  -SS       Row Name 25          Cognition    Orientation Status (Cognition) oriented to;person;place;situation  -SS       Row Name 25           Safety Issues/Impairments Affecting Functional Mobility    Impairments Affecting Function (Mobility) balance;endurance/activity tolerance  -               User Key  (r) = Recorded By, (t) = Taken By, (c) = Cosigned By      Initials Name Provider Type    Mami Dixon PT Physical Therapist                   Mobility       Row Name 04/16/25 1657          Bed Mobility    Bed Mobility bed mobility (all) activities  -     All Activities, Warren (Bed Mobility) moderate assist (50% patient effort)  -       Row Name 04/16/25 1657          Sit-Stand Transfer    Sit-Stand Warren (Transfers) minimum assist (75% patient effort)  -     Assistive Device (Sit-Stand Transfers) --  hand held assist  -       Row Name 04/16/25 1657          Gait/Stairs (Locomotion)    Warren Level (Gait) minimum assist (75% patient effort)  -     Assistive Device (Gait) --  hand held assist  -     Distance in Feet (Gait) 15  x 2  -SS     Comment, (Gait/Stairs) decreased gait speed, reliance on UE assist  -               User Key  (r) = Recorded By, (t) = Taken By, (c) = Cosigned By      Initials Name Provider Type     Mami Kelsey PT Physical Therapist                   Obj/Interventions       Row Name 04/16/25 1702          Range of Motion Comprehensive    General Range of Motion no range of motion deficits identified  -SS       Row Name 04/16/25 1702          Strength Comprehensive (MMT)    General Manual Muscle Testing (MMT) Assessment no strength deficits identified  -SS       Row Name 04/16/25 1702          Sensory Assessment (Somatosensory)    Sensory Assessment (Somatosensory) sensation intact  -               User Key  (r) = Recorded By, (t) = Taken By, (c) = Cosigned By      Initials Name Provider Type    Mami Dixon PT Physical Therapist                   Goals/Plan       Row Name 04/16/25 1732          Bed Mobility Goal 1 (PT)    Activity/Assistive Device (Bed Mobility  Goal 1, PT) bed mobility activities, all  -SS     Downing Level/Cues Needed (Bed Mobility Goal 1, PT) modified independence  -SS     Time Frame (Bed Mobility Goal 1, PT) long term goal (LTG);2 weeks  -       Row Name 04/16/25 1732          Transfer Goal 1 (PT)    Activity/Assistive Device (Transfer Goal 1, PT) transfers, all  -SS     Downing Level/Cues Needed (Transfer Goal 1, PT) modified independence  -SS     Time Frame (Transfer Goal 1, PT) long term goal (LTG);2 weeks  -       Row Name 04/16/25 1732          Gait Training Goal 1 (PT)    Activity/Assistive Device (Gait Training Goal 1, PT) gait (walking locomotion);walker, rolling  -SS     [place]ing Level (Gait Training Goal 1, PT) modified independence  -SS     Distance (Gait Training Goal 1, PT) 75'  -SS     Time Frame (Gait Training Goal 1, PT) long term goal (LTG);2 weeks  -       Row Name 04/16/25 1732          Therapy Assessment/Plan (PT)    Planned Therapy Interventions (PT) balance training;bed mobility training;gait training;home exercise program;transfer training;strengthening;patient/family education  -               User Key  (r) = Recorded By, (t) = Taken By, (c) = Cosigned By      Initials Name Provider Type     Mami Kelsey, PT Physical Therapist                   Clinical Impression       Row Name 04/16/25 1702          Pain    Additional Documentation Pain Scale: FACES Pre/Post-Treatment (Group)  -[facility] Name 04/16/25 1703          Pain Scale: FACES Pre/Post-Treatment    Pain: FACES Scale, Pretreatment 0-->no hurt  -     Posttreatment Pain Rating 0-->no hurt  -[facility] Name 04/16/25 6005          Plan of Care Review    Plan of Care Reviewed With patient  -     Outcome Evaluation 85 y/o F who presented with nausea and dizziness. Diagnosed with Anemia, Electrolyte imbalances, ERIN on CKD, Weakness. Pt does not speak English, thus  services were used with  number: [id number]. Patient lives  part time in [facility] and part time in . She lives with son here and daughter there. She has assist from family as needed and is not generally left alone per report. She typically is mod I with mobility and ADLs. She has 2 TOSHA the home here and has difficulty with these. She denies falls. She reports sensations of dizziness and imbalance, fear of falling currently. She required mod A for bed mobility, min A for transfer, min A for ambulation with HHA. She toileted mod I. She was hypertensive, nurse medicated her at conclusion of session. /74 sitting and 127/60 in standing. At risk for orthostatic hypotension once BP meds given. She reports dizziness is worse in sitting and standing and better in supine. Not able to perform clear vestibular assessment in setting of supine and seated HTN. Patient fatigues quickly. She demos the seated junior chi like exercises she performs daily. She was happy to be in chair at conclusion of session and explained via  to use call light when she needs to get up and to not mobilize without staff. Chair alarm utilized. Recommending family assist and HHPT at d/c.  -       Row Name 04/16/25 3843          Therapy Assessment/Plan (PT)    Criteria for Skilled Interventions Met (PT) yes;meets criteria  -               User Key  (r) = Recorded By, (t) = Taken By, (c) = Cosigned By      Initials Name Provider Type    SS Mami Kelsey, PT Physical Therapist                   Outcome Measures       Row Name 04/16/25 3497          How much help from another person do you currently need...    Turning from your back to your side while in flat bed without using bedrails? 3  -RM     Moving from lying on back to sitting on the side of a flat bed without bedrails? 3  -RM     Moving to and from a bed to a chair (including a wheelchair)? 3  -RM     Standing up from a chair using your arms (e.g., wheelchair, bedside chair)? 3  -RM     Climbing 3-5 steps with a railing? 2  -RM     To  walk in hospital room? 3  -RM     AM-PAC 6 Clicks Score (PT) 17  -RM       Row Name 04/16/25 1711          Functional Assessment    Outcome Measure Options AM-PAC 6 Clicks Daily Activity (OT)  -LS               User Key  (r) = Recorded By, (t) = Taken By, (c) = Cosigned By      Initials Name Provider Type     Janet Latif LPN Licensed Nurse    Thai Ambriz OT Occupational Therapist                                 Physical Therapy Education       Title: PT OT SLP Therapies (Done)       Topic: Physical Therapy (Done)       Point: Mobility training (Done)       Learning Progress Summary            Patient Acceptance, E, VU by  at 4/16/2025 1732                                      User Key       Initials Effective Dates Name Provider Type Discipline     06/16/21 -  Mami Kelsey, PT Physical Therapist PT                  PT Recommendation and Plan  Planned Therapy Interventions (PT): balance training, bed mobility training, gait training, home exercise program, transfer training, strengthening, patient/family education  Outcome Evaluation: 83 y/o F who presented with nausea and dizziness. Diagnosed with Anemia, Electrolyte imbalances, ERIN on CKD, Weakness. Pt does not speak English, thus  services were used with  number: [id number]. Patient lives part time in [facility] and part time in . She lives with son here and daughter there. She has assist from family as needed and is not generally left alone per report. She typically is mod I with mobility and ADLs. She has 2 TOSHA the home here and has difficulty with these. She denies falls. She reports sensations of dizziness and imbalance, fear of falling currently. She required mod A for bed mobility, min A for transfer, min A for ambulation with HHA. She toileted mod I. She was hypertensive, nurse medicated her at conclusion of session. /74 sitting and 127/60 in standing. At risk for orthostatic hypotension once BP meds given. She  reports dizziness is worse in sitting and standing and better in supine. Not able to perform clear vestibular assessment in setting of supine and seated HTN. Patient fatigues quickly. She demos the seated junior chi like exercises she performs daily. She was happy to be in chair at conclusion of session and explained via  to use call light when she needs to get up and to not mobilize without staff. Chair alarm utilized. Recommending family assist and HHPT at d/c.     Time Calculation:   PT Evaluation Complexity  History, PT Evaluation Complexity: 3 or more personal factors and/or comorbidities  Examination of Body Systems (PT Eval Complexity): total of 3 or more elements  Clinical Presentation (PT Evaluation Complexity): evolving  Clinical Decision Making (PT Evaluation Complexity): moderate complexity  Overall Complexity (PT Evaluation Complexity): moderate complexity     PT Charges       Row Name 04/16/25 1732             Time Calculation    Start Time 1510  -SS      Stop Time 1550  -SS      Time Calculation (min) 40 min  -SS      PT Received On 04/16/25  -      PT - Next Appointment 04/17/25  -      PT Goal Re-Cert Due Date 04/30/25  -         Time Calculation- PT    Total Timed Code Minutes- PT 0 minute(s)  -SS                User Key  (r) = Recorded By, (t) = Taken By, (c) = Cosigned By      Initials Name Provider Type    SS Mami Kelsey, PT Physical Therapist                  Therapy Charges for Today       Code Description Service Date Service Provider Modifiers Qty    06806396685 HC-PT EVAL MOD COMPLEXITY 5 4/16/2025 Mami Kelsey, PT  1            PT G-Codes  Outcome Measure Options: AM-PAC 6 Clicks Daily Activity (OT)  AM-PAC 6 Clicks Score (PT): 17  AM-PAC 6 Clicks Score (OT): 20  PT Discharge Summary  Anticipated Discharge Disposition (PT): home with home health    Mami Kelsey PT  4/16/2025

## 2025-04-16 NOTE — PLAN OF CARE
Goal Outcome Evaluation:  Plan of Care Reviewed With: patient           Outcome Evaluation: 83 y/o F who presented with nausea and dizziness. Diagnosed with Anemia, Electrolyte imbalances, ERIN on CKD, Weakness. Pt does not speak English, thus  services were used with  number: [id number]. Patient lives part time in [facility] and part time in . She lives with son here and daughter there. She has assist from family as needed and is not generally left alone per report. She typically is mod I with mobility and ADLs. She has 2 TOSHA the home here and has difficulty with these. She denies falls. She reports sensations of dizziness and imbalance, fear of falling currently. She required mod A for bed mobility, min A for transfer, min A for ambulation with HHA. She toileted mod I. She was hypertensive, nurse medicated her at conclusion of session. /74 sitting and 127/60 in standing. At risk for orthostatic hypotension once BP meds given. She reports dizziness is worse in sitting and standing and better in supine. Not able to perform clear vestibular assessment in setting of supine and seated HTN. Patient fatigues quickly. She demos the seated junior chi like exercises she performs daily. She was happy to be in chair at conclusion of session and explained via  to use call light when she needs to get up and to not mobilize without staff. Chair alarm utilized. Recommending family assist and HHPT at d/c.    Anticipated Discharge Disposition (PT): home with home health                         Nicho Lawler  : 1957  ACCOUNT:  [id number]  [phone]  PCP: Dr. Peterson Born     TODAY'S DATE: 10/09/2018  DICTATED BY:  [Dr. Caesar Koehlerrussell Whitfield]      CHIEF COMPLAINT: [Followup of Chest pain, precordial, Followup of Hypercholesterolemia, pure, Followup of Hypertension and benign.]    HPI:    [On 10/09/2018, Nicho Lawler, a 64year old female, presented with no interim cardiac complaints.]    The patient is a middle-aged female who had pericarditis. She has underlying hypertension. She is being seen in follow-up. She has hypercholesterolemia as well. The patient denies chest pain PND and orthopnea. She has no presyncope nor syncope. RISK FACTORS:  CAD - Hypertension    REVIEW OF SYSTEMS:    CONS: 10/9/2018. EYES: denies significant visual changes. ENMT: denies difficulties with hearing, otherwise negative. CV: no chest pains. RESP: denies chronic cough, hemoptysis. GI: denies melena, hematochezia. : no hematuria. INTEG: no new rashes, lesions. MS: no limiting arthritis. NEURO: no localized deficits, head injury, numbness or tingling sensations, unsteady gait and patient fell down the stairs/ concussion. HEM/LYMPH: denies easy bruising. ALL: no new food or enviornmental allergies. PAST HISTORY: Irritable bowel syndrome, radiation for breast cancer , hysterectomy, lumpectomy, gall bladder surgery and ilinydal cyst.    PAST CV HISTORY: pericarditis 2016    FAMILY HISTORY: Negative for premature CAD. Negative for AAA. SOCIAL HISTORY: SMOKING: Former tobacco use. Quit 2018. ALCOHOL: drinks rarely. EXERCISE: no regular exercise. DIET: no special diet. MARITAL STATUS: . OCCUPATION: retired. ALLERGIES: Morphine and Related - CLASS    MEDICATIONS: Selected prescriptions see below    VITAL SIGNS: [B/P - 138/80 , Pulse - 66, Weight -  182, Height -   66 , BMI - 29.4 ]    CONS: well developed, well nourished. WEIGHT: BMI parameters reviewed and discussed.  HEAD/FACE: no trauma and normocephalic. EYES: conjunctivae not injected and no xanthelasma. ENT: mucosa pink and moist. NECK: jugular venous pressure not elevated. RESP: respirations with normal rate and rhythm, clear to auscultation. GI: no masses, tenderness or hepatosplenomegaly, rectal deferred. MS: adequate gait for exercise/testing. EXT: no clubbing or cyanosis. SKIN: no rashes, lesions, ulcers. NEURO/PSYCH: alert, oriented to time, place and person and normal affect. CV: PALP: PMI not displaced, no lifts and thrills or rub. AUSC:  regular rhythm, normal S1, S2 without S3; systolic murmur. CAROTIDS: carotid pulses normal. ABD AORTA: abdominal aorta not enlarged. FEM: femoral pulses intact. PEDAL: pedal pulses intact. EXT: no peripheral edema. LABORATORY DATA: [00]    DECISION MAKING:    []    ASSESSMENT:  1. Chest pain, precordial  2. Hypercholesterolemia, pure  3. Hypertension, benign  4. Pericarditis, acute idiopathic  5. Smoker      PLAN:  [The patient has no chest pain. She is a soft systolic murmur. The patient is recommended for fasting lipid panel and hepatic profile today. She is fasting. The patient will have increased dose of her carvedilol to 25 mg p.o. twice daily as she is borderline in the hypertension department. Further recommendations are dependent upon clinical course. The patient follow-up in about 3 months for further recommendations or review. It was a pleasure to see Klarissa Kelly and her  in the office today.]    PRESCRIPTIONS:   09/21/18 *Carvedilol           12. 5MG    1 TABLET TWICE DAILY AS DIRECTED.        09/21/18 *Lisinopril           40MG      1 TABLET BY MOUTH EVERY DAY              09/12/18 *Atorvastatin Calcium 20MG      1 TABLET BY MOUTH EVERYDAY AT BEDTIM     10/09/18 LamoTRIgine           25MG      2 tablets twice daily                    10/09/18 TraZODone HCl         100MG     1 daily                                  10/09/18 Xanax                 0.5MG     1 daily as needed 09/21/18 Cholestyramine Light  4GM       as needed                                09/21/18 Methylphenidate HCl   20MG      as needed                                09/21/18 Topiramate            50MG      2 tablets at bedtime                     08/31/18 Aspirin 81            81MG      once daily                               08/31/18 Nicotine              21MG/24H  daily                                    08/31/18 Vitamin B Complex               once daily                               08/31/18 Vitamin D             1000UNIT  5 tablets daily                          09/19/17 Advil                 200MG     as needed                                05/24/16 Acetaminophen         325MG     2 tablets every 6 hours as needed        05/24/16 Percogesic            12.5-325  1 tablet daily                           05/24/16 Temazepam             30MG      2 capsules at bedtime

## 2025-04-17 LAB
BASOPHILS # BLD AUTO: 0.01 10*3/MM3 (ref 0–0.2)
BASOPHILS NFR BLD AUTO: 0.2 % (ref 0–1.5)
DEPRECATED RDW RBC AUTO: 44.4 FL (ref 37–54)
EOSINOPHIL # BLD AUTO: 0.09 10*3/MM3 (ref 0–0.4)
EOSINOPHIL NFR BLD AUTO: 2.1 % (ref 0.3–6.2)
ERYTHROCYTE [DISTWIDTH] IN BLOOD BY AUTOMATED COUNT: 14.5 % (ref 12.3–15.4)
GLUCOSE BLDC GLUCOMTR-MCNC: 131 MG/DL (ref 70–105)
GLUCOSE BLDC GLUCOMTR-MCNC: 143 MG/DL (ref 70–105)
GLUCOSE BLDC GLUCOMTR-MCNC: 153 MG/DL (ref 70–105)
GLUCOSE BLDC GLUCOMTR-MCNC: 177 MG/DL (ref 70–105)
HCT VFR BLD AUTO: 26.6 % (ref 34–46.6)
HGB BLD-MCNC: 9 G/DL (ref 12–15.9)
IMM GRANULOCYTES # BLD AUTO: 0.04 10*3/MM3 (ref 0–0.05)
IMM GRANULOCYTES NFR BLD AUTO: 0.9 % (ref 0–0.5)
INR PPP: 0.99 (ref 0.9–1.1)
LYMPHOCYTES # BLD AUTO: 1.23 10*3/MM3 (ref 0.7–3.1)
LYMPHOCYTES NFR BLD AUTO: 28.9 % (ref 19.6–45.3)
MCH RBC QN AUTO: 28.5 PG (ref 26.6–33)
MCHC RBC AUTO-ENTMCNC: 33.8 G/DL (ref 31.5–35.7)
MCV RBC AUTO: 84.2 FL (ref 79–97)
MONOCYTES # BLD AUTO: 0.57 10*3/MM3 (ref 0.1–0.9)
MONOCYTES NFR BLD AUTO: 13.4 % (ref 5–12)
NEUTROPHILS NFR BLD AUTO: 2.32 10*3/MM3 (ref 1.7–7)
NEUTROPHILS NFR BLD AUTO: 54.5 % (ref 42.7–76)
NRBC BLD AUTO-RTO: 0 /100 WBC (ref 0–0.2)
PLATELET # BLD AUTO: 214 10*3/MM3 (ref 140–450)
PMV BLD AUTO: 9 FL (ref 6–12)
POTASSIUM SERPL-SCNC: 3.6 MMOL/L (ref 3.5–5.2)
PROTHROMBIN TIME: 13 SECONDS (ref 11.7–14.2)
RBC # BLD AUTO: 3.16 10*6/MM3 (ref 3.77–5.28)
WBC NRBC COR # BLD AUTO: 4.26 10*3/MM3 (ref 3.4–10.8)

## 2025-04-17 PROCEDURE — 85025 COMPLETE CBC W/AUTO DIFF WBC: CPT

## 2025-04-17 PROCEDURE — 80048 BASIC METABOLIC PNL TOTAL CA: CPT

## 2025-04-17 PROCEDURE — 25010000002 BUMETANIDE PER 0.5 MG: Performed by: INTERNAL MEDICINE

## 2025-04-17 PROCEDURE — 82948 REAGENT STRIP/BLOOD GLUCOSE: CPT

## 2025-04-17 PROCEDURE — 84132 ASSAY OF SERUM POTASSIUM: CPT | Performed by: INTERNAL MEDICINE

## 2025-04-17 PROCEDURE — 82575 CREATININE CLEARANCE TEST: CPT | Performed by: INTERNAL MEDICINE

## 2025-04-17 PROCEDURE — 85610 PROTHROMBIN TIME: CPT | Performed by: INTERNAL MEDICINE

## 2025-04-17 RX ORDER — LABETALOL HYDROCHLORIDE 5 MG/ML
10 INJECTION, SOLUTION INTRAVENOUS EVERY 4 HOURS PRN
Status: DISCONTINUED | OUTPATIENT
Start: 2025-04-17 | End: 2025-04-24 | Stop reason: HOSPADM

## 2025-04-17 RX ORDER — SCOPOLAMINE 1 MG/3D
1 PATCH, EXTENDED RELEASE TRANSDERMAL
Status: DISCONTINUED | OUTPATIENT
Start: 2025-04-17 | End: 2025-04-24 | Stop reason: HOSPADM

## 2025-04-17 RX ORDER — SODIUM BICARBONATE 650 MG/1
1300 TABLET ORAL 3 TIMES DAILY
Status: DISCONTINUED | OUTPATIENT
Start: 2025-04-17 | End: 2025-04-22

## 2025-04-17 RX ORDER — HYDRALAZINE HYDROCHLORIDE 25 MG/1
100 TABLET, FILM COATED ORAL 3 TIMES DAILY
Status: DISCONTINUED | OUTPATIENT
Start: 2025-04-17 | End: 2025-04-24 | Stop reason: HOSPADM

## 2025-04-17 RX ORDER — ASPIRIN 81 MG/1
81 TABLET, CHEWABLE ORAL DAILY
Status: DISCONTINUED | OUTPATIENT
Start: 2025-04-17 | End: 2025-04-24 | Stop reason: HOSPADM

## 2025-04-17 RX ORDER — BUMETANIDE 0.25 MG/ML
2 INJECTION, SOLUTION INTRAMUSCULAR; INTRAVENOUS ONCE
Status: COMPLETED | OUTPATIENT
Start: 2025-04-17 | End: 2025-04-17

## 2025-04-17 RX ORDER — ATORVASTATIN CALCIUM 40 MG/1
80 TABLET, FILM COATED ORAL DAILY
Status: DISCONTINUED | OUTPATIENT
Start: 2025-04-17 | End: 2025-04-24 | Stop reason: HOSPADM

## 2025-04-17 RX ORDER — POTASSIUM CHLORIDE 1500 MG/1
20 TABLET, EXTENDED RELEASE ORAL ONCE
Status: COMPLETED | OUTPATIENT
Start: 2025-04-17 | End: 2025-04-17

## 2025-04-17 RX ADMIN — AMLODIPINE BESYLATE 10 MG: 5 TABLET ORAL at 09:13

## 2025-04-17 RX ADMIN — SODIUM BICARBONATE 1300 MG: 650 TABLET ORAL at 16:55

## 2025-04-17 RX ADMIN — HYDRALAZINE HYDROCHLORIDE 100 MG: 25 TABLET ORAL at 09:13

## 2025-04-17 RX ADMIN — HYDRALAZINE HYDROCHLORIDE 100 MG: 25 TABLET ORAL at 20:21

## 2025-04-17 RX ADMIN — Medication 25 MG: at 12:14

## 2025-04-17 RX ADMIN — SODIUM BICARBONATE 1300 MG: 650 TABLET ORAL at 20:22

## 2025-04-17 RX ADMIN — SCOPOLAMINE 1 PATCH: 1.5 PATCH, EXTENDED RELEASE TRANSDERMAL at 22:28

## 2025-04-17 RX ADMIN — ASPIRIN 81 MG CHEWABLE TABLET 81 MG: 81 TABLET CHEWABLE at 12:14

## 2025-04-17 RX ADMIN — Medication 10 MG: at 01:31

## 2025-04-17 RX ADMIN — BUMETANIDE 2 MG: 0.25 INJECTION INTRAMUSCULAR; INTRAVENOUS at 09:13

## 2025-04-17 RX ADMIN — Medication 25 MG: at 20:27

## 2025-04-17 RX ADMIN — ATORVASTATIN CALCIUM 80 MG: 40 TABLET, FILM COATED ORAL at 12:14

## 2025-04-17 RX ADMIN — HYDRALAZINE HYDROCHLORIDE 100 MG: 25 TABLET ORAL at 16:55

## 2025-04-17 RX ADMIN — POTASSIUM CHLORIDE 20 MEQ: 1500 TABLET, EXTENDED RELEASE ORAL at 00:31

## 2025-04-17 RX ADMIN — SODIUM BICARBONATE 1300 MG: 650 TABLET ORAL at 09:13

## 2025-04-17 NOTE — PLAN OF CARE
Problem: Fall Injury Risk  Goal: Absence of Fall and Fall-Related Injury  Outcome: Progressing  Intervention: Identify and Manage Contributors  Recent Flowsheet Documentation  Taken 4/17/2025 0831 by Janet Latif LPN  Medication Review/Management: medications reviewed  Intervention: Promote Injury-Free Environment  Recent Flowsheet Documentation  Taken 4/17/2025 1200 by Janet Latif LPN  Safety Promotion/Fall Prevention:   activity supervised   assistive device/personal items within reach   clutter free environment maintained   lighting adjusted   room organization consistent   safety round/check completed  Taken 4/17/2025 1000 by Janet Latif LPN  Safety Promotion/Fall Prevention:   activity supervised   assistive device/personal items within reach   clutter free environment maintained   lighting adjusted   room organization consistent   safety round/check completed  Taken 4/17/2025 0831 by Janet Latif LPN  Safety Promotion/Fall Prevention:   activity supervised   assistive device/personal items within reach   clutter free environment maintained   lighting adjusted   room organization consistent   safety round/check completed     Problem: Adult Inpatient Plan of Care  Goal: Plan of Care Review  Outcome: Progressing  Goal: Patient-Specific Goal (Individualized)  Outcome: Progressing  Goal: Absence of Hospital-Acquired Illness or Injury  Outcome: Progressing  Intervention: Identify and Manage Fall Risk  Recent Flowsheet Documentation  Taken 4/17/2025 1200 by Janet Latif LPN  Safety Promotion/Fall Prevention:   activity supervised   assistive device/personal items within reach   clutter free environment maintained   lighting adjusted   room organization consistent   safety round/check completed  Taken 4/17/2025 1000 by Janet Latif LPN  Safety Promotion/Fall Prevention:   activity supervised   assistive device/personal items within reach   clutter free environment maintained   lighting  adjusted   room organization consistent   safety round/check completed  Taken 4/17/2025 0831 by Janet Latif LPN  Safety Promotion/Fall Prevention:   activity supervised   assistive device/personal items within reach   clutter free environment maintained   lighting adjusted   room organization consistent   safety round/check completed  Intervention: Prevent Infection  Recent Flowsheet Documentation  Taken 4/17/2025 1200 by Janet Latif LPN  Infection Prevention:   cohorting utilized   environmental surveillance performed   hand hygiene promoted   personal protective equipment utilized   rest/sleep promoted   single patient room provided   visitors restricted/screened  Taken 4/17/2025 1000 by Janet Latif LPN  Infection Prevention:   cohorting utilized   environmental surveillance performed   hand hygiene promoted   personal protective equipment utilized   rest/sleep promoted   single patient room provided   visitors restricted/screened  Taken 4/17/2025 0831 by Janet Latif LPN  Infection Prevention:   cohorting utilized   environmental surveillance performed   hand hygiene promoted   personal protective equipment utilized   rest/sleep promoted   single patient room provided   visitors restricted/screened  Goal: Optimal Comfort and Wellbeing  Outcome: Progressing  Intervention: Monitor Pain and Promote Comfort  Recent Flowsheet Documentation  Taken 4/17/2025 0831 by Janet Latif LPN  Pain Management Interventions:   care clustered   diversional activity provided   pain pump in use  Intervention: Provide Person-Centered Care  Recent Flowsheet Documentation  Taken 4/17/2025 0831 by Janet Latif LPN  Trust Relationship/Rapport:   care explained   choices provided   thoughts/feelings acknowledged  Goal: Readiness for Transition of Care  Outcome: Progressing   Goal Outcome Evaluation:       Spoke with family about patient  condition & new orders that were received. Answered questions the family  had. Spoke with patient about possible dialysis & placement of tunnel cathter, medications and a little information about dialysis with use of  #[id number], patient speaks Sami, with family at bedside. Patient has had decreased appetite this shift with supplements given. 24 hour urine collection continues at this time. Patient has no c/o pain or discomfort at this time. Call light kelvin parsons.

## 2025-04-17 NOTE — CASE MANAGEMENT/SOCIAL WORK
Social Work Assessment  [facility]     Patient Name: Brad Woodward  MRN: [id number]  Today's Date: 4/17/2025    Admit Date: 4/15/2025     Discharge Needs Assessment    No documentation.                  Discharge Plan       Row Name 04/17/25 3013       Plan    Plan Comments Floor Nurse/Janet and SARAH met with son/Dany and his Significant Other to explain Dialysis Catheter, Dialysis, or Comfort Measures for pt.   Up until now, patient did NOT want dialysis catheter placed and did not want to start dialysis. Floor nurse, SARAH, Dany and his S.O./Glenn then went into the patient's room and used the video Cambodian  to give patient information and options. Patient verbalized understanding.   Patient now agreeable to have dialysis cath placed and start dialysis if needed.  Family is also requesting a letter from Nephrologist or Hospitalist stating patient's condition.  They will take Physician letter with their own typed letter to the local consulate or embassy in efforts to get another family member to the [facility] to say final goodbyes to patient.  Sample letter sent to  Supervisor/Delmis Crump RN/Laura (2D), Floor RN/Janet, Dr. Quispe, Dr. Guzman and Dr. Mendez.  Once letter is completed, it will be given to son/Dany and copy will be placed on chart.                   AMISH Skinner    Phone: [phone]  Cell: [phone]  Fax: [phone]  [email or web address]

## 2025-04-17 NOTE — PROGRESS NOTES
"RENAL/KCC:     LOS: 1 day    Patient Care Team:  Brian Nazario APRN as PCP - General  Zane Aldridge MD as Consulting Physician (Cardiology)  Marnie Brandt APRN as Nurse Practitioner (Cardiology)    Chief Complaint:  Weakness, N/V    Subjective     Interval History:   Chart reviewed  Some SOA overnight  Still no appetite  Weakness  Discussed with family    Objective     Vital Sign Min/Max for last 24 hours  Temp  Min: 97.1 °F (36.2 °C)  Max: 98.5 °F (36.9 °C)   BP  Min: 103/61  Max: 190/75   Pulse  Min: 68  Max: 80   Resp  Min: 14  Max: 17   SpO2  Min: 93 %  Max: 96 %   No data recorded   No data recorded     Flowsheet Rows      Flowsheet Row First Filed Value   Admission Height 162.6 cm (64\") Documented at 04/15/2025 1243   Admission Weight 65.7 kg (144 lb 13.5 oz) Documented at 04/15/2025 1243            No intake/output data recorded.  I/O last 3 completed shifts:  In: 453.8 [Blood:453.8]  Out: -     Physical Exam:  GEN: Awake, NAD  ENT: PERRL, EOMI, MMM  NECK: Supple, no JVD  CHEST: CTAB, no W/R/C  CV: RRR, no M/G/R  ABD: Soft, NT, +BS  SKIN: Warm and Dry  NEURO: CN's intact      WBC WBC   Date Value Ref Range Status   04/16/2025 4.64 3.40 - 10.80 10*3/mm3 Final   04/15/2025 4.11 3.40 - 10.80 10*3/mm3 Final      HGB Hemoglobin   Date Value Ref Range Status   04/16/2025 8.7 (L) 12.0 - 15.9 g/dL Final   04/16/2025 8.5 (L) 12.0 - 15.9 g/dL Final   04/15/2025 8.1 (L) 12.0 - 15.9 g/dL Final   04/15/2025 7.2 (L) 12.0 - 15.9 g/dL Final      HCT Hematocrit   Date Value Ref Range Status   04/16/2025 26.1 (L) 34.0 - 46.6 % Final   04/16/2025 25.4 (L) 34.0 - 46.6 % Final   04/15/2025 24.4 (L) 34.0 - 46.6 % Final   04/15/2025 21.3 (L) 34.0 - 46.6 % Final      Platlets No results found for: \"LABPLAT\"   MCV MCV   Date Value Ref Range Status   04/16/2025 84.2 79.0 - 97.0 fL Final   04/15/2025 81.3 79.0 - 97.0 fL Final          Sodium Sodium   Date Value Ref Range Status   04/16/2025 124 (L) 136 - 145 mmol/L Final " "  04/16/2025 123 (L) 136 - 145 mmol/L Final   04/15/2025 121 (L) 136 - 145 mmol/L Final   04/15/2025 124 (L) 136 - 145 mmol/L Final   04/15/2025 127 (L) 136 - 145 mmol/L Final      Potassium Potassium   Date Value Ref Range Status   04/17/2025 3.6 3.5 - 5.2 mmol/L Final   04/16/2025 3.6 3.5 - 5.2 mmol/L Final   04/16/2025 3.6 3.5 - 5.2 mmol/L Final   04/15/2025 3.4 (L) 3.5 - 5.2 mmol/L Final   04/15/2025 3.3 (L) 3.5 - 5.2 mmol/L Final   04/15/2025 3.7 3.5 - 5.2 mmol/L Final      Chloride Chloride   Date Value Ref Range Status   04/16/2025 95 (L) 98 - 107 mmol/L Final   04/16/2025 92 (L) 98 - 107 mmol/L Final   04/15/2025 91 (L) 98 - 107 mmol/L Final   04/15/2025 93 (L) 98 - 107 mmol/L Final   04/15/2025 95 (L) 98 - 107 mmol/L Final      CO2 CO2   Date Value Ref Range Status   04/16/2025 18.3 (L) 22.0 - 29.0 mmol/L Final   04/16/2025 20.6 (L) 22.0 - 29.0 mmol/L Final   04/15/2025 19.8 (L) 22.0 - 29.0 mmol/L Final   04/15/2025 19.5 (L) 22.0 - 29.0 mmol/L Final   04/15/2025 20.0 (L) 22.0 - 29.0 mmol/L Final      BUN BUN   Date Value Ref Range Status   04/16/2025 40 (H) 8 - 23 mg/dL Final   04/16/2025 41 (H) 8 - 23 mg/dL Final   04/15/2025 39 (H) 8 - 23 mg/dL Final   04/15/2025 39 (H) 8 - 23 mg/dL Final   04/15/2025 39 (H) 8 - 23 mg/dL Final      Creatinine Creatinine   Date Value Ref Range Status   04/16/2025 2.89 (H) 0.57 - 1.00 mg/dL Final   04/16/2025 3.02 (H) 0.57 - 1.00 mg/dL Final   04/15/2025 3.02 (H) 0.57 - 1.00 mg/dL Final   04/15/2025 2.98 (H) 0.57 - 1.00 mg/dL Final   04/15/2025 3.03 (H) 0.57 - 1.00 mg/dL Final      Calcium Calcium   Date Value Ref Range Status   04/16/2025 7.1 (L) 8.6 - 10.5 mg/dL Final   04/16/2025 8.1 (L) 8.6 - 10.5 mg/dL Final   04/15/2025 7.9 (L) 8.6 - 10.5 mg/dL Final   04/15/2025 8.5 (L) 8.6 - 10.5 mg/dL Final   04/15/2025 8.5 (L) 8.6 - 10.5 mg/dL Final      PO4 No results found for: \"CAPO4\"   Albumin Albumin   Date Value Ref Range Status   04/15/2025 3.7 3.5 - 5.2 g/dL Final    " "  Magnesium Magnesium   Date Value Ref Range Status   04/16/2025 1.7 1.6 - 2.4 mg/dL Final   04/15/2025 1.6 1.6 - 2.4 mg/dL Final      Uric Acid No results found for: \"URICACID\"        Results Review:     I reviewed the patient's new clinical results.    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  Checking 24 hour urine for Cr clearance and patient family considering dialysis.  Will add PO sodium bicarbonate.  IV Bumex x 1 this AM.  If no improvement in symptoms will plan for trial of dialysis tomorrow if patient and family decide that is what they want to pursue.  Titrate Hydralazine.       Jim Farooq MD  [facility]  04/17/25  07:46 EDT      "

## 2025-04-17 NOTE — PLAN OF CARE
Problem: Fall Injury Risk  Goal: Absence of Fall and Fall-Related Injury  Intervention: Promote Injury-Free Environment  Recent Flowsheet Documentation  Taken 4/17/2025 0200 by Raúl Brenner RN  Safety Promotion/Fall Prevention:   safety round/check completed   room organization consistent   nonskid shoes/slippers when out of bed   fall prevention program maintained   clutter free environment maintained   assistive device/personal items within reach  Taken 4/17/2025 0000 by Raúl Brenner RN  Safety Promotion/Fall Prevention:   safety round/check completed   room organization consistent   nonskid shoes/slippers when out of bed   fall prevention program maintained   clutter free environment maintained   assistive device/personal items within reach  Taken 4/16/2025 2200 by Raúl Brenner RN  Safety Promotion/Fall Prevention:   safety round/check completed   room organization consistent   nonskid shoes/slippers when out of bed   fall prevention program maintained   clutter free environment maintained   assistive device/personal items within reach  Taken 4/16/2025 2000 by Raúl Brenner RN  Safety Promotion/Fall Prevention:   safety round/check completed   room organization consistent   nonskid shoes/slippers when out of bed   fall prevention program maintained   clutter free environment maintained   assistive device/personal items within reach  Taken 4/16/2025 1921 by Raúl Brenner RN  Safety Promotion/Fall Prevention:   safety round/check completed   room organization consistent   nonskid shoes/slippers when out of bed   fall prevention program maintained   clutter free environment maintained   assistive device/personal items within reach     Problem: Adult Inpatient Plan of Care  Goal: Absence of Hospital-Acquired Illness or Injury  Intervention: Identify and Manage Fall Risk  Recent Flowsheet Documentation  Taken 4/17/2025 0200 by Raúl Brenner, RN  Safety Promotion/Fall Prevention:   safety  round/check completed   room organization consistent   nonskid shoes/slippers when out of bed   fall prevention program maintained   clutter free environment maintained   assistive device/personal items within reach  Taken 4/17/2025 0000 by Raúl Brenner RN  Safety Promotion/Fall Prevention:   safety round/check completed   room organization consistent   nonskid shoes/slippers when out of bed   fall prevention program maintained   clutter free environment maintained   assistive device/personal items within reach  Taken 4/16/2025 2200 by Raúl Brenner RN  Safety Promotion/Fall Prevention:   safety round/check completed   room organization consistent   nonskid shoes/slippers when out of bed   fall prevention program maintained   clutter free environment maintained   assistive device/personal items within reach  Taken 4/16/2025 2000 by Raúl Brenner RN  Safety Promotion/Fall Prevention:   safety round/check completed   room organization consistent   nonskid shoes/slippers when out of bed   fall prevention program maintained   clutter free environment maintained   assistive device/personal items within reach  Taken 4/16/2025 1921 by Raúl Brenner RN  Safety Promotion/Fall Prevention:   safety round/check completed   room organization consistent   nonskid shoes/slippers when out of bed   fall prevention program maintained   clutter free environment maintained   assistive device/personal items within reach  Intervention: Prevent Skin Injury  Recent Flowsheet Documentation  Taken 4/16/2025 2000 by Raúl Brenner RN  Body Position: position changed independently  Taken 4/16/2025 1921 by Raúl Brenner RN  Body Position: position changed independently  Intervention: Prevent Infection  Recent Flowsheet Documentation  Taken 4/17/2025 0000 by Raúl Brenner RN  Infection Prevention:   environmental surveillance performed   hand hygiene promoted   personal protective equipment utilized   rest/sleep  promoted   single patient room provided   Goal Outcome Evaluation:         Patient started to feel really short of breath with activity last night. Patient's arm was swollen and her lungs sounded wheezy and coarse. Provider was notified and patient's IV fluids were stopped per provider orders. Patient's potassium was 3.6. The potassium replacement protocol was implemented. Patient's blood pressure has been elevated with patient's systolic pressure up into the 180's. Patient was given a dose of hydralazine. Patient's blood pressure remained high. The on call provider was contacted. Labetalol was ordered. Labetalol was administered. The labetalol was ineffective in treating the high blood pressure. Patient uses diuretics at home but they have not been ordered. On call provider recommended that the nurse passes on to day shift to have a discussion about restarting diuretics. Patient has been cooperative with care.

## 2025-04-17 NOTE — PROGRESS NOTES
[facility] MEDICINE SERVICE  DAILY PROGRESS NOTE    NAME: Brad Woodward  : 1940  MRN: [id number]      LOS: 1 day     PROVIDER OF SERVICE: Harjinder Guzman MD    Chief Complaint: Anemia    Subjective:     Interval History:  History taken from: patient    Patient seen and examined at bedside with family at bedside.        Review of Systems: Negative except described above  Review of Systems    Objective:     Vital Signs  Temp:  [97.5 °F (36.4 °C)-98.5 °F (36.9 °C)] 98.5 °F (36.9 °C)  Heart Rate:  [66-80] 66  Resp:  [14-17] 17  BP: (145-190)/(60-76) 183/75   Body mass index is 24.86 kg/m².    Physical Exam  Physical Exam  Constitutional:       Comments: NAD    Cardiovascular:      Comments:  RRR, S1 & S2   Pulmonary:      Comments:  Lungs CTA   Abdominal:      Comments:  ABD soft, NT            Diagnostic Data    Results from last 7 days   Lab Units 25  0540 25  2257 04/15/25  2204 04/15/25  1330   WBC 10*3/mm3  --  4.64  --  4.11   HEMOGLOBIN g/dL  --  8.7*   < > 7.2*   HEMATOCRIT %  --  26.1*   < > 21.3*   PLATELETS 10*3/mm3  --  214  --  228   GLUCOSE mg/dL  --  167*   < > 119*   CREATININE mg/dL  --  2.89*   < > 2.98*   BUN mg/dL  --  40*   < > 39*   SODIUM mmol/L  --  124*   < > 124*   POTASSIUM mmol/L 3.6 3.6   < > 3.3*   AST (SGOT) U/L  --   --   --  79*   ALT (SGPT) U/L  --   --   --  53*   ALK PHOS U/L  --   --   --  90   BILIRUBIN mg/dL  --   --   --  0.3   ANION GAP mmol/L  --  10.7   < > 11.5    < > = values in this interval not displayed.       XR Chest 1 View  Result Date: 4/15/2025  Impression: No active pulmonary process. Stable cardiomegaly since . Electronically Signed: Samuel Villa MD  4/15/2025 1:49 PM EDT  Workstation ID: [id number]        I reviewed the patient's new clinical results.    Assessment/Plan:     Active and Resolved Problems  Active Hospital Problems    Diagnosis  POA    **Anemia [D64.9]  Yes      Resolved Hospital Problems   No resolved problems to  display.       Anemia  Electrolyte imbalances  ERIN on CKD  Weakness  Per Dr. Farooq, give 1 unit PRBC and recheck labs  Appreciate nephrology assistance  RVP negative  CXR negative  PT/OT to eval  EKG with QT prolongation, avoid medications that further prolong QT  Caution restarting home meds with ERIN as well as QT prolongation, close review once meds are reconciled  Nephrology following, checking 24-hour urine for creatinine clearance, state if no improvement in symptoms then will plan for trial of dialysis tomorrow    Hypertension   Sick Sinus Syndrome  BP stable  S/P pacemaker- follows with Dr. Aldridge outpatient  Resume home medications once reconciled and appropriate     Hyperlipidemia  Continue statin     Diabetes Mellitus   Low SSI   Consistent carb diet  Hypoglycemia protocol   Hold home PO meds  ACHS accucheck    VTE Prophylaxis:  Mechanical VTE prophylaxis orders are present.             Disposition Planning:        Anticipated Date of Discharge: tbd         Time: 35 minutes     Code Status and Medical Interventions: CPR (Attempt to Resuscitate); Full Support   Ordered at: 04/15/25 1734     Code Status (Patient has no pulse and is not breathing):    CPR (Attempt to Resuscitate)     Medical Interventions (Patient has pulse or is breathing):    Full Support     Level Of Support Discussed With:    Health Care Surrogate       Next of Kin (If No Surrogate)       Signature: Electronically signed by Harjinder Guzman MD, 04/17/25, 09:48 EDT.  [facility] Hospitalist Team

## 2025-04-18 ENCOUNTER — APPOINTMENT (OUTPATIENT)
Dept: NEPHROLOGY | Facility: HOSPITAL | Age: 85
End: 2025-04-18
Payer: MEDICAID

## 2025-04-18 ENCOUNTER — APPOINTMENT (OUTPATIENT)
Dept: INTERVENTIONAL RADIOLOGY/VASCULAR | Facility: HOSPITAL | Age: 85
End: 2025-04-18
Payer: MEDICAID

## 2025-04-18 LAB
ANION GAP SERPL CALCULATED.3IONS-SCNC: 10.3 MMOL/L (ref 5–15)
BUN SERPL-MCNC: 39 MG/DL (ref 8–23)
BUN/CREAT SERPL: 13.5 (ref 7–25)
CALCIUM SPEC-SCNC: 8 MG/DL (ref 8.6–10.5)
CHLORIDE SERPL-SCNC: 93 MMOL/L (ref 98–107)
CO2 SERPL-SCNC: 20.7 MMOL/L (ref 22–29)
COLLECT DURATION TIME UR: 24 HRS
CREAT CL 24H UR+SERPL-VRATE: 15.2 ML/MIN (ref 88–128)
CREAT CL 24H UR+SERPL-VRATE: 21.8 L/24 HR (ref 126.7–184.3)
CREAT SERPL-MCNC: 2.89 MG/DL (ref 0.57–1)
CREAT UR-MCNC: 2.89 MG/DL (ref 0.6–1.3)
CREAT UR-MCNC: 56.7 MG/DL
CREATINE 24H UR-MRATE: 0.62 G/24 HR (ref 0.7–1.6)
EGFRCR SERPLBLD CKD-EPI 2021: 15.6 ML/MIN/1.73
GLUCOSE BLDC GLUCOMTR-MCNC: 112 MG/DL (ref 70–105)
GLUCOSE BLDC GLUCOMTR-MCNC: 113 MG/DL (ref 70–105)
GLUCOSE BLDC GLUCOMTR-MCNC: 165 MG/DL (ref 70–105)
GLUCOSE SERPL-MCNC: 135 MG/DL (ref 65–99)
HBV SURFACE AB SER RIA-ACNC: REACTIVE
HBV SURFACE AG SERPL QL IA: NORMAL
POTASSIUM SERPL-SCNC: 3.5 MMOL/L (ref 3.5–5.2)
SODIUM SERPL-SCNC: 124 MMOL/L (ref 136–145)
SPECIMEN VOL 24H UR: 1100 ML

## 2025-04-18 PROCEDURE — 25010000002 CEFAZOLIN PER 500 MG

## 2025-04-18 PROCEDURE — C1750 CATH, HEMODIALYSIS,LONG-TERM: HCPCS

## 2025-04-18 PROCEDURE — 86706 HEP B SURFACE ANTIBODY: CPT | Performed by: INTERNAL MEDICINE

## 2025-04-18 PROCEDURE — 87340 HEPATITIS B SURFACE AG IA: CPT | Performed by: INTERNAL MEDICINE

## 2025-04-18 PROCEDURE — 25010000002 HEPARIN (PORCINE) PER 1000 UNITS

## 2025-04-18 PROCEDURE — 25010000002 FENTANYL CITRATE (PF) 50 MCG/ML SOLUTION

## 2025-04-18 PROCEDURE — 36558 INSERT TUNNELED CV CATH: CPT

## 2025-04-18 PROCEDURE — 99153 MOD SED SAME PHYS/QHP EA: CPT

## 2025-04-18 PROCEDURE — 82948 REAGENT STRIP/BLOOD GLUCOSE: CPT

## 2025-04-18 PROCEDURE — 76937 US GUIDE VASCULAR ACCESS: CPT

## 2025-04-18 PROCEDURE — 5A1D70Z PERFORMANCE OF URINARY FILTRATION, INTERMITTENT, LESS THAN 6 HOURS PER DAY: ICD-10-PCS | Performed by: INTERNAL MEDICINE

## 2025-04-18 PROCEDURE — B5131ZA FLUOROSCOPY OF RIGHT JUGULAR VEINS USING LOW OSMOLAR CONTRAST, GUIDANCE: ICD-10-PCS

## 2025-04-18 PROCEDURE — C1894 INTRO/SHEATH, NON-LASER: HCPCS

## 2025-04-18 PROCEDURE — 25010000002 ONDANSETRON PER 1 MG

## 2025-04-18 PROCEDURE — 25010000002 LIDOCAINE 1 % SOLUTION

## 2025-04-18 PROCEDURE — 0JH63XZ INSERTION OF TUNNELED VASCULAR ACCESS DEVICE INTO CHEST SUBCUTANEOUS TISSUE AND FASCIA, PERCUTANEOUS APPROACH: ICD-10-PCS

## 2025-04-18 PROCEDURE — 25010000002 PROCHLORPERAZINE 10 MG/2ML SOLUTION

## 2025-04-18 PROCEDURE — 05HM33Z INSERTION OF INFUSION DEVICE INTO RIGHT INTERNAL JUGULAR VEIN, PERCUTANEOUS APPROACH: ICD-10-PCS

## 2025-04-18 PROCEDURE — 86704 HEP B CORE ANTIBODY TOTAL: CPT | Performed by: INTERNAL MEDICINE

## 2025-04-18 PROCEDURE — 99152 MOD SED SAME PHYS/QHP 5/>YRS: CPT

## 2025-04-18 PROCEDURE — 63710000001 INSULIN LISPRO (HUMAN) PER 5 UNITS

## 2025-04-18 PROCEDURE — 25010000002 MIDAZOLAM PER 1 MG

## 2025-04-18 PROCEDURE — 77001 FLUOROGUIDE FOR VEIN DEVICE: CPT

## 2025-04-18 PROCEDURE — 25010000002 HEPARIN (PORCINE) PER 1000 UNITS: Performed by: INTERNAL MEDICINE

## 2025-04-18 RX ORDER — HEPARIN SODIUM 1000 [USP'U]/ML
5000 INJECTION, SOLUTION INTRAVENOUS; SUBCUTANEOUS AS NEEDED
Status: DISCONTINUED | OUTPATIENT
Start: 2025-04-18 | End: 2025-04-24 | Stop reason: HOSPADM

## 2025-04-18 RX ORDER — LIDOCAINE HYDROCHLORIDE 10 MG/ML
INJECTION, SOLUTION INFILTRATION; PERINEURAL AS NEEDED
Status: COMPLETED | OUTPATIENT
Start: 2025-04-18 | End: 2025-04-18

## 2025-04-18 RX ORDER — PROCHLORPERAZINE EDISYLATE 5 MG/ML
5 INJECTION INTRAMUSCULAR; INTRAVENOUS EVERY 6 HOURS PRN
Status: DISCONTINUED | OUTPATIENT
Start: 2025-04-18 | End: 2025-04-24 | Stop reason: HOSPADM

## 2025-04-18 RX ORDER — CLONIDINE HYDROCHLORIDE 0.1 MG/1
0.1 TABLET ORAL EVERY 12 HOURS SCHEDULED
Status: DISCONTINUED | OUTPATIENT
Start: 2025-04-18 | End: 2025-04-18

## 2025-04-18 RX ORDER — ONDANSETRON 2 MG/ML
4 INJECTION INTRAMUSCULAR; INTRAVENOUS EVERY 6 HOURS PRN
Status: DISCONTINUED | OUTPATIENT
Start: 2025-04-18 | End: 2025-04-18

## 2025-04-18 RX ORDER — ONDANSETRON 2 MG/ML
INJECTION INTRAMUSCULAR; INTRAVENOUS AS NEEDED
Status: COMPLETED | OUTPATIENT
Start: 2025-04-18 | End: 2025-04-18

## 2025-04-18 RX ORDER — MIDAZOLAM HYDROCHLORIDE 1 MG/ML
INJECTION, SOLUTION INTRAMUSCULAR; INTRAVENOUS AS NEEDED
Status: COMPLETED | OUTPATIENT
Start: 2025-04-18 | End: 2025-04-18

## 2025-04-18 RX ORDER — POTASSIUM CHLORIDE 1500 MG/1
40 TABLET, EXTENDED RELEASE ORAL ONCE
Status: COMPLETED | OUTPATIENT
Start: 2025-04-18 | End: 2025-04-18

## 2025-04-18 RX ORDER — FENTANYL CITRATE 50 UG/ML
INJECTION, SOLUTION INTRAMUSCULAR; INTRAVENOUS AS NEEDED
Status: COMPLETED | OUTPATIENT
Start: 2025-04-18 | End: 2025-04-18

## 2025-04-18 RX ORDER — HEPARIN SODIUM 1000 [USP'U]/ML
INJECTION, SOLUTION INTRAVENOUS; SUBCUTANEOUS AS NEEDED
Status: COMPLETED | OUTPATIENT
Start: 2025-04-18 | End: 2025-04-18

## 2025-04-18 RX ADMIN — SODIUM BICARBONATE 1300 MG: 650 TABLET ORAL at 21:22

## 2025-04-18 RX ADMIN — FENTANYL CITRATE 50 MCG: 50 INJECTION, SOLUTION INTRAMUSCULAR; INTRAVENOUS at 08:33

## 2025-04-18 RX ADMIN — POTASSIUM CHLORIDE 40 MEQ: 1500 TABLET, EXTENDED RELEASE ORAL at 09:35

## 2025-04-18 RX ADMIN — FENTANYL CITRATE 50 MCG: 50 INJECTION, SOLUTION INTRAMUSCULAR; INTRAVENOUS at 08:47

## 2025-04-18 RX ADMIN — HYDRALAZINE HYDROCHLORIDE 100 MG: 25 TABLET ORAL at 16:31

## 2025-04-18 RX ADMIN — HYDRALAZINE HYDROCHLORIDE 100 MG: 25 TABLET ORAL at 21:22

## 2025-04-18 RX ADMIN — HEPARIN SODIUM 4100 UNITS: 1000 INJECTION, SOLUTION INTRAVENOUS; SUBCUTANEOUS at 09:02

## 2025-04-18 RX ADMIN — INSULIN LISPRO 2 UNITS: 100 INJECTION, SOLUTION INTRAVENOUS; SUBCUTANEOUS at 21:22

## 2025-04-18 RX ADMIN — CLONIDINE HYDROCHLORIDE 0.1 MG: 0.1 TABLET ORAL at 06:23

## 2025-04-18 RX ADMIN — LIDOCAINE HYDROCHLORIDE 3 ML: 10 INJECTION, SOLUTION INFILTRATION; PERINEURAL at 08:42

## 2025-04-18 RX ADMIN — SODIUM BICARBONATE 1300 MG: 650 TABLET ORAL at 16:30

## 2025-04-18 RX ADMIN — MIDAZOLAM 1 MG: 1 INJECTION INTRAMUSCULAR; INTRAVENOUS at 08:33

## 2025-04-18 RX ADMIN — PROCHLORPERAZINE EDISYLATE 5 MG: 5 INJECTION INTRAMUSCULAR; INTRAVENOUS at 11:08

## 2025-04-18 RX ADMIN — FENTANYL CITRATE 50 MCG: 50 INJECTION, SOLUTION INTRAMUSCULAR; INTRAVENOUS at 08:52

## 2025-04-18 RX ADMIN — Medication 10 MG: at 00:48

## 2025-04-18 RX ADMIN — CEFAZOLIN 1000 MG: 2 INJECTION, POWDER, FOR SOLUTION INTRAMUSCULAR; INTRAVENOUS at 08:22

## 2025-04-18 RX ADMIN — ONDANSETRON 4 MG: 2 INJECTION INTRAMUSCULAR; INTRAVENOUS at 08:32

## 2025-04-18 RX ADMIN — Medication 10 MG: at 18:21

## 2025-04-18 RX ADMIN — HEPARIN SODIUM 5000 UNITS: 1000 INJECTION INTRAVENOUS; SUBCUTANEOUS at 15:31

## 2025-04-18 NOTE — NURSING NOTE
Treatment completed.  Port-line connections reversed. Catheter is intact, heparin locked.  Patient is not in distress, waiting for transportation back to unit.  Report given to primary RN.  UF=1L

## 2025-04-18 NOTE — PLAN OF CARE
Goal Outcome Evaluation:  Plan of Care Reviewed With: patient, family        Progress: no change  Outcome Evaluation: Slept at intervals. Family at bedside. Scopolamine patch placed for nausea. NPO for HD cath placement. No c/o discomfort. Will continue to monitor.

## 2025-04-18 NOTE — PLAN OF CARE
Problem: Fall Injury Risk  Goal: Absence of Fall and Fall-Related Injury  Intervention: Promote Injury-Free Environment  Recent Flowsheet Documentation  Taken 4/18/2025 1200 by Viviana Kilgore, RN  Safety Promotion/Fall Prevention:   safety round/check completed   fall prevention program maintained   clutter free environment maintained   assistive device/personal items within reach  Taken 4/18/2025 0800 by Viviana Kilgore, RN  Safety Promotion/Fall Prevention:   clutter free environment maintained   assistive device/personal items within reach   fall prevention program maintained   safety round/check completed   Goal Outcome Evaluation:

## 2025-04-18 NOTE — SIGNIFICANT NOTE
Tx. init per mdo. Vss/pt. denies all. ECC lines secure/visible.     Family advised they could not stay in 1e d/t HIPAA/infecx control.     Will monitor.

## 2025-04-18 NOTE — SIGNIFICANT NOTE
04/18/25 1541   OTHER   Discipline physical therapist   Rehab Time/Intention   Session Not Performed   (off the floor for dialysis at this time.)   Recommendation   PT - Next Appointment 04/20/25

## 2025-04-18 NOTE — SIGNIFICANT NOTE
04/18/25 1541   OTHER   Discipline physical therapist   Rehab Time/Intention   Session Not Performed   (dialysis at this time, unable to participate in PT treatment.)   Recommendation   PT - Next Appointment 04/20/25

## 2025-04-18 NOTE — PROGRESS NOTES
"RENAL/KCC:     LOS: 2 days    Patient Care Team:  Brian Nazario APRN as PCP - General  Zane Aldridge MD as Consulting Physician (Cardiology)  Marnie Brandt APRN as Nurse Practitioner (Cardiology)    Chief Complaint:  Weakness, N/V    Subjective     Interval History:   Chart reviewed  Discussed with family  Patient and family agreeable to trial of dialysis  Remains week with little appetite    Objective     Vital Sign Min/Max for last 24 hours  Temp  Min: 97.1 °F (36.2 °C)  Max: 98.4 °F (36.9 °C)   BP  Min: 163/66  Max: 200/74   Pulse  Min: 61  Max: 83   Resp  Min: 11  Max: 20   SpO2  Min: 92 %  Max: 97 %   Flow (L/min) (Oxygen Therapy)  Min: 2  Max: 2   No data recorded     Flowsheet Rows      Flowsheet Row First Filed Value   Admission Height 162.6 cm (64\") Documented at 04/15/2025 1243   Admission Weight 65.7 kg (144 lb 13.5 oz) Documented at 04/15/2025 1243            I/O this shift:  In: 120 [P.O.:120]  Out: -   No intake/output data recorded.    Physical Exam:  GEN: Awake, NAD  ENT: PERRL, EOMI, MMM  NECK: Supple, no JVD  CHEST: CTAB, no W/R/C  CV: RRR, no M/G/R  ABD: Soft, NT, +BS  SKIN: Warm and Dry  NEURO: CN's intact      WBC WBC   Date Value Ref Range Status   04/17/2025 4.26 3.40 - 10.80 10*3/mm3 Final   04/16/2025 4.64 3.40 - 10.80 10*3/mm3 Final   04/15/2025 4.11 3.40 - 10.80 10*3/mm3 Final      HGB Hemoglobin   Date Value Ref Range Status   04/17/2025 9.0 (L) 12.0 - 15.9 g/dL Final   04/16/2025 8.7 (L) 12.0 - 15.9 g/dL Final   04/16/2025 8.5 (L) 12.0 - 15.9 g/dL Final   04/15/2025 8.1 (L) 12.0 - 15.9 g/dL Final   04/15/2025 7.2 (L) 12.0 - 15.9 g/dL Final      HCT Hematocrit   Date Value Ref Range Status   04/17/2025 26.6 (L) 34.0 - 46.6 % Final   04/16/2025 26.1 (L) 34.0 - 46.6 % Final   04/16/2025 25.4 (L) 34.0 - 46.6 % Final   04/15/2025 24.4 (L) 34.0 - 46.6 % Final   04/15/2025 21.3 (L) 34.0 - 46.6 % Final      Platlets No results found for: \"LABPLAT\"   MCV MCV   Date Value Ref Range Status "   04/17/2025 84.2 79.0 - 97.0 fL Final   04/16/2025 84.2 79.0 - 97.0 fL Final   04/15/2025 81.3 79.0 - 97.0 fL Final          Sodium Sodium   Date Value Ref Range Status   04/17/2025 124 (L) 136 - 145 mmol/L Final   04/16/2025 124 (L) 136 - 145 mmol/L Final   04/16/2025 123 (L) 136 - 145 mmol/L Final   04/15/2025 121 (L) 136 - 145 mmol/L Final   04/15/2025 124 (L) 136 - 145 mmol/L Final   04/15/2025 127 (L) 136 - 145 mmol/L Final      Potassium Potassium   Date Value Ref Range Status   04/17/2025 3.5 3.5 - 5.2 mmol/L Final   04/17/2025 3.6 3.5 - 5.2 mmol/L Final   04/16/2025 3.6 3.5 - 5.2 mmol/L Final   04/16/2025 3.6 3.5 - 5.2 mmol/L Final   04/15/2025 3.4 (L) 3.5 - 5.2 mmol/L Final   04/15/2025 3.3 (L) 3.5 - 5.2 mmol/L Final   04/15/2025 3.7 3.5 - 5.2 mmol/L Final      Chloride Chloride   Date Value Ref Range Status   04/17/2025 93 (L) 98 - 107 mmol/L Final   04/16/2025 95 (L) 98 - 107 mmol/L Final   04/16/2025 92 (L) 98 - 107 mmol/L Final   04/15/2025 91 (L) 98 - 107 mmol/L Final   04/15/2025 93 (L) 98 - 107 mmol/L Final   04/15/2025 95 (L) 98 - 107 mmol/L Final      CO2 CO2   Date Value Ref Range Status   04/17/2025 20.7 (L) 22.0 - 29.0 mmol/L Final   04/16/2025 18.3 (L) 22.0 - 29.0 mmol/L Final   04/16/2025 20.6 (L) 22.0 - 29.0 mmol/L Final   04/15/2025 19.8 (L) 22.0 - 29.0 mmol/L Final   04/15/2025 19.5 (L) 22.0 - 29.0 mmol/L Final   04/15/2025 20.0 (L) 22.0 - 29.0 mmol/L Final      BUN BUN   Date Value Ref Range Status   04/17/2025 39 (H) 8 - 23 mg/dL Final   04/16/2025 40 (H) 8 - 23 mg/dL Final   04/16/2025 41 (H) 8 - 23 mg/dL Final   04/15/2025 39 (H) 8 - 23 mg/dL Final   04/15/2025 39 (H) 8 - 23 mg/dL Final   04/15/2025 39 (H) 8 - 23 mg/dL Final      Creatinine Creatinine   Date Value Ref Range Status   04/17/2025 2.89 (H) 0.57 - 1.00 mg/dL Final   04/16/2025 2.89 (H) 0.57 - 1.00 mg/dL Final   04/16/2025 3.02 (H) 0.57 - 1.00 mg/dL Final   04/15/2025 3.02 (H) 0.57 - 1.00 mg/dL Final   04/15/2025 2.98 (H)  "0.57 - 1.00 mg/dL Final   04/15/2025 3.03 (H) 0.57 - 1.00 mg/dL Final      Calcium Calcium   Date Value Ref Range Status   04/17/2025 8.0 (L) 8.6 - 10.5 mg/dL Final   04/16/2025 7.1 (L) 8.6 - 10.5 mg/dL Final   04/16/2025 8.1 (L) 8.6 - 10.5 mg/dL Final   04/15/2025 7.9 (L) 8.6 - 10.5 mg/dL Final   04/15/2025 8.5 (L) 8.6 - 10.5 mg/dL Final   04/15/2025 8.5 (L) 8.6 - 10.5 mg/dL Final      PO4 No results found for: \"CAPO4\"   Albumin Albumin   Date Value Ref Range Status   04/15/2025 3.7 3.5 - 5.2 g/dL Final      Magnesium Magnesium   Date Value Ref Range Status   04/16/2025 1.7 1.6 - 2.4 mg/dL Final   04/15/2025 1.6 1.6 - 2.4 mg/dL Final      Uric Acid No results found for: \"URICACID\"        Results Review:     I reviewed the patient's new clinical results.    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  Patient remains with no appetite, reduced energy.  Have discussed at length with patient and multiple family members.  They are agreeable to a trial of dialysis.  Will ask IR for TDC today and plan HD today and tomorrow.  Start outpatient HD planning.  BP still above goal but should improve with HD.  Adjust medications after HD if still elevated.  Will follow closely.      Jim Farooq MD  [facility]  04/18/25  05:46 EDT      "

## 2025-04-18 NOTE — PROGRESS NOTES
[facility] MEDICINE SERVICE  DAILY PROGRESS NOTE    NAME: Brad Woodward  : 1940  MRN: [id number]      LOS: 2 days     PROVIDER OF SERVICE: Harjinder Guzman MD    Chief Complaint: Anemia    Subjective:     Interval History:  History taken from: patient    Patient seen and examined at bedside with family at bedside.  Family is stating that they would prefer to have home health at home.  Patient went for tunneled dialysis catheter placement today, scheduled for dialysis today        Review of Systems: Negative except described above  Review of Systems    Objective:     Vital Signs  Temp:  [97.3 °F (36.3 °C)-98.4 °F (36.9 °C)] 97.3 °F (36.3 °C)  Heart Rate:  [60-83] 60  Resp:  [7-19] 9  BP: (105-200)/(58-98) 149/58  Flow (L/min) (Oxygen Therapy):  [2] 2   Body mass index is 24.86 kg/m².    Physical Exam  Physical Exam  Constitutional:       Comments: NAD    Cardiovascular:      Comments:  RRR, S1 & S2   Pulmonary:      Comments:  Lungs CTA   Abdominal:      Comments:  ABD soft, NT            Diagnostic Data    Results from last 7 days   Lab Units 25  2339 04/15/25  2204 04/15/25  1330   WBC 10*3/mm3 4.26   < > 4.11   HEMOGLOBIN g/dL 9.0*   < > 7.2*   HEMATOCRIT % 26.6*   < > 21.3*   PLATELETS 10*3/mm3 214   < > 228   GLUCOSE mg/dL 135*   < > 119*   CREATININE mg/dL 2.89*   < > 2.98*   BUN mg/dL 39*   < > 39*   SODIUM mmol/L 124*   < > 124*   POTASSIUM mmol/L 3.5   < > 3.3*   AST (SGOT) U/L  --   --  79*   ALT (SGPT) U/L  --   --  53*   ALK PHOS U/L  --   --  90   BILIRUBIN mg/dL  --   --  0.3   ANION GAP mmol/L 10.3   < > 11.5    < > = values in this interval not displayed.       No radiology results for the last day        I reviewed the patient's new clinical results.    Assessment/Plan:     Active and Resolved Problems  Active Hospital Problems    Diagnosis  POA    **Anemia [D64.9]  Yes      Resolved Hospital Problems   No resolved problems to display.       Anemia  Electrolyte  imbalances  ERIN on CKD  Weakness  Per Dr. Farooq, give 1 unit PRBC and recheck labs  Appreciate nephrology assistance  RVP negative  CXR negative  PT/OT to eval  EKG with QT prolongation, avoid medications that further prolong QT  Caution restarting home meds with ERIN as well as QT prolongation, close review once meds are reconciled  Nephrology following, ordered 24-hour urine for creatinine clearance, schedule patient for dialysis today and tomorrow.  Patient received tunneled dialysis catheter placement today    Hypertension   Sick Sinus Syndrome  BP stable  S/P pacemaker- follows with Dr. Aldridge outpatient  Resume home medications once reconciled and appropriate     Hyperlipidemia  Continue statin     Diabetes Mellitus   Low SSI   Consistent carb diet  Hypoglycemia protocol   Hold home PO meds  ACHS accucheck    VTE Prophylaxis:  Mechanical VTE prophylaxis orders are present.             Disposition Planning:        Anticipated Date of Discharge: tbd         Time: 35 minutes     Code Status and Medical Interventions: CPR (Attempt to Resuscitate); Full Support   Ordered at: 04/15/25 1734     Code Status (Patient has no pulse and is not breathing):    CPR (Attempt to Resuscitate)     Medical Interventions (Patient has pulse or is breathing):    Full Support     Level Of Support Discussed With:    Health Care Surrogate       Next of Kin (If No Surrogate)       Signature: Electronically signed by Harjinder Guzman MD, 04/18/25, 11:21 EDT.  [facility] Hospitalist Team

## 2025-04-18 NOTE — H&P
[facility]   Interventional Radiology H&P    Patient Name: Brad Woodward  : 1940  MRN: [id number]  Primary Care Physician:  Brian Nazario APRN  Referring Physician: No Known Provider  Date of admission: 4/15/2025    Subjective   Subjective     HPI:  Brad Woodward is a 84 y.o. female with end stage renal disease.    Review of Systems:   Constitutional no fever,  no weight loss       Otolaryngeal no difficulty swallowing   Cardiovascular no chest pain   Pulmonary no cough, no sputum production   Gastrointestinal no constipation, no diarrhea                         Personal History       Past Medical/Surgical History:   Past Medical History:   Diagnosis Date    Diabetes mellitus     Hyperlipidemia     Hypertension      Past Surgical History:   Procedure Laterality Date    CARDIAC ELECTROPHYSIOLOGY PROCEDURE N/A 2021    Procedure: Pacemaker DC new;  Surgeon: Yovany Navarrete MD;  Location: [facility] INVASIVE LOCATION;  Service: Cardiovascular;  Laterality: N/A;       Social History:  reports that she has never smoked. She has never been exposed to tobacco smoke. She has never used smokeless tobacco. She reports that she does not drink alcohol and does not use drugs.    Medications:  Medications Prior to Admission   Medication Sig Dispense Refill Last Dose/Taking    amLODIPine (NORVASC) 10 MG tablet Take 1 tablet by mouth Daily. 270 tablet 1 Taking    aspirin 81 MG chewable tablet Chew 1 tablet Daily. 270 tablet 2 Taking    atorvastatin (LIPITOR) 80 MG tablet Take 1 tablet by mouth Daily. 270 tablet 1 Taking    cephalexin (KEFLEX) 250 MG capsule Take 1 capsule by mouth 2 (Two) Times a Day.   4/15/2025 Morning    chlorthalidone (HYGROTEN) 50 MG tablet Take 1 tablet by mouth Daily.   Taking    Cyanocobalamin (VITAMIN B-12 PO) Take 1 tablet by mouth Daily.   Taking    glipiZIDE 2.5 MG tablet Take 5 mg by mouth Daily.   Taking    hydrALAZINE (APRESOLINE) 50 MG tablet Take 1 tablet by mouth 3 (Three) Times a  Day. In the morning, evening and bedtime.   Taking     Current medications:  amLODIPine, 10 mg, Oral, Q24H  aspirin, 81 mg, Oral, Daily  atorvastatin, 80 mg, Oral, Daily  hydrALAZINE, 100 mg, Oral, TID  insulin lispro, 2-7 Units, Subcutaneous, 4x Daily AC & at Bedtime  potassium chloride, 40 mEq, Oral, Once  Scopolamine, 1 patch, Transdermal, Q72H  sodium bicarbonate, 1,300 mg, Oral, TID      Current IV drips:  ceFAZolin, , Last Rate: 1,000 mg (04/18/25 0822)        Allergies:  No Known Allergies    Objective    Objective     Vitals:   Temp:  [97.1 °F (36.2 °C)-98.4 °F (36.9 °C)] 98 °F (36.7 °C)  Heart Rate:  [61-83] 72  Resp:  [11-20] 11  BP: (163-200)/(66-80) 188/68  Flow (L/min) (Oxygen Therapy):  [2] 2      Physical Exam:   Constitutional: Awake, alert, No acute distress    Respiratory: Clear to auscultation bilaterally, nonlabored respirations    Cardiovascular: RRR, no murmurs, rubs, or gallops, palpable pedal pulses bilaterally   Gastrointestinal: Positive bowel sounds, soft, nontender, nondistended        ASA SCALE ASSESSMENT:  2-Mild to moderate systemic disease, medically well controlled, with no functional limitation    MALLAMPATI CLASSIFICATION:  2-Able to visualize the soft palate, fauces, uvula. The anterior & posterior tonsilar pillars are hidden by the tongue.       Result Review        Result Review:     Sodium   Date Value Ref Range Status   04/17/2025 124 (L) 136 - 145 mmol/L Final   04/16/2025 124 (L) 136 - 145 mmol/L Final   04/16/2025 123 (L) 136 - 145 mmol/L Final   04/15/2025 121 (L) 136 - 145 mmol/L Final   04/15/2025 124 (L) 136 - 145 mmol/L Final   04/15/2025 127 (L) 136 - 145 mmol/L Final       Potassium   Date Value Ref Range Status   04/17/2025 3.5 3.5 - 5.2 mmol/L Final   04/17/2025 3.6 3.5 - 5.2 mmol/L Final   04/16/2025 3.6 3.5 - 5.2 mmol/L Final   04/16/2025 3.6 3.5 - 5.2 mmol/L Final   04/15/2025 3.4 (L) 3.5 - 5.2 mmol/L Final   04/15/2025 3.3 (L) 3.5 - 5.2 mmol/L Final  "  04/15/2025 3.7 3.5 - 5.2 mmol/L Final       Chloride   Date Value Ref Range Status   04/17/2025 93 (L) 98 - 107 mmol/L Final   04/16/2025 95 (L) 98 - 107 mmol/L Final   04/16/2025 92 (L) 98 - 107 mmol/L Final   04/15/2025 91 (L) 98 - 107 mmol/L Final   04/15/2025 93 (L) 98 - 107 mmol/L Final   04/15/2025 95 (L) 98 - 107 mmol/L Final       No results found for: \"PLASMABICARB\"    BUN   Date Value Ref Range Status   04/17/2025 39 (H) 8 - 23 mg/dL Final   04/16/2025 40 (H) 8 - 23 mg/dL Final   04/16/2025 41 (H) 8 - 23 mg/dL Final   04/15/2025 39 (H) 8 - 23 mg/dL Final   04/15/2025 39 (H) 8 - 23 mg/dL Final   04/15/2025 39 (H) 8 - 23 mg/dL Final       Creatinine   Date Value Ref Range Status   04/17/2025 2.89 (H) 0.57 - 1.00 mg/dL Final   04/16/2025 2.89 (H) 0.57 - 1.00 mg/dL Final   04/16/2025 3.02 (H) 0.57 - 1.00 mg/dL Final   04/15/2025 3.02 (H) 0.57 - 1.00 mg/dL Final   04/15/2025 2.98 (H) 0.57 - 1.00 mg/dL Final   04/15/2025 3.03 (H) 0.57 - 1.00 mg/dL Final       Calcium   Date Value Ref Range Status   04/17/2025 8.0 (L) 8.6 - 10.5 mg/dL Final   04/16/2025 7.1 (L) 8.6 - 10.5 mg/dL Final   04/16/2025 8.1 (L) 8.6 - 10.5 mg/dL Final   04/15/2025 7.9 (L) 8.6 - 10.5 mg/dL Final   04/15/2025 8.5 (L) 8.6 - 10.5 mg/dL Final   04/15/2025 8.5 (L) 8.6 - 10.5 mg/dL Final           No components found for: \"GLUCOSE.*\"  Results from last 7 days   Lab Units 04/17/25  2339   WBC 10*3/mm3 4.26   HEMOGLOBIN g/dL 9.0*   HEMATOCRIT % 26.6*   PLATELETS 10*3/mm3 214      Results from last 7 days   Lab Units 04/17/25  2339   INR  0.99           Assessment / Plan     Assesment:   End stage renal disease.      Plan:   Tunneled dialysis catheter placement.     The risks and benefits of the procedure were discussed with the patient.    Electronically signed by CECE Ramirez, 04/18/25, 8:26 AM EDT.  "

## 2025-04-19 LAB
ANION GAP SERPL CALCULATED.3IONS-SCNC: 9 MMOL/L (ref 5–15)
BASOPHILS # BLD AUTO: 0.03 10*3/MM3 (ref 0–0.2)
BASOPHILS NFR BLD AUTO: 0.4 % (ref 0–1.5)
BUN SERPL-MCNC: 23 MG/DL (ref 8–23)
BUN/CREAT SERPL: 11.1 (ref 7–25)
CALCIUM SPEC-SCNC: 8.1 MG/DL (ref 8.6–10.5)
CHLORIDE SERPL-SCNC: 94 MMOL/L (ref 98–107)
CO2 SERPL-SCNC: 26 MMOL/L (ref 22–29)
CREAT SERPL-MCNC: 2.07 MG/DL (ref 0.57–1)
DEPRECATED RDW RBC AUTO: 44.8 FL (ref 37–54)
EGFRCR SERPLBLD CKD-EPI 2021: 23.3 ML/MIN/1.73
EOSINOPHIL # BLD AUTO: 0.16 10*3/MM3 (ref 0–0.4)
EOSINOPHIL NFR BLD AUTO: 2.4 % (ref 0.3–6.2)
ERYTHROCYTE [DISTWIDTH] IN BLOOD BY AUTOMATED COUNT: 14.8 % (ref 12.3–15.4)
GLUCOSE BLDC GLUCOMTR-MCNC: 106 MG/DL (ref 70–105)
GLUCOSE BLDC GLUCOMTR-MCNC: 113 MG/DL (ref 70–105)
GLUCOSE BLDC GLUCOMTR-MCNC: 168 MG/DL (ref 70–105)
GLUCOSE BLDC GLUCOMTR-MCNC: 172 MG/DL (ref 70–105)
GLUCOSE SERPL-MCNC: 101 MG/DL (ref 65–99)
HBV CORE AB SERPL QL IA: POSITIVE
HCT VFR BLD AUTO: 25.8 % (ref 34–46.6)
HGB BLD-MCNC: 8.5 G/DL (ref 12–15.9)
IMM GRANULOCYTES # BLD AUTO: 0.03 10*3/MM3 (ref 0–0.05)
IMM GRANULOCYTES NFR BLD AUTO: 0.4 % (ref 0–0.5)
LYMPHOCYTES # BLD AUTO: 1.33 10*3/MM3 (ref 0.7–3.1)
LYMPHOCYTES NFR BLD AUTO: 19.8 % (ref 19.6–45.3)
MCH RBC QN AUTO: 27.7 PG (ref 26.6–33)
MCHC RBC AUTO-ENTMCNC: 32.9 G/DL (ref 31.5–35.7)
MCV RBC AUTO: 84 FL (ref 79–97)
MONOCYTES # BLD AUTO: 0.69 10*3/MM3 (ref 0.1–0.9)
MONOCYTES NFR BLD AUTO: 10.3 % (ref 5–12)
NEUTROPHILS NFR BLD AUTO: 4.47 10*3/MM3 (ref 1.7–7)
NEUTROPHILS NFR BLD AUTO: 66.7 % (ref 42.7–76)
NRBC BLD AUTO-RTO: 0.3 /100 WBC (ref 0–0.2)
PLATELET # BLD AUTO: 219 10*3/MM3 (ref 140–450)
PMV BLD AUTO: 9.6 FL (ref 6–12)
POTASSIUM SERPL-SCNC: 3.5 MMOL/L (ref 3.5–5.2)
RBC # BLD AUTO: 3.07 10*6/MM3 (ref 3.77–5.28)
SODIUM SERPL-SCNC: 129 MMOL/L (ref 136–145)
WBC NRBC COR # BLD AUTO: 6.71 10*3/MM3 (ref 3.4–10.8)

## 2025-04-19 PROCEDURE — 25010000002 HEPARIN (PORCINE) PER 1000 UNITS: Performed by: INTERNAL MEDICINE

## 2025-04-19 PROCEDURE — 85025 COMPLETE CBC W/AUTO DIFF WBC: CPT

## 2025-04-19 PROCEDURE — 80048 BASIC METABOLIC PNL TOTAL CA: CPT

## 2025-04-19 PROCEDURE — 82948 REAGENT STRIP/BLOOD GLUCOSE: CPT

## 2025-04-19 PROCEDURE — 63710000001 INSULIN LISPRO (HUMAN) PER 5 UNITS

## 2025-04-19 RX ADMIN — HEPARIN SODIUM 5000 UNITS: 1000 INJECTION INTRAVENOUS; SUBCUTANEOUS at 10:48

## 2025-04-19 RX ADMIN — SODIUM BICARBONATE 1300 MG: 650 TABLET ORAL at 16:57

## 2025-04-19 RX ADMIN — SODIUM BICARBONATE 1300 MG: 650 TABLET ORAL at 20:48

## 2025-04-19 RX ADMIN — HYDRALAZINE HYDROCHLORIDE 100 MG: 25 TABLET ORAL at 16:57

## 2025-04-19 RX ADMIN — HYDRALAZINE HYDROCHLORIDE 100 MG: 25 TABLET ORAL at 20:48

## 2025-04-19 RX ADMIN — AMLODIPINE BESYLATE 10 MG: 5 TABLET ORAL at 10:16

## 2025-04-19 RX ADMIN — SODIUM BICARBONATE 1300 MG: 650 TABLET ORAL at 10:15

## 2025-04-19 RX ADMIN — Medication 10 MG: at 08:56

## 2025-04-19 RX ADMIN — HYDRALAZINE HYDROCHLORIDE 100 MG: 25 TABLET ORAL at 10:16

## 2025-04-19 RX ADMIN — ATORVASTATIN CALCIUM 80 MG: 40 TABLET, FILM COATED ORAL at 10:16

## 2025-04-19 RX ADMIN — ASPIRIN 81 MG CHEWABLE TABLET 81 MG: 81 TABLET CHEWABLE at 10:16

## 2025-04-19 RX ADMIN — INSULIN LISPRO 2 UNITS: 100 INJECTION, SOLUTION INTRAVENOUS; SUBCUTANEOUS at 16:57

## 2025-04-19 NOTE — PROGRESS NOTES
[facility] MEDICINE SERVICE  DAILY PROGRESS NOTE    NAME: Brad Woodward  : 1940  MRN: [id number]      LOS: 3 days     PROVIDER OF SERVICE: Harjinder Guzman MD    Chief Complaint: Anemia    Subjective:     Interval History:  History taken from: patient    Made multiple attempts to see patient however patient in dialysis, will continue to reassess symptoms        Review of Systems: Negative except described above  Review of Systems    Objective:     Vital Signs  Temp:  [98.2 °F (36.8 °C)-98.8 °F (37.1 °C)] 98.3 °F (36.8 °C)  Heart Rate:  [60-90] 60  Resp:  [9-20] 12  BP: (105-208)/(61-99) 186/63  Flow (L/min) (Oxygen Therapy):  [2] 2   Body mass index is 24.86 kg/m².    Physical Exam  Physical Exam  Constitutional:       Comments: NAD    Cardiovascular:      Comments:  RRR, S1 & S2   Pulmonary:      Comments:  Lungs CTA   Abdominal:      Comments:  ABD soft, NT            Diagnostic Data    Results from last 7 days   Lab Units 25  0458 04/15/25  2204 04/15/25  1330   WBC 10*3/mm3 6.71   < > 4.11   HEMOGLOBIN g/dL 8.5*   < > 7.2*   HEMATOCRIT % 25.8*   < > 21.3*   PLATELETS 10*3/mm3 219   < > 228   GLUCOSE mg/dL 101*   < > 119*   CREATININE mg/dL 2.07*   < > 2.98*   BUN mg/dL 23   < > 39*   SODIUM mmol/L 129*   < > 124*   POTASSIUM mmol/L 3.5   < > 3.3*   AST (SGOT) U/L  --   --  79*   ALT (SGPT) U/L  --   --  53*   ALK PHOS U/L  --   --  90   BILIRUBIN mg/dL  --   --  0.3   ANION GAP mmol/L 9.0   < > 11.5    < > = values in this interval not displayed.       IR Ins Tunneled Dialysis Catheter WO Port  Result Date: 2025  Successful placement of right IJ 19 cm tip to cuff dual-lumen tunneled dialysis catheter utilizing ultrasound and fluoroscopic guidance. Report dictated by: Rene Walker DNP  I have personally reviewed this case and agree with the findings above: Electronically Signed: Roxie Lentz MD  2025 1:26 PM EDT  Workstation ID: [id number]          I reviewed the patient's new  clinical results.    Assessment/Plan:     Active and Resolved Problems  Active Hospital Problems    Diagnosis  POA    **Anemia [D64.9]  Yes      Resolved Hospital Problems   No resolved problems to display.       Anemia  Electrolyte imbalances  ERIN on CKD  Weakness  Per Dr. Farooq, give 1 unit PRBC and recheck labs  Appreciate nephrology assistance  RVP negative  CXR negative  PT/OT to eval  EKG with QT prolongation, avoid medications that further prolong QT  Caution restarting home meds with ERIN as well as QT prolongation, close review once meds are reconciled  Nephrology following, ordered 24-hour urine for creatinine clearance which confirmed low GFR, patient underwent dialysis yesterday and again going for dialysis today  Patient received tunneled dialysis catheter placement on 4/18    Hypertension   Sick Sinus Syndrome  BP stable  S/P pacemaker- follows with Dr. Aldridge outpatient  Resume home medications once reconciled and appropriate     Hyperlipidemia  Continue statin     Diabetes Mellitus   Low SSI   Consistent carb diet  Hypoglycemia protocol   Hold home PO meds  ACHS accucheck    VTE Prophylaxis:  Pharmacologic & mechanical VTE prophylaxis orders are present.             Disposition Planning:        Anticipated Date of Discharge: tbd         Time: 35 minutes     Code Status and Medical Interventions: CPR (Attempt to Resuscitate); Full Support   Ordered at: 04/15/25 1734     Code Status (Patient has no pulse and is not breathing):    CPR (Attempt to Resuscitate)     Medical Interventions (Patient has pulse or is breathing):    Full Support     Level Of Support Discussed With:    Health Care Surrogate       Next of Kin (If No Surrogate)       Signature: Electronically signed by Harjinder Guzman MD, 04/19/25, 11:05 EDT.  [facility] Hospitalist Team

## 2025-04-19 NOTE — PROGRESS NOTES
Psychiatric     Progress Note    Patient Name: Brad Woodward  : 1940  MRN: [id number]  Primary Care Physician:  Brian Nazario APRN  Date of admission: 4/15/2025    Subjective   Subjective     Chief Complaint: ESRD    History of Present Illness  Patient with ESRD initiated on hd this admission    Review of Systems    Objective   Objective     Vitals:   Temp:  [98.2 °F (36.8 °C)-98.8 °F (37.1 °C)] 98.2 °F (36.8 °C)  Heart Rate:  [60-90] 61  Resp:  [9-20] 13  BP: (105-208)/(58-99) 168/58  Flow (L/min) (Oxygen Therapy):  [2] 2    Physical Exam   General Appearance:  In no acute distress.    HEENT: Normal HEENT exam.     Neurological: Patient is asleep    Result Review    Result Review:  I have personally reviewed the results from the time of this admission to 2025 12:14 EDT and agree with these findings:  []  Laboratory list / accordion  []  Microbiology  []  Radiology  []  EKG/Telemetry   []  Cardiology/Vascular   []  Pathology  []  Old records  []  Other:  Most notable findings include:       Assessment & Plan   Assessment / Plan     Brief Patient Summary:  Brad Woodward is a 84 y.o. 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 Asleep. In no distress. Family at bedside.  Initiated on dialysis s/p treatment yesterday and today. Labs reviewed. Will likely continue with hd on Monday. She will need outpatient hd spot    Christy Low MD

## 2025-04-19 NOTE — PLAN OF CARE
Goal Outcome Evaluation:   Pt had dialysis this morning. Pts BP was elevated but doing good otherwise. Family remands at bed side and helps pt with ADLs.

## 2025-04-19 NOTE — NURSING NOTE
Dialysis Treatment    Treatment: HD  Access: Tunneled Cath  BVP: 72 liters   UF: 1000 mL    VSS. No distress present. Tolerated well.    Report given to TIO Diaz .

## 2025-04-19 NOTE — PLAN OF CARE
Goal Outcome Evaluation:              Outcome Evaluation: Pt has had no complaints on this shift. family remains at bedside. Scopolamine patch still in place. Pt is stable at this time. will continue to monitor.

## 2025-04-20 LAB
ANION GAP SERPL CALCULATED.3IONS-SCNC: 8.4 MMOL/L (ref 5–15)
BASOPHILS # BLD AUTO: 0.03 10*3/MM3 (ref 0–0.2)
BASOPHILS NFR BLD AUTO: 0.4 % (ref 0–1.5)
BUN SERPL-MCNC: 17 MG/DL (ref 8–23)
BUN/CREAT SERPL: 9 (ref 7–25)
CALCIUM SPEC-SCNC: 7.9 MG/DL (ref 8.6–10.5)
CHLORIDE SERPL-SCNC: 91 MMOL/L (ref 98–107)
CO2 SERPL-SCNC: 29.6 MMOL/L (ref 22–29)
CREAT SERPL-MCNC: 1.89 MG/DL (ref 0.57–1)
DEPRECATED RDW RBC AUTO: 46 FL (ref 37–54)
EGFRCR SERPLBLD CKD-EPI 2021: 25.9 ML/MIN/1.73
EOSINOPHIL # BLD AUTO: 0.14 10*3/MM3 (ref 0–0.4)
EOSINOPHIL NFR BLD AUTO: 2 % (ref 0.3–6.2)
ERYTHROCYTE [DISTWIDTH] IN BLOOD BY AUTOMATED COUNT: 14.9 % (ref 12.3–15.4)
GLUCOSE BLDC GLUCOMTR-MCNC: 111 MG/DL (ref 70–105)
GLUCOSE BLDC GLUCOMTR-MCNC: 138 MG/DL (ref 70–105)
GLUCOSE BLDC GLUCOMTR-MCNC: 158 MG/DL (ref 70–105)
GLUCOSE BLDC GLUCOMTR-MCNC: 178 MG/DL (ref 70–105)
GLUCOSE SERPL-MCNC: 120 MG/DL (ref 65–99)
HCT VFR BLD AUTO: 25.6 % (ref 34–46.6)
HGB BLD-MCNC: 8.2 G/DL (ref 12–15.9)
IMM GRANULOCYTES # BLD AUTO: 0.03 10*3/MM3 (ref 0–0.05)
IMM GRANULOCYTES NFR BLD AUTO: 0.4 % (ref 0–0.5)
LYMPHOCYTES # BLD AUTO: 1.67 10*3/MM3 (ref 0.7–3.1)
LYMPHOCYTES NFR BLD AUTO: 24.4 % (ref 19.6–45.3)
MCH RBC QN AUTO: 27.5 PG (ref 26.6–33)
MCHC RBC AUTO-ENTMCNC: 32 G/DL (ref 31.5–35.7)
MCV RBC AUTO: 85.9 FL (ref 79–97)
MONOCYTES # BLD AUTO: 0.88 10*3/MM3 (ref 0.1–0.9)
MONOCYTES NFR BLD AUTO: 12.8 % (ref 5–12)
NEUTROPHILS NFR BLD AUTO: 4.1 10*3/MM3 (ref 1.7–7)
NEUTROPHILS NFR BLD AUTO: 60 % (ref 42.7–76)
NRBC BLD AUTO-RTO: 0.4 /100 WBC (ref 0–0.2)
PLATELET # BLD AUTO: 229 10*3/MM3 (ref 140–450)
PMV BLD AUTO: 9.4 FL (ref 6–12)
POTASSIUM SERPL-SCNC: 3.7 MMOL/L (ref 3.5–5.2)
RBC # BLD AUTO: 2.98 10*6/MM3 (ref 3.77–5.28)
SODIUM SERPL-SCNC: 129 MMOL/L (ref 136–145)
WBC NRBC COR # BLD AUTO: 6.85 10*3/MM3 (ref 3.4–10.8)

## 2025-04-20 PROCEDURE — 82948 REAGENT STRIP/BLOOD GLUCOSE: CPT

## 2025-04-20 PROCEDURE — 63710000001 INSULIN LISPRO (HUMAN) PER 5 UNITS

## 2025-04-20 PROCEDURE — 80048 BASIC METABOLIC PNL TOTAL CA: CPT

## 2025-04-20 PROCEDURE — 85025 COMPLETE CBC W/AUTO DIFF WBC: CPT

## 2025-04-20 RX ADMIN — ATORVASTATIN CALCIUM 80 MG: 40 TABLET, FILM COATED ORAL at 07:37

## 2025-04-20 RX ADMIN — HYDRALAZINE HYDROCHLORIDE 100 MG: 25 TABLET ORAL at 07:37

## 2025-04-20 RX ADMIN — AMLODIPINE BESYLATE 10 MG: 5 TABLET ORAL at 07:36

## 2025-04-20 RX ADMIN — Medication 10 MG: at 01:24

## 2025-04-20 RX ADMIN — SODIUM BICARBONATE 1300 MG: 650 TABLET ORAL at 16:44

## 2025-04-20 RX ADMIN — SODIUM BICARBONATE 1300 MG: 650 TABLET ORAL at 20:03

## 2025-04-20 RX ADMIN — SODIUM BICARBONATE 1300 MG: 650 TABLET ORAL at 07:37

## 2025-04-20 RX ADMIN — ASPIRIN 81 MG CHEWABLE TABLET 81 MG: 81 TABLET CHEWABLE at 07:36

## 2025-04-20 RX ADMIN — INSULIN LISPRO 2 UNITS: 100 INJECTION, SOLUTION INTRAVENOUS; SUBCUTANEOUS at 21:10

## 2025-04-20 RX ADMIN — HYDRALAZINE HYDROCHLORIDE 100 MG: 25 TABLET ORAL at 20:03

## 2025-04-20 RX ADMIN — INSULIN LISPRO 2 UNITS: 100 INJECTION, SOLUTION INTRAVENOUS; SUBCUTANEOUS at 07:36

## 2025-04-20 RX ADMIN — HYDRALAZINE HYDROCHLORIDE 100 MG: 25 TABLET ORAL at 16:44

## 2025-04-20 NOTE — PROGRESS NOTES
[facility]     Progress Note    Patient Name: Brad Woodward  : 1940  MRN: [id number]  Primary Care Physician:  Brian Nazario APRN  Date of admission: 4/15/2025    Subjective   Subjective     Chief Complaint: ESRD    History of Present Illness  Patient with ESRD initiated on hd this admission    Review of Systems    Objective   Objective     Vitals:   Temp:  [97.6 °F (36.4 °C)-98.4 °F (36.9 °C)] 98.2 °F (36.8 °C)  Heart Rate:  [72-86] 77  Resp:  [13-22] 14  BP: (144-201)/(59-75) 176/70    Physical Exam   General Appearance:  In no acute distress.    HEENT: Normal HEENT exam.     Neurological: Patient is awake. alert    Result Review    Result Review:  I have personally reviewed the results from the time of this admission to 2025 13:37 EDT and agree with these findings:  []  Laboratory list / accordion  []  Microbiology  []  Radiology  []  EKG/Telemetry   []  Cardiology/Vascular   []  Pathology  []  Old records  []  Other:  Most notable findings include:       Assessment & Plan   Assessment / Plan     Brief Patient Summary:  Brad Woodward is a 84 y.o. 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 Awake, alert. Family at bedside.  Doing well.  Labs and chart reviewed.  Will plan hd again tomorrow. Will need outpatient hd arranged    Christy Low MD

## 2025-04-20 NOTE — PROGRESS NOTES
[facility] MEDICINE SERVICE  DAILY PROGRESS NOTE    NAME: Brad Woodward  : 1940  MRN: [id number]      LOS: 4 days     PROVIDER OF SERVICE: Harjinder Guzman MD    Chief Complaint: Anemia    Subjective:     Interval History:  History taken from: patient    Seen and examined, underwent dialysis yesterday.  Grandson at bedside        Review of Systems: Negative except described above  Review of Systems    Objective:     Vital Signs  Temp:  [97.6 °F (36.4 °C)-98.4 °F (36.9 °C)] 97.9 °F (36.6 °C)  Heart Rate:  [60-86] 77  Resp:  [12-22] 22  BP: (144-201)/(58-75) 201/73   Body mass index is 24.86 kg/m².    Physical Exam  Physical Exam  Constitutional:       Comments: NAD    Cardiovascular:      Comments:  RRR, S1 & S2   Pulmonary:      Comments:  Lungs CTA   Abdominal:      Comments:  ABD soft, NT            Diagnostic Data    Results from last 7 days   Lab Units 25  0114 04/15/25  2204 04/15/25  1330   WBC 10*3/mm3 6.85   < > 4.11   HEMOGLOBIN g/dL 8.2*   < > 7.2*   HEMATOCRIT % 25.6*   < > 21.3*   PLATELETS 10*3/mm3 229   < > 228   GLUCOSE mg/dL 120*   < > 119*   CREATININE mg/dL 1.89*   < > 2.98*   BUN mg/dL 17   < > 39*   SODIUM mmol/L 129*   < > 124*   POTASSIUM mmol/L 3.7   < > 3.3*   AST (SGOT) U/L  --   --  79*   ALT (SGPT) U/L  --   --  53*   ALK PHOS U/L  --   --  90   BILIRUBIN mg/dL  --   --  0.3   ANION GAP mmol/L 8.4   < > 11.5    < > = values in this interval not displayed.       No radiology results for the last day          I reviewed the patient's new clinical results.    Assessment/Plan:     Active and Resolved Problems  Active Hospital Problems    Diagnosis  POA    **Anemia [D64.9]  Yes      Resolved Hospital Problems   No resolved problems to display.       Anemia  Electrolyte imbalances  ERIN on CKD  Weakness  Per Dr. Farooq, give 1 unit PRBC and recheck labs  Appreciate nephrology assistance  RVP negative  CXR negative  PT/OT to eval  EKG with QT prolongation, avoid  medications that further prolong QT  Caution restarting home meds with ERIN as well as QT prolongation, close review once meds are reconciled  Nephrology following, ordered 24-hour urine for creatinine clearance which confirmed low GFR, patient underwent 2 sessions of dialysis so far, next session likely tomorrow  Patient received tunneled dialysis catheter placement on 4/18    Hypertension   Sick Sinus Syndrome  BP stable  S/P pacemaker- follows with Dr. Aldridge outpatient  Resume home medications once reconciled and appropriate     Hyperlipidemia  Continue statin     Diabetes Mellitus   Low SSI   Consistent carb diet  Hypoglycemia protocol   Hold home PO meds  ACHS accucheck    VTE Prophylaxis:  Pharmacologic & mechanical VTE prophylaxis orders are present.             Disposition Planning:        Anticipated Date of Discharge: tbd         Time: 35 minutes     Code Status and Medical Interventions: CPR (Attempt to Resuscitate); Full Support   Ordered at: 04/15/25 1734     Code Status (Patient has no pulse and is not breathing):    CPR (Attempt to Resuscitate)     Medical Interventions (Patient has pulse or is breathing):    Full Support     Level Of Support Discussed With:    Health Care Surrogate       Next of Kin (If No Surrogate)       Signature: Electronically signed by Harjinder Guzman MD, 04/20/25, 09:25 EDT.  [facility] Hospitalist Team

## 2025-04-21 ENCOUNTER — APPOINTMENT (OUTPATIENT)
Dept: NEPHROLOGY | Facility: HOSPITAL | Age: 85
End: 2025-04-21
Payer: MEDICAID

## 2025-04-21 LAB
ANION GAP SERPL CALCULATED.3IONS-SCNC: 9.9 MMOL/L (ref 5–15)
BASOPHILS # BLD AUTO: 0.03 10*3/MM3 (ref 0–0.2)
BASOPHILS NFR BLD AUTO: 0.4 % (ref 0–1.5)
BUN SERPL-MCNC: 22 MG/DL (ref 8–23)
BUN/CREAT SERPL: 9.5 (ref 7–25)
CALCIUM SPEC-SCNC: 7.9 MG/DL (ref 8.6–10.5)
CHLORIDE SERPL-SCNC: 91 MMOL/L (ref 98–107)
CO2 SERPL-SCNC: 28.1 MMOL/L (ref 22–29)
CREAT SERPL-MCNC: 2.31 MG/DL (ref 0.57–1)
DEPRECATED RDW RBC AUTO: 46.3 FL (ref 37–54)
EGFRCR SERPLBLD CKD-EPI 2021: 20.4 ML/MIN/1.73
EOSINOPHIL # BLD AUTO: 0.19 10*3/MM3 (ref 0–0.4)
EOSINOPHIL NFR BLD AUTO: 2.4 % (ref 0.3–6.2)
ERYTHROCYTE [DISTWIDTH] IN BLOOD BY AUTOMATED COUNT: 15 % (ref 12.3–15.4)
GLUCOSE BLDC GLUCOMTR-MCNC: 108 MG/DL (ref 70–105)
GLUCOSE BLDC GLUCOMTR-MCNC: 218 MG/DL (ref 70–105)
GLUCOSE SERPL-MCNC: 95 MG/DL (ref 65–99)
HCT VFR BLD AUTO: 27.1 % (ref 34–46.6)
HGB BLD-MCNC: 8.8 G/DL (ref 12–15.9)
IMM GRANULOCYTES # BLD AUTO: 0.03 10*3/MM3 (ref 0–0.05)
IMM GRANULOCYTES NFR BLD AUTO: 0.4 % (ref 0–0.5)
LYMPHOCYTES # BLD AUTO: 1.84 10*3/MM3 (ref 0.7–3.1)
LYMPHOCYTES NFR BLD AUTO: 22.9 % (ref 19.6–45.3)
MCH RBC QN AUTO: 27.7 PG (ref 26.6–33)
MCHC RBC AUTO-ENTMCNC: 32.5 G/DL (ref 31.5–35.7)
MCV RBC AUTO: 85.2 FL (ref 79–97)
MONOCYTES # BLD AUTO: 1.06 10*3/MM3 (ref 0.1–0.9)
MONOCYTES NFR BLD AUTO: 13.2 % (ref 5–12)
NEUTROPHILS NFR BLD AUTO: 4.89 10*3/MM3 (ref 1.7–7)
NEUTROPHILS NFR BLD AUTO: 60.7 % (ref 42.7–76)
NRBC BLD AUTO-RTO: 0 /100 WBC (ref 0–0.2)
PLATELET # BLD AUTO: 255 10*3/MM3 (ref 140–450)
PMV BLD AUTO: 9.4 FL (ref 6–12)
POTASSIUM SERPL-SCNC: 3 MMOL/L (ref 3.5–5.2)
POTASSIUM SERPL-SCNC: 3.8 MMOL/L (ref 3.5–5.2)
RBC # BLD AUTO: 3.18 10*6/MM3 (ref 3.77–5.28)
SODIUM SERPL-SCNC: 129 MMOL/L (ref 136–145)
WBC NRBC COR # BLD AUTO: 8.04 10*3/MM3 (ref 3.4–10.8)

## 2025-04-21 PROCEDURE — 84132 ASSAY OF SERUM POTASSIUM: CPT | Performed by: STUDENT IN AN ORGANIZED HEALTH CARE EDUCATION/TRAINING PROGRAM

## 2025-04-21 PROCEDURE — 82948 REAGENT STRIP/BLOOD GLUCOSE: CPT

## 2025-04-21 PROCEDURE — 25010000002 PROCHLORPERAZINE 10 MG/2ML SOLUTION

## 2025-04-21 PROCEDURE — 85025 COMPLETE CBC W/AUTO DIFF WBC: CPT

## 2025-04-21 PROCEDURE — 80048 BASIC METABOLIC PNL TOTAL CA: CPT

## 2025-04-21 PROCEDURE — 25010000002 HEPARIN (PORCINE) PER 1000 UNITS: Performed by: INTERNAL MEDICINE

## 2025-04-21 PROCEDURE — 63710000001 INSULIN LISPRO (HUMAN) PER 5 UNITS: Performed by: STUDENT IN AN ORGANIZED HEALTH CARE EDUCATION/TRAINING PROGRAM

## 2025-04-21 RX ORDER — POTASSIUM CHLORIDE 1500 MG/1
40 TABLET, EXTENDED RELEASE ORAL ONCE
Status: COMPLETED | OUTPATIENT
Start: 2025-04-21 | End: 2025-04-21

## 2025-04-21 RX ORDER — CLONIDINE HYDROCHLORIDE 0.1 MG/1
0.1 TABLET ORAL EVERY 12 HOURS PRN
Status: DISCONTINUED | OUTPATIENT
Start: 2025-04-21 | End: 2025-04-22

## 2025-04-21 RX ADMIN — ATORVASTATIN CALCIUM 80 MG: 40 TABLET, FILM COATED ORAL at 13:12

## 2025-04-21 RX ADMIN — HYDRALAZINE HYDROCHLORIDE 100 MG: 25 TABLET ORAL at 10:35

## 2025-04-21 RX ADMIN — CLONIDINE HYDROCHLORIDE 0.1 MG: 0.1 TABLET ORAL at 21:22

## 2025-04-21 RX ADMIN — ASPIRIN 81 MG CHEWABLE TABLET 81 MG: 81 TABLET CHEWABLE at 13:12

## 2025-04-21 RX ADMIN — HYDRALAZINE HYDROCHLORIDE 100 MG: 25 TABLET ORAL at 21:21

## 2025-04-21 RX ADMIN — AMLODIPINE BESYLATE 10 MG: 5 TABLET ORAL at 10:35

## 2025-04-21 RX ADMIN — SODIUM BICARBONATE 1300 MG: 650 TABLET ORAL at 16:39

## 2025-04-21 RX ADMIN — HYDRALAZINE HYDROCHLORIDE 100 MG: 25 TABLET ORAL at 16:39

## 2025-04-21 RX ADMIN — Medication 10 MG: at 03:03

## 2025-04-21 RX ADMIN — POTASSIUM CHLORIDE 40 MEQ: 1500 TABLET, EXTENDED RELEASE ORAL at 06:21

## 2025-04-21 RX ADMIN — INSULIN LISPRO 3 UNITS: 100 INJECTION, SOLUTION INTRAVENOUS; SUBCUTANEOUS at 17:52

## 2025-04-21 RX ADMIN — PROCHLORPERAZINE EDISYLATE 5 MG: 5 INJECTION INTRAMUSCULAR; INTRAVENOUS at 21:28

## 2025-04-21 RX ADMIN — SCOPOLAMINE 1 PATCH: 1.5 PATCH, EXTENDED RELEASE TRANSDERMAL at 00:20

## 2025-04-21 RX ADMIN — INSULIN LISPRO 3 UNITS: 100 INJECTION, SOLUTION INTRAVENOUS; SUBCUTANEOUS at 21:22

## 2025-04-21 RX ADMIN — HEPARIN SODIUM 5000 UNITS: 1000 INJECTION INTRAVENOUS; SUBCUTANEOUS at 10:37

## 2025-04-21 RX ADMIN — SODIUM BICARBONATE 1300 MG: 650 TABLET ORAL at 21:22

## 2025-04-21 NOTE — PROGRESS NOTES
[facility] MEDICINE SERVICE  DAILY PROGRESS NOTE    NAME: Brad Woodward  : 1940  MRN: [id number]      LOS: 5 days     PROVIDER OF SERVICE: Harjinder Rosenbaum MD    Chief Complaint: Anemia    Subjective:     Interval History:  History taken from: patient    Seen and examined, son at bed side. C/o LUE swelling and also noted some swelling in RUE but LUE>RUE. Denies any other complaints.         Review of Systems: Negative except described above  Review of Systems    Objective:     Vital Signs  Temp:  [98 °F (36.7 °C)-99.2 °F (37.3 °C)] 98.5 °F (36.9 °C)  Heart Rate:  [71-90] 90  Resp:  [11-19] 16  BP: (164-211)/(67-84) 164/67  Flow (L/min) (Oxygen Therapy):  [2] 2   Body mass index is 24.86 kg/m².    Physical Exam  General: Alert and oriented, no acute distress.  Lungs: Clear to auscultation, nonlabored respiration. R upper chest TDC cath in place   Heart: Regular rate and rhythm   Abdomen: Soft, nontender, nondistended, + bowel sounds.  Musculoskeletal: LUE swelling>RUE  Neuro: alert and awake, moving all 4 extremities           Diagnostic Data    Results from last 7 days   Lab Units 25  1337 25  0303 04/15/25  2204 04/15/25  1330   WBC 10*3/mm3  --  8.04   < > 4.11   HEMOGLOBIN g/dL  --  8.8*   < > 7.2*   HEMATOCRIT %  --  27.1*   < > 21.3*   PLATELETS 10*3/mm3  --  255   < > 228   GLUCOSE mg/dL  --  95   < > 119*   CREATININE mg/dL  --  2.31*   < > 2.98*   BUN mg/dL  --  22   < > 39*   SODIUM mmol/L  --  129*   < > 124*   POTASSIUM mmol/L 3.8 3.0*   < > 3.3*   AST (SGOT) U/L  --   --   --  79*   ALT (SGPT) U/L  --   --   --  53*   ALK PHOS U/L  --   --   --  90   BILIRUBIN mg/dL  --   --   --  0.3   ANION GAP mmol/L  --  9.9   < > 11.5    < > = values in this interval not displayed.       No radiology results for the last day          I reviewed the patient's new clinical results.    Assessment/Plan:     Active and Resolved Problems  Active Hospital Problems    Diagnosis  POA     **Anemia [D64.9]  Yes      Resolved Hospital Problems   No resolved problems to display.       Anemia  Electrolyte imbalances  ERIN on CKD  Weakness  Per Dr. Farooq, give 1 unit PRBC and recheck labs  Appreciate nephrology assistance  RVP negative  CXR negative  PT/OT to eval  EKG with QT prolongation, avoid medications that further prolong QT  Caution restarting home meds with ERIN as well as QT prolongation, close review once meds are reconciled  Nephrology following, HD as per renal, patient will need OP HD set up. Discussed with CM.  Patient received tunneled dialysis catheter placement on 4/18    Hypertension   Sick Sinus Syndrome  BP stable  S/P pacemaker- follows with Dr. Aldridge outpatient  On Tab amlodipine 10 mg and Tab hydralazine 100 mg TID   Add clonidine 0.1 mg q12h PRN for BP >160 systolic   Monitor BP and adjust medications accordingly   Resume home medications once as appropriate     Hyperlipidemia  Continue statin     Diabetes Mellitus   Low SSI   Consistent carb diet  Hypoglycemia protocol   Hold home PO meds  ACHS accucheck    VTE Prophylaxis:  Pharmacologic & mechanical VTE prophylaxis orders are present.             Disposition Planning:        Anticipated Date of Discharge: 04/24, HD set up, medical clearance          Time: 35 minutes     Code Status and Medical Interventions: CPR (Attempt to Resuscitate); Full Support   Ordered at: 04/15/25 1734     Code Status (Patient has no pulse and is not breathing):    CPR (Attempt to Resuscitate)     Medical Interventions (Patient has pulse or is breathing):    Full Support     Level Of Support Discussed With:    Health Care Surrogate       Next of Kin (If No Surrogate)       Signature: Electronically signed by Harjinder Rosenbaum MD, 04/21/25, 18:40 EDT.  [facility] Hospitalist Team

## 2025-04-21 NOTE — PLAN OF CARE
Problem: Fall Injury Risk  Goal: Absence of Fall and Fall-Related Injury  Intervention: Promote Injury-Free Environment  Recent Flowsheet Documentation  Taken 4/21/2025 1600 by Ivana Tesfaye RN  Safety Promotion/Fall Prevention: safety round/check completed  Taken 4/21/2025 1400 by Ivana Tesfaye RN  Safety Promotion/Fall Prevention: safety round/check completed  Taken 4/21/2025 1200 by Ivana Tesfaye RN  Safety Promotion/Fall Prevention: safety round/check completed  Taken 4/21/2025 1000 by Ivana Tesfaye RN  Safety Promotion/Fall Prevention: safety round/check completed  Taken 4/21/2025 0800 by Ivana Tesfaye RN  Safety Promotion/Fall Prevention: safety round/check completed     Problem: Adult Inpatient Plan of Care  Goal: Absence of Hospital-Acquired Illness or Injury  Intervention: Identify and Manage Fall Risk  Recent Flowsheet Documentation  Taken 4/21/2025 1600 by Ivana Tesfaye RN  Safety Promotion/Fall Prevention: safety round/check completed  Taken 4/21/2025 1400 by Ivana Tesfaye RN  Safety Promotion/Fall Prevention: safety round/check completed  Taken 4/21/2025 1200 by Ivana Tesfaye RN  Safety Promotion/Fall Prevention: safety round/check completed  Taken 4/21/2025 1000 by Ivana Tesfaye RN  Safety Promotion/Fall Prevention: safety round/check completed  Taken 4/21/2025 0800 by Ivana Tesfaye RN  Safety Promotion/Fall Prevention: safety round/check completed  Intervention: Prevent Skin Injury  Recent Flowsheet Documentation  Taken 4/21/2025 1600 by Ivana Tesfaye RN  Skin Protection: incontinence pads utilized  Taken 4/21/2025 0800 by Ivana Tesfaye RN  Skin Protection: incontinence pads utilized  Intervention: Prevent Infection  Recent Flowsheet Documentation  Taken 4/21/2025 1600 by Ivana Tesfaye RN  Infection Prevention:   environmental surveillance performed   single patient room provided  Taken 4/21/2025 1400 by Ivana Tesfaye  RN  Infection Prevention:   environmental surveillance performed   single patient room provided  Taken 4/21/2025 1200 by Ivana Tesfaye RN  Infection Prevention:   environmental surveillance performed   single patient room provided  Taken 4/21/2025 1000 by Ivana Tesfaye RN  Infection Prevention:   environmental surveillance performed   single patient room provided  Taken 4/21/2025 0800 by Ivana Tesfaye RN  Infection Prevention:   environmental surveillance performed   single patient room provided  Goal: Optimal Comfort and Wellbeing  Intervention: Provide Person-Centered Care  Recent Flowsheet Documentation  Taken 4/21/2025 0800 by Ivana Tesfaye RN  Trust Relationship/Rapport: care explained   Goal Outcome Evaluation:

## 2025-04-21 NOTE — SIGNIFICANT NOTE
04/21/25 0943   OTHER   Discipline occupational therapist;physical therapist   Rehab Time/Intention   Session Not Performed patient unavailable for treatment  (Pt is at dialysis this AM.)   Recommendation   PT - Next Appointment 04/22/25   Recommendation   OT - Next Appointment 04/22/25

## 2025-04-21 NOTE — CASE MANAGEMENT/SOCIAL WORK
Continued Stay Note  NATHANIEL Zach     Patient Name: Brad Woodward  MRN: [id number]  Today's Date: 4/21/2025    Admit Date: 4/15/2025    Plan: DC PLAN; Routine home with son. (Pending [facility] [place] Chair time. )       Discharge Plan       Row Name 04/21/25 1506       Plan    Plan DC PLAN; Routine home with son. (Pending [facility] [place] Chair time. )        Patient/Family in Agreement with Plan yes    Plan Comments Reached out to [facility] regarding new outpatient HD set up.  Requested information faxed to the [facility] on [address]. Pending Chair time. Pending Left upper arm ultrasound. Elevated Blood pressure. Continue to monitor.                      Expected Discharge Date and Time       Expected Discharge Date Expected Discharge Time    Apr 22, 2025           Laquita Talley RN   Case Management  [phone]

## 2025-04-21 NOTE — PROGRESS NOTES
[facility]     Progress Note    Patient Name: Brad Woodward  : 1940  MRN: [id number]  Primary Care Physician:  Brian Nazario APRN  Date of admission: 4/15/2025    Subjective   Subjective     Chief Complaint: ESRD    History of Present Illness  Patient with ESRD initiated on hd this admission      Pt received HD today and tolerated it well. Denies for any other active complaint     Review of Systems    Objective   Objective     Vitals:   Temp:  [98 °F (36.7 °C)-99.2 °F (37.3 °C)] 98 °F (36.7 °C)  Heart Rate:  [71-89] 80  Resp:  [11-19] 16  BP: (108-211)/(67-84) 188/72  Flow (L/min) (Oxygen Therapy):  [2] 2    Physical Exam   General Appearance:  In no acute distress.    HEENT: Normal HEENT exam.     Neurological: Patient is awake. alert    Result Review    Result Review:  I have personally reviewed the results from the time of this admission to 2025 12:27 EDT and agree with these findings:  []  Laboratory list / accordion  []  Microbiology  []  Radiology  []  EKG/Telemetry   []  Cardiology/Vascular   []  Pathology  []  Old records  []  Other:  Most notable findings include:       Assessment & Plan   Assessment / Plan     Brief Patient Summary:  Brad Woodward is a 84 y.o. 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   Awake, alert.   Family at bedside.    Received HD today and tolerated it well.    to arrange chair time for HD At [facility] or [facility].     Christy Low MD

## 2025-04-21 NOTE — PLAN OF CARE
Goal Outcome Evaluation:        No complaints from patient during the night.  She denies any pain or shortness of air.  Elevated blood pressure - PRN labetalol given.  Dialysis schedule for today.  Family stays with patient at all times.                                       Normal vision: sees adequately in most situations; can see medication labels, newsprint

## 2025-04-21 NOTE — CASE MANAGEMENT/SOCIAL WORK
Social Work Assessment  [facility]     Patient Name: Brad Woodward  MRN: [id number]  Today's Date: 4/21/2025    Admit Date: 4/15/2025     Discharge Plan       Row Name 04/21/25 1700       Plan    Plan Comments SW met with patient's 2 sons.  Patient has been tolerating HD although her right arm is swelling and she is weaker today.  SW provided support.  RN/JOSE MANUEL to set up OP HD.  SW will continue to monitor.                                           AMISH Skinner MSW    Phone: [phone]  Cell: [phone]  Fax: [phone]  [email or web address]

## 2025-04-22 ENCOUNTER — APPOINTMENT (OUTPATIENT)
Dept: CARDIOLOGY | Facility: HOSPITAL | Age: 85
End: 2025-04-22
Payer: MEDICAID

## 2025-04-22 ENCOUNTER — APPOINTMENT (OUTPATIENT)
Dept: NEPHROLOGY | Facility: HOSPITAL | Age: 85
End: 2025-04-22
Payer: MEDICAID

## 2025-04-22 LAB
ANION GAP SERPL CALCULATED.3IONS-SCNC: 8.8 MMOL/L (ref 5–15)
BASOPHILS # BLD AUTO: 0.02 10*3/MM3 (ref 0–0.2)
BASOPHILS NFR BLD AUTO: 0.4 % (ref 0–1.5)
BH CV UPPER VENOUS LEFT AXILLARY AUGMENT: NORMAL
BH CV UPPER VENOUS LEFT AXILLARY COMPRESS: NORMAL
BH CV UPPER VENOUS LEFT AXILLARY PHASIC: NORMAL
BH CV UPPER VENOUS LEFT AXILLARY SPONT: NORMAL
BH CV UPPER VENOUS LEFT BASILIC FOREARM COMPRESS: NORMAL
BH CV UPPER VENOUS LEFT BASILIC UPPER COMPRESS: NORMAL
BH CV UPPER VENOUS LEFT BRACHIAL COMPRESS: NORMAL
BH CV UPPER VENOUS LEFT CEPHALIC FOREARM COMPRESS: NORMAL
BH CV UPPER VENOUS LEFT CEPHALIC UPPER COMPRESS: NORMAL
BH CV UPPER VENOUS LEFT INTERNAL JUGULAR AUGMENT: NORMAL
BH CV UPPER VENOUS LEFT INTERNAL JUGULAR COMPRESS: NORMAL
BH CV UPPER VENOUS LEFT INTERNAL JUGULAR PHASIC: NORMAL
BH CV UPPER VENOUS LEFT INTERNAL JUGULAR SPONT: NORMAL
BH CV UPPER VENOUS LEFT RADIAL COMPRESS: NORMAL
BH CV UPPER VENOUS LEFT SUBCLAVIAN AUGMENT: NORMAL
BH CV UPPER VENOUS LEFT SUBCLAVIAN COMPRESS: NORMAL
BH CV UPPER VENOUS LEFT SUBCLAVIAN PHASIC: NORMAL
BH CV UPPER VENOUS LEFT SUBCLAVIAN SPONT: NORMAL
BH CV UPPER VENOUS LEFT ULNAR COMPRESS: NORMAL
BH CV UPPER VENOUS RIGHT AXILLARY AUGMENT: NORMAL
BH CV UPPER VENOUS RIGHT AXILLARY COMPRESS: NORMAL
BH CV UPPER VENOUS RIGHT AXILLARY PHASIC: NORMAL
BH CV UPPER VENOUS RIGHT AXILLARY SPONT: NORMAL
BH CV UPPER VENOUS RIGHT BASILIC FOREARM COMPRESS: NORMAL
BH CV UPPER VENOUS RIGHT BASILIC UPPER COMPRESS: NORMAL
BH CV UPPER VENOUS RIGHT BRACHIAL COMPRESS: NORMAL
BH CV UPPER VENOUS RIGHT CEPHALIC FOREARM COMPRESS: NORMAL
BH CV UPPER VENOUS RIGHT CEPHALIC UPPER COMPRESS: NORMAL
BH CV UPPER VENOUS RIGHT INTERNAL JUGULAR AUGMENT: NORMAL
BH CV UPPER VENOUS RIGHT INTERNAL JUGULAR COMPRESS: NORMAL
BH CV UPPER VENOUS RIGHT INTERNAL JUGULAR PHASIC: NORMAL
BH CV UPPER VENOUS RIGHT INTERNAL JUGULAR SPONT: NORMAL
BH CV UPPER VENOUS RIGHT RADIAL COMPRESS: NORMAL
BH CV UPPER VENOUS RIGHT SUBCLAVIAN AUGMENT: NORMAL
BH CV UPPER VENOUS RIGHT SUBCLAVIAN COMPRESS: NORMAL
BH CV UPPER VENOUS RIGHT SUBCLAVIAN PHASIC: NORMAL
BH CV UPPER VENOUS RIGHT SUBCLAVIAN SPONT: NORMAL
BH CV UPPER VENOUS RIGHT ULNAR COMPRESS: NORMAL
BUN SERPL-MCNC: 14 MG/DL (ref 8–23)
BUN/CREAT SERPL: 7 (ref 7–25)
CALCIUM SPEC-SCNC: 8.1 MG/DL (ref 8.6–10.5)
CHLORIDE SERPL-SCNC: 97 MMOL/L (ref 98–107)
CO2 SERPL-SCNC: 25.2 MMOL/L (ref 22–29)
CREAT SERPL-MCNC: 2 MG/DL (ref 0.57–1)
DEPRECATED RDW RBC AUTO: 48.1 FL (ref 37–54)
EGFRCR SERPLBLD CKD-EPI 2021: 24.2 ML/MIN/1.73
EOSINOPHIL # BLD AUTO: 0.09 10*3/MM3 (ref 0–0.4)
EOSINOPHIL NFR BLD AUTO: 1.7 % (ref 0.3–6.2)
ERYTHROCYTE [DISTWIDTH] IN BLOOD BY AUTOMATED COUNT: 15.3 % (ref 12.3–15.4)
FERRITIN SERPL-MCNC: 89.8 NG/ML (ref 13–150)
GLUCOSE BLDC GLUCOMTR-MCNC: 131 MG/DL (ref 70–105)
GLUCOSE BLDC GLUCOMTR-MCNC: 163 MG/DL (ref 70–105)
GLUCOSE BLDC GLUCOMTR-MCNC: 260 MG/DL (ref 70–105)
GLUCOSE SERPL-MCNC: 111 MG/DL (ref 65–99)
HCT VFR BLD AUTO: 23.9 % (ref 34–46.6)
HGB BLD-MCNC: 7.5 G/DL (ref 12–15.9)
IMM GRANULOCYTES # BLD AUTO: 0.01 10*3/MM3 (ref 0–0.05)
IMM GRANULOCYTES NFR BLD AUTO: 0.2 % (ref 0–0.5)
IRON 24H UR-MRATE: 27 MCG/DL (ref 37–145)
IRON SATN MFR SERPL: 11 % (ref 20–50)
LDH SERPL-CCNC: 301 U/L (ref 135–214)
LYMPHOCYTES # BLD AUTO: 1.05 10*3/MM3 (ref 0.7–3.1)
LYMPHOCYTES NFR BLD AUTO: 19.5 % (ref 19.6–45.3)
MCH RBC QN AUTO: 27.2 PG (ref 26.6–33)
MCHC RBC AUTO-ENTMCNC: 31.4 G/DL (ref 31.5–35.7)
MCV RBC AUTO: 86.6 FL (ref 79–97)
MONOCYTES # BLD AUTO: 0.78 10*3/MM3 (ref 0.1–0.9)
MONOCYTES NFR BLD AUTO: 14.5 % (ref 5–12)
NEUTROPHILS NFR BLD AUTO: 3.43 10*3/MM3 (ref 1.7–7)
NEUTROPHILS NFR BLD AUTO: 63.7 % (ref 42.7–76)
NRBC BLD AUTO-RTO: 0 /100 WBC (ref 0–0.2)
PLATELET # BLD AUTO: 212 10*3/MM3 (ref 140–450)
PMV BLD AUTO: 9.1 FL (ref 6–12)
POTASSIUM SERPL-SCNC: 4.1 MMOL/L (ref 3.5–5.2)
RBC # BLD AUTO: 2.76 10*6/MM3 (ref 3.77–5.28)
RETICS # AUTO: 0.1 10*6/MM3 (ref 0.02–0.13)
RETICS/RBC NFR AUTO: 3.8 % (ref 0.7–1.9)
SODIUM SERPL-SCNC: 131 MMOL/L (ref 136–145)
TIBC SERPL-MCNC: 240 MCG/DL (ref 298–536)
TRANSFERRIN SERPL-MCNC: 161 MG/DL (ref 200–360)
WBC NRBC COR # BLD AUTO: 5.38 10*3/MM3 (ref 3.4–10.8)

## 2025-04-22 PROCEDURE — 83540 ASSAY OF IRON: CPT | Performed by: STUDENT IN AN ORGANIZED HEALTH CARE EDUCATION/TRAINING PROGRAM

## 2025-04-22 PROCEDURE — 25010000002 EPOETIN ALFA PER 1000 UNITS: Performed by: INTERNAL MEDICINE

## 2025-04-22 PROCEDURE — 93970 EXTREMITY STUDY: CPT

## 2025-04-22 PROCEDURE — 97112 NEUROMUSCULAR REEDUCATION: CPT

## 2025-04-22 PROCEDURE — 82607 VITAMIN B-12: CPT | Performed by: STUDENT IN AN ORGANIZED HEALTH CARE EDUCATION/TRAINING PROGRAM

## 2025-04-22 PROCEDURE — 25010000002 HEPARIN (PORCINE) PER 1000 UNITS: Performed by: INTERNAL MEDICINE

## 2025-04-22 PROCEDURE — 80048 BASIC METABOLIC PNL TOTAL CA: CPT

## 2025-04-22 PROCEDURE — 82728 ASSAY OF FERRITIN: CPT | Performed by: STUDENT IN AN ORGANIZED HEALTH CARE EDUCATION/TRAINING PROGRAM

## 2025-04-22 PROCEDURE — 84466 ASSAY OF TRANSFERRIN: CPT | Performed by: STUDENT IN AN ORGANIZED HEALTH CARE EDUCATION/TRAINING PROGRAM

## 2025-04-22 PROCEDURE — 97530 THERAPEUTIC ACTIVITIES: CPT

## 2025-04-22 PROCEDURE — 97110 THERAPEUTIC EXERCISES: CPT

## 2025-04-22 PROCEDURE — 97535 SELF CARE MNGMENT TRAINING: CPT

## 2025-04-22 PROCEDURE — 85025 COMPLETE CBC W/AUTO DIFF WBC: CPT

## 2025-04-22 PROCEDURE — 82746 ASSAY OF FOLIC ACID SERUM: CPT | Performed by: STUDENT IN AN ORGANIZED HEALTH CARE EDUCATION/TRAINING PROGRAM

## 2025-04-22 PROCEDURE — 83615 LACTATE (LD) (LDH) ENZYME: CPT | Performed by: STUDENT IN AN ORGANIZED HEALTH CARE EDUCATION/TRAINING PROGRAM

## 2025-04-22 PROCEDURE — 82948 REAGENT STRIP/BLOOD GLUCOSE: CPT | Performed by: STUDENT IN AN ORGANIZED HEALTH CARE EDUCATION/TRAINING PROGRAM

## 2025-04-22 PROCEDURE — 83010 ASSAY OF HAPTOGLOBIN QUANT: CPT | Performed by: STUDENT IN AN ORGANIZED HEALTH CARE EDUCATION/TRAINING PROGRAM

## 2025-04-22 PROCEDURE — 63710000001 INSULIN LISPRO (HUMAN) PER 5 UNITS: Performed by: STUDENT IN AN ORGANIZED HEALTH CARE EDUCATION/TRAINING PROGRAM

## 2025-04-22 PROCEDURE — 93970 EXTREMITY STUDY: CPT | Performed by: STUDENT IN AN ORGANIZED HEALTH CARE EDUCATION/TRAINING PROGRAM

## 2025-04-22 PROCEDURE — 85045 AUTOMATED RETICULOCYTE COUNT: CPT | Performed by: STUDENT IN AN ORGANIZED HEALTH CARE EDUCATION/TRAINING PROGRAM

## 2025-04-22 PROCEDURE — 97116 GAIT TRAINING THERAPY: CPT

## 2025-04-22 PROCEDURE — 82948 REAGENT STRIP/BLOOD GLUCOSE: CPT

## 2025-04-22 RX ORDER — CLONIDINE HYDROCHLORIDE 0.1 MG/1
0.1 TABLET ORAL EVERY 12 HOURS SCHEDULED
Status: DISCONTINUED | OUTPATIENT
Start: 2025-04-22 | End: 2025-04-23

## 2025-04-22 RX ADMIN — ERYTHROPOIETIN 20000 UNITS: 10000 INJECTION, SOLUTION INTRAVENOUS; SUBCUTANEOUS at 16:03

## 2025-04-22 RX ADMIN — ACETAMINOPHEN 650 MG: 325 TABLET, FILM COATED ORAL at 05:05

## 2025-04-22 RX ADMIN — INSULIN LISPRO 4 UNITS: 100 INJECTION, SOLUTION INTRAVENOUS; SUBCUTANEOUS at 17:44

## 2025-04-22 RX ADMIN — Medication 10 MG: at 00:03

## 2025-04-22 RX ADMIN — Medication 10 MG: at 05:05

## 2025-04-22 RX ADMIN — HYDRALAZINE HYDROCHLORIDE 100 MG: 25 TABLET ORAL at 21:18

## 2025-04-22 RX ADMIN — ATORVASTATIN CALCIUM 80 MG: 40 TABLET, FILM COATED ORAL at 08:06

## 2025-04-22 RX ADMIN — SODIUM BICARBONATE 1300 MG: 650 TABLET ORAL at 08:06

## 2025-04-22 RX ADMIN — HEPARIN SODIUM 5000 UNITS: 1000 INJECTION INTRAVENOUS; SUBCUTANEOUS at 14:28

## 2025-04-22 RX ADMIN — ASPIRIN 81 MG CHEWABLE TABLET 81 MG: 81 TABLET CHEWABLE at 08:05

## 2025-04-22 RX ADMIN — AMLODIPINE BESYLATE 10 MG: 5 TABLET ORAL at 08:05

## 2025-04-22 RX ADMIN — CLONIDINE HYDROCHLORIDE 0.1 MG: 0.1 TABLET ORAL at 21:18

## 2025-04-22 RX ADMIN — INSULIN LISPRO 2 UNITS: 100 INJECTION, SOLUTION INTRAVENOUS; SUBCUTANEOUS at 08:16

## 2025-04-22 RX ADMIN — HYDRALAZINE HYDROCHLORIDE 100 MG: 25 TABLET ORAL at 08:05

## 2025-04-22 RX ADMIN — HYDRALAZINE HYDROCHLORIDE 100 MG: 25 TABLET ORAL at 16:03

## 2025-04-22 NOTE — PLAN OF CARE
Problem: Fall Injury Risk  Goal: Absence of Fall and Fall-Related Injury  Intervention: Promote Injury-Free Environment  Recent Flowsheet Documentation  Taken 4/22/2025 1600 by Ivana Tesfaye RN  Safety Promotion/Fall Prevention: safety round/check completed  Taken 4/22/2025 1400 by Ivana Tesfaye RN  Safety Promotion/Fall Prevention: safety round/check completed  Taken 4/22/2025 1200 by Ivana Tesfaye RN  Safety Promotion/Fall Prevention: patient off unit  Taken 4/22/2025 1000 by Ivana Tesfaye RN  Safety Promotion/Fall Prevention: safety round/check completed  Taken 4/22/2025 0800 by Ivana Tesfaye RN  Safety Promotion/Fall Prevention: safety round/check completed     Problem: Adult Inpatient Plan of Care  Goal: Absence of Hospital-Acquired Illness or Injury  Intervention: Identify and Manage Fall Risk  Recent Flowsheet Documentation  Taken 4/22/2025 1600 by Ivana Tesfaye RN  Safety Promotion/Fall Prevention: safety round/check completed  Taken 4/22/2025 1400 by Ivana Tesfaye RN  Safety Promotion/Fall Prevention: safety round/check completed  Taken 4/22/2025 1200 by Ivana Tesfaye RN  Safety Promotion/Fall Prevention: patient off unit  Taken 4/22/2025 1000 by Ivana Tesfaye RN  Safety Promotion/Fall Prevention: safety round/check completed  Taken 4/22/2025 0800 by Ivana Tesfaye RN  Safety Promotion/Fall Prevention: safety round/check completed  Intervention: Prevent Skin Injury  Recent Flowsheet Documentation  Taken 4/22/2025 1600 by Ivana Tesfaye RN  Skin Protection: incontinence pads utilized  Taken 4/22/2025 0800 by Ivana Tesfaye RN  Skin Protection: incontinence pads utilized  Intervention: Prevent Infection  Recent Flowsheet Documentation  Taken 4/22/2025 1600 by Ivana Tesfaye RN  Infection Prevention:   environmental surveillance performed   single patient room provided  Taken 4/22/2025 1400 by Ivana Tesfaye RN  Infection Prevention:    environmental surveillance performed   single patient room provided  Taken 4/22/2025 1000 by Ivana Tesfaye RN  Infection Prevention:   environmental surveillance performed   single patient room provided  Taken 4/22/2025 0800 by Ivana Tesfaye RN  Infection Prevention:   environmental surveillance performed   single patient room provided   Goal Outcome Evaluation:

## 2025-04-22 NOTE — THERAPY TREATMENT NOTE
"Subjective: Pt agreeable to therapeutic plan of care.    Objective:    [id number] was used    Precautions -  required, anemic    Bed mobility - Supervision and cues  Transfers - close supervision and cues for technique  Ambulation - 45 feet x 2 CGA with pt pushing the Ipad cart     Therapeutic Exercise -  standing junior chi movement  Vitals: Hypertensive  BP supine 166/61  BP sitting at edge of bed 134/90  BP post tx 127/84    Pain: 5 [place] Location: right sided neck pain  Intervention for pain: Repositioned, RN notified, and Therapeutic Presence    Education: Provided education on the importance of mobility in the acute care setting and Verbal/Tactile Cues    Assessment: Brad Woodward presents with functional mobility impairments which indicate the need for skilled intervention. Pt had normal bilateral upper extremity venous duplex scan. Tolerating session today without incident. Pt with Hgb of 7.5. Pt was pleasant and cooperative and agreeable to mobilize once she knew her BP had been lowered from meds. PT assisted pt to toilet. Pt pushing the Ipad cart while ambulating. Pt has supportive family at home. Will continue to follow and progress as tolerated.     Plan/Recommendations:   If medically appropriate, Low Intensity Therapy recommended post-acute care - This is recommended as therapy feels this patient would require 2-3 visits per week. OP or HH would be the best option depending on patient's home bound status. Consider, if the patient has other  \"skilled\" needs such as wounds, IV antibiotics, etc. Combined with \"low intensity\" could also equate to a SNF. If patient is medically complex, consider LTAC. Pt requires no DME at discharge.     Pt desires Home with family assist and Home Health at discharge. Pt cooperative; agreeable to therapeutic recommendations and plan of care.         Basic Mobility 6-click:  Rollin = Total, A lot = 2, A little = 3; 4 = None  Supine>Sit:   1 = Total, " A lot = 2, A little = 3; 4 = None   Sit>Stand with arms:  1 = Total, A lot = 2, A little = 3; 4 = None  Bed>Chair:   1 = Total, A lot = 2, A little = 3; 4 = None  Ambulate in room:  1 = Total, A lot = 2, A little = 3; 4 = None  3-5 Steps with railin = Total, A lot = 2, A little = 3; 4 = None  Score: 19    Modified [place]: N/A = No pre-op stroke/TIA    Post-Tx Position: Up in Chair, Staff Present, Alarms activated, and Call light and personal items within reach  PPE: gloves       PT Charges       Row Name 25 1257             Time Calculation    Start Time 911  -BR      Stop Time 934  -BR      Time Calculation (min) 23 min  -BR      PT Received On 25  -BR      PT - Next Appointment 25  -BR         Time Calculation- PT    Total Timed Code Minutes- PT 23 minute(s)  -BR                User Key  (r) = Recorded By, (t) = Taken By, (c) = Cosigned By      Initials Name Provider Type    Carrol Soriano, PT Physical Therapist

## 2025-04-22 NOTE — CASE MANAGEMENT/SOCIAL WORK
Continued Stay Note  NATHANIEL Serrano     Patient Name: Brad Woodward  MRN: [id number]  Today's Date: 4/22/2025    Admit Date: 4/15/2025    Plan: DC PLAN: Routine home with son. New Debra in [facility] at 1330.       Discharge Plan       Row Name 04/22/25 1553       Plan    Plan DC PLAN: Routine home with son. New Debra in [facility] at 1330.        Patient/Family in Agreement with Plan yes    Plan Comments Reached out to Debra at [phone], spoke with Mago. Who states confirmed Chair time of [facility] at 1330. MD and bedside nurse updated.                         Expected Discharge Date and Time       Expected Discharge Date Expected Discharge Time    Apr 22, 2025           Laquita Talley RN    Case Management  [phone]

## 2025-04-22 NOTE — PLAN OF CARE
Problem: Fall Injury Risk  Goal: Absence of Fall and Fall-Related Injury  Intervention: Identify and Manage Contributors  Description: Develop a fall prevention plan, considering patient-centered interventions and family/caregiver involvement; identify and address patient's facilitators and barriers.Provide reorientation, appropriate sensory stimulation and routines with changes in mental status to decrease risk of fall.Promote use of personal vision and auditory aids.Assess assistance level required for safe and effective self-care; provide support as needed, such as toileting and mobilization. For age 65 and older, implement timed toileting with assistance.Encourage physical activity, such as performance of mobility and self-care at highest level of patient ability, multicomponent exercise program and provision of appropriate assistive devices.If fall occurs, assess the severity of injury; implement fall injury protocol. Determine the cause and revise fall injury prevention plan.Regularly review and advocate for medication adjustment to decrease fall risk; consider administration times, polypharmacy and age.Balance adequate pain management with potential for oversedation.  Recent Flowsheet Documentation  Taken 4/22/2025 0400 by Ben Gutierrez LPN  Medication Review/Management: medications reviewed  Taken 4/22/2025 0200 by Ben Gutierrez LPN  Medication Review/Management: medications reviewed  Taken 4/22/2025 0005 by Ben Gutierrez LPN  Medication Review/Management: medications reviewed  Taken 4/21/2025 2200 by Ben Gutierrez LPN  Medication Review/Management: medications reviewed  Taken 4/21/2025 2011 by Ben Gutierrez LPN  Medication Review/Management: medications reviewed  Intervention: Promote Injury-Free Environment  Description: Provide a safe, barrier-free environment that encourages independent activity.Keep care area uncluttered and well-lighted.Determine need for increased  observation or monitoring.Avoid use of devices that minimize mobility, such as restraints or indwelling urinary catheter.  Recent Flowsheet Documentation  Taken 4/22/2025 0400 by Ben Gutierrez LPN  Safety Promotion/Fall Prevention:   safety round/check completed   room organization consistent   nonskid shoes/slippers when out of bed   muscle strengthening facilitated   mobility aid in reach   lighting adjusted   fall prevention program maintained   clutter free environment maintained   assistive device/personal items within reach   activity supervised  Taken 4/22/2025 0200 by Ben Gutierrez LPN  Safety Promotion/Fall Prevention:   safety round/check completed   nonskid shoes/slippers when out of bed  Taken 4/22/2025 0005 by Ben Gutierrez LPN  Safety Promotion/Fall Prevention:   safety round/check completed   room organization consistent   fall prevention program maintained  Taken 4/21/2025 2200 by Ben Gutierrez LPN  Safety Promotion/Fall Prevention:   safety round/check completed   room organization consistent   nonskid shoes/slippers when out of bed   muscle strengthening facilitated   mobility aid in reach   lighting adjusted   fall prevention program maintained   assistive device/personal items within reach   clutter free environment maintained  Taken 4/21/2025 2011 by Ben Gutierrez LPN  Safety Promotion/Fall Prevention:   safety round/check completed   room organization consistent   nonskid shoes/slippers when out of bed   muscle strengthening facilitated   mobility aid in reach   lighting adjusted   Goal Outcome Evaluation:pt require PRN medication for BP and pt is requiring HD MWF, plan is to return home and awaiting chair time. Doppler of bg arms order awaiting to be performed and result,  with elevated BP and c/o fatigue progress ongoing cont to ,monitor

## 2025-04-22 NOTE — PROGRESS NOTES
"RENAL/KCC:     LOS: 6 days    Patient Care Team:  Brian Nazario APRN as PCP - General  Zane Aldridge MD as Consulting Physician (Cardiology)  Marnie Brandt APRN as Nurse Practitioner (Cardiology)    Chief Complaint:  Weakness, N/V    Subjective     Interval History:   Chart reviewed  Discussed with family  Sitting up eating this AM  C/O some headache  Also with edema  BP remains above goal    Objective     Vital Sign Min/Max for last 24 hours  Temp  Min: 98 °F (36.7 °C)  Max: 98.8 °F (37.1 °C)   BP  Min: 164/67  Max: 193/81   Pulse  Min: 66  Max: 94   Resp  Min: 12  Max: 20   SpO2  Min: 93 %  Max: 98 %   Flow (L/min) (Oxygen Therapy)  Min: 2  Max: 2   No data recorded     Flowsheet Rows      Flowsheet Row First Filed Value   Admission Height 162.6 cm (64\") Documented at 04/15/2025 1243   Admission Weight 65.7 kg (144 lb 13.5 oz) Documented at 04/15/2025 1243            No intake/output data recorded.  I/O last 3 completed shifts:  In: -   Out: 2000     Physical Exam:  GEN: Awake, NAD  ENT: PERRL, EOMI, MMM  NECK: Supple, no JVD  CHEST: CTAB, no W/R/C, +edema  CV: RRR, no M/G/R  ABD: Soft, NT, +BS  SKIN: Warm and Dry  NEURO: CN's intact      WBC WBC   Date Value Ref Range Status   04/22/2025 5.38 3.40 - 10.80 10*3/mm3 Final   04/21/2025 8.04 3.40 - 10.80 10*3/mm3 Final   04/20/2025 6.85 3.40 - 10.80 10*3/mm3 Final      HGB Hemoglobin   Date Value Ref Range Status   04/22/2025 7.5 (L) 12.0 - 15.9 g/dL Final   04/21/2025 8.8 (L) 12.0 - 15.9 g/dL Final   04/20/2025 8.2 (L) 12.0 - 15.9 g/dL Final      HCT Hematocrit   Date Value Ref Range Status   04/22/2025 23.9 (L) 34.0 - 46.6 % Final   04/21/2025 27.1 (L) 34.0 - 46.6 % Final   04/20/2025 25.6 (L) 34.0 - 46.6 % Final      Platlets No results found for: \"LABPLAT\"   MCV MCV   Date Value Ref Range Status   04/22/2025 86.6 79.0 - 97.0 fL Final   04/21/2025 85.2 79.0 - 97.0 fL Final   04/20/2025 85.9 79.0 - 97.0 fL Final          Sodium Sodium   Date Value Ref Range " "Status   04/22/2025 131 (L) 136 - 145 mmol/L Final   04/21/2025 129 (L) 136 - 145 mmol/L Final   04/20/2025 129 (L) 136 - 145 mmol/L Final      Potassium Potassium   Date Value Ref Range Status   04/22/2025 4.1 3.5 - 5.2 mmol/L Final   04/21/2025 3.8 3.5 - 5.2 mmol/L Final   04/21/2025 3.0 (L) 3.5 - 5.2 mmol/L Final   04/20/2025 3.7 3.5 - 5.2 mmol/L Final      Chloride Chloride   Date Value Ref Range Status   04/22/2025 97 (L) 98 - 107 mmol/L Final   04/21/2025 91 (L) 98 - 107 mmol/L Final   04/20/2025 91 (L) 98 - 107 mmol/L Final      CO2 CO2   Date Value Ref Range Status   04/22/2025 25.2 22.0 - 29.0 mmol/L Final   04/21/2025 28.1 22.0 - 29.0 mmol/L Final   04/20/2025 29.6 (H) 22.0 - 29.0 mmol/L Final      BUN BUN   Date Value Ref Range Status   04/22/2025 14 8 - 23 mg/dL Final   04/21/2025 22 8 - 23 mg/dL Final   04/20/2025 17 8 - 23 mg/dL Final      Creatinine Creatinine   Date Value Ref Range Status   04/22/2025 2.00 (H) 0.57 - 1.00 mg/dL Final   04/21/2025 2.31 (H) 0.57 - 1.00 mg/dL Final   04/20/2025 1.89 (H) 0.57 - 1.00 mg/dL Final      Calcium Calcium   Date Value Ref Range Status   04/22/2025 8.1 (L) 8.6 - 10.5 mg/dL Final   04/21/2025 7.9 (L) 8.6 - 10.5 mg/dL Final   04/20/2025 7.9 (L) 8.6 - 10.5 mg/dL Final      PO4 No results found for: \"CAPO4\"   Albumin No results found for: \"ALBUMIN\"     Magnesium No results found for: \"MG\"     Uric Acid No results found for: \"URICACID\"        Results Review:     I reviewed the patient's new clinical results.    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  Reassess BP after HD.  Appetite looks to be improved.  Re-dose Epo today and will continue Mircera outpatient.  OK for D/C from a renal standpoint after dialysis.        Jim Farooq MD  [facility]  04/22/25  08:06 EDT

## 2025-04-22 NOTE — THERAPY TREATMENT NOTE
Subjective: Pt agreeable to therapeutic plan of care.  Cognition: oriented to Person, Place, Time, and Situation    Objective:      services utilized: #[id number]    Precautions - Falls, Cambodian speaking    Bed Mobility: SBA and verbal cues  Functional Transfers: SBA     Balance: supported, static, dynamic, and standing SBA  Functional Ambulation: SBA    Toileting: Supervision  ADL Position: unsupported sitting  ADL Comments: Patient ambulated to restroom, performing toileting task with supervision including hygiene completion.    Therapeutic Exercise - 10 Reps B UE AROM standing    Vitals:   BP supine 166/61  BP sitting at edge of bed 134/90  BP post tx 127/84    Pain: 5 [place] Location: Right sided neck and head pain - pt reports high BP related.  Interventions for pain: RN provided medication  Education: Provided education on the importance of mobility in the acute care setting, Verbal/Tactile Cues, ADL training, and Transfer Training    Assessment: Brad Woodward presents with ADL impairments affecting function including endurance / activity tolerance and strength. Pt appreciative of OT treatment this date. She performed bed mobility, transfers, and toileting task this date without the need for physical assistance.  used, pt describing pain in right side of head related to high BP. BP at safe level for activity. Treatment was finished this date with standing BUE exercises, patient also tolerating this very well. Demonstrated functioning below baseline abilities indicate the need for continued skilled intervention while inpatient. Tolerating session today without incident. Will continue to follow and progress as tolerated.     Plan/Recommendations:   Low Intensity Therapy recommended post-acute care - This is recommended as therapy feels this patient would require 2-3 visits per week. OP or HH would be the best option depending on patient's home bound status. Consider, if the patient has other   "\"skilled\" needs such as wounds, IV antibiotics, etc. Combined with \"low intensity\" could also equate to a SNF. If patient is medically complex, consider LTAC.. Pt requires no DME at discharge.     Pt desires Home with Home Health and Home with family assist at discharge. Pt cooperative; agreeable to therapeutic recommendations and plan of care.     Modified [place]: N/A = No pre-op stroke/TIA    Post-Tx Position: Up in Chair, Alarms activated, and Call light and personal items within reach  PPE: gloves    Therapy Charges for Today       Code Description Service Date Service Provider Modifiers Qty    46568815842  OT THER PROC EA 15 MIN 4/22/2025 Thai Solomon OT GO 1    [id number]  OT THERAPEUTIC ACT EA 15 MIN 4/22/2025 Thai Solomon OT GO 1    [id number]  OT SELF CARE/MGMT/TRAIN EA 15 MIN 4/22/2025 Thai Solomon OT GO 1           Time Calculation- OT       Row Name 04/22/25 1049             Time Calculation- OT    OT Start Time 0913  -      OT Stop Time 0937  -      OT Time Calculation (min) 24 min  -      Total Timed Code Minutes- OT 24 minute(s)  -      OT Received On 04/22/25  -      OT - Next Appointment 04/24/25  -         Timed Charges    20245 - OT Therapeutic Exercise Minutes 8  -LS      67242 - OT Therapeutic Activity Minutes 8  -LS      35280 - OT Self Care/Mgmt Minutes 8  -LS         Total Minutes    Timed Charges Total Minutes 24  -LS       Total Minutes 24  -LS                User Key  (r) = Recorded By, (t) = Taken By, (c) = Cosigned By      Initials Name Provider Type    Thai Ambriz OT Occupational Therapist                    "

## 2025-04-22 NOTE — PLAN OF CARE
"Assessment: Brad Woodward presents with ADL impairments affecting function including endurance / activity tolerance and strength. Pt appreciative of OT treatment this date. She performed bed mobility, transfers, and toileting task this date without the need for physical assistance.  used, pt describing pain in right side of head related to high BP. BP at safe level for activity. Treatment was finished this date with standing BUE exercises, patient also tolerating this very well. Demonstrated functioning below baseline abilities indicate the need for continued skilled intervention while inpatient. Tolerating session today without incident. Will continue to follow and progress as tolerated.      Plan/Recommendations:   Low Intensity Therapy recommended post-acute care - This is recommended as therapy feels this patient would require 2-3 visits per week. OP or HH would be the best option depending on patient's home bound status. Consider, if the patient has other  \"skilled\" needs such as wounds, IV antibiotics, etc. Combined with \"low intensity\" could also equate to a SNF. If patient is medically complex, consider LTAC.. Pt requires no DME at discharge.      Pt desires Home with Home Health and Home with family assist at discharge. Pt cooperative; agreeable to therapeutic recommendations and plan of care.     "

## 2025-04-22 NOTE — PLAN OF CARE
"Goal Outcome Evaluation:  Assessment: Brad Woodward presents with functional mobility impairments which indicate the need for skilled intervention. Pt had normal bilateral upper extremity venous duplex scan. Tolerating session today without incident. Pt with Hgb of 7.5. Pt was pleasant and cooperative and agreeable to mobilize once she knew her BP had been lowered from meds. PT assisted pt to toilet. Pt pushing the Ipad cart while ambulating. Pt has supportive family at home. Will continue to follow and progress as tolerated.     Plan/Recommendations:   If medically appropriate, Low Intensity Therapy recommended post-acute care - This is recommended as therapy feels this patient would require 2-3 visits per week. OP or HH would be the best option depending on patient's home bound status. Consider, if the patient has other  \"skilled\" needs such as wounds, IV antibiotics, etc. Combined with \"low intensity\" could also equate to a SNF. If patient is medically complex, consider LTAC. Pt requires no DME at discharge.     Pt desires Home with family assist and Home Health at discharge. Pt cooperative; agreeable to therapeutic recommendations and plan of care.                      Anticipated Discharge Disposition (PT): home with assist, home with home health                        "

## 2025-04-22 NOTE — PROGRESS NOTES
[facility] MEDICINE SERVICE  DAILY PROGRESS NOTE    NAME: Brad Woodward  : 1940  MRN: [id number]      LOS: 6 days     PROVIDER OF SERVICE: Harjinder Rosenbaum MD    Chief Complaint: Anemia    Subjective:     Interval History:  History taken from: patient    Seen and examined, son at bed side. Going for HD today.  Denies any new complaints.       Review of Systems: Negative except described above  Review of Systems    Objective:     Vital Signs  Temp:  [97.9 °F (36.6 °C)-98.8 °F (37.1 °C)] 97.9 °F (36.6 °C)  Heart Rate:  [64-94] 66  Resp:  [14-20] 16  BP: (145-190)/(47-75) 164/66  Flow (L/min) (Oxygen Therapy):  [2] 2   Body mass index is 24.86 kg/m².    Physical Exam  General: Alert and oriented, no acute distress.  Lungs: Clear to auscultation, nonlabored respiration. R upper chest TDC cath in place   Heart: Regular rate and rhythm   Abdomen: Soft, nontender, nondistended, + bowel sounds.  Musculoskeletal: LUE swelling>RUE  Neuro: alert and awake, moving all 4 extremities           Diagnostic Data    Results from last 7 days   Lab Units 25  0349   WBC 10*3/mm3 5.38   HEMOGLOBIN g/dL 7.5*   HEMATOCRIT % 23.9*   PLATELETS 10*3/mm3 212   GLUCOSE mg/dL 111*   CREATININE mg/dL 2.00*   BUN mg/dL 14   SODIUM mmol/L 131*   POTASSIUM mmol/L 4.1   ANION GAP mmol/L 8.8       No radiology results for the last day          I reviewed the patient's new clinical results.    Assessment/Plan:     Active and Resolved Problems  Active Hospital Problems    Diagnosis  POA    **Anemia [D64.9]  Yes      Resolved Hospital Problems   No resolved problems to display.       Anemia  Electrolyte imbalances  ERIN on CKD  Weakness  Per Dr. Farooq, give 1 unit PRBC and recheck labs  Appreciate nephrology assistance  RVP negative  CXR negative  PT/OT to eval  EKG with QT prolongation, avoid medications that further prolong QT  Caution restarting home meds with ERIN as well as QT prolongation, close review once meds are  reconciled  Nephrology following, HD as per renal, patient will need OP HD set up. Discussed with CM.  Patient received tunneled dialysis catheter placement on 4/18    Hypertension   Sick Sinus Syndrome  BP stable  S/P pacemaker- follows with Dr. Aldridge outpatient  On Tab amlodipine 10 mg and Tab hydralazine 100 mg TID   Add clonidine 0.1 mg q12h   Monitor BP and adjust medications accordingly   Resume home medications as appropriate     Hyperlipidemia  Continue statin     Diabetes Mellitus   Low SSI   Consistent carb diet  Hypoglycemia protocol   Hold home PO meds  ACHS accucheck    LUE swelling  No DVT on US duplex       Anemia   Denies any bleeding  Send and follow anemia work up    VTE Prophylaxis:  Pharmacologic & mechanical VTE prophylaxis orders are present.             Disposition Planning:        Anticipated Date of Discharge: 04/24, HD set up, medical clearance          Time: 35 minutes     Code Status and Medical Interventions: CPR (Attempt to Resuscitate); Full Support   Ordered at: 04/15/25 1734     Code Status (Patient has no pulse and is not breathing):    CPR (Attempt to Resuscitate)     Medical Interventions (Patient has pulse or is breathing):    Full Support     Level Of Support Discussed With:    Health Care Surrogate       Next of Kin (If No Surrogate)       Signature: Electronically signed by Harjinder Rosenbaum MD, 04/22/25, 15:06 EDT.  [facility] Hospitalist Team

## 2025-04-23 ENCOUNTER — TELEPHONE (OUTPATIENT)
Dept: CARDIOLOGY | Facility: CLINIC | Age: 85
End: 2025-04-23
Payer: MEDICARE

## 2025-04-23 LAB
ANION GAP SERPL CALCULATED.3IONS-SCNC: 8.5 MMOL/L (ref 5–15)
BASOPHILS # BLD AUTO: 0.02 10*3/MM3 (ref 0–0.2)
BASOPHILS NFR BLD AUTO: 0.5 % (ref 0–1.5)
BUN SERPL-MCNC: 13 MG/DL (ref 8–23)
BUN/CREAT SERPL: 7 (ref 7–25)
CALCIUM SPEC-SCNC: 7.9 MG/DL (ref 8.6–10.5)
CHLORIDE SERPL-SCNC: 95 MMOL/L (ref 98–107)
CO2 SERPL-SCNC: 28.5 MMOL/L (ref 22–29)
CREAT SERPL-MCNC: 1.86 MG/DL (ref 0.57–1)
DEPRECATED RDW RBC AUTO: 47.7 FL (ref 37–54)
EGFRCR SERPLBLD CKD-EPI 2021: 26.4 ML/MIN/1.73
EOSINOPHIL # BLD AUTO: 0.19 10*3/MM3 (ref 0–0.4)
EOSINOPHIL NFR BLD AUTO: 4.4 % (ref 0.3–6.2)
ERYTHROCYTE [DISTWIDTH] IN BLOOD BY AUTOMATED COUNT: 15.1 % (ref 12.3–15.4)
FOLATE SERPL-MCNC: 7.58 NG/ML (ref 4.78–24.2)
GLUCOSE BLDC GLUCOMTR-MCNC: 105 MG/DL (ref 70–105)
GLUCOSE BLDC GLUCOMTR-MCNC: 172 MG/DL (ref 70–105)
GLUCOSE BLDC GLUCOMTR-MCNC: 222 MG/DL (ref 70–105)
GLUCOSE BLDC GLUCOMTR-MCNC: 71 MG/DL (ref 70–105)
GLUCOSE SERPL-MCNC: 129 MG/DL (ref 65–99)
HAPTOGLOB SERPL-MCNC: 138 MG/DL (ref 30–200)
HCT VFR BLD AUTO: 23.8 % (ref 34–46.6)
HGB BLD-MCNC: 7.5 G/DL (ref 12–15.9)
IMM GRANULOCYTES # BLD AUTO: 0.01 10*3/MM3 (ref 0–0.05)
IMM GRANULOCYTES NFR BLD AUTO: 0.2 % (ref 0–0.5)
LYMPHOCYTES # BLD AUTO: 0.9 10*3/MM3 (ref 0.7–3.1)
LYMPHOCYTES NFR BLD AUTO: 20.9 % (ref 19.6–45.3)
MCH RBC QN AUTO: 27.1 PG (ref 26.6–33)
MCHC RBC AUTO-ENTMCNC: 31.5 G/DL (ref 31.5–35.7)
MCV RBC AUTO: 85.9 FL (ref 79–97)
MONOCYTES # BLD AUTO: 0.52 10*3/MM3 (ref 0.1–0.9)
MONOCYTES NFR BLD AUTO: 12.1 % (ref 5–12)
NEUTROPHILS NFR BLD AUTO: 2.66 10*3/MM3 (ref 1.7–7)
NEUTROPHILS NFR BLD AUTO: 61.9 % (ref 42.7–76)
NRBC BLD AUTO-RTO: 0 /100 WBC (ref 0–0.2)
PLATELET # BLD AUTO: 198 10*3/MM3 (ref 140–450)
PMV BLD AUTO: 8.9 FL (ref 6–12)
POTASSIUM SERPL-SCNC: 3.6 MMOL/L (ref 3.5–5.2)
RBC # BLD AUTO: 2.77 10*6/MM3 (ref 3.77–5.28)
SODIUM SERPL-SCNC: 132 MMOL/L (ref 136–145)
VIT B12 BLD-MCNC: >2000 PG/ML (ref 211–946)
WBC NRBC COR # BLD AUTO: 4.3 10*3/MM3 (ref 3.4–10.8)

## 2025-04-23 PROCEDURE — 80048 BASIC METABOLIC PNL TOTAL CA: CPT

## 2025-04-23 PROCEDURE — 82948 REAGENT STRIP/BLOOD GLUCOSE: CPT

## 2025-04-23 PROCEDURE — 63710000001 INSULIN LISPRO (HUMAN) PER 5 UNITS: Performed by: STUDENT IN AN ORGANIZED HEALTH CARE EDUCATION/TRAINING PROGRAM

## 2025-04-23 PROCEDURE — 25010000002 HEPARIN (PORCINE) PER 1000 UNITS: Performed by: INTERNAL MEDICINE

## 2025-04-23 PROCEDURE — 85025 COMPLETE CBC W/AUTO DIFF WBC: CPT

## 2025-04-23 PROCEDURE — 82948 REAGENT STRIP/BLOOD GLUCOSE: CPT | Performed by: STUDENT IN AN ORGANIZED HEALTH CARE EDUCATION/TRAINING PROGRAM

## 2025-04-23 RX ORDER — CARVEDILOL 3.12 MG/1
3.12 TABLET ORAL 2 TIMES DAILY WITH MEALS
Status: DISCONTINUED | OUTPATIENT
Start: 2025-04-23 | End: 2025-04-24 | Stop reason: HOSPADM

## 2025-04-23 RX ORDER — ALBUMIN (HUMAN) 12.5 G/50ML
12.5 SOLUTION INTRAVENOUS AS NEEDED
Status: DISCONTINUED | OUTPATIENT
Start: 2025-04-23 | End: 2025-04-24 | Stop reason: HOSPADM

## 2025-04-23 RX ORDER — CLONIDINE HYDROCHLORIDE 0.1 MG/1
0.2 TABLET ORAL EVERY 12 HOURS SCHEDULED
Status: DISCONTINUED | OUTPATIENT
Start: 2025-04-23 | End: 2025-04-24 | Stop reason: HOSPADM

## 2025-04-23 RX ORDER — FERROUS SULFATE 325(65) MG
325 TABLET ORAL
Status: DISCONTINUED | OUTPATIENT
Start: 2025-04-24 | End: 2025-04-24 | Stop reason: HOSPADM

## 2025-04-23 RX ADMIN — CLONIDINE HYDROCHLORIDE 0.2 MG: 0.1 TABLET ORAL at 20:43

## 2025-04-23 RX ADMIN — CARVEDILOL 3.12 MG: 3.12 TABLET, FILM COATED ORAL at 10:16

## 2025-04-23 RX ADMIN — ASPIRIN 81 MG CHEWABLE TABLET 81 MG: 81 TABLET CHEWABLE at 09:05

## 2025-04-23 RX ADMIN — INSULIN LISPRO 2 UNITS: 100 INJECTION, SOLUTION INTRAVENOUS; SUBCUTANEOUS at 20:43

## 2025-04-23 RX ADMIN — INSULIN LISPRO 3 UNITS: 100 INJECTION, SOLUTION INTRAVENOUS; SUBCUTANEOUS at 12:07

## 2025-04-23 RX ADMIN — HYDRALAZINE HYDROCHLORIDE 100 MG: 25 TABLET ORAL at 09:05

## 2025-04-23 RX ADMIN — HYDRALAZINE HYDROCHLORIDE 100 MG: 25 TABLET ORAL at 18:13

## 2025-04-23 RX ADMIN — SCOPOLAMINE 1 PATCH: 1.5 PATCH, EXTENDED RELEASE TRANSDERMAL at 23:09

## 2025-04-23 RX ADMIN — AMLODIPINE BESYLATE 10 MG: 5 TABLET ORAL at 09:05

## 2025-04-23 RX ADMIN — HEPARIN SODIUM 5000 UNITS: 1000 INJECTION INTRAVENOUS; SUBCUTANEOUS at 17:33

## 2025-04-23 RX ADMIN — ATORVASTATIN CALCIUM 80 MG: 40 TABLET, FILM COATED ORAL at 09:05

## 2025-04-23 RX ADMIN — HYDRALAZINE HYDROCHLORIDE 100 MG: 25 TABLET ORAL at 20:43

## 2025-04-23 RX ADMIN — CLONIDINE HYDROCHLORIDE 0.2 MG: 0.1 TABLET ORAL at 09:05

## 2025-04-23 RX ADMIN — CARVEDILOL 3.12 MG: 3.12 TABLET, FILM COATED ORAL at 18:13

## 2025-04-23 NOTE — NURSING NOTE
Treatment completed, tolerated well.   Dialysis catheter is intact, heparin locked. Dressing is clean, dry, intact.  UF: 1.5L.  Report given to primary RN.  Transported back to floor, not in distress.

## 2025-04-23 NOTE — NURSING NOTE
Orders verified. Machine & water checks passed.   Aox4, not in distress.  Access is intact, patent.   Treatment started without issue.

## 2025-04-23 NOTE — TELEPHONE ENCOUNTER
Hub staff attempted to follow warm transfer process and was unsuccessful     Caller: DEVANTE CASAS    Relationship to patient: Louann    Best call back number: [phone]     Patient is needing: PT IS AT HOSPITAL AND THEY NEED TO R/S APPT'S ON 4/24. HUB DIDNT REACH OFFICE. PLS CALL TO R/S. DEVANTE SAID THEY HAVE AVAILABILITY WHENEVER SO ANYTHING WOULD WORK, PLS LVM IF CAN'T REACH ANYONE.

## 2025-04-23 NOTE — PROGRESS NOTES
"RENAL/KCC:     LOS: 7 days    Patient Care Team:  Brian Nazario APRN as PCP - General  Zane Aldridge MD as Consulting Physician (Cardiology)  Marnie Brandt APRN as Nurse Practitioner (Cardiology)    Chief Complaint:  Weakness, N/V    4/23  Patient noted comfortable, he has no new complaint.  Noted running BP on higher side.    Subjective     Interval History:   Chart reviewed  Discussed with family  Sitting up eating this AM  C/O some headache  Also with edema  BP remains above goal    Objective     Vital Sign Min/Max for last 24 hours  Temp  Min: 97.6 °F (36.4 °C)  Max: 98.4 °F (36.9 °C)   BP  Min: 143/63  Max: 211/85   Pulse  Min: 64  Max: 89   Resp  Min: 14  Max: 20   SpO2  Min: 94 %  Max: 99 %   No data recorded   No data recorded     Flowsheet Rows      Flowsheet Row First Filed Value   Admission Height 162.6 cm (64\") Documented at 04/15/2025 1243   Admission Weight 65.7 kg (144 lb 13.5 oz) Documented at 04/15/2025 1243            No intake/output data recorded.  I/O last 3 completed shifts:  In: 480 [P.O.:480]  Out: 3000     Physical Exam:  GEN: Awake, NAD  ENT: PERRL, EOMI, MMM  NECK: Supple, no JVD  CHEST: CTAB, no W/R/C, +edema  CV: RRR, no M/G/R  ABD: Soft, NT, +BS  SKIN: Warm and Dry  NEURO: CN's intact      WBC WBC   Date Value Ref Range Status   04/23/2025 4.30 3.40 - 10.80 10*3/mm3 Final   04/22/2025 5.38 3.40 - 10.80 10*3/mm3 Final   04/21/2025 8.04 3.40 - 10.80 10*3/mm3 Final      HGB Hemoglobin   Date Value Ref Range Status   04/23/2025 7.5 (L) 12.0 - 15.9 g/dL Final   04/22/2025 7.5 (L) 12.0 - 15.9 g/dL Final   04/21/2025 8.8 (L) 12.0 - 15.9 g/dL Final      HCT Hematocrit   Date Value Ref Range Status   04/23/2025 23.8 (L) 34.0 - 46.6 % Final   04/22/2025 23.9 (L) 34.0 - 46.6 % Final   04/21/2025 27.1 (L) 34.0 - 46.6 % Final      Platlets No results found for: \"LABPLAT\"   MCV MCV   Date Value Ref Range Status   04/23/2025 85.9 79.0 - 97.0 fL Final   04/22/2025 86.6 79.0 - 97.0 fL Final " "  04/21/2025 85.2 79.0 - 97.0 fL Final          Sodium Sodium   Date Value Ref Range Status   04/23/2025 132 (L) 136 - 145 mmol/L Final   04/22/2025 131 (L) 136 - 145 mmol/L Final   04/21/2025 129 (L) 136 - 145 mmol/L Final      Potassium Potassium   Date Value Ref Range Status   04/23/2025 3.6 3.5 - 5.2 mmol/L Final   04/22/2025 4.1 3.5 - 5.2 mmol/L Final   04/21/2025 3.8 3.5 - 5.2 mmol/L Final   04/21/2025 3.0 (L) 3.5 - 5.2 mmol/L Final      Chloride Chloride   Date Value Ref Range Status   04/23/2025 95 (L) 98 - 107 mmol/L Final   04/22/2025 97 (L) 98 - 107 mmol/L Final   04/21/2025 91 (L) 98 - 107 mmol/L Final      CO2 CO2   Date Value Ref Range Status   04/23/2025 28.5 22.0 - 29.0 mmol/L Final   04/22/2025 25.2 22.0 - 29.0 mmol/L Final   04/21/2025 28.1 22.0 - 29.0 mmol/L Final      BUN BUN   Date Value Ref Range Status   04/23/2025 13 8 - 23 mg/dL Final   04/22/2025 14 8 - 23 mg/dL Final   04/21/2025 22 8 - 23 mg/dL Final      Creatinine Creatinine   Date Value Ref Range Status   04/23/2025 1.86 (H) 0.57 - 1.00 mg/dL Final   04/22/2025 2.00 (H) 0.57 - 1.00 mg/dL Final   04/21/2025 2.31 (H) 0.57 - 1.00 mg/dL Final      Calcium Calcium   Date Value Ref Range Status   04/23/2025 7.9 (L) 8.6 - 10.5 mg/dL Final   04/22/2025 8.1 (L) 8.6 - 10.5 mg/dL Final   04/21/2025 7.9 (L) 8.6 - 10.5 mg/dL Final      PO4 No results found for: \"CAPO4\"   Albumin No results found for: \"ALBUMIN\"     Magnesium No results found for: \"MG\"     Uric Acid No results found for: \"URICACID\"        Results Review:     I reviewed the patient's new clinical results.    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  BP noted running on higher side, antihypertensive regimen adjusted by hospitalist service, will monitor for now.  Okay to discharge today after dialysis.  Patient has  chair time at [facility].      Chayito Laureano MD  [facility]  04/23/25  10:04 EDT

## 2025-04-23 NOTE — PLAN OF CARE
Goal Outcome Evaluation:  Plan of Care Reviewed With: patient     Problem: Adult Inpatient Plan of Care  Goal: Plan of Care Review  Outcome: Progressing  Flowsheets (Taken 4/23/2025 9663)  Progress: improving  Plan of Care Reviewed With: patient        Progress: improving

## 2025-04-23 NOTE — SIGNIFICANT NOTE
04/23/25 1433   OTHER   Discipline physical therapist   Rehab Time/Intention   Session Not Performed patient unavailable for treatment  (Pt off the unit for dialysis.)   Therapy Assessment/Plan (PT)   Criteria for Skilled Interventions Met (PT) yes   Recommendation   PT - Next Appointment 04/24/25

## 2025-04-23 NOTE — PROGRESS NOTES
[facility] MEDICINE SERVICE  DAILY PROGRESS NOTE    NAME: Brad Woodward  : 1940  MRN: [id number]      LOS: 7 days     PROVIDER OF SERVICE: Harjinder Rosenbaum MD    Chief Complaint: Anemia    Subjective:     Interval History:  History taken from: patient    Seen and examined, son at bed side. Going for HD today.  BP noted to be elevated to >200 systolic this am      Review of Systems: Negative except described above  Review of Systems    Objective:     Vital Signs  Temp:  [97.6 °F (36.4 °C)-98.4 °F (36.9 °C)] 98.3 °F (36.8 °C)  Heart Rate:  [66-89] 77  Resp:  [14-20] 20  BP: (143-211)/(63-85) 176/74   Body mass index is 24.86 kg/m².    Physical Exam  General: Alert and oriented, no acute distress.  Lungs: Clear to auscultation, nonlabored respiration. R upper chest TDC cath in place   Heart: Regular rate and rhythm   Abdomen: Soft, nontender, nondistended, + bowel sounds.  Musculoskeletal: LUE swelling>RUE- improving   Neuro: alert and awake, moving all 4 extremities           Diagnostic Data    Results from last 7 days   Lab Units 25  0503   WBC 10*3/mm3 4.30   HEMOGLOBIN g/dL 7.5*   HEMATOCRIT % 23.8*   PLATELETS 10*3/mm3 198   GLUCOSE mg/dL 129*   CREATININE mg/dL 1.86*   BUN mg/dL 13   SODIUM mmol/L 132*   POTASSIUM mmol/L 3.6   ANION GAP mmol/L 8.5       No radiology results for the last day          I reviewed the patient's new clinical results.    Assessment/Plan:     Active and Resolved Problems  Active Hospital Problems    Diagnosis  POA    **Anemia [D64.9]  Yes      Resolved Hospital Problems   No resolved problems to display.       Anemia  Electrolyte imbalances  ERIN on CKD  Weakness  Per Dr. Farooq, give 1 unit PRBC and recheck labs  Appreciate nephrology assistance  RVP negative  CXR negative  PT/OT to eval  EKG with QT prolongation, avoid medications that further prolong QT  Caution restarting home meds with ERIN as well as QT prolongation, close review once meds are  reconciled  Nephrology following, HD as per renal, patient will need OP HD set up. Discussed with CM.  Patient received tunneled dialysis catheter placement on 4/18    Hypertension   Sick Sinus Syndrome  BP stable  S/P pacemaker- follows with Dr. Aldridge outpatient  On Tab amlodipine 10 mg and Tab hydralazine 100 mg TID   increase clonidine to 0.2 mg q12h   Add carvedilol 3.125 mg BID  Monitor BP and adjust medications accordingly   Resume home medications as appropriate     Hyperlipidemia  Continue statin     Diabetes Mellitus   Low SSI   Consistent carb diet  Hypoglycemia protocol   Hold home PO meds  ACHS accucheck    LUE swelling  No DVT on US duplex       Anemia   Denies any bleeding  Anemia work up consistent with ROMAN  Start oral ferrous sulphate 325 mg  GI as outpatient for possible EGD/colonoscopy     VTE Prophylaxis:  Pharmacologic & mechanical VTE prophylaxis orders are present.             Disposition Planning:        Anticipated Date of Discharge: 04/24, stable BP, medical clearance          Time: 35 minutes     Code Status and Medical Interventions: CPR (Attempt to Resuscitate); Full Support   Ordered at: 04/15/25 1734     Code Status (Patient has no pulse and is not breathing):    CPR (Attempt to Resuscitate)     Medical Interventions (Patient has pulse or is breathing):    Full Support     Level Of Support Discussed With:    Health Care Surrogate       Next of Kin (If No Surrogate)       Signature: Electronically signed by Harjinder Rosenbaum MD, 04/23/25, 13:51 EDT.  [facility] Hospitalist Team

## 2025-04-23 NOTE — PLAN OF CARE
Problem: Fall Injury Risk  Goal: Absence of Fall and Fall-Related Injury  Intervention: Promote Injury-Free Environment  Recent Flowsheet Documentation  Taken 4/23/2025 1630 by Viviana Kilgore RN  Safety Promotion/Fall Prevention: (dialysis) patient off unit  Taken 4/23/2025 1449 by Viviana Kilgore RN  Safety Promotion/Fall Prevention: patient off unit  Taken 4/23/2025 1200 by Viviana Kilgore RN  Safety Promotion/Fall Prevention: patient off unit  Taken 4/23/2025 1000 by Viviana Kilgore RN  Safety Promotion/Fall Prevention: safety round/check completed  Taken 4/23/2025 0800 by Viviana Kilgore RN  Safety Promotion/Fall Prevention:   safety round/check completed   fall prevention program maintained   clutter free environment maintained   assistive device/personal items within reach   Goal Outcome Evaluation:

## 2025-04-23 NOTE — CASE MANAGEMENT/SOCIAL WORK
Social Work Assessment  [facility]     Patient Name: Brad Woodward  MRN: [id number]  Today's Date: 4/23/2025    Admit Date: 4/15/2025     Discharge Plan       Row Name 04/23/25 6458       Plan    Plan Comments SW spoke with Hospitalist who reported patient's BP was high this morning.  Plan for HD today and d/c tomorrow morning if BP stablizes.   SARAH spoke with son/Dany to relay this information.  Per son, patient is feeling better each day.  Will continue to follow.              AMISH Skinner MSW    Phone: [phone]  Cell: [phone]  Fax: [phone]  [email or web address]

## 2025-04-23 NOTE — CASE MANAGEMENT/SOCIAL WORK
Continued Stay Note  NATHANIEL Serrano     Patient Name: Brad Woodward  MRN: [id number]  Today's Date: 4/23/2025    Admit Date: 4/15/2025    Plan: DC PLAN: Routine home with son. [facility] in [facility] @ 1330       Discharge Plan       Row Name 04/23/25 1314       Plan    Plan DC PLAN: Routine home with son. New Debra in [facility] @ 1330    Patient/Family in Agreement with Plan yes    Plan Comments Elevated BP , adjustment to medications, Renal following. Anticipate discharge for tomorrow.                      Expected Discharge Date and Time       Expected Discharge Date Expected Discharge Time    Apr 24, 2025           Laquita Talley RN   Case Management  [phone]

## 2025-04-24 ENCOUNTER — TELEPHONE (OUTPATIENT)
Dept: CARDIOLOGY | Facility: CLINIC | Age: 85
End: 2025-04-24
Payer: MEDICARE

## 2025-04-24 ENCOUNTER — READMISSION MANAGEMENT (OUTPATIENT)
Dept: CALL CENTER | Facility: HOSPITAL | Age: 85
End: 2025-04-24
Payer: MEDICARE

## 2025-04-24 VITALS
TEMPERATURE: 98 F | SYSTOLIC BLOOD PRESSURE: 172 MMHG | RESPIRATION RATE: 20 BRPM | HEART RATE: 67 BPM | OXYGEN SATURATION: 94 % | DIASTOLIC BLOOD PRESSURE: 64 MMHG | BODY MASS INDEX: 24.73 KG/M2 | WEIGHT: 144.84 LBS | HEIGHT: 64 IN

## 2025-04-24 LAB — GLUCOSE BLDC GLUCOMTR-MCNC: 113 MG/DL (ref 70–105)

## 2025-04-24 PROCEDURE — 94761 N-INVAS EAR/PLS OXIMETRY MLT: CPT

## 2025-04-24 PROCEDURE — 94799 UNLISTED PULMONARY SVC/PX: CPT

## 2025-04-24 PROCEDURE — 82948 REAGENT STRIP/BLOOD GLUCOSE: CPT | Performed by: STUDENT IN AN ORGANIZED HEALTH CARE EDUCATION/TRAINING PROGRAM

## 2025-04-24 PROCEDURE — 94618 PULMONARY STRESS TESTING: CPT

## 2025-04-24 RX ORDER — DOCUSATE SODIUM 100 MG/1
100 CAPSULE, LIQUID FILLED ORAL 2 TIMES DAILY
Qty: 60 CAPSULE | Refills: 0 | Status: SHIPPED | OUTPATIENT
Start: 2025-04-24 | End: 2025-05-24

## 2025-04-24 RX ORDER — CLONIDINE HYDROCHLORIDE 0.2 MG/1
0.2 TABLET ORAL EVERY 12 HOURS SCHEDULED
Qty: 60 TABLET | Refills: 0 | Status: SHIPPED | OUTPATIENT
Start: 2025-04-24 | End: 2025-05-24

## 2025-04-24 RX ORDER — FERROUS SULFATE 325(65) MG
325 TABLET ORAL
Qty: 30 TABLET | Refills: 0 | Status: SHIPPED | OUTPATIENT
Start: 2025-04-25 | End: 2025-05-25

## 2025-04-24 RX ORDER — SCOPOLAMINE 1 MG/3D
1 PATCH, EXTENDED RELEASE TRANSDERMAL
Qty: 3 PATCH | Refills: 0 | Status: SHIPPED | OUTPATIENT
Start: 2025-04-26 | End: 2025-05-05

## 2025-04-24 RX ORDER — CARVEDILOL 3.12 MG/1
3.12 TABLET ORAL 2 TIMES DAILY WITH MEALS
Qty: 60 TABLET | Refills: 0 | Status: SHIPPED | OUTPATIENT
Start: 2025-04-24 | End: 2025-05-24

## 2025-04-24 RX ORDER — PYRIDOXINE HCL (VITAMIN B6) 25 MG
25 TABLET ORAL 3 TIMES DAILY PRN
Qty: 60 TABLET | Refills: 0 | Status: SHIPPED | OUTPATIENT
Start: 2025-04-24 | End: 2025-05-24

## 2025-04-24 RX ORDER — FUROSEMIDE 40 MG/1
40 TABLET ORAL DAILY
Qty: 30 TABLET | Refills: 0 | Status: SHIPPED | OUTPATIENT
Start: 2025-04-25 | End: 2025-05-25

## 2025-04-24 RX ORDER — AMLODIPINE BESYLATE 10 MG/1
10 TABLET ORAL DAILY
Qty: 30 TABLET | Refills: 0 | Status: SHIPPED | OUTPATIENT
Start: 2025-04-24 | End: 2025-05-24

## 2025-04-24 RX ORDER — FUROSEMIDE 40 MG/1
40 TABLET ORAL DAILY
Status: DISCONTINUED | OUTPATIENT
Start: 2025-04-24 | End: 2025-04-24 | Stop reason: HOSPADM

## 2025-04-24 RX ORDER — HYDRALAZINE HYDROCHLORIDE 100 MG/1
100 TABLET, FILM COATED ORAL 3 TIMES DAILY
Qty: 90 TABLET | Refills: 0 | Status: SHIPPED | OUTPATIENT
Start: 2025-04-24 | End: 2025-05-24

## 2025-04-24 RX ADMIN — AMLODIPINE BESYLATE 10 MG: 5 TABLET ORAL at 08:54

## 2025-04-24 RX ADMIN — ASPIRIN 81 MG CHEWABLE TABLET 81 MG: 81 TABLET CHEWABLE at 08:54

## 2025-04-24 RX ADMIN — ATORVASTATIN CALCIUM 80 MG: 40 TABLET, FILM COATED ORAL at 08:54

## 2025-04-24 RX ADMIN — CLONIDINE HYDROCHLORIDE 0.2 MG: 0.1 TABLET ORAL at 08:54

## 2025-04-24 RX ADMIN — FERROUS SULFATE TAB 325 MG (65 MG ELEMENTAL FE) 325 MG: 325 (65 FE) TAB at 08:54

## 2025-04-24 RX ADMIN — CARVEDILOL 3.12 MG: 3.12 TABLET, FILM COATED ORAL at 08:54

## 2025-04-24 RX ADMIN — FUROSEMIDE 40 MG: 40 TABLET ORAL at 08:58

## 2025-04-24 RX ADMIN — HYDRALAZINE HYDROCHLORIDE 100 MG: 25 TABLET ORAL at 08:54

## 2025-04-24 NOTE — DISCHARGE INSTR - APPOINTMENTS
Follow up appointment with PCP 5/6/25 at 2:30pm  Please arrive 15 minutes early for your appointment.  Bring insurance cards, ID and list of your medications.     Follow up with Jim Farooq Nephrologist on 5/1/2025 at 9:45am  Labs will need to be done at [facility] prior to vist.  Please arrive 15 minutes early for your appointment.  Bring insurance cards, ID and list of your medications

## 2025-04-24 NOTE — PROCEDURES
Exercise Oximetry    Patient Name:Brad Woodward   MRN: [id number]   Date: 04/24/25             ROOM AIR BASELINE   SpO2% 96   Heart Rate 67   Blood Pressure      EXERCISE ON ROOM AIR SpO2% EXERCISE ON O2 LPM SpO2%   1 MINUTE 96 1 MINUTE             2 MINUTES 96 2 MINUTES          3 MINUTES 94 3 MINUTES         4 MINUTES 93 4 MINUTES       5 MINUTES 94 5 MINUTES    6 MINUTES 94 6 MINUTES                  Distance Walked  6min Distance Walked   Dyspnea (Becki Scale)   Dyspnea (Becki Scale)   Fatigue (Becki Scale)   Fatigue (Becki Scale)   SpO2% Post Exercise  94 SpO2% Post Exercise   HR Post Exercise  72 HR Post Exercise   Time to Recovery   Time to Recovery     Comments:     Pt will not need O2 to maintain sats with exertion.   RN made aware.      Ned Grullon, RRT

## 2025-04-24 NOTE — TELEPHONE ENCOUNTER
Caller: JADYN RODRIGUEZ    Relationship: Emergency Contact    Best call back number:       PATIENT IS NEEDING A 1 WEEK HFU, UNABLE TO SCHEDULE

## 2025-04-24 NOTE — PLAN OF CARE
Problem: Fall Injury Risk  Goal: Absence of Fall and Fall-Related Injury  Intervention: Identify and Manage Contributors  Recent Flowsheet Documentation  Taken 4/24/2025 0405 by Hany Prabhakar RN  Medication Review/Management: medications reviewed  Taken 4/24/2025 0200 by Hany Prabhakar RN  Medication Review/Management: medications reviewed  Taken 4/24/2025 0009 by Hany Prabhakar RN  Medication Review/Management: medications reviewed  Taken 4/23/2025 2218 by Hany Prabhakar RN  Medication Review/Management: medications reviewed  Taken 4/23/2025 2038 by Hany Prabhakar RN  Medication Review/Management: medications reviewed  Intervention: Promote Injury-Free Environment  Recent Flowsheet Documentation  Taken 4/24/2025 0405 by Hany Prabhakar RN  Safety Promotion/Fall Prevention:   assistive device/personal items within reach   clutter free environment maintained   fall prevention program maintained   nonskid shoes/slippers when out of bed   room organization consistent   safety round/check completed  Taken 4/24/2025 0200 by Hany Prabhakar RN  Safety Promotion/Fall Prevention:   assistive device/personal items within reach   clutter free environment maintained   fall prevention program maintained   nonskid shoes/slippers when out of bed   room organization consistent   safety round/check completed  Taken 4/24/2025 0009 by Hany Prabhakar RN  Safety Promotion/Fall Prevention:   assistive device/personal items within reach   clutter free environment maintained   fall prevention program maintained   nonskid shoes/slippers when out of bed   room organization consistent   safety round/check completed  Taken 4/23/2025 2218 by Hany Prabhakar RN  Safety Promotion/Fall Prevention:   assistive device/personal items within reach   clutter free environment maintained   fall prevention program maintained   nonskid shoes/slippers when out of bed   room organization consistent   safety round/check  completed  Taken 4/23/2025 2038 by Hany Prabhakar, RN  Safety Promotion/Fall Prevention:   assistive device/personal items within reach   clutter free environment maintained   fall prevention program maintained   nonskid shoes/slippers when out of bed   room organization consistent   safety round/check completed   Goal Outcome Evaluation:    Patient had a good night rest tonight. Family at bedside. Blood Pressure not stable yet as per chat

## 2025-04-24 NOTE — DISCHARGE SUMMARY
"             [facility] Medicine Services  Discharge Summary    Date of Service: 2025  Patient Name: Brad Woodward  : 1940  MRN: [id number]    Date of Admission: 4/15/2025  Discharge Diagnosis: Anemia, New ESRD  Hypertension   Date of Discharge: 2025  Primary Care Physician: Brian Nazario APRN      Presenting Problem:   Hypokalemia [E87.6]  Nausea [R11.0]  Chest tightness [R07.89]  Anemia [D64.9]  Generalized weakness [R53.1]  Dyspnea, unspecified type [R06.00]  Anemia due to chronic kidney disease, unspecified CKD stage [N18.9, D63.1]    Active and Resolved Hospital Problems:  Active Hospital Problems    Diagnosis POA    **Anemia [D64.9] Yes      Resolved Hospital Problems   No resolved problems to display.         Hospital Course     HPI:    \"Brad Woodward is a 84 y.o. female with a CMH of DM, HTN, HLD, CKD who presented to [facility] on 4/15/2025 with family due to complaints of nausea and generalized weakness.  Patient was resting comfortably during encounter, patient son at bedside with his wife were primary historians.  Per family, patient had nausea, chills, weakness that started earlier today.  Patient family reports patient did complain of neck pain briefly yesterday however they attributed the pain to hypertension.  Denies fevers at home.  Reports patient was complaining of mild shortness of breath as well.  States they came to the hospital for outpatient labs and when they were getting labs drawn patient continued to complain of weakness and shortness of breath as well as nausea and chills.  Patient family reports they left to go home and en route to the house they decided to turn around and return to the ED for evaluation.  Patient family reports patient bowel movements have been her regular which for the past few years has been every 3 to 4 days.  Patient family denies any dark stools and denies abdominal pain complaints from patient.  No signs of bleeding per family.     On " "ED evaluation, vitals stable. Labs remarkable for UA with presence of protein and WBC.  CBC remarkable for hemoglobin 7.2, hematocrit 21.3, RBC 2.62.  PTH intact 179.  Random protein creatinine ratio shows 5309.4.  Initial troponin 69, reflex 67.  proBNP 5094.  TSH 6.210, free T4 within normal limits.  CMP in ED shows creatinine 2.98, BUN 39, sodium 124, potassium 3.3, ALT 53, AST 79.  Ferritin 226.  Iron profile shows within normal limits.  RVP negative.  Chest x-ray negative for acute findings.  EKG on arrival shows \"sinus rhythm, right bundle branch block, prolonged QT interval\".  Patient did receive IV Zofran in the ED.  ED provider spoke with the primary nephrologist Dr. Farooq who ordered to give 1 unit packed red blood cells and reassess electrolytes and kidney function posttransfusion. Hospitalist service to admit for further management.\"    Hospital Course:  Anemia  Electrolyte imbalances  ERIN on CKD  Weakness  Appreciate nephrology assistance  RVP negative  CXR negative  PT/OT to eval  EKG with QT prolongation, avoid medications that further prolong QT  Caution restarting home meds with ERIN as well as QT prolongation, close review once meds are reconciled  Nephrology following, HD as per renal, patient will need OP HD set up. Discussed with CM.  Patient received tunneled dialysis catheter placement on 4/18     Hypertension   Sick Sinus Syndrome  BP stable  S/P pacemaker- follows with Dr. Aldridge outpatient  On Tab amlodipine 10 mg and Tab hydralazine 100 mg TID, clonidine to 0.2 mg q12h and carvedilol 3.125 mg BID  Furosemide 40 mg daily added by renal  Monitor BP and adjust medications accordingly        Hyperlipidemia  Continue statin     Diabetes Mellitus   Last A1C 5.4  Patient's POC glucose mostly normal and occasionally needing ISS  Given her age monitor off oral hypoglycemics  High risk for hypoglycemic events  Monitor POC glucose and resume meds as appropriate  Close follow up with PCP     MARGARITA " swelling  No DVT on US duplex         Anemia   Denies any bleeding  Anemia work up consistent with ROMAN  Start oral ferrous sulphate 325 mg  GI as outpatient for possible EGD/colonoscopy         DISCHARGE Follow Up Recommendations for labs and diagnostics:   Follow up with primary care provider within 3 days of this discharge.   Monitor CBC as outpatient. Consider EGD/colonoscopy outpatient for further evaluation of iron def anemia. No signs of bleeding while inpatient.  Monitor POC glucose and blood pressure and adjust medications as appropriate.   Follow up with Renal and Cardiology as outpatient.         Day of Discharge     Vital Signs:  Temp:  [97.1 °F (36.2 °C)-98.3 °F (36.8 °C)] 98 °F (36.7 °C)  Heart Rate:  [60-63] 61  Resp:  [12-20] 20  BP: (126-177)/(56-72) 172/64  Flow (L/min) (Oxygen Therapy):  [2] 2        Pertinent  and/or Most Recent Results     LAB RESULTS:      Lab 04/23/25  0503 04/22/25  0349 04/21/25  0303 04/20/25  0114 04/19/25  0458 04/17/25  2339   WBC 4.30 5.38 8.04 6.85 6.71 4.26   HEMOGLOBIN 7.5* 7.5* 8.8* 8.2* 8.5* 9.0*   HEMATOCRIT 23.8* 23.9* 27.1* 25.6* 25.8* 26.6*   PLATELETS 198 212 255 229 219 214   NEUTROS ABS 2.66 3.43 4.89 4.10 4.47 2.32   IMMATURE GRANS (ABS) 0.01 0.01 0.03 0.03 0.03 0.04   LYMPHS ABS 0.90 1.05 1.84 1.67 1.33 1.23   MONOS ABS 0.52 0.78 1.06* 0.88 0.69 0.57   EOS ABS 0.19 0.09 0.19 0.14 0.16 0.09   MCV 85.9 86.6 85.2 85.9 84.0 84.2   LDH  --  301*  --   --   --   --    PROTIME  --   --   --   --   --  13.0         Lab 04/23/25  0503 04/22/25  0349 04/21/25  1337 04/21/25  0303 04/20/25  0114 04/19/25  0458   SODIUM 132* 131*  --  129* 129* 129*   POTASSIUM 3.6 4.1 3.8 3.0* 3.7 3.5   CHLORIDE 95* 97*  --  91* 91* 94*   CO2 28.5 25.2  --  28.1 29.6* 26.0   ANION GAP 8.5 8.8  --  9.9 8.4 9.0   BUN 13 14  --  22 17 23   CREATININE 1.86* 2.00*  --  2.31* 1.89* 2.07*   EGFR 26.4* 24.2*  --  20.4* 25.9* 23.3*   GLUCOSE 129* 111*  --  95 120* 101*   CALCIUM 7.9* 8.1*  --   7.9* 7.9* 8.1*             Lab 04/17/25  2339   PROTIME 13.0   INR 0.99             Lab 04/22/25  1804 04/22/25  0349   IRON  --  27*   IRON SATURATION (TSAT)  --  11*   TIBC  --  240*   TRANSFERRIN  --  161*   FERRITIN  --  89.80   FOLATE 7.58  --    VITAMIN B 12 >2,000*  --          Brief Urine Lab Results  (Last result in the past 365 days)        Color   Clarity   Blood   Leuk Est   Nitrite   Protein   CREAT   Urine HCG        04/17/25 1847             56.7               Microbiology Results (last 10 days)       Procedure Component Value - Date/Time    COVID-19, FLU A/B, RSV PCR 1 HR TAT - Swab, Nasopharynx [[id number]]  (Normal) Collected: 04/15/25 1942    Lab Status: Final result Specimen: Swab from Nasopharynx Updated: 04/15/25 2024     COVID19 Not Detected     Influenza A PCR Not Detected     Influenza B PCR Not Detected     RSV, PCR Not Detected    Narrative:      Fact sheet for providers: https://www.fda.gov/media/616454/download    Fact sheet for patients: https://www.fda.gov/media/594019/download    Test performed by PCR.    Respiratory Panel PCR w/COVID-19(SARS-CoV-2) VÍCTOR/DAYNA/JADYN/PAD/COR/CHIRAG In-House, NP Swab in UTM/VTM, 2 HR TAT - Swab, Nasopharynx [[id number]]  (Normal) Collected: 04/15/25 1331    Lab Status: Final result Specimen: Swab from Nasopharynx Updated: 04/15/25 1425     ADENOVIRUS, PCR Not Detected     Coronavirus 229E Not Detected     Coronavirus HKU1 Not Detected     Coronavirus NL63 Not Detected     Coronavirus OC43 Not Detected     COVID19 Not Detected     Human Metapneumovirus Not Detected     Human Rhinovirus/Enterovirus Not Detected     Influenza A PCR Not Detected     Influenza B PCR Not Detected     Parainfluenza Virus 1 Not Detected     Parainfluenza Virus 2 Not Detected     Parainfluenza Virus 3 Not Detected     Parainfluenza Virus 4 Not Detected     RSV, PCR Not Detected     Bordetella pertussis pcr Not Detected     Bordetella parapertussis PCR Not Detected     Chlamydophila  pneumoniae PCR Not Detected     Mycoplasma pneumo by PCR Not Detected    Narrative:      In the setting of a positive respiratory panel with a viral infection PLUS a negative procalcitonin without other underlying concern for bacterial infection, consider observing off antibiotics or discontinuation of antibiotics and continue supportive care. If the respiratory panel is positive for atypical bacterial infection (Bordetella pertussis, Chlamydophila pneumoniae, or Mycoplasma pneumoniae), consider antibiotic de-escalation to target atypical bacterial infection.    Urine Culture - Urine, Urine, Clean Catch [[id number]]  (Normal) Collected: 04/15/25 1146    Lab Status: Final result Specimen: Urine, Clean Catch Updated: 04/16/25 2126     Urine Culture No growth            IR Ins Tunneled Dialysis Catheter WO Port  Result Date: 4/18/2025  Impression: Successful placement of right IJ 19 cm tip to cuff dual-lumen tunneled dialysis catheter utilizing ultrasound and fluoroscopic guidance. Report dictated by: Rene Walker DNP  I have personally reviewed this case and agree with the findings above: Electronically Signed: Roxie Lentz MD  4/18/2025 1:26 PM EDT  Workstation ID: [id number]    XR Chest 1 View  Result Date: 4/15/2025  Impression: Impression: No active pulmonary process. Stable cardiomegaly since 2021. Electronically Signed: Samuel Villa MD  4/15/2025 1:49 PM EDT  Workstation ID: [id number]      Results for orders placed during the hospital encounter of 04/15/25    Duplex Venous Upper Extremity - Bilateral CAR    Interpretation Summary    Normal bilateral upper extremity venous duplex scan.      Results for orders placed during the hospital encounter of 04/15/25    Duplex Venous Upper Extremity - Bilateral CAR    Interpretation Summary    Normal bilateral upper extremity venous duplex scan.      Results for orders placed during the hospital encounter of 04/15/21    Adult Transthoracic Echo Complete W/ Cont if Necessary  Per Protocol    Interpretation Summary  · Left ventricular ejection fraction appears to be 61 - 65%.  · Left ventricular wall thickness is consistent with moderate basal asymmetric hypertrophy.  · The right ventricular cavity is mildly dilated.  · Mild to moderate aortic valve stenosis is present.  · Moderate mitral valve regurgitation is present.  · No pericardial effusion noted      Labs Pending at Discharge:  Pending Results       Procedure [Order ID] Specimen - Date/Time    Occult Blood, Fecal By Immunoassay - Stool, Per Rectum [[id number]]     Specimen: Stool from Per Rectum             Procedures Performed           Consults:   Consults       Date and Time Order Name Status Description    4/15/2025  3:44 PM Hospitalist (on-call MD unless specified)      4/15/2025  2:31 PM Nephrology (on -call MD unless specified) Completed               Discharge Details        Discharge Medications        New Medications        Instructions Start Date   carvedilol 3.125 MG tablet  Commonly known as: COREG   3.125 mg, Oral, 2 Times Daily With Meals      cloNIDine 0.2 MG tablet  Commonly known as: CATAPRES   0.2 mg, Oral, Every 12 Hours Scheduled      docusate sodium 100 MG capsule  Commonly known as: Colace   100 mg, Oral, 2 Times Daily, As needed for constipation      ferrous sulfate 325 (65 FE) MG tablet   325 mg, Oral, Daily With Breakfast   Start Date: April 25, 2025     furosemide 40 MG tablet  Commonly known as: LASIX   40 mg, Oral, Daily   Start Date: April 25, 2025     pyridoxine 25 MG tablet  Commonly known as: VITAMIN B-6   25 mg, Oral, 3 Times Daily PRN      Scopolamine 1 MG/3DAYS patch   1 patch, Transdermal, Every 72 Hours   Start Date: April 26, 2025            Changes to Medications        Instructions Start Date   hydrALAZINE 100 MG tablet  Commonly known as: APRESOLINE  What changed:   medication strength  how much to take  when to take this  additional instructions   100 mg, Oral, 3 times daily              Continue These Medications        Instructions Start Date   amLODIPine 10 MG tablet  Commonly known as: NORVASC   10 mg, Oral, Daily      aspirin 81 MG chewable tablet   81 mg, Oral, Daily      atorvastatin 80 MG tablet  Commonly known as: LIPITOR   80 mg, Oral, Daily      VITAMIN B-12 PO   1 tablet, Daily             Stop These Medications      cephalexin 250 MG capsule  Commonly known as: KEFLEX     chlorthalidone 50 MG tablet  Commonly known as: HYGROTEN     glipiZIDE 2.5 MG tablet              No Known Allergies      Discharge Disposition:   Home or Self Care    Diet:  Hospital:  Diet Order   Procedures    Diet: Cardiac, Diabetic, Renal; Healthy Heart (2-3 Na+); Consistent Carbohydrate; Low Sodium (2-3g), Low Potassium, Low Phosphorus; Fluid Consistency: Thin (IDDSI 0)         Discharge Activity:   as tolerated       CODE STATUS:  Code Status and Medical Interventions: CPR (Attempt to Resuscitate); Full Support   Ordered at: 04/15/25 1734     Code Status (Patient has no pulse and is not breathing):    CPR (Attempt to Resuscitate)     Medical Interventions (Patient has pulse or is breathing):    Full Support     Level Of Support Discussed With:    Health Care Surrogate       Next of Kin (If No Surrogate)         Future Appointments   Date Time Provider Department Center   6/5/2025  9:00 AM [facility] DEVICE CHECK [facility]   6/5/2025  9:30 AM Marnie Brandt APRN [facility]   7/15/2025 11:45 AM Zane Aldridge MD [facility]           Time spent on Discharge including face to face service:  >35 minutes    Signature: Electronically signed by Harjinder Rosenbaum MD, 04/24/25, 09:30 EDT.  [facility] Hospitalist Team

## 2025-04-24 NOTE — SIGNIFICANT NOTE
04/24/25 1112   OTHER   Discipline occupational therapist;physical therapist   Rehab Time/Intention   Session Not Performed other (see comments)  (Hospital dc pending)   Recommendation   PT - Next Appointment 04/25/25   Recommendation   OT - Next Appointment 04/25/25

## 2025-04-24 NOTE — DISCHARGE PLACEMENT REQUEST
"Alie Montoya (84 y.o. Female)       Date of Birth   1940    Social Security Number       Address   [address] [place] [address] [place] [address]    Home Phone   [phone]    MRN   [id number]       Mu-ism   Yarsani    Marital Status                               Admission Date   4/15/2025    Admission Type   Emergency    Admitting Provider   Benita Trevino MD    Attending Provider   Harjinder Rosenbaum MD    Department, Room/Bed   [facility] 2D, 263/1       Discharge Date       Discharge Disposition   Home or Self Care    Discharge Destination                                 Attending Provider: Harjinder Rosenbaum MD    Allergies: No Known Allergies    Isolation: None   Infection: None   Code Status: CPR    Ht: 162.6 cm (64\")   Wt: 65.7 kg (144 lb 13.5 oz)    Admission Cmt: None   Principal Problem: Anemia [D64.9]                   Active Insurance as of 4/15/2025       Primary Coverage       Payor Plan Insurance Group Employer/Plan Group    [facility] COMMUNITY PLAN OF IN [facility] CARE CONNECT [facility] COMMUNITY PLAN PATHWAYS IN        Payor Plan Address Payor Plan Phone Number Payor Plan Fax Number Effective Dates    [address]   2025 - None Entered    [facility] [address]         Subscriber Name Subscriber Birth Date Member ID       ALIE MONTOYA 1940 [id number]                     Emergency Contacts        (Rel.) Home Phone Work Phone Mobile Phone    JADYN RODRIGUEZ (Son) -- -- [phone]    MOSES GARCIA (Grandchild) [phone] -- --    CARLITO RODRIGUEZ (Grandchild) [phone] -- [phone]    Glenn Starkey -- -- [phone]                 History & Physical        Walker, CECE Delgadillo at 25 0826       Attestation signed by Roxie Lentz MD at 25 0953      I have reviewed this documentation and agree.                        [facility]   Interventional Radiology H&P    Patient Name: Alie Montoya  : 1940  MRN: [id number]  Primary Care " Physician:  Brian Nazario APRN  Referring Physician: No Known Provider  Date of admission: 4/15/2025    Subjective  Subjective     HPI:  Brad Woodward is a 84 y.o. female with end stage renal disease.    Review of Systems:   Constitutional no fever,  no weight loss       Otolaryngeal no difficulty swallowing   Cardiovascular no chest pain   Pulmonary no cough, no sputum production   Gastrointestinal no constipation, no diarrhea                         Personal History       Past Medical/Surgical History:   Past Medical History:   Diagnosis Date    Diabetes mellitus     Hyperlipidemia     Hypertension      Past Surgical History:   Procedure Laterality Date    CARDIAC ELECTROPHYSIOLOGY PROCEDURE N/A 4/20/2021    Procedure: Pacemaker DC new;  Surgeon: Yovany Navarrete MD;  Location: [facility] CATH INVASIVE LOCATION;  Service: Cardiovascular;  Laterality: N/A;       Social History:  reports that she has never smoked. She has never been exposed to tobacco smoke. She has never used smokeless tobacco. She reports that she does not drink alcohol and does not use drugs.    Medications:  Medications Prior to Admission   Medication Sig Dispense Refill Last Dose/Taking    amLODIPine (NORVASC) 10 MG tablet Take 1 tablet by mouth Daily. 270 tablet 1 Taking    aspirin 81 MG chewable tablet Chew 1 tablet Daily. 270 tablet 2 Taking    atorvastatin (LIPITOR) 80 MG tablet Take 1 tablet by mouth Daily. 270 tablet 1 Taking    cephalexin (KEFLEX) 250 MG capsule Take 1 capsule by mouth 2 (Two) Times a Day.   4/15/2025 Morning    chlorthalidone (HYGROTEN) 50 MG tablet Take 1 tablet by mouth Daily.   Taking    Cyanocobalamin (VITAMIN B-12 PO) Take 1 tablet by mouth Daily.   Taking    glipiZIDE 2.5 MG tablet Take 5 mg by mouth Daily.   Taking    hydrALAZINE (APRESOLINE) 50 MG tablet Take 1 tablet by mouth 3 (Three) Times a Day. In the morning, evening and bedtime.   Taking     Current medications:  amLODIPine, 10 mg, Oral, Q24H  aspirin, 81  mg, Oral, Daily  atorvastatin, 80 mg, Oral, Daily  hydrALAZINE, 100 mg, Oral, TID  insulin lispro, 2-7 Units, Subcutaneous, 4x Daily AC & at Bedtime  potassium chloride, 40 mEq, Oral, Once  Scopolamine, 1 patch, Transdermal, Q72H  sodium bicarbonate, 1,300 mg, Oral, TID      Current IV drips:  ceFAZolin, , Last Rate: 1,000 mg (04/18/25 0822)        Allergies:  No Known Allergies    Objective   Objective     Vitals:   Temp:  [97.1 °F (36.2 °C)-98.4 °F (36.9 °C)] 98 °F (36.7 °C)  Heart Rate:  [61-83] 72  Resp:  [11-20] 11  BP: (163-200)/(66-80) 188/68  Flow (L/min) (Oxygen Therapy):  [2] 2      Physical Exam:   Constitutional: Awake, alert, No acute distress    Respiratory: Clear to auscultation bilaterally, nonlabored respirations    Cardiovascular: RRR, no murmurs, rubs, or gallops, palpable pedal pulses bilaterally   Gastrointestinal: Positive bowel sounds, soft, nontender, nondistended        ASA SCALE ASSESSMENT:  2-Mild to moderate systemic disease, medically well controlled, with no functional limitation    MALLAMPATI CLASSIFICATION:  2-Able to visualize the soft palate, fauces, uvula. The anterior & posterior tonsilar pillars are hidden by the tongue.       Result Review       Result Review:     Sodium   Date Value Ref Range Status   04/17/2025 124 (L) 136 - 145 mmol/L Final   04/16/2025 124 (L) 136 - 145 mmol/L Final   04/16/2025 123 (L) 136 - 145 mmol/L Final   04/15/2025 121 (L) 136 - 145 mmol/L Final   04/15/2025 124 (L) 136 - 145 mmol/L Final   04/15/2025 127 (L) 136 - 145 mmol/L Final       Potassium   Date Value Ref Range Status   04/17/2025 3.5 3.5 - 5.2 mmol/L Final   04/17/2025 3.6 3.5 - 5.2 mmol/L Final   04/16/2025 3.6 3.5 - 5.2 mmol/L Final   04/16/2025 3.6 3.5 - 5.2 mmol/L Final   04/15/2025 3.4 (L) 3.5 - 5.2 mmol/L Final   04/15/2025 3.3 (L) 3.5 - 5.2 mmol/L Final   04/15/2025 3.7 3.5 - 5.2 mmol/L Final       Chloride   Date Value Ref Range Status   04/17/2025 93 (L) 98 - 107 mmol/L Final  "  04/16/2025 95 (L) 98 - 107 mmol/L Final   04/16/2025 92 (L) 98 - 107 mmol/L Final   04/15/2025 91 (L) 98 - 107 mmol/L Final   04/15/2025 93 (L) 98 - 107 mmol/L Final   04/15/2025 95 (L) 98 - 107 mmol/L Final       No results found for: \"PLASMABICARB\"    BUN   Date Value Ref Range Status   04/17/2025 39 (H) 8 - 23 mg/dL Final   04/16/2025 40 (H) 8 - 23 mg/dL Final   04/16/2025 41 (H) 8 - 23 mg/dL Final   04/15/2025 39 (H) 8 - 23 mg/dL Final   04/15/2025 39 (H) 8 - 23 mg/dL Final   04/15/2025 39 (H) 8 - 23 mg/dL Final       Creatinine   Date Value Ref Range Status   04/17/2025 2.89 (H) 0.57 - 1.00 mg/dL Final   04/16/2025 2.89 (H) 0.57 - 1.00 mg/dL Final   04/16/2025 3.02 (H) 0.57 - 1.00 mg/dL Final   04/15/2025 3.02 (H) 0.57 - 1.00 mg/dL Final   04/15/2025 2.98 (H) 0.57 - 1.00 mg/dL Final   04/15/2025 3.03 (H) 0.57 - 1.00 mg/dL Final       Calcium   Date Value Ref Range Status   04/17/2025 8.0 (L) 8.6 - 10.5 mg/dL Final   04/16/2025 7.1 (L) 8.6 - 10.5 mg/dL Final   04/16/2025 8.1 (L) 8.6 - 10.5 mg/dL Final   04/15/2025 7.9 (L) 8.6 - 10.5 mg/dL Final   04/15/2025 8.5 (L) 8.6 - 10.5 mg/dL Final   04/15/2025 8.5 (L) 8.6 - 10.5 mg/dL Final           No components found for: \"GLUCOSE.*\"  Results from last 7 days   Lab Units 04/17/25  2339   WBC 10*3/mm3 4.26   HEMOGLOBIN g/dL 9.0*   HEMATOCRIT % 26.6*   PLATELETS 10*3/mm3 214      Results from last 7 days   Lab Units 04/17/25  2339   INR  0.99           Assessment / Plan     Assesment:   End stage renal disease.      Plan:   Tunneled dialysis catheter placement.     The risks and benefits of the procedure were discussed with the patient.    Electronically signed by CECE Ramirez, 04/18/25, 8:26 AM EDT.    Electronically signed by Roxie Lentz MD at 04/18/25 0906       Anuja Kan APRN at 04/15/25 1646       Attestation signed by Benita Trevino MD at 04/16/25 0648      I have reviewed this documentation and agree.                          [facility] " [facility] Medicine Services  History & Physical    Patient Name: Brad Woodward  : 1940  MRN: [id number]  Primary Care Physician:  Brian Nazario APRN  Date of admission: 4/15/2025  Date and Time of Service: 4/15/2025 at 1650    Subjective      Chief Complaint: nausea, weakness    History of Present Illness: Brad Woodward is a 84 y.o. female with a CMH of DM, HTN, HLD, CKD who [facility] with family due to complaints of nausea and generalized weakness.  Patient was resting comfortably during encounter, patient son at bedside with his wife were primary historians.  Per family, patient had nausea, chills, weakness that started earlier today.  Patient family reports patient did complain of neck pain briefly yesterday however they attributed the pain to hypertension.  Denies fevers at home.  Reports patient was complaining of mild shortness of breath as well.  States they came to the hospital for outpatient labs and when they were getting labs drawn patient continued to complain of weakness and shortness of breath as well as nausea and chills.  Patient family reports they left to go home and en route to the house they decided to turn around and return to the ED for evaluation.  Patient family reports patient bowel movements have been her regular which for the past few years has been every 3 to 4 days.  Patient family denies any dark stools and denies abdominal pain complaints from patient.  No signs of bleeding per family.    On ED evaluation, vitals stable. Labs remarkable for UA with presence of protein and WBC.  CBC remarkable for hemoglobin 7.2, hematocrit 21.3, RBC 2.62.  PTH intact 179.  Random protein creatinine ratio shows 5309.4.  Initial troponin 69, reflex 67.  proBNP 5094.  TSH 6.210, free T4 within normal limits.  CMP in ED shows creatinine 2.98, BUN 39, sodium 124, potassium 3.3, ALT 53, AST 79.  Ferritin 226.  Iron profile shows within normal limits.  RVP negative.   "Chest x-ray negative for acute findings.  EKG on arrival shows \"sinus rhythm, right bundle branch block, prolonged QT interval\".  Patient did receive IV Zofran in the ED.  ED provider spoke with the primary nephrologist Dr. Farooq who ordered to give 1 unit packed red blood cells and reassess electrolytes and kidney function posttransfusion. Hospitalist service to admit for further management.      Review of Systems   Constitutional:  Positive for chills and fatigue.   Respiratory:  Positive for shortness of breath.    Cardiovascular:  Negative for chest pain.   Gastrointestinal:  Positive for nausea. Negative for constipation, diarrhea and vomiting.   Neurological:  Positive for weakness. Negative for dizziness and headaches.       Personal History     Past Medical History:   Diagnosis Date    Diabetes mellitus     Hyperlipidemia     Hypertension        Past Surgical History:   Procedure Laterality Date    CARDIAC ELECTROPHYSIOLOGY PROCEDURE N/A 4/20/2021    Procedure: Pacemaker DC new;  Surgeon: Yovany Navarrete MD;  Location: [facility] INVASIVE LOCATION;  Service: Cardiovascular;  Laterality: N/A;       Family History: Family history is unknown by patient. Otherwise pertinent FHx was reviewed and not pertinent to current issue.    Social History:  reports that she has never smoked. She has never been exposed to tobacco smoke. She has never used smokeless tobacco. She reports that she does not drink alcohol and does not use drugs.    Home Medications:  Prior to Admission Medications       Prescriptions Last Dose Informant Patient Reported? Taking?    amLODIPine (NORVASC) 10 MG tablet   No Yes    Take 1 tablet by mouth Daily.    aspirin 81 MG chewable tablet   No Yes    Chew 1 tablet Daily.    atorvastatin (LIPITOR) 80 MG tablet   No Yes    Take 1 tablet by mouth Daily.    chlorthalidone (HYGROTEN) 50 MG tablet   Yes Yes    Take 1 tablet by mouth Daily.    Cyanocobalamin (VITAMIN B-12 PO)   Yes Yes    Take 1 " tablet by mouth Daily.    hydrALAZINE (APRESOLINE) 25 MG tablet   No No    Take 1 tablet by mouth 3 (Three) Times a Day.    nitroglycerin (NITROSTAT) 0.4 MG SL tablet   No No    Place 1 tablet under the tongue Every 5 (Five) Minutes As Needed for Chest Pain (Only if SBP Greater Than 100). Take no more than 3 doses in 15 minutes.              Allergies:  No Known Allergies    Objective      Vitals:   Temp:  [98.1 °F (36.7 °C)-98.4 °F (36.9 °C)] 98.1 °F (36.7 °C)  Heart Rate:  [73-84] 76  Resp:  [15-17] 15  BP: (147-164)/(58-69) 162/59  Body mass index is 24.86 kg/m².  Physical Exam  General: 85 yo female, well nourished, no acute distress.   HENT: Normocephalic, normal hearing, moist oral mucosa, no scleral icterus.  Neck: Supple, nontender, no carotid bruits, no JVD, no LAD.  Lungs:  nonlabored respiration.  Heart: RRR.  Abdomen: Soft, nontender, nondistended.  Musculoskeletal: Normal range of motion and strength, no tenderness or swelling.  Skin: Skin is warm, dry and pink, no rashes or lesions.  Psychiatric: Cooperative, appropriate mood and affect.      Diagnostic Data:  Lab Results (last 24 hours)       Procedure Component Value Units Date/Time    T4, Free [[id number]]  (Normal) Collected: 04/15/25 1330    Specimen: Blood Updated: 04/15/25 1545     Free T4 1.23 ng/dL     High Sensitivity Troponin T 1Hr [[id number]]  (Abnormal) Collected: 04/15/25 1448    Specimen: Blood Updated: 04/15/25 1513     HS Troponin T 67 ng/L      Troponin T Numeric Delta -2 ng/L      Troponin T % Delta -3    Narrative:      High Sensitive Troponin T Reference Range:  <14.0 ng/L- Negative Female for AMI  <22.0 ng/L- Negative Male for AMI  >=14 - Abnormal Female indicating possible myocardial injury.  >=22 - Abnormal Male indicating possible myocardial injury.   Clinicians would have to utilize clinical acumen, EKG, Troponin, and serial changes to determine if it is an Acute Myocardial Infarction or myocardial injury due to an underlying  chronic condition.         High Sensitivity Troponin T [[id number]]  (Abnormal) Collected: 04/15/25 1330    Specimen: Blood Updated: 04/15/25 1428     HS Troponin T 69 ng/L     Narrative:      High Sensitive Troponin T Reference Range:  <14.0 ng/L- Negative Female for AMI  <22.0 ng/L- Negative Male for AMI  >=14 - Abnormal Female indicating possible myocardial injury.  >=22 - Abnormal Male indicating possible myocardial injury.   Clinicians would have to utilize clinical acumen, EKG, Troponin, and serial changes to determine if it is an Acute Myocardial Infarction or myocardial injury due to an underlying chronic condition.         Comprehensive Metabolic Panel [[id number]]  (Abnormal) Collected: 04/15/25 1330    Specimen: Blood Updated: 04/15/25 1425     Glucose 119 mg/dL      BUN 39 mg/dL      Creatinine 2.98 mg/dL      Sodium 124 mmol/L      Potassium 3.3 mmol/L      Chloride 93 mmol/L      CO2 19.5 mmol/L      Calcium 8.5 mg/dL      Total Protein 6.6 g/dL      Albumin 3.7 g/dL      ALT (SGPT) 53 U/L      AST (SGOT) 79 U/L      Alkaline Phosphatase 90 U/L      Total Bilirubin 0.3 mg/dL      Globulin 2.9 gm/dL      A/G Ratio 1.3 g/dL      BUN/Creatinine Ratio 13.1     Anion Gap 11.5 mmol/L      eGFR 15.0 mL/min/1.73     Narrative:      GFR Categories in Chronic Kidney Disease (CKD)      GFR Category          GFR (mL/min/1.73)    Interpretation  G1                     90 or greater         Normal or high (1)  G2                      60-89                Mild decrease (1)  G3a                   45-59                Mild to moderate decrease  G3b                   30-44                Moderate to severe decrease  G4                    15-29                Severe decrease  G5                    14 or less           Kidney failure          (1)In the absence of evidence of kidney disease, neither GFR category G1 or G2 fulfill the criteria for CKD.    eGFR calculation 2021 CKD-EPI creatinine equation, which does not  include race as a factor    Respiratory Panel PCR w/COVID-19(SARS-CoV-2) VÍCTOR/DAYNA/JADYN/PAD/COR/CHIRAG In-House, NP Swab in UTM/VTM, 2 HR TAT - Swab, Nasopharynx [[id number]]  (Normal) Collected: 04/15/25 1331    Specimen: Swab from Nasopharynx Updated: 04/15/25 1425     ADENOVIRUS, PCR Not Detected     Coronavirus 229E Not Detected     Coronavirus HKU1 Not Detected     Coronavirus NL63 Not Detected     Coronavirus OC43 Not Detected     COVID19 Not Detected     Human Metapneumovirus Not Detected     Human Rhinovirus/Enterovirus Not Detected     Influenza A PCR Not Detected     Influenza B PCR Not Detected     Parainfluenza Virus 1 Not Detected     Parainfluenza Virus 2 Not Detected     Parainfluenza Virus 3 Not Detected     Parainfluenza Virus 4 Not Detected     RSV, PCR Not Detected     Bordetella pertussis pcr Not Detected     Bordetella parapertussis PCR Not Detected     Chlamydophila pneumoniae PCR Not Detected     Mycoplasma pneumo by PCR Not Detected    Narrative:      In the setting of a positive respiratory panel with a viral infection PLUS a negative procalcitonin without other underlying concern for bacterial infection, consider observing off antibiotics or discontinuation of antibiotics and continue supportive care. If the respiratory panel is positive for atypical bacterial infection (Bordetella pertussis, Chlamydophila pneumoniae, or Mycoplasma pneumoniae), consider antibiotic de-escalation to target atypical bacterial infection.    BNP [[id number]]  (Abnormal) Collected: 04/15/25 1330    Specimen: Blood Updated: 04/15/25 1425     proBNP 5,094.0 pg/mL     Narrative:      This assay is used as an aid in the diagnosis of individuals suspected of having heart failure. It can be used as an aid in the diagnosis of acute decompensated heart failure (ADHF) in patients presenting with signs and symptoms of ADHF to the emergency department (ED). In addition, NT-proBNP of <300 pg/mL indicates ADHF is not likely.    Age  Range Result Interpretation  NT-proBNP Concentration (pg/mL:      <50             Positive            >450                   Gray                 300-450                    Negative             <300    50-75           Positive            >900                  Gray                300-900                  Negative            <300      >75             Positive            >1800                  Gray                300-1800                  Negative            <300    TSH Rfx On Abnormal To Free T4 [[id number]]  (Abnormal) Collected: 04/15/25 1330    Specimen: Blood Updated: 04/15/25 1425     TSH 6.210 uIU/mL     Magnesium [[id number]]  (Normal) Collected: 04/15/25 1330    Specimen: Blood Updated: 04/15/25 1425     Magnesium 1.6 mg/dL     Extra Tubes [[id number]] Collected: 04/15/25 1330    Specimen: Blood, Venous Line Updated: 04/15/25 1345    Narrative:      The following orders were created for panel order Extra Tubes.  Procedure                               Abnormality         Status                     ---------                               -----------         ------                     Gold Top - SST[[id number]]                                   Final result               Light Blue Top[[id number]]                                   Final result                 Please view results for these tests on the individual orders.    Gold Top - SST [[id number]] Collected: 04/15/25 1330    Specimen: Blood Updated: 04/15/25 1345     Extra Tube Hold for add-ons.     Comment: Auto resulted.       Light Blue Top [[id number]] Collected: 04/15/25 1330    Specimen: Blood Updated: 04/15/25 1345     Extra Tube Hold for add-ons.     Comment: Auto resulted       CBC & Differential [[id number]]  (Abnormal) Collected: 04/15/25 1330    Specimen: Blood Updated: 04/15/25 1340    Narrative:      The following orders were created for panel order CBC & Differential.  Procedure                               Abnormality         Status                      ---------                               -----------         ------                     CBC Auto Differential[[id number]]        Abnormal            Final result                 Please view results for these tests on the individual orders.    CBC Auto Differential [[id number]]  (Abnormal) Collected: 04/15/25 1330    Specimen: Blood Updated: 04/15/25 1340     WBC 4.11 10*3/mm3      RBC 2.62 10*6/mm3      Hemoglobin 7.2 g/dL      Hematocrit 21.3 %      MCV 81.3 fL      MCH 27.5 pg      MCHC 33.8 g/dL      RDW 14.1 %      RDW-SD 41.2 fl      MPV 9.0 fL      Platelets 228 10*3/mm3      Neutrophil % 45.1 %      Lymphocyte % 38.7 %      Monocyte % 11.9 %      Eosinophil % 3.6 %      Basophil % 0.5 %      Immature Grans % 0.2 %      Neutrophils, Absolute 1.85 10*3/mm3      Lymphocytes, Absolute 1.59 10*3/mm3      Monocytes, Absolute 0.49 10*3/mm3      Eosinophils, Absolute 0.15 10*3/mm3      Basophils, Absolute 0.02 10*3/mm3      Immature Grans, Absolute 0.01 10*3/mm3      nRBC 0.0 /100 WBC              Imaging Results (Last 24 Hours)       Procedure Component Value Units Date/Time    XR Chest 1 View [[id number]] Collected: 04/15/25 1349     Updated: 04/15/25 1352    Narrative:      XR CHEST 1 VW    Date of Exam: 4/15/2025 1:43 PM EDT    Indication: Chest pain, dyspnea, weakness    Comparison: Chest radiograph 4/20/2021    Findings:  Stable cardiomegaly. Multichamber AICD. Aortic atherosclerotic disease. Negative for lobar consolidation, edema, large effusion or pneumothorax. Degenerative related osseous change.      Impression:      Impression:  No active pulmonary process. Stable cardiomegaly since 2021.      Electronically Signed: Samuel Villa MD    4/15/2025 1:49 PM EDT    Workstation ID: [id number]              Assessment & Plan        This is a 84 y.o. female with:    Active and Resolved Problems  Active Hospital Problems    Diagnosis  POA    **Anemia [D64.9]  Yes      Resolved Hospital Problems   No resolved problems  to display.       Anemia  Electrolyte imbalances  ERIN on CKD  Weakness  Hgb 7.2, hct 21.3  Cr 2.98, BUN 39, Sodium 124, K+ 3.3  Serial H&H and BMP ordered  Follows with Dr. Farooq outpatient  Per Dr. Farooq, give 1 unit PRBC and recheck labs  Appreciate nephrology assistance  Transfuse 1 unit PRBCs  RVP negative  CXR negative  PT/OT to eval  EKG with QT prolongation, avoid medications that further prolong QT  Caution restarting home meds with ERIN as well as QT prolongation, close review once meds are reconciled    Recent UTI  Was prescribed Keflex from Dr. Farooq outpatient on 4/10/2025 for urine culture + e coli  Continue keflex    Hypertension   Sick Sinus Syndrome  BP stable  S/P pacemaker- follows with Dr. Aldridge outpatient  Resume home medications once reconciled and appropriate    Hyperlipidemia  Continue statin    Diabetes Mellitus   A1c pending  Low SSI   Consistent carb diet  Hypoglycemia protocol   Hold home PO meds  ACHS accucheck        CODE STATUS was discussed with family at bedside, per family patient desires to be a full code at this time.  CODE STATUS was updated in the system    VTE Prophylaxis:  Mechanical VTE prophylaxis orders are present.        The patient desires to be as follows:    CODE STATUS:    Code Status (Patient has no pulse and is not breathing): CPR (Attempt to Resuscitate)  Medical Interventions (Patient has pulse or is breathing): Full Support  Level Of Support Discussed With: Health Care Surrogate   Next of Kin (If No Surrogate)        Dany Go, who can be contacted at [phone], is the designated person to make medical decisions on the patient's behalf if She is incapable of doing so. This was clarified with patient and/or next of kin on 4/15/2025 during the course of this H&P.    Admission Status:  I believe this patient meets observation status.    Expected Length of Stay: <2 midnights    PDMP and Medication Dispenses via Sidebar reviewed and consistent with patient  reported medications.    I discussed the patient's findings and my recommendations with patient and family.      Signature:     This document has been electronically signed by CECE Jones on April 15, 2025 17:35 EDT   [facility] Hospitalist Team      Electronically signed by Benita Trevino MD at 04/16/25 0648          Operative/Procedure Notes (all)        Ned Grullon RRT at 04/24/25 0938  Version 1 of 1         Exercise Oximetry    Patient Name:Brad Woodward   MRN: [id number]   Date: 04/24/25             ROOM AIR BASELINE   SpO2% 96   Heart Rate 67   Blood Pressure      EXERCISE ON ROOM AIR SpO2% EXERCISE ON O2 LPM SpO2%   1 MINUTE 96 1 MINUTE             2 MINUTES 96 2 MINUTES          3 MINUTES 94 3 MINUTES         4 MINUTES 93 4 MINUTES       5 MINUTES 94 5 MINUTES    6 MINUTES 94 6 MINUTES                  Distance Walked  6min Distance Walked   Dyspnea (Becki Scale)   Dyspnea (Becki Scale)   Fatigue (Becki Scale)   Fatigue (Becki Scale)   SpO2% Post Exercise  94 SpO2% Post Exercise   HR Post Exercise  72 HR Post Exercise   Time to Recovery   Time to Recovery     Comments:     Pt will not need O2 to maintain sats with exertion.   RN made aware.      Ned Grullon RRT     Electronically signed by Ned Grullon RRT at 04/24/25 0955          Physician Progress Notes (last 24 hours)        Jim Farooq MD at 04/24/25 0812          RENAL/KCC:     LOS: 8 days    Patient Care Team:  Brian Nazario APRN as PCP - Zane Hassan MD as Consulting Physician (Cardiology)  Marnie Brandt APRN as Nurse Practitioner (Cardiology)    Chief Complaint:  Weakness, N/V    Subjective     Interval History:   Chart reviewed  Discussed with family  Patient feeling better    Objective     Vital Sign Min/Max for last 24 hours  Temp  Min: 97.1 °F (36.2 °C)  Max: 98.3 °F (36.8 °C)   BP  Min: 126/59  Max: 211/85   Pulse  Min: 60  Max: 81   Resp  Min: 12  Max: 20   SpO2  Min: 94 %  Max: 100 %   Flow (L/min)  "(Oxygen Therapy)  Min: 2  Max: 2   No data recorded     Flowsheet Rows      Flowsheet Row First Filed Value   Admission Height 162.6 cm (64\") Documented at 04/15/2025 1243   Admission Weight 65.7 kg (144 lb 13.5 oz) Documented at 04/15/2025 1243            No intake/output data recorded.  I/O last 3 completed shifts:  In: 480 [P.O.:480]  Out: 1500     Physical Exam:  GEN: Awake, NAD  ENT: PERRL, EOMI, MMM  NECK: Supple, no JVD  CHEST: CTAB, no W/R/C, +edema  CV: RRR, no M/G/R  ABD: Soft, NT, +BS  SKIN: Warm and Dry  NEURO: CN's intact      WBC WBC   Date Value Ref Range Status   04/23/2025 4.30 3.40 - 10.80 10*3/mm3 Final   04/22/2025 5.38 3.40 - 10.80 10*3/mm3 Final      HGB Hemoglobin   Date Value Ref Range Status   04/23/2025 7.5 (L) 12.0 - 15.9 g/dL Final   04/22/2025 7.5 (L) 12.0 - 15.9 g/dL Final      HCT Hematocrit   Date Value Ref Range Status   04/23/2025 23.8 (L) 34.0 - 46.6 % Final   04/22/2025 23.9 (L) 34.0 - 46.6 % Final      Platlets No results found for: \"LABPLAT\"   MCV MCV   Date Value Ref Range Status   04/23/2025 85.9 79.0 - 97.0 fL Final   04/22/2025 86.6 79.0 - 97.0 fL Final          Sodium Sodium   Date Value Ref Range Status   04/23/2025 132 (L) 136 - 145 mmol/L Final   04/22/2025 131 (L) 136 - 145 mmol/L Final      Potassium Potassium   Date Value Ref Range Status   04/23/2025 3.6 3.5 - 5.2 mmol/L Final   04/22/2025 4.1 3.5 - 5.2 mmol/L Final   04/21/2025 3.8 3.5 - 5.2 mmol/L Final      Chloride Chloride   Date Value Ref Range Status   04/23/2025 95 (L) 98 - 107 mmol/L Final   04/22/2025 97 (L) 98 - 107 mmol/L Final      CO2 CO2   Date Value Ref Range Status   04/23/2025 28.5 22.0 - 29.0 mmol/L Final   04/22/2025 25.2 22.0 - 29.0 mmol/L Final      BUN BUN   Date Value Ref Range Status   04/23/2025 13 8 - 23 mg/dL Final   04/22/2025 14 8 - 23 mg/dL Final      Creatinine Creatinine   Date Value Ref Range Status   04/23/2025 1.86 (H) 0.57 - 1.00 mg/dL Final   04/22/2025 2.00 (H) 0.57 - 1.00 mg/dL " "Final      Calcium Calcium   Date Value Ref Range Status   2025 7.9 (L) 8.6 - 10.5 mg/dL Final   2025 8.1 (L) 8.6 - 10.5 mg/dL Final      PO4 No results found for: \"CAPO4\"   Albumin No results found for: \"ALBUMIN\"     Magnesium No results found for: \"MG\"     Uric Acid No results found for: \"URICACID\"        Results Review:     I reviewed the patient's new clinical results.    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  Add daily Lasix.  EPO with HD.  BP improving on increased Rx.  OK for D/C from a renal standpoint.  Continue HD at [facility] until transition to [facility].        Jim Farooq MD  [facility]  25  08:12 EDT        Electronically signed by Jim Farooq MD at 25 0832       Harjinder Rosenbaum MD at 25 1353              [facility] MEDICINE SERVICE  DAILY PROGRESS NOTE    NAME: Brad Woodward  : 1940  MRN: [id number]      LOS: 7 days     PROVIDER OF SERVICE: Harjinder Rosenbaum MD    Chief Complaint: Anemia    Subjective:     Interval History:  History taken from: patient    Seen and examined, son at bed side. Going for HD today.  BP noted to be elevated to >200 systolic this am      Review of Systems: Negative " except described above  Review of Systems    Objective:     Vital Signs  Temp:  [97.6 °F (36.4 °C)-98.4 °F (36.9 °C)] 98.3 °F (36.8 °C)  Heart Rate:  [66-89] 77  Resp:  [14-20] 20  BP: (143-211)/(63-85) 176/74   Body mass index is 24.86 kg/m².    Physical Exam  General: Alert and oriented, no acute distress.  Lungs: Clear to auscultation, nonlabored respiration. R upper chest TDC cath in place   Heart: Regular rate and rhythm   Abdomen: Soft, nontender, nondistended, + bowel sounds.  Musculoskeletal: LUE swelling>RUE- improving   Neuro: alert and awake, moving all 4 extremities           Diagnostic Data    Results from last 7 days   Lab Units 04/23/25  0503   WBC 10*3/mm3 4.30   HEMOGLOBIN g/dL 7.5*   HEMATOCRIT % 23.8*   PLATELETS 10*3/mm3 198   GLUCOSE mg/dL 129*   CREATININE mg/dL 1.86*   BUN mg/dL 13   SODIUM mmol/L 132*   POTASSIUM mmol/L 3.6   ANION GAP mmol/L 8.5       No radiology results for the last day          I reviewed the patient's new clinical results.    Assessment/Plan:     Active and Resolved Problems  Active Hospital Problems    Diagnosis  POA    **Anemia [D64.9]  Yes      Resolved Hospital Problems   No resolved problems to display.       Anemia  Electrolyte imbalances  ERIN on CKD  Weakness  Per Dr. Farooq, give 1 unit PRBC and recheck labs  Appreciate nephrology assistance  RVP negative  CXR negative  PT/OT to eval  EKG with QT prolongation, avoid medications that further prolong QT  Caution restarting home meds with ERIN as well as QT prolongation, close review once meds are reconciled  Nephrology following, HD as per renal, patient will need OP HD set up. Discussed with CM.  Patient received tunneled dialysis catheter placement on 4/18    Hypertension   Sick Sinus Syndrome  BP stable  S/P pacemaker- follows with Dr. Aldridge outpatient  On Tab amlodipine 10 mg and Tab hydralazine 100 mg TID   increase clonidine to 0.2 mg q12h   Add carvedilol 3.125 mg BID  Monitor BP and adjust medications  accordingly   Resume home medications as appropriate     Hyperlipidemia  Continue statin     Diabetes Mellitus   Low SSI   Consistent carb diet  Hypoglycemia protocol   Hold home PO meds  ACHS accucheck    LUE swelling  No DVT on US duplex       Anemia   Denies any bleeding  Anemia work up consistent with ROMAN  Start oral ferrous sulphate 325 mg  GI as outpatient for possible EGD/colonoscopy     VTE Prophylaxis:  Pharmacologic & mechanical VTE prophylaxis orders are present.             Disposition Planning:        Anticipated Date of Discharge: , stable BP, medical clearance          Time: 35 minutes     Code Status and Medical Interventions: CPR (Attempt to Resuscitate); Full Support   Ordered at: 04/15/25 1734     Code Status (Patient has no pulse and is not breathing):    CPR (Attempt to Resuscitate)     Medical Interventions (Patient has pulse or is breathing):    Full Support     Level Of Support Discussed With:    Health Care Surrogate       Next of Kin (If No Surrogate)       Signature: Electronically signed by Harjinder Rosenbaum MD, 25, 13:51 EDT.  [facility] Hospitalist Team      Electronically signed by Harjinder Rosenbaum MD at 25 1355       Consult Notes (last 24 hours)  Notes from 25 1027 through 25 1027   No notes of this type exist for this encounter.          Discharge Summary        Harjinder Rosenbaum MD at 25 0929                       [facility] Medicine Services  Discharge Summary    Date of Service: 2025  Patient Name: Brad Woodward  : 1940  MRN: [id number]    Date of Admission: 4/15/2025  Discharge Diagnosis: Anemia, New ESRD  Hypertension   Date of Discharge: 2025  Primary Care Physician: Brian Nazario APRN      Presenting Problem:   Hypokalemia [E87.6]  Nausea [R11.0]  Chest tightness [R07.89]  Anemia [D64.9]  Generalized weakness [R53.1]  Dyspnea, unspecified type [R06.00]  Anemia due to chronic kidney disease,  "unspecified CKD stage [N18.9, D63.1]    Active and Resolved Hospital Problems:  Active Hospital Problems    Diagnosis POA    **Anemia [D64.9] Yes      Resolved Hospital Problems   No resolved problems to display.         Hospital Course     HPI:    \"Brad Woodward is a 84 y.o. female with a CMH of DM, HTN, HLD, CKD who presented to [facility] on 4/15/2025 with family due to complaints of nausea and generalized weakness.  Patient was resting comfortably during encounter, patient son at bedside with his wife were primary historians.  Per family, patient had nausea, chills, weakness that started earlier today.  Patient family reports patient did complain of neck pain briefly yesterday however they attributed the pain to hypertension.  Denies fevers at home.  Reports patient was complaining of mild shortness of breath as well.  States they came to the hospital for outpatient labs and when they were getting labs drawn patient continued to complain of weakness and shortness of breath as well as nausea and chills.  Patient family reports they left to go home and en route to the house they decided to turn around and return to the ED for evaluation.  Patient family reports patient bowel movements have been her regular which for the past few years has been every 3 to 4 days.  Patient family denies any dark stools and denies abdominal pain complaints from patient.  No signs of bleeding per family.     On ED evaluation, vitals stable. Labs remarkable for UA with presence of protein and WBC.  CBC remarkable for hemoglobin 7.2, hematocrit 21.3, RBC 2.62.  PTH intact 179.  Random protein creatinine ratio shows 5309.4.  Initial troponin 69, reflex 67.  proBNP 5094.  TSH 6.210, free T4 within normal limits.  CMP in ED shows creatinine 2.98, BUN 39, sodium 124, potassium 3.3, ALT 53, AST 79.  Ferritin 226.  Iron profile shows within normal limits.  RVP negative.  Chest x-ray negative for acute findings.  EKG on arrival shows " "\"sinus rhythm, right bundle branch block, prolonged QT interval\".  Patient did receive IV Zofran in the ED.  ED provider spoke with the primary nephrologist Dr. Farooq who ordered to give 1 unit packed red blood cells and reassess electrolytes and kidney function posttransfusion. Hospitalist service to admit for further management.\"    Hospital Course:  Anemia  Electrolyte imbalances  ERIN on CKD  Weakness  Appreciate nephrology assistance  RVP negative  CXR negative  PT/OT to eval  EKG with QT prolongation, avoid medications that further prolong QT  Caution restarting home meds with ERIN as well as QT prolongation, close review once meds are reconciled  Nephrology following, HD as per renal, patient will need OP HD set up. Discussed with CM.  Patient received tunneled dialysis catheter placement on 4/18     Hypertension   Sick Sinus Syndrome  BP stable  S/P pacemaker- follows with Dr. Aldridge outpatient  On Tab amlodipine 10 mg and Tab hydralazine 100 mg TID, clonidine to 0.2 mg q12h and carvedilol 3.125 mg BID  Furosemide 40 mg daily added by renal  Monitor BP and adjust medications accordingly        Hyperlipidemia  Continue statin     Diabetes Mellitus   Last A1C 5.4  Patient's POC glucose mostly normal and occasionally needing ISS  Given her age monitor off oral hypoglycemics  High risk for hypoglycemic events  Monitor POC glucose and resume meds as appropriate  Close follow up with PCP     LUE swelling  No DVT on US duplex         Anemia   Denies any bleeding  Anemia work up consistent with ROMAN  Start oral ferrous sulphate 325 mg  GI as outpatient for possible EGD/colonoscopy         DISCHARGE Follow Up Recommendations for labs and diagnostics:   Follow up with primary care provider within 3 days of this discharge.   Monitor CBC as outpatient. Consider EGD/colonoscopy outpatient for further evaluation of iron def anemia. No signs of bleeding while inpatient.  Monitor POC glucose and blood pressure and adjust " medications as appropriate.   Follow up with Renal and Cardiology as outpatient.         Day of Discharge     Vital Signs:  Temp:  [97.1 °F (36.2 °C)-98.3 °F (36.8 °C)] 98 °F (36.7 °C)  Heart Rate:  [60-63] 61  Resp:  [12-20] 20  BP: (126-177)/(56-72) 172/64  Flow (L/min) (Oxygen Therapy):  [2] 2        Pertinent  and/or Most Recent Results     LAB RESULTS:      Lab 04/23/25  0503 04/22/25  0349 04/21/25  0303 04/20/25  0114 04/19/25  0458 04/17/25  2339   WBC 4.30 5.38 8.04 6.85 6.71 4.26   HEMOGLOBIN 7.5* 7.5* 8.8* 8.2* 8.5* 9.0*   HEMATOCRIT 23.8* 23.9* 27.1* 25.6* 25.8* 26.6*   PLATELETS 198 212 255 229 219 214   NEUTROS ABS 2.66 3.43 4.89 4.10 4.47 2.32   IMMATURE GRANS (ABS) 0.01 0.01 0.03 0.03 0.03 0.04   LYMPHS ABS 0.90 1.05 1.84 1.67 1.33 1.23   MONOS ABS 0.52 0.78 1.06* 0.88 0.69 0.57   EOS ABS 0.19 0.09 0.19 0.14 0.16 0.09   MCV 85.9 86.6 85.2 85.9 84.0 84.2   LDH  --  301*  --   --   --   --    PROTIME  --   --   --   --   --  13.0         Lab 04/23/25  0503 04/22/25  0349 04/21/25  1337 04/21/25  0303 04/20/25  0114 04/19/25  0458   SODIUM 132* 131*  --  129* 129* 129*   POTASSIUM 3.6 4.1 3.8 3.0* 3.7 3.5   CHLORIDE 95* 97*  --  91* 91* 94*   CO2 28.5 25.2  --  28.1 29.6* 26.0   ANION GAP 8.5 8.8  --  9.9 8.4 9.0   BUN 13 14  --  22 17 23   CREATININE 1.86* 2.00*  --  2.31* 1.89* 2.07*   EGFR 26.4* 24.2*  --  20.4* 25.9* 23.3*   GLUCOSE 129* 111*  --  95 120* 101*   CALCIUM 7.9* 8.1*  --  7.9* 7.9* 8.1*             Lab 04/17/25  2339   PROTIME 13.0   INR 0.99             Lab 04/22/25  1804 04/22/25  0349   IRON  --  27*   IRON SATURATION (TSAT)  --  11*   TIBC  --  240*   TRANSFERRIN  --  161*   FERRITIN  --  89.80   FOLATE 7.58  --    VITAMIN B 12 >2,000*  --          Brief Urine Lab Results  (Last result in the past 365 days)        Color   Clarity   Blood   Leuk Est   Nitrite   Protein   CREAT   Urine HCG        04/17/25 1847             56.7               Microbiology Results (last 10 days)        Procedure Component Value - Date/Time    COVID-19, FLU A/B, RSV PCR 1 HR TAT - Swab, Nasopharynx [[id number]]  (Normal) Collected: 04/15/25 1942    Lab Status: Final result Specimen: Swab from Nasopharynx Updated: 04/15/25 2024     COVID19 Not Detected     Influenza A PCR Not Detected     Influenza B PCR Not Detected     RSV, PCR Not Detected    Narrative:      Fact sheet for providers: https://www.fda.gov/media/610864/download    Fact sheet for patients: https://www.fda.gov/media/538472/download    Test performed by PCR.    Respiratory Panel PCR w/COVID-19(SARS-CoV-2) VÍCTOR/DAYNA/JADYN/PAD/COR/[facility]IRAG In-House, NP Swab in UTM/VTM, 2 HR TAT - Swab, Nasopharynx [[id number]]  (Normal) Collected: 04/15/25 1331    Lab Status: Final result Specimen: Swab from Nasopharynx Updated: 04/15/25 1425     ADENOVIRUS, PCR Not Detected     Coronavirus 229E Not Detected     Coronavirus HKU1 Not Detected     Coronavirus NL63 Not Detected     Coronavirus OC43 Not Detected     COVID19 Not Detected     Human Metapneumovirus Not Detected     Human Rhinovirus/Enterovirus Not Detected     Influenza A PCR Not Detected     Influenza B PCR Not Detected     Parainfluenza Virus 1 Not Detected     Parainfluenza Virus 2 Not Detected     Parainfluenza Virus 3 Not Detected     Parainfluenza Virus 4 Not Detected     RSV, PCR Not Detected     Bordetella pertussis pcr Not Detected     Bordetella parapertussis PCR Not Detected     Chlamydophila pneumoniae PCR Not Detected     Mycoplasma pneumo by PCR Not Detected    Narrative:      In the setting of a positive respiratory panel with a viral infection PLUS a negative procalcitonin without other underlying concern for bacterial infection, consider observing off antibiotics or discontinuation of antibiotics and continue supportive care. If the respiratory panel is positive for atypical bacterial infection (Bordetella pertussis, Chlamydophila pneumoniae, or Mycoplasma pneumoniae), consider antibiotic de-escalation to  target atypical bacterial infection.    Urine Culture - Urine, Urine, Clean Catch [[id number]]  (Normal) Collected: 04/15/25 1146    Lab Status: Final result Specimen: Urine, Clean Catch Updated: 04/16/25 2126     Urine Culture No growth            IR Ins Tunneled Dialysis Catheter WO Port  Result Date: 4/18/2025  Impression: Successful placement of right IJ 19 cm tip to cuff dual-lumen tunneled dialysis catheter utilizing ultrasound and fluoroscopic guidance. Report dictated by: Rene Walker DNP  I have personally reviewed this case and agree with the findings above: Electronically Signed: Roxie Lentz MD  4/18/2025 1:26 PM EDT  Workstation ID: [id number]    XR Chest 1 View  Result Date: 4/15/2025  Impression: Impression: No active pulmonary process. Stable cardiomegaly since 2021. Electronically Signed: Samuel Villa MD  4/15/2025 1:49 PM EDT  Workstation ID: [id number]      Results for orders placed during the hospital encounter of 04/15/25    Duplex Venous Upper Extremity - Bilateral CAR    Interpretation Summary    Normal bilateral upper extremity venous duplex scan.      Results for orders placed during the hospital encounter of 04/15/25    Duplex Venous Upper Extremity - Bilateral CAR    Interpretation Summary    Normal bilateral upper extremity venous duplex scan.      Results for orders placed during the hospital encounter of 04/15/21    Adult Transthoracic Echo Complete W/ Cont if Necessary Per Protocol    Interpretation Summary  · Left ventricular ejection fraction appears to be 61 - 65%.  · Left ventricular wall thickness is consistent with moderate basal asymmetric hypertrophy.  · The right ventricular cavity is mildly dilated.  · Mild to moderate aortic valve stenosis is present.  · Moderate mitral valve regurgitation is present.  · No pericardial effusion noted      Labs Pending at Discharge:  Pending Results       Procedure [Order ID] Specimen - Date/Time    Occult Blood, Fecal By Immunoassay - Stool,  Per Rectum [[id number]]     Specimen: Stool from Per Rectum             Procedures Performed           Consults:   Consults       Date and Time Order Name Status Description    4/15/2025  3:44 PM Hospitalist (on-call MD unless specified)      4/15/2025  2:31 PM Nephrology (on -call MD unless specified) Completed               Discharge Details        Discharge Medications        New Medications        Instructions Start Date   carvedilol 3.125 MG tablet  Commonly known as: COREG   3.125 mg, Oral, 2 Times Daily With Meals      cloNIDine 0.2 MG tablet  Commonly known as: CATAPRES   0.2 mg, Oral, Every 12 Hours Scheduled      docusate sodium 100 MG capsule  Commonly known as: Colace   100 mg, Oral, 2 Times Daily, As needed for constipation      ferrous sulfate 325 (65 FE) MG tablet   325 mg, Oral, Daily With Breakfast   Start Date: April 25, 2025     furosemide 40 MG tablet  Commonly known as: LASIX   40 mg, Oral, Daily   Start Date: April 25, 2025     pyridoxine 25 MG tablet  Commonly known as: VITAMIN B-6   25 mg, Oral, 3 Times Daily PRN      Scopolamine 1 MG/3DAYS patch   1 patch, Transdermal, Every 72 Hours   Start Date: April 26, 2025            Changes to Medications        Instructions Start Date   hydrALAZINE 100 MG tablet  Commonly known as: APRESOLINE  What changed:   medication strength  how much to take  when to take this  additional instructions   100 mg, Oral, 3 times daily             Continue These Medications        Instructions Start Date   amLODIPine 10 MG tablet  Commonly known as: NORVASC   10 mg, Oral, Daily      aspirin 81 MG chewable tablet   81 mg, Oral, Daily      atorvastatin 80 MG tablet  Commonly known as: LIPITOR   80 mg, Oral, Daily      VITAMIN B-12 PO   1 tablet, Daily             Stop These Medications      cephalexin 250 MG capsule  Commonly known as: KEFLEX     chlorthalidone 50 MG tablet  Commonly known as: HYGROTEN     glipiZIDE 2.5 MG tablet              No Known  Allergies      Discharge Disposition:   Home or Self Care    Diet:  Hospital:  Diet Order   Procedures    Diet: Cardiac, Diabetic, Renal; Healthy Heart (2-3 Na+); Consistent Carbohydrate; Low Sodium (2-3g), Low Potassium, Low Phosphorus; Fluid Consistency: Thin (IDDSI 0)         Discharge Activity:   as tolerated       CODE STATUS:  Code Status and Medical Interventions: CPR (Attempt to Resuscitate); Full Support   Ordered at: 04/15/25 3262     Code Status (Patient has no pulse and is not breathing):    CPR (Attempt to Resuscitate)     Medical Interventions (Patient has pulse or is breathing):    Full Support     Level Of Support Discussed With:    Health Care Surrogate       Next of Kin (If No Surrogate)         Future Appointments   Date Time Provider Department Center   6/5/2025  9:00 AM [facility] DEVICE [facility]   6/5/2025  9:30 AM Marnie Brandt APRN [facility]   7/15/2025 11:45 AM Zane Aldridge MD [facility]           Time spent on Discharge including face to face service:  >35 minutes    Signature: Electronically signed by Harjinder Rosenbaum MD, 04/24/25, 09:30 EDT.  [facility] Hospitalist Team    Electronically signed by Harjinder Rosenbaum MD at 04/24/25 0907

## 2025-04-24 NOTE — PROGRESS NOTES
"RENAL/KCC:     LOS: 8 days    Patient Care Team:  Brian Nazario APRN as PCP - General  Zane Aldridge MD as Consulting Physician (Cardiology)  Marnie Brandt APRN as Nurse Practitioner (Cardiology)    Chief Complaint:  Weakness, N/V    Subjective     Interval History:   Chart reviewed  Discussed with family  Patient feeling better    Objective     Vital Sign Min/Max for last 24 hours  Temp  Min: 97.1 °F (36.2 °C)  Max: 98.3 °F (36.8 °C)   BP  Min: 126/59  Max: 211/85   Pulse  Min: 60  Max: 81   Resp  Min: 12  Max: 20   SpO2  Min: 94 %  Max: 100 %   Flow (L/min) (Oxygen Therapy)  Min: 2  Max: 2   No data recorded     Flowsheet Rows      Flowsheet Row First Filed Value   Admission Height 162.6 cm (64\") Documented at 04/15/2025 1243   Admission Weight 65.7 kg (144 lb 13.5 oz) Documented at 04/15/2025 1243            No intake/output data recorded.  I/O last 3 completed shifts:  In: 480 [P.O.:480]  Out: 1500     Physical Exam:  GEN: Awake, NAD  ENT: PERRL, EOMI, MMM  NECK: Supple, no JVD  CHEST: CTAB, no W/R/C, +edema  CV: RRR, no M/G/R  ABD: Soft, NT, +BS  SKIN: Warm and Dry  NEURO: CN's intact      WBC WBC   Date Value Ref Range Status   04/23/2025 4.30 3.40 - 10.80 10*3/mm3 Final   04/22/2025 5.38 3.40 - 10.80 10*3/mm3 Final      HGB Hemoglobin   Date Value Ref Range Status   04/23/2025 7.5 (L) 12.0 - 15.9 g/dL Final   04/22/2025 7.5 (L) 12.0 - 15.9 g/dL Final      HCT Hematocrit   Date Value Ref Range Status   04/23/2025 23.8 (L) 34.0 - 46.6 % Final   04/22/2025 23.9 (L) 34.0 - 46.6 % Final      Platlets No results found for: \"LABPLAT\"   MCV MCV   Date Value Ref Range Status   04/23/2025 85.9 79.0 - 97.0 fL Final   04/22/2025 86.6 79.0 - 97.0 fL Final          Sodium Sodium   Date Value Ref Range Status   04/23/2025 132 (L) 136 - 145 mmol/L Final   04/22/2025 131 (L) 136 - 145 mmol/L Final      Potassium Potassium   Date Value Ref Range Status   04/23/2025 3.6 3.5 - 5.2 mmol/L Final   04/22/2025 4.1 3.5 - 5.2 " "mmol/L Final   04/21/2025 3.8 3.5 - 5.2 mmol/L Final      Chloride Chloride   Date Value Ref Range Status   04/23/2025 95 (L) 98 - 107 mmol/L Final   04/22/2025 97 (L) 98 - 107 mmol/L Final      CO2 CO2   Date Value Ref Range Status   04/23/2025 28.5 22.0 - 29.0 mmol/L Final   04/22/2025 25.2 22.0 - 29.0 mmol/L Final      BUN BUN   Date Value Ref Range Status   04/23/2025 13 8 - 23 mg/dL Final   04/22/2025 14 8 - 23 mg/dL Final      Creatinine Creatinine   Date Value Ref Range Status   04/23/2025 1.86 (H) 0.57 - 1.00 mg/dL Final   04/22/2025 2.00 (H) 0.57 - 1.00 mg/dL Final      Calcium Calcium   Date Value Ref Range Status   04/23/2025 7.9 (L) 8.6 - 10.5 mg/dL Final   04/22/2025 8.1 (L) 8.6 - 10.5 mg/dL Final      PO4 No results found for: \"CAPO4\"   Albumin No results found for: \"ALBUMIN\"     Magnesium No results found for: \"MG\"     Uric Acid No results found for: \"URICACID\"        Results Review:     I reviewed the patient's new clinical results.    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  Add daily Lasix.  EPO with HD.  BP improving on increased Rx.  OK for D/C from a renal standpoint.  Continue HD at [facility] in [facility] until transition to [facility].        Jim Farooq MD  [facility]  04/24/25  08:12 EDT

## 2025-04-25 NOTE — CASE MANAGEMENT/SOCIAL WORK
Case Management Discharge Note      Final Note: Routine home         Selected Continued Care - Discharged on 4/24/2025 Admission date: 4/15/2025 - Discharge disposition: Home or Self Care              Transportation Services  Private: Car    Final Discharge Disposition Code: 01 - home or self-care

## 2025-04-25 NOTE — OUTREACH NOTE
Prep Survey      Flowsheet Row Responses   [facility] facility [facility] discharged from? [facility]   Is LACE score < 7 ? No   Eligibility Readm Mgmt   Discharge diagnosis Anemia   Does the patient have one of the following disease processes/diagnoses(primary or secondary)? Other   Comments regarding appointments Mino Richardson in [facility] @ 1332   Prep survey completed? Yes            Janee FOY - Registered Nurse

## 2025-04-28 DIAGNOSIS — N18.32 STAGE 3B CHRONIC KIDNEY DISEASE: Primary | Chronic | ICD-10-CM

## 2025-04-28 DIAGNOSIS — Z01.818 PRE-OP EVALUATION: ICD-10-CM

## 2025-04-29 ENCOUNTER — HOSPITAL ENCOUNTER (EMERGENCY)
Facility: HOSPITAL | Age: 85
Discharge: HOME | End: 2025-04-29
Attending: EMERGENCY MEDICINE | Admitting: EMERGENCY MEDICINE
Payer: MEDICARE

## 2025-04-29 ENCOUNTER — READMISSION MANAGEMENT (OUTPATIENT)
Dept: CALL CENTER | Facility: HOSPITAL | Age: 85
End: 2025-04-29
Payer: MEDICARE

## 2025-04-29 VITALS
RESPIRATION RATE: 13 BRPM | TEMPERATURE: 98.2 F | SYSTOLIC BLOOD PRESSURE: 164 MMHG | HEART RATE: 62 BPM | BODY MASS INDEX: 24.39 KG/M2 | WEIGHT: 142.86 LBS | DIASTOLIC BLOOD PRESSURE: 66 MMHG | HEIGHT: 64 IN | OXYGEN SATURATION: 94 %

## 2025-04-29 DIAGNOSIS — I10 HYPERTENSION, UNSPECIFIED TYPE: Primary | ICD-10-CM

## 2025-04-29 LAB
ANION GAP SERPL CALCULATED.3IONS-SCNC: 16.1 MMOL/L (ref 5–15)
BASOPHILS # BLD AUTO: 0.02 10*3/MM3 (ref 0–0.2)
BASOPHILS NFR BLD AUTO: 0.5 % (ref 0–1.5)
BUN SERPL-MCNC: 14 MG/DL (ref 8–23)
BUN/CREAT SERPL: 6.6 (ref 7–25)
CALCIUM SPEC-SCNC: 8.8 MG/DL (ref 8.6–10.5)
CHLORIDE SERPL-SCNC: 93 MMOL/L (ref 98–107)
CO2 SERPL-SCNC: 24.9 MMOL/L (ref 22–29)
CREAT SERPL-MCNC: 2.12 MG/DL (ref 0.57–1)
DEPRECATED RDW RBC AUTO: 50.3 FL (ref 37–54)
EGFRCR SERPLBLD CKD-EPI 2021: 22.6 ML/MIN/1.73
EOSINOPHIL # BLD AUTO: 0.25 10*3/MM3 (ref 0–0.4)
EOSINOPHIL NFR BLD AUTO: 5.7 % (ref 0.3–6.2)
ERYTHROCYTE [DISTWIDTH] IN BLOOD BY AUTOMATED COUNT: 16 % (ref 12.3–15.4)
GLUCOSE SERPL-MCNC: 108 MG/DL (ref 65–99)
HCT VFR BLD AUTO: 35.2 % (ref 34–46.6)
HGB BLD-MCNC: 10.9 G/DL (ref 12–15.9)
HOLD SPECIMEN: NORMAL
HOLD SPECIMEN: NORMAL
IMM GRANULOCYTES # BLD AUTO: 0.02 10*3/MM3 (ref 0–0.05)
IMM GRANULOCYTES NFR BLD AUTO: 0.5 % (ref 0–0.5)
LYMPHOCYTES # BLD AUTO: 1.06 10*3/MM3 (ref 0.7–3.1)
LYMPHOCYTES NFR BLD AUTO: 24 % (ref 19.6–45.3)
MCH RBC QN AUTO: 27 PG (ref 26.6–33)
MCHC RBC AUTO-ENTMCNC: 31 G/DL (ref 31.5–35.7)
MCV RBC AUTO: 87.1 FL (ref 79–97)
MONOCYTES # BLD AUTO: 0.48 10*3/MM3 (ref 0.1–0.9)
MONOCYTES NFR BLD AUTO: 10.9 % (ref 5–12)
NEUTROPHILS NFR BLD AUTO: 2.59 10*3/MM3 (ref 1.7–7)
NEUTROPHILS NFR BLD AUTO: 58.4 % (ref 42.7–76)
NRBC BLD AUTO-RTO: 0 /100 WBC (ref 0–0.2)
PLATELET # BLD AUTO: 256 10*3/MM3 (ref 140–450)
PMV BLD AUTO: 9.3 FL (ref 6–12)
POTASSIUM SERPL-SCNC: 3.2 MMOL/L (ref 3.5–5.2)
RBC # BLD AUTO: 4.04 10*6/MM3 (ref 3.77–5.28)
SODIUM SERPL-SCNC: 134 MMOL/L (ref 136–145)
WBC NRBC COR # BLD AUTO: 4.42 10*3/MM3 (ref 3.4–10.8)
WHOLE BLOOD HOLD COAG: NORMAL
WHOLE BLOOD HOLD SPECIMEN: NORMAL

## 2025-04-29 PROCEDURE — 96374 THER/PROPH/DIAG INJ IV PUSH: CPT

## 2025-04-29 PROCEDURE — 80048 BASIC METABOLIC PNL TOTAL CA: CPT | Performed by: EMERGENCY MEDICINE

## 2025-04-29 PROCEDURE — 96376 TX/PRO/DX INJ SAME DRUG ADON: CPT

## 2025-04-29 PROCEDURE — 25010000002 HYDRALAZINE PER 20 MG: Performed by: EMERGENCY MEDICINE

## 2025-04-29 PROCEDURE — 85025 COMPLETE CBC W/AUTO DIFF WBC: CPT | Performed by: EMERGENCY MEDICINE

## 2025-04-29 PROCEDURE — 99283 EMERGENCY DEPT VISIT LOW MDM: CPT

## 2025-04-29 RX ORDER — CLONIDINE HYDROCHLORIDE 0.1 MG/1
0.2 TABLET ORAL ONCE
Status: COMPLETED | OUTPATIENT
Start: 2025-04-29 | End: 2025-04-29

## 2025-04-29 RX ORDER — HYDRALAZINE HYDROCHLORIDE 20 MG/ML
10 INJECTION INTRAMUSCULAR; INTRAVENOUS ONCE
Status: COMPLETED | OUTPATIENT
Start: 2025-04-29 | End: 2025-04-29

## 2025-04-29 RX ADMIN — CLONIDINE HYDROCHLORIDE 0.2 MG: 0.1 TABLET ORAL at 05:55

## 2025-04-29 RX ADMIN — HYDRALAZINE HYDROCHLORIDE 10 MG: 20 INJECTION INTRAMUSCULAR; INTRAVENOUS at 04:55

## 2025-04-29 RX ADMIN — HYDRALAZINE HYDROCHLORIDE 10 MG: 20 INJECTION INTRAMUSCULAR; INTRAVENOUS at 04:05

## 2025-04-29 NOTE — OUTREACH NOTE
Medical Week 1 Survey      Flowsheet Row Responses   [facility] facility patient discharged from? [facility]   Does the patient have one of the following disease processes/diagnoses(primary or secondary)? Other   Week 1 attempt successful? No   Unsuccessful attempts Attempt 1            Priti MEJIA - Registered Nurse

## 2025-04-29 NOTE — ED PROVIDER NOTES
Subjective   History of Present Illness  84-year-old female who is non-English speaking, family translates with elevated blood pressure all day today.  Patient had a normal dialysis session.  There is some confusion over the administration of her medicines.  Initially the son told me she only had 1 dose of her medicines and it was at 4 PM though she is on hydralazine 3 times daily, Coreg twice daily and clonidine twice daily.  Has no symptoms with her elevated blood pressure.      Review of Systems    Past Medical History:   Diagnosis Date    Diabetes mellitus     Hyperlipidemia     Hypertension        No Known Allergies    Past Surgical History:   Procedure Laterality Date    CARDIAC ELECTROPHYSIOLOGY PROCEDURE N/A 4/20/2021    Procedure: Pacemaker DC new;  Surgeon: Yovany Navarrete MD;  Location: [facility] INVASIVE LOCATION;  Service: Cardiovascular;  Laterality: N/A;       Family History   Family history unknown: Yes       Social History     Socioeconomic History    Marital status:    Tobacco Use    Smoking status: Never     Passive exposure: Never    Smokeless tobacco: Never   Vaping Use    Vaping status: Never Used   Substance and Sexual Activity    Alcohol use: No    Drug use: No    Sexual activity: Defer           Objective   Physical Exam  Constitutional:       Appearance: Normal appearance.   HENT:      Head: Normocephalic and atraumatic.      Mouth/Throat:      Mouth: Mucous membranes are moist.      Pharynx: Oropharynx is clear.   Cardiovascular:      Rate and Rhythm: Normal rate and regular rhythm.   Pulmonary:      Effort: Pulmonary effort is normal.      Breath sounds: Normal breath sounds.      Comments: Shiley right chest  Musculoskeletal:         General: No swelling or tenderness. Normal range of motion.   Skin:     General: Skin is warm and dry.      Capillary Refill: Capillary refill takes less than 2 seconds.   Neurological:      General: No focal deficit present.      Mental Status:  She is alert.         Procedures           ED Course                                                       Medical Decision Making  Patient well, essentially has slept the majority of her encounter and responded best to her clonidine, blood pressure improved, family comfortable taking home    Problems Addressed:  Hypertension, unspecified type: complicated acute illness or injury    Amount and/or Complexity of Data Reviewed  External Data Reviewed: notes.     Details: Discharge summary reviewed from 4/24/2025, admission for fatigue/anemia/chest pain  Labs: ordered.     Details: Hemoglobin 10.9    Risk  Prescription drug management.        Final diagnoses:   Hypertension, unspecified type       ED Disposition  ED Disposition       ED Disposition   Discharge    Condition   Stable    Comment   --               Brian Nazario, APRN  [address] [facility] [place] [address]  [phone]               Medication List      No changes were made to your prescriptions during this visit.            Renato Coley MD  04/29/25 0707     Dutasteride Counseling: Dustasteride Counseling:  I discussed with the patient the risks of use of dutasteride including but not limited to decreased libido, decreased ejaculate volume, and gynecomastia. Women who can become pregnant should not handle medication.  All of the patient's questions and concerns were addressed. Dutasteride Male Counseling: Dustasteride Counseling:  I discussed with the patient the risks of use of dutasteride including but not limited to decreased libido, decreased ejaculate volume, and gynecomastia. Women who can become pregnant should not handle medication.  All of the patient's questions and concerns were addressed.

## 2025-05-06 ENCOUNTER — READMISSION MANAGEMENT (OUTPATIENT)
Dept: CALL CENTER | Facility: HOSPITAL | Age: 85
End: 2025-05-06
Payer: MEDICARE

## 2025-05-06 NOTE — OUTREACH NOTE
Medical Week 2 Survey      Flowsheet Row Responses   [facility] patient discharged from? [facility]   Does the patient have one of the following disease processes/diagnoses(primary or secondary)? Other   Week 2 attempt successful? Yes   Call start time 1341   Discharge diagnosis Anemia   Call end time 1344   Person spoke with today (if not patient) and relationship jose j Saenz reviewed with patient/caregiver? Yes   Is the patient having any side effects they believe may be caused by any medication additions or changes? No   Does the patient have all medications ordered at discharge? Yes   Is the patient taking all medications as directed (includes completed medication regime)? Yes   Comments regarding appointments Mino Richardson in [facility] @ 1330   Does the patient have a primary care provider?  Yes   Does the patient have an appointment with their PCP within 7 days of discharge? Yes   Has the patient kept scheduled appointments due by today? Yes   Psychosocial issues? No   Did the patient receive a copy of their discharge instructions? Yes   Nursing interventions Reviewed instructions with patient   What is the patient's perception of their health status since discharge? Improving   Is the patient/caregiver able to teach back signs and symptoms related to disease process for when to call PCP? Yes   Is the patient/caregiver able to teach back signs and symptoms related to disease process for when to call 911? Yes   Is the patient/caregiver able to teach back the hierarchy of who to call/visit for symptoms/problems? PCP, Specialist, Home health nurse, Urgent Care, ED, 911 Yes   If the patient is a current smoker, are they able to teach back resources for cessation? Not a smoker   Additional teach back comments States she is doing well and going to dialysis 3times a week.  Had f/u with PCP.   Week 2 Call Completed? Yes   Graduated Yes   Graduated/Revoked comments Doing well with no questions or needs  at thist kim.   Call end time 0784            Melissa GANT - Licensed Nurse

## 2025-05-16 ENCOUNTER — HOSPITAL ENCOUNTER (INPATIENT)
Facility: HOSPITAL | Age: 85
LOS: 4 days | Discharge: HOME | End: 2025-05-22
Attending: STUDENT IN AN ORGANIZED HEALTH CARE EDUCATION/TRAINING PROGRAM | Admitting: INTERNAL MEDICINE
Payer: MEDICARE

## 2025-05-16 ENCOUNTER — APPOINTMENT (OUTPATIENT)
Dept: GENERAL RADIOLOGY | Facility: HOSPITAL | Age: 85
End: 2025-05-16
Payer: MEDICARE

## 2025-05-16 DIAGNOSIS — I16.0 HYPERTENSIVE URGENCY: Primary | ICD-10-CM

## 2025-05-16 LAB
ALBUMIN SERPL-MCNC: 3.1 G/DL (ref 3.5–5.2)
ALBUMIN/GLOB SERPL: 1.2 G/DL
ALP SERPL-CCNC: 85 U/L (ref 39–117)
ALT SERPL W P-5'-P-CCNC: 23 U/L (ref 1–33)
ANION GAP SERPL CALCULATED.3IONS-SCNC: 6.1 MMOL/L (ref 5–15)
AST SERPL-CCNC: 36 U/L (ref 1–32)
BASOPHILS # BLD AUTO: 0.02 10*3/MM3 (ref 0–0.2)
BASOPHILS NFR BLD AUTO: 0.6 % (ref 0–1.5)
BILIRUB SERPL-MCNC: 0.3 MG/DL (ref 0–1.2)
BUN SERPL-MCNC: 13 MG/DL (ref 8–23)
BUN/CREAT SERPL: 6.3 (ref 7–25)
CALCIUM SPEC-SCNC: 7.9 MG/DL (ref 8.6–10.5)
CHLORIDE SERPL-SCNC: 97 MMOL/L (ref 98–107)
CO2 SERPL-SCNC: 27.9 MMOL/L (ref 22–29)
CREAT SERPL-MCNC: 2.06 MG/DL (ref 0.57–1)
DEPRECATED RDW RBC AUTO: 48.8 FL (ref 37–54)
EGFRCR SERPLBLD CKD-EPI 2021: 23.4 ML/MIN/1.73
EOSINOPHIL # BLD AUTO: 0.19 10*3/MM3 (ref 0–0.4)
EOSINOPHIL NFR BLD AUTO: 6 % (ref 0.3–6.2)
ERYTHROCYTE [DISTWIDTH] IN BLOOD BY AUTOMATED COUNT: 16 % (ref 12.3–15.4)
GEN 5 1HR TROPONIN T REFLEX: 60 NG/L
GLOBULIN UR ELPH-MCNC: 2.6 GM/DL
GLUCOSE SERPL-MCNC: 193 MG/DL (ref 65–99)
HCT VFR BLD AUTO: 26.2 % (ref 34–46.6)
HGB BLD-MCNC: 8.2 G/DL (ref 12–15.9)
IMM GRANULOCYTES # BLD AUTO: 0.01 10*3/MM3 (ref 0–0.05)
IMM GRANULOCYTES NFR BLD AUTO: 0.3 % (ref 0–0.5)
LYMPHOCYTES # BLD AUTO: 0.96 10*3/MM3 (ref 0.7–3.1)
LYMPHOCYTES NFR BLD AUTO: 30.1 % (ref 19.6–45.3)
MCH RBC QN AUTO: 26.5 PG (ref 26.6–33)
MCHC RBC AUTO-ENTMCNC: 31.3 G/DL (ref 31.5–35.7)
MCV RBC AUTO: 84.5 FL (ref 79–97)
MONOCYTES # BLD AUTO: 0.34 10*3/MM3 (ref 0.1–0.9)
MONOCYTES NFR BLD AUTO: 10.7 % (ref 5–12)
NEUTROPHILS NFR BLD AUTO: 1.67 10*3/MM3 (ref 1.7–7)
NEUTROPHILS NFR BLD AUTO: 52.3 % (ref 42.7–76)
NRBC BLD AUTO-RTO: 0 /100 WBC (ref 0–0.2)
PLATELET # BLD AUTO: 146 10*3/MM3 (ref 140–450)
PMV BLD AUTO: 9.7 FL (ref 6–12)
POTASSIUM SERPL-SCNC: 3.7 MMOL/L (ref 3.5–5.2)
PROT SERPL-MCNC: 5.7 G/DL (ref 6–8.5)
QT INTERVAL: 508 MS
QTC INTERVAL: 514 MS
RBC # BLD AUTO: 3.1 10*6/MM3 (ref 3.77–5.28)
SODIUM SERPL-SCNC: 131 MMOL/L (ref 136–145)
TROPONIN T % DELTA: 3
TROPONIN T NUMERIC DELTA: 2 NG/L
TROPONIN T SERPL HS-MCNC: 58 NG/L
WBC NRBC COR # BLD AUTO: 3.19 10*3/MM3 (ref 3.4–10.8)

## 2025-05-16 PROCEDURE — 93005 ELECTROCARDIOGRAM TRACING: CPT | Performed by: INTERNAL MEDICINE

## 2025-05-16 PROCEDURE — 93010 ELECTROCARDIOGRAM REPORT: CPT | Performed by: INTERNAL MEDICINE

## 2025-05-16 PROCEDURE — G0378 HOSPITAL OBSERVATION PER HR: HCPCS

## 2025-05-16 PROCEDURE — 85025 COMPLETE CBC W/AUTO DIFF WBC: CPT | Performed by: NURSE PRACTITIONER

## 2025-05-16 PROCEDURE — 25010000002 NICARDIPINE 2.5 MG/ML SOLUTION 10 ML VIAL: Performed by: STUDENT IN AN ORGANIZED HEALTH CARE EDUCATION/TRAINING PROGRAM

## 2025-05-16 PROCEDURE — 93005 ELECTROCARDIOGRAM TRACING: CPT | Performed by: STUDENT IN AN ORGANIZED HEALTH CARE EDUCATION/TRAINING PROGRAM

## 2025-05-16 PROCEDURE — 93005 ELECTROCARDIOGRAM TRACING: CPT

## 2025-05-16 PROCEDURE — 71045 X-RAY EXAM CHEST 1 VIEW: CPT

## 2025-05-16 PROCEDURE — 99285 EMERGENCY DEPT VISIT HI MDM: CPT

## 2025-05-16 PROCEDURE — 84484 ASSAY OF TROPONIN QUANT: CPT | Performed by: NURSE PRACTITIONER

## 2025-05-16 PROCEDURE — 84443 ASSAY THYROID STIM HORMONE: CPT | Performed by: STUDENT IN AN ORGANIZED HEALTH CARE EDUCATION/TRAINING PROGRAM

## 2025-05-16 PROCEDURE — 36415 COLL VENOUS BLD VENIPUNCTURE: CPT

## 2025-05-16 PROCEDURE — 25010000002 HYDRALAZINE PER 20 MG: Performed by: NURSE PRACTITIONER

## 2025-05-16 PROCEDURE — 80053 COMPREHEN METABOLIC PANEL: CPT | Performed by: NURSE PRACTITIONER

## 2025-05-16 PROCEDURE — 25810000003 SODIUM CHLORIDE 0.9 % SOLUTION 250 ML FLEX CONT: Performed by: STUDENT IN AN ORGANIZED HEALTH CARE EDUCATION/TRAINING PROGRAM

## 2025-05-16 RX ORDER — HYDRALAZINE HYDROCHLORIDE 20 MG/ML
20 INJECTION INTRAMUSCULAR; INTRAVENOUS ONCE
Status: COMPLETED | OUTPATIENT
Start: 2025-05-16 | End: 2025-05-16

## 2025-05-16 RX ORDER — AMOXICILLIN 250 MG
2 CAPSULE ORAL 2 TIMES DAILY PRN
Status: DISCONTINUED | OUTPATIENT
Start: 2025-05-16 | End: 2025-05-22 | Stop reason: HOSPADM

## 2025-05-16 RX ORDER — BISACODYL 10 MG
10 SUPPOSITORY, RECTAL RECTAL DAILY PRN
Status: DISCONTINUED | OUTPATIENT
Start: 2025-05-16 | End: 2025-05-22 | Stop reason: HOSPADM

## 2025-05-16 RX ORDER — NITROGLYCERIN 0.4 MG/1
0.4 TABLET SUBLINGUAL
Status: DISCONTINUED | OUTPATIENT
Start: 2025-05-16 | End: 2025-05-22 | Stop reason: HOSPADM

## 2025-05-16 RX ORDER — ACETAMINOPHEN 650 MG/1
650 SUPPOSITORY RECTAL EVERY 4 HOURS PRN
Status: DISCONTINUED | OUTPATIENT
Start: 2025-05-16 | End: 2025-05-22 | Stop reason: HOSPADM

## 2025-05-16 RX ORDER — SODIUM CHLORIDE 0.9 % (FLUSH) 0.9 %
10 SYRINGE (ML) INJECTION AS NEEDED
Status: DISCONTINUED | OUTPATIENT
Start: 2025-05-16 | End: 2025-05-22 | Stop reason: HOSPADM

## 2025-05-16 RX ORDER — POLYETHYLENE GLYCOL 3350 17 G/17G
17 POWDER, FOR SOLUTION ORAL DAILY PRN
Status: DISCONTINUED | OUTPATIENT
Start: 2025-05-16 | End: 2025-05-22 | Stop reason: HOSPADM

## 2025-05-16 RX ORDER — ACETAMINOPHEN 160 MG/5ML
650 SOLUTION ORAL EVERY 4 HOURS PRN
Status: DISCONTINUED | OUTPATIENT
Start: 2025-05-16 | End: 2025-05-22 | Stop reason: HOSPADM

## 2025-05-16 RX ORDER — POTASSIUM CHLORIDE 1500 MG/1
20 TABLET, EXTENDED RELEASE ORAL 2 TIMES DAILY
COMMUNITY
End: 2025-05-22 | Stop reason: HOSPADM

## 2025-05-16 RX ORDER — ACETAMINOPHEN 325 MG/1
650 TABLET ORAL EVERY 4 HOURS PRN
Status: DISCONTINUED | OUTPATIENT
Start: 2025-05-16 | End: 2025-05-22 | Stop reason: HOSPADM

## 2025-05-16 RX ORDER — CLONIDINE HYDROCHLORIDE 0.1 MG/1
0.2 TABLET ORAL EVERY 12 HOURS SCHEDULED
Status: DISCONTINUED | OUTPATIENT
Start: 2025-05-17 | End: 2025-05-17

## 2025-05-16 RX ORDER — LISINOPRIL 40 MG/1
40 TABLET ORAL DAILY
COMMUNITY
End: 2025-05-22 | Stop reason: HOSPADM

## 2025-05-16 RX ORDER — ATORVASTATIN CALCIUM 40 MG/1
80 TABLET, FILM COATED ORAL DAILY
Status: DISCONTINUED | OUTPATIENT
Start: 2025-05-17 | End: 2025-05-22 | Stop reason: HOSPADM

## 2025-05-16 RX ORDER — CHLORTHALIDONE 50 MG/1
50 TABLET ORAL DAILY
COMMUNITY
End: 2025-05-22 | Stop reason: HOSPADM

## 2025-05-16 RX ORDER — HYDRALAZINE HYDROCHLORIDE 25 MG/1
100 TABLET, FILM COATED ORAL 3 TIMES DAILY
Status: DISCONTINUED | OUTPATIENT
Start: 2025-05-17 | End: 2025-05-22 | Stop reason: HOSPADM

## 2025-05-16 RX ORDER — ASPIRIN 81 MG/1
81 TABLET, CHEWABLE ORAL DAILY
Status: DISCONTINUED | OUTPATIENT
Start: 2025-05-17 | End: 2025-05-22 | Stop reason: HOSPADM

## 2025-05-16 RX ORDER — SODIUM CHLORIDE 9 MG/ML
40 INJECTION, SOLUTION INTRAVENOUS AS NEEDED
Status: DISCONTINUED | OUTPATIENT
Start: 2025-05-16 | End: 2025-05-22 | Stop reason: HOSPADM

## 2025-05-16 RX ORDER — SODIUM CHLORIDE 0.9 % (FLUSH) 0.9 %
10 SYRINGE (ML) INJECTION EVERY 12 HOURS SCHEDULED
Status: DISCONTINUED | OUTPATIENT
Start: 2025-05-16 | End: 2025-05-22 | Stop reason: HOSPADM

## 2025-05-16 RX ORDER — BISACODYL 5 MG/1
5 TABLET, DELAYED RELEASE ORAL DAILY PRN
Status: DISCONTINUED | OUTPATIENT
Start: 2025-05-16 | End: 2025-05-22 | Stop reason: HOSPADM

## 2025-05-16 RX ADMIN — HYDRALAZINE HYDROCHLORIDE 20 MG: 20 INJECTION INTRAMUSCULAR; INTRAVENOUS at 19:44

## 2025-05-16 RX ADMIN — SODIUM CHLORIDE 5 MG/HR: 9 INJECTION, SOLUTION INTRAVENOUS at 21:52

## 2025-05-16 RX ADMIN — Medication 10 ML: at 20:38

## 2025-05-16 NOTE — ED PROVIDER NOTES
Subjective   Chief Complaint   Patient presents with    Hypertension       History of Present Illness  Patient is an 84-year-old female wishes history of chronic kidney disease, and is on dialysis, does dialysis Monday Wednesday Friday, her nephrologist is Dr. Chance.  Presents emergency department with complaint of elevated blood pressure readings despite dialysis, and taking her home blood pressure medications.  Her family does report she had left-sided neck pain in the left arm at times.  It is intermittent.  No shortness of breath.  No leg pain or swelling.  No episodes of syncope.  No fevers, nausea vomiting diarrhea.    History provided by:  Patient      Review of Systems    Past Medical History:   Diagnosis Date    Diabetes mellitus     Hyperlipidemia     Hypertension        No Known Allergies    Past Surgical History:   Procedure Laterality Date    CARDIAC ELECTROPHYSIOLOGY PROCEDURE N/A 4/20/2021    Procedure: Pacemaker DC new;  Surgeon: Yovany Navarrete MD;  Location: [facility] INVASIVE LOCATION;  Service: Cardiovascular;  Laterality: N/A;       Family History   Family history unknown: Yes       Social History     Socioeconomic History    Marital status:    Tobacco Use    Smoking status: Never     Passive exposure: Never    Smokeless tobacco: Never   Vaping Use    Vaping status: Never Used   Substance and Sexual Activity    Alcohol use: No    Drug use: No    Sexual activity: Defer           Objective   Physical Exam  Vitals and nursing note reviewed.   Constitutional:       Appearance: Normal appearance. She is not toxic-appearing.   HENT:      Head: Normocephalic and atraumatic.      Nose: Nose normal.      Mouth/Throat:      Mouth: Mucous membranes are moist.      Pharynx: Oropharynx is clear.   Eyes:      Extraocular Movements: Extraocular movements intact.      Conjunctiva/sclera: Conjunctivae normal.      Pupils: Pupils are equal, round, and reactive to light.   Cardiovascular:      Rate and  Rhythm: Normal rate and regular rhythm.      Heart sounds: Normal heart sounds. No murmur heard.     No friction rub. No gallop.   Chest:      Comments: Shiley noted to left upper chest.  Abdominal:      General: Bowel sounds are normal.      Palpations: Abdomen is soft.   Musculoskeletal:         General: Normal range of motion.      Cervical back: Normal range of motion and neck supple.   Skin:     General: Skin is warm and dry.      Capillary Refill: Capillary refill takes less than 2 seconds.      Findings: No erythema.   Neurological:      General: No focal deficit present.      Mental Status: She is alert and oriented to person, place, and time.         Procedures           ED Course  ED Course as of 05/16/25 2324   Fri May 16, 2025   1853 HS Troponin T(!!): 58  Similar to previous [LB]      ED Course User Index  [LB] Marnie Sommer APRN                                           XR Chest 1 View  Result Date: 5/16/2025  Impression: 1. No acute process. 2. Stable cardiomegaly. 3. Placement of a right IJ central venous catheter with tip overlying the right atrium. No pneumothorax. Electronically Signed: Austin Kendrick MD  5/16/2025 6:01 PM EDT  Workstation ID: [id number]                Medical Decision Making  Problems Addressed:  Hypertensive urgency: complicated acute illness or injury    Amount and/or Complexity of Data Reviewed  Labs: ordered. Decision-making details documented in ED Course.  Radiology: ordered.    Risk  Prescription drug management.  Decision regarding hospitalization.    Appropriate PPE worn during patient interactions.    Chart Review: ED note 4/29/2025  Discharge summary 4/24/2025    EKG/summary 68.  , QTc 512.  PVC noted.  Compared to previous 4/15/2025.  Interpreted per ED attending Dr. Jeffries    EKG sinus rhythm rate of 62.  , QTc 514.  Interpreted per ED attending physician Dr. Jeffries    Patient is a 84-year-old female presents the ED for evaluation of  hypertension.  Patient has a history of hypertension, she is on dialysis.  Noted to have elevated blood pressures tonight despite medication at home dialysis treatments.  Patient's blood pressure did not get less than 160 systolic in the ED.  She started hydralazine.  Troponins noted to be elevated, but similar and decreased from previous, most likely secondary to her renal disease.  Will admit to hospital medicine for hypertensive urgency.  I discussed with the patient their test results, work-up here in the emergency department, and need for admission and further evaluation. Patient is agreeable to the plan of care. At time of disposition patient's VS are reviewed, and patient without acute distress.  Opportunity was provided for questions at the bedside, all questions and concerns were addressed.      Note Disclaimer: At [facility], we believe that sharing information builds trust and better relationships. You are receiving this note because you recently visited [facility]. It is possible you will see health information before a provider has talked with you about it. This kind of information can be easy to misunderstand. To help you fully understand what it means for your health, we urge you to discuss this note with your provider  Note dictated utilizing Dragon Dictation.          Final diagnoses:   Hypertensive urgency       ED Disposition  ED Disposition       ED Disposition   Decision to Admit    Condition   --    Comment   Level of Care: Med/Surg [1]   Diagnosis: Hypertensive urgency [119798]                 No follow-up provider specified.       Medication List      No changes were made to your prescriptions during this visit.            Marnie Sommer, APRN  05/16/25 2414

## 2025-05-16 NOTE — Clinical Note
Level of Care: Med/Surg [1]   Diagnosis: Hypertensive urgency [624011]   Admitting Physician: JACKIE GUY [[id number]]

## 2025-05-17 ENCOUNTER — APPOINTMENT (OUTPATIENT)
Dept: CT IMAGING | Facility: HOSPITAL | Age: 85
End: 2025-05-17
Payer: MEDICARE

## 2025-05-17 LAB
ALBUMIN SERPL-MCNC: 3.2 G/DL (ref 3.5–5.2)
ALBUMIN/GLOB SERPL: 1.1 G/DL
ALP SERPL-CCNC: 88 U/L (ref 39–117)
ALT SERPL W P-5'-P-CCNC: 21 U/L (ref 1–33)
ANION GAP SERPL CALCULATED.3IONS-SCNC: 5.7 MMOL/L (ref 5–15)
AST SERPL-CCNC: 35 U/L (ref 1–32)
BACTERIA UR QL AUTO: ABNORMAL /HPF
BASOPHILS # BLD AUTO: 0.03 10*3/MM3 (ref 0–0.2)
BASOPHILS NFR BLD AUTO: 0.7 % (ref 0–1.5)
BILIRUB SERPL-MCNC: 0.3 MG/DL (ref 0–1.2)
BILIRUB UR QL STRIP: NEGATIVE
BUN SERPL-MCNC: 18 MG/DL (ref 8–23)
BUN/CREAT SERPL: 7.3 (ref 7–25)
CALCIUM SPEC-SCNC: 8.3 MG/DL (ref 8.6–10.5)
CHLORIDE SERPL-SCNC: 98 MMOL/L (ref 98–107)
CLARITY UR: CLEAR
CO2 SERPL-SCNC: 27.3 MMOL/L (ref 22–29)
COLOR UR: YELLOW
CREAT SERPL-MCNC: 2.45 MG/DL (ref 0.57–1)
DEPRECATED RDW RBC AUTO: 47.8 FL (ref 37–54)
EGFRCR SERPLBLD CKD-EPI 2021: 19 ML/MIN/1.73
EOSINOPHIL # BLD AUTO: 0.31 10*3/MM3 (ref 0–0.4)
EOSINOPHIL NFR BLD AUTO: 7.3 % (ref 0.3–6.2)
ERYTHROCYTE [DISTWIDTH] IN BLOOD BY AUTOMATED COUNT: 15.9 % (ref 12.3–15.4)
GLOBULIN UR ELPH-MCNC: 2.8 GM/DL
GLUCOSE BLDC GLUCOMTR-MCNC: 110 MG/DL (ref 70–105)
GLUCOSE BLDC GLUCOMTR-MCNC: 137 MG/DL (ref 70–105)
GLUCOSE BLDC GLUCOMTR-MCNC: 140 MG/DL (ref 70–105)
GLUCOSE BLDC GLUCOMTR-MCNC: 207 MG/DL (ref 70–105)
GLUCOSE BLDC GLUCOMTR-MCNC: 217 MG/DL (ref 70–105)
GLUCOSE SERPL-MCNC: 113 MG/DL (ref 65–99)
GLUCOSE UR STRIP-MCNC: NEGATIVE MG/DL
HCT VFR BLD AUTO: 27.1 % (ref 34–46.6)
HGB BLD-MCNC: 8.6 G/DL (ref 12–15.9)
HGB UR QL STRIP.AUTO: NEGATIVE
HYALINE CASTS UR QL AUTO: ABNORMAL /LPF
IMM GRANULOCYTES # BLD AUTO: 0.01 10*3/MM3 (ref 0–0.05)
IMM GRANULOCYTES NFR BLD AUTO: 0.2 % (ref 0–0.5)
KETONES UR QL STRIP: NEGATIVE
LEUKOCYTE ESTERASE UR QL STRIP.AUTO: NEGATIVE
LYMPHOCYTES # BLD AUTO: 1.32 10*3/MM3 (ref 0.7–3.1)
LYMPHOCYTES NFR BLD AUTO: 31.1 % (ref 19.6–45.3)
MAGNESIUM SERPL-MCNC: 1.7 MG/DL (ref 1.6–2.4)
MCH RBC QN AUTO: 26.3 PG (ref 26.6–33)
MCHC RBC AUTO-ENTMCNC: 31.7 G/DL (ref 31.5–35.7)
MCV RBC AUTO: 82.9 FL (ref 79–97)
MONOCYTES # BLD AUTO: 0.46 10*3/MM3 (ref 0.1–0.9)
MONOCYTES NFR BLD AUTO: 10.8 % (ref 5–12)
NEUTROPHILS NFR BLD AUTO: 2.11 10*3/MM3 (ref 1.7–7)
NEUTROPHILS NFR BLD AUTO: 49.9 % (ref 42.7–76)
NITRITE UR QL STRIP: NEGATIVE
NRBC BLD AUTO-RTO: 0 /100 WBC (ref 0–0.2)
PH UR STRIP.AUTO: 8.5 [PH] (ref 5–8)
PHOSPHATE SERPL-MCNC: 3.3 MG/DL (ref 2.5–4.5)
PLATELET # BLD AUTO: 151 10*3/MM3 (ref 140–450)
PMV BLD AUTO: 9.9 FL (ref 6–12)
POTASSIUM SERPL-SCNC: 3.7 MMOL/L (ref 3.5–5.2)
PROT SERPL-MCNC: 6 G/DL (ref 6–8.5)
PROT UR QL STRIP: ABNORMAL
RBC # BLD AUTO: 3.27 10*6/MM3 (ref 3.77–5.28)
RBC # UR STRIP: ABNORMAL /HPF
REF LAB TEST METHOD: ABNORMAL
SODIUM SERPL-SCNC: 131 MMOL/L (ref 136–145)
SP GR UR STRIP: 1.01 (ref 1–1.03)
SQUAMOUS #/AREA URNS HPF: ABNORMAL /HPF
T4 FREE SERPL-MCNC: 1.5 NG/DL (ref 0.92–1.68)
TSH SERPL DL<=0.05 MIU/L-ACNC: 2.52 UIU/ML (ref 0.27–4.2)
UROBILINOGEN UR QL STRIP: ABNORMAL
WBC # UR STRIP: ABNORMAL /HPF
WBC NRBC COR # BLD AUTO: 4.24 10*3/MM3 (ref 3.4–10.8)

## 2025-05-17 PROCEDURE — 82948 REAGENT STRIP/BLOOD GLUCOSE: CPT | Performed by: STUDENT IN AN ORGANIZED HEALTH CARE EDUCATION/TRAINING PROGRAM

## 2025-05-17 PROCEDURE — 80053 COMPREHEN METABOLIC PANEL: CPT | Performed by: STUDENT IN AN ORGANIZED HEALTH CARE EDUCATION/TRAINING PROGRAM

## 2025-05-17 PROCEDURE — 63710000001 INSULIN GLARGINE PER 5 UNITS: Performed by: INTERNAL MEDICINE

## 2025-05-17 PROCEDURE — G0378 HOSPITAL OBSERVATION PER HR: HCPCS

## 2025-05-17 PROCEDURE — 85025 COMPLETE CBC W/AUTO DIFF WBC: CPT | Performed by: STUDENT IN AN ORGANIZED HEALTH CARE EDUCATION/TRAINING PROGRAM

## 2025-05-17 PROCEDURE — 81001 URINALYSIS AUTO W/SCOPE: CPT | Performed by: STUDENT IN AN ORGANIZED HEALTH CARE EDUCATION/TRAINING PROGRAM

## 2025-05-17 PROCEDURE — 84100 ASSAY OF PHOSPHORUS: CPT | Performed by: STUDENT IN AN ORGANIZED HEALTH CARE EDUCATION/TRAINING PROGRAM

## 2025-05-17 PROCEDURE — 84439 ASSAY OF FREE THYROXINE: CPT | Performed by: STUDENT IN AN ORGANIZED HEALTH CARE EDUCATION/TRAINING PROGRAM

## 2025-05-17 PROCEDURE — 63710000001 INSULIN LISPRO (HUMAN) PER 5 UNITS: Performed by: STUDENT IN AN ORGANIZED HEALTH CARE EDUCATION/TRAINING PROGRAM

## 2025-05-17 PROCEDURE — 83735 ASSAY OF MAGNESIUM: CPT | Performed by: STUDENT IN AN ORGANIZED HEALTH CARE EDUCATION/TRAINING PROGRAM

## 2025-05-17 PROCEDURE — 82948 REAGENT STRIP/BLOOD GLUCOSE: CPT

## 2025-05-17 PROCEDURE — 70450 CT HEAD/BRAIN W/O DYE: CPT

## 2025-05-17 RX ORDER — INSULIN LISPRO 100 [IU]/ML
2-7 INJECTION, SOLUTION INTRAVENOUS; SUBCUTANEOUS
Status: DISCONTINUED | OUTPATIENT
Start: 2025-05-17 | End: 2025-05-22 | Stop reason: HOSPADM

## 2025-05-17 RX ORDER — FUROSEMIDE 40 MG/1
40 TABLET ORAL DAILY
Status: DISCONTINUED | OUTPATIENT
Start: 2025-05-17 | End: 2025-05-22 | Stop reason: HOSPADM

## 2025-05-17 RX ORDER — NICOTINE POLACRILEX 4 MG
15 LOZENGE BUCCAL
Status: DISCONTINUED | OUTPATIENT
Start: 2025-05-17 | End: 2025-05-22 | Stop reason: HOSPADM

## 2025-05-17 RX ORDER — IBUPROFEN 600 MG/1
1 TABLET ORAL
Status: DISCONTINUED | OUTPATIENT
Start: 2025-05-17 | End: 2025-05-22 | Stop reason: HOSPADM

## 2025-05-17 RX ORDER — DEXTROSE MONOHYDRATE 25 G/50ML
25 INJECTION, SOLUTION INTRAVENOUS
Status: DISCONTINUED | OUTPATIENT
Start: 2025-05-17 | End: 2025-05-22 | Stop reason: HOSPADM

## 2025-05-17 RX ORDER — CLONIDINE HYDROCHLORIDE 0.1 MG/1
0.2 TABLET ORAL EVERY 8 HOURS SCHEDULED
Status: DISCONTINUED | OUTPATIENT
Start: 2025-05-17 | End: 2025-05-18

## 2025-05-17 RX ORDER — AMLODIPINE BESYLATE 5 MG/1
10 TABLET ORAL
Status: DISCONTINUED | OUTPATIENT
Start: 2025-05-17 | End: 2025-05-22 | Stop reason: HOSPADM

## 2025-05-17 RX ORDER — CHLORTHALIDONE 25 MG/1
25 TABLET ORAL DAILY
Status: DISCONTINUED | OUTPATIENT
Start: 2025-05-17 | End: 2025-05-22

## 2025-05-17 RX ORDER — CLONIDINE HYDROCHLORIDE 0.1 MG/1
0.1 TABLET ORAL DAILY
Status: DISCONTINUED | OUTPATIENT
Start: 2025-05-17 | End: 2025-05-17

## 2025-05-17 RX ORDER — CARVEDILOL 3.12 MG/1
3.12 TABLET ORAL 2 TIMES DAILY WITH MEALS
Status: DISCONTINUED | OUTPATIENT
Start: 2025-05-17 | End: 2025-05-17

## 2025-05-17 RX ORDER — METOPROLOL TARTRATE 1 MG/ML
5 INJECTION, SOLUTION INTRAVENOUS ONCE
Status: DISCONTINUED | OUTPATIENT
Start: 2025-05-17 | End: 2025-05-20

## 2025-05-17 RX ORDER — CLONIDINE HYDROCHLORIDE 0.1 MG/1
0.2 TABLET ORAL DAILY
Status: DISCONTINUED | OUTPATIENT
Start: 2025-05-17 | End: 2025-05-17

## 2025-05-17 RX ORDER — CLONIDINE HYDROCHLORIDE 0.1 MG/1
0.2 TABLET ORAL EVERY 8 HOURS SCHEDULED
Status: DISCONTINUED | OUTPATIENT
Start: 2025-05-17 | End: 2025-05-17

## 2025-05-17 RX ORDER — HYDRALAZINE HYDROCHLORIDE 20 MG/ML
10 INJECTION INTRAMUSCULAR; INTRAVENOUS EVERY 4 HOURS PRN
Status: DISCONTINUED | OUTPATIENT
Start: 2025-05-17 | End: 2025-05-22 | Stop reason: HOSPADM

## 2025-05-17 RX ADMIN — Medication 10 ML: at 10:56

## 2025-05-17 RX ADMIN — INSULIN LISPRO 3 UNITS: 100 INJECTION, SOLUTION INTRAVENOUS; SUBCUTANEOUS at 12:20

## 2025-05-17 RX ADMIN — CLONIDINE HYDROCHLORIDE 0.2 MG: 0.1 TABLET ORAL at 21:00

## 2025-05-17 RX ADMIN — ATORVASTATIN CALCIUM 80 MG: 40 TABLET, FILM COATED ORAL at 10:55

## 2025-05-17 RX ADMIN — AMLODIPINE BESYLATE 10 MG: 5 TABLET ORAL at 07:43

## 2025-05-17 RX ADMIN — INSULIN GLARGINE 5 UNITS: 100 INJECTION, SOLUTION SUBCUTANEOUS at 14:00

## 2025-05-17 RX ADMIN — HYDRALAZINE HYDROCHLORIDE 100 MG: 25 TABLET ORAL at 20:56

## 2025-05-17 RX ADMIN — FUROSEMIDE 40 MG: 40 TABLET ORAL at 13:37

## 2025-05-17 RX ADMIN — HYDRALAZINE HYDROCHLORIDE 100 MG: 25 TABLET ORAL at 01:30

## 2025-05-17 RX ADMIN — Medication 10 ML: at 21:00

## 2025-05-17 RX ADMIN — ASPIRIN 81 MG CHEWABLE TABLET 81 MG: 81 TABLET CHEWABLE at 10:55

## 2025-05-17 RX ADMIN — HYDRALAZINE HYDROCHLORIDE 100 MG: 25 TABLET ORAL at 16:08

## 2025-05-17 RX ADMIN — HYDRALAZINE HYDROCHLORIDE 100 MG: 25 TABLET ORAL at 07:43

## 2025-05-17 NOTE — PLAN OF CARE
Problem: Adult Inpatient Plan of Care  Goal: Plan of Care Review  Outcome: Progressing  Goal: Patient-Specific Goal (Individualized)  Outcome: Progressing  Goal: Absence of Hospital-Acquired Illness or Injury  Outcome: Progressing  Intervention: Identify and Manage Fall Risk  Recent Flowsheet Documentation  Taken 5/17/2025 0400 by Jane Laughlin RN  Safety Promotion/Fall Prevention:   safety round/check completed   room organization consistent   nonskid shoes/slippers when out of bed   fall prevention program maintained   clutter free environment maintained   assistive device/personal items within reach   activity supervised  Taken 5/16/2025 2300 by Jane Laughlin RN  Safety Promotion/Fall Prevention:   safety round/check completed   room organization consistent   nonskid shoes/slippers when out of bed   fall prevention program maintained   clutter free environment maintained   assistive device/personal items within reach   activity supervised  Intervention: Prevent Skin Injury  Recent Flowsheet Documentation  Taken 5/17/2025 0400 by Jane Laughlin RN  Body Position: position changed independently  Skin Protection: transparent dressing maintained  Taken 5/16/2025 2300 by Jane Laughlin RN  Body Position: position changed independently  Skin Protection:   incontinence pads utilized   transparent dressing maintained  Intervention: Prevent and Manage VTE (Venous Thromboembolism) Risk  Recent Flowsheet Documentation  Taken 5/17/2025 0400 by Jane Laughlin RN  VTE Prevention/Management: SCDs (sequential compression devices) off  Taken 5/16/2025 2300 by Jane Laughlin RN  VTE Prevention/Management: SCDs (sequential compression devices) off  Intervention: Prevent Infection  Recent Flowsheet Documentation  Taken 5/17/2025 0400 by Jane Laughlin RN  Infection Prevention:   single patient room provided   rest/sleep promoted   personal protective equipment utilized   hand hygiene promoted   equipment surfaces  disinfected   environmental surveillance performed  Taken 5/16/2025 2300 by Jane Laughlin RN  Infection Prevention:   single patient room provided   rest/sleep promoted   personal protective equipment utilized   hand hygiene promoted   equipment surfaces disinfected   environmental surveillance performed  Goal: Optimal Comfort and Wellbeing  Outcome: Progressing  Intervention: Provide Person-Centered Care  Recent Flowsheet Documentation  Taken 5/17/2025 0400 by Jane Laughlin RN  Trust Relationship/Rapport:   care explained   choices provided   questions answered  Taken 5/16/2025 2300 by Jane Laughlin RN  Trust Relationship/Rapport:   care explained   choices provided   questions answered  Goal: Readiness for Transition of Care  Outcome: Progressing  Intervention: Mutually Develop Transition Plan  Recent Flowsheet Documentation  Taken 5/16/2025 2310 by Jane Laughlin RN  Transportation Anticipated: family or friend will provide  Patient/Family Anticipated Services at Transition: (peritoneal dialysis) other (see comments)  Patient/Family Anticipates Transition to: home with family  Taken 5/16/2025 2305 by Jane Laughlin RN  Equipment Currently Used at Home:   bp cuff   commode     Problem: Skin Injury Risk Increased  Goal: Skin Health and Integrity  Outcome: Progressing  Intervention: Optimize Skin Protection  Recent Flowsheet Documentation  Taken 5/17/2025 0400 by Jane Laughlin RN  Pressure Reduction Techniques: frequent weight shift encouraged  Head of Bed (HOB) Positioning: HOB elevated  Pressure Reduction Devices: pressure-redistributing mattress utilized  Skin Protection: transparent dressing maintained  Taken 5/16/2025 2300 by Jane Laughlin RN  Activity Management: ambulated to bathroom  Pressure Reduction Techniques:   frequent weight shift encouraged   pressure points protected  Head of Bed (HOB) Positioning: HOB elevated  Pressure Reduction Devices: pressure-redistributing mattress utilized  Skin  Protection:   incontinence pads utilized   transparent dressing maintained     Problem: Comorbidity Management  Goal: Blood Glucose Level Within Target Range  Outcome: Progressing  Intervention: Monitor and Manage Glycemia  Recent Flowsheet Documentation  Taken 5/17/2025 0400 by Jane Laughlin RN  Medication Review/Management: medications reviewed  Taken 5/16/2025 2300 by Jane Laughlin RN  Medication Review/Management: medications reviewed  Goal: Blood Pressure in Desired Range  Outcome: Progressing  Intervention: Maintain Blood Pressure Management  Recent Flowsheet Documentation  Taken 5/17/2025 0400 by Jane Laughlin RN  Medication Review/Management: medications reviewed  Taken 5/16/2025 2300 by Jane Laughlin RN  Medication Review/Management: medications reviewed   Goal Outcome Evaluation:

## 2025-05-17 NOTE — CONSULTS
RENAL/KCC:    Referring Provider: Dr. Marcum  Reason for Consultation: Uncontrolled HTN    Subjective     Chief complaint: Elevated BP    History of present illness:  Patient is an 85 yo female with h/o CKD5 who recently initiated dialysis.  She is currently on HD MWF but will be transitioning to PD.  She has had very resistant HTN with SBP's over 200 regularly at home and in the dialysis unit.  BP meds have been titrated and family states compliance.  She was admitted and started on Cardene gtt.  BP is improved.  She denies any symptoms this afternoon.  Discussed with family.    History  Past Medical History:   Diagnosis Date    Diabetes mellitus     Hyperlipidemia     Hypertension    ,   Past Surgical History:   Procedure Laterality Date    CARDIAC ELECTROPHYSIOLOGY PROCEDURE N/A 4/20/2021    Procedure: Pacemaker DC new;  Surgeon: Yovany Navarrete MD;  Location: [facility] INVASIVE LOCATION;  Service: Cardiovascular;  Laterality: N/A;   ,   Family History   Family history unknown: Yes   ,   Social History     Socioeconomic History    Marital status:    Tobacco Use    Smoking status: Never     Passive exposure: Never    Smokeless tobacco: Never   Vaping Use    Vaping status: Never Used   Substance and Sexual Activity    Alcohol use: No    Drug use: No    Sexual activity: Defer     E-cigarette/Vaping    E-cigarette/Vaping Use Never User     Passive Exposure No     Counseling Given No      E-cigarette/Vaping Substances    Nicotine No     THC No     CBD No     Flavoring No      E-cigarette/Vaping Devices    Disposable No     Pre-filled or Refillable Cartridge No     Refillable Tank No     Pre-filled Pod No          ,   Medications Prior to Admission   Medication Sig Dispense Refill Last Dose/Taking    carvedilol (COREG) 3.125 MG tablet Take 1 tablet by mouth 2 (Two) Times a Day With Meals for 30 days. 60 tablet 0 5/16/2025 Bedtime    chlorthalidone (HYGROTEN) 50 MG tablet Take 1 tablet by mouth Daily. Take 1  tablet by mouth 1 time each day   5/16/2025 Morning    cloNIDine (CATAPRES) 0.2 MG tablet Take 1 tablet by mouth Every 12 (Twelve) Hours for 30 days. 60 tablet 0 5/16/2025 Bedtime    docusate sodium (Colace) 100 MG capsule Take 1 capsule by mouth 2 (Two) Times a Day for 30 days. As needed for constipation 60 capsule 0 5/16/2025 Bedtime    furosemide (LASIX) 40 MG tablet Take 1 tablet by mouth Daily for 30 days. 30 tablet 0 5/16/2025 Morning    hydrALAZINE (APRESOLINE) 100 MG tablet Take 1 tablet by mouth 3 times a day for 30 days. 90 tablet 0 5/16/2025 Bedtime    lisinopril (PRINIVIL,ZESTRIL) 40 MG tablet Take 1 tablet by mouth Daily. One tablet by mouth every day   5/16/2025 Morning    potassium chloride (KLOR-CON M20) 20 MEQ CR tablet Take 1 tablet by mouth 2 (Two) Times a Day. Take 1 tablet by mouth twice a day   5/16/2025 Bedtime    pyridoxine (VITAMIN B-6) 25 MG tablet Take 1 tablet by mouth 3 (Three) Times a Day As Needed (nausea) for up to 30 days. (Patient taking differently: Take 4 tablets by mouth 3 (Three) Times a Day As Needed (nausea).) 60 tablet 0 Patient Taking Differently    amLODIPine (NORVASC) 10 MG tablet Take 1 tablet by mouth Daily for 30 days. 30 tablet 0 Unknown    aspirin 81 MG chewable tablet Chew 1 tablet Daily. 270 tablet 2 Unknown    atorvastatin (LIPITOR) 80 MG tablet Take 1 tablet by mouth Daily. 270 tablet 1 Unknown    Cyanocobalamin (VITAMIN B-12 PO) Take 1 tablet by mouth Daily.   Unknown    ferrous sulfate 325 (65 FE) MG tablet Take 1 tablet by mouth Daily With Breakfast for 30 days. 30 tablet 0 Unknown   , Scheduled Meds:  amLODIPine, 10 mg, Oral, Q24H  aspirin, 81 mg, Oral, Daily  atorvastatin, 80 mg, Oral, Daily  chlorthalidone, 25 mg, Oral, Daily  cloNIDine, 0.2 mg, Oral, Q8H  furosemide, 40 mg, Oral, Daily  hydrALAZINE, 100 mg, Oral, TID  insulin glargine, 5 Units, Subcutaneous, Daily  insulin lispro, 2-7 Units, Subcutaneous, 4x Daily AC & at Bedtime  metoprolol tartrate, 5 mg,  "Intravenous, Once  sodium chloride, 10 mL, Intravenous, Q12H   , Continuous Infusions:  [Held by provider] niCARdipine, 5-15 mg/hr, Last Rate: Stopped (05/17/25 0623)   , PRN Meds:    acetaminophen **OR** acetaminophen **OR** acetaminophen    senna-docusate sodium **AND** polyethylene glycol **AND** bisacodyl **AND** bisacodyl    Calcium Replacement - Follow Nurse / BPA Driven Protocol    dextrose    dextrose    glucagon (human recombinant)    hydrALAZINE    Magnesium Standard Dose Replacement - Follow Nurse / BPA Driven Protocol    nitroglycerin    Phosphorus Replacement - Follow Nurse / BPA Driven Protocol    Potassium Replacement - Follow Nurse / BPA Driven Protocol    [COMPLETED] Insert Peripheral IV **AND** sodium chloride    sodium chloride    sodium chloride, and Allergies:  Patient has no known allergies.    Review of Systems  Pertinent items are noted in HPI    Objective     Vital Signs  Temp:  [97.9 °F (36.6 °C)-98.4 °F (36.9 °C)] 97.9 °F (36.6 °C)  Heart Rate:  [60-85] 83  Resp:  [15-17] 15  BP: (119-233)/(58-89) 131/65    No intake/output data recorded.  No intake/output data recorded.    Physical Exam:  GEN: Awake, NAD  ENT: PERRL, EOMI, MMM  NECK: Supple, no JVD  CHEST: CTAB, no W/R/C  CV: RRR, no M/G/R  ABD: Soft, NT, +BS  SKIN: Warm and Dry  NEURO: CN's intact      Results Review:   I reviewed the patient's new clinical results.    WBC WBC   Date Value Ref Range Status   05/17/2025 4.24 3.40 - 10.80 10*3/mm3 Final   05/16/2025 3.19 (L) 3.40 - 10.80 10*3/mm3 Final      HGB Hemoglobin   Date Value Ref Range Status   05/17/2025 8.6 (L) 12.0 - 15.9 g/dL Final   05/16/2025 8.2 (L) 12.0 - 15.9 g/dL Final      HCT Hematocrit   Date Value Ref Range Status   05/17/2025 27.1 (L) 34.0 - 46.6 % Final   05/16/2025 26.2 (L) 34.0 - 46.6 % Final      Platlets No results found for: \"LABPLAT\"   MCV MCV   Date Value Ref Range Status   05/17/2025 82.9 79.0 - 97.0 fL Final   05/16/2025 84.5 79.0 - 97.0 fL Final    " "      Sodium Sodium   Date Value Ref Range Status   05/17/2025 131 (L) 136 - 145 mmol/L Final   05/16/2025 131 (L) 136 - 145 mmol/L Final      Potassium Potassium   Date Value Ref Range Status   05/17/2025 3.7 3.5 - 5.2 mmol/L Final   05/16/2025 3.7 3.5 - 5.2 mmol/L Final     Comment:     Specimen hemolyzed.  Result may be falsely elevated.      Chloride Chloride   Date Value Ref Range Status   05/17/2025 98 98 - 107 mmol/L Final   05/16/2025 97 (L) 98 - 107 mmol/L Final      CO2 CO2   Date Value Ref Range Status   05/17/2025 27.3 22.0 - 29.0 mmol/L Final   05/16/2025 27.9 22.0 - 29.0 mmol/L Final      BUN BUN   Date Value Ref Range Status   05/17/2025 18 8 - 23 mg/dL Final   05/16/2025 13 8 - 23 mg/dL Final      Creatinine Creatinine   Date Value Ref Range Status   05/17/2025 2.45 (H) 0.57 - 1.00 mg/dL Final   05/16/2025 2.06 (H) 0.57 - 1.00 mg/dL Final      Calcium Calcium   Date Value Ref Range Status   05/17/2025 8.3 (L) 8.6 - 10.5 mg/dL Final   05/16/2025 7.9 (L) 8.6 - 10.5 mg/dL Final      PO4 No results found for: \"CAPO4\"   Albumin Albumin   Date Value Ref Range Status   05/17/2025 3.2 (L) 3.5 - 5.2 g/dL Final   05/16/2025 3.1 (L) 3.5 - 5.2 g/dL Final      Magnesium Magnesium   Date Value Ref Range Status   05/17/2025 1.7 1.6 - 2.4 mg/dL Final      Uric Acid No results found for: \"URICACID\"         amLODIPine, 10 mg, Oral, Q24H  aspirin, 81 mg, Oral, Daily  atorvastatin, 80 mg, Oral, Daily  chlorthalidone, 25 mg, Oral, Daily  cloNIDine, 0.2 mg, Oral, Q8H  furosemide, 40 mg, Oral, Daily  hydrALAZINE, 100 mg, Oral, TID  insulin glargine, 5 Units, Subcutaneous, Daily  insulin lispro, 2-7 Units, Subcutaneous, 4x Daily AC & at Bedtime  metoprolol tartrate, 5 mg, Intravenous, Once  sodium chloride, 10 mL, Intravenous, Q12H      [Held by provider] niCARdipine, 5-15 mg/hr, Last Rate: Stopped (05/17/25 0623)        Assessment & Plan     1) 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  Check renal artery doppler and Renin/Yao as well as plasma catecholamines and metanephrines.  Continue MWF HD.  Home Lisinopril on hold for now.  May need to add.  Will follow.      Jim aFrooq MD  [facility]  05/17/25  @NOW

## 2025-05-17 NOTE — PROGRESS NOTES
[facility] MEDICINE SERVICE  DAILY PROGRESS NOTE    NAME: Brad Woodward  : 1940  MRN: [id number]      LOS: 0 days     PROVIDER OF SERVICE: Helder Marcum MD    Chief Complaint: Hypertensive urgency    Subjective:   H&P, labs imaging reviewed.  Family at the bedside  Interval History:    Patient seen and evaluated at bedside.   Patient denies any chest pain nausea vomiting or diarrhea.  As per nurse patient's family also giving her antihypertensives.  Family was advised not to give any medicines from home.  Treatment plan discussed with patient. All questions addressed.     Review of Systems:   All 21 ROS were negative except mentioned above.    Objective:     Vital Signs  Temp:  [97.9 °F (36.6 °C)-98.4 °F (36.9 °C)] 97.9 °F (36.6 °C)  Heart Rate:  [60-85] 85  Resp:  [15-17] 15  BP: (119-233)/(58-89) 209/85   Body mass index is 22.75 kg/m².    Physical Exam   General: No acute distress, appears stated age  Neuro: Awake and alert, oriented x3, no focal deficits appreciated  Head: Atraumatic, normocephalic  HEENT: EOMI, anicteric, normal sclerae and conjunctivae, moist mucus membranes  Neck: supple, no lymphadenopathy  CV: RRR, soft heart sounds, no murmurs appreciated, no peripheral edema  Pulm: Decreased breath sounds, no increased work of breathing, no adventitious sounds  Abd: Soft, nontender, nondistended  Skin: Warm, dry and intact  Psych: Appropriate mood and affect    Scheduled Meds   amLODIPine, 10 mg, Oral, Q24H  aspirin, 81 mg, Oral, Daily  atorvastatin, 80 mg, Oral, Daily  cloNIDine, 0.1 mg, Oral, Daily  hydrALAZINE, 100 mg, Oral, TID  insulin lispro, 2-7 Units, Subcutaneous, 4x Daily AC & at Bedtime  sodium chloride, 10 mL, Intravenous, Q12H       PRN Meds     acetaminophen **OR** acetaminophen **OR** acetaminophen    senna-docusate sodium **AND** polyethylene glycol **AND** bisacodyl **AND** bisacodyl    Calcium Replacement - Follow Nurse / BPA Driven Protocol    dextrose    dextrose    glucagon  (human recombinant)    Magnesium Standard Dose Replacement - Follow Nurse / BPA Driven Protocol    nitroglycerin    Phosphorus Replacement - Follow Nurse / BPA Driven Protocol    Potassium Replacement - Follow Nurse / BPA Driven Protocol    [COMPLETED] Insert Peripheral IV **AND** sodium chloride    sodium chloride    sodium chloride   Infusions  [Held by provider] niCARdipine, 5-15 mg/hr, Last Rate: Stopped (05/17/25 0623)          Diagnostic Data    Results from last 7 days   Lab Units 05/17/25  0135   WBC 10*3/mm3 4.24   HEMOGLOBIN g/dL 8.6*   HEMATOCRIT % 27.1*   PLATELETS 10*3/mm3 151   GLUCOSE mg/dL 113*   CREATININE mg/dL 2.45*   BUN mg/dL 18   SODIUM mmol/L 131*   POTASSIUM mmol/L 3.7   AST (SGOT) U/L 35*   ALT (SGPT) U/L 21   ALK PHOS U/L 88   BILIRUBIN mg/dL 0.3   ANION GAP mmol/L 5.7       CT Head Without Contrast  Result Date: 5/17/2025  Impression: 1.No acute intracranial abnormality. 2.Chronic infarcts in the right basal ganglia and left temporal lobe. 3.Mild chronic small vessel ischemic change. Electronically Signed: Kamari Renteria MD  5/17/2025 5:54 AM EDT  Workstation ID: [id number]    XR Chest 1 View  Result Date: 5/16/2025  Impression: 1. No acute process. 2. Stable cardiomegaly. 3. Placement of a right IJ central venous catheter with tip overlying the right atrium. No pneumothorax. Electronically Signed: Austin Kendrick MD  5/16/2025 6:01 PM EDT  Workstation ID: [id number]      Interval results reviewed.    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  Hold lisinopril until seen by nephrologist.  Consult nephrology for BP management    Add Lantus 5 units daily and sliding scale  insulin            Treatment plan discussed with RN.     VTE Prophylaxis:  No VTE prophylaxis order currently exists.         Code status is   Code Status and Medical Interventions: CPR (Attempt to Resuscitate); Full Support   Ordered at: 05/16/25 2028     Code Status (Patient has no pulse and is not breathing):    CPR (Attempt to Resuscitate)     Medical Interventions (Patient has pulse or is breathing):    Full Support       Plan for disposition:     Barriers to Discharge: Labile blood pressure  Anticipated Date of Discharge: 5/18/25  Place of Discharge: Home      Time: 40 minutes     Signature: Electronically signed by Helder Marcum MD, 05/17/25, 11:57 EDT.  [facility] Hospitalist Team

## 2025-05-17 NOTE — H&P
[facility] Medicine Services  History & Physical    Patient Name: Brad Woodward  : 1940  MRN: [id number]  Primary Care Physician:  Brian Nazario APRN  Date of admission: 2025  Date and Time of Service: 2025 at     Subjective      Chief Complaint: Hypertension urgency    History of Present Illness: Brad Woodward is a 84 y.o. female with a CMH of end-stage renal disease on dialysis, hypertension, hyperlipidemia, type 2 diabetes who presented to [facility] on 2025 with uncontrolled hypertension.    Patient is currently awake and alert, with her grandson at bedside assisting with translation. The grandson reports that the patient has had poorly controlled blood pressure over the past 2 to 3 days, prompting presentation to the hospital for further evaluation. patient denies chest pain, shortness of breath, fever, or chills. According to the grandson, she has Stage 5 kidney disease, is followed by nephrology outpatient, and is in the process of initiating peritoneal dialysis. She was recently started on dialysis but continues to make urine.    In the ED, vitals notable for blood pressure in 200s over 100s, afebrile, CMP remarkable for sodium of 131, creatinine of 2.06, bicarb of 27.9, WBC of 3.1, hemoglobin 8.8, platelets 146, MCV normal, EKG remarkable for normal sinus rhythm prolonged QTc of 514, right bundle block no remarkable changes from EKG obtained on 4/15/2025.  Patient was given a dose of hydralazine 20 IV, her blood pressure did not improve, admitted to medicine team for further inpatient management.    Review of Systems negative unless mentioned above    Personal History     Past Medical History:   Diagnosis Date    Diabetes mellitus     Hyperlipidemia     Hypertension        Past Surgical History:   Procedure Laterality Date    CARDIAC ELECTROPHYSIOLOGY PROCEDURE N/A 2021    Procedure: Pacemaker DC new;  Surgeon: Yovany Navarrete MD;  Location: [facility]  INVASIVE LOCATION;  Service: Cardiovascular;  Laterality: N/A;       Family History: Family history is unknown by patient. Otherwise pertinent FHx was reviewed and not pertinent to current issue.    Social History:  reports that she has never smoked. She has never been exposed to tobacco smoke. She has never used smokeless tobacco. She reports that she does not drink alcohol and does not use drugs.    Home Medications:  Prior to Admission Medications       Prescriptions Last Dose Informant Patient Reported? Taking?    amLODIPine (NORVASC) 10 MG tablet   No No    Take 1 tablet by mouth Daily for 30 days.    aspirin 81 MG chewable tablet   No No    Chew 1 tablet Daily.    atorvastatin (LIPITOR) 80 MG tablet   No No    Take 1 tablet by mouth Daily.    carvedilol (COREG) 3.125 MG tablet   No No    Take 1 tablet by mouth 2 (Two) Times a Day With Meals for 30 days.    cloNIDine (CATAPRES) 0.2 MG tablet   No No    Take 1 tablet by mouth Every 12 (Twelve) Hours for 30 days.    Cyanocobalamin (VITAMIN B-12 PO)   Yes No    Take 1 tablet by mouth Daily.    docusate sodium (Colace) 100 MG capsule   No No    Take 1 capsule by mouth 2 (Two) Times a Day for 30 days. As needed for constipation    ferrous sulfate 325 (65 FE) MG tablet   No No    Take 1 tablet by mouth Daily With Breakfast for 30 days.    furosemide (LASIX) 40 MG tablet   No No    Take 1 tablet by mouth Daily for 30 days.    hydrALAZINE (APRESOLINE) 100 MG tablet   No No    Take 1 tablet by mouth 3 times a day for 30 days.    pyridoxine (VITAMIN B-6) 25 MG tablet   No No    Take 1 tablet by mouth 3 (Three) Times a Day As Needed (nausea) for up to 30 days.              Allergies:  No Known Allergies    Objective      Vitals:   Temp:  [98.4 °F (36.9 °C)] 98.4 °F (36.9 °C)  Heart Rate:  [60-69] 60  Resp:  [16] 16  BP: (167-195)/(61-74) 195/74  Body mass index is 22.78 kg/m².  Physical Exam  General: Awake, alert  HEENT: Atraumatic normocephalic  Cardio: Heart rate  normal, rhythm regular, paced  Respiratory: Clear to auscultation on exam  Abdomen: Soft, nontender, no rebound or guarding  Extremities: Trace edema in the bilateral extremities      Diagnostic Data:  Lab Results (last 24 hours)       Procedure Component Value Units Date/Time    High Sensitivity Troponin T 1Hr [[id number]]  (Abnormal) Collected: 05/16/25 1942    Specimen: Blood from Arm, Right Updated: 05/16/25 2008     HS Troponin T 60 ng/L      Troponin T Numeric Delta 2 ng/L      Troponin T % Delta 3    Narrative:      High Sensitive Troponin T Reference Range:  <14.0 ng/L- Negative Female for AMI  <22.0 ng/L- Negative Male for AMI  >=14 - Abnormal Female indicating possible myocardial injury.  >=22 - Abnormal Male indicating possible myocardial injury.   Clinicians would have to utilize clinical acumen, EKG, Troponin, and serial changes to determine if it is an Acute Myocardial Infarction or myocardial injury due to an underlying chronic condition.         Comprehensive Metabolic Panel [[id number]]  (Abnormal) Collected: 05/16/25 1805    Specimen: Blood Updated: 05/16/25 1852     Glucose 193 mg/dL      BUN 13 mg/dL      Creatinine 2.06 mg/dL      Sodium 131 mmol/L      Potassium 3.7 mmol/L      Comment: Specimen hemolyzed.  Result may be falsely elevated.        Chloride 97 mmol/L      CO2 27.9 mmol/L      Calcium 7.9 mg/dL      Total Protein 5.7 g/dL      Albumin 3.1 g/dL      ALT (SGPT) 23 U/L      AST (SGOT) 36 U/L      Alkaline Phosphatase 85 U/L      Total Bilirubin 0.3 mg/dL      Globulin 2.6 gm/dL      A/G Ratio 1.2 g/dL      BUN/Creatinine Ratio 6.3     Anion Gap 6.1 mmol/L      eGFR 23.4 mL/min/1.73     Narrative:      GFR Categories in Chronic Kidney Disease (CKD)              GFR Category          GFR (mL/min/1.73)    Interpretation  G1                    90 or greater        Normal or high (1)  G2                    60-89                Mild decrease (1)  G3a                   45-59                 Mild to moderate decrease  G3b                   30-44                Moderate to severe decrease  G4                    15-29                Severe decrease  G5                    14 or less           Kidney failure    (1)In the absence of evidence of kidney disease, neither GFR category G1 or G2 fulfill the criteria for CKD.    eGFR calculation 2021 CKD-EPI creatinine equation, which does not include race as a factor    High Sensitivity Troponin T [[id number]]  (Abnormal) Collected: 05/16/25 1805    Specimen: Blood Updated: 05/16/25 1852     HS Troponin T 58 ng/L     Narrative:      High Sensitive Troponin T Reference Range:  <14.0 ng/L- Negative Female for AMI  <22.0 ng/L- Negative Male for AMI  >=14 - Abnormal Female indicating possible myocardial injury.  >=22 - Abnormal Male indicating possible myocardial injury.   Clinicians would have to utilize clinical acumen, EKG, Troponin, and serial changes to determine if it is an Acute Myocardial Infarction or myocardial injury due to an underlying chronic condition.         CBC & Differential [[id number]]  (Abnormal) Collected: 05/16/25 1805    Specimen: Blood Updated: 05/16/25 1818    Narrative:      The following orders were created for panel order CBC & Differential.  Procedure                               Abnormality         Status                     ---------                               -----------         ------                     CBC Auto Differential[[id number]]        Abnormal            Final result                 Please view results for these tests on the individual orders.    CBC Auto Differential [[id number]]  (Abnormal) Collected: 05/16/25 1805    Specimen: Blood Updated: 05/16/25 1818     WBC 3.19 10*3/mm3      RBC 3.10 10*6/mm3      Hemoglobin 8.2 g/dL      Hematocrit 26.2 %      MCV 84.5 fL      MCH 26.5 pg      MCHC 31.3 g/dL      RDW 16.0 %      RDW-SD 48.8 fl      MPV 9.7 fL      Platelets 146 10*3/mm3      Neutrophil % 52.3 %      Lymphocyte %  30.1 %      Monocyte % 10.7 %      Eosinophil % 6.0 %      Basophil % 0.6 %      Immature Grans % 0.3 %      Neutrophils, Absolute 1.67 10*3/mm3      Lymphocytes, Absolute 0.96 10*3/mm3      Monocytes, Absolute 0.34 10*3/mm3      Eosinophils, Absolute 0.19 10*3/mm3      Basophils, Absolute 0.02 10*3/mm3      Immature Grans, Absolute 0.01 10*3/mm3      nRBC 0.0 /100 WBC              Imaging Results (Last 24 Hours)       Procedure Component Value Units Date/Time    XR Chest 1 View [[id number]] Collected: 05/16/25 1800     Updated: 05/16/25 1803    Narrative:      XR CHEST 1 VW    Date of Exam: 5/16/2025 5:58 PM EDT    Indication: left neck pain    Comparison: 4/15/2025    Findings:  Cardiomegaly unchanged. Left-sided AICD noted. Right IJ central venous catheter tip is at the proximal right atrium. The lungs are clear. There is no pneumothorax or effusion. Osseous structures appear intact.      Impression:      Impression:  1. No acute process.  2. Stable cardiomegaly.  3. Placement of a right IJ central venous catheter with tip overlying the right atrium. No pneumothorax.      Electronically Signed: Austin Kendrick MD    5/16/2025 6:01 PM EDT    Workstation ID: [id number]              Assessment & Plan    Brad Woodward is a 84 y.o. female with a CMH of end-stage renal disease on dialysis, hypertension, hyperlipidemia, type 2 diabetes who presented to [facility] on 5/16/2025 with uncontrolled hypertension.    Assessments  Hypertensive urgency  ESRD on dialysis  Status post pacemaker  Hypertension  Hyperlipidemia  Type 2 diabetes    Plan  -Reports headache; will obtain CT head without contrast to rule out acute pathology.  -Start Cardene drip for BP control; resume home clonidine 0.2 mg and adjust to q8h schedule; resume hydralazine 100 mg TID.  -Resume other home antihypertensives including amlodipine and thiazide in the morning; monitor closely as combination may potentiate hypotension.  -Consult nephrology for further  recommendations; defer Lasix for now as patient does not appear volume overloaded.  -Initiate SSI for type 2 diabetes; monitor glucose and adjust as needed.  -Resume aspirin and statin.  -Resume other home medications once reconciled or medically appropriate.  -Follow a.m. labs.          VTE Prophylaxis:  No VTE prophylaxis order currently exists.        The patient desires to be as follows:    CODE STATUS:    Code Status (Patient has no pulse and is not breathing): CPR (Attempt to Resuscitate)  Medical Interventions (Patient has pulse or is breathing): Full Support      This document has been electronically signed by Kurtis Garcia MD on May 16, 2025 20:28 EDT   [facility] Hospitalist Team

## 2025-05-18 ENCOUNTER — APPOINTMENT (OUTPATIENT)
Dept: CT IMAGING | Facility: HOSPITAL | Age: 85
End: 2025-05-18
Payer: MEDICARE

## 2025-05-18 ENCOUNTER — APPOINTMENT (OUTPATIENT)
Dept: CARDIOLOGY | Facility: HOSPITAL | Age: 85
End: 2025-05-18
Payer: MEDICARE

## 2025-05-18 LAB
ANION GAP SERPL CALCULATED.3IONS-SCNC: 11.6 MMOL/L (ref 5–15)
BUN SERPL-MCNC: 37 MG/DL (ref 8–23)
BUN/CREAT SERPL: 10.1 (ref 7–25)
CALCIUM SPEC-SCNC: 8.3 MG/DL (ref 8.6–10.5)
CHLORIDE SERPL-SCNC: 99 MMOL/L (ref 98–107)
CO2 SERPL-SCNC: 24.4 MMOL/L (ref 22–29)
CREAT SERPL-MCNC: 3.67 MG/DL (ref 0.57–1)
EGFRCR SERPLBLD CKD-EPI 2021: 11.7 ML/MIN/1.73
GLUCOSE BLDC GLUCOMTR-MCNC: 115 MG/DL (ref 70–105)
GLUCOSE BLDC GLUCOMTR-MCNC: 123 MG/DL (ref 70–105)
GLUCOSE BLDC GLUCOMTR-MCNC: 142 MG/DL (ref 70–105)
GLUCOSE BLDC GLUCOMTR-MCNC: 175 MG/DL (ref 70–105)
GLUCOSE SERPL-MCNC: 106 MG/DL (ref 65–99)
HBV SURFACE AG SERPL QL IA: NORMAL
POTASSIUM SERPL-SCNC: 3.7 MMOL/L (ref 3.5–5.2)
SODIUM SERPL-SCNC: 135 MMOL/L (ref 136–145)

## 2025-05-18 PROCEDURE — 82088 ASSAY OF ALDOSTERONE: CPT | Performed by: INTERNAL MEDICINE

## 2025-05-18 PROCEDURE — 74160 CT ABDOMEN W/CONTRAST: CPT

## 2025-05-18 PROCEDURE — 25010000002 HYDRALAZINE PER 20 MG: Performed by: INTERNAL MEDICINE

## 2025-05-18 PROCEDURE — 82948 REAGENT STRIP/BLOOD GLUCOSE: CPT | Performed by: STUDENT IN AN ORGANIZED HEALTH CARE EDUCATION/TRAINING PROGRAM

## 2025-05-18 PROCEDURE — 63710000001 INSULIN GLARGINE PER 5 UNITS: Performed by: INTERNAL MEDICINE

## 2025-05-18 PROCEDURE — 87340 HEPATITIS B SURFACE AG IA: CPT | Performed by: INTERNAL MEDICINE

## 2025-05-18 PROCEDURE — 83835 ASSAY OF METANEPHRINES: CPT | Performed by: INTERNAL MEDICINE

## 2025-05-18 PROCEDURE — 82948 REAGENT STRIP/BLOOD GLUCOSE: CPT

## 2025-05-18 PROCEDURE — 63710000001 INSULIN LISPRO (HUMAN) PER 5 UNITS: Performed by: STUDENT IN AN ORGANIZED HEALTH CARE EDUCATION/TRAINING PROGRAM

## 2025-05-18 PROCEDURE — 82384 ASSAY THREE CATECHOLAMINES: CPT | Performed by: INTERNAL MEDICINE

## 2025-05-18 PROCEDURE — 80048 BASIC METABOLIC PNL TOTAL CA: CPT | Performed by: INTERNAL MEDICINE

## 2025-05-18 PROCEDURE — 84244 ASSAY OF RENIN: CPT | Performed by: INTERNAL MEDICINE

## 2025-05-18 PROCEDURE — 25510000001 IOPAMIDOL PER 1 ML: Performed by: INTERNAL MEDICINE

## 2025-05-18 RX ORDER — IOPAMIDOL 755 MG/ML
100 INJECTION, SOLUTION INTRAVASCULAR
Status: COMPLETED | OUTPATIENT
Start: 2025-05-18 | End: 2025-05-18

## 2025-05-18 RX ORDER — CLONIDINE HYDROCHLORIDE 0.1 MG/1
0.3 TABLET ORAL EVERY 8 HOURS SCHEDULED
Status: DISCONTINUED | OUTPATIENT
Start: 2025-05-18 | End: 2025-05-18

## 2025-05-18 RX ORDER — CLONIDINE HYDROCHLORIDE 0.1 MG/1
0.2 TABLET ORAL EVERY 8 HOURS SCHEDULED
Status: DISCONTINUED | OUTPATIENT
Start: 2025-05-18 | End: 2025-05-22

## 2025-05-18 RX ADMIN — INSULIN LISPRO 2 UNITS: 100 INJECTION, SOLUTION INTRAVENOUS; SUBCUTANEOUS at 18:23

## 2025-05-18 RX ADMIN — ASPIRIN 81 MG CHEWABLE TABLET 81 MG: 81 TABLET CHEWABLE at 10:02

## 2025-05-18 RX ADMIN — CLONIDINE HYDROCHLORIDE 0.2 MG: 0.1 TABLET ORAL at 05:39

## 2025-05-18 RX ADMIN — HYDRALAZINE HYDROCHLORIDE 10 MG: 20 INJECTION INTRAMUSCULAR; INTRAVENOUS at 02:17

## 2025-05-18 RX ADMIN — AMLODIPINE BESYLATE 10 MG: 5 TABLET ORAL at 10:01

## 2025-05-18 RX ADMIN — CLONIDINE HYDROCHLORIDE 0.3 MG: 0.1 TABLET ORAL at 15:13

## 2025-05-18 RX ADMIN — HYDRALAZINE HYDROCHLORIDE 100 MG: 25 TABLET ORAL at 21:39

## 2025-05-18 RX ADMIN — Medication 10 ML: at 10:03

## 2025-05-18 RX ADMIN — Medication 10 ML: at 21:39

## 2025-05-18 RX ADMIN — HYDRALAZINE HYDROCHLORIDE 100 MG: 25 TABLET ORAL at 10:01

## 2025-05-18 RX ADMIN — FUROSEMIDE 40 MG: 40 TABLET ORAL at 10:02

## 2025-05-18 RX ADMIN — CHLORTHALIDONE 25 MG: 25 TABLET ORAL at 10:01

## 2025-05-18 RX ADMIN — INSULIN GLARGINE 5 UNITS: 100 INJECTION, SOLUTION SUBCUTANEOUS at 10:02

## 2025-05-18 RX ADMIN — ATORVASTATIN CALCIUM 80 MG: 40 TABLET, FILM COATED ORAL at 10:01

## 2025-05-18 RX ADMIN — IOPAMIDOL 100 ML: 755 INJECTION, SOLUTION INTRAVENOUS at 17:57

## 2025-05-18 NOTE — PROGRESS NOTES
"RENAL/KCC:     LOS: 0 days    Patient Care Team:  Brian Nazario APRN as PCP - General  Zane Aldridge MD as Consulting Physician (Cardiology)  Marnie Brandt APRN as Nurse Practitioner (Cardiology)    Chief Complaint:  HTN    Subjective     Interval History:   Chart reviewed  BP spiked overnight and required Cardene drip  Clonidine increased this AM    Objective     Vital Sign Min/Max for last 24 hours  Temp  Min: 97.9 °F (36.6 °C)  Max: 99.6 °F (37.6 °C)   BP  Min: 124/58  Max: 218/75   Pulse  Min: 62  Max: 84   Resp  Min: 14  Max: 18   SpO2  Min: 91 %  Max: 97 %   No data recorded   Weight  Min: 62 kg (136 lb 11 oz)  Max: 62 kg (136 lb 11 oz)     Flowsheet Rows      Flowsheet Row First Filed Value   Admission Height 165.1 cm (65\") Documented at 05/16/2025 1725   Admission Weight 62.1 kg (136 lb 14.5 oz) Documented at 05/16/2025 1725            No intake/output data recorded.  I/O last 3 completed shifts:  In: 240 [P.O.:240]  Out: -     Physical Exam:  GEN: Awake, NAD  ENT: PERRL, EOMI, MMM  NECK: Supple, no JVD  CHEST: CTAB, no W/R/C  CV: RRR, no M/G/R  ABD: Soft, NT, +BS  SKIN: Warm and Dry  NEURO: CN's intact      WBC WBC   Date Value Ref Range Status   05/17/2025 4.24 3.40 - 10.80 10*3/mm3 Final   05/16/2025 3.19 (L) 3.40 - 10.80 10*3/mm3 Final      HGB Hemoglobin   Date Value Ref Range Status   05/17/2025 8.6 (L) 12.0 - 15.9 g/dL Final   05/16/2025 8.2 (L) 12.0 - 15.9 g/dL Final      HCT Hematocrit   Date Value Ref Range Status   05/17/2025 27.1 (L) 34.0 - 46.6 % Final   05/16/2025 26.2 (L) 34.0 - 46.6 % Final      Platlets No results found for: \"LABPLAT\"   MCV MCV   Date Value Ref Range Status   05/17/2025 82.9 79.0 - 97.0 fL Final   05/16/2025 84.5 79.0 - 97.0 fL Final          Sodium Sodium   Date Value Ref Range Status   05/18/2025 135 (L) 136 - 145 mmol/L Final   05/17/2025 131 (L) 136 - 145 mmol/L Final   05/16/2025 131 (L) 136 - 145 mmol/L Final      Potassium Potassium   Date Value Ref Range " "Status   05/18/2025 3.7 3.5 - 5.2 mmol/L Final   05/17/2025 3.7 3.5 - 5.2 mmol/L Final   05/16/2025 3.7 3.5 - 5.2 mmol/L Final     Comment:     Specimen hemolyzed.  Result may be falsely elevated.      Chloride Chloride   Date Value Ref Range Status   05/18/2025 99 98 - 107 mmol/L Final   05/17/2025 98 98 - 107 mmol/L Final   05/16/2025 97 (L) 98 - 107 mmol/L Final      CO2 CO2   Date Value Ref Range Status   05/18/2025 24.4 22.0 - 29.0 mmol/L Final   05/17/2025 27.3 22.0 - 29.0 mmol/L Final   05/16/2025 27.9 22.0 - 29.0 mmol/L Final      BUN BUN   Date Value Ref Range Status   05/18/2025 37 (H) 8 - 23 mg/dL Final   05/17/2025 18 8 - 23 mg/dL Final   05/16/2025 13 8 - 23 mg/dL Final      Creatinine Creatinine   Date Value Ref Range Status   05/18/2025 3.67 (H) 0.57 - 1.00 mg/dL Final   05/17/2025 2.45 (H) 0.57 - 1.00 mg/dL Final   05/16/2025 2.06 (H) 0.57 - 1.00 mg/dL Final      Calcium Calcium   Date Value Ref Range Status   05/18/2025 8.3 (L) 8.6 - 10.5 mg/dL Final   05/17/2025 8.3 (L) 8.6 - 10.5 mg/dL Final   05/16/2025 7.9 (L) 8.6 - 10.5 mg/dL Final      PO4 No results found for: \"CAPO4\"   Albumin Albumin   Date Value Ref Range Status   05/17/2025 3.2 (L) 3.5 - 5.2 g/dL Final   05/16/2025 3.1 (L) 3.5 - 5.2 g/dL Final      Magnesium Magnesium   Date Value Ref Range Status   05/17/2025 1.7 1.6 - 2.4 mg/dL Final      Uric Acid No results found for: \"URICACID\"        Results Review:     I reviewed the patient's new clinical results.    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  Have increased Clonidine.  Doppler of renal arteries was not diagnostic.  Will check CTA.  HD tomorrow.  Follow-up renin/magdiel as well as catecholamines and metanephrines.  Will follow.      Jim Farooq MD  [facility]  05/18/25  11:56 EDT

## 2025-05-18 NOTE — PROGRESS NOTES
[facility] MEDICINE SERVICE  DAILY PROGRESS NOTE    NAME: Brad Woodward  : 1940  MRN: [id number]      LOS: 0 days     PROVIDER OF SERVICE: Helder Marcum MD    Chief Complaint: Hypertensive urgency    Subjective:   Patient denies any complaint no chest pain dizziness shortness of air or abdominal pain  Interval History:    Patient seen and evaluated at bedside.   Family at the bedside  Treatment plan discussed with patient. All questions addressed.     Review of Systems:   All 21 ROS were negative except mentioned above.    Objective:     Vital Signs  Temp:  [97.9 °F (36.6 °C)-99.6 °F (37.6 °C)] 98.1 °F (36.7 °C)  Heart Rate:  [62-84] 75  Resp:  [14-18] 14  BP: (124-218)/(54-76) 124/58   Body mass index is 22.75 kg/m².    Physical Exam   General: No acute distress, appears stated age  Neuro: Awake and alert, oriented x3, no focal deficits appreciated  Head: Atraumatic, normocephalic  HEENT: EOMI, anicteric, normal sclerae and conjunctivae, moist mucus membranes  Neck: supple, no lymphadenopathy  CV: RRR, soft heart sounds, no murmurs appreciated, no peripheral edema  Pulm: Decreased breath sounds, no increased work of breathing, no adventitious sounds  Abd: Soft, nontender, nondistended  Skin: Warm, dry and intact  Psych: Appropriate mood and affect    Scheduled Meds   amLODIPine, 10 mg, Oral, Q24H  aspirin, 81 mg, Oral, Daily  atorvastatin, 80 mg, Oral, Daily  chlorthalidone, 25 mg, Oral, Daily  cloNIDine, 0.3 mg, Oral, Q8H  furosemide, 40 mg, Oral, Daily  hydrALAZINE, 100 mg, Oral, TID  insulin glargine, 5 Units, Subcutaneous, Daily  insulin lispro, 2-7 Units, Subcutaneous, 4x Daily AC & at Bedtime  metoprolol tartrate, 5 mg, Intravenous, Once  sodium chloride, 10 mL, Intravenous, Q12H       PRN Meds     acetaminophen **OR** acetaminophen **OR** acetaminophen    senna-docusate sodium **AND** polyethylene glycol **AND** bisacodyl **AND** bisacodyl    Calcium Replacement - Follow Nurse / BPA Driven  Protocol    dextrose    dextrose    glucagon (human recombinant)    hydrALAZINE    Magnesium Standard Dose Replacement - Follow Nurse / BPA Driven Protocol    nitroglycerin    Phosphorus Replacement - Follow Nurse / BPA Driven Protocol    Potassium Replacement - Follow Nurse / BPA Driven Protocol    [COMPLETED] Insert Peripheral IV **AND** sodium chloride    sodium chloride    sodium chloride   Infusions         Diagnostic Data    Results from last 7 days   Lab Units 05/18/25  0403 05/17/25  0135   WBC 10*3/mm3  --  4.24   HEMOGLOBIN g/dL  --  8.6*   HEMATOCRIT %  --  27.1*   PLATELETS 10*3/mm3  --  151   GLUCOSE mg/dL 106* 113*   CREATININE mg/dL 3.67* 2.45*   BUN mg/dL 37* 18   SODIUM mmol/L 135* 131*   POTASSIUM mmol/L 3.7 3.7   AST (SGOT) U/L  --  35*   ALT (SGPT) U/L  --  21   ALK PHOS U/L  --  88   BILIRUBIN mg/dL  --  0.3   ANION GAP mmol/L 11.6 5.7       CT Head Without Contrast  Result Date: 5/17/2025  Impression: 1.No acute intracranial abnormality. 2.Chronic infarcts in the right basal ganglia and left temporal lobe. 3.Mild chronic small vessel ischemic change. Electronically Signed: Kamari Renteria MD  5/17/2025 5:54 AM EDT  Workstation ID: [id number]    XR Chest 1 View  Result Date: 5/16/2025  Impression: 1. No acute process. 2. Stable cardiomegaly. 3. Placement of a right IJ central venous catheter with tip overlying the right atrium. No pneumothorax. Electronically Signed: Austin Kendrick MD  5/16/2025 6:01 PM EDT  Workstation ID: [id number]      Interval results reviewed.    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    Hold lisinopril   Patient was restarted on nicardipine drip as blood pressure went up  during the night.  nephrology following the patient for BP management  Doppler renal arteries nondiagnostic as per renal  Renal ordered renin aldosterone, catecholamines and metanephrines.    Lantus 5 units daily and sliding scale insulin  Blood sugar better.        Treatment plan discussed with RN.     VTE Prophylaxis:  No VTE prophylaxis order currently exists.         Code status is   Code Status and Medical Interventions: CPR (Attempt to Resuscitate); Full Support   Ordered at: 05/16/25 2028     Code Status (Patient has no pulse and is not breathing):    CPR (Attempt to Resuscitate)     Medical Interventions (Patient has pulse or is breathing):    Full Support       Plan for disposition:     Barriers to Discharge: Labile blood pressure  Anticipated Date of Discharge: 5/19/25  Place of Discharge: Home         Time: 40 minutes     Signature: Electronically signed by Helder Marcum MD, 05/18/25, 12:11 EDT.  [facility] Hospitalist Team

## 2025-05-18 NOTE — PLAN OF CARE
Problem: Adult Inpatient Plan of Care  Goal: Plan of Care Review  Outcome: Progressing  Flowsheets (Taken 5/18/2025 1853)  Outcome Evaluation: NPO for renal duplex this AM, diet restarted after test. Cardene gtt stopped, BP stable 120s-130s/50s-60s with oral meds. CTA completed. Family remains at bedside, call light in reach.  Goal: Patient-Specific Goal (Individualized)  Outcome: Progressing  Goal: Absence of Hospital-Acquired Illness or Injury  Outcome: Progressing  Intervention: Identify and Manage Fall Risk  Recent Flowsheet Documentation  Taken 5/18/2025 1600 by Lisa Mahmood RN  Safety Promotion/Fall Prevention:   safety round/check completed   room organization consistent   nonskid shoes/slippers when out of bed   lighting adjusted   clutter free environment maintained   assistive device/personal items within reach  Taken 5/18/2025 1400 by Lisa Mahmood RN  Safety Promotion/Fall Prevention:   safety round/check completed   room organization consistent   nonskid shoes/slippers when out of bed   lighting adjusted   clutter free environment maintained   assistive device/personal items within reach  Taken 5/18/2025 1200 by Lisa Mahmood RN  Safety Promotion/Fall Prevention:   safety round/check completed   room organization consistent   nonskid shoes/slippers when out of bed   lighting adjusted   clutter free environment maintained   assistive device/personal items within reach  Taken 5/18/2025 1000 by Lisa Mahmood, SAVITA  Safety Promotion/Fall Prevention:   safety round/check completed   room organization consistent   nonskid shoes/slippers when out of bed   lighting adjusted   clutter free environment maintained   assistive device/personal items within reach  Taken 5/18/2025 0800 by Lisa Mahmood, SAVITA  Safety Promotion/Fall Prevention:   safety round/check completed   room organization consistent   nonskid shoes/slippers when out of bed   lighting adjusted   clutter free environment maintained   assistive  device/personal items within reach  Intervention: Prevent Skin Injury  Recent Flowsheet Documentation  Taken 5/18/2025 1600 by Lisa Mahmood RN  Skin Protection:   transparent dressing maintained   incontinence pads utilized  Taken 5/18/2025 1200 by Lisa Mahmood RN  Skin Protection: transparent dressing maintained  Taken 5/18/2025 0800 by Lisa Mahmood RN  Skin Protection: transparent dressing maintained  Intervention: Prevent and Manage VTE (Venous Thromboembolism) Risk  Recent Flowsheet Documentation  Taken 5/18/2025 0800 by Lisa Mahmood RN  VTE Prevention/Management:   SCDs (sequential compression devices) off   patient refused intervention  Intervention: Prevent Infection  Recent Flowsheet Documentation  Taken 5/18/2025 1600 by Lisa Mahmood RN  Infection Prevention:   environmental surveillance performed   hand hygiene promoted   rest/sleep promoted   single patient room provided  Taken 5/18/2025 1400 by Lisa Mahmood RN  Infection Prevention:   environmental surveillance performed   hand hygiene promoted   rest/sleep promoted   single patient room provided  Taken 5/18/2025 1200 by Lisa Mahmood RN  Infection Prevention:   environmental surveillance performed   hand hygiene promoted   rest/sleep promoted   single patient room provided  Taken 5/18/2025 1000 by Lisa Mahmood RN  Infection Prevention:   environmental surveillance performed   hand hygiene promoted   rest/sleep promoted   single patient room provided  Taken 5/18/2025 0800 by Lisa Mahmood RN  Infection Prevention:   environmental surveillance performed   hand hygiene promoted   rest/sleep promoted   single patient room provided  Goal: Optimal Comfort and Wellbeing  Outcome: Progressing  Intervention: Provide Person-Centered Care  Recent Flowsheet Documentation  Taken 5/18/2025 1600 by Lisa Mahmood RN  Trust Relationship/Rapport:   care explained   choices provided  Taken 5/18/2025 1200 by Lisa Mahmood RN  Trust Relationship/Rapport:   choices  provided   care explained  Taken 5/18/2025 0800 by Lisa Mahmood RN  Trust Relationship/Rapport:   care explained   choices provided  Goal: Readiness for Transition of Care  Outcome: Progressing     Problem: Skin Injury Risk Increased  Goal: Skin Health and Integrity  Outcome: Progressing  Intervention: Optimize Skin Protection  Recent Flowsheet Documentation  Taken 5/18/2025 1600 by Lisa Mahmood RN  Pressure Reduction Techniques: frequent weight shift encouraged  Pressure Reduction Devices:   pressure-redistributing mattress utilized   positioning supports utilized  Skin Protection:   transparent dressing maintained   incontinence pads utilized  Taken 5/18/2025 1200 by Lisa Mahmood RN  Pressure Reduction Techniques: frequent weight shift encouraged  Pressure Reduction Devices:   pressure-redistributing mattress utilized   positioning supports utilized  Skin Protection: transparent dressing maintained  Taken 5/18/2025 0800 by Lisa Mahmood RN  Pressure Reduction Techniques: frequent weight shift encouraged  Pressure Reduction Devices:   pressure-redistributing mattress utilized   positioning supports utilized  Skin Protection: transparent dressing maintained     Problem: Comorbidity Management  Goal: Blood Glucose Level Within Target Range  Outcome: Progressing  Intervention: Monitor and Manage Glycemia  Recent Flowsheet Documentation  Taken 5/18/2025 1600 by Lisa Mahmood RN  Medication Review/Management: medications reviewed  Taken 5/18/2025 1400 by Lisa Mahmood RN  Medication Review/Management: medications reviewed  Taken 5/18/2025 1200 by Lisa Mahmood RN  Medication Review/Management: medications reviewed  Taken 5/18/2025 1000 by Lisa Mahmood RN  Medication Review/Management: medications reviewed  Taken 5/18/2025 0800 by Lisa Mahmood RN  Medication Review/Management: medications reviewed  Goal: Blood Pressure in Desired Range  Outcome: Progressing  Intervention: Maintain Blood Pressure Management  Recent  Flowsheet Documentation  Taken 5/18/2025 1600 by Lisa Mahmood RN  Medication Review/Management: medications reviewed  Taken 5/18/2025 1400 by Lisa Mahmood RN  Medication Review/Management: medications reviewed  Taken 5/18/2025 1200 by Lisa Mahmood RN  Medication Review/Management: medications reviewed  Taken 5/18/2025 1000 by Lisa Mahmood RN  Medication Review/Management: medications reviewed  Taken 5/18/2025 0800 by Lisa Mahmood RN  Medication Review/Management: medications reviewed     Problem: Fall Injury Risk  Goal: Absence of Fall and Fall-Related Injury  Outcome: Progressing  Intervention: Identify and Manage Contributors  Recent Flowsheet Documentation  Taken 5/18/2025 1600 by Lisa Mahmood RN  Medication Review/Management: medications reviewed  Taken 5/18/2025 1400 by Lisa Mahmood RN  Medication Review/Management: medications reviewed  Taken 5/18/2025 1200 by Lisa Mahmood RN  Medication Review/Management: medications reviewed  Taken 5/18/2025 1000 by Lisa Mahmood RN  Medication Review/Management: medications reviewed  Taken 5/18/2025 0800 by Lisa Mahmood RN  Medication Review/Management: medications reviewed  Intervention: Promote Injury-Free Environment  Recent Flowsheet Documentation  Taken 5/18/2025 1600 by Lisa Mahmood RN  Safety Promotion/Fall Prevention:   safety round/check completed   room organization consistent   nonskid shoes/slippers when out of bed   lighting adjusted   clutter free environment maintained   assistive device/personal items within reach  Taken 5/18/2025 1400 by Lisa Mahmood RN  Safety Promotion/Fall Prevention:   safety round/check completed   room organization consistent   nonskid shoes/slippers when out of bed   lighting adjusted   clutter free environment maintained   assistive device/personal items within reach  Taken 5/18/2025 1200 by Lisa Mahmood RN  Safety Promotion/Fall Prevention:   safety round/check completed   room organization consistent   nonskid  shoes/slippers when out of bed   lighting adjusted   clutter free environment maintained   assistive device/personal items within reach  Taken 5/18/2025 1000 by Lisa Mahmood, RN  Safety Promotion/Fall Prevention:   safety round/check completed   room organization consistent   nonskid shoes/slippers when out of bed   lighting adjusted   clutter free environment maintained   assistive device/personal items within reach  Taken 5/18/2025 0800 by Lisa Mahmood, RN  Safety Promotion/Fall Prevention:   safety round/check completed   room organization consistent   nonskid shoes/slippers when out of bed   lighting adjusted   clutter free environment maintained   assistive device/personal items within reach   Goal Outcome Evaluation:              Outcome Evaluation: NPO for renal duplex this AM, diet restarted after test. Cardene gtt stopped, BP stable 120s-130s/50s-60s with oral meds. CTA completed. Family remains at bedside, call light in reach.

## 2025-05-18 NOTE — PLAN OF CARE
Goal Outcome Evaluation:   Pt BP elevated all night. Treated unsuccessfully with PRN hydralazine. Oncall provider notified and orders for Cardene gtt started. Pt NPO since midnight for procedure in AM. Call light within reach. Family at bedside.

## 2025-05-19 ENCOUNTER — APPOINTMENT (OUTPATIENT)
Dept: NEPHROLOGY | Facility: HOSPITAL | Age: 85
End: 2025-05-19
Payer: MEDICARE

## 2025-05-19 ENCOUNTER — APPOINTMENT (OUTPATIENT)
Dept: CT IMAGING | Facility: HOSPITAL | Age: 85
End: 2025-05-19
Payer: MEDICARE

## 2025-05-19 LAB
ANION GAP SERPL CALCULATED.3IONS-SCNC: 9.7 MMOL/L (ref 5–15)
BUN SERPL-MCNC: 52 MG/DL (ref 8–23)
BUN/CREAT SERPL: 11.9 (ref 7–25)
CALCIUM SPEC-SCNC: 7.7 MG/DL (ref 8.6–10.5)
CHLORIDE SERPL-SCNC: 98 MMOL/L (ref 98–107)
CO2 SERPL-SCNC: 23.3 MMOL/L (ref 22–29)
CREAT SERPL-MCNC: 4.36 MG/DL (ref 0.57–1)
EGFRCR SERPLBLD CKD-EPI 2021: 9.5 ML/MIN/1.73
GLUCOSE BLDC GLUCOMTR-MCNC: 139 MG/DL (ref 70–105)
GLUCOSE BLDC GLUCOMTR-MCNC: 177 MG/DL (ref 70–105)
GLUCOSE BLDC GLUCOMTR-MCNC: 96 MG/DL (ref 70–105)
GLUCOSE BLDC GLUCOMTR-MCNC: 98 MG/DL (ref 70–105)
GLUCOSE SERPL-MCNC: 183 MG/DL (ref 65–99)
POTASSIUM SERPL-SCNC: 2.5 MMOL/L (ref 3.5–5.2)
POTASSIUM SERPL-SCNC: 3.3 MMOL/L (ref 3.5–5.2)
QT INTERVAL: 490 MS
QTC INTERVAL: 512 MS
SODIUM SERPL-SCNC: 131 MMOL/L (ref 136–145)

## 2025-05-19 PROCEDURE — 84132 ASSAY OF SERUM POTASSIUM: CPT | Performed by: INTERNAL MEDICINE

## 2025-05-19 PROCEDURE — 5A1D70Z PERFORMANCE OF URINARY FILTRATION, INTERMITTENT, LESS THAN 6 HOURS PER DAY: ICD-10-PCS | Performed by: INTERNAL MEDICINE

## 2025-05-19 PROCEDURE — 74174 CTA ABD&PLVS W/CONTRAST: CPT

## 2025-05-19 PROCEDURE — 82948 REAGENT STRIP/BLOOD GLUCOSE: CPT | Performed by: STUDENT IN AN ORGANIZED HEALTH CARE EDUCATION/TRAINING PROGRAM

## 2025-05-19 PROCEDURE — 25010000002 HEPARIN (PORCINE) PER 1000 UNITS: Performed by: INTERNAL MEDICINE

## 2025-05-19 PROCEDURE — 25510000001 IOPAMIDOL PER 1 ML: Performed by: INTERNAL MEDICINE

## 2025-05-19 PROCEDURE — 63710000001 INSULIN GLARGINE PER 5 UNITS: Performed by: INTERNAL MEDICINE

## 2025-05-19 PROCEDURE — 99222 1ST HOSP IP/OBS MODERATE 55: CPT | Performed by: NURSE PRACTITIONER

## 2025-05-19 PROCEDURE — 63710000001 INSULIN LISPRO (HUMAN) PER 5 UNITS: Performed by: STUDENT IN AN ORGANIZED HEALTH CARE EDUCATION/TRAINING PROGRAM

## 2025-05-19 PROCEDURE — 82948 REAGENT STRIP/BLOOD GLUCOSE: CPT

## 2025-05-19 PROCEDURE — 80048 BASIC METABOLIC PNL TOTAL CA: CPT | Performed by: INTERNAL MEDICINE

## 2025-05-19 RX ORDER — POTASSIUM CHLORIDE 1500 MG/1
40 TABLET, EXTENDED RELEASE ORAL ONCE
Status: COMPLETED | OUTPATIENT
Start: 2025-05-19 | End: 2025-05-19

## 2025-05-19 RX ORDER — POTASSIUM CHLORIDE 1500 MG/1
20 TABLET, EXTENDED RELEASE ORAL ONCE
Status: COMPLETED | OUTPATIENT
Start: 2025-05-19 | End: 2025-05-19

## 2025-05-19 RX ORDER — HEPARIN SODIUM 1000 [USP'U]/ML
4000 INJECTION, SOLUTION INTRAVENOUS; SUBCUTANEOUS AS NEEDED
Status: DISCONTINUED | OUTPATIENT
Start: 2025-05-19 | End: 2025-05-22 | Stop reason: HOSPADM

## 2025-05-19 RX ORDER — IOPAMIDOL 755 MG/ML
100 INJECTION, SOLUTION INTRAVASCULAR
Status: COMPLETED | OUTPATIENT
Start: 2025-05-19 | End: 2025-05-19

## 2025-05-19 RX ORDER — POTASSIUM CHLORIDE 1500 MG/1
40 TABLET, EXTENDED RELEASE ORAL DAILY
Status: DISCONTINUED | OUTPATIENT
Start: 2025-05-19 | End: 2025-05-22 | Stop reason: HOSPADM

## 2025-05-19 RX ADMIN — FUROSEMIDE 40 MG: 40 TABLET ORAL at 09:30

## 2025-05-19 RX ADMIN — HYDRALAZINE HYDROCHLORIDE 100 MG: 25 TABLET ORAL at 21:58

## 2025-05-19 RX ADMIN — CLONIDINE HYDROCHLORIDE 0.2 MG: 0.1 TABLET ORAL at 22:04

## 2025-05-19 RX ADMIN — Medication 10 ML: at 22:04

## 2025-05-19 RX ADMIN — ASPIRIN 81 MG CHEWABLE TABLET 81 MG: 81 TABLET CHEWABLE at 09:30

## 2025-05-19 RX ADMIN — AMLODIPINE BESYLATE 10 MG: 5 TABLET ORAL at 09:30

## 2025-05-19 RX ADMIN — HEPARIN SODIUM 4000 UNITS: 1000 INJECTION INTRAVENOUS; SUBCUTANEOUS at 15:58

## 2025-05-19 RX ADMIN — CLONIDINE HYDROCHLORIDE 0.2 MG: 0.1 TABLET ORAL at 17:00

## 2025-05-19 RX ADMIN — HYDRALAZINE HYDROCHLORIDE 100 MG: 25 TABLET ORAL at 17:00

## 2025-05-19 RX ADMIN — Medication 10 ML: at 09:31

## 2025-05-19 RX ADMIN — CLONIDINE HYDROCHLORIDE 0.2 MG: 0.1 TABLET ORAL at 05:25

## 2025-05-19 RX ADMIN — POTASSIUM CHLORIDE 20 MEQ: 1500 TABLET, EXTENDED RELEASE ORAL at 05:55

## 2025-05-19 RX ADMIN — INSULIN GLARGINE 5 UNITS: 100 INJECTION, SOLUTION SUBCUTANEOUS at 09:30

## 2025-05-19 RX ADMIN — INSULIN LISPRO 2 UNITS: 100 INJECTION, SOLUTION INTRAVENOUS; SUBCUTANEOUS at 21:58

## 2025-05-19 RX ADMIN — ATORVASTATIN CALCIUM 80 MG: 40 TABLET, FILM COATED ORAL at 09:30

## 2025-05-19 RX ADMIN — CHLORTHALIDONE 25 MG: 25 TABLET ORAL at 09:30

## 2025-05-19 RX ADMIN — IOPAMIDOL 100 ML: 755 INJECTION, SOLUTION INTRAVENOUS at 18:24

## 2025-05-19 RX ADMIN — HYDRALAZINE HYDROCHLORIDE 100 MG: 25 TABLET ORAL at 09:30

## 2025-05-19 RX ADMIN — POTASSIUM CHLORIDE 40 MEQ: 1500 TABLET, EXTENDED RELEASE ORAL at 19:07

## 2025-05-19 NOTE — SIGNIFICANT NOTE
HD TX init. per mdo. VSS/ECC secure/visible. Pt. Family states that pt. does not speak English.     Will monitor.

## 2025-05-19 NOTE — PLAN OF CARE
Goal Outcome Evaluation:      Pt has dialysis this morning, pt has been resting in between care with no complaints of pain.

## 2025-05-19 NOTE — CASE MANAGEMENT/SOCIAL WORK
Discharge Planning Assessment   Zach     Patient Name: Brad Woodward  MRN: [id number]  Today's Date: 5/19/2025    Admit Date: 5/16/2025    Plan: Anticipate routine home with son and family. Current OP HD MWF [facility].   Discharge Needs Assessment       Row Name 05/19/25 1505       Living Environment    People in Home child(geoffrey), adult    Name(s) of People in Home Jarek Saenz    Current Living Arrangements home    Potentially Unsafe Housing Conditions none    In the past 12 months has the electric, gas, oil, or water company threatened to shut off services in your home? No    Primary Care Provided by self;child(geoffrey)    Provides Primary Care For no one, unable/limited ability to care for self    Family Caregiver if Needed child(geoffrey), adult;grandchild(geoffrey), adult    Quality of Family Relationships helpful;involved;supportive    Able to Return to Prior Arrangements yes       Resource/Environmental Concerns    Resource/Environmental Concerns none    Transportation Concerns none       Transportation Needs    In the past 12 months, has lack of transportation kept you from medical appointments or from getting medications? no    In the past 12 months, has lack of transportation kept you from meetings, work, or from getting things needed for daily living? No       Food Insecurity    Within the past 12 months, you worried that your food would run out before you got the money to buy more. Never true    Within the past 12 months, the food you bought just didn't last and you didn't have money to get more. Never true       Transition Planning    Patient/Family Anticipates Transition to home    Patient/Family Anticipated Services at Transition none    Transportation Anticipated family or friend will provide       Discharge Needs Assessment    Readmission Within the Last 30 Days other (see comments)  See readmission assessment    Current Outpatient/Agency/Support Group outpatient hemodialysis    Equipment Currently Used  at Home none    Concerns to be Addressed care coordination/care conferences    Anticipated Changes Related to Illness none    Equipment Needed After Discharge none    Provided Post Acute Provider List? N/A    Provided Post Acute Provider Quality & Resource List? N/A    Offered/Gave Vendor List no                   Discharge Plan       Row Name 05/19/25 1504       Plan    Plan Anticipate routine home with son and family. Current OP HD MWF [facility].    Plan Comments Patient off the floor for dialysis at this time. CM contacted patient’s son, Dany ([phone]) to discuss dc planning, IMM letter, and readmission assessment. Currently enrolled in [facility] meds to bed. Dany reported that patient lives with him and his girlfriend (Glenn). Reported that his sister also helps. Confirmed patient is current with OP HD [facility] MWF. He reported that he and his sister are off work at this time to help care for patient and inquired about what resources there were available. Reported that he would provide CM name and phone number to his daughter as he reported he was not familiar with any short-term disability/FMLA options through employer and reported that his daughter spoke better English to understand. JOSE MANUEL discussed with Denisha WELLINGTON who submitted [employer] referral online for waiver services.         Plan    Patient/Family in Agreement with Plan yes                    Continued Care and Services - Admitted Since 5/16/2025       Dialysis/Infusion Coordination complete.      Service Provider Request Status Services Address Phone Fax Patient Preferred    [facility]  Selected In-Center Hemodialysis [address] [place] [address] [place] [address] [phone] -- Yes                   Demographic Summary       Row Name 05/19/25 1502       General Information    Admission Type inpatient    Arrived From emergency department    Required Notices Provided Important Message from Medicare     Referral Source admission list    Reason for Consult discharge planning    Preferred Language other (see comments)    General Information Comments Tajik       Contact Information    Permission Granted to Share Info With                    Functional Status       Row Name 05/19/25 1504       Functional Status    Usual Activity Tolerance moderate    Current Activity Tolerance moderate       Functional Status, IADL    Medications independent    Meal Preparation assistive person    Housekeeping assistive person    Laundry assistive person    Shopping assistive person                  Case Management Readmission Assessment Note    Case Management Readmission Assessment (all recorded)       Readmission Interview       Row Name 05/19/25 1509             Readmission Indications    Is the patient and/or family able to complete the readmission assessment questions? Yes      Is this hospitalization related to the prior hospital diagnosis? Yes        Row Name 05/19/25 1509             Recommendation for rehospitalization    Did you speak with your physician prior to coming to the hospital No        Row Name 05/19/25 1509             Follow-up Appointments    Do you have a PCP? Yes  PCP Brian Nazario      Did you have an appointment with PCP after your hospitalization? Yes      When was your appointment scheduled? 04/30/25      Did you go to appointment? Yes      Did you have an appointment with a Specialist? Yes      When was your appointment scheduled? 05/22/25  PCP appt 4/30, nephrology 5/1, Vascular appt 5/22, cardiology appt 6/5      Did you go to appointment? No      Are you current with the Pulmonary Clinic? No      Are you current with the CHF Clinic? No        Row Name 05/19/25 1509             Medications    Did you have newly prescribed medications at discharge? Yes      Did you understand the reasons for your medications at discharge and how to take them? Yes      Did you understand the side effects of  your medications? Yes      Are you taking all of you prescribed medications? Yes      What pharmacy was used to fill prescription(s)? [facility]      Were medications picked up? Yes        Row Name 05/19/25 1509             Discharge Instructions    Did you understand your discharge instructions? Yes      Did your family/caregiver hear your instructions? Yes      Were you told to eat a special diet? No      Did you adhere to the diet? --  N/a      Were you given a number of someone to call if you had questions or concerns? Yes        Row Name 05/19/25 1509             Index discharge location/services    Where did you go upon discharge? Home      Do you have supportive family or friends in the home? Yes      What services were arranged at discharge? Other (comment)  OP HD [facility]        Row Name 05/19/25 1509             Discharge Readiness    On a scale of 1-5 (5 being well prepared), how ready were you for discharge 3      Recommendation based on interview Education on diagnosis/self management;Additional caregiver support;Transportation assistance;Goals of care discussion/advanced care planning;Financial assistance        Row Name 05/19/25 1509             Palliative Care/Hospice    Are you current with Palliative Care? No      Are you current with Hospice Care? No        Row Name 05/19/25 1509 05/16/25 2305          Advance Directives (For Healthcare)    Pre-existing AND/MOST/POLST Order No No     Advance Directive Status Patient does not have advance directive Patient does not have advance directive     Have you reviewed your Advance Directive and is it valid for this stay? 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 ER visit 4/29 with elevated BP. Current (5/16-present): HTN, cardene gtt, vascular and nephro cx, Cr 4.36, CT abd- renal artery stenosis, renal artery angiography with possible intervention ordered to be completed in OR. LACE: 13.                  Angela Xie RN     Office Phone: [phone]  Office Cell: [phone]

## 2025-05-19 NOTE — CONSULTS
Name: Brad Woodward ADMIT: 2025   : 1940  PCP: Brian Nazario APRN    MRN: [id number] LOS: 1 days   AGE/SEX: 84 y.o. female  ROOM:    [facility]      Patient Care Team:  Brian Nazario APRN as PCP - Zane Hassan MD as Consulting Physician (Cardiology)  Marnie Brandt APRN as Nurse Practitioner (Cardiology)  Chief Complaint   Patient presents with    Hypertension       CC: Renal artery stenosis    Subjective     Inpatient Vascular Surgery Consult  Consult performed by: Claudia Enamorado APRN  Consult ordered by: Jim Farooq MD          History of Present Illness  Brad Woodward is a 84 y.o. female with a past medical history of hypertension, hyperlipidemia, diabetes mellitus, pacemaker placement, and ESRD on HD who presented to [facility] on 2025 for uncontrolled hypertension.  She was found to be in hypertensive urgency on arrival and admitted for further workup and care.  She was started on a Cardene drip.  Nephrology was consulted for dialysis management.  CT of the abdomen was obtained, this showed significant stenosis at the origin of the left renal artery.  Vascular surgery was consulted to evaluate.  The patient does not speak any English.  She is a native of [place]. Her daughter is at the bedside and the patient prefers to have daughter interpret for her.  Patient reports a longstanding history of hypertension.  She is on hemodialysis.  Receiving dialysis through a right chest TDC on a M, W, F schedule.    Review of Systems   All other systems reviewed and are negative.      Past Medical History:   Diagnosis Date    Diabetes mellitus     Hyperlipidemia     Hypertension      Past Surgical History:   Procedure Laterality Date    CARDIAC ELECTROPHYSIOLOGY PROCEDURE N/A 2021    Procedure: Pacemaker DC new;  Surgeon: Yovany Navarrete MD;  Location: [facility] INVASIVE LOCATION;  Service: Cardiovascular;  Laterality: N/A;     Family History    Family history unknown: Yes       Social History     Tobacco Use    Smoking status: Never     Passive exposure: Never    Smokeless tobacco: Never   Vaping Use    Vaping status: Never Used   Substance Use Topics    Alcohol use: No    Drug use: No     Medications Prior to Admission   Medication Sig Dispense Refill Last Dose/Taking    carvedilol (COREG) 3.125 MG tablet Take 1 tablet by mouth 2 (Two) Times a Day With Meals for 30 days. 60 tablet 0 5/16/2025 Bedtime    chlorthalidone (HYGROTEN) 50 MG tablet Take 1 tablet by mouth Daily. Take 1 tablet by mouth 1 time each day   5/16/2025 Morning    cloNIDine (CATAPRES) 0.2 MG tablet Take 1 tablet by mouth Every 12 (Twelve) Hours for 30 days. 60 tablet 0 5/16/2025 Bedtime    docusate sodium (Colace) 100 MG capsule Take 1 capsule by mouth 2 (Two) Times a Day for 30 days. As needed for constipation 60 capsule 0 5/16/2025 Bedtime    furosemide (LASIX) 40 MG tablet Take 1 tablet by mouth Daily for 30 days. 30 tablet 0 5/16/2025 Morning    hydrALAZINE (APRESOLINE) 100 MG tablet Take 1 tablet by mouth 3 times a day for 30 days. 90 tablet 0 5/16/2025 Bedtime    lisinopril (PRINIVIL,ZESTRIL) 40 MG tablet Take 1 tablet by mouth Daily. One tablet by mouth every day   5/16/2025 Morning    potassium chloride (KLOR-CON M20) 20 MEQ CR tablet Take 1 tablet by mouth 2 (Two) Times a Day. Take 1 tablet by mouth twice a day   5/16/2025 Bedtime    pyridoxine (VITAMIN B-6) 25 MG tablet Take 1 tablet by mouth 3 (Three) Times a Day As Needed (nausea) for up to 30 days. (Patient taking differently: Take 4 tablets by mouth 3 (Three) Times a Day As Needed (nausea).) 60 tablet 0 Patient Taking Differently    amLODIPine (NORVASC) 10 MG tablet Take 1 tablet by mouth Daily for 30 days. 30 tablet 0 Unknown    aspirin 81 MG chewable tablet Chew 1 tablet Daily. 270 tablet 2 Unknown    atorvastatin (LIPITOR) 80 MG tablet Take 1 tablet by mouth Daily. 270 tablet 1 Unknown    Cyanocobalamin (VITAMIN B-12  PO) Take 1 tablet by mouth Daily.   Unknown    ferrous sulfate 325 (65 FE) MG tablet Take 1 tablet by mouth Daily With Breakfast for 30 days. 30 tablet 0 Unknown     amLODIPine, 10 mg, Oral, Q24H  aspirin, 81 mg, Oral, Daily  atorvastatin, 80 mg, Oral, Daily  chlorthalidone, 25 mg, Oral, Daily  cloNIDine, 0.2 mg, Oral, Q8H  furosemide, 40 mg, Oral, Daily  hydrALAZINE, 100 mg, Oral, TID  insulin glargine, 5 Units, Subcutaneous, Daily  insulin lispro, 2-7 Units, Subcutaneous, 4x Daily AC & at Bedtime  metoprolol tartrate, 5 mg, Intravenous, Once  sodium chloride, 10 mL, Intravenous, Q12H           acetaminophen **OR** acetaminophen **OR** acetaminophen    senna-docusate sodium **AND** polyethylene glycol **AND** bisacodyl **AND** bisacodyl    Calcium Replacement - Follow Nurse / BPA Driven Protocol    dextrose    dextrose    glucagon (human recombinant)    hydrALAZINE    Magnesium Standard Dose Replacement - Follow Nurse / BPA Driven Protocol    nitroglycerin    Phosphorus Replacement - Follow Nurse / BPA Driven Protocol    Potassium Replacement - Follow Nurse / BPA Driven Protocol    [COMPLETED] Insert Peripheral IV **AND** sodium chloride    sodium chloride    sodium chloride  Patient has no known allergies.    Objective     Physical Exam:   NAD, alert and oriented  RRR  Lungs clear  Abd soft, benign  Vascular: Palpable bilateral radial and pedal pulses  Skin: Right chest TDC    Vital Signs and Labs:  Vital Signs Patient Vitals for the past 24 hrs:   BP Temp Temp src Pulse Resp SpO2 Weight   05/19/25 0930 134/56 -- -- 60 -- -- --   05/19/25 0830 164/58 98.1 °F (36.7 °C) Oral 60 16 97 % --   05/19/25 0510 152/50 98.2 °F (36.8 °C) Oral 60 14 95 % --   05/19/25 0400 -- -- -- -- -- -- 64.1 kg (141 lb 5 oz)   05/18/25 2358 142/52 98.4 °F (36.9 °C) Oral 62 15 97 % --   05/18/25 2258 148/54 -- -- 60 -- -- --   05/18/25 2139 156/63 -- -- 62 -- -- --   05/18/25 1945 147/51 97.9 °F (36.6 °C) Oral 60 12 96 % --   05/18/25 1602  122/53 -- -- -- -- -- --   05/18/25 1600 135/53 98.6 °F (37 °C) Oral 67 16 96 % --   05/18/25 1513 134/56 -- -- 73 -- -- --   05/18/25 1154 124/58 -- -- 75 -- -- --   05/18/25 1100 148/61 98.1 °F (36.7 °C) Oral 63 14 97 % --     BMI:  Body mass index is 23.52 kg/m².    CBC    Results from last 7 days   Lab Units 05/17/25  0135 05/16/25  1805   WBC 10*3/mm3 4.24 3.19*   HEMOGLOBIN g/dL 8.6* 8.2*   PLATELETS 10*3/mm3 151 146     BMP   Results from last 7 days   Lab Units 05/19/25  0452 05/18/25  0403 05/17/25  0135 05/16/25  1805   SODIUM mmol/L 131* 135* 131* 131*   POTASSIUM mmol/L 3.3* 3.7 3.7 3.7   CHLORIDE mmol/L 98 99 98 97*   CO2 mmol/L 23.3 24.4 27.3 27.9   BUN mg/dL 52* 37* 18 13   CREATININE mg/dL 4.36* 3.67* 2.45* 2.06*   GLUCOSE mg/dL 183* 106* 113* 193*   MAGNESIUM mg/dL  --   --  1.7  --    PHOSPHORUS mg/dL  --   --  3.3  --      Coag     HbA1C   Lab Results   Component Value Date    HGBA1C 5.40 04/15/2025    HGBA1C 5.9 (H) 04/16/2021     Infection     Radiology(recent) CT Abdomen With Contrast  Result Date: 5/18/2025  1. Study not optimized for evaluation of the visceral arteries (standard CT was performed, not CTA. Therefore imaging was not performed in the arterial phase and postprocessing was not performed). There is noted to be calcific plaque at the origins of the renal arteries, with suggestion of significant stenosis at the origin of the left renal artery. CTA of the abdomen would be recommended 2. 1.8 cm hepatic lesion, likely hemangioma Electronically Signed: Sacha Vinson  5/18/2025 6:46 PM EDT  Workstation ID: [id number]     VTE Prophylaxis:  No VTE prophylaxis order currently exists.        Active Hospital Problems    Diagnosis  POA    **Hypertensive urgency [I16.0]  Yes      Resolved Hospital Problems   No resolved problems to display.       Assessment & Plan   Assessment / Plan     Hypertensive urgency      84 y.o. female with history of HLD, HTN, DM, and ESRD on HD who presents with  hypertensive urgency  Nephrology following  She is currently on 4 antihypertensive medications with systolic blood pressures ranging between 130s and 160s.  CT of the abdomen shows significant stenosis at the origin of the left renal artery  She may benefit from renal artery angiography with possible intervention, timing to be determined by OR schedule and surgeon availability, likely in the next day or 2  Continue aspirin and statin    Thank you for this consultation.          CECE Stout  [facility]  05/19/25   O: [phone]  F: [phone]

## 2025-05-19 NOTE — CASE MANAGEMENT/SOCIAL WORK
Social Work Assessment  [facility]     Patient Name: Brad Woodward  MRN: [id number]  Today's Date: 5/19/2025    Admit Date: 5/16/2025     Discharge Plan       Row Name 05/19/25 1453       Plan    Plan Comments Informed by CM that family was inquiring about additional home supports/resouces, such as being a paid caregiver. Services available through the waiver called 'Family Structured Caregiving' - referral made to [facility], [facility], for additional insight one the application process.                  NIKOLAS Aguilar, LSW, CCM  Lead   Phone: [phone]  Cell: [phone]  Fax: [phone]  [email or web address] [facility]

## 2025-05-19 NOTE — PROGRESS NOTES
"RENAL/KCC:     LOS: 1 day    Patient Care Team:  Brian Nazario APRN as PCP - General  Zane Aldridge MD as Consulting Physician (Cardiology)  Marnie Brandt APRN as Nurse Practitioner (Cardiology)    Chief Complaint:  HTN    Subjective     Interval History:   Chart reviewed  BP spiked overnight and required Cardene drip  Clonidine increased this AM    5/19  Patient seen in dialysis.  Tolerating it well.    Objective     Vital Sign Min/Max for last 24 hours  Temp  Min: 97.9 °F (36.6 °C)  Max: 98.6 °F (37 °C)   BP  Min: 122/53  Max: 164/58   Pulse  Min: 60  Max: 75   Resp  Min: 12  Max: 16   SpO2  Min: 95 %  Max: 97 %   No data recorded   Weight  Min: 64.1 kg (141 lb 5 oz)  Max: 64.1 kg (141 lb 5 oz)     Flowsheet Rows      Flowsheet Row First Filed Value   Admission Height 165.1 cm (65\") Documented at 05/16/2025 1725   Admission Weight 62.1 kg (136 lb 14.5 oz) Documented at 05/16/2025 1725            No intake/output data recorded.  I/O last 3 completed shifts:  In: 240 [P.O.:240]  Out: -     Physical Exam:  GEN: Awake, NAD  ENT: PERRL, EOMI, MMM  NECK: Supple, no JVD  CHEST: CTAB, no W/R/C  CV: RRR, no M/G/R  ABD: Soft, NT, +BS  SKIN: Warm and Dry  NEURO: CN's intact      WBC WBC   Date Value Ref Range Status   05/17/2025 4.24 3.40 - 10.80 10*3/mm3 Final   05/16/2025 3.19 (L) 3.40 - 10.80 10*3/mm3 Final      HGB Hemoglobin   Date Value Ref Range Status   05/17/2025 8.6 (L) 12.0 - 15.9 g/dL Final   05/16/2025 8.2 (L) 12.0 - 15.9 g/dL Final      HCT Hematocrit   Date Value Ref Range Status   05/17/2025 27.1 (L) 34.0 - 46.6 % Final   05/16/2025 26.2 (L) 34.0 - 46.6 % Final      Platlets No results found for: \"LABPLAT\"   MCV MCV   Date Value Ref Range Status   05/17/2025 82.9 79.0 - 97.0 fL Final   05/16/2025 84.5 79.0 - 97.0 fL Final          Sodium Sodium   Date Value Ref Range Status   05/19/2025 131 (L) 136 - 145 mmol/L Final   05/18/2025 135 (L) 136 - 145 mmol/L Final   05/17/2025 131 (L) 136 - 145 mmol/L " "Final   05/16/2025 131 (L) 136 - 145 mmol/L Final      Potassium Potassium   Date Value Ref Range Status   05/19/2025 3.3 (L) 3.5 - 5.2 mmol/L Final   05/18/2025 3.7 3.5 - 5.2 mmol/L Final   05/17/2025 3.7 3.5 - 5.2 mmol/L Final   05/16/2025 3.7 3.5 - 5.2 mmol/L Final     Comment:     Specimen hemolyzed.  Result may be falsely elevated.      Chloride Chloride   Date Value Ref Range Status   05/19/2025 98 98 - 107 mmol/L Final   05/18/2025 99 98 - 107 mmol/L Final   05/17/2025 98 98 - 107 mmol/L Final   05/16/2025 97 (L) 98 - 107 mmol/L Final      CO2 CO2   Date Value Ref Range Status   05/19/2025 23.3 22.0 - 29.0 mmol/L Final   05/18/2025 24.4 22.0 - 29.0 mmol/L Final   05/17/2025 27.3 22.0 - 29.0 mmol/L Final   05/16/2025 27.9 22.0 - 29.0 mmol/L Final      BUN BUN   Date Value Ref Range Status   05/19/2025 52 (H) 8 - 23 mg/dL Final   05/18/2025 37 (H) 8 - 23 mg/dL Final   05/17/2025 18 8 - 23 mg/dL Final   05/16/2025 13 8 - 23 mg/dL Final      Creatinine Creatinine   Date Value Ref Range Status   05/19/2025 4.36 (H) 0.57 - 1.00 mg/dL Final   05/18/2025 3.67 (H) 0.57 - 1.00 mg/dL Final   05/17/2025 2.45 (H) 0.57 - 1.00 mg/dL Final   05/16/2025 2.06 (H) 0.57 - 1.00 mg/dL Final      Calcium Calcium   Date Value Ref Range Status   05/19/2025 7.7 (L) 8.6 - 10.5 mg/dL Final   05/18/2025 8.3 (L) 8.6 - 10.5 mg/dL Final   05/17/2025 8.3 (L) 8.6 - 10.5 mg/dL Final   05/16/2025 7.9 (L) 8.6 - 10.5 mg/dL Final      PO4 No results found for: \"CAPO4\"   Albumin Albumin   Date Value Ref Range Status   05/17/2025 3.2 (L) 3.5 - 5.2 g/dL Final   05/16/2025 3.1 (L) 3.5 - 5.2 g/dL Final      Magnesium Magnesium   Date Value Ref Range Status   05/17/2025 1.7 1.6 - 2.4 mg/dL Final      Uric Acid No results found for: \"URICACID\"        Results Review:     I reviewed the patient's new clinical results.    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    Have increased Clonidine.    Doppler of renal arteries was not diagnostic.     CTA renal ordered. ... Order in.  HD tomorrow.   Follow-up renin/magdiel as well as catecholamines and metanephrines.  Will follow.  Tolerated HD today.      Chayito Laureano MD  [facility]  05/19/25  09:50 EDT

## 2025-05-19 NOTE — PROGRESS NOTES
[facility] MEDICINE SERVICE  DAILY PROGRESS NOTE    NAME: Brad Woodward  : 1940  MRN: [id number]      LOS: 1 day     PROVIDER OF SERVICE: Helder Marcum MD    Chief Complaint: Hypertensive urgency    Subjective:   Family at the bedside  Interval History:    Patient seen and evaluated at bedside.   Patient denies any complain.  Events of yesterday noted blood pressure was running low so clonidine dose was changed to 0.2 mg 3 times daily  Treatment plan discussed with patient. All questions addressed.     Review of Systems:   All 21 ROS were negative except mentioned above.    Objective:     Vital Signs  Temp:  [96.9 °F (36.1 °C)-98.6 °F (37 °C)] 96.9 °F (36.1 °C)  Heart Rate:  [60-73] 62  Resp:  [12-16] 16  BP: (117-164)/(47-63) 117/47   Body mass index is 23.52 kg/m².    Physical Exam   General: No acute distress, appears stated age  Neuro: Awake and alert, oriented x3, no focal deficits appreciated  Head: Atraumatic, normocephalic  HEENT: EOMI, anicteric, normal sclerae and conjunctivae, moist mucus membranes  Neck: supple, no lymphadenopathy  CV: RRR, soft heart sounds, no murmurs appreciated, no peripheral edema  Pulm: Decreased breath sounds, no increased work of breathing, no adventitious sounds  Abd: Soft, nontender, nondistended  Skin: Warm, dry and intact  Psych: Appropriate mood and affect    Scheduled Meds   amLODIPine, 10 mg, Oral, Q24H  aspirin, 81 mg, Oral, Daily  atorvastatin, 80 mg, Oral, Daily  chlorthalidone, 25 mg, Oral, Daily  cloNIDine, 0.2 mg, Oral, Q8H  furosemide, 40 mg, Oral, Daily  hydrALAZINE, 100 mg, Oral, TID  insulin glargine, 5 Units, Subcutaneous, Daily  insulin lispro, 2-7 Units, Subcutaneous, 4x Daily AC & at Bedtime  metoprolol tartrate, 5 mg, Intravenous, Once  potassium chloride, 40 mEq, Oral, Daily  sodium chloride, 10 mL, Intravenous, Q12H       PRN Meds     acetaminophen **OR** acetaminophen **OR** acetaminophen    senna-docusate sodium **AND** polyethylene glycol  **AND** bisacodyl **AND** bisacodyl    Calcium Replacement - Follow Nurse / BPA Driven Protocol    dextrose    dextrose    glucagon (human recombinant)    hydrALAZINE    Magnesium Standard Dose Replacement - Follow Nurse / BPA Driven Protocol    nitroglycerin    Phosphorus Replacement - Follow Nurse / BPA Driven Protocol    Potassium Replacement - Follow Nurse / BPA Driven Protocol    [COMPLETED] Insert Peripheral IV **AND** sodium chloride    sodium chloride    sodium chloride   Infusions         Diagnostic Data    Results from last 7 days   Lab Units 05/19/25  0452 05/18/25  0403 05/17/25  0135   WBC 10*3/mm3  --   --  4.24   HEMOGLOBIN g/dL  --   --  8.6*   HEMATOCRIT %  --   --  27.1*   PLATELETS 10*3/mm3  --   --  151   GLUCOSE mg/dL 183*   < > 113*   CREATININE mg/dL 4.36*   < > 2.45*   BUN mg/dL 52*   < > 18   SODIUM mmol/L 131*   < > 131*   POTASSIUM mmol/L 3.3*   < > 3.7   AST (SGOT) U/L  --   --  35*   ALT (SGPT) U/L  --   --  21   ALK PHOS U/L  --   --  88   BILIRUBIN mg/dL  --   --  0.3   ANION GAP mmol/L 9.7   < > 5.7    < > = values in this interval not displayed.       CT Abdomen With Contrast  Result Date: 5/18/2025  1. Study not optimized for evaluation of the visceral arteries (standard CT was performed, not CTA. Therefore imaging was not performed in the arterial phase and postprocessing was not performed). There is noted to be calcific plaque at the origins of the renal arteries, with suggestion of significant stenosis at the origin of the left renal artery. CTA of the abdomen would be recommended 2. 1.8 cm hepatic lesion, likely hemangioma Electronically Signed: Sacha Vinson  5/18/2025 6:46 PM EDT  Workstation ID: [id number]      Interval results reviewed.    Assessment/Plan:   Hypertensive urgency  End-stage renal disease on hemodialysis  Hyperlipidemia  Type 2 diabetes  Abnormal EKG  Anemia of chronic diseases  Chronic right basal ganglia and left temporal lobe infarcts  Renal artery  stenosis-new  Hypokalemia-new    Replete potassium as per renal    CT abdomen showed calcific plaque at the origin of renal arteries especially at the origin of left renal artery.  Vascular surgery consulted.  Patient will go for renal artery intervention in 1 to 2 days as per vascular.    Will continue hydralazine 100 mg 3 times daily, clonidine 0.2 mg 3 times daily, continue amlodipine, chlorthalidone and Lasix.      Hold lisinopril   Patient was restarted on nicardipine drip as blood pressure went up during the night.  nephrology following the patient for BP management  Doppler renal arteries nondiagnostic as per renal  Renal ordered renin aldosterone, catecholamines and metanephrines.     Lantus 5 units daily and sliding scale insulin  Blood sugar better.           Treatment plan discussed with RN.     VTE Prophylaxis:  No VTE prophylaxis order currently exists.         Code status is   Code Status and Medical Interventions: CPR (Attempt to Resuscitate); Full Support   Ordered at: 05/16/25 2028     Code Status (Patient has no pulse and is not breathing):    CPR (Attempt to Resuscitate)     Medical Interventions (Patient has pulse or is breathing):    Full Support       Plan for disposition:     Barriers to Discharge: Possible renal artery stenting versus intervention  Anticipated Date of Discharge: 5/22/25  Place of Discharge: Home       Time: 40 minutes     Signature: Electronically signed by Helder Marcum MD, 05/19/25, 14:06 EDT.  [facility] Hospitalist Team

## 2025-05-20 ENCOUNTER — APPOINTMENT (OUTPATIENT)
Dept: CARDIOLOGY | Facility: HOSPITAL | Age: 85
End: 2025-05-20
Payer: MEDICARE

## 2025-05-20 PROBLEM — I70.1 RENAL ARTERY STENOSIS: Status: ACTIVE | Noted: 2025-05-20

## 2025-05-20 LAB
ANION GAP SERPL CALCULATED.3IONS-SCNC: 8.3 MMOL/L (ref 5–15)
BH CV UPPER VENOUS LEFT AXILLARY AUGMENT: NORMAL
BH CV UPPER VENOUS LEFT AXILLARY COMPRESS: NORMAL
BH CV UPPER VENOUS LEFT AXILLARY PHASIC: NORMAL
BH CV UPPER VENOUS LEFT AXILLARY SPONT: NORMAL
BH CV UPPER VENOUS LEFT BASILIC FOREARM COMPRESS: NORMAL
BH CV UPPER VENOUS LEFT BASILIC UPPER COMPRESS: NORMAL
BH CV UPPER VENOUS LEFT BRACHIAL COMPRESS: NORMAL
BH CV UPPER VENOUS LEFT CEPHALIC FOREARM COMPRESS: NORMAL
BH CV UPPER VENOUS LEFT CEPHALIC UPPER COMPRESS: NORMAL
BH CV UPPER VENOUS LEFT INTERNAL JUGULAR AUGMENT: NORMAL
BH CV UPPER VENOUS LEFT INTERNAL JUGULAR COMPRESS: NORMAL
BH CV UPPER VENOUS LEFT INTERNAL JUGULAR PHASIC: NORMAL
BH CV UPPER VENOUS LEFT INTERNAL JUGULAR SPONT: NORMAL
BH CV UPPER VENOUS LEFT RADIAL COMPRESS: NORMAL
BH CV UPPER VENOUS LEFT SUBCLAVIAN AUGMENT: NORMAL
BH CV UPPER VENOUS LEFT SUBCLAVIAN COMPRESS: NORMAL
BH CV UPPER VENOUS LEFT SUBCLAVIAN PHASIC: NORMAL
BH CV UPPER VENOUS LEFT SUBCLAVIAN SPONT: NORMAL
BH CV UPPER VENOUS LEFT ULNAR COMPRESS: NORMAL
BH CV UPPER VENOUS RIGHT AXILLARY AUGMENT: NORMAL
BH CV UPPER VENOUS RIGHT AXILLARY COMPRESS: NORMAL
BH CV UPPER VENOUS RIGHT AXILLARY PHASIC: NORMAL
BH CV UPPER VENOUS RIGHT AXILLARY SPONT: NORMAL
BH CV UPPER VENOUS RIGHT BASILIC FOREARM COMPRESS: NORMAL
BH CV UPPER VENOUS RIGHT BASILIC UPPER COMPRESS: NORMAL
BH CV UPPER VENOUS RIGHT BRACHIAL COMPRESS: NORMAL
BH CV UPPER VENOUS RIGHT CEPHALIC FOREARM COMPRESS: NORMAL
BH CV UPPER VENOUS RIGHT CEPHALIC UPPER COMPRESS: NORMAL
BH CV UPPER VENOUS RIGHT INTERNAL JUGULAR AUGMENT: NORMAL
BH CV UPPER VENOUS RIGHT INTERNAL JUGULAR COMPRESS: NORMAL
BH CV UPPER VENOUS RIGHT INTERNAL JUGULAR PHASIC: NORMAL
BH CV UPPER VENOUS RIGHT INTERNAL JUGULAR SPONT: NORMAL
BH CV UPPER VENOUS RIGHT RADIAL COMPRESS: NORMAL
BH CV UPPER VENOUS RIGHT SUBCLAVIAN AUGMENT: NORMAL
BH CV UPPER VENOUS RIGHT SUBCLAVIAN COMPRESS: NORMAL
BH CV UPPER VENOUS RIGHT SUBCLAVIAN PHASIC: NORMAL
BH CV UPPER VENOUS RIGHT SUBCLAVIAN SPONT: NORMAL
BH CV UPPER VENOUS RIGHT ULNAR COMPRESS: NORMAL
BH CV VAS MEAS BASILIC ANTECUBITAL FOSSA LEFT: 0.44 CM
BH CV VAS MEAS BASILIC ANTECUBITAL FOSSA RIGHT: 0.37 CM
BH CV VAS MEAS BASILIC FOREARM LEFT - DIST: 0.25 CM
BH CV VAS MEAS BASILIC FOREARM LEFT - MID: 0.27 CM
BH CV VAS MEAS BASILIC FOREARM LEFT - PROX: 0.31 CM
BH CV VAS MEAS BASILIC FOREARM RIGHT - DIST: 0.1 CM
BH CV VAS MEAS BASILIC FOREARM RIGHT - MID: 0.14 CM
BH CV VAS MEAS BASILIC FOREARM RIGHT - PROX: 0.32 CM
BH CV VAS MEAS BASILIC UPPER ARM LEFT - DIST: 0.48 CM
BH CV VAS MEAS BASILIC UPPER ARM LEFT - MID: 0.48 CM
BH CV VAS MEAS BASILIC UPPER ARM RIGHT - DIST: 0.46 CM
BH CV VAS MEAS BASILIC UPPER ARM RIGHT - MID: 0.43 CM
BH CV VAS MEAS CEPHALIC ANTECUBITAL FOSSA LEFT: 0.17 CM
BH CV VAS MEAS CEPHALIC ANTECUBITAL FOSSA RIGHT: 0.47 CM
BH CV VAS MEAS CEPHALIC FOREARM LEFT - DIST: 0.28 CM
BH CV VAS MEAS CEPHALIC FOREARM LEFT - MID: 0.32 CM
BH CV VAS MEAS CEPHALIC FOREARM LEFT - PROX: 0.32 CM
BH CV VAS MEAS CEPHALIC FOREARM RIGHT - DIST: 0.29 CM
BH CV VAS MEAS CEPHALIC FOREARM RIGHT - MID: 0.28 CM
BH CV VAS MEAS CEPHALIC FOREARM RIGHT - PROX: 0.29 CM
BH CV VAS MEAS CEPHALIC UPPER ARM LEFT - DIST: 0.05 CM
BH CV VAS MEAS CEPHALIC UPPER ARM LEFT - MID: 0.14 CM
BH CV VAS MEAS CEPHALIC UPPER ARM LEFT - PROX: 0.11 CM
BH CV VAS MEAS CEPHALIC UPPER ARM RIGHT - DIST: 0.3 CM
BH CV VAS MEAS CEPHALIC UPPER ARM RIGHT - MID: 0.29 CM
BH CV VAS MEAS CEPHALIC UPPER ARM RIGHT - PROX: 0.25 CM
BH CV VAS MEAS RADIAL UPPER ARM LEFT - DIST: 0.17 CM
BH CV VAS MEAS RADIAL UPPER ARM LEFT - MID: 0.18 CM
BH CV VAS MEAS RADIAL UPPER ARM LEFT - PROX: 0.2 CM
BH CV VAS MEAS RADIAL UPPER ARM RIGHT - DIST: 0.21 CM
BH CV VAS MEAS RADIAL UPPER ARM RIGHT - MID: 0.22 CM
BH CV VAS MEAS RADIAL UPPER ARM RIGHT - PROX: 0.18 CM
BUN SERPL-MCNC: 22 MG/DL (ref 8–23)
BUN/CREAT SERPL: 8.8 (ref 7–25)
CALCIUM SPEC-SCNC: 7.9 MG/DL (ref 8.6–10.5)
CHLORIDE SERPL-SCNC: 97 MMOL/L (ref 98–107)
CO2 SERPL-SCNC: 26.7 MMOL/L (ref 22–29)
CREAT SERPL-MCNC: 2.51 MG/DL (ref 0.57–1)
EGFRCR SERPLBLD CKD-EPI 2021: 18.4 ML/MIN/1.73
GLUCOSE BLDC GLUCOMTR-MCNC: 104 MG/DL (ref 70–105)
GLUCOSE BLDC GLUCOMTR-MCNC: 121 MG/DL (ref 70–105)
GLUCOSE BLDC GLUCOMTR-MCNC: 159 MG/DL (ref 70–105)
GLUCOSE BLDC GLUCOMTR-MCNC: 186 MG/DL (ref 70–105)
GLUCOSE SERPL-MCNC: 217 MG/DL (ref 65–99)
POTASSIUM SERPL-SCNC: 3.7 MMOL/L (ref 3.5–5.2)
SODIUM SERPL-SCNC: 132 MMOL/L (ref 136–145)
UPPER ARTERIAL LEFT ARM BRACHIAL LENGTH: 0.49 CM
UPPER ARTERIAL RIGHT ARM BRACHIAL LENGTH: 0.41 CM

## 2025-05-20 PROCEDURE — 99232 SBSQ HOSP IP/OBS MODERATE 35: CPT | Performed by: NURSE PRACTITIONER

## 2025-05-20 PROCEDURE — 63710000001 INSULIN GLARGINE PER 5 UNITS: Performed by: INTERNAL MEDICINE

## 2025-05-20 PROCEDURE — 93985 DUP-SCAN HEMO COMPL BI STD: CPT | Performed by: SURGERY

## 2025-05-20 PROCEDURE — 93985 DUP-SCAN HEMO COMPL BI STD: CPT

## 2025-05-20 PROCEDURE — 82948 REAGENT STRIP/BLOOD GLUCOSE: CPT | Performed by: STUDENT IN AN ORGANIZED HEALTH CARE EDUCATION/TRAINING PROGRAM

## 2025-05-20 PROCEDURE — 82948 REAGENT STRIP/BLOOD GLUCOSE: CPT

## 2025-05-20 PROCEDURE — 63710000001 INSULIN LISPRO (HUMAN) PER 5 UNITS: Performed by: STUDENT IN AN ORGANIZED HEALTH CARE EDUCATION/TRAINING PROGRAM

## 2025-05-20 PROCEDURE — 97161 PT EVAL LOW COMPLEX 20 MIN: CPT

## 2025-05-20 PROCEDURE — 80048 BASIC METABOLIC PNL TOTAL CA: CPT | Performed by: INTERNAL MEDICINE

## 2025-05-20 RX ORDER — METOPROLOL SUCCINATE 25 MG/1
25 TABLET, EXTENDED RELEASE ORAL
Status: DISCONTINUED | OUTPATIENT
Start: 2025-05-20 | End: 2025-05-22 | Stop reason: HOSPADM

## 2025-05-20 RX ADMIN — HYDRALAZINE HYDROCHLORIDE 100 MG: 25 TABLET ORAL at 21:46

## 2025-05-20 RX ADMIN — FUROSEMIDE 40 MG: 40 TABLET ORAL at 08:21

## 2025-05-20 RX ADMIN — CHLORTHALIDONE 25 MG: 25 TABLET ORAL at 08:22

## 2025-05-20 RX ADMIN — METOPROLOL SUCCINATE 25 MG: 25 TABLET, EXTENDED RELEASE ORAL at 12:35

## 2025-05-20 RX ADMIN — ATORVASTATIN CALCIUM 80 MG: 40 TABLET, FILM COATED ORAL at 08:21

## 2025-05-20 RX ADMIN — HYDRALAZINE HYDROCHLORIDE 100 MG: 25 TABLET ORAL at 08:22

## 2025-05-20 RX ADMIN — INSULIN LISPRO 2 UNITS: 100 INJECTION, SOLUTION INTRAVENOUS; SUBCUTANEOUS at 17:53

## 2025-05-20 RX ADMIN — Medication 10 ML: at 21:50

## 2025-05-20 RX ADMIN — HYDRALAZINE HYDROCHLORIDE 100 MG: 25 TABLET ORAL at 14:11

## 2025-05-20 RX ADMIN — CLONIDINE HYDROCHLORIDE 0.2 MG: 0.1 TABLET ORAL at 21:46

## 2025-05-20 RX ADMIN — POTASSIUM CHLORIDE 40 MEQ: 1500 TABLET, EXTENDED RELEASE ORAL at 08:21

## 2025-05-20 RX ADMIN — Medication 10 ML: at 08:22

## 2025-05-20 RX ADMIN — AMLODIPINE BESYLATE 10 MG: 5 TABLET ORAL at 08:22

## 2025-05-20 RX ADMIN — INSULIN GLARGINE 5 UNITS: 100 INJECTION, SOLUTION SUBCUTANEOUS at 08:21

## 2025-05-20 RX ADMIN — INSULIN LISPRO 2 UNITS: 100 INJECTION, SOLUTION INTRAVENOUS; SUBCUTANEOUS at 21:46

## 2025-05-20 RX ADMIN — CLONIDINE HYDROCHLORIDE 0.2 MG: 0.1 TABLET ORAL at 14:11

## 2025-05-20 RX ADMIN — ASPIRIN 81 MG CHEWABLE TABLET 81 MG: 81 TABLET CHEWABLE at 08:21

## 2025-05-20 NOTE — PLAN OF CARE
Problem: Adult Inpatient Plan of Care  Goal: Absence of Hospital-Acquired Illness or Injury  Intervention: Identify and Manage Fall Risk  Recent Flowsheet Documentation  Taken 5/20/2025 0200 by Dawn Velazquez RN  Safety Promotion/Fall Prevention:   assistive device/personal items within reach   clutter free environment maintained   fall prevention program maintained   nonskid shoes/slippers when out of bed   room organization consistent   safety round/check completed  Taken 5/20/2025 0000 by Dockins, Dawn, RN  Safety Promotion/Fall Prevention:   assistive device/personal items within reach   clutter free environment maintained   fall prevention program maintained   nonskid shoes/slippers when out of bed   room organization consistent   safety round/check completed  Taken 5/19/2025 2200 by Dawn Velazquez RN  Safety Promotion/Fall Prevention:   assistive device/personal items within reach   clutter free environment maintained   fall prevention program maintained   nonskid shoes/slippers when out of bed   room organization consistent   safety round/check completed  Taken 5/19/2025 2000 by Dawn Velazquez RN  Safety Promotion/Fall Prevention:   assistive device/personal items within reach   clutter free environment maintained   fall prevention program maintained   nonskid shoes/slippers when out of bed   room organization consistent   safety round/check completed  Intervention: Prevent Skin Injury  Recent Flowsheet Documentation  Taken 5/20/2025 0000 by Dawn Velazquez RN  Skin Protection: incontinence pads utilized  Taken 5/19/2025 2000 by Dawn Velazquez RN  Skin Protection: incontinence pads utilized  Intervention: Prevent Infection  Recent Flowsheet Documentation  Taken 5/20/2025 0200 by Dawn Velazquez RN  Infection Prevention:   environmental surveillance performed   hand hygiene promoted   personal protective equipment utilized   rest/sleep promoted   single patient room  provided  Taken 5/20/2025 0000 by Dawn Velazquez RN  Infection Prevention:   environmental surveillance performed   hand hygiene promoted   personal protective equipment utilized   rest/sleep promoted   single patient room provided  Taken 5/19/2025 2200 by Dawn Velazquez RN  Infection Prevention:   environmental surveillance performed   hand hygiene promoted   personal protective equipment utilized   rest/sleep promoted   single patient room provided  Taken 5/19/2025 2000 by Dawn Velazquez RN  Infection Prevention:   environmental surveillance performed   hand hygiene promoted   personal protective equipment utilized   rest/sleep promoted   single patient room provided  Goal: Optimal Comfort and Wellbeing  Intervention: Provide Person-Centered Care  Recent Flowsheet Documentation  Taken 5/20/2025 0000 by Dawn Velazquez RN  Trust Relationship/Rapport: care explained  Taken 5/19/2025 2000 by Dawn Velazquez RN  Trust Relationship/Rapport:   care explained   choices provided     Problem: Skin Injury Risk Increased  Goal: Skin Health and Integrity  Intervention: Optimize Skin Protection  Recent Flowsheet Documentation  Taken 5/20/2025 0000 by Dawn Velazquez RN  Pressure Reduction Techniques:   frequent weight shift encouraged   weight shift assistance provided  Pressure Reduction Devices: pressure-redistributing mattress utilized  Skin Protection: incontinence pads utilized  Taken 5/19/2025 2000 by Dawn Velazquez RN  Pressure Reduction Techniques:   frequent weight shift encouraged   weight shift assistance provided  Pressure Reduction Devices: pressure-redistributing mattress utilized  Skin Protection: incontinence pads utilized     Problem: Comorbidity Management  Goal: Blood Glucose Level Within Target Range  Intervention: Monitor and Manage Glycemia  Recent Flowsheet Documentation  Taken 5/20/2025 0200 by Dawn Velazquez RN  Medication Review/Management: medications  reviewed  Taken 5/20/2025 0000 by Dawn Velazquez RN  Medication Review/Management: medications reviewed  Taken 5/19/2025 2200 by Dawn Velazquez RN  Medication Review/Management: medications reviewed  Taken 5/19/2025 2000 by Dawn Velazquez RN  Medication Review/Management: medications reviewed  Goal: Blood Pressure in Desired Range  Intervention: Maintain Blood Pressure Management  Recent Flowsheet Documentation  Taken 5/20/2025 0200 by Dawn Velazquez RN  Medication Review/Management: medications reviewed  Taken 5/20/2025 0000 by Dawn Velazquez RN  Medication Review/Management: medications reviewed  Taken 5/19/2025 2200 by Dawn Velazquez RN  Medication Review/Management: medications reviewed  Taken 5/19/2025 2000 by Dawn Velazquez RN  Medication Review/Management: medications reviewed     Problem: Fall Injury Risk  Goal: Absence of Fall and Fall-Related Injury  Intervention: Identify and Manage Contributors  Recent Flowsheet Documentation  Taken 5/20/2025 0200 by Dawn Velazquez RN  Medication Review/Management: medications reviewed  Taken 5/20/2025 0000 by Dawn Velazquez RN  Medication Review/Management: medications reviewed  Taken 5/19/2025 2200 by Dawn Velazquez RN  Medication Review/Management: medications reviewed  Taken 5/19/2025 2000 by Dawn Velazquez RN  Medication Review/Management: medications reviewed  Intervention: Promote Injury-Free Environment  Recent Flowsheet Documentation  Taken 5/20/2025 0200 by Dawn Velazquez RN  Safety Promotion/Fall Prevention:   assistive device/personal items within reach   clutter free environment maintained   fall prevention program maintained   nonskid shoes/slippers when out of bed   room organization consistent   safety round/check completed  Taken 5/20/2025 0000 by Dockins, Dawn, RN  Safety Promotion/Fall Prevention:   assistive device/personal items within reach   clutter free environment  maintained   fall prevention program maintained   nonskid shoes/slippers when out of bed   room organization consistent   safety round/check completed  Taken 5/19/2025 2200 by Dawn Velazquez, RN  Safety Promotion/Fall Prevention:   assistive device/personal items within reach   clutter free environment maintained   fall prevention program maintained   nonskid shoes/slippers when out of bed   room organization consistent   safety round/check completed  Taken 5/19/2025 2000 by Dawn Velazquez, RN  Safety Promotion/Fall Prevention:   assistive device/personal items within reach   clutter free environment maintained   fall prevention program maintained   nonskid shoes/slippers when out of bed   room organization consistent   safety round/check completed   Goal Outcome Evaluation:

## 2025-05-20 NOTE — PROGRESS NOTES
"RENAL/KCC:     LOS: 2 days    Patient Care Team:  Brian Nazario APRN as PCP - General  Zane Aldridge MD as Consulting Physician (Cardiology)  Marnie Brandt APRN as Nurse Practitioner (Cardiology)    Chief Complaint:  HTN    Subjective     Interval History:   Chart reviewed  BP improved    Objective     Vital Sign Min/Max for last 24 hours  Temp  Min: 96.9 °F (36.1 °C)  Max: 98.1 °F (36.7 °C)   BP  Min: 117/47  Max: 179/67   Pulse  Min: 60  Max: 65   Resp  Min: 12  Max: 17   SpO2  Min: 95 %  Max: 98 %   No data recorded   Weight  Min: 63.8 kg (140 lb 10.5 oz)  Max: 63.8 kg (140 lb 10.5 oz)     Flowsheet Rows      Flowsheet Row First Filed Value   Admission Height 165.1 cm (65\") Documented at 05/16/2025 1725   Admission Weight 62.1 kg (136 lb 14.5 oz) Documented at 05/16/2025 1725            No intake/output data recorded.  I/O last 3 completed shifts:  In: 360 [P.O.:360]  Out: 2000     Physical Exam:  GEN: Awake, NAD  ENT: PERRL, EOMI, MMM  NECK: Supple, no JVD  CHEST: CTAB, no W/R/C  CV: RRR, no M/G/R  ABD: Soft, NT, +BS  SKIN: Warm and Dry  NEURO: CN's intact      WBC No results found for: \"WBC\"     HGB No results found for: \"HGB\"     HCT No results found for: \"HCT\"     Platlets No results found for: \"LABPLAT\"   MCV No results found for: \"MCV\"         Sodium Sodium   Date Value Ref Range Status   05/20/2025 132 (L) 136 - 145 mmol/L Final   05/19/2025 131 (L) 136 - 145 mmol/L Final   05/18/2025 135 (L) 136 - 145 mmol/L Final      Potassium Potassium   Date Value Ref Range Status   05/20/2025 3.7 3.5 - 5.2 mmol/L Final   05/19/2025 2.5 (C) 3.5 - 5.2 mmol/L Final   05/19/2025 3.3 (L) 3.5 - 5.2 mmol/L Final   05/18/2025 3.7 3.5 - 5.2 mmol/L Final      Chloride Chloride   Date Value Ref Range Status   05/20/2025 97 (L) 98 - 107 mmol/L Final   05/19/2025 98 98 - 107 mmol/L Final   05/18/2025 99 98 - 107 mmol/L Final      CO2 CO2   Date Value Ref Range Status   05/20/2025 26.7 22.0 - 29.0 mmol/L Final   05/19/2025 " "23.3 22.0 - 29.0 mmol/L Final   05/18/2025 24.4 22.0 - 29.0 mmol/L Final      BUN BUN   Date Value Ref Range Status   05/20/2025 22 8 - 23 mg/dL Final   05/19/2025 52 (H) 8 - 23 mg/dL Final   05/18/2025 37 (H) 8 - 23 mg/dL Final      Creatinine Creatinine   Date Value Ref Range Status   05/20/2025 2.51 (H) 0.57 - 1.00 mg/dL Final   05/19/2025 4.36 (H) 0.57 - 1.00 mg/dL Final   05/18/2025 3.67 (H) 0.57 - 1.00 mg/dL Final      Calcium Calcium   Date Value Ref Range Status   05/20/2025 7.9 (L) 8.6 - 10.5 mg/dL Final   05/19/2025 7.7 (L) 8.6 - 10.5 mg/dL Final   05/18/2025 8.3 (L) 8.6 - 10.5 mg/dL Final      PO4 No results found for: \"CAPO4\"   Albumin No results found for: \"ALBUMIN\"     Magnesium No results found for: \"MG\"     Uric Acid No results found for: \"URICACID\"        Results Review:     I reviewed the patient's new clinical results.    amLODIPine, 10 mg, Oral, Q24H  aspirin, 81 mg, Oral, Daily  atorvastatin, 80 mg, Oral, Daily  [START ON 5/21/2025] ceFAZolin, 2,000 mg, Intravenous, On Call to OR  chlorthalidone, 25 mg, Oral, Daily  cloNIDine, 0.2 mg, Oral, Q8H  furosemide, 40 mg, Oral, Daily  hydrALAZINE, 100 mg, Oral, TID  insulin glargine, 5 Units, Subcutaneous, Daily  insulin lispro, 2-7 Units, Subcutaneous, 4x Daily AC & at Bedtime  metoprolol tartrate, 5 mg, Intravenous, Once  potassium chloride, 40 mEq, Oral, Daily  sodium chloride, 10 mL, Intravenous, Q12H           Medication Review: Reviewed    Assessment & Plan     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  BP better.  Appreciate Vascular input - suspect will benefit from renal artery angiogram.  Will follow.      Jim Farooq MD  [facility]  05/20/25  08:11 EDT      "

## 2025-05-20 NOTE — PROGRESS NOTES
[facility] MEDICINE SERVICE  DAILY PROGRESS NOTE    NAME: Brad Woodward  : 1940  MRN: [id number]      LOS: 2 days     PROVIDER OF SERVICE: Helder Marcum MD    Chief Complaint: Hypertensive urgency    Subjective:   Patient denies complaint  Interval History:    Patient seen and evaluated at bedside.   Has had a bowel movement yesterday.  Family at the bedside  Treatment plan discussed with patient. All questions addressed.     Review of Systems:   All 21 ROS were negative except mentioned above.    Objective:     Vital Signs  Temp:  [96.9 °F (36.1 °C)-98 °F (36.7 °C)] 97.9 °F (36.6 °C)  Heart Rate:  [60-65] 60  Resp:  [12-17] 12  BP: (117-179)/(46-67) 160/59   Body mass index is 23.41 kg/m².    Physical Exam   General: No acute distress, appears stated age  Neuro: Awake and alert, oriented x3, no focal deficits appreciated  Head: Atraumatic, normocephalic  HEENT: EOMI, anicteric, normal sclerae and conjunctivae, moist mucus membranes  Neck: supple, no lymphadenopathy  CV: RRR, soft heart sounds, no murmurs appreciated, no peripheral edema  Pulm: Decreased breath sounds, no increased work of breathing, no adventitious sounds  Abd: Soft, nontender, nondistended  Skin: Warm, dry and intact  Psych: Appropriate mood and affect    Scheduled Meds   amLODIPine, 10 mg, Oral, Q24H  aspirin, 81 mg, Oral, Daily  atorvastatin, 80 mg, Oral, Daily  [START ON 2025] ceFAZolin, 2,000 mg, Intravenous, On Call to OR  chlorthalidone, 25 mg, Oral, Daily  cloNIDine, 0.2 mg, Oral, Q8H  furosemide, 40 mg, Oral, Daily  hydrALAZINE, 100 mg, Oral, TID  insulin glargine, 5 Units, Subcutaneous, Daily  insulin lispro, 2-7 Units, Subcutaneous, 4x Daily AC & at Bedtime  metoprolol tartrate, 5 mg, Intravenous, Once  potassium chloride, 40 mEq, Oral, Daily  sodium chloride, 10 mL, Intravenous, Q12H       PRN Meds     acetaminophen **OR** acetaminophen **OR** acetaminophen    senna-docusate sodium **AND** polyethylene glycol **AND**  28-Sep-2022 19:40 bisacodyl **AND** bisacodyl    Calcium Replacement - Follow Nurse / BPA Driven Protocol    dextrose    dextrose    glucagon (human recombinant)    heparin (porcine)    hydrALAZINE    Magnesium Standard Dose Replacement - Follow Nurse / BPA Driven Protocol    nitroglycerin    Phosphorus Replacement - Follow Nurse / BPA Driven Protocol    Potassium Replacement - Follow Nurse / BPA Driven Protocol    [COMPLETED] Insert Peripheral IV **AND** sodium chloride    sodium chloride    sodium chloride   Infusions         Diagnostic Data    Results from last 7 days   Lab Units 05/20/25  0035 05/18/25  0403 05/17/25  0135   WBC 10*3/mm3  --   --  4.24   HEMOGLOBIN g/dL  --   --  8.6*   HEMATOCRIT %  --   --  27.1*   PLATELETS 10*3/mm3  --   --  151   GLUCOSE mg/dL 217*   < > 113*   CREATININE mg/dL 2.51*   < > 2.45*   BUN mg/dL 22   < > 18   SODIUM mmol/L 132*   < > 131*   POTASSIUM mmol/L 3.7   < > 3.7   AST (SGOT) U/L  --   --  35*   ALT (SGPT) U/L  --   --  21   ALK PHOS U/L  --   --  88   BILIRUBIN mg/dL  --   --  0.3   ANION GAP mmol/L 8.3   < > 5.7    < > = values in this interval not displayed.       CT Angiogram Abdomen Pelvis  Result Date: 5/20/2025  Impression: 1.Moderate focal stenosis at the origin of both renal arteries. 2.Mild aortic atherosclerotic disease. 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 4.Moderate atherosclerotic disease in the common femoral arteries and SFA. 5.Cardiomegaly, coronary artery disease and trace pericardial effusion. 6.No acute abdominal or pelvic abnormality. Electronically Signed: Kamari Renteria MD  5/20/2025 3:52 AM EDT  Workstation ID: [id number]    CT Abdomen With Contrast  Result Date: 5/18/2025  1. Study not optimized for evaluation of the visceral arteries (standard CT was performed, not CTA. Therefore imaging was not performed in the arterial phase and postprocessing was not performed). There is noted to be calcific plaque at the origins of the renal  arteries, with suggestion of significant stenosis at the origin of the left renal artery. CTA of the abdomen would be recommended 2. 1.8 cm hepatic lesion, likely hemangioma Electronically Signed: Sacha Vinson  5/18/2025 6:46 PM EDT  Workstation ID: [id number]      Interval results reviewed.    Assessment/Plan:     Hypertensive urgency  End-stage renal disease on hemodialysis  Hyperlipidemia  Type 2 diabetes  Abnormal EKG  Anemia of chronic diseases  Chronic right basal ganglia and left temporal lobe infarcts  Renal artery stenosis-new     CT abdomen showed calcific plaque at the origin of renal arteries especially at the origin of left renal artery.  Vascular surgery consulted.  Patient will go for renal artery intervention in am as per vascular.     Will continue hydralazine 100 mg 3 times daily, clonidine 0.2 mg 3 times daily, continue amlodipine, chlorthalidone and Lasix.  Blood pressure better.  Add metoprolol XL 25 mg daily     Hold lisinopril     nephrology following the patient for BP management  Doppler renal arteries nondiagnostic as per renal  Renal ordered renin aldosterone, catecholamines and metanephrines.     Lantus 5 units daily and sliding scale insulin  Blood sugar better.    Hemodialysis Monday Wednesday Friday.    Treatment plan discussed with RN.     VTE Prophylaxis:  Pharmacologic VTE prophylaxis orders are present.         Code status is   Code Status and Medical Interventions: CPR (Attempt to Resuscitate); Full Support   Ordered at: 05/16/25 2028     Code Status (Patient has no pulse and is not breathing):    CPR (Attempt to Resuscitate)     Medical Interventions (Patient has pulse or is breathing):    Full Support       Plan for disposition:     Barriers to Discharge: Possible renal artery stenting versus intervention tomorrow morning  Anticipated Date of Discharge: 5/23/25  Place of Discharge: Home         Time: 40 minutes     Signature: Electronically signed by Helder Marcum MD, 05/20/25,  10:03 EDT.  [facility]

## 2025-05-20 NOTE — CASE MANAGEMENT/SOCIAL WORK
Continued Stay Note  NATHANIEL Pooleyd     Patient Name: Brad Woodward  MRN: [id number]  Today's Date: 5/20/2025    Admit Date: 5/16/2025    Plan: Anticipate routine home with son and family. Current OP HD MWF [facility].   Discharge Plan       Row Name 05/20/25 1241       Plan    Patient/Family in Agreement with Plan yes    Plan Comments Call received from patient's grandson, Leana Baker ([phone]) who inquired about caregiver support services. He reported that he could be the point of contact to work on arranging home caregiving services for patient. Reported that his mom [place] lives [place] but was interested in helping care for patient. CM discussed the [employer] referral submitted by [facility] in order to have family be patient's paid caregivers through waiver services.                  Angela Xie RN     Office Phone: [phone]  Office Cell: [phone]

## 2025-05-20 NOTE — PROGRESS NOTES
Psychiatric Vascular Surgery Progress Note    Name: Brad Woodward ADMIT: 2025   : 1940  PCP: Brian Nazario APRN    MRN: [id number] LOS: 2 days   AGE/SEX: 84 y.o. female  ROOM:    [facility]    CC: Renal artery stenosis    Subjective     Resting in bed, no complaints this morning.  Daughter is at the bedside.  Agreeable to renal arteriogram with possible intervention tomorrow.    Objective     Scheduled Medications:   amLODIPine, 10 mg, Oral, Q24H  aspirin, 81 mg, Oral, Daily  atorvastatin, 80 mg, Oral, Daily  [START ON 2025] ceFAZolin, 2,000 mg, Intravenous, On Call to OR  chlorthalidone, 25 mg, Oral, Daily  cloNIDine, 0.2 mg, Oral, Q8H  furosemide, 40 mg, Oral, Daily  hydrALAZINE, 100 mg, Oral, TID  insulin glargine, 5 Units, Subcutaneous, Daily  insulin lispro, 2-7 Units, Subcutaneous, 4x Daily AC & at Bedtime  metoprolol tartrate, 5 mg, Intravenous, Once  potassium chloride, 40 mEq, Oral, Daily  sodium chloride, 10 mL, Intravenous, Q12H        Active Infusions:       As Needed Medications:    acetaminophen **OR** acetaminophen **OR** acetaminophen    senna-docusate sodium **AND** polyethylene glycol **AND** bisacodyl **AND** bisacodyl    Calcium Replacement - Follow Nurse / BPA Driven Protocol    dextrose    dextrose    glucagon (human recombinant)    heparin (porcine)    hydrALAZINE    Magnesium Standard Dose Replacement - Follow Nurse / BPA Driven Protocol    nitroglycerin    Phosphorus Replacement - Follow Nurse / BPA Driven Protocol    Potassium Replacement - Follow Nurse / BPA Driven Protocol    [COMPLETED] Insert Peripheral IV **AND** sodium chloride    sodium chloride    sodium chloride    Vital Signs  Vitals:    25 0821   BP: 160/59   Pulse: 60   Resp:    Temp:    SpO2:       Body mass index is 23.41 kg/m².     Physical Exam:  NAD, alert and oriented  RRR  Lungs clear  Abd soft, benign  Vascular: Palpable bilateral radial and pedal pulses  Skin: Right chest  TDC    Results Review:     CBC    Results from last 7 days   Lab Units 05/17/25  0135 05/16/25  1805   WBC 10*3/mm3 4.24 3.19*   HEMOGLOBIN g/dL 8.6* 8.2*   PLATELETS 10*3/mm3 151 146     BMP   Results from last 7 days   Lab Units 05/20/25  0035 05/19/25  1803 05/19/25  0452 05/18/25  0403 05/17/25  0135 05/16/25  1805   SODIUM mmol/L 132*  --  131* 135* 131* 131*   POTASSIUM mmol/L 3.7 2.5* 3.3* 3.7 3.7 3.7   CHLORIDE mmol/L 97*  --  98 99 98 97*   CO2 mmol/L 26.7  --  23.3 24.4 27.3 27.9   BUN mg/dL 22  --  52* 37* 18 13   CREATININE mg/dL 2.51*  --  4.36* 3.67* 2.45* 2.06*   GLUCOSE mg/dL 217*  --  183* 106* 113* 193*   MAGNESIUM mg/dL  --   --   --   --  1.7  --    PHOSPHORUS mg/dL  --   --   --   --  3.3  --      Coag     HbA1C   Lab Results   Component Value Date    HGBA1C 5.40 04/15/2025    HGBA1C 5.9 (H) 04/16/2021     Infection     Radiology(recent) CT Angiogram Abdomen Pelvis  Result Date: 5/20/2025  Impression: 1.Moderate focal stenosis at the origin of both renal arteries. 2.Mild aortic atherosclerotic disease. 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 4.Moderate atherosclerotic disease in the common femoral arteries and SFA. 5.Cardiomegaly, coronary artery disease and trace pericardial effusion. 6.No acute abdominal or pelvic abnormality. Electronically Signed: Kamari Renteria MD  5/20/2025 3:52 AM EDT  Workstation ID: [id number]    CT Abdomen With Contrast  Result Date: 5/18/2025  1. Study not optimized for evaluation of the visceral arteries (standard CT was performed, not CTA. Therefore imaging was not performed in the arterial phase and postprocessing was not performed). There is noted to be calcific plaque at the origins of the renal arteries, with suggestion of significant stenosis at the origin of the left renal artery. CTA of the abdomen would be recommended 2. 1.8 cm hepatic lesion, likely hemangioma Electronically Signed: Sacha Vinson  5/18/2025 6:46 PM EDT   Workstation ID: [id number]      VTE Prophylaxis:  Pharmacologic VTE prophylaxis orders are present.         Problems:    Active Hospital Problems:  Active Hospital Problems    Diagnosis  POA    **Hypertensive urgency [I16.0]  Yes    Renal artery stenosis [I70.1]  Yes      Resolved Hospital Problems   No resolved problems to display.        Assessment & Plan   Assessment / Plan     Hypertensive urgency    Renal artery stenosis      84 y.o. female with history of HLD, HTN, DM, and ESRD on HD who presents with hypertensive urgency  Nephrology following, managing HD  CT of the abdomen shows significant stenosis at the origin of the left renal artery  Plan for arteriogram and possible renal artery intervention tomorrow with Dr. Mckee  N.p.o. after midnight  Nursing to obtain informed consent  Continue aspirin and statin        CECE Stout  [facility] Vascular Surgery  05/20/25   O: [phone]  F: [phone]

## 2025-05-20 NOTE — PLAN OF CARE
Goal Outcome Evaluation:  Plan of Care Reviewed With: patient, family        Progress: improving  Outcome Evaluation: Brad Woodward is an 85 y/o F admitted to [facility] on 5/16/25 for hypertensive urgency after HD and medical intervention. CTA shows renal artery stenosis, with plans for arteriogram with possible bilat renal A stents 5/21. This date, pt supine in bed with daughter and grandson present. Pt speaks Indonesian; grandson present for communication. At baseline, pt lives with her son, and is IND with ADLs and mobility. Family able to provide care as needed. Pt is AAOx4 and demonstrates safe baseline function, and in good spirits. Pt ambulates x 400 ft with good gait speed, and has no LOB/postural sway with dynamic balance. Pt is safe for d/c home with family when medically appropriate, will complete orders.    Anticipated Discharge Disposition (PT): home with assist

## 2025-05-20 NOTE — THERAPY EVALUATION
Patient Name: Brad Woodward  : 1940    MRN: [id number]                              Today's Date: 2025       Admit Date: 2025    Visit Dx:     ICD-10-CM ICD-9-CM   1. Hypertensive urgency  I16.0 401.9     Patient Active Problem List   Diagnosis    Mixed hyperlipidemia    Essential hypertension    Type 2 diabetes mellitus    Anemia due to stage 3a chronic kidney disease    CKD (chronic kidney disease) stage 3, GFR 30-59 ml/min    Hypertensive emergency    HFrEF (heart failure with reduced ejection fraction)    Dyspnea    Sick sinus syndrome    Junctional bradycardia    Pacemaker    Anemia    Hypertensive urgency    Renal artery stenosis     Past Medical History:   Diagnosis Date    Diabetes mellitus     Hyperlipidemia     Hypertension      Past Surgical History:   Procedure Laterality Date    CARDIAC ELECTROPHYSIOLOGY PROCEDURE N/A 2021    Procedure: Pacemaker DC new;  Surgeon: Yovany Navarrete MD;  Location: [facility] CATH INVASIVE LOCATION;  Service: Cardiovascular;  Laterality: N/A;      General Information       Row Name 25 1412          Physical Therapy Time and Intention    Document Type evaluation  -OD     Mode of Treatment physical therapy  -OD       Row Name 25 1412          General Information    Patient Profile Reviewed yes  -OD     Prior Level of Function independent:;ADL's;all household mobility  -OD     Existing Precautions/Restrictions no known precautions/restrictions  -OD     Barriers to Rehab language barrier   speaks Chinese; grandson present to translate  -OD       Row Name 25 1412          Living Environment    Current Living Arrangements home  -OD     People in Home child(geoffrey), adult;other (see comments)  son  -OD       Row Name 25 1412          Cognition    Orientation Status (Cognition) oriented x 4  -OD               User Key  (r) = Recorded By, (t) = Taken By, (c) = Cosigned By      Initials Name Provider Type    OD Aicha Green, PT Physical Therapist                    Mobility       Row Name 05/20/25 1413          Bed Mobility    Bed Mobility bed mobility (all) activities  -OD     All Activities, [place] (Bed Mobility) independent  -OD       Row Name 05/20/25 1413          Bed-Chair Transfer    Bed-Chair [place] (Transfers) independent  -OD       Row Name 05/20/25 1413          Sit-Stand Transfer    Sit-Stand [place] (Transfers) independent  -OD       Row Name 05/20/25 1413          Gait/Stairs (Locomotion)    [place] Level (Gait) standby assist  -OD     Patient was able to Ambulate yes  -OD     Distance in Feet (Gait) 400  -OD               User Key  (r) = Recorded By, (t) = Taken By, (c) = Cosigned By      Initials Name Provider Type    OD Aicha Green PT Physical Therapist                   Obj/Interventions       Row Name 05/20/25 1414          Range of Motion Comprehensive    General Range of Motion no range of motion deficits identified  -OD       Row Name 05/20/25 1414          Strength Comprehensive (MMT)    General Manual Muscle Testing (MMT) Assessment no strength deficits identified  -OD       Row Name 05/20/25 1414          Motor Skills    Motor Skills functional endurance  -OD     Functional Endurance good  -OD       Row Name 05/20/25 1414          Balance    Balance Assessment standing dynamic balance  -OD     Dynamic Standing Balance independent  -OD     Balance Interventions sitting;standing;minimal challenge  -OD       Row Name 05/20/25 1414          Sensory Assessment (Somatosensory)    Sensory Assessment (Somatosensory) sensation intact  -OD               User Key  (r) = Recorded By, (t) = Taken By, (c) = Cosigned By      Initials Name Provider Type    Aicha Larson PT Physical Therapist                   Goals/Plan    No documentation.                  Clinical Impression       Row Name 05/20/25 1414          Pain    Pretreatment Pain Rating 0/10 - no pain  -OD     Posttreatment Pain Rating 0/10 - no pain  -OD        Row Name 05/20/25 1414          Plan of Care Review    Plan of Care Reviewed With patient;family  -OD     Progress improving  -OD     Outcome Evaluation Brad Woodward is an 83 y/o F admitted to [facility] on 5/16/25 for hypertensive urgency after HD and medical intervention. CTA shows renal artery stenosis, with plans for arteriogram with possible bilat renal A stents 5/21. This date, pt supine in bed with daughter and grandson present. Pt speaks Azeri; grandson present for communication. At baseline, pt lives with her son, and is IND with ADLs and mobility. Family able to provide care as needed. Pt is AAOx4 and demonstrates safe baseline function, and in good spirits. Pt ambulates x 400 ft with good gait speed, and has no LOB/postural sway with dynamic balance. Pt is safe for d/c home with family when medically appropriate, will complete orders.  -OD       Row Name 05/20/25 1414          Therapy Assessment/Plan (PT)    Criteria for Skilled Interventions Met (PT) no;does not meet criteria for skilled intervention  -OD     Therapy Frequency (PT) evaluation only  -OD       Row Name 05/20/25 1414          Vital Signs    Intra Systolic BP Rehab 139  -OD     Intra Treatment Diastolic BP 52  -OD     Intratreatment Heart Rate (beats/min) 72  -OD     O2 Delivery Pre Treatment room air  -OD     Intra SpO2 (%) 97  -OD     O2 Delivery Intra Treatment room air  -OD     O2 Delivery Post Treatment room air  -OD     Pre Patient Position Supine  -OD     Intra Patient Position Standing  -OD     Post Patient Position Sitting  -OD       Row Name 05/20/25 1414          Positioning and Restraints    Pre-Treatment Position in bed  -OD     Post Treatment Position chair  -OD     In Chair notified nsg;reclined;call light within reach;encouraged to call for assist  -OD               User Key  (r) = Recorded By, (t) = Taken By, (c) = Cosigned By      Initials Name Provider Type    OD Aicha Green, PT Physical Therapist                   Outcome  Measures       Row Name 05/20/25 1417 05/20/25 0800       How much help from another person do you currently need...    Turning from your back to your side while in flat bed without using bedrails? 4  -OD 4  -HI    Moving from lying on back to sitting on the side of a flat bed without bedrails? 4  -OD 3  -HI    Moving to and from a bed to a chair (including a wheelchair)? 4  -OD 3  -HI    Standing up from a chair using your arms (e.g., wheelchair, bedside chair)? 4  -OD 3  -HI    Climbing 3-5 steps with a railing? 4  -OD 2  -HI    To walk in hospital room? 4  -OD 3  -HI    AM-PAC 6 Clicks Score (PT) 24  -OD 18  -HI    Highest Level of Mobility Goal Walk 250 Feet or More - 8  -OD Walk 10 Steps or More-6  -HI      Row Name 05/20/25 1417          Functional Assessment    Outcome Measure Options AM-PAC 6 Clicks Basic Mobility (PT)  -OD               User Key  (r) = Recorded By, (t) = Taken By, (c) = Cosigned By      Initials Name Provider Type    HI Jane Carvajal, RN Registered Nurse    OD Aicha Green, PT Physical Therapist                                 Physical Therapy Education       Title: PT OT SLP Therapies (Done)       Topic: Physical Therapy (Done)       Point: Mobility training (Done)       Learning Progress Summary            Patient Acceptance, E, VU by OD at 5/20/2025 1417                      Point: Home exercise program (Done)       Learning Progress Summary            Patient Acceptance, E, VU by OD at 5/20/2025 1417                      Point: Body mechanics (Done)       Learning Progress Summary            Patient Acceptance, E, VU by OD at 5/20/2025 1417                      Point: Precautions (Done)       Learning Progress Summary            Patient Acceptance, E, VU by OD at 5/20/2025 1417                                      User Key       Initials Effective Dates Name Provider Type Discipline    OD 05/11/23 -  Aicha Green, PT Physical Therapist PT                  PT Recommendation and  Plan     Progress: improving  Outcome Evaluation: Brad Woodward is an 83 y/o F admitted to [facility] on 5/16/25 for hypertensive urgency after HD and medical intervention. CTA shows renal artery stenosis, with plans for arteriogram with possible bilat renal A stents 5/21. This date, pt supine in bed with daughter and grandson present. Pt speaks Slovenian; grandson present for communication. At baseline, pt lives with her son, and is IND with ADLs and mobility. Family able to provide care as needed. Pt is AAOx4 and demonstrates safe baseline function, and in good spirits. Pt ambulates x 400 ft with good gait speed, and has no LOB/postural sway with dynamic balance. Pt is safe for d/c home with family when medically appropriate, will complete orders.     Time Calculation:         PT Charges       Row Name 05/20/25 1418             Time Calculation    Start Time 1302  -OD      Stop Time 1315  -OD      Time Calculation (min) 13 min  -OD      PT Received On 05/20/25  -OD         Time Calculation- PT    Total Timed Code Minutes- PT 0 minute(s)  -OD                User Key  (r) = Recorded By, (t) = Taken By, (c) = Cosigned By      Initials Name Provider Type    OD Aicha Dunne, PT Physical Therapist                  Therapy Charges for Today       Code Description Service Date Service Provider Modifiers Qty    [id number] HC PT EVAL LOW COMPLEXITY 3 5/20/2025 Aicha Dunne, PT GP 1            PT G-Codes  Outcome Measure Options: AM-PAC 6 Clicks Basic Mobility (PT)  AM-PAC 6 Clicks Score (PT): 24  PT Discharge Summary  Anticipated Discharge Disposition (PT): home with assist    Aicha Dunne PT  5/20/2025

## 2025-05-21 ENCOUNTER — ANESTHESIA EVENT (OUTPATIENT)
Dept: PERIOP | Facility: HOSPITAL | Age: 85
End: 2025-05-21
Payer: MEDICARE

## 2025-05-21 ENCOUNTER — ANCILLARY PROCEDURE (OUTPATIENT)
Dept: PERIOP | Facility: HOSPITAL | Age: 85
End: 2025-05-21
Payer: MEDICARE

## 2025-05-21 ENCOUNTER — ANESTHESIA (OUTPATIENT)
Dept: PERIOP | Facility: HOSPITAL | Age: 85
End: 2025-05-21
Payer: MEDICARE

## 2025-05-21 ENCOUNTER — APPOINTMENT (OUTPATIENT)
Dept: NEPHROLOGY | Facility: HOSPITAL | Age: 85
End: 2025-05-21
Payer: MEDICARE

## 2025-05-21 LAB
ABO GROUP BLD: NORMAL
ANION GAP SERPL CALCULATED.3IONS-SCNC: 11 MMOL/L (ref 5–15)
BLD GP AB SCN SERPL QL: NEGATIVE
BUN SERPL-MCNC: 37 MG/DL (ref 8–23)
BUN/CREAT SERPL: 10.7 (ref 7–25)
CALCIUM SPEC-SCNC: 8.2 MG/DL (ref 8.6–10.5)
CHLORIDE SERPL-SCNC: 96 MMOL/L (ref 98–107)
CO2 SERPL-SCNC: 23 MMOL/L (ref 22–29)
CREAT SERPL-MCNC: 3.47 MG/DL (ref 0.57–1)
DEPRECATED RDW RBC AUTO: 49 FL (ref 37–54)
EGFRCR SERPLBLD CKD-EPI 2021: 12.5 ML/MIN/1.73
ERYTHROCYTE [DISTWIDTH] IN BLOOD BY AUTOMATED COUNT: 16.3 % (ref 12.3–15.4)
GLUCOSE BLDC GLUCOMTR-MCNC: 108 MG/DL (ref 70–105)
GLUCOSE BLDC GLUCOMTR-MCNC: 162 MG/DL (ref 70–105)
GLUCOSE BLDC GLUCOMTR-MCNC: 292 MG/DL (ref 70–105)
GLUCOSE BLDC GLUCOMTR-MCNC: 94 MG/DL (ref 70–105)
GLUCOSE SERPL-MCNC: 137 MG/DL (ref 65–99)
HCT VFR BLD AUTO: 22.2 % (ref 34–46.6)
HGB BLD-MCNC: 7.3 G/DL (ref 12–15.9)
MCH RBC QN AUTO: 27 PG (ref 26.6–33)
MCHC RBC AUTO-ENTMCNC: 32.9 G/DL (ref 31.5–35.7)
MCV RBC AUTO: 82.2 FL (ref 79–97)
PLATELET # BLD AUTO: 176 10*3/MM3 (ref 140–450)
PMV BLD AUTO: 10.3 FL (ref 6–12)
POTASSIUM SERPL-SCNC: 3.9 MMOL/L (ref 3.5–5.2)
RBC # BLD AUTO: 2.7 10*6/MM3 (ref 3.77–5.28)
RH BLD: POSITIVE
SODIUM SERPL-SCNC: 130 MMOL/L (ref 136–145)
T&S EXPIRATION DATE: NORMAL
WBC NRBC COR # BLD AUTO: 3.57 10*3/MM3 (ref 3.4–10.8)

## 2025-05-21 PROCEDURE — C1769 GUIDE WIRE: HCPCS | Performed by: STUDENT IN AN ORGANIZED HEALTH CARE EDUCATION/TRAINING PROGRAM

## 2025-05-21 PROCEDURE — 76937 US GUIDE VASCULAR ACCESS: CPT | Performed by: STUDENT IN AN ORGANIZED HEALTH CARE EDUCATION/TRAINING PROGRAM

## 2025-05-21 PROCEDURE — 86850 RBC ANTIBODY SCREEN: CPT | Performed by: STUDENT IN AN ORGANIZED HEALTH CARE EDUCATION/TRAINING PROGRAM

## 2025-05-21 PROCEDURE — 25010000002 HEPARIN (PORCINE) PER 1000 UNITS: Performed by: INTERNAL MEDICINE

## 2025-05-21 PROCEDURE — C1894 INTRO/SHEATH, NON-LASER: HCPCS | Performed by: STUDENT IN AN ORGANIZED HEALTH CARE EDUCATION/TRAINING PROGRAM

## 2025-05-21 PROCEDURE — 25810000003 LACTATED RINGERS PER 1000 ML

## 2025-05-21 PROCEDURE — 36200 PLACE CATHETER IN AORTA: CPT | Performed by: STUDENT IN AN ORGANIZED HEALTH CARE EDUCATION/TRAINING PROGRAM

## 2025-05-21 PROCEDURE — 25010000002 LIDOCAINE PF 2% 2 % SOLUTION

## 2025-05-21 PROCEDURE — C1760 CLOSURE DEV, VASC: HCPCS | Performed by: STUDENT IN AN ORGANIZED HEALTH CARE EDUCATION/TRAINING PROGRAM

## 2025-05-21 PROCEDURE — C1874 STENT, COATED/COV W/DEL SYS: HCPCS | Performed by: STUDENT IN AN ORGANIZED HEALTH CARE EDUCATION/TRAINING PROGRAM

## 2025-05-21 PROCEDURE — 25010000002 HEPARIN (PORCINE) PER 1000 UNITS

## 2025-05-21 PROCEDURE — 25010000002 ONDANSETRON PER 1 MG

## 2025-05-21 PROCEDURE — 25010000002 PROPOFOL 10 MG/ML EMULSION

## 2025-05-21 PROCEDURE — 25010000002 FENTANYL CITRATE (PF) 100 MCG/2ML SOLUTION

## 2025-05-21 PROCEDURE — 25510000001 IOPAMIDOL PER 1 ML: Performed by: STUDENT IN AN ORGANIZED HEALTH CARE EDUCATION/TRAINING PROGRAM

## 2025-05-21 PROCEDURE — C1725 CATH, TRANSLUMIN NON-LASER: HCPCS | Performed by: STUDENT IN AN ORGANIZED HEALTH CARE EDUCATION/TRAINING PROGRAM

## 2025-05-21 PROCEDURE — 25010000002 LIDOCAINE 1 % SOLUTION: Performed by: STUDENT IN AN ORGANIZED HEALTH CARE EDUCATION/TRAINING PROGRAM

## 2025-05-21 PROCEDURE — 25010000002 DEXAMETHASONE PER 1 MG

## 2025-05-21 PROCEDURE — P9016 RBC LEUKOCYTES REDUCED: HCPCS

## 2025-05-21 PROCEDURE — 37236 OPEN/PERQ PLACE STENT 1ST: CPT | Performed by: STUDENT IN AN ORGANIZED HEALTH CARE EDUCATION/TRAINING PROGRAM

## 2025-05-21 PROCEDURE — 63710000001 INSULIN LISPRO (HUMAN) PER 5 UNITS: Performed by: STUDENT IN AN ORGANIZED HEALTH CARE EDUCATION/TRAINING PROGRAM

## 2025-05-21 PROCEDURE — 80048 BASIC METABOLIC PNL TOTAL CA: CPT | Performed by: INTERNAL MEDICINE

## 2025-05-21 PROCEDURE — 86900 BLOOD TYPING SEROLOGIC ABO: CPT

## 2025-05-21 PROCEDURE — 82948 REAGENT STRIP/BLOOD GLUCOSE: CPT

## 2025-05-21 PROCEDURE — C1887 CATHETER, GUIDING: HCPCS | Performed by: STUDENT IN AN ORGANIZED HEALTH CARE EDUCATION/TRAINING PROGRAM

## 2025-05-21 PROCEDURE — C1773 RET DEV, INSERTABLE: HCPCS | Performed by: STUDENT IN AN ORGANIZED HEALTH CARE EDUCATION/TRAINING PROGRAM

## 2025-05-21 PROCEDURE — 37197 REMOVE INTRVAS FOREIGN BODY: CPT | Performed by: STUDENT IN AN ORGANIZED HEALTH CARE EDUCATION/TRAINING PROGRAM

## 2025-05-21 PROCEDURE — 82948 REAGENT STRIP/BLOOD GLUCOSE: CPT | Performed by: STUDENT IN AN ORGANIZED HEALTH CARE EDUCATION/TRAINING PROGRAM

## 2025-05-21 PROCEDURE — 85027 COMPLETE CBC AUTOMATED: CPT | Performed by: NURSE PRACTITIONER

## 2025-05-21 PROCEDURE — 25010000002 PROTAMINE SULFATE PER 10 MG

## 2025-05-21 PROCEDURE — 86901 BLOOD TYPING SEROLOGIC RH(D): CPT | Performed by: STUDENT IN AN ORGANIZED HEALTH CARE EDUCATION/TRAINING PROGRAM

## 2025-05-21 PROCEDURE — 25010000002 SUGAMMADEX 200 MG/2ML SOLUTION

## 2025-05-21 PROCEDURE — 86923 COMPATIBILITY TEST ELECTRIC: CPT

## 2025-05-21 PROCEDURE — 25010000002 HYDRALAZINE PER 20 MG: Performed by: INTERNAL MEDICINE

## 2025-05-21 PROCEDURE — 25010000002 CEFAZOLIN PER 500 MG: Performed by: NURSE PRACTITIONER

## 2025-05-21 PROCEDURE — B4101ZZ FLUOROSCOPY OF ABDOMINAL AORTA USING LOW OSMOLAR CONTRAST: ICD-10-PCS | Performed by: STUDENT IN AN ORGANIZED HEALTH CARE EDUCATION/TRAINING PROGRAM

## 2025-05-21 PROCEDURE — 86900 BLOOD TYPING SEROLOGIC ABO: CPT | Performed by: STUDENT IN AN ORGANIZED HEALTH CARE EDUCATION/TRAINING PROGRAM

## 2025-05-21 PROCEDURE — 36245 INS CATH ABD/L-EXT ART 1ST: CPT | Performed by: STUDENT IN AN ORGANIZED HEALTH CARE EDUCATION/TRAINING PROGRAM

## 2025-05-21 PROCEDURE — 25010000002 HEPARIN (PORCINE) PER 1000 UNITS: Performed by: STUDENT IN AN ORGANIZED HEALTH CARE EDUCATION/TRAINING PROGRAM

## 2025-05-21 PROCEDURE — 047A3DZ DILATION OF LEFT RENAL ARTERY WITH INTRALUMINAL DEVICE, PERCUTANEOUS APPROACH: ICD-10-PCS | Performed by: STUDENT IN AN ORGANIZED HEALTH CARE EDUCATION/TRAINING PROGRAM

## 2025-05-21 PROCEDURE — 25010000002 LABETALOL 5 MG/ML SOLUTION

## 2025-05-21 PROCEDURE — 25010000002 HYDRALAZINE PER 20 MG

## 2025-05-21 PROCEDURE — 75625 CONTRAST EXAM ABDOMINL AORTA: CPT

## 2025-05-21 DEVICE — FLOSEAL WITH RECOTHROM - 5ML
Type: IMPLANTABLE DEVICE | Site: KIDNEY | Status: FUNCTIONAL
Brand: FLOSEAL HEMOSTATIC MATRIX

## 2025-05-21 DEVICE — IMPLANTABLE DEVICE: Type: IMPLANTABLE DEVICE | Site: KIDNEY | Status: FUNCTIONAL

## 2025-05-21 RX ORDER — HEPARIN SODIUM 1000 [USP'U]/ML
INJECTION, SOLUTION INTRAVENOUS; SUBCUTANEOUS AS NEEDED
Status: DISCONTINUED | OUTPATIENT
Start: 2025-05-21 | End: 2025-05-21 | Stop reason: SURG

## 2025-05-21 RX ORDER — IOPAMIDOL 755 MG/ML
INJECTION, SOLUTION INTRAVASCULAR AS NEEDED
Status: DISCONTINUED | OUTPATIENT
Start: 2025-05-21 | End: 2025-05-21 | Stop reason: HOSPADM

## 2025-05-21 RX ORDER — HYDROMORPHONE HYDROCHLORIDE 1 MG/ML
0.5 INJECTION, SOLUTION INTRAMUSCULAR; INTRAVENOUS; SUBCUTANEOUS
Status: DISCONTINUED | OUTPATIENT
Start: 2025-05-21 | End: 2025-05-21

## 2025-05-21 RX ORDER — ROCURONIUM BROMIDE 10 MG/ML
INJECTION, SOLUTION INTRAVENOUS AS NEEDED
Status: DISCONTINUED | OUTPATIENT
Start: 2025-05-21 | End: 2025-05-21 | Stop reason: SURG

## 2025-05-21 RX ORDER — HYDRALAZINE HYDROCHLORIDE 20 MG/ML
5 INJECTION INTRAMUSCULAR; INTRAVENOUS
Status: DISCONTINUED | OUTPATIENT
Start: 2025-05-21 | End: 2025-05-21

## 2025-05-21 RX ORDER — EPHEDRINE SULFATE 5 MG/ML
5 INJECTION INTRAVENOUS ONCE AS NEEDED
Status: DISCONTINUED | OUTPATIENT
Start: 2025-05-21 | End: 2025-05-21

## 2025-05-21 RX ORDER — PROTAMINE SULFATE 10 MG/ML
INJECTION, SOLUTION INTRAVENOUS AS NEEDED
Status: DISCONTINUED | OUTPATIENT
Start: 2025-05-21 | End: 2025-05-21 | Stop reason: SURG

## 2025-05-21 RX ORDER — IPRATROPIUM BROMIDE AND ALBUTEROL SULFATE 2.5; .5 MG/3ML; MG/3ML
3 SOLUTION RESPIRATORY (INHALATION) ONCE AS NEEDED
Status: DISCONTINUED | OUTPATIENT
Start: 2025-05-21 | End: 2025-05-21

## 2025-05-21 RX ORDER — FLUMAZENIL 0.1 MG/ML
0.2 INJECTION INTRAVENOUS AS NEEDED
Status: DISCONTINUED | OUTPATIENT
Start: 2025-05-21 | End: 2025-05-21

## 2025-05-21 RX ORDER — HYDRALAZINE HYDROCHLORIDE 20 MG/ML
10 INJECTION INTRAMUSCULAR; INTRAVENOUS EVERY 6 HOURS PRN
Status: DISCONTINUED | OUTPATIENT
Start: 2025-05-21 | End: 2025-05-22 | Stop reason: HOSPADM

## 2025-05-21 RX ORDER — NALOXONE HCL 0.4 MG/ML
0.4 VIAL (ML) INJECTION AS NEEDED
Status: DISCONTINUED | OUTPATIENT
Start: 2025-05-21 | End: 2025-05-21

## 2025-05-21 RX ORDER — PROPOFOL 10 MG/ML
VIAL (ML) INTRAVENOUS AS NEEDED
Status: DISCONTINUED | OUTPATIENT
Start: 2025-05-21 | End: 2025-05-21 | Stop reason: SURG

## 2025-05-21 RX ORDER — LABETALOL HYDROCHLORIDE 5 MG/ML
5 INJECTION, SOLUTION INTRAVENOUS
Status: DISCONTINUED | OUTPATIENT
Start: 2025-05-21 | End: 2025-05-21

## 2025-05-21 RX ORDER — ASPIRIN 81 MG/1
81 TABLET, CHEWABLE ORAL DAILY
Status: DISCONTINUED | OUTPATIENT
Start: 2025-05-21 | End: 2025-05-22

## 2025-05-21 RX ORDER — ONDANSETRON 2 MG/ML
INJECTION INTRAMUSCULAR; INTRAVENOUS AS NEEDED
Status: DISCONTINUED | OUTPATIENT
Start: 2025-05-21 | End: 2025-05-21 | Stop reason: SURG

## 2025-05-21 RX ORDER — LIDOCAINE HYDROCHLORIDE 10 MG/ML
INJECTION, SOLUTION INFILTRATION; PERINEURAL AS NEEDED
Status: DISCONTINUED | OUTPATIENT
Start: 2025-05-21 | End: 2025-05-21 | Stop reason: HOSPADM

## 2025-05-21 RX ORDER — CLOPIDOGREL BISULFATE 75 MG/1
75 TABLET ORAL DAILY
Status: DISCONTINUED | OUTPATIENT
Start: 2025-05-21 | End: 2025-05-22 | Stop reason: HOSPADM

## 2025-05-21 RX ORDER — NOREPINEPHRINE BITARTRATE 0.03 MG/ML
INJECTION, SOLUTION INTRAVENOUS CONTINUOUS PRN
Status: DISCONTINUED | OUTPATIENT
Start: 2025-05-21 | End: 2025-05-21 | Stop reason: SURG

## 2025-05-21 RX ORDER — FENTANYL CITRATE 50 UG/ML
INJECTION, SOLUTION INTRAMUSCULAR; INTRAVENOUS AS NEEDED
Status: DISCONTINUED | OUTPATIENT
Start: 2025-05-21 | End: 2025-05-21 | Stop reason: SURG

## 2025-05-21 RX ORDER — OXYCODONE HYDROCHLORIDE 5 MG/1
5 TABLET ORAL ONCE AS NEEDED
Status: DISCONTINUED | OUTPATIENT
Start: 2025-05-21 | End: 2025-05-21

## 2025-05-21 RX ORDER — LIDOCAINE HYDROCHLORIDE 20 MG/ML
INJECTION, SOLUTION EPIDURAL; INFILTRATION; INTRACAUDAL; PERINEURAL AS NEEDED
Status: DISCONTINUED | OUTPATIENT
Start: 2025-05-21 | End: 2025-05-21 | Stop reason: SURG

## 2025-05-21 RX ORDER — ONDANSETRON 2 MG/ML
4 INJECTION INTRAMUSCULAR; INTRAVENOUS ONCE AS NEEDED
Status: DISCONTINUED | OUTPATIENT
Start: 2025-05-21 | End: 2025-05-21

## 2025-05-21 RX ORDER — FENTANYL CITRATE 50 UG/ML
50 INJECTION, SOLUTION INTRAMUSCULAR; INTRAVENOUS
Status: DISCONTINUED | OUTPATIENT
Start: 2025-05-21 | End: 2025-05-21

## 2025-05-21 RX ORDER — DIPHENHYDRAMINE HYDROCHLORIDE 50 MG/ML
12.5 INJECTION, SOLUTION INTRAMUSCULAR; INTRAVENOUS
Status: DISCONTINUED | OUTPATIENT
Start: 2025-05-21 | End: 2025-05-21

## 2025-05-21 RX ORDER — DEXAMETHASONE SODIUM PHOSPHATE 4 MG/ML
INJECTION, SOLUTION INTRA-ARTICULAR; INTRALESIONAL; INTRAMUSCULAR; INTRAVENOUS; SOFT TISSUE AS NEEDED
Status: DISCONTINUED | OUTPATIENT
Start: 2025-05-21 | End: 2025-05-21 | Stop reason: SURG

## 2025-05-21 RX ORDER — DIPHENHYDRAMINE HYDROCHLORIDE 50 MG/ML
12.5 INJECTION, SOLUTION INTRAMUSCULAR; INTRAVENOUS ONCE AS NEEDED
Status: DISCONTINUED | OUTPATIENT
Start: 2025-05-21 | End: 2025-05-21

## 2025-05-21 RX ORDER — SODIUM CHLORIDE, SODIUM LACTATE, POTASSIUM CHLORIDE, CALCIUM CHLORIDE 600; 310; 30; 20 MG/100ML; MG/100ML; MG/100ML; MG/100ML
INJECTION, SOLUTION INTRAVENOUS CONTINUOUS PRN
Status: DISCONTINUED | OUTPATIENT
Start: 2025-05-21 | End: 2025-05-21 | Stop reason: SURG

## 2025-05-21 RX ORDER — OXYCODONE HYDROCHLORIDE 5 MG/1
10 TABLET ORAL EVERY 4 HOURS PRN
Status: DISCONTINUED | OUTPATIENT
Start: 2025-05-21 | End: 2025-05-21

## 2025-05-21 RX ADMIN — HEPARIN SODIUM 4000 UNITS: 1000 INJECTION INTRAVENOUS; SUBCUTANEOUS at 10:25

## 2025-05-21 RX ADMIN — HYDRALAZINE HYDROCHLORIDE 5 MG: 20 INJECTION INTRAMUSCULAR; INTRAVENOUS at 14:12

## 2025-05-21 RX ADMIN — LIDOCAINE HYDROCHLORIDE 60 MG: 20 INJECTION, SOLUTION EPIDURAL; INFILTRATION; INTRACAUDAL; PERINEURAL at 11:32

## 2025-05-21 RX ADMIN — LABETALOL HYDROCHLORIDE 5 MG: 5 INJECTION, SOLUTION INTRAVENOUS at 14:29

## 2025-05-21 RX ADMIN — INSULIN LISPRO 2 UNITS: 100 INJECTION, SOLUTION INTRAVENOUS; SUBCUTANEOUS at 17:31

## 2025-05-21 RX ADMIN — HEPARIN SODIUM 3000 UNITS: 1000 INJECTION INTRAVENOUS; SUBCUTANEOUS at 12:20

## 2025-05-21 RX ADMIN — CLONIDINE HYDROCHLORIDE 0.2 MG: 0.1 TABLET ORAL at 05:49

## 2025-05-21 RX ADMIN — ROCURONIUM BROMIDE 50 MG: 10 INJECTION, SOLUTION INTRAVENOUS at 11:32

## 2025-05-21 RX ADMIN — HYDRALAZINE HYDROCHLORIDE 10 MG: 20 INJECTION INTRAMUSCULAR; INTRAVENOUS at 04:46

## 2025-05-21 RX ADMIN — SUGAMMADEX 200 MG: 100 INJECTION, SOLUTION INTRAVENOUS at 13:22

## 2025-05-21 RX ADMIN — FENTANYL CITRATE 50 MCG: 50 INJECTION, SOLUTION INTRAMUSCULAR; INTRAVENOUS at 11:28

## 2025-05-21 RX ADMIN — ASPIRIN 81 MG CHEWABLE TABLET 81 MG: 81 TABLET CHEWABLE at 15:28

## 2025-05-21 RX ADMIN — CLOPIDOGREL BISULFATE 75 MG: 75 TABLET, FILM COATED ORAL at 15:28

## 2025-05-21 RX ADMIN — Medication 10 ML: at 20:17

## 2025-05-21 RX ADMIN — CHLORTHALIDONE 25 MG: 25 TABLET ORAL at 15:28

## 2025-05-21 RX ADMIN — CLONIDINE HYDROCHLORIDE 0.2 MG: 0.1 TABLET ORAL at 15:27

## 2025-05-21 RX ADMIN — HYDRALAZINE HYDROCHLORIDE 5 MG: 20 INJECTION INTRAMUSCULAR; INTRAVENOUS at 13:46

## 2025-05-21 RX ADMIN — FENTANYL CITRATE 50 MCG: 50 INJECTION, SOLUTION INTRAMUSCULAR; INTRAVENOUS at 12:49

## 2025-05-21 RX ADMIN — SODIUM CHLORIDE, SODIUM LACTATE, POTASSIUM CHLORIDE, AND CALCIUM CHLORIDE: .6; .31; .03; .02 INJECTION, SOLUTION INTRAVENOUS at 11:27

## 2025-05-21 RX ADMIN — PROPOFOL 50 MG: 10 INJECTION, EMULSION INTRAVENOUS at 11:32

## 2025-05-21 RX ADMIN — PROTAMINE SULFATE 30 MG: 10 INJECTION, SOLUTION INTRAVENOUS at 13:12

## 2025-05-21 RX ADMIN — Medication 0.02 MCG/KG/MIN: at 12:38

## 2025-05-21 RX ADMIN — ONDANSETRON 4 MG: 2 INJECTION, SOLUTION INTRAMUSCULAR; INTRAVENOUS at 13:05

## 2025-05-21 RX ADMIN — AMLODIPINE BESYLATE 10 MG: 5 TABLET ORAL at 15:28

## 2025-05-21 RX ADMIN — HEPARIN SODIUM 6000 UNITS: 1000 INJECTION INTRAVENOUS; SUBCUTANEOUS at 12:05

## 2025-05-21 RX ADMIN — INSULIN LISPRO 4 UNITS: 100 INJECTION, SOLUTION INTRAVENOUS; SUBCUTANEOUS at 21:20

## 2025-05-21 RX ADMIN — HYDRALAZINE HYDROCHLORIDE 100 MG: 25 TABLET ORAL at 15:28

## 2025-05-21 RX ADMIN — METOPROLOL SUCCINATE 25 MG: 25 TABLET, EXTENDED RELEASE ORAL at 15:28

## 2025-05-21 RX ADMIN — DEXAMETHASONE SODIUM PHOSPHATE 4 MG: 4 INJECTION, SOLUTION INTRAMUSCULAR; INTRAVENOUS at 12:00

## 2025-05-21 RX ADMIN — HYDRALAZINE HYDROCHLORIDE 10 MG: 20 INJECTION INTRAMUSCULAR; INTRAVENOUS at 00:43

## 2025-05-21 RX ADMIN — CEFAZOLIN 2000 MG: 2 INJECTION, POWDER, FOR SOLUTION INTRAMUSCULAR; INTRAVENOUS at 05:49

## 2025-05-21 NOTE — PLAN OF CARE
Goal Outcome Evaluation:   Pt A/Ox4 with VSS at this time. Pt off of unit majority of shift for dialysis and renal angioplasty. Unit of PRBCs currently infusing as ordered for hgb 7.3. No signs of transfusion reaction. Family remains at bedside. Call light within reach.

## 2025-05-21 NOTE — ANESTHESIA PROCEDURE NOTES
Airway  Reason: elective    Date/Time: 5/21/2025 11:33 AM  Difficult airway    General Information and Staff    Patient location during procedure: OR  CRNA/CAA: Sacha Garcia CRNA  SRNA: Zaynab Swanson SRNA  Indications and Patient Condition  Indications for airway management: airway protection    Preoxygenated: yes    Mask difficulty assessment: 1 - vent by mask    Final Airway Details    Final airway type: endotracheal airway      Successful airway: ETT  Cuffed: yes   Successful intubation technique: video laryngoscopy  Adjuncts used in placement: intubating stylet  Endotracheal tube insertion site: oral  Blade: Neri  Blade size: 3  ETT size (mm): 7.0  Cormack-Lehane Classification: grade IIb - view of arytenoids or posterior of glottis only  Placement verified by: chest auscultation and capnometry   Measured from: lips  ETT/EBT  to lips (cm): 18  Number of attempts at approach: 1  Assessment: lips, teeth, and gum same as pre-op and atraumatic intubation

## 2025-05-21 NOTE — CASE MANAGEMENT/SOCIAL WORK
Continued Stay Note  NATHANIEL Serrano     Patient Name: Brad Woodward  MRN: [id number]  Today's Date: 5/21/2025    Admit Date: 5/16/2025    Plan: Anticipate routine home with son and family. Current OP HD MWF [facility].   Discharge Plan       Row Name 05/21/25 1035       Plan    Plan Comments DC Barriers: Scheduled to have renal artery angiogram performed today, 5/21, vascular surgery and nephrology following. HGB monitoring (HGB 7.3), PRBCs ordered.           Angela Xie RN     Office Phone: [phone]  Office Cell: [phone]

## 2025-05-21 NOTE — OP NOTE
[facility]    Date of Admission:  5/16/2025  Today's Date:  05/21/25  Ted Mckee II, MD   Zach    Preoperative Diagnosis:   Renovascular hypertension  Left renal artery stenosis    Postoperative Diagnosis:   Same    Procedure Performed:   Ultrasound-guided access bilateral common femoral arteries  Abdominal aortogram  Left renal artery stent angioplasty with 5 x 22 iCAST stent, predilated with 4 x 40 mm angioplasty balloon, stent flared with 10 x 20 mm angioplasty balloon after placement  Snare removal of dislodged 5x16 mm iCast stent   Pro-glide closure bilateral common femoral arteries    Surgeon:   Ted Mckee II, MD    Assistant:    Beba Jo  RN, Provided critical assistance in exposure, retraction, and suction that overall decrease blood loss and operative time.    Anesthesia:   General    Estimated Blood Loss:   Minimal    Findings:    Abdominal aortogram: SMA widely patent, infrarenal aorta widely patent, moderate to severe calcified atherosclerotic disease of common iliac arteries bilaterally with moderate stenosis of the left common iliac artery, right common iliac artery and bilateral external iliac arteries widely patent, internal iliac arteries patent bilaterally; left renal artery with heavily calcified plaque at its origin with moderate to severe stenosis, right renal artery with moderate calcified plaque at its origin with minimal stenosis.  Left renal artery stenosis treated with 5 x 22 mm iCAST stent intra-aortic side of stent flared with 10x20 mm angioplasty balloon.         Implants:    Implant Name Type Inv. Item Serial No.  Lot No. LRB No. Used Action   KT SEAL HEMOS ABS FLOSEAL MATRX 1.5/FAST/PREP 5000/IU 5ML - [id number] Implant KT SEAL HEMOS ABS FLOSEAL MATRX 1.5/FAST/PREP 5000/IU 5ML  HERN[employer]NDEZ [employer]ely[employer] [id number] Left 1 Implanted   STNT CVR ILIAC ICAST PTFE 5X22MM 120CM - [id number] - [id number] Stent STNT CVR ILIAC ICAST PTFE 5X22MM 120CM [id number] [facility]  [place] . Left 1 Implanted       Staff:   Circulator: Ibis Leal, SAVITA; Rosi Brand RN  Radiology Technologist: Christiana Page  Scrub Person: Estrella Germain; Caridad Grullon RN  Assistant: Beba Jo CSFA    Specimen:   None.     Complications:   Initial 5 x 16 mm iCast stent became dislodged from balloon when trying to insert in the renal artery but would not pull back into sheath so had to access left common femoral artery and snare the stent to remove it.    Possible small pseudoaneurysm on the left femoral artery access site on completion angiography which was treated with holding pressure for 15 minutes.    Dispo:   to PACU    Indication for procedure:   84 y.o. female with severe hypertension requiring 4 antihypertensive medications, progressive renal failure and renal artery stenosis identified on CT scan.  Plans made for renal artery angiography with possible stent placement.  Details of this procedure including risks benefits and alternatives discussed with patient with the use of  iPad and she verbalized understanding and agrees to proceed.    Description of procedure:   Patient is taken the hybrid room play supine on the table.  General endotracheal anesthesia was begun by the anesthesia service.  Once patient was asleep her groins were prepped with ChloraPrep bilaterally and she was draped in sterile fashion.  I began procedure by marking the site of the right femoral head under fluoroscopy.  I then evaluated the right common femoral artery with ultrasound.  1% lidocaine was infiltrated over the artery and it was accessed with a microneedle under ultrasound guidance.  Microwire was inserted.  Placement over the femoral head was confirmed with fluoroscopy.  I then exchanged microneedle for microsheath and performed angiogram of the access site which looked appropriate.  0.035 Glidewire was advanced through the micro sheath into the aorta and microsheath was exchanged for a  tensiometer 5 Zimbabwean sheath.  Omni Flush catheter was advanced over the wire and abdominal aortogram was performed with the above listed findings.  We then reinserted the Glidewire and exchanged our 5 [place]an sheath for a 7 Zimbabwean  sheath.  The patient was given 9000 units of heparin using the  is able to select the left renal artery with a Glidewire and 90 cm Jaquez cross catheter was advanced over the wire and angiography was performed from within the renal artery to confirm intraluminal placement.  We then placed a Frias wire through the Jaquez cross and the Jaquez cross was removed.  A 5 x 16 iCAST stent was obtained and advanced over the wire but was not able to cross the origin of the renal artery.  In attempting to pull the iCAST back into the 7 [place]an sheath the stent appeared to become dislodged off of the deployment balloon.  At this point we performed ultrasound of the left femoral artery and lidocaine was infiltrated here.  I then accessed the left femoral artery with a microneedle under ultrasound guidance.  Placement of the femoral head was confirmed fluoroscopy and then the microneedle was exchanged for a micro sheath and angiogram was performed which showed appropriate placement.  A Glidewire was advanced to the microsheath on the left side and we then used a ProGlide device to perform a preclosed technique placement on the left side.  After this we placed a 10 cm 8 Zimbabwean sheath up the left femoral artery.  A 25 mm gooseneck snare was obtained and advanced up the left side and positioned in the aorta just proximal to the left renal artery.  We then advanced to the  sheath and Frias wire cephalad disengaging from the renal artery but still maintaining the stent on the Frias wire.  I was then able to snare the Frias wire and this was pulled back down into the left iliac system by advancing wire up the right side.  The snared iCast could not be pulled through the 8 Zimbabwean sheath  but we were able to remove the snare, sheath, and stent altogether and then the left femoral access site Pro-glide device was closed.  Pressure was held here, and seemed to have reasonably good hemostasis.  We confirmed the patient had a pedal signal in the left foot.  We then repeated a perivisceral angiogram through the  sheath and were able to select the left renal artery again with a Glidewire.  Jaquez cross catheter was advanced over the wire and angiography was performed to confirm intraluminal placement.  I then placed a 0.018 wire through this catheter with the idea that I would predilate the renal artery with a 0.018 balloon but the 0.018 wire did not provide sufficient support to cross and so this was removed and we reaccessed the left renal artery with the Glidewire.  Is then able to get the  sheath down into the origin of the renal artery and then a 4 mm angioplasty balloon was advanced over the Glidewire and the left renal artery was predilated with this 4 x 40 mm angioplasty balloon.  We then obtained a 5 x 22 iCAST stent and this was advanced over the wire and deployed in excellent position with approximately 2 mm hanging out into the aorta as desired.  Patient angiography showed good patency of the stent in the renal artery distal to the stent.  A 10 x 20 mm angioplasty balloon was obtained and then used to flare the intra aortic segment of the stent.  Completion angiography after this showed good flaring of the stent and excellent flow through the stent with no evidence of complication.  We then removed the wire from the renal artery and the  was pulled down to the level of aortic bifurcation.  I then performed angiogram of the left femoral artery which showed it to be patent with a very focal area of stenosis that was unchanged from the initial angiogram but a small area of extravasation concerning for possible pseudoaneurysm.  The patient had a small hematoma that was  not expanding.  We then remove the  sheath from the right side and the right femoral arteriotomy was closed with a ProGlide Perclose device without complication.  Pressure was held bilaterally and we confirmed the patient had pedal signals in both feet and then patient was given 30 mg of protamine.  Pressure was held on both sides for 15 minutes and hematomas seem to have dissipated/resolved and there were no palpable pulsatile masses on either side.  Sterile dressings were placed.  Patient was extubated and awakened from anesthesia and transported to PACU in good condition.  Prior to leaving the OR I personally confirmed the patient had no large hematomas forming in either of her groin access sites and had strong pedal signals bilaterally.  Patient tolerated the procedure well.  I discussed the outcome of the procedure including the need for contralateral access for stent removal with the patient's family.  All wires, catheters, sheaths, and other devices were removed and found to be whole and intact prior to the conclusion of the procedure.     Ted Mckee II, MD  05/21/25     Active Hospital Problems    Diagnosis  POA    **Hypertensive urgency [I16.0]  Yes    Renal artery stenosis [I70.1]  Yes      Resolved Hospital Problems   No resolved problems to display.

## 2025-05-21 NOTE — NURSING NOTE
Treatment completed, tolerated well.  Access is intact, patent.  UF=2L.  Report given to primary RN.

## 2025-05-21 NOTE — PROGRESS NOTES
[facility] MEDICINE SERVICE  DAILY PROGRESS NOTE    NAME: Brad Woodward  : 1940  MRN: [id number]      LOS: 3 days     PROVIDER OF SERVICE: Helder Marcum MD    Chief Complaint: Hypertensive urgency    Subjective:   Patient lying in bed family at bedside  Interval History:    Patient seen and evaluated at bedside.   Patient denies any complain, blood pressure better  Treatment plan discussed with patient. All questions addressed.   Dialysis as per renal  Review of Systems:   All 21 ROS were negative except mentioned above.    Objective:     Vital Signs  Temp:  [97.7 °F (36.5 °C)-97.8 °F (36.6 °C)] 97.8 °F (36.6 °C)  Heart Rate:  [60-90] 60  Resp:  [9-20] 12  BP: (134-181)/() 163/68   Body mass index is 23.41 kg/m².    Physical Exam   General: No acute distress, appears stated age  Neuro: Awake and alert, oriented x3, no focal deficits appreciated  Head: Atraumatic, normocephalic  HEENT: EOMI, anicteric, normal sclerae and conjunctivae, moist mucus membranes  Neck: supple, no lymphadenopathy  CV: RRR, soft heart sounds, no murmurs appreciated, no peripheral edema  Pulm: Decreased breath sounds, no increased work of breathing, no adventitious sounds  Abd: Soft, nontender, nondistended  Skin: Warm, dry and intact  Psych: Appropriate mood and affect    Scheduled Meds   amLODIPine, 10 mg, Oral, Q24H  aspirin, 81 mg, Oral, Daily  atorvastatin, 80 mg, Oral, Daily  chlorthalidone, 25 mg, Oral, Daily  cloNIDine, 0.2 mg, Oral, Q8H  furosemide, 40 mg, Oral, Daily  hydrALAZINE, 100 mg, Oral, TID  insulin glargine, 5 Units, Subcutaneous, Daily  insulin lispro, 2-7 Units, Subcutaneous, 4x Daily AC & at Bedtime  metoprolol succinate XL, 25 mg, Oral, Q24H  potassium chloride, 40 mEq, Oral, Daily  sodium chloride, 10 mL, Intravenous, Q12H       PRN Meds     acetaminophen **OR** acetaminophen **OR** acetaminophen    senna-docusate sodium **AND** polyethylene glycol **AND** bisacodyl **AND** bisacodyl    Calcium  Replacement - Follow Nurse / BPA Driven Protocol    dextrose    dextrose    glucagon (human recombinant)    heparin (porcine)    hydrALAZINE    Magnesium Standard Dose Replacement - Follow Nurse / BPA Driven Protocol    nitroglycerin    Phosphorus Replacement - Follow Nurse / BPA Driven Protocol    Potassium Replacement - Follow Nurse / BPA Driven Protocol    [COMPLETED] Insert Peripheral IV **AND** sodium chloride    sodium chloride    sodium chloride   Infusions         Diagnostic Data    Results from last 7 days   Lab Units 05/21/25  0142 05/18/25  0403 05/17/25  0135   WBC 10*3/mm3 3.57  --  4.24   HEMOGLOBIN g/dL 7.3*  --  8.6*   HEMATOCRIT % 22.2*  --  27.1*   PLATELETS 10*3/mm3 176  --  151   GLUCOSE mg/dL 137*   < > 113*   CREATININE mg/dL 3.47*   < > 2.45*   BUN mg/dL 37*   < > 18   SODIUM mmol/L 130*   < > 131*   POTASSIUM mmol/L 3.9   < > 3.7   AST (SGOT) U/L  --   --  35*   ALT (SGPT) U/L  --   --  21   ALK PHOS U/L  --   --  88   BILIRUBIN mg/dL  --   --  0.3   ANION GAP mmol/L 11.0   < > 5.7    < > = values in this interval not displayed.       CT Angiogram Abdomen Pelvis  Result Date: 5/20/2025  Impression: 1.Moderate focal stenosis at the origin of both renal arteries. 2.Mild aortic atherosclerotic disease. 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 4.Moderate atherosclerotic disease in the common femoral arteries and SFA. 5.Cardiomegaly, coronary artery disease and trace pericardial effusion. 6.No acute abdominal or pelvic abnormality. Electronically Signed: Kamari Renteria MD  5/20/2025 3:52 AM EDT  Workstation ID: [id number]      Interval results reviewed.    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    CT abdomen showed calcific plaque at the origin of renal arteries especially at the origin of left renal artery.  Vascular surgery consulted.  Patient will go for renal artery intervention today as per vascular.     Will continue hydralazine 100 mg 3 times daily, clonidine 0.2 mg 3 times daily, continue amlodipine, chlorthalidone and Lasix.  Blood pressure better.  Added metoprolol XL 25 mg daily yesterday     Hold lisinopril      nephrology following the patient for BP management  Doppler renal arteries nondiagnostic as per renal  Renal ordered renin aldosterone, catecholamines and metanephrines.     Lantus 5 units daily and sliding scale insulin  Blood sugar better.     Hemodialysis Monday Wednesday Friday.        Treatment plan discussed with RN.     VTE Prophylaxis:  Pharmacologic VTE prophylaxis orders are present.         Code status is   Code Status and Medical Interventions: CPR (Attempt to Resuscitate); Full Support   Ordered at: 05/16/25 2028     Code Status (Patient has no pulse and is not breathing):    CPR (Attempt to Resuscitate)     Medical Interventions (Patient has pulse or is breathing):    Full Support       Plan for disposition:     Barriers to Discharge: Possible renal artery stenting versus intervention  Anticipated Date of Discharge: 5/23/25  Place of Discharge: Home         Time: 40 minutes     Signature: Electronically signed by Helder Marcum MD, 05/21/25, 10:14 EDT.  [facility] Hospitalist Team

## 2025-05-21 NOTE — PROGRESS NOTES
"RENAL/KCC:     LOS: 3 days    Patient Care Team:  Brian Nazario APRN as PCP - General  Zane Aldridge MD as Consulting Physician (Cardiology)  Marnie Brandt APRN as Nurse Practitioner (Cardiology)    Chief Complaint:  HTN    Subjective     Interval History:   Chart reviewed  Seen on HD    Objective     Vital Sign Min/Max for last 24 hours  Temp  Min: 97.7 °F (36.5 °C)  Max: 97.8 °F (36.6 °C)   BP  Min: 134/61  Max: 181/66   Pulse  Min: 60  Max: 90   Resp  Min: 9  Max: 20   SpO2  Min: 95 %  Max: 99 %   No data recorded   No data recorded     Flowsheet Rows      Flowsheet Row First Filed Value   Admission Height 165.1 cm (65\") Documented at 05/16/2025 1725   Admission Weight 62.1 kg (136 lb 14.5 oz) Documented at 05/16/2025 1725            I/O this shift:  In: -   Out: 30 [Urine:30]  I/O last 3 completed shifts:  In: 840 [P.O.:840]  Out: -     Physical Exam:  GEN: Awake, NAD  ENT: PERRL, EOMI, MMM  NECK: Supple, no JVD  CHEST: CTAB, no W/R/C  CV: RRR, no M/G/R  ABD: Soft, NT, +BS  SKIN: Warm and Dry  NEURO: CN's intact      WBC WBC   Date Value Ref Range Status   05/21/2025 3.57 3.40 - 10.80 10*3/mm3 Final        HGB Hemoglobin   Date Value Ref Range Status   05/21/2025 7.3 (L) 12.0 - 15.9 g/dL Final        HCT Hematocrit   Date Value Ref Range Status   05/21/2025 22.2 (L) 34.0 - 46.6 % Final        Platlets No results found for: \"LABPLAT\"   MCV MCV   Date Value Ref Range Status   05/21/2025 82.2 79.0 - 97.0 fL Final            Sodium Sodium   Date Value Ref Range Status   05/21/2025 130 (L) 136 - 145 mmol/L Final   05/20/2025 132 (L) 136 - 145 mmol/L Final   05/19/2025 131 (L) 136 - 145 mmol/L Final      Potassium Potassium   Date Value Ref Range Status   05/21/2025 3.9 3.5 - 5.2 mmol/L Final   05/20/2025 3.7 3.5 - 5.2 mmol/L Final   05/19/2025 2.5 (C) 3.5 - 5.2 mmol/L Final   05/19/2025 3.3 (L) 3.5 - 5.2 mmol/L Final      Chloride Chloride   Date Value Ref Range Status   05/21/2025 96 (L) 98 - 107 mmol/L Final " "  05/20/2025 97 (L) 98 - 107 mmol/L Final   05/19/2025 98 98 - 107 mmol/L Final      CO2 CO2   Date Value Ref Range Status   05/21/2025 23.0 22.0 - 29.0 mmol/L Final   05/20/2025 26.7 22.0 - 29.0 mmol/L Final   05/19/2025 23.3 22.0 - 29.0 mmol/L Final      BUN BUN   Date Value Ref Range Status   05/21/2025 37 (H) 8 - 23 mg/dL Final   05/20/2025 22 8 - 23 mg/dL Final   05/19/2025 52 (H) 8 - 23 mg/dL Final      Creatinine Creatinine   Date Value Ref Range Status   05/21/2025 3.47 (H) 0.57 - 1.00 mg/dL Final   05/20/2025 2.51 (H) 0.57 - 1.00 mg/dL Final   05/19/2025 4.36 (H) 0.57 - 1.00 mg/dL Final      Calcium Calcium   Date Value Ref Range Status   05/21/2025 8.2 (L) 8.6 - 10.5 mg/dL Final   05/20/2025 7.9 (L) 8.6 - 10.5 mg/dL Final   05/19/2025 7.7 (L) 8.6 - 10.5 mg/dL Final      PO4 No results found for: \"CAPO4\"   Albumin No results found for: \"ALBUMIN\"     Magnesium No results found for: \"MG\"     Uric Acid No results found for: \"URICACID\"        Results Review:     I reviewed the patient's new clinical results.    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  BP better.  Appreciate Vascular input - for renal artery angiogram today.  Will follow.      Jim Farooq MD  [facility]  05/21/25  08:13 EDT      "

## 2025-05-21 NOTE — PLAN OF CARE
Problem: Adult Inpatient Plan of Care  Goal: Absence of Hospital-Acquired Illness or Injury  Intervention: Identify and Manage Fall Risk  Recent Flowsheet Documentation  Taken 5/21/2025 0401 by Amanda Kent RN  Safety Promotion/Fall Prevention:   assistive device/personal items within reach   clutter free environment maintained   fall prevention program maintained   nonskid shoes/slippers when out of bed   room organization consistent   safety round/check completed  Taken 5/21/2025 0200 by Amanda Kent RN  Safety Promotion/Fall Prevention:   assistive device/personal items within reach   clutter free environment maintained   fall prevention program maintained   nonskid shoes/slippers when out of bed   room organization consistent   safety round/check completed  Taken 5/21/2025 0001 by Amanda Kent RN  Safety Promotion/Fall Prevention:   assistive device/personal items within reach   clutter free environment maintained   fall prevention program maintained   nonskid shoes/slippers when out of bed   room organization consistent   safety round/check completed  Taken 5/20/2025 2200 by Amanda Kent RN  Safety Promotion/Fall Prevention:   assistive device/personal items within reach   clutter free environment maintained   fall prevention program maintained   nonskid shoes/slippers when out of bed   room organization consistent   safety round/check completed  Taken 5/20/2025 2001 by Amanda Kent RN  Safety Promotion/Fall Prevention:   assistive device/personal items within reach   clutter free environment maintained   fall prevention program maintained   nonskid shoes/slippers when out of bed   room organization consistent   safety round/check completed  Intervention: Prevent Skin Injury  Recent Flowsheet Documentation  Taken 5/20/2025 2001 by Amanda Kent RN  Body Position: position changed independently  Skin Protection:   incontinence pads utilized   transparent dressing  maintained  Intervention: Prevent and Manage VTE (Venous Thromboembolism) Risk  Recent Flowsheet Documentation  Taken 5/21/2025 0401 by Amanda Kent RN  VTE Prevention/Management:   patient refused intervention   SCDs (sequential compression devices) off  Taken 5/21/2025 0001 by Amanda Kent RN  VTE Prevention/Management:   patient refused intervention   SCDs (sequential compression devices) off  Taken 5/20/2025 2001 by Amanda Kent RN  VTE Prevention/Management:   SCDs (sequential compression devices) off   patient refused intervention  Intervention: Prevent Infection  Recent Flowsheet Documentation  Taken 5/20/2025 2001 by Amanda Kent RN  Infection Prevention:   environmental surveillance performed   hand hygiene promoted   personal protective equipment utilized   single patient room provided  Goal: Optimal Comfort and Wellbeing  Intervention: Provide Person-Centered Care  Recent Flowsheet Documentation  Taken 5/20/2025 2001 by Amanda Kent RN  Trust Relationship/Rapport:   care explained   choices provided   questions encouraged   reassurance provided     Problem: Skin Injury Risk Increased  Goal: Skin Health and Integrity  Intervention: Optimize Skin Protection  Recent Flowsheet Documentation  Taken 5/20/2025 2001 by Amanda Kent RN  Activity Management: up to bedside commode  Pressure Reduction Techniques: frequent weight shift encouraged  Head of Bed (HOB) Positioning: HOB at 20-30 degrees  Pressure Reduction Devices: pressure-redistributing mattress utilized  Skin Protection:   incontinence pads utilized   transparent dressing maintained     Problem: Comorbidity Management  Goal: Blood Glucose Level Within Target Range  Intervention: Monitor and Manage Glycemia  Recent Flowsheet Documentation  Taken 5/20/2025 2001 by Amanda Kent RN  Medication Review/Management: medications reviewed  Goal: Blood Pressure in Desired Range  Intervention: Maintain Blood Pressure  Management  Recent Flowsheet Documentation  Taken 5/20/2025 2001 by Amanda Kent RN  Medication Review/Management: medications reviewed     Problem: Fall Injury Risk  Goal: Absence of Fall and Fall-Related Injury  Intervention: Identify and Manage Contributors  Recent Flowsheet Documentation  Taken 5/20/2025 2001 by Amanda Kent RN  Medication Review/Management: medications reviewed  Intervention: Promote Injury-Free Environment  Recent Flowsheet Documentation  Taken 5/21/2025 0401 by Amanda Kent RN  Safety Promotion/Fall Prevention:   assistive device/personal items within reach   clutter free environment maintained   fall prevention program maintained   nonskid shoes/slippers when out of bed   room organization consistent   safety round/check completed  Taken 5/21/2025 0200 by Amanda Kent RN  Safety Promotion/Fall Prevention:   assistive device/personal items within reach   clutter free environment maintained   fall prevention program maintained   nonskid shoes/slippers when out of bed   room organization consistent   safety round/check completed  Taken 5/21/2025 0001 by Amanda Kent RN  Safety Promotion/Fall Prevention:   assistive device/personal items within reach   clutter free environment maintained   fall prevention program maintained   nonskid shoes/slippers when out of bed   room organization consistent   safety round/check completed  Taken 5/20/2025 2200 by Amanda Kent RN  Safety Promotion/Fall Prevention:   assistive device/personal items within reach   clutter free environment maintained   fall prevention program maintained   nonskid shoes/slippers when out of bed   room organization consistent   safety round/check completed  Taken 5/20/2025 2001 by Amanda Kent RN  Safety Promotion/Fall Prevention:   assistive device/personal items within reach   clutter free environment maintained   fall prevention program maintained   nonskid shoes/slippers when out of bed   room  organization consistent   safety round/check completed   Goal Outcome Evaluation:

## 2025-05-21 NOTE — ANESTHESIA PREPROCEDURE EVALUATION
Anesthesia Evaluation     Patient summary reviewed and Nursing notes reviewed   no history of anesthetic complications:   NPO Solid Status: > 8 hours  NPO Liquid Status: > 8 hours           Airway   Mallampati: II  TM distance: >3 FB  Neck ROM: full  No difficulty expected  Dental    (+) edentulous    Pulmonary - normal exam   (+) ,shortness of breath  Cardiovascular - normal exam    (+) pacemaker pacemaker, hypertension poorly controlled, PVD, hyperlipidemia      Neuro/Psych- negative ROS  GI/Hepatic/Renal/Endo    (+) renal disease- dialysis and CRI, diabetes mellitus type 2    Musculoskeletal (-) negative ROS    Abdominal  - normal exam   Substance History - negative use     OB/GYN negative ob/gyn ROS         Other                      Anesthesia Plan    ASA 4     general     (Interperter #[id number] translating preop)  intravenous induction     Anesthetic plan, risks, benefits, and alternatives have been provided, discussed and informed consent has been obtained with: patient.    Plan discussed with CRNA.      CODE STATUS:    Code Status (Patient has no pulse and is not breathing): CPR (Attempt to Resuscitate)  Medical Interventions (Patient has pulse or is breathing): Full Support

## 2025-05-22 ENCOUNTER — APPOINTMENT (OUTPATIENT)
Dept: CARDIOLOGY | Facility: HOSPITAL | Age: 85
End: 2025-05-22
Payer: MEDICARE

## 2025-05-22 ENCOUNTER — READMISSION MANAGEMENT (OUTPATIENT)
Dept: CALL CENTER | Facility: HOSPITAL | Age: 85
End: 2025-05-22
Payer: MEDICARE

## 2025-05-22 VITALS
WEIGHT: 137.13 LBS | OXYGEN SATURATION: 97 % | BODY MASS INDEX: 22.85 KG/M2 | DIASTOLIC BLOOD PRESSURE: 55 MMHG | HEIGHT: 65 IN | RESPIRATION RATE: 13 BRPM | TEMPERATURE: 97.7 F | SYSTOLIC BLOOD PRESSURE: 137 MMHG | HEART RATE: 60 BPM

## 2025-05-22 LAB
ANION GAP SERPL CALCULATED.3IONS-SCNC: 10.4 MMOL/L (ref 5–15)
APTT PPP: 31.8 SECONDS (ref 22.7–35.4)
BASOPHILS # BLD AUTO: 0.02 10*3/MM3 (ref 0–0.2)
BASOPHILS NFR BLD AUTO: 0.4 % (ref 0–1.5)
BH BB BLOOD EXPIRATION DATE: NORMAL
BH BB BLOOD TYPE BARCODE: 7300
BH BB DISPENSE STATUS: NORMAL
BH BB PRODUCT CODE: NORMAL
BH BB UNIT NUMBER: NORMAL
BH CV LEFT GROIN PSA PROCEDURE SCRIPTING LRR: 1
BH CV RIGHT GROIN PSA PROCEDURE SCRIPTING LRR: 1
BH CV VAS L EIA VELOCITY PSV: 126 CM/SEC
BH CV XLRA MEAS EXT ILIAC A PSV RIGHT: 145 CM/SEC
BUN SERPL-MCNC: 27 MG/DL (ref 8–23)
BUN/CREAT SERPL: 9.4 (ref 7–25)
CALCIUM SPEC-SCNC: 8.1 MG/DL (ref 8.6–10.5)
CHLORIDE SERPL-SCNC: 95 MMOL/L (ref 98–107)
CO2 SERPL-SCNC: 25.6 MMOL/L (ref 22–29)
CREAT SERPL-MCNC: 2.86 MG/DL (ref 0.57–1)
CROSSMATCH INTERPRETATION: NORMAL
DEPRECATED RDW RBC AUTO: 46.5 FL (ref 37–54)
EGFRCR SERPLBLD CKD-EPI 2021: 15.8 ML/MIN/1.73
EOSINOPHIL # BLD AUTO: 0.01 10*3/MM3 (ref 0–0.4)
EOSINOPHIL NFR BLD AUTO: 0.2 % (ref 0.3–6.2)
ERYTHROCYTE [DISTWIDTH] IN BLOOD BY AUTOMATED COUNT: 15.6 % (ref 12.3–15.4)
GLUCOSE BLDC GLUCOMTR-MCNC: 109 MG/DL (ref 70–105)
GLUCOSE BLDC GLUCOMTR-MCNC: 64 MG/DL (ref 70–105)
GLUCOSE BLDC GLUCOMTR-MCNC: 92 MG/DL (ref 70–105)
GLUCOSE SERPL-MCNC: 130 MG/DL (ref 65–99)
HCT VFR BLD AUTO: 26.8 % (ref 34–46.6)
HGB BLD-MCNC: 8.9 G/DL (ref 12–15.9)
IMM GRANULOCYTES # BLD AUTO: 0.02 10*3/MM3 (ref 0–0.05)
IMM GRANULOCYTES NFR BLD AUTO: 0.4 % (ref 0–0.5)
INR PPP: 1.04 (ref 0.9–1.1)
LEFT GROIN CFA SYS: 140 CM/SEC
LYMPHOCYTES # BLD AUTO: 1.02 10*3/MM3 (ref 0.7–3.1)
LYMPHOCYTES NFR BLD AUTO: 20 % (ref 19.6–45.3)
MCH RBC QN AUTO: 27.5 PG (ref 26.6–33)
MCHC RBC AUTO-ENTMCNC: 33.2 G/DL (ref 31.5–35.7)
MCV RBC AUTO: 82.7 FL (ref 79–97)
METANEPH FREE SERPL-MCNC: <25 PG/ML (ref 0–88)
MONOCYTES # BLD AUTO: 0.26 10*3/MM3 (ref 0.1–0.9)
MONOCYTES NFR BLD AUTO: 5.1 % (ref 5–12)
NEUTROPHILS NFR BLD AUTO: 3.76 10*3/MM3 (ref 1.7–7)
NEUTROPHILS NFR BLD AUTO: 73.9 % (ref 42.7–76)
NORMETANEPHRINE SERPL-MCNC: 70.3 PG/ML (ref 0–297.2)
NRBC BLD AUTO-RTO: 0 /100 WBC (ref 0–0.2)
PLATELET # BLD AUTO: 187 10*3/MM3 (ref 140–450)
PMV BLD AUTO: 10.8 FL (ref 6–12)
POTASSIUM SERPL-SCNC: 4.3 MMOL/L (ref 3.5–5.2)
PROTHROMBIN TIME: 13.5 SECONDS (ref 11.7–14.2)
PROX PFA PSV LEFT: 83 CM/SEC
PROX PFA PSV RIGHT: 136 CM/SEC
PROX SFA PSV LEFT: 93 CM/SEC
PROX SFA PSV RIGHT: 183 CM/SEC
RBC # BLD AUTO: 3.24 10*6/MM3 (ref 3.77–5.28)
RIGHT GROIN CFA SYS: 156 CM/SEC
SODIUM SERPL-SCNC: 131 MMOL/L (ref 136–145)
UNIT  ABO: NORMAL
UNIT  RH: NORMAL
WBC NRBC COR # BLD AUTO: 5.09 10*3/MM3 (ref 3.4–10.8)

## 2025-05-22 PROCEDURE — 82948 REAGENT STRIP/BLOOD GLUCOSE: CPT

## 2025-05-22 PROCEDURE — 85610 PROTHROMBIN TIME: CPT | Performed by: STUDENT IN AN ORGANIZED HEALTH CARE EDUCATION/TRAINING PROGRAM

## 2025-05-22 PROCEDURE — 85025 COMPLETE CBC W/AUTO DIFF WBC: CPT | Performed by: STUDENT IN AN ORGANIZED HEALTH CARE EDUCATION/TRAINING PROGRAM

## 2025-05-22 PROCEDURE — 85730 THROMBOPLASTIN TIME PARTIAL: CPT | Performed by: STUDENT IN AN ORGANIZED HEALTH CARE EDUCATION/TRAINING PROGRAM

## 2025-05-22 PROCEDURE — 93925 LOWER EXTREMITY STUDY: CPT | Performed by: SURGERY

## 2025-05-22 PROCEDURE — 63710000001 INSULIN GLARGINE PER 5 UNITS: Performed by: STUDENT IN AN ORGANIZED HEALTH CARE EDUCATION/TRAINING PROGRAM

## 2025-05-22 PROCEDURE — 80048 BASIC METABOLIC PNL TOTAL CA: CPT | Performed by: STUDENT IN AN ORGANIZED HEALTH CARE EDUCATION/TRAINING PROGRAM

## 2025-05-22 PROCEDURE — 93925 LOWER EXTREMITY STUDY: CPT

## 2025-05-22 PROCEDURE — 99232 SBSQ HOSP IP/OBS MODERATE 35: CPT | Performed by: NURSE PRACTITIONER

## 2025-05-22 PROCEDURE — 82948 REAGENT STRIP/BLOOD GLUCOSE: CPT | Performed by: STUDENT IN AN ORGANIZED HEALTH CARE EDUCATION/TRAINING PROGRAM

## 2025-05-22 RX ORDER — CLOPIDOGREL BISULFATE 75 MG/1
75 TABLET ORAL DAILY
Qty: 30 TABLET | Refills: 0 | Status: SHIPPED | OUTPATIENT
Start: 2025-05-23 | End: 2025-06-22

## 2025-05-22 RX ORDER — CLONIDINE HYDROCHLORIDE 0.1 MG/1
0.1 TABLET ORAL EVERY 12 HOURS SCHEDULED
Qty: 60 TABLET | Refills: 0 | Status: SHIPPED | OUTPATIENT
Start: 2025-05-22 | End: 2025-06-21

## 2025-05-22 RX ORDER — CLONIDINE HYDROCHLORIDE 0.1 MG/1
0.1 TABLET ORAL EVERY 12 HOURS SCHEDULED
Status: DISCONTINUED | OUTPATIENT
Start: 2025-05-22 | End: 2025-05-22 | Stop reason: HOSPADM

## 2025-05-22 RX ORDER — METOPROLOL SUCCINATE 25 MG/1
25 TABLET, EXTENDED RELEASE ORAL DAILY
Qty: 30 TABLET | Refills: 0 | Status: SHIPPED | OUTPATIENT
Start: 2025-05-22 | End: 2025-06-21

## 2025-05-22 RX ADMIN — POTASSIUM CHLORIDE 40 MEQ: 1500 TABLET, EXTENDED RELEASE ORAL at 09:19

## 2025-05-22 RX ADMIN — METOPROLOL SUCCINATE 25 MG: 25 TABLET, EXTENDED RELEASE ORAL at 09:18

## 2025-05-22 RX ADMIN — CHLORTHALIDONE 25 MG: 25 TABLET ORAL at 09:19

## 2025-05-22 RX ADMIN — CLONIDINE HYDROCHLORIDE 0.2 MG: 0.1 TABLET ORAL at 05:02

## 2025-05-22 RX ADMIN — CLOPIDOGREL BISULFATE 75 MG: 75 TABLET, FILM COATED ORAL at 09:18

## 2025-05-22 RX ADMIN — AMLODIPINE BESYLATE 10 MG: 5 TABLET ORAL at 09:19

## 2025-05-22 RX ADMIN — ASPIRIN 81 MG CHEWABLE TABLET 81 MG: 81 TABLET CHEWABLE at 09:18

## 2025-05-22 RX ADMIN — FUROSEMIDE 40 MG: 40 TABLET ORAL at 09:19

## 2025-05-22 RX ADMIN — INSULIN GLARGINE 5 UNITS: 100 INJECTION, SOLUTION SUBCUTANEOUS at 09:18

## 2025-05-22 RX ADMIN — Medication 10 ML: at 09:22

## 2025-05-22 RX ADMIN — HYDRALAZINE HYDROCHLORIDE 100 MG: 25 TABLET ORAL at 09:19

## 2025-05-22 RX ADMIN — ATORVASTATIN CALCIUM 80 MG: 40 TABLET, FILM COATED ORAL at 09:18

## 2025-05-22 NOTE — PLAN OF CARE
Problem: Adult Inpatient Plan of Care  Goal: Absence of Hospital-Acquired Illness or Injury  Intervention: Identify and Manage Fall Risk  Recent Flowsheet Documentation  Taken 5/22/2025 0400 by Amanda Kent RN  Safety Promotion/Fall Prevention:   assistive device/personal items within reach   clutter free environment maintained   nonskid shoes/slippers when out of bed   room organization consistent   safety round/check completed  Taken 5/22/2025 0200 by Amanda Kent RN  Safety Promotion/Fall Prevention:   assistive device/personal items within reach   clutter free environment maintained   nonskid shoes/slippers when out of bed   room organization consistent   safety round/check completed  Taken 5/22/2025 0001 by Amanda Kent RN  Safety Promotion/Fall Prevention:   assistive device/personal items within reach   clutter free environment maintained   nonskid shoes/slippers when out of bed   room organization consistent   safety round/check completed  Taken 5/21/2025 2200 by Amanda Kent RN  Safety Promotion/Fall Prevention:   assistive device/personal items within reach   clutter free environment maintained   nonskid shoes/slippers when out of bed   room organization consistent   safety round/check completed  Taken 5/21/2025 2001 by Amanda Kent RN  Safety Promotion/Fall Prevention:   assistive device/personal items within reach   clutter free environment maintained   fall prevention program maintained   nonskid shoes/slippers when out of bed   room organization consistent   safety round/check completed  Intervention: Prevent Skin Injury  Recent Flowsheet Documentation  Taken 5/21/2025 2001 by Amanda Kent RN  Body Position: position changed independently  Skin Protection:   incontinence pads utilized   transparent dressing maintained  Intervention: Prevent and Manage VTE (Venous Thromboembolism) Risk  Recent Flowsheet Documentation  Taken 5/22/2025 0400 by Amanda Kent RN  VTE  Prevention/Management:   SCDs (sequential compression devices) off   patient refused intervention  Taken 5/22/2025 0001 by Amanda Kent RN  VTE Prevention/Management:   SCDs (sequential compression devices) off   patient refused intervention  Taken 5/21/2025 2001 by Amanda Kent RN  VTE Prevention/Management:   SCDs (sequential compression devices) off   patient refused intervention  Intervention: Prevent Infection  Recent Flowsheet Documentation  Taken 5/22/2025 0400 by Amanda Kent RN  Infection Prevention:   environmental surveillance performed   hand hygiene promoted   personal protective equipment utilized   single patient room provided  Taken 5/22/2025 0200 by Amanda Kent RN  Infection Prevention:   environmental surveillance performed   hand hygiene promoted   personal protective equipment utilized   single patient room provided  Taken 5/22/2025 0001 by Amanda Kent RN  Infection Prevention:   environmental surveillance performed   hand hygiene promoted   personal protective equipment utilized   single patient room provided  Taken 5/21/2025 2200 by Amanda Kent RN  Infection Prevention:   environmental surveillance performed   hand hygiene promoted   personal protective equipment utilized   single patient room provided  Taken 5/21/2025 2001 by Amanda Kent RN  Infection Prevention:   environmental surveillance performed   hand hygiene promoted   personal protective equipment utilized   single patient room provided  Goal: Optimal Comfort and Wellbeing  Intervention: Provide Person-Centered Care  Recent Flowsheet Documentation  Taken 5/21/2025 2001 by Amanda Kent RN  Trust Relationship/Rapport:   care explained   choices provided   questions answered   questions encouraged   reassurance provided     Problem: Skin Injury Risk Increased  Goal: Skin Health and Integrity  Intervention: Optimize Skin Protection  Recent Flowsheet Documentation  Taken 5/21/2025 2043 by Donte  Amanda MEJIA RN  Activity Management:   ambulated in room   ambulated to bathroom   back to bed   activity encouraged  Taken 5/21/2025 2001 by Amanda Kent RN  Activity Management: ambulated in room  Pressure Reduction Techniques: frequent weight shift encouraged  Head of Bed (HOB) Positioning: HOB at 30-45 degrees  Pressure Reduction Devices: pressure-redistributing mattress utilized  Skin Protection:   incontinence pads utilized   transparent dressing maintained     Problem: Comorbidity Management  Goal: Blood Glucose Level Within Target Range  Intervention: Monitor and Manage Glycemia  Recent Flowsheet Documentation  Taken 5/21/2025 2001 by Amanda Kent RN  Medication Review/Management: medications reviewed  Goal: Blood Pressure in Desired Range  Intervention: Maintain Blood Pressure Management  Recent Flowsheet Documentation  Taken 5/21/2025 2001 by Amanda Kent RN  Medication Review/Management: medications reviewed     Problem: Fall Injury Risk  Goal: Absence of Fall and Fall-Related Injury  Intervention: Identify and Manage Contributors  Recent Flowsheet Documentation  Taken 5/21/2025 2001 by Amanda Kent RN  Medication Review/Management: medications reviewed  Intervention: Promote Injury-Free Environment  Recent Flowsheet Documentation  Taken 5/22/2025 0400 by Amanda Kent RN  Safety Promotion/Fall Prevention:   assistive device/personal items within reach   clutter free environment maintained   nonskid shoes/slippers when out of bed   room organization consistent   safety round/check completed  Taken 5/22/2025 0200 by Amanda Kent RN  Safety Promotion/Fall Prevention:   assistive device/personal items within reach   clutter free environment maintained   nonskid shoes/slippers when out of bed   room organization consistent   safety round/check completed  Taken 5/22/2025 0001 by Amanda Kent RN  Safety Promotion/Fall Prevention:   assistive device/personal items within reach    clutter free environment maintained   nonskid shoes/slippers when out of bed   room organization consistent   safety round/check completed  Taken 5/21/2025 2200 by Amanda Kent, RN  Safety Promotion/Fall Prevention:   assistive device/personal items within reach   clutter free environment maintained   nonskid shoes/slippers when out of bed   room organization consistent   safety round/check completed  Taken 5/21/2025 2001 by Amanda Kent, RN  Safety Promotion/Fall Prevention:   assistive device/personal items within reach   clutter free environment maintained   fall prevention program maintained   nonskid shoes/slippers when out of bed   room organization consistent   safety round/check completed   Goal Outcome Evaluation:

## 2025-05-22 NOTE — ANESTHESIA POSTPROCEDURE EVALUATION
Patient: Brad Woodward    Procedure Summary       Date: 05/21/25 Room / Location: [facility] OR  / [facility] HYBRID OR    Anesthesia Start: 1127 Anesthesia Stop: 1333    Procedure: LEFT RENAL ARTERY ANGIOPLASTY WITH STENT (Left: Thigh) Diagnosis:     Surgeons: eTd Mckee II, MD Provider: Hudson Avila MD    Anesthesia Type: general, [place]ia ASA Status: 4            Anesthesia Type: general, [place]    Vitals  Vitals Value Taken Time   /60 05/21/25 14:52   Temp 97.6 °F (36.4 °C) 05/21/25 14:49   Pulse 60 05/21/25 14:53   Resp 10 05/21/25 14:49   SpO2 98 % 05/21/25 14:53   Vitals shown include unfiled device data.        Post Anesthesia Care and Evaluation    Patient location during evaluation: PACU  Patient participation: complete - patient participated  Level of consciousness: awake  Pain scale: See nurse's notes for pain score.  Pain management: adequate    Airway patency: patent  Anesthetic complications: No anesthetic complications  PONV Status: none  Cardiovascular status: acceptable  Respiratory status: acceptable and spontaneous ventilation  Hydration status: acceptable    Comments: Patient seen and examined postoperatively; vital signs stable; SpO2 greater than or equal to 90%; cardiopulmonary status stable; nausea/vomiting adequately controlled; pain adequately controlled; no apparent anesthesia complications; patient discharged from anesthesia care when discharge criteria were met

## 2025-05-22 NOTE — OUTREACH NOTE
Prep Survey      Flowsheet Row Responses   [facility] facility patient discharged from? [facility]   Is LACE score < 7 ? No   Eligibility Readm Mgmt   Discharge diagnosis Hypertensive urgency-Left renal artery angioplasty with stent   Does the patient have one of the following disease processes/diagnoses(primary or secondary)? Other   Does the patient have Home health ordered? No   Is there a DME ordered? No   General alerts for this patient HD   Prep survey completed? Yes            AHMET FRAUSTO - Registered Nurse

## 2025-05-22 NOTE — PROGRESS NOTES
"RENAL/KCC:     LOS: 4 days    Patient Care Team:  Brian Nazario APRN as PCP - General  Zane Aldridge MD as Consulting Physician (Cardiology)  Marnie Brandt APRN as Nurse Practitioner (Cardiology)    Chief Complaint:  HTN    Subjective     Interval History:   Chart reviewed  S/P HD yesterday  S/P renal artery angiogram and L stent placement  BP stable    Objective     Vital Sign Min/Max for last 24 hours  Temp  Min: 97.1 °F (36.2 °C)  Max: 99.1 °F (37.3 °C)   BP  Min: 97/41  Max: 212/78   Pulse  Min: 60  Max: 66   Resp  Min: 8  Max: 16   SpO2  Min: 93 %  Max: 100 %   Flow (L/min) (Oxygen Therapy)  Min: 2  Max: 6   Weight  Min: 62.2 kg (137 lb 2 oz)  Max: 62.2 kg (137 lb 2 oz)     Flowsheet Rows      Flowsheet Row First Filed Value   Admission Height 165.1 cm (65\") Documented at 05/16/2025 1725   Admission Weight 62.1 kg (136 lb 14.5 oz) Documented at 05/16/2025 1725            No intake/output data recorded.  I/O last 3 completed shifts:  In: 1167 [P.O.:480; I.V.:305.7; Blood:381.3]  Out: 2030 [Urine:30]    Physical Exam:  GEN: Awake, NAD  ENT: PERRL, EOMI, MMM  NECK: Supple, no JVD  CHEST: CTAB, no W/R/C  CV: RRR, no M/G/R  ABD: Soft, NT, +BS  SKIN: Warm and Dry  NEURO: CN's intact      WBC WBC   Date Value Ref Range Status   05/22/2025 5.09 3.40 - 10.80 10*3/mm3 Final   05/21/2025 3.57 3.40 - 10.80 10*3/mm3 Final        HGB Hemoglobin   Date Value Ref Range Status   05/22/2025 8.9 (L) 12.0 - 15.9 g/dL Final   05/21/2025 7.3 (L) 12.0 - 15.9 g/dL Final        HCT Hematocrit   Date Value Ref Range Status   05/22/2025 26.8 (L) 34.0 - 46.6 % Final   05/21/2025 22.2 (L) 34.0 - 46.6 % Final        Platlets No results found for: \"LABPLAT\"   MCV MCV   Date Value Ref Range Status   05/22/2025 82.7 79.0 - 97.0 fL Final   05/21/2025 82.2 79.0 - 97.0 fL Final            Sodium Sodium   Date Value Ref Range Status   05/22/2025 131 (L) 136 - 145 mmol/L Final   05/21/2025 130 (L) 136 - 145 mmol/L Final   05/20/2025 132 (L) " "136 - 145 mmol/L Final      Potassium Potassium   Date Value Ref Range Status   05/22/2025 4.3 3.5 - 5.2 mmol/L Final   05/21/2025 3.9 3.5 - 5.2 mmol/L Final   05/20/2025 3.7 3.5 - 5.2 mmol/L Final   05/19/2025 2.5 (C) 3.5 - 5.2 mmol/L Final      Chloride Chloride   Date Value Ref Range Status   05/22/2025 95 (L) 98 - 107 mmol/L Final   05/21/2025 96 (L) 98 - 107 mmol/L Final   05/20/2025 97 (L) 98 - 107 mmol/L Final      CO2 CO2   Date Value Ref Range Status   05/22/2025 25.6 22.0 - 29.0 mmol/L Final   05/21/2025 23.0 22.0 - 29.0 mmol/L Final   05/20/2025 26.7 22.0 - 29.0 mmol/L Final      BUN BUN   Date Value Ref Range Status   05/22/2025 27 (H) 8 - 23 mg/dL Final   05/21/2025 37 (H) 8 - 23 mg/dL Final   05/20/2025 22 8 - 23 mg/dL Final      Creatinine Creatinine   Date Value Ref Range Status   05/22/2025 2.86 (H) 0.57 - 1.00 mg/dL Final   05/21/2025 3.47 (H) 0.57 - 1.00 mg/dL Final   05/20/2025 2.51 (H) 0.57 - 1.00 mg/dL Final      Calcium Calcium   Date Value Ref Range Status   05/22/2025 8.1 (L) 8.6 - 10.5 mg/dL Final   05/21/2025 8.2 (L) 8.6 - 10.5 mg/dL Final   05/20/2025 7.9 (L) 8.6 - 10.5 mg/dL Final      PO4 No results found for: \"CAPO4\"   Albumin No results found for: \"ALBUMIN\"     Magnesium No results found for: \"MG\"     Uric Acid No results found for: \"URICACID\"        Results Review:     I reviewed the patient's new clinical results.    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  BP stable.  Appreciate Vascular - s/p renal artery angiogram and L stent placement.  Vascular checking for pseudoaneuryms today.  Once cleared by all, OK for D/C on current Rx regimen.  Will follow.      Jim Farooq MD  [facility]  05/22/25  09:25 EDT

## 2025-05-22 NOTE — PLAN OF CARE
Goal Outcome Evaluation:   Pt discharged to home. Belongings sent with pt. Follow-up appts scheduled. Medications brought to bedside by pharmacy tech. Dialysis cath capped and intact. Discharge instruction provided to pt and family. Understanding verbalized.

## 2025-05-22 NOTE — CASE MANAGEMENT/SOCIAL WORK
Continued Stay Note  [facility]     Patient Name: Brad Woodward  MRN: [id number]  Today's Date: 5/22/2025    Admit Date: 5/16/2025    Plan: Anticipate routine home with son and family. Current OP HD MWF [facility].   Discharge Plan       Row Name 05/22/25 1354       Plan    Plan Comments Barrier to D/C: renal angioplasty yesterday, vascular following, monitoring hgb (hgb 8.9).                      Expected Discharge Date and Time       Expected Discharge Date Expected Discharge Time    May 23, 2025           Phone communication or documentation only - no physical contact with patient or family.     MAXINE WaldronN, [place] [address]    Office: [phone]  Fax: [phone]

## 2025-05-22 NOTE — CASE MANAGEMENT/SOCIAL WORK
Discharge Planning Assessment  [facility]     Patient Name: Brad Woodward  MRN: [id number]  Today's Date: 5/22/2025    Admit Date: 5/16/2025    Plan: Anticipate routine home with son and family. Current OP HD MWF [facility].       Discharge Plan       Row Name 05/22/25 1531       Plan    Plan Comments CM met with patient and family at bedside. 2nd IMM letter reviewed with family, verbal consent and copy left at bedside. Denies any dc needs at this time.    Final Discharge Disposition Code 01 - home or self-care    Final Note Home with family                     Expected Discharge Date and Time       Expected Discharge Date Expected Discharge Time    May 22, 2025                Patient Forms       Row Name 05/22/25 1534       Patient Forms    Important Message from Medicare (IMM) Delivered  5/22/25 2nd IMM reviewed    Delivered to Support person    Method of delivery In person                  Case Management Discharge Note      Final Note: Home with family    Provided Post Acute Provider List?: N/A  Provided Post Acute Provider Quality & Resource List?: N/A    Selected Continued Care - Admitted Since 5/16/2025           Dialysis/Infusion Coordination complete.      Service Provider Services Address Phone Fax Patient Preferred    [facility] [address] [place] [id number] [phone] -- Yes                 Transportation Services  Private: Car    Final Discharge Disposition Code: 01 - home or self-care    Met with patient in room.  Maintained distance greater than six feet and spent less than 15 minutes in the room.   MAXINE WaldronN, RN, CCM    [address] [place] [address]    Office: [phone]  Fax: [phone]

## 2025-05-22 NOTE — PROGRESS NOTES
[facility] Vascular Surgery Progress Note    Name: Brad Woodward ADMIT: 2025   : 1940  PCP: Brian Nazario APRN    MRN: [id number] LOS: 4 days   AGE/SEX: 84 y.o. female  ROOM:    [facility]    CC: Postop; status post left renal artery stent angioplasty on     Subjective     Patient resting in bed.  Family at bedside.  Denies any issues with her groin sites overnight.  Denies any pain to her groins or lower extremities.   iPad was used for the entirety of our visit.    Objective     Scheduled Medications:   amLODIPine, 10 mg, Oral, Q24H  aspirin, 81 mg, Oral, Daily  aspirin, 81 mg, Oral, Daily  atorvastatin, 80 mg, Oral, Daily  chlorthalidone, 25 mg, Oral, Daily  cloNIDine, 0.2 mg, Oral, Q8H  clopidogrel, 75 mg, Oral, Daily  furosemide, 40 mg, Oral, Daily  hydrALAZINE, 100 mg, Oral, TID  insulin glargine, 5 Units, Subcutaneous, Daily  insulin lispro, 2-7 Units, Subcutaneous, 4x Daily AC & at Bedtime  metoprolol succinate XL, 25 mg, Oral, Q24H  potassium chloride, 40 mEq, Oral, Daily  sodium chloride, 10 mL, Intravenous, Q12H        Active Infusions:       As Needed Medications:    acetaminophen **OR** acetaminophen **OR** acetaminophen    senna-docusate sodium **AND** polyethylene glycol **AND** bisacodyl **AND** bisacodyl    Calcium Replacement - Follow Nurse / BPA Driven Protocol    dextrose    dextrose    glucagon (human recombinant)    heparin (porcine)    hydrALAZINE    hydrALAZINE    Magnesium Standard Dose Replacement - Follow Nurse / BPA Driven Protocol    nitroglycerin    Phosphorus Replacement - Follow Nurse / BPA Driven Protocol    Potassium Replacement - Follow Nurse / BPA Driven Protocol    [COMPLETED] Insert Peripheral IV **AND** sodium chloride    sodium chloride    sodium chloride    Vital Signs  Vitals:    25 0800   BP: 151/76   Pulse: 60   Resp:    Temp: 98.1 °F (36.7 °C)   SpO2: 94%      Body mass index is 22.82 kg/m².     Physical Exam:  NAD, alert and  oriented  RRR  Lungs clear  Abd soft, benign  Vascular: Palpable bilateral radial and pedal pulses  Skin: Right chest TDC  Bilateral groin access sites are CDI and soft, no signs of hematoma    Results Review:     CBC    Results from last 7 days   Lab Units 05/22/25  0045 05/21/25  0142 05/17/25  0135 05/16/25  1805   WBC 10*3/mm3 5.09 3.57 4.24 3.19*   HEMOGLOBIN g/dL 8.9* 7.3* 8.6* 8.2*   PLATELETS 10*3/mm3 187 176 151 146     BMP   Results from last 7 days   Lab Units 05/22/25  0045 05/21/25  0142 05/20/25  0035 05/19/25  1803 05/19/25  0452 05/18/25  0403 05/17/25  0135 05/16/25  1805   SODIUM mmol/L 131* 130* 132*  --  131* 135* 131* 131*   POTASSIUM mmol/L 4.3 3.9 3.7 2.5* 3.3* 3.7 3.7 3.7   CHLORIDE mmol/L 95* 96* 97*  --  98 99 98 97*   CO2 mmol/L 25.6 23.0 26.7  --  23.3 24.4 27.3 27.9   BUN mg/dL 27* 37* 22  --  52* 37* 18 13   CREATININE mg/dL 2.86* 3.47* 2.51*  --  4.36* 3.67* 2.45* 2.06*   GLUCOSE mg/dL 130* 137* 217*  --  183* 106* 113* 193*   MAGNESIUM mg/dL  --   --   --   --   --   --  1.7  --    PHOSPHORUS mg/dL  --   --   --   --   --   --  3.3  --      Coag   Results from last 7 days   Lab Units 05/22/25 0045   INR  1.04   APTT seconds 31.8     HbA1C   Lab Results   Component Value Date    HGBA1C 5.40 04/15/2025    HGBA1C 5.9 (H) 04/16/2021     Infection     Radiology(recent) No radiology results for the last day      VTE Prophylaxis:  Pharmacologic & mechanical VTE prophylaxis orders are present.         Problems:    Active Hospital Problems:  Active Hospital Problems    Diagnosis  POA    **Hypertensive urgency [I16.0]  Yes    Renal artery stenosis [I70.1]  Yes      Resolved Hospital Problems   No resolved problems to display.        Assessment & Plan   Assessment / Plan     Hypertensive urgency    Renal artery stenosis    5/21/2025 -left renal artery stent angioplasty by Dr. Mckee    POD 1  VSS, labs stable  Pain is controlled  Bilateral groin access sites are CDI and soft, no signs of  hematoma, after procedure there was questionable pseudoaneurysm of the left groin access site, will obtain pseudoaneurysm duplex of the bilateral groins this morning  Continue aspirin and statin  Hemodialysis per nephrology, continue to use TDC, vein mapping obtained and reviewed, patient is a candidate for a left brachiobasilic fistula, will have nursing remove IVs from the left arm and protect left arm from blood pressure and venipuncture, limb alert bracelet to be placed    ADDENDUM:  Pseudoaneurysm duplex shows no evidence of pseudoaneurysm or AVF in bilateral groin. Okay to discharge home from a vascular standpoint. Will plan for postop visit with Dr. Mckee in 2 weeks. Will discuss AVF creation with pt at that time. DC instructions have been placed.        CECE Stout  [facility] Vascular Surgery  05/22/25   O: [phone]  F: [phone]

## 2025-05-22 NOTE — DISCHARGE SUMMARY
"[facility] Medicine Services  Discharge Summary    Date of Service: 2025  Patient Name: Brad Woodward  : 1940  MRN: [id number]    Date of Admission: 2025  Discharge Diagnosis: Hypertensive urgency  Date of Discharge: 2025  Primary Care Physician: Brian Nazario APRN    Presenting Problem:   Hypertensive urgency [I16.0]    Active and Resolved Hospital Problems:  Active Hospital Problems    Diagnosis POA    **Hypertensive urgency [I16.0] Yes    Renal artery stenosis [I70.1] Yes      Resolved Hospital Problems   No resolved problems to display.         Hospital Course     HPI:  \"Brad Woodward is a 84 y.o. female with a CMH of end-stage renal disease on dialysis, hypertension, hyperlipidemia, type 2 diabetes who presented to [facility] on 2025 with uncontrolled hypertension.     Patient is currently awake and alert, with her grandson at bedside assisting with translation. The grandson reports that the patient has had poorly controlled blood pressure over the past 2 to 3 days, prompting presentation to the hospital for further evaluation. patient denies chest pain, shortness of breath, fever, or chills. According to the grandson, she has Stage 5 kidney disease, is followed by nephrology outpatient, and is in the process of initiating peritoneal dialysis. She was recently started on dialysis but continues to make urine.     In the ED, vitals notable for blood pressure in 200s over 100s, afebrile, CMP remarkable for sodium of 131, creatinine of 2.06, bicarb of 27.9, WBC of 3.1, hemoglobin 8.8, platelets 146, MCV normal, EKG remarkable for normal sinus rhythm prolonged QTc of 514, right bundle block no remarkable changes from EKG obtained on 4/15/2025.  Patient was given a dose of hydralazine 20 IV, her blood pressure did not improve, admitted to medicine team for further inpatient management.\"    Hospital Course:  Patient was admitted for hypertensive emergency CT head was " negative she was started on Cardene drip and continued her amlodipine chlorthalidone and Lasix her lisinopril was held.  Nephrology was consulted.  CT abdomen was also ordered which showed 1.8 cm hepatic lesion likely an hemangioma and plaquing of the renal arteries.  Vascular was consulted and patient underwent angiogram of renal arteries and then later she underwent intervention for renal artery stenosis.  There was risk of pseudoaneurysm lateral groin duplex ultrasound was obtained which did not show any pseudoaneurysm or aVF and bilateral groin patient okay to get discharge.  Her blood pressure medicine was adjusted and clonidine was decreased to 0.1 mg twice daily, patient will continue amlodipine 10 mg daily she was also started on Plavix 75 mg daily her chlorthalidone was discontinued she will continue hydralazine 100 mg 3 times a day and metoprolol XL 25 mg once daily.  Patient needs to follow-up with PCP, renal and vascular surgery.  Patient wanted to go home and stable for discharge.  DISCHARGE Follow Up Recommendations for labs and diagnostics:   Follow-up with PCP, renal, vascular surgery    Reasons For Change In Medications and Indications for New Medications:  Plavix 75 mg once daily  Hydralazine 100 mg 3 times a day  Metoprolol succinate XL 25 mg once daily    Hold chlorthalidone and furosemide    Day of Discharge     Vital Signs:  Temp:  [97.1 °F (36.2 °C)-99.1 °F (37.3 °C)] 97.7 °F (36.5 °C)  Heart Rate:  [60-66] 60  Resp:  [10-16] 13  BP: ()/(39-76) 137/55  Flow (L/min) (Oxygen Therapy):  [2] 2    Physical Exam:  Vitals and nursing note reviewed.   Constitutional:       Appearance: Normal appearance.   HENT:      Head: Normocephalic and atraumatic.   Cardiovascular:      Rate and Rhythm: Normal rate and rhythm.      Heart sounds: Normal heart sounds.   Pulmonary:      Effort: Pulmonary effort is normal.      Breath sounds: Decreased breath sounds.   Abdominal:      Palpations: Abdomen is  soft.   Musculoskeletal:      Cervical back: Neck supple.   Neurological:      Mental Status: Mental status is at baseline.     Pertinent  and/or Most Recent Results     LAB RESULTS:      Lab 05/22/25  0045 05/21/25  0142 05/17/25  0135 05/16/25  1805   WBC 5.09 3.57 4.24 3.19*   HEMOGLOBIN 8.9* 7.3* 8.6* 8.2*   HEMATOCRIT 26.8* 22.2* 27.1* 26.2*   PLATELETS 187 176 151 146   NEUTROS ABS 3.76  --  2.11 1.67*   IMMATURE GRANS (ABS) 0.02  --  0.01 0.01   LYMPHS ABS 1.02  --  1.32 0.96   MONOS ABS 0.26  --  0.46 0.34   EOS ABS 0.01  --  0.31 0.19   MCV 82.7 82.2 82.9 84.5   PROTIME 13.5  --   --   --    APTT 31.8  --   --   --          Lab 05/22/25  0045 05/21/25  0142 05/20/25  0035 05/19/25  1803 05/19/25  0452 05/18/25  0403 05/17/25  0135 05/16/25  1942   SODIUM 131* 130* 132*  --  131* 135* 131*  --    POTASSIUM 4.3 3.9 3.7 2.5* 3.3* 3.7 3.7  --    CHLORIDE 95* 96* 97*  --  98 99 98  --    CO2 25.6 23.0 26.7  --  23.3 24.4 27.3  --    ANION GAP 10.4 11.0 8.3  --  9.7 11.6 5.7  --    BUN 27* 37* 22  --  52* 37* 18  --    CREATININE 2.86* 3.47* 2.51*  --  4.36* 3.67* 2.45*  --    EGFR 15.8* 12.5* 18.4*  --  9.5* 11.7* 19.0*  --    GLUCOSE 130* 137* 217*  --  183* 106* 113*  --    CALCIUM 8.1* 8.2* 7.9*  --  7.7* 8.3* 8.3*  --    MAGNESIUM  --   --   --   --   --   --  1.7  --    PHOSPHORUS  --   --   --   --   --   --  3.3  --    TSH  --   --   --   --   --   --   --  2.520         Lab 05/17/25  0135 05/16/25  1805   TOTAL PROTEIN 6.0 5.7*   ALBUMIN 3.2* 3.1*   GLOBULIN 2.8 2.6   ALT (SGPT) 21 23   AST (SGOT) 35* 36*   BILIRUBIN 0.3 0.3   ALK PHOS 88 85         Lab 05/22/25  0045 05/16/25  1942 05/16/25  1805   HSTROP T  --  60* 58*   PROTIME 13.5  --   --    INR 1.04  --   --              Lab 05/21/25  1544   ABO TYPING B   RH TYPING Positive   ANTIBODY SCREEN Negative         Brief Urine Lab Results  (Last result in the past 365 days)        Color   Clarity   Blood   Leuk Est   Nitrite   Protein   CREAT   Urine  HCG        05/17/25 1057 Yellow   Clear   Negative   Negative   Negative   >=300 mg/dL (3+)                 Microbiology Results (last 10 days)       ** No results found for the last 240 hours. **            CT Angiogram Abdomen Pelvis  Result Date: 5/20/2025  Impression: Impression: 1.Moderate focal stenosis at the origin of both renal arteries. 2.Mild aortic atherosclerotic disease. 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 4.Moderate atherosclerotic disease in the common femoral arteries and SFA. 5.Cardiomegaly, coronary artery disease and trace pericardial effusion. 6.No acute abdominal or pelvic abnormality. Electronically Signed: Kamari Renteria MD  5/20/2025 3:52 AM EDT  Workstation ID: [id number]    CT Abdomen With Contrast  Result Date: 5/18/2025  Impression: 1. Study not optimized for evaluation of the visceral arteries (standard CT was performed, not CTA. Therefore imaging was not performed in the arterial phase and postprocessing was not performed). There is noted to be calcific plaque at the origins of the renal arteries, with suggestion of significant stenosis at the origin of the left renal artery. CTA of the abdomen would be recommended 2. 1.8 cm hepatic lesion, likely hemangioma Electronically Signed: Sacha Vinson  5/18/2025 6:46 PM EDT  Workstation ID: [id number]    CT Head Without Contrast  Result Date: 5/17/2025  Impression: Impression: 1.No acute intracranial abnormality. 2.Chronic infarcts in the right basal ganglia and left temporal lobe. 3.Mild chronic small vessel ischemic change. Electronically Signed: Kamari Renteria MD  5/17/2025 5:54 AM EDT  Workstation ID: [id number]    XR Chest 1 View  Result Date: 5/16/2025  Impression: Impression: 1. No acute process. 2. Stable cardiomegaly. 3. Placement of a right IJ central venous catheter with tip overlying the right atrium. No pneumothorax. Electronically Signed: Austin Kendrick MD  5/16/2025 6:01 PM EDT  Workstation ID:  [id number]      Results for orders placed during the hospital encounter of 05/16/25    Duplex Pseudoaneurysm CAR    Interpretation Summary    No evidence of pseudoaneurysm or AVF in bilateral groin.      Results for orders placed during the hospital encounter of 05/16/25    Duplex Pseudoaneurysm CAR    Interpretation Summary    No evidence of pseudoaneurysm or AVF in bilateral groin.      Results for orders placed during the hospital encounter of 04/15/21    Adult Transthoracic Echo Complete W/ Cont if Necessary Per Protocol    Interpretation Summary  · Left ventricular ejection fraction appears to be 61 - 65%.  · Left ventricular wall thickness is consistent with moderate basal asymmetric hypertrophy.  · The right ventricular cavity is mildly dilated.  · Mild to moderate aortic valve stenosis is present.  · Moderate mitral valve regurgitation is present.  · No pericardial effusion noted      Labs Pending at Discharge:  Pending Results       Procedure [Order ID] Specimen - Date/Time    Aldosterone / Renin Ratio [[id number]] Collected: 05/18/25 1104    Specimen: Blood Updated: 05/18/25 1108    Catecholamines, Fractionated, Plasma [[id number]] Collected: 05/18/25 0403    Specimen: Blood Updated: 05/18/25 0410            Procedures Performed  Procedure(s):  LEFT RENAL ARTERY ANGIOPLASTY WITH STENT       Consults:   Consults       Date and Time Order Name Status Description    5/19/2025  8:11 AM Inpatient Vascular Surgery Consult Completed     5/17/2025  1:01 PM Inpatient Nephrology Consult Completed     5/16/2025  7:40 PM Hospitalist (on-call MD unless specified)      4/15/2025  2:31 PM Nephrology (on -call MD unless specified) Completed             Discharge Details        Discharge Medications        New Medications        Instructions Start Date   clopidogrel 75 MG tablet  Commonly known as: PLAVIX   75 mg, Oral, Daily   Start Date: May 23, 2025     metoprolol succinate XL 25 MG 24 hr tablet  Commonly known as:  TOPROL-XL   25 mg, Oral, Daily             Changes to Medications        Instructions Start Date   cloNIDine 0.1 MG tablet  Commonly known as: CATAPRES  What changed:   medication strength  how much to take   0.1 mg, Oral, Every 12 Hours Scheduled      vitamin B-6 25 MG tablet  Commonly known as: PYRIDOXINE  What changed: how much to take   25 mg, Oral, 3 Times Daily PRN             Continue These Medications        Instructions Start Date   amLODIPine 10 MG tablet  Commonly known as: NORVASC   10 mg, Oral, Daily      aspirin 81 MG chewable tablet   81 mg, Oral, Daily      atorvastatin 80 MG tablet  Commonly known as: LIPITOR   80 mg, Oral, Daily      docusate sodium 100 MG capsule  Commonly known as: Colace   100 mg, Oral, 2 Times Daily, As needed for constipation      FeroSul 325 (65 Fe) MG tablet  Generic drug: ferrous sulfate   325 mg, Oral, Daily With Breakfast      hydrALAZINE 100 MG tablet  Commonly known as: APRESOLINE   100 mg, Oral, 3 times daily      VITAMIN B-12 PO   1 tablet, Daily             Stop These Medications      carvedilol 3.125 MG tablet  Commonly known as: COREG     chlorthalidone 50 MG tablet  Commonly known as: HYGROTEN     furosemide 40 MG tablet  Commonly known as: LASIX     lisinopril 40 MG tablet  Commonly known as: PRINIVIL,ZESTRIL     potassium chloride 20 MEQ CR tablet  Commonly known as: KLOR-CON M20              No Known Allergies    Discharge Disposition:   Home or Self Care    Diet:  Diet Instructions       Diet: Cardiac Diets, Diabetic Diets, Renal Diets; Low Sodium (2g); Regular (IDDSI 7); Thin (IDDSI 0); Consistent Carbohydrate; Low Phosphorus, Low Potassium      Discharge Diet:  Cardiac Diets  Diabetic Diets  Renal Diets       Cardiac Diet: Low Sodium (2g)    Texture: Regular (IDDSI 7)    Fluid Consistency: Thin (IDDSI 0)    Diabetic Diet: Consistent Carbohydrate    Renal Diet:  Low Phosphorus  Low Potassium               Discharge Activity:   Activity Instructions        Activity as Tolerated              CODE STATUS:  Code Status and Medical Interventions: CPR (Attempt to Resuscitate); Full Support   Ordered at: 05/16/25 2028     Code Status (Patient has no pulse and is not breathing):    CPR (Attempt to Resuscitate)     Medical Interventions (Patient has pulse or is breathing):    Full Support       Future Appointments   Date Time Provider Department Center   6/5/2025  9:00 AM [facility] [place] DEVICE CHECK [facility]   6/5/2025  9:30 AM Marnie Brandt APRN [facility]   6/10/2025  1:00 PM Ted Mckee II, MD [facility]   7/15/2025 11:45 AM Zane Aldridge MD [facility]       Additional Instructions for the Follow-ups that You Need to Schedule       Discharge Follow-up with PCP   As directed       Currently Documented PCP:    Brian Nazario APRN    PCP Phone Number:    [phone]     Follow Up Details: 2-3 d        Discharge Follow-up with Specified Provider: dr Mckee; 2 Weeks   As directed      To: dr Mckee   Follow Up: 2 Weeks   Follow Up Details: Follow-up on renal artery intervention        Discharge Follow-up with Specified Provider: renal; 2 Weeks   As directed      To: renal   Follow Up: 2 Weeks   Follow Up Details: Follow-up ERIN and high blood pressure                Time spent on Discharge including face to face service:  >30 minutes    Signature: Electronically signed by Helder Marcum MD, 05/22/25, 14:40 EDT.  [facility] Hospitalist Team

## 2025-05-23 LAB
ALDOST SERPL-MCNC: 5.8 NG/DL (ref 0–30)
ALDOST/RENIN PLAS-RTO: >34.7 {RATIO} (ref 0–30)
RENIN PLAS-CCNC: <0.167 NG/ML/HR (ref 0.17–5.38)

## 2025-05-27 ENCOUNTER — READMISSION MANAGEMENT (OUTPATIENT)
Dept: CALL CENTER | Facility: HOSPITAL | Age: 85
End: 2025-05-27
Payer: MEDICARE

## 2025-05-27 NOTE — OUTREACH NOTE
Medical Week 1 Survey      Flowsheet Row Responses   [facility] patient discharged from? [facility]   Does the patient have one of the following disease processes/diagnoses(primary or secondary)? Other   Week 1 attempt successful? Yes   Call start time 1505   Call end time 1508   Discharge diagnosis Hypertensive urgency-Left renal artery angioplasty with stent   Person spoke with today (if not patient) and relationship jose j Saenz reviewed with patient/caregiver? Yes   Does the patient have all medications ordered at discharge? Yes   Prescription comments New Clopidogrel Bisulfate 75 mg Oral Daily  New Metoprolol Succinate 25 mg Oral Daily   Is the patient taking all medications as directed (includes completed medication regime)? Yes   Comments regarding appointments [facility] in [place] MWF @ 1330   Does the patient have a primary care provider?  Yes   Does the patient have an appointment with their PCP within 7 days of discharge? Yes   Comments regarding PCP Follow up with Ted Mckee II, MD (Vascular Surgery) in 2 weeks (6/5/2025)  Follow up with Brian Nazario APRN (Nurse Practitioner),  2-3 d  Go to Jim Farooq MD (Nephrology) on 6/5/2025,  Appointment scheduled at 09:30  Follow up with Zane Aldridge MD (Cardiology)   Comments Sees pcp next week.   Has home health visited the patient within 72 hours of discharge? N/A   Psychosocial issues? No   Did the patient receive a copy of their discharge instructions? Yes   Nursing interventions Reviewed instructions with patient   What is the patient's perception of their health status since discharge? Improving   Is the patient/caregiver able to teach back signs and symptoms related to disease process for when to call PCP? Yes   Is the patient/caregiver able to teach back signs and symptoms related to disease process for when to call 911? Yes   Is the patient/caregiver able to teach back the hierarchy of who to call/visit for  symptoms/problems? PCP, Specialist, Home health nurse, Urgent Care, ED, 911 Yes   Week 1 call completed? Yes   Graduated Yes   Wrap up additional comments Son Dany reports patient is doing well. NO concerns or questions noted.   Call end time 1502            Janee FOY - Registered Nurse

## 2025-05-29 LAB
DOPAMINE SERPL-MCNC: 13.9 PG/ML (ref 0–36.7)
EPINEPH PLAS-MCNC: <10 PG/ML (ref 0–55.4)
NOREPINEPH PLAS-MCNC: 412 PG/ML (ref 115–524)

## 2025-06-01 ENCOUNTER — E-VISIT (OUTPATIENT)
Dept: ADMINISTRATIVE | Facility: OTHER | Age: 85
End: 2025-06-01
Payer: MEDICARE

## 2025-06-01 NOTE — E-VISIT ESCALATED
Status: Referred Out  Date: 2025 10:44:52  Acuity Level: Not applicable  Referral message: Based on the information you provided during your interview, eVisit is not appropriate for treating your condition.  Patient: Brad Woodward  Patient : 1940  Patient Address: [address] [place] [address]  Patient Phone: [phone]  Clinician Response: Unavailable  Diagnosis: Unavailable  Diagnosis ICD: Unavailable     Patient Interview Questions and Responses: None available

## 2025-06-01 NOTE — E-VISIT ESCALATED
Status: Referred Out  Date: 2025 10:45:59  Acuity Level: Not applicable  Referral message: Based on the information you provided during your interview, eVisit is not appropriate for treating your condition.  Patient: Brad Woodward  Patient : 1940  Patient Address: [address] [place] [address]  Patient Phone: [phone]  Clinician Response: Unavailable  Diagnosis: Unavailable  Diagnosis ICD: Unavailable     Patient Interview Questions and Responses: None available

## 2025-06-02 NOTE — PROGRESS NOTES
Subjective:     Encounter Date:06/05/2025      Patient ID: Brad Woodward is a 84 y.o. female.    Chief Complaint:  History of Present Illness    Brad Woodward is a 84 y.o. female with history of hypertension, dyslipidemia, sick sinus syndrome status post pacemaker implantation, type 2 diabetes who presents to the office today with her son for hospital discharge follow-up with device interrogation.  Recent hospitalization 5/16-5/22/2025 for hypertensive urgency and renal artery stenosis.  Patient was followed by vascular surgery, nephrology. Patient underwent angioplasty with stent placement for left renal artery stenosis. Cardiology was not consulted during recent admission.  Patient seen and examined, labs and chart reviewed. Patient states she is doing well from cardiology standpoint she denies chest pain, shortness of breath, palpitations, lightheadedness/dizziness, nausea, vomiting, edema, syncope.  Her only complaint is fatigue which has been ongoing.  Her blood pressure is slightly low today at 104/60.  She takes all medications as prescribed.  She has never been a smoker.  Interrogation shows that is working well with about 5 years of battery longevity.      The following portions of the patient's history were reviewed and updated as appropriate: allergies, current medications, past family history, past medical history, past social history, past surgical history, and problem list.    Past Medical History:   Diagnosis Date    Diabetes mellitus     Hyperlipidemia     Hypertension      Past Surgical History:   Procedure Laterality Date    ANGIOPLASTY ILIAC ARTERY Left 5/21/2025    Procedure: LEFT RENAL ARTERY ANGIOPLASTY WITH STENT;  Surgeon: Ted Mckee II, MD;  Location: [facility] HYBRID OR;  Service: Vascular;  Laterality: Left;    CARDIAC ELECTROPHYSIOLOGY PROCEDURE N/A 4/20/2021    Procedure: Pacemaker DC new;  Surgeon: Yovany Navarrete MD;  Location: [facility] CATH INVASIVE LOCATION;  Service: Cardiovascular;   "Laterality: N/A;     /60 (BP Location: Left arm, Patient Position: Sitting, Cuff Size: Adult) Comment: manual recheck  Pulse 60   Ht 162.6 cm (64\")   Wt 61.3 kg (135 lb 4 oz)   SpO2 98%   BMI 23.22 kg/m²     Family History   Family history unknown: Yes       Current Outpatient Medications:     aspirin 81 MG chewable tablet, Chew 1 tablet Daily., Disp: 270 tablet, Rfl: 2    atorvastatin (LIPITOR) 80 MG tablet, Take 1 tablet by mouth Daily., Disp: 270 tablet, Rfl: 1    cloNIDine (CATAPRES) 0.1 MG tablet, Take 1 tablet by mouth Every 12 (Twelve) Hours for 30 days., Disp: 60 tablet, Rfl: 0    clopidogrel (PLAVIX) 75 MG tablet, Take 1 tablet by mouth Daily for 30 days., Disp: 30 tablet, Rfl: 0    Cyanocobalamin (VITAMIN B-12 PO), Take 1 tablet by mouth Daily., Disp: , Rfl:     docusate sodium (COLACE) 100 MG capsule, Take 1 capsule by mouth Every 12 (Twelve) Hours., Disp: , Rfl:     hydrALAZINE (APRESOLINE) 100 MG tablet, Take 1 tablet by mouth 3 times a day for 30 days., Disp: 90 tablet, Rfl: 0    lisinopril (PRINIVIL,ZESTRIL) 20 MG tablet, Take 1 tablet by mouth Daily., Disp: , Rfl:     metoprolol succinate XL (TOPROL-XL) 25 MG 24 hr tablet, Take 1 tablet by mouth Daily for 30 days., Disp: 30 tablet, Rfl: 0    No Known Allergies    Social History     Socioeconomic History    Marital status:    Tobacco Use    Smoking status: Never     Passive exposure: Never    Smokeless tobacco: Never   Vaping Use    Vaping status: Never Used   Substance and Sexual Activity    Alcohol use: No    Drug use: No    Sexual activity: Defer     Review of Systems   Constitutional: Positive for malaise/fatigue.   Cardiovascular:  Negative for chest pain, dyspnea on exertion, leg swelling and palpitations.   Respiratory:  Negative for cough and shortness of breath.    Gastrointestinal:  Negative for abdominal pain, nausea and vomiting.   Neurological:  Negative for dizziness, headaches, light-headedness, numbness and weakness. "   All other systems reviewed and are negative.         Objective:     Vitals reviewed.   Constitutional:       Appearance: Well-developed and not in distress.   Eyes:      Pupils: Pupils are equal, round, and reactive to light.   Pulmonary:      Effort: Pulmonary effort is normal.      Breath sounds: Normal breath sounds. No wheezing. No rhonchi.   Cardiovascular:      Normal rate. Regular rhythm.      Murmurs: There is a systolic murmur.   Edema:     Peripheral edema absent.   Abdominal:      Palpations: Abdomen is soft.   Musculoskeletal: Normal range of motion.      Cervical back: Neck supple. Skin:     General: Skin is warm and dry.   Neurological:      General: No focal deficit present.      Mental Status: Alert and oriented to person, place and time.         Procedures      LAB RESULTS (LAST 7 DAYS)  Lab Review:   Echocardiogram   Results for orders placed during the hospital encounter of 04/15/21    Adult Transthoracic Echo Complete W/ Cont if Necessary Per Protocol    Interpretation Summary  · Left ventricular ejection fraction appears to be 61 - 65%.  · Left ventricular wall thickness is consistent with moderate basal asymmetric hypertrophy.  · The right ventricular cavity is mildly dilated.  · Mild to moderate aortic valve stenosis is present.  · Moderate mitral valve regurgitation is present.  · No pericardial effusion noted      Cardiac Catheterization   No results found for this or any previous visit.      CBC        BMP        CMP         BNP        TROPONIN        CoAg        Creatinine Clearance  Estimated Creatinine Clearance: 14.2 mL/min (A) (by C-G formula based on SCr of 2.86 mg/dL (H)).    ABG        Radiology  No radiology results for the last day              Assessment    Diagnoses and all orders for this visit:    1. Sick sinus syndrome (Primary)  Overview:  Added automatically from request for surgery [id number]      2. Hospital discharge follow-up    3. Junctional  bradycardia  Overview:  Added automatically from request for surgery [id number]      4. Pacemaker    5. Moderate mitral valve regurgitation  -     Adult Transthoracic Echo Complete W/ Color, Spectral and Contrast if Necessary Per Protocol; Future    6. Mixed hyperlipidemia    7. Essential hypertension    8. Stage 3 chronic kidney disease, unspecified whether stage 3a or 3b CKD    9. Other fatigue  -     Adult Transthoracic Echo Complete W/ Color, Spectral and Contrast if Necessary Per Protocol; Future                  MDM    Sick sinus syndrome  History of junctional bradycardia  Status post dual chamber pacemaker (2021)   Device interrogation shows it is working well with about 5 years battery longevity    Valvular heart disease  + systolic murmur   Echocardiogram from 2021 with LVEF of 61-65%, mild to moderate aortic valve stenosis and moderate mitral valve regurgitation noted  Beta blocker   Ace-I and hydralazine for afterload reduction    Obtain echocardiogram prior to next office visit to evaluate valvular heart disease    Hypertension  Recent hospitalization for hypertensive urgency  Blood pressure 104/60  Metoprolol succinate 25 mg  Clonidine 0.1 mg  Hydralazine 100 mg  Lisinopril 20 mg  Recommend home monitoring of blood pressure and adjust medications as needed as blood pressure on the lower side today    Dyslipidemia  High intensity statin    Prediabetic   A1c 5.4, previously 5.9    ESRD  HD MWF  Follows with Dr. Farooq     Renal artery stenosis  Status post left renal artery stent angioplasty   Follows with vascular surgery  Aspirin, Plavix, high intensity statin    Patient's previous medical records, labs, and EKG were reviewed and discussed with the patient at today's visit.     Patient to be seen as needed or in 6 months with cardiology and device interrogation    Electronically signed by CECE Hsu, 06/05/25, 10:03 AM EDT.

## 2025-06-02 NOTE — CASE MANAGEMENT/SOCIAL WORK
Continued Stay Note  NATHANIEL Zach     Patient Name: Brad Woodward  MRN: [id number]  Today's Date: 6/2/2025    Admit Date: 5/16/2025    Plan: Anticipate routine home with son and family. Current OP HD MWF [facility].   Discharge Plan       Row Name 06/02/25 1538       Plan    Plan Comments Call received from Leana Baker ([phone]) who reported he was patient's grandson and was wondering about when his mom would be able to become patient's paid caregiver. CM discussed that [facility] referral was submitted for Family Structured Caregiver, [facility] but unsure of what the wait time is in order to complete process. CM provided phone number for [facility] for f/u ([phone]). Also discussed that Private Paid Caregiver List could be given in the meantime to arrange if needed and he declined at this time.                  Angela Xie RN     Office Phone: [phone]  Office Cell: [phone]

## 2025-06-05 ENCOUNTER — CLINICAL SUPPORT NO REQUIREMENTS (OUTPATIENT)
Dept: CARDIOLOGY | Facility: CLINIC | Age: 85
End: 2025-06-05
Payer: MEDICARE

## 2025-06-05 ENCOUNTER — OFFICE VISIT (OUTPATIENT)
Dept: CARDIOLOGY | Facility: CLINIC | Age: 85
End: 2025-06-05
Payer: MEDICARE

## 2025-06-05 VITALS
HEIGHT: 64 IN | DIASTOLIC BLOOD PRESSURE: 60 MMHG | SYSTOLIC BLOOD PRESSURE: 104 MMHG | BODY MASS INDEX: 23.09 KG/M2 | WEIGHT: 135.25 LBS | HEART RATE: 60 BPM | OXYGEN SATURATION: 98 %

## 2025-06-05 DIAGNOSIS — E78.2 MIXED HYPERLIPIDEMIA: Chronic | ICD-10-CM

## 2025-06-05 DIAGNOSIS — I49.5 SICK SINUS SYNDROME: ICD-10-CM

## 2025-06-05 DIAGNOSIS — R53.83 OTHER FATIGUE: ICD-10-CM

## 2025-06-05 DIAGNOSIS — Z09 HOSPITAL DISCHARGE FOLLOW-UP: ICD-10-CM

## 2025-06-05 DIAGNOSIS — Z95.0 PACEMAKER: ICD-10-CM

## 2025-06-05 DIAGNOSIS — I10 ESSENTIAL HYPERTENSION: Chronic | ICD-10-CM

## 2025-06-05 DIAGNOSIS — R00.1 JUNCTIONAL BRADYCARDIA: ICD-10-CM

## 2025-06-05 DIAGNOSIS — N18.30 STAGE 3 CHRONIC KIDNEY DISEASE, UNSPECIFIED WHETHER STAGE 3A OR 3B CKD: Chronic | ICD-10-CM

## 2025-06-05 DIAGNOSIS — R00.1 JUNCTIONAL BRADYCARDIA: Primary | ICD-10-CM

## 2025-06-05 DIAGNOSIS — I34.0 MODERATE MITRAL VALVE REGURGITATION: ICD-10-CM

## 2025-06-05 DIAGNOSIS — I49.5 SICK SINUS SYNDROME: Primary | ICD-10-CM

## 2025-06-05 RX ORDER — LISINOPRIL 20 MG/1
1 TABLET ORAL DAILY
COMMUNITY
Start: 2025-04-28

## 2025-06-05 RX ORDER — DOCUSATE SODIUM 100 MG/1
1 CAPSULE, LIQUID FILLED ORAL EVERY 12 HOURS SCHEDULED
COMMUNITY
Start: 2025-05-26

## 2025-06-10 ENCOUNTER — OFFICE VISIT (OUTPATIENT)
Age: 85
End: 2025-06-10
Payer: MEDICARE

## 2025-06-10 VITALS
WEIGHT: 135 LBS | BODY MASS INDEX: 23.05 KG/M2 | HEIGHT: 64 IN | DIASTOLIC BLOOD PRESSURE: 53 MMHG | SYSTOLIC BLOOD PRESSURE: 99 MMHG

## 2025-06-10 DIAGNOSIS — I70.1 RENAL ARTERY STENOSIS: Primary | ICD-10-CM

## 2025-06-10 PROCEDURE — 1159F MED LIST DOCD IN RCRD: CPT | Performed by: STUDENT IN AN ORGANIZED HEALTH CARE EDUCATION/TRAINING PROGRAM

## 2025-06-10 PROCEDURE — 3078F DIAST BP <80 MM HG: CPT | Performed by: STUDENT IN AN ORGANIZED HEALTH CARE EDUCATION/TRAINING PROGRAM

## 2025-06-10 PROCEDURE — 3074F SYST BP LT 130 MM HG: CPT | Performed by: STUDENT IN AN ORGANIZED HEALTH CARE EDUCATION/TRAINING PROGRAM

## 2025-06-10 PROCEDURE — 1160F RVW MEDS BY RX/DR IN RCRD: CPT | Performed by: STUDENT IN AN ORGANIZED HEALTH CARE EDUCATION/TRAINING PROGRAM

## 2025-06-10 PROCEDURE — 99214 OFFICE O/P EST MOD 30 MIN: CPT | Performed by: STUDENT IN AN ORGANIZED HEALTH CARE EDUCATION/TRAINING PROGRAM

## 2025-06-10 NOTE — PROGRESS NOTES
"Chief Complaint  Post-op    Follow-up for renal artery stent    Subjective        Brad Woodward presents to [facility] VASCULAR SURGERY  History of Present Illness    Patient is a very pleasant 84-year-old lady with hypertension, chronic kidney disease, sick sinus syndrome status post pacemaker, diabetes and renal artery stenosis.  Patient had poorly controlled blood pressure despite 4 antihypertensive medications and on 5/21/2025 was taken for left renal artery stent placement.   Patient was also initiated on hemodialysis on that admission.    She returns today for follow-up from her renal artery stent placement and to discuss options for long-term dialysis access.    The patien and her son were offered to use the  phone but they refused, and son provided translation.    He reports she has been doing very well but has been somewhat tired and constipated.  Blood pressure has improved significantly since left renal stent placed.    He reports she is planning to start peritoneal dialysis next week..         She has a pacemaker accessing through the left subclavian vein.    Objective   Vital Signs:  BP 99/53 (BP Location: Left arm)   Ht 162.6 cm (64.02\")   Wt 61.2 kg (135 lb)   BMI 23.16 kg/m²   Estimated body mass index is 23.16 kg/m² as calculated from the following:    Height as of this encounter: 162.6 cm (64.02\").    Weight as of this encounter: 61.2 kg (135 lb).         BMI is within normal parameters. No other follow-up for BMI required.         Physical Exam   Elderly and frail appearing  Respirations unlabored  Groin access sites healed.   R TDC in place with dressing intact.   Result Review :                                 Assessment and Plan     84-year-old lady with renovascular hypertension status post left renal artery stent placement, diabetes, sick sinus syndrome status post left-sided pacemaker, end-stage renal disease on dialysis here for follow-up after renal artery stent " placement.  Also to discuss future dialysis access.    Patient appears to have had significant improvement in blood pressure after renal artery stent placement.  They were encouraged to continue aspirin and Plavix, and continue weaning her antihypertensives per PCP/nephrology.    We discussed possible fistula for dialysis access however they are planning to pursue peritoneal dialysis and do not want to schedule any fistula surgery at this time.    I will see her again in 6 weeks with renal artery duplex and we will continue ongoing discussions of possible dialysis access at that time.    Patient is having constipation which the son reports is a chronic issue for her. Based on the medication list that they have with him today it appears she has only been taking docusate.  I encouraged them to take MiraLAX twice daily going forward and follow up with PCP.       Diagnoses and all orders for this visit:    1. Renal artery stenosis (Primary)  -     Duplex Renal Artery - Bilateral Complete CAR; Future              Patient BMI noted. Educational material for patient for health risks of being overweight added to patient's after visit summary.           Follow Up     Return in about 6 weeks (around 7/22/2025).  Patient was given instructions and counseling regarding her condition or for health maintenance advice. Please see specific information pulled into the AVS if appropriate.

## 2025-06-18 ENCOUNTER — APPOINTMENT (OUTPATIENT)
Dept: NEPHROLOGY | Facility: HOSPITAL | Age: 85
End: 2025-06-18
Payer: MEDICAID

## 2025-06-18 ENCOUNTER — APPOINTMENT (OUTPATIENT)
Dept: CT IMAGING | Facility: HOSPITAL | Age: 85
End: 2025-06-18
Payer: MEDICAID

## 2025-06-18 ENCOUNTER — HOSPITAL ENCOUNTER (INPATIENT)
Facility: HOSPITAL | Age: 85
LOS: 6 days | Discharge: HOME | End: 2025-06-24
Attending: STUDENT IN AN ORGANIZED HEALTH CARE EDUCATION/TRAINING PROGRAM | Admitting: FAMILY MEDICINE
Payer: MEDICARE

## 2025-06-18 DIAGNOSIS — R19.5 DARK STOOLS: ICD-10-CM

## 2025-06-18 DIAGNOSIS — R42 DIZZINESS: ICD-10-CM

## 2025-06-18 DIAGNOSIS — R53.1 GENERALIZED WEAKNESS: Primary | ICD-10-CM

## 2025-06-18 DIAGNOSIS — D64.9 ANEMIA, UNSPECIFIED TYPE: ICD-10-CM

## 2025-06-18 DIAGNOSIS — Z99.2 ADMISSION FOR DIALYSIS: ICD-10-CM

## 2025-06-18 DIAGNOSIS — K92.1 MELENA: ICD-10-CM

## 2025-06-18 PROBLEM — N18.6 ESRD (END STAGE RENAL DISEASE) ON DIALYSIS: Status: ACTIVE | Noted: 2025-06-18

## 2025-06-18 LAB
ABO GROUP BLD: NORMAL
ALBUMIN SERPL-MCNC: 3.7 G/DL (ref 3.5–5.2)
ALBUMIN/GLOB SERPL: 1.2 G/DL
ALP SERPL-CCNC: 124 U/L (ref 39–117)
ALT SERPL W P-5'-P-CCNC: 22 U/L (ref 1–33)
ANION GAP SERPL CALCULATED.3IONS-SCNC: 12 MMOL/L (ref 5–15)
AST SERPL-CCNC: 38 U/L (ref 1–32)
BACTERIA UR QL AUTO: ABNORMAL /HPF
BASOPHILS # BLD AUTO: 0.03 10*3/MM3 (ref 0–0.2)
BASOPHILS NFR BLD AUTO: 0.6 % (ref 0–1.5)
BILIRUB SERPL-MCNC: 0.3 MG/DL (ref 0–1.2)
BILIRUB UR QL STRIP: NEGATIVE
BLD GP AB SCN SERPL QL: NEGATIVE
BUN SERPL-MCNC: 42.1 MG/DL (ref 8–23)
BUN/CREAT SERPL: 11.1 (ref 7–25)
CALCIUM SPEC-SCNC: 9 MG/DL (ref 8.6–10.5)
CHLORIDE SERPL-SCNC: 93 MMOL/L (ref 98–107)
CLARITY UR: CLEAR
CO2 SERPL-SCNC: 26 MMOL/L (ref 22–29)
COLOR UR: YELLOW
CREAT SERPL-MCNC: 3.79 MG/DL (ref 0.57–1)
D-LACTATE SERPL-SCNC: 1.9 MMOL/L (ref 0.3–2)
DEPRECATED RDW RBC AUTO: 50.1 FL (ref 37–54)
EGFRCR SERPLBLD CKD-EPI 2021: 11.3 ML/MIN/1.73
EOSINOPHIL # BLD AUTO: 0.24 10*3/MM3 (ref 0–0.4)
EOSINOPHIL NFR BLD AUTO: 4.8 % (ref 0.3–6.2)
ERYTHROCYTE [DISTWIDTH] IN BLOOD BY AUTOMATED COUNT: 16.9 % (ref 12.3–15.4)
GEN 5 1HR TROPONIN T REFLEX: 55 NG/L
GLOBULIN UR ELPH-MCNC: 3.1 GM/DL
GLUCOSE BLDC GLUCOMTR-MCNC: 223 MG/DL (ref 70–105)
GLUCOSE SERPL-MCNC: 117 MG/DL (ref 65–99)
GLUCOSE UR STRIP-MCNC: ABNORMAL MG/DL
HCT VFR BLD AUTO: 24.7 % (ref 34–46.6)
HGB BLD-MCNC: 8 G/DL (ref 12–15.9)
HGB UR QL STRIP.AUTO: NEGATIVE
HOLD SPECIMEN: NORMAL
HOLD SPECIMEN: NORMAL
HYALINE CASTS UR QL AUTO: ABNORMAL /LPF
IMM GRANULOCYTES # BLD AUTO: 0.01 10*3/MM3 (ref 0–0.05)
IMM GRANULOCYTES NFR BLD AUTO: 0.2 % (ref 0–0.5)
INR PPP: 0.93 (ref 0.9–1.1)
KETONES UR QL STRIP: NEGATIVE
LEUKOCYTE ESTERASE UR QL STRIP.AUTO: NEGATIVE
LIPASE SERPL-CCNC: 128 U/L (ref 13–60)
LYMPHOCYTES # BLD AUTO: 0.99 10*3/MM3 (ref 0.7–3.1)
LYMPHOCYTES NFR BLD AUTO: 19.8 % (ref 19.6–45.3)
MCH RBC QN AUTO: 26.8 PG (ref 26.6–33)
MCHC RBC AUTO-ENTMCNC: 32.4 G/DL (ref 31.5–35.7)
MCV RBC AUTO: 82.6 FL (ref 79–97)
MONOCYTES # BLD AUTO: 0.45 10*3/MM3 (ref 0.1–0.9)
MONOCYTES NFR BLD AUTO: 9 % (ref 5–12)
NEUTROPHILS NFR BLD AUTO: 3.28 10*3/MM3 (ref 1.7–7)
NEUTROPHILS NFR BLD AUTO: 65.6 % (ref 42.7–76)
NITRITE UR QL STRIP: NEGATIVE
NRBC BLD AUTO-RTO: 0 /100 WBC (ref 0–0.2)
PH UR STRIP.AUTO: 8.5 [PH] (ref 5–8)
PLATELET # BLD AUTO: 205 10*3/MM3 (ref 140–450)
PMV BLD AUTO: 9.8 FL (ref 6–12)
POTASSIUM SERPL-SCNC: 3.6 MMOL/L (ref 3.5–5.2)
PROT SERPL-MCNC: 6.8 G/DL (ref 6–8.5)
PROT UR QL STRIP: ABNORMAL
PROTHROMBIN TIME: 12.3 SECONDS (ref 11.7–14.2)
RBC # BLD AUTO: 2.99 10*6/MM3 (ref 3.77–5.28)
RBC # UR STRIP: ABNORMAL /HPF
REF LAB TEST METHOD: ABNORMAL
RH BLD: POSITIVE
SODIUM SERPL-SCNC: 131 MMOL/L (ref 136–145)
SP GR UR STRIP: 1.01 (ref 1–1.03)
SQUAMOUS #/AREA URNS HPF: ABNORMAL /HPF
T&S EXPIRATION DATE: NORMAL
T4 FREE SERPL-MCNC: 1.45 NG/DL (ref 0.92–1.68)
TROPONIN T % DELTA: -4
TROPONIN T NUMERIC DELTA: -2 NG/L
TROPONIN T SERPL HS-MCNC: 57 NG/L
TSH SERPL DL<=0.05 MIU/L-ACNC: 6.15 UIU/ML (ref 0.27–4.2)
UROBILINOGEN UR QL STRIP: ABNORMAL
WBC # UR STRIP: ABNORMAL /HPF
WBC NRBC COR # BLD AUTO: 5 10*3/MM3 (ref 3.4–10.8)
WHOLE BLOOD HOLD COAG: NORMAL
WHOLE BLOOD HOLD SPECIMEN: NORMAL

## 2025-06-18 PROCEDURE — 82607 VITAMIN B-12: CPT

## 2025-06-18 PROCEDURE — G0378 HOSPITAL OBSERVATION PER HR: HCPCS

## 2025-06-18 PROCEDURE — 80053 COMPREHEN METABOLIC PANEL: CPT | Performed by: NURSE PRACTITIONER

## 2025-06-18 PROCEDURE — 87340 HEPATITIS B SURFACE AG IA: CPT | Performed by: INTERNAL MEDICINE

## 2025-06-18 PROCEDURE — P9612 CATHETERIZE FOR URINE SPEC: HCPCS

## 2025-06-18 PROCEDURE — 81001 URINALYSIS AUTO W/SCOPE: CPT | Performed by: NURSE PRACTITIONER

## 2025-06-18 PROCEDURE — 84443 ASSAY THYROID STIM HORMONE: CPT

## 2025-06-18 PROCEDURE — 82948 REAGENT STRIP/BLOOD GLUCOSE: CPT

## 2025-06-18 PROCEDURE — 36415 COLL VENOUS BLD VENIPUNCTURE: CPT

## 2025-06-18 PROCEDURE — 85610 PROTHROMBIN TIME: CPT | Performed by: NURSE PRACTITIONER

## 2025-06-18 PROCEDURE — 99285 EMERGENCY DEPT VISIT HI MDM: CPT

## 2025-06-18 PROCEDURE — 85025 COMPLETE CBC W/AUTO DIFF WBC: CPT | Performed by: NURSE PRACTITIONER

## 2025-06-18 PROCEDURE — 84484 ASSAY OF TROPONIN QUANT: CPT | Performed by: NURSE PRACTITIONER

## 2025-06-18 PROCEDURE — 86850 RBC ANTIBODY SCREEN: CPT | Performed by: NURSE PRACTITIONER

## 2025-06-18 PROCEDURE — 74177 CT ABD & PELVIS W/CONTRAST: CPT

## 2025-06-18 PROCEDURE — 83605 ASSAY OF LACTIC ACID: CPT | Performed by: NURSE PRACTITIONER

## 2025-06-18 PROCEDURE — 83690 ASSAY OF LIPASE: CPT

## 2025-06-18 PROCEDURE — 86923 COMPATIBILITY TEST ELECTRIC: CPT

## 2025-06-18 PROCEDURE — 86901 BLOOD TYPING SEROLOGIC RH(D): CPT | Performed by: NURSE PRACTITIONER

## 2025-06-18 PROCEDURE — 84439 ASSAY OF FREE THYROXINE: CPT

## 2025-06-18 PROCEDURE — 87040 BLOOD CULTURE FOR BACTERIA: CPT | Performed by: NURSE PRACTITIONER

## 2025-06-18 PROCEDURE — 25510000001 IOPAMIDOL PER 1 ML: Performed by: NURSE PRACTITIONER

## 2025-06-18 PROCEDURE — 82746 ASSAY OF FOLIC ACID SERUM: CPT

## 2025-06-18 PROCEDURE — 25010000002 LORAZEPAM PER 2 MG: Performed by: NURSE PRACTITIONER

## 2025-06-18 PROCEDURE — 25010000002 ONDANSETRON PER 1 MG: Performed by: NURSE PRACTITIONER

## 2025-06-18 PROCEDURE — 93005 ELECTROCARDIOGRAM TRACING: CPT | Performed by: NURSE PRACTITIONER

## 2025-06-18 PROCEDURE — 25810000003 SODIUM CHLORIDE 0.9 % SOLUTION: Performed by: NURSE PRACTITIONER

## 2025-06-18 PROCEDURE — 86900 BLOOD TYPING SEROLOGIC ABO: CPT | Performed by: NURSE PRACTITIONER

## 2025-06-18 RX ORDER — CLONIDINE HYDROCHLORIDE 0.1 MG/1
0.3 TABLET ORAL 2 TIMES DAILY
Status: DISCONTINUED | OUTPATIENT
Start: 2025-06-18 | End: 2025-06-19

## 2025-06-18 RX ORDER — CLONIDINE HYDROCHLORIDE 0.3 MG/1
0.3 TABLET ORAL 2 TIMES DAILY
Status: ON HOLD | COMMUNITY

## 2025-06-18 RX ORDER — HEPARIN SODIUM 1000 [USP'U]/ML
5000 INJECTION, SOLUTION INTRAVENOUS; SUBCUTANEOUS AS NEEDED
Status: DISCONTINUED | OUTPATIENT
Start: 2025-06-18 | End: 2025-06-24 | Stop reason: HOSPADM

## 2025-06-18 RX ORDER — SODIUM CHLORIDE 0.9 % (FLUSH) 0.9 %
10 SYRINGE (ML) INJECTION AS NEEDED
Status: DISCONTINUED | OUTPATIENT
Start: 2025-06-18 | End: 2025-06-24 | Stop reason: HOSPADM

## 2025-06-18 RX ORDER — ONDANSETRON 4 MG/1
4 TABLET, ORALLY DISINTEGRATING ORAL EVERY 6 HOURS PRN
Status: DISCONTINUED | OUTPATIENT
Start: 2025-06-18 | End: 2025-06-22

## 2025-06-18 RX ORDER — HYDRALAZINE HYDROCHLORIDE 25 MG/1
100 TABLET, FILM COATED ORAL 3 TIMES DAILY
Status: DISCONTINUED | OUTPATIENT
Start: 2025-06-18 | End: 2025-06-24 | Stop reason: HOSPADM

## 2025-06-18 RX ORDER — CLOPIDOGREL BISULFATE 75 MG/1
75 TABLET ORAL DAILY
Status: DISPENSED | OUTPATIENT
Start: 2025-06-18 | End: 2025-06-22

## 2025-06-18 RX ORDER — METOPROLOL SUCCINATE 25 MG/1
25 TABLET, EXTENDED RELEASE ORAL DAILY
Status: DISCONTINUED | OUTPATIENT
Start: 2025-06-18 | End: 2025-06-19

## 2025-06-18 RX ORDER — DEXTROSE MONOHYDRATE 25 G/50ML
25 INJECTION, SOLUTION INTRAVENOUS
Status: DISCONTINUED | OUTPATIENT
Start: 2025-06-18 | End: 2025-06-24 | Stop reason: HOSPADM

## 2025-06-18 RX ORDER — ONDANSETRON 2 MG/ML
4 INJECTION INTRAMUSCULAR; INTRAVENOUS EVERY 6 HOURS PRN
Status: DISCONTINUED | OUTPATIENT
Start: 2025-06-18 | End: 2025-06-22

## 2025-06-18 RX ORDER — AMOXICILLIN 250 MG
2 CAPSULE ORAL 2 TIMES DAILY PRN
Status: DISCONTINUED | OUTPATIENT
Start: 2025-06-18 | End: 2025-06-24 | Stop reason: HOSPADM

## 2025-06-18 RX ORDER — POLYETHYLENE GLYCOL 3350 17 G/17G
17 POWDER, FOR SOLUTION ORAL DAILY PRN
Status: DISCONTINUED | OUTPATIENT
Start: 2025-06-18 | End: 2025-06-24 | Stop reason: HOSPADM

## 2025-06-18 RX ORDER — IOPAMIDOL 755 MG/ML
100 INJECTION, SOLUTION INTRAVASCULAR
Status: COMPLETED | OUTPATIENT
Start: 2025-06-18 | End: 2025-06-18

## 2025-06-18 RX ORDER — ACETAMINOPHEN 160 MG/5ML
650 SOLUTION ORAL EVERY 4 HOURS PRN
Status: DISCONTINUED | OUTPATIENT
Start: 2025-06-18 | End: 2025-06-24 | Stop reason: HOSPADM

## 2025-06-18 RX ORDER — NICOTINE POLACRILEX 4 MG
15 LOZENGE BUCCAL
Status: DISCONTINUED | OUTPATIENT
Start: 2025-06-18 | End: 2025-06-24 | Stop reason: HOSPADM

## 2025-06-18 RX ORDER — BISACODYL 10 MG
10 SUPPOSITORY, RECTAL RECTAL DAILY PRN
Status: DISCONTINUED | OUTPATIENT
Start: 2025-06-18 | End: 2025-06-24 | Stop reason: HOSPADM

## 2025-06-18 RX ORDER — IBUPROFEN 600 MG/1
1 TABLET ORAL
Status: DISCONTINUED | OUTPATIENT
Start: 2025-06-18 | End: 2025-06-24 | Stop reason: HOSPADM

## 2025-06-18 RX ORDER — LORAZEPAM 2 MG/ML
1 INJECTION INTRAMUSCULAR ONCE
Status: COMPLETED | OUTPATIENT
Start: 2025-06-18 | End: 2025-06-18

## 2025-06-18 RX ORDER — ONDANSETRON 2 MG/ML
4 INJECTION INTRAMUSCULAR; INTRAVENOUS ONCE
Status: COMPLETED | OUTPATIENT
Start: 2025-06-18 | End: 2025-06-18

## 2025-06-18 RX ORDER — POTASSIUM CHLORIDE 1500 MG/1
20 TABLET, EXTENDED RELEASE ORAL 2 TIMES DAILY
Status: ON HOLD | COMMUNITY

## 2025-06-18 RX ORDER — ACETAMINOPHEN 325 MG/1
650 TABLET ORAL EVERY 4 HOURS PRN
Status: DISCONTINUED | OUTPATIENT
Start: 2025-06-18 | End: 2025-06-24 | Stop reason: HOSPADM

## 2025-06-18 RX ORDER — BISACODYL 5 MG/1
5 TABLET, DELAYED RELEASE ORAL DAILY PRN
Status: DISCONTINUED | OUTPATIENT
Start: 2025-06-18 | End: 2025-06-24 | Stop reason: HOSPADM

## 2025-06-18 RX ORDER — ASPIRIN 81 MG/1
81 TABLET, CHEWABLE ORAL DAILY
Status: DISCONTINUED | OUTPATIENT
Start: 2025-06-18 | End: 2025-06-24 | Stop reason: HOSPADM

## 2025-06-18 RX ORDER — ATORVASTATIN CALCIUM 40 MG/1
80 TABLET, FILM COATED ORAL DAILY
Status: DISCONTINUED | OUTPATIENT
Start: 2025-06-18 | End: 2025-06-19

## 2025-06-18 RX ORDER — LISINOPRIL 20 MG/1
40 TABLET ORAL DAILY
Status: DISCONTINUED | OUTPATIENT
Start: 2025-06-18 | End: 2025-06-19

## 2025-06-18 RX ORDER — ACETAMINOPHEN 650 MG/1
650 SUPPOSITORY RECTAL EVERY 4 HOURS PRN
Status: DISCONTINUED | OUTPATIENT
Start: 2025-06-18 | End: 2025-06-24 | Stop reason: HOSPADM

## 2025-06-18 RX ADMIN — CLONIDINE HYDROCHLORIDE 0.3 MG: 0.1 TABLET ORAL at 20:25

## 2025-06-18 RX ADMIN — SODIUM CHLORIDE 250 ML: 9 INJECTION, SOLUTION INTRAVENOUS at 11:16

## 2025-06-18 RX ADMIN — IOPAMIDOL 100 ML: 755 INJECTION, SOLUTION INTRAVENOUS at 12:15

## 2025-06-18 RX ADMIN — ONDANSETRON 4 MG: 2 INJECTION, SOLUTION INTRAMUSCULAR; INTRAVENOUS at 11:17

## 2025-06-18 RX ADMIN — LORAZEPAM 1 MG: 2 INJECTION INTRAMUSCULAR; INTRAVENOUS at 11:40

## 2025-06-18 RX ADMIN — HYDRALAZINE HYDROCHLORIDE 100 MG: 25 TABLET ORAL at 20:26

## 2025-06-18 NOTE — H&P
[facility] Medicine Services  History & Physical    Patient Name: Brad Woodward  : 1940  MRN: [id number]  Primary Care Physician:  Brian Nazario APRN  Date of admission: 2025  Date and Time of Service: 2025 at 1332    Subjective      Chief Complaint: weakness, fatigue     History of Present Illness: Brad Woodward is a 84 y.o. female with a CMH of  SSS s/p PPM, ESRD on HD, DM2, HTN, HLD, h/o renal artery stenosis s/p L renal stent who presented to [facility] on 2025 with dark stools x 2 weeks.  Patient is not English-speaking,  was not available. Family assisted with history as patient lethargic after medications (Ativan) in ED). Daughter reports patient had constipation x 1 week which was then followed by dark stool after taking stool softeners.  They also reports chills, fatigue, weakness and episodes of dizziness and lightheadedness.  Denies syncopal episodes or fall. Family reports missing dialysis session today due to coming to ED.  HD schedule is .  Patient noted to still make urine in small amounts.    On ED evaluation, patient was noted to be hypertensive with SBP > 200s.  Patient was also noted to be anxious and was given Ativan with subsequent improvement of BP.  CBC and CMP were unremarkable from baseline.  LFTs WNL.  Lactic acid was 1.9.  Initial troponin 57, repeat 55.  EKG sinus rhythm, RBBB, rate of 69 with prolonged QT at 514.  UA was not concerning for infection.  CT A/P with no acute findings.  Patient was given Zofran with no relief of symptoms. Due to missing dialysis today and intractable nausea, patient to be admitted under hospitalist service.    Review of Systems   Unable to perform ROS: Mental status change       Personal History     Past Medical History:   Diagnosis Date   • Diabetes mellitus    • Hyperlipidemia    • Hypertension        Past Surgical History:   Procedure Laterality Date   • ANGIOPLASTY ILIAC ARTERY Left  5/21/2025    Procedure: LEFT RENAL ARTERY ANGIOPLASTY WITH STENT;  Surgeon: Ted Mckee II, MD;  Location: [facility] HYBRID OR;  Service: Vascular;  Laterality: Left;   • CARDIAC ELECTROPHYSIOLOGY PROCEDURE N/A 4/20/2021    Procedure: Pacemaker DC new;  Surgeon: Yovany Navarrete MD;  Location: [facility] CATH INVASIVE LOCATION;  Service: Cardiovascular;  Laterality: N/A;       Family History: Family history is unknown by patient. Otherwise pertinent FHx was reviewed and not pertinent to current issue.    Social History:  reports that she has never smoked. She has never been exposed to tobacco smoke. She has never used smokeless tobacco. She reports that she does not drink alcohol and does not use drugs.    Home Medications:  Prior to Admission Medications       Prescriptions Last Dose Informant Patient Reported? Taking?    aspirin 81 MG chewable tablet   No No    Chew 1 tablet Daily.    atorvastatin (LIPITOR) 80 MG tablet   No No    Take 1 tablet by mouth Daily.    cloNIDine (CATAPRES) 0.1 MG tablet   No No    Take 1 tablet by mouth Every 12 (Twelve) Hours for 30 days.    clopidogrel (PLAVIX) 75 MG tablet   No No    Take 1 tablet by mouth Daily for 30 days.    Cyanocobalamin (VITAMIN B-12 PO)   Yes No    Take 1 tablet by mouth Daily.    docusate sodium (COLACE) 100 MG capsule   Yes No    Take 1 capsule by mouth Every 12 (Twelve) Hours.    hydrALAZINE (APRESOLINE) 100 MG tablet   No No    Take 1 tablet by mouth 3 times a day for 30 days.    lisinopril (PRINIVIL,ZESTRIL) 20 MG tablet   Yes No    Take 1 tablet by mouth Daily.    metoprolol succinate XL (TOPROL-XL) 25 MG 24 hr tablet   No No    Take 1 tablet by mouth Daily for 30 days.              Allergies:  No Known Allergies    Objective      Vitals:   Temp:  [98.4 °F (36.9 °C)] 98.4 °F (36.9 °C)  Heart Rate:  [60-80] 60  Resp:  [14] 14  BP: (147-236)/(50-88) 147/50  Body mass index is 23.16 kg/m².      Physical Exam  General: 85 yo F, lethargic but arousable, well  nourished, no acute distress.  HENT: Normocephalic, normal hearing, moist oral mucosa, no scleral icterus.  Neck: Supple, nontender, no carotid bruits, no JVD, no LAD.  Lungs: Clear to auscultation, nonlabored respiration.  Heart: RRR, no murmur, gallop or edema.  Abdomen: Soft, nontender, nondistended, + bowel sounds.  Musculoskeletal: Normal range of motion and strength, no tenderness or swelling.  Skin: Skin is warm, dry and pink, no rashes or lesions.  Psychiatric: Cooperative, appropriate mood and affect.      Diagnostic Data:  Lab Results (last 24 hours)       Procedure Component Value Units Date/Time    High Sensitivity Troponin T 1Hr [[id number]]  (Abnormal) Collected: 06/18/25 1159    Specimen: Blood from Arm, Right Updated: 06/18/25 1237     HS Troponin T 55 ng/L      Troponin T Numeric Delta -2 ng/L      Troponin T % Delta -4    Narrative:      High Sensitive Troponin T Reference Range:  <14.0 ng/L- Negative Female for AMI  <22.0 ng/L- Negative Male for AMI  >=14 - Abnormal Female indicating possible myocardial injury.  >=22 - Abnormal Male indicating possible myocardial injury.   Clinicians would have to utilize clinical acumen, EKG, Troponin, and serial changes to determine if it is an Acute Myocardial Infarction or myocardial injury due to an underlying chronic condition.         Urinalysis, Microscopic Only - Straight Cath [[id number]]  (Abnormal) Collected: 06/18/25 1156    Specimen: Urine from Straight Cath Updated: 06/18/25 1210     RBC, UA 3-5 /HPF      WBC, UA 3-5 /HPF      Comment: Urine culture not indicated.        Bacteria, UA None Seen /HPF      Squamous Epithelial Cells, UA 0-2 /HPF      Hyaline Casts, UA None Seen /LPF      Methodology Automated Microscopy    Urinalysis With Culture If Indicated - Straight Cath [[id number]]  (Abnormal) Collected: 06/18/25 1156    Specimen: Urine from Straight Cath Updated: 06/18/25 1206     Color, UA Yellow     Appearance, UA Clear     pH, UA 8.5     Specific  Gravity, UA 1.009     Glucose,  mg/dL (Trace)     Ketones, UA Negative     Bilirubin, UA Negative     Blood, UA Negative     Protein, UA >=300 mg/dL (3+)     Leuk Esterase, UA Negative     Nitrite, UA Negative     Urobilinogen, UA 0.2 E.U./dL    Narrative:      In absence of clinical symptoms, the presence of pyuria, bacteria, and/or nitrites on the urinalysis result does not correlate with infection.    Blood Culture - Blood, Arm, Left [[id number]] Collected: 06/18/25 1146    Specimen: Blood from Arm, Left Updated: 06/18/25 1151    High Sensitivity Troponin T [[id number]]  (Abnormal) Collected: 06/18/25 1045    Specimen: Blood Updated: 06/18/25 1148     HS Troponin T 57 ng/L     Narrative:      High Sensitive Troponin T Reference Range:  <14.0 ng/L- Negative Female for AMI  <22.0 ng/L- Negative Male for AMI  >=14 - Abnormal Female indicating possible myocardial injury.  >=22 - Abnormal Male indicating possible myocardial injury.   Clinicians would have to utilize clinical acumen, EKG, Troponin, and serial changes to determine if it is an Acute Myocardial Infarction or myocardial injury due to an underlying chronic condition.         Comprehensive Metabolic Panel [[id number]]  (Abnormal) Collected: 06/18/25 1045    Specimen: Blood Updated: 06/18/25 1139     Glucose 117 mg/dL      BUN 42.1 mg/dL      Creatinine 3.79 mg/dL      Sodium 131 mmol/L      Potassium 3.6 mmol/L      Chloride 93 mmol/L      CO2 26.0 mmol/L      Calcium 9.0 mg/dL      Total Protein 6.8 g/dL      Albumin 3.7 g/dL      ALT (SGPT) 22 U/L      AST (SGOT) 38 U/L      Alkaline Phosphatase 124 U/L      Total Bilirubin 0.3 mg/dL      Globulin 3.1 gm/dL      A/G Ratio 1.2 g/dL      BUN/Creatinine Ratio 11.1     Anion Gap 12.0 mmol/L      eGFR 11.3 mL/min/1.73     Narrative:      GFR Categories in Chronic Kidney Disease (CKD)              GFR Category          GFR (mL/min/1.73)    Interpretation  G1                    90 or greater        Normal or  high (1)  G2                    60-89                Mild decrease (1)  G3a                   45-59                Mild to moderate decrease  G3b                   30-44                Moderate to severe decrease  G4                    15-29                Severe decrease  G5                    14 or less           Kidney failure    (1)In the absence of evidence of kidney disease, neither GFR category G1 or G2 fulfill the criteria for CKD.    eGFR calculation 2021 CKD-EPI creatinine equation, which does not include race as a factor    Protime-INR [[id number]]  (Normal) Collected: 06/18/25 1045    Specimen: Blood Updated: 06/18/25 1120     Protime 12.3 Seconds      INR 0.93    Kell Draw [[id number]] Collected: 06/18/25 1045    Specimen: Blood Updated: 06/18/25 1117    Narrative:      The following orders were created for panel order Kell Draw.  Procedure                               Abnormality         Status                     ---------                               -----------         ------                     Green Top (Gel)[[id number]]                                  Final result               Lavender Top[[id number]]                                     Final result               Gold Top - SST[[id number]]                                   Final result               Light Blue Top[[id number]]                                   Final result                 Please view results for these tests on the individual orders.    Green Top (Gel) [[id number]] Collected: 06/18/25 1045    Specimen: Blood Updated: 06/18/25 1117     Extra Tube Hold for add-ons.     Comment: Auto resulted.       Lavender Top [[id number]] Collected: 06/18/25 1045    Specimen: Blood Updated: 06/18/25 1117     Extra Tube hold for add-on     Comment: Auto resulted       Gold Top - SST [[id number]] Collected: 06/18/25 1045    Specimen: Blood Updated: 06/18/25 1117     Extra Tube Hold for add-ons.     Comment: Auto resulted.       Light Blue Top  [[id number]] Collected: 06/18/25 1045    Specimen: Blood Updated: 06/18/25 1117     Extra Tube Hold for add-ons.     Comment: Auto resulted       POC Lactate [[id number]]  (Normal) Collected: 06/18/25 1112    Specimen: Blood Updated: 06/18/25 1113     Lactate 1.9 mmol/L      Comment: Serial Number: [id number]:  [id number]       CBC & Differential [[id number]]  (Abnormal) Collected: 06/18/25 1045    Specimen: Blood Updated: 06/18/25 1110    Narrative:      The following orders were created for panel order CBC & Differential.  Procedure                               Abnormality         Status                     ---------                               -----------         ------                     CBC Auto Differential[[id number]]        Abnormal            Final result                 Please view results for these tests on the individual orders.    CBC Auto Differential [[id number]]  (Abnormal) Collected: 06/18/25 1045    Specimen: Blood Updated: 06/18/25 1110     WBC 5.00 10*3/mm3      RBC 2.99 10*6/mm3      Hemoglobin 8.0 g/dL      Hematocrit 24.7 %      MCV 82.6 fL      MCH 26.8 pg      MCHC 32.4 g/dL      RDW 16.9 %      RDW-SD 50.1 fl      MPV 9.8 fL      Platelets 205 10*3/mm3      Neutrophil % 65.6 %      Lymphocyte % 19.8 %      Monocyte % 9.0 %      Eosinophil % 4.8 %      Basophil % 0.6 %      Immature Grans % 0.2 %      Neutrophils, Absolute 3.28 10*3/mm3      Lymphocytes, Absolute 0.99 10*3/mm3      Monocytes, Absolute 0.45 10*3/mm3      Eosinophils, Absolute 0.24 10*3/mm3      Basophils, Absolute 0.03 10*3/mm3      Immature Grans, Absolute 0.01 10*3/mm3      nRBC 0.0 /100 WBC     Blood Culture - Blood, Arm, Left [[id number]] Collected: 06/18/25 1045    Specimen: Blood from Arm, Left Updated: 06/18/25 1106             Imaging Results (Last 24 Hours)       Procedure Component Value Units Date/Time    CT Abdomen Pelvis With Contrast [[id number]] Collected: 06/18/25 1235     Updated: 06/18/25 1300     Narrative:      CT ABDOMEN PELVIS W CONTRAST    Date of Exam: 6/18/2025 12:00 PM EDT    Indication: 2 week h/o dark stool, constipation, dry heaving.    Comparison: CTA abdomen pelvis 5/19/2025, CT abdomen 5/18/2025.    Technique: Axial CT images were obtained of the abdomen and pelvis following the uneventful intravenous administration of iodinated contrast. Sagittal and coronal reconstructions were performed.  Automated exposure control and iterative reconstruction   methods were used.    Findings:  Motion-degraded exam.    No focal airspace consolidation in the lung bases. Cardiomegaly. Partially visualized pacemaker leads.    No morphologic changes of chronic liver disease. Hypoattenuating lesions with nodular enhancement matching blood pool located in the right and left hepatic lobes, each measuring around 15 mm and possibly representative of hemangiomas; of note, the right   hepatic lobe lesion appears present as far back as 2019. There is also a small/subcentimeter cyst in hepatic dome. Borderline distention of the gallbladder without additional findings of acute cholecystitis. Low-grade dilatation of the biliary tree to   the level of the ampulla, unchanged and possibly senescent ectasia. Calcification in the pancreatic body/tail may be sequela of prior inflammation. No findings of acute pancreatitis. Borderline ectatic pancreatic duct without overt dilatation, unchanged.   Spleen is normal in size.    No adrenal nodule. Kidneys are symmetric in size and enhancement without hydronephrosis. Small simple-appearing right renal cyst. Punctate focus of gas within the urinary bladder, which is otherwise unremarkable. Endometrium may be abnormally thickened,   though suboptimally evaluated on this exam. New trace pelvic free fluid, nonspecific. No evidence of bowel obstruction. Normal appendix. No free air.    No new or progressive lymphadenopathy. Atherosclerosis. No abdominal aortic aneurysm. See recent CTA for  further vascular assessment. No body wall abnormality. Multilevel spondylosis. No acute or suspicious osseous lesion.      Impression:      Impression:  Motion-degraded exam.    Punctate focus of gas within the urinary bladder. If no recent history of instrumentation, recommend correlation with urinalysis to assess for cystitis.     New trace pelvic free fluid, nonspecific.    Possible endometrial thickening, though not well assessed on this exam. Consider further evaluation with nonemergent/outpatient pelvic ultrasound.    Stable chronic/ancillary findings as above.      Electronically Signed: Justin Abraham MD    6/18/2025 12:58 PM EDT    Workstation ID: [id number]              Assessment & Plan        This is a 84 y.o. female with:    Active and Resolved Problems  Active Hospital Problems    Diagnosis  POA   • **ESRD (end stage renal disease) on dialysis [N18.6, Z99.2]  Not Applicable      Resolved Hospital Problems   No resolved problems to display.       ESRD on HD  HD schedule: MWF  Last dialyzed 6/16, missed todays HD   Avoid nephrotoxic medications  Pharmacy to renally dose medication  Nephrology consulted to manage     Generalized weakness   Fatigue  Melena  Nausea/Vomiting  CT A/P with no acute findings  UA with no evidence of infection   FOBT negative in ED   Hgb stable, continue to monitor CBC   Troponin 57-> 55, likely 2/2 underlying ESRD  Prn Zofran   PT/OT to evaluate     SSS s/p PPM  Hypertension   Hyperlipidemia   BP elevated on ED arrival with SBP > 200, improved  Continue Clonidine, Hydralazine, Lisinopril, Metoprolol   Continue ASA, statin, Plavix    Diabetes Mellitus   A1c 5.40  Consistent carb diet  Hypoglycemia protocol     Anemia of chronic disease  Hgb stable, at baseline   Continue to monitor CBC  Transfuse if Hgb < 7    H/o renal artery stenosis s/p L renal stent     Anxiety-given Ativan IV in ED        VTE Prophylaxis:  Mechanical VTE prophylaxis orders are present.        The patient  desires to be as follows:    CODE STATUS:    Code Status (Patient has no pulse and is not breathing): CPR (Attempt to Resuscitate)  Medical Interventions (Patient has pulse or is breathing): Full Support        Leana Cr, who can be contacted at [phone] is the designated person to make medical decisions on the patient's behalf if She is incapable of doing so. This was clarified with patient and/or next of kin on 6/18/2025 during the course of this H&P.    Admission Status:  I believe this patient meets observation status.    Expected Length of Stay: 1-2 days    PDMP and Medication Dispenses via Sidebar reviewed and consistent with patient reported medications.    I discussed the patient's findings and my recommendations with patient and family.      Signature:     This document has been electronically signed by Marina Torres PA-C on June 18, 2025 13:33 EDT   [facility]

## 2025-06-18 NOTE — ED PROVIDER NOTES
Subjective   History of Present Illness  84 year old non-English-speaking, [place] female with history significant for CKD-dialysis MWF, DM, HLD, HTN, left renal artery stent, presents with 2- week history of dark stools. She had 1 week history of constipation, followed by dark stool with episodes of dizziness and lightheadedness.  She denies any falls.  Denies chest pain dyspnea.  Denies fever sweats chills.  Reports that she does produce some urine, denies urinary symptoms. She is on Plavix.     History provided by:  Relative   used: Yes (Son at bedside)        Review of Systems   Reason unable to perform ROS: Provided by son at bedside.   Constitutional:  Positive for chills and diaphoresis. Negative for fever.   Respiratory:  Negative for shortness of breath.    Cardiovascular:  Negative for chest pain.   Gastrointestinal:  Positive for blood in stool, constipation, nausea and vomiting. Negative for abdominal pain and diarrhea.   Genitourinary:  Positive for decreased urine volume (dialysis).   Skin:  Negative for pallor.   Allergic/Immunologic: Negative for immunocompromised state.   Neurological:  Positive for dizziness and light-headedness.   Psychiatric/Behavioral:  Positive for confusion.    All other systems reviewed and are negative.      Past Medical History:   Diagnosis Date    Diabetes mellitus     Hyperlipidemia     Hypertension        No Known Allergies    Past Surgical History:   Procedure Laterality Date    ANGIOPLASTY ILIAC ARTERY Left 5/21/2025    Procedure: LEFT RENAL ARTERY ANGIOPLASTY WITH STENT;  Surgeon: Ted Mckee II, MD;  Location: [facility] HYBRID OR;  Service: Vascular;  Laterality: Left;    CARDIAC ELECTROPHYSIOLOGY PROCEDURE N/A 4/20/2021    Procedure: Pacemaker DC new;  Surgeon: Yovany Navarrete MD;  Location: [facility] CATH INVASIVE LOCATION;  Service: Cardiovascular;  Laterality: N/A;       Family History   Family history unknown: Yes       Social History      Socioeconomic History    Marital status:    Tobacco Use    Smoking status: Never     Passive exposure: Never    Smokeless tobacco: Never   Vaping Use    Vaping status: Never Used   Substance and Sexual Activity    Alcohol use: No    Drug use: No    Sexual activity: Defer           Objective   Physical Exam  Vitals and nursing note reviewed. Exam conducted with a chaperone present (Mer RN).   Constitutional:       General: She is awake. She is not in acute distress.     Appearance: Normal appearance. She is well-developed and normal weight. She is ill-appearing, toxic-appearing and diaphoretic.   HENT:      Mouth/Throat:      Mouth: Mucous membranes are moist.   Eyes:      General: No scleral icterus.  Cardiovascular:      Rate and Rhythm: Normal rate and regular rhythm.      Pulses: Normal pulses.      Heart sounds: Normal heart sounds, S1 normal and S2 normal. Heart sounds not distant. No murmur heard.     No friction rub. No gallop.   Pulmonary:      Effort: Pulmonary effort is normal.      Breath sounds: Normal breath sounds and air entry.   Abdominal:      General: Abdomen is flat. Bowel sounds are normal. There is no distension.      Palpations: Abdomen is soft. There is no mass.      Tenderness: There is no abdominal tenderness. There is no right CVA tenderness, left CVA tenderness, guarding or rebound.      Hernia: No hernia is present.   Genitourinary:     Rectum: Normal. Guaiac result negative.      Comments: Dark stool noted.  No opal blood.  Skin:     General: Skin is warm.      Capillary Refill: Capillary refill takes less than 2 seconds.      Coloration: Skin is pale. Skin is not jaundiced.      Findings: No bruising or rash.   Neurological:      Mental Status: She is alert and oriented to person, place, and time.   Psychiatric:         Behavior: Behavior is cooperative.         ECG 12 Lead      Date/Time: 6/18/2025 10:42 AM    Performed by: Micaela Easton APRN  Authorized by: Rustam  CECE Ponce  Interpreted by ED physician  Comparison: compared with previous ECG from 5/16/2025  Similar to previous ECG  Comparison to previous ECG: Sinus rhythm right bundle branch block prolonged QT interval 514  Rhythm: sinus rhythm  Conduction: right bundle branch block  Other findings: LVH  Clinical impression: abnormal ECG             ED Course  ED Course as of 06/18/25 1338   Wed Jun 18, 2025   1112 Awaiting labs and patient to go to CT. [AL]   1212 Awaiting CT results. [AL]   1249 Awaiting CT results. [AL]      ED Course User Index  [AL] Micaela Easton APRN                                                       Medical Decision Making  Problems Addressed:  Admission for dialysis: complicated acute illness or injury  Dark stools: complicated acute illness or injury  Dizziness: complicated acute illness or injury  Generalized weakness: complicated acute illness or injury    Amount and/or Complexity of Data Reviewed  Labs: ordered. Decision-making details documented in ED Course.  Radiology: ordered. Decision-making details documented in ED Course.  ECG/medicine tests: ordered and independent interpretation performed. Decision-making details documented in ED Course.    Risk  Prescription drug management.  Decision regarding hospitalization.    Interpreted by radiologist as below:     CT Abdomen Pelvis With Contrast  Result Date: 6/18/2025  Impression: Motion-degraded exam. Punctate focus of gas within the urinary bladder. If no recent history of instrumentation, recommend correlation with urinalysis to assess for cystitis. New trace pelvic free fluid, nonspecific. Possible endometrial thickening, though not well assessed on this exam. Consider further evaluation with nonemergent/outpatient pelvic ultrasound. Stable chronic/ancillary findings as above. Electronically Signed: Justin Abraham MD  6/18/2025 12:58 PM EDT  Workstation ID: [id number]        /50   Pulse 60   Temp 98.4 °F (36.9 °C)   Resp 14    "Ht 162.6 cm (64\")   Wt 61.2 kg (134 lb 14.7 oz)   SpO2 97%   BMI 23.16 kg/m²      Lab Results (last 24 hours)       Procedure Component Value Units Date/Time    CBC & Differential [[id number]]  (Abnormal) Collected: 06/18/25 1045    Specimen: Blood Updated: 06/18/25 1110    Narrative:      The following orders were created for panel order CBC & Differential.  Procedure                               Abnormality         Status                     ---------                               -----------         ------                     CBC Auto Differential[[id number]]        Abnormal            Final result                 Please view results for these tests on the individual orders.    Comprehensive Metabolic Panel [[id number]]  (Abnormal) Collected: 06/18/25 1045    Specimen: Blood Updated: 06/18/25 1139     Glucose 117 mg/dL      BUN 42.1 mg/dL      Creatinine 3.79 mg/dL      Sodium 131 mmol/L      Potassium 3.6 mmol/L      Chloride 93 mmol/L      CO2 26.0 mmol/L      Calcium 9.0 mg/dL      Total Protein 6.8 g/dL      Albumin 3.7 g/dL      ALT (SGPT) 22 U/L      AST (SGOT) 38 U/L      Alkaline Phosphatase 124 U/L      Total Bilirubin 0.3 mg/dL      Globulin 3.1 gm/dL      A/G Ratio 1.2 g/dL      BUN/Creatinine Ratio 11.1     Anion Gap 12.0 mmol/L      eGFR 11.3 mL/min/1.73     Narrative:      GFR Categories in Chronic Kidney Disease (CKD)              GFR Category          GFR (mL/min/1.73)    Interpretation  G1                    90 or greater        Normal or high (1)  G2                    60-89                Mild decrease (1)  G3a                   45-59                Mild to moderate decrease  G3b                   30-44                Moderate to severe decrease  G4                    15-29                Severe decrease  G5                    14 or less           Kidney failure    (1)In the absence of evidence of kidney disease, neither GFR category G1 or G2 fulfill the criteria for CKD.    eGFR calculation " 2021 CKD-EPI creatinine equation, which does not include race as a factor    Protime-INR [[id number]]  (Normal) Collected: 06/18/25 1045    Specimen: Blood Updated: 06/18/25 1120     Protime 12.3 Seconds      INR 0.93    Blood Culture - Blood, Arm, Left [[id number]] Collected: 06/18/25 1045    Specimen: Blood from Arm, Left Updated: 06/18/25 1106    High Sensitivity Troponin T [[id number]]  (Abnormal) Collected: 06/18/25 1045    Specimen: Blood Updated: 06/18/25 1148     HS Troponin T 57 ng/L     Narrative:      High Sensitive Troponin T Reference Range:  <14.0 ng/L- Negative Female for AMI  <22.0 ng/L- Negative Male for AMI  >=14 - Abnormal Female indicating possible myocardial injury.  >=22 - Abnormal Male indicating possible myocardial injury.   Clinicians would have to utilize clinical acumen, EKG, Troponin, and serial changes to determine if it is an Acute Myocardial Infarction or myocardial injury due to an underlying chronic condition.         CBC Auto Differential [[id number]]  (Abnormal) Collected: 06/18/25 1045    Specimen: Blood Updated: 06/18/25 1110     WBC 5.00 10*3/mm3      RBC 2.99 10*6/mm3      Hemoglobin 8.0 g/dL      Hematocrit 24.7 %      MCV 82.6 fL      MCH 26.8 pg      MCHC 32.4 g/dL      RDW 16.9 %      RDW-SD 50.1 fl      MPV 9.8 fL      Platelets 205 10*3/mm3      Neutrophil % 65.6 %      Lymphocyte % 19.8 %      Monocyte % 9.0 %      Eosinophil % 4.8 %      Basophil % 0.6 %      Immature Grans % 0.2 %      Neutrophils, Absolute 3.28 10*3/mm3      Lymphocytes, Absolute 0.99 10*3/mm3      Monocytes, Absolute 0.45 10*3/mm3      Eosinophils, Absolute 0.24 10*3/mm3      Basophils, Absolute 0.03 10*3/mm3      Immature Grans, Absolute 0.01 10*3/mm3      nRBC 0.0 /100 WBC     POC Lactate [[id number]]  (Normal) Collected: 06/18/25 1112    Specimen: Blood Updated: 06/18/25 1113     Lactate 1.9 mmol/L      Comment: Serial Number: [id number]:  [id number]       Blood Culture - Blood, Arm, Left  [[id number]] Collected: 06/18/25 1146    Specimen: Blood from Arm, Left Updated: 06/18/25 1151    Urinalysis With Culture If Indicated - Straight Cath [[id number]]  (Abnormal) Collected: 06/18/25 1156    Specimen: Urine from Straight Cath Updated: 06/18/25 1206     Color, UA Yellow     Appearance, UA Clear     pH, UA 8.5     Specific Gravity, UA 1.009     Glucose,  mg/dL (Trace)     Ketones, UA Negative     Bilirubin, UA Negative     Blood, UA Negative     Protein, UA >=300 mg/dL (3+)     Leuk Esterase, UA Negative     Nitrite, UA Negative     Urobilinogen, UA 0.2 E.U./dL    Narrative:      In absence of clinical symptoms, the presence of pyuria, bacteria, and/or nitrites on the urinalysis result does not correlate with infection.    Urinalysis, Microscopic Only - Straight Cath [[id number]]  (Abnormal) Collected: 06/18/25 1156    Specimen: Urine from Straight Cath Updated: 06/18/25 1210     RBC, UA 3-5 /HPF      WBC, UA 3-5 /HPF      Comment: Urine culture not indicated.        Bacteria, UA None Seen /HPF      Squamous Epithelial Cells, UA 0-2 /HPF      Hyaline Casts, UA None Seen /LPF      Methodology Automated Microscopy    High Sensitivity Troponin T 1Hr [[id number]]  (Abnormal) Collected: 06/18/25 1159    Specimen: Blood from Arm, Right Updated: 06/18/25 1237     HS Troponin T 55 ng/L      Troponin T Numeric Delta -2 ng/L      Troponin T % Delta -4    Narrative:      High Sensitive Troponin T Reference Range:  <14.0 ng/L- Negative Female for AMI  <22.0 ng/L- Negative Male for AMI  >=14 - Abnormal Female indicating possible myocardial injury.  >=22 - Abnormal Male indicating possible myocardial injury.   Clinicians would have to utilize clinical acumen, EKG, Troponin, and serial changes to determine if it is an Acute Myocardial Infarction or myocardial injury due to an underlying chronic condition.                  Medications   sodium chloride 0.9 % flush 10 mL (has no administration in time range)   Potassium  Replacement - Follow Nurse / BPA Driven Protocol (has no administration in time range)   Magnesium Low Dose Replacement - Follow Nurse / BPA Driven Protocol (has no administration in time range)   Phosphorus Replacement - Follow Nurse / BPA Driven Protocol (has no administration in time range)   Calcium Replacement - Follow Nurse / BPA Driven Protocol (has no administration in time range)   sennosides-docusate (PERICOLACE) 8.6-50 MG per tablet 2 tablet (has no administration in time range)     And   polyethylene glycol (MIRALAX) packet 17 g (has no administration in time range)     And   bisacodyl (DULCOLAX) EC tablet 5 mg (has no administration in time range)     And   bisacodyl (DULCOLAX) suppository 10 mg (has no administration in time range)   melatonin tablet 5 mg (has no administration in time range)   acetaminophen (TYLENOL) tablet 650 mg (has no administration in time range)     Or   acetaminophen (TYLENOL) 160 MG/5ML oral solution 650 mg (has no administration in time range)     Or   acetaminophen (TYLENOL) suppository 650 mg (has no administration in time range)   ondansetron ODT (ZOFRAN-ODT) disintegrating tablet 4 mg (has no administration in time range)     Or   ondansetron (ZOFRAN) injection 4 mg (has no administration in time range)   ondansetron (ZOFRAN) injection 4 mg (4 mg Intravenous Given 6/18/25 1117)   sodium chloride 0.9 % bolus 250 mL (0 mL Intravenous Stopped 6/18/25 1216)   LORazepam (ATIVAN) injection 1 mg (1 mg Intravenous Given 6/18/25 1140)   iopamidol (ISOVUE-370) 76 % injection 100 mL (100 mL Intravenous Given 6/18/25 1215)        Appropriate PPE worn during patient exam.  Appropriate monitoring initiated. Patient is alert with intermittent confusion.  Difficult to assess due to non-English-speaking, being translated by her son at bedside.  She is also tearful and tremulous.  Abdomen is soft, round, nontender.  Bowel sounds present all 4 quadrants.  IV established and labs obtained.   Patient was gently hydrated with normal saline 250 cc bolus.  She was given Zofran 4 mg IV.  CT of the abdomen pelvis with IV contrast obtained with the above findings.  Patient was given Ativan 1 mg IV prior to CT CBC CMP similar to previous.  Lactic 1.9 blood cultures pending.  Urine shows no signs of infection. Troponin elevated 57, similar to previous visits 1 and 2 months prior.  INR 0.93.  CT showed no acute findings.  Spoke with DUNIA Dong with the hospitalist group who agrees patient should be admitted for management of generalized weakness and dialysis.  Consulted nephrology, did not hear back from them.    I reviewed chart 6/12/2025 patient was seen at [facility] for evaluation for peritoneal dialysis. My radiology interpretation CT abdomen pelvis shows no acute obstruction, mass. Imaging was independently interpreted by Dr. Venegas.  Differential Diagnoses, not all-inclusive and does not constitute entirety of all causes: UTI, bowel obstruction, GI bleeding.  UTI ruled out by urinalysis.  Bowel obstruction ruled out by imaging.  GI bleeding ruled out by Hemoccult.    Disposition/Treatment: Discussed results with patient, verbalized understanding. Agreeable with plan of care. Patient was stable upon disposition. Upon reassessment, patient is flesh tone warm and dry no acute distress noted.  Vital signs are stable.     Part of this note may be an electronic transcription/translation of spoken language to printed text using the Dragon Dictation System.   Final diagnoses:   Generalized weakness   Dark stools   Dizziness   Admission for dialysis       ED Disposition  ED Disposition       ED Disposition   Decision to Admit    Condition   --    Comment   Level of Care: Med/Surg [1]   Diagnosis: ESRD (end stage renal disease) on dialysis [139831]   Admitting Physician: JOCELYNE GUZMAN [[id number]]   Attending Physician: JOCELYNE GUZMAN [[id number]]                 No follow-up provider specified.       Medication List       No changes were made to your prescriptions during this visit.            Micaela Easton, APRN  06/18/25 1501

## 2025-06-18 NOTE — SIGNIFICANT NOTE
06/18/25 1538   OTHER   Discipline physical therapist   Rehab Time/Intention   Session Not Performed other (see comments)  (pt currently [facility] for dialysis. last admission, pt IND with mobility. will f/u tomorrow for mobility screen vs evaluation as appropriate.)   Therapy Assessment/Plan (PT)   Criteria for Skilled Interventions Met (PT) yes;meets criteria   Recommendation   PT - Next Appointment 06/19/25   Recommendation   OT - Next Appointment 06/19/25

## 2025-06-18 NOTE — Clinical Note
Level of Care: Telemetry [5]   Admitting Physician: JOCELYNE GUZMAN [[id number]]   Attending Physician: JOCELYNE GUZMAN [[id number]]

## 2025-06-19 ENCOUNTER — ANESTHESIA (OUTPATIENT)
Dept: GASTROENTEROLOGY | Facility: HOSPITAL | Age: 85
End: 2025-06-19
Payer: MEDICAID

## 2025-06-19 ENCOUNTER — ANESTHESIA EVENT (OUTPATIENT)
Dept: GASTROENTEROLOGY | Facility: HOSPITAL | Age: 85
End: 2025-06-19
Payer: MEDICAID

## 2025-06-19 PROBLEM — K92.1 MELENA: Status: ACTIVE | Noted: 2025-06-18

## 2025-06-19 LAB
ALBUMIN SERPL-MCNC: 2.9 G/DL (ref 3.5–5.2)
ALP SERPL-CCNC: 92 U/L (ref 39–117)
ALT SERPL W P-5'-P-CCNC: 17 U/L (ref 1–33)
ANION GAP SERPL CALCULATED.3IONS-SCNC: 9.7 MMOL/L (ref 5–15)
AST SERPL-CCNC: 31 U/L (ref 1–32)
BASOPHILS # BLD AUTO: 0.02 10*3/MM3 (ref 0–0.2)
BASOPHILS NFR BLD AUTO: 0.6 % (ref 0–1.5)
BH BB BLOOD EXPIRATION DATE: NORMAL
BH BB BLOOD TYPE BARCODE: 7300
BH BB DISPENSE STATUS: NORMAL
BH BB PRODUCT CODE: NORMAL
BH BB UNIT NUMBER: NORMAL
BILIRUB CONJ SERPL-MCNC: 0.1 MG/DL (ref 0–0.3)
BILIRUB CONJ SERPL-MCNC: 0.1 MG/DL (ref 0–0.3)
BILIRUB INDIRECT SERPL-MCNC: 0.2 MG/DL
BILIRUB INDIRECT SERPL-MCNC: 0.2 MG/DL
BILIRUB SERPL-MCNC: 0.3 MG/DL (ref 0–1.2)
BILIRUB SERPL-MCNC: 0.3 MG/DL (ref 0–1.2)
BUN SERPL-MCNC: 21.8 MG/DL (ref 8–23)
BUN/CREAT SERPL: 8.6 (ref 7–25)
CALCIUM SPEC-SCNC: 7.7 MG/DL (ref 8.6–10.5)
CHLORIDE SERPL-SCNC: 97 MMOL/L (ref 98–107)
CO2 SERPL-SCNC: 28.3 MMOL/L (ref 22–29)
CREAT SERPL-MCNC: 2.53 MG/DL (ref 0.57–1)
CROSSMATCH INTERPRETATION: NORMAL
DEPRECATED RDW RBC AUTO: 51 FL (ref 37–54)
EGFRCR SERPLBLD CKD-EPI 2021: 18.3 ML/MIN/1.73
EOSINOPHIL # BLD AUTO: 0.31 10*3/MM3 (ref 0–0.4)
EOSINOPHIL NFR BLD AUTO: 8.9 % (ref 0.3–6.2)
ERYTHROCYTE [DISTWIDTH] IN BLOOD BY AUTOMATED COUNT: 17 % (ref 12.3–15.4)
FERRITIN SERPL-MCNC: 296 NG/ML (ref 13–150)
FOLATE SERPL-MCNC: 11.8 NG/ML (ref 4.78–24.2)
GLUCOSE BLDC GLUCOMTR-MCNC: 102 MG/DL (ref 70–105)
GLUCOSE BLDC GLUCOMTR-MCNC: 116 MG/DL (ref 70–105)
GLUCOSE BLDC GLUCOMTR-MCNC: 125 MG/DL (ref 70–105)
GLUCOSE SERPL-MCNC: 72 MG/DL (ref 65–99)
HAPTOGLOB SERPL-MCNC: 58 MG/DL (ref 30–200)
HBV SURFACE AG SERPL QL IA: NORMAL
HCT VFR BLD AUTO: 18.4 % (ref 34–46.6)
HCT VFR BLD AUTO: 19.7 % (ref 34–46.6)
HCT VFR BLD AUTO: 23.7 % (ref 34–46.6)
HGB BLD-MCNC: 6 G/DL (ref 12–15.9)
HGB BLD-MCNC: 6.4 G/DL (ref 12–15.9)
HGB BLD-MCNC: 7.8 G/DL (ref 12–15.9)
IMM GRANULOCYTES # BLD AUTO: 0.01 10*3/MM3 (ref 0–0.05)
IMM GRANULOCYTES NFR BLD AUTO: 0.3 % (ref 0–0.5)
IRON 24H UR-MRATE: 79 MCG/DL (ref 37–145)
IRON SATN MFR SERPL: 34 % (ref 20–50)
LDH SERPL-CCNC: 228 U/L (ref 135–214)
LYMPHOCYTES # BLD AUTO: 1.29 10*3/MM3 (ref 0.7–3.1)
LYMPHOCYTES NFR BLD AUTO: 37.1 % (ref 19.6–45.3)
MAGNESIUM SERPL-MCNC: 1.7 MG/DL (ref 1.6–2.4)
MCH RBC QN AUTO: 26.9 PG (ref 26.6–33)
MCHC RBC AUTO-ENTMCNC: 32.6 G/DL (ref 31.5–35.7)
MCV RBC AUTO: 82.5 FL (ref 79–97)
MONOCYTES # BLD AUTO: 0.46 10*3/MM3 (ref 0.1–0.9)
MONOCYTES NFR BLD AUTO: 13.2 % (ref 5–12)
NEUTROPHILS NFR BLD AUTO: 1.39 10*3/MM3 (ref 1.7–7)
NEUTROPHILS NFR BLD AUTO: 39.9 % (ref 42.7–76)
NRBC BLD AUTO-RTO: 0 /100 WBC (ref 0–0.2)
PHOSPHATE SERPL-MCNC: 2.3 MG/DL (ref 2.5–4.5)
PLATELET # BLD AUTO: 135 10*3/MM3 (ref 140–450)
PMV BLD AUTO: 9.5 FL (ref 6–12)
POTASSIUM SERPL-SCNC: 3.8 MMOL/L (ref 3.5–5.2)
PROT SERPL-MCNC: 5.2 G/DL (ref 6–8.5)
QT INTERVAL: 456 MS
QTC INTERVAL: 489 MS
RBC # BLD AUTO: 2.23 10*6/MM3 (ref 3.77–5.28)
SODIUM SERPL-SCNC: 135 MMOL/L (ref 136–145)
TIBC SERPL-MCNC: 235 MCG/DL (ref 298–536)
TRANSFERRIN SERPL-MCNC: 158 MG/DL (ref 200–360)
TROPONIN T SERPL HS-MCNC: 61 NG/L
UNIT  ABO: NORMAL
UNIT  RH: NORMAL
VIT B12 BLD-MCNC: >2000 PG/ML (ref 211–946)
WBC NRBC COR # BLD AUTO: 3.48 10*3/MM3 (ref 3.4–10.8)

## 2025-06-19 PROCEDURE — 85018 HEMOGLOBIN: CPT | Performed by: INTERNAL MEDICINE

## 2025-06-19 PROCEDURE — 80076 HEPATIC FUNCTION PANEL: CPT

## 2025-06-19 PROCEDURE — 88342 IMHCHEM/IMCYTCHM 1ST ANTB: CPT | Performed by: INTERNAL MEDICINE

## 2025-06-19 PROCEDURE — 0DB68ZX EXCISION OF STOMACH, VIA NATURAL OR ARTIFICIAL OPENING ENDOSCOPIC, DIAGNOSTIC: ICD-10-PCS | Performed by: INTERNAL MEDICINE

## 2025-06-19 PROCEDURE — 82948 REAGENT STRIP/BLOOD GLUCOSE: CPT

## 2025-06-19 PROCEDURE — 85025 COMPLETE CBC W/AUTO DIFF WBC: CPT

## 2025-06-19 PROCEDURE — 85014 HEMATOCRIT: CPT | Performed by: INTERNAL MEDICINE

## 2025-06-19 PROCEDURE — 82247 BILIRUBIN TOTAL: CPT

## 2025-06-19 PROCEDURE — 84100 ASSAY OF PHOSPHORUS: CPT

## 2025-06-19 PROCEDURE — 82248 BILIRUBIN DIRECT: CPT

## 2025-06-19 PROCEDURE — 83540 ASSAY OF IRON: CPT

## 2025-06-19 PROCEDURE — 88305 TISSUE EXAM BY PATHOLOGIST: CPT | Performed by: INTERNAL MEDICINE

## 2025-06-19 PROCEDURE — 84466 ASSAY OF TRANSFERRIN: CPT

## 2025-06-19 PROCEDURE — 83615 LACTATE (LD) (LDH) ENZYME: CPT

## 2025-06-19 PROCEDURE — 36430 TRANSFUSION BLD/BLD COMPNT: CPT

## 2025-06-19 PROCEDURE — 85018 HEMOGLOBIN: CPT | Performed by: NURSE PRACTITIONER

## 2025-06-19 PROCEDURE — 85014 HEMATOCRIT: CPT | Performed by: NURSE PRACTITIONER

## 2025-06-19 PROCEDURE — 80048 BASIC METABOLIC PNL TOTAL CA: CPT

## 2025-06-19 PROCEDURE — 25010000002 LIDOCAINE PF 2% 2 % SOLUTION

## 2025-06-19 PROCEDURE — 86900 BLOOD TYPING SEROLOGIC ABO: CPT

## 2025-06-19 PROCEDURE — 25010000002 ONDANSETRON PER 1 MG

## 2025-06-19 PROCEDURE — 83735 ASSAY OF MAGNESIUM: CPT

## 2025-06-19 PROCEDURE — P9016 RBC LEUKOCYTES REDUCED: HCPCS

## 2025-06-19 PROCEDURE — 84484 ASSAY OF TROPONIN QUANT: CPT | Performed by: INTERNAL MEDICINE

## 2025-06-19 PROCEDURE — 99222 1ST HOSP IP/OBS MODERATE 55: CPT | Performed by: NURSE PRACTITIONER

## 2025-06-19 PROCEDURE — 25010000002 PROCHLORPERAZINE 10 MG/2ML SOLUTION: Performed by: NURSE PRACTITIONER

## 2025-06-19 PROCEDURE — 83010 ASSAY OF HAPTOGLOBIN QUANT: CPT

## 2025-06-19 PROCEDURE — 25810000003 SODIUM CHLORIDE 0.9 % SOLUTION

## 2025-06-19 PROCEDURE — P9041 ALBUMIN (HUMAN),5%, 50ML: HCPCS | Performed by: NURSE PRACTITIONER

## 2025-06-19 PROCEDURE — 99222 1ST HOSP IP/OBS MODERATE 55: CPT | Performed by: INTERNAL MEDICINE

## 2025-06-19 PROCEDURE — 25010000002 EPOETIN ALFA-EPBX 20000 UNIT/ML SOLUTION: Performed by: INTERNAL MEDICINE

## 2025-06-19 PROCEDURE — 25010000002 PROPOFOL 10 MG/ML EMULSION

## 2025-06-19 PROCEDURE — 82728 ASSAY OF FERRITIN: CPT

## 2025-06-19 PROCEDURE — 25010000002 ALBUMIN HUMAN 5% PER 50 ML: Performed by: NURSE PRACTITIONER

## 2025-06-19 RX ORDER — SODIUM CHLORIDE 9 MG/ML
INJECTION, SOLUTION INTRAVENOUS CONTINUOUS PRN
Status: DISCONTINUED | OUTPATIENT
Start: 2025-06-19 | End: 2025-06-19 | Stop reason: SURG

## 2025-06-19 RX ORDER — ALBUMIN HUMAN 50 G/1000ML
500 SOLUTION INTRAVENOUS ONCE
Status: COMPLETED | OUTPATIENT
Start: 2025-06-19 | End: 2025-06-20

## 2025-06-19 RX ORDER — LOSARTAN POTASSIUM 50 MG/1
100 TABLET ORAL
Status: DISCONTINUED | OUTPATIENT
Start: 2025-06-20 | End: 2025-06-24 | Stop reason: HOSPADM

## 2025-06-19 RX ORDER — IPRATROPIUM BROMIDE AND ALBUTEROL SULFATE 2.5; .5 MG/3ML; MG/3ML
3 SOLUTION RESPIRATORY (INHALATION) ONCE AS NEEDED
Status: DISCONTINUED | OUTPATIENT
Start: 2025-06-19 | End: 2025-06-19 | Stop reason: HOSPADM

## 2025-06-19 RX ORDER — HYDRALAZINE HYDROCHLORIDE 20 MG/ML
5 INJECTION INTRAMUSCULAR; INTRAVENOUS
Status: DISCONTINUED | OUTPATIENT
Start: 2025-06-19 | End: 2025-06-19 | Stop reason: HOSPADM

## 2025-06-19 RX ORDER — PANTOPRAZOLE SODIUM 40 MG/10ML
40 INJECTION, POWDER, LYOPHILIZED, FOR SOLUTION INTRAVENOUS
Status: DISCONTINUED | OUTPATIENT
Start: 2025-06-19 | End: 2025-06-24 | Stop reason: HOSPADM

## 2025-06-19 RX ORDER — LIDOCAINE HYDROCHLORIDE 20 MG/ML
INJECTION, SOLUTION EPIDURAL; INFILTRATION; INTRACAUDAL; PERINEURAL AS NEEDED
Status: DISCONTINUED | OUTPATIENT
Start: 2025-06-19 | End: 2025-06-19 | Stop reason: SURG

## 2025-06-19 RX ORDER — PROPOFOL 10 MG/ML
VIAL (ML) INTRAVENOUS AS NEEDED
Status: DISCONTINUED | OUTPATIENT
Start: 2025-06-19 | End: 2025-06-19 | Stop reason: SURG

## 2025-06-19 RX ORDER — METOPROLOL SUCCINATE 25 MG/1
25 TABLET, EXTENDED RELEASE ORAL ONCE
Status: COMPLETED | OUTPATIENT
Start: 2025-06-19 | End: 2025-06-19

## 2025-06-19 RX ORDER — CLONIDINE HYDROCHLORIDE 0.1 MG/1
0.3 TABLET ORAL EVERY 8 HOURS SCHEDULED
Status: DISCONTINUED | OUTPATIENT
Start: 2025-06-19 | End: 2025-06-24 | Stop reason: HOSPADM

## 2025-06-19 RX ORDER — AMLODIPINE BESYLATE 5 MG/1
10 TABLET ORAL
Status: DISCONTINUED | OUTPATIENT
Start: 2025-06-20 | End: 2025-06-19

## 2025-06-19 RX ORDER — ONDANSETRON 2 MG/ML
4 INJECTION INTRAMUSCULAR; INTRAVENOUS ONCE AS NEEDED
Status: DISCONTINUED | OUTPATIENT
Start: 2025-06-19 | End: 2025-06-19 | Stop reason: HOSPADM

## 2025-06-19 RX ORDER — SODIUM, POTASSIUM,MAG SULFATES 17.5-3.13G
1 SOLUTION, RECONSTITUTED, ORAL ORAL EVERY 12 HOURS
Status: DISPENSED | OUTPATIENT
Start: 2025-06-19 | End: 2025-06-20

## 2025-06-19 RX ORDER — PROCHLORPERAZINE EDISYLATE 5 MG/ML
5 INJECTION INTRAMUSCULAR; INTRAVENOUS ONCE AS NEEDED
Status: COMPLETED | OUTPATIENT
Start: 2025-06-19 | End: 2025-06-19

## 2025-06-19 RX ORDER — LABETALOL HYDROCHLORIDE 5 MG/ML
5 INJECTION, SOLUTION INTRAVENOUS
Status: DISCONTINUED | OUTPATIENT
Start: 2025-06-19 | End: 2025-06-19 | Stop reason: HOSPADM

## 2025-06-19 RX ORDER — PANTOPRAZOLE SODIUM 40 MG/10ML
40 INJECTION, POWDER, LYOPHILIZED, FOR SOLUTION INTRAVENOUS
Status: DISCONTINUED | OUTPATIENT
Start: 2025-06-19 | End: 2025-06-19

## 2025-06-19 RX ORDER — EPHEDRINE SULFATE 5 MG/ML
5 INJECTION INTRAVENOUS ONCE AS NEEDED
Status: DISCONTINUED | OUTPATIENT
Start: 2025-06-19 | End: 2025-06-19 | Stop reason: HOSPADM

## 2025-06-19 RX ORDER — METOPROLOL SUCCINATE 50 MG/1
50 TABLET, EXTENDED RELEASE ORAL EVERY 12 HOURS SCHEDULED
Status: DISCONTINUED | OUTPATIENT
Start: 2025-06-19 | End: 2025-06-19

## 2025-06-19 RX ORDER — AMLODIPINE BESYLATE 5 MG/1
5 TABLET ORAL ONCE
Status: COMPLETED | OUTPATIENT
Start: 2025-06-19 | End: 2025-06-19

## 2025-06-19 RX ORDER — METOPROLOL SUCCINATE 25 MG/1
25 TABLET, EXTENDED RELEASE ORAL ONCE
Status: DISCONTINUED | OUTPATIENT
Start: 2025-06-19 | End: 2025-06-19

## 2025-06-19 RX ORDER — AMLODIPINE BESYLATE 5 MG/1
5 TABLET ORAL
Status: DISCONTINUED | OUTPATIENT
Start: 2025-06-19 | End: 2025-06-19

## 2025-06-19 RX ORDER — AMLODIPINE BESYLATE 5 MG/1
10 TABLET ORAL
Status: DISCONTINUED | OUTPATIENT
Start: 2025-06-19 | End: 2025-06-19

## 2025-06-19 RX ADMIN — PANTOPRAZOLE SODIUM 40 MG: 40 INJECTION, POWDER, FOR SOLUTION INTRAVENOUS at 08:00

## 2025-06-19 RX ADMIN — EPOETIN ALFA-EPBX 20000 UNITS: 20000 INJECTION, SOLUTION INTRAVENOUS; SUBCUTANEOUS at 09:41

## 2025-06-19 RX ADMIN — PROCHLORPERAZINE EDISYLATE 5 MG: 5 INJECTION INTRAMUSCULAR; INTRAVENOUS at 21:29

## 2025-06-19 RX ADMIN — ATORVASTATIN CALCIUM 80 MG: 40 TABLET, FILM COATED ORAL at 08:00

## 2025-06-19 RX ADMIN — LIDOCAINE HYDROCHLORIDE 50 MG: 20 INJECTION, SOLUTION EPIDURAL; INFILTRATION; INTRACAUDAL; PERINEURAL at 14:54

## 2025-06-19 RX ADMIN — Medication 1 BOTTLE: at 17:15

## 2025-06-19 RX ADMIN — ONDANSETRON 4 MG: 2 INJECTION, SOLUTION INTRAMUSCULAR; INTRAVENOUS at 18:45

## 2025-06-19 RX ADMIN — CLOPIDOGREL BISULFATE 75 MG: 75 TABLET, FILM COATED ORAL at 08:00

## 2025-06-19 RX ADMIN — METOPROLOL SUCCINATE 25 MG: 25 TABLET, EXTENDED RELEASE ORAL at 08:00

## 2025-06-19 RX ADMIN — PROPOFOL 50 MG: 10 INJECTION, EMULSION INTRAVENOUS at 14:54

## 2025-06-19 RX ADMIN — METOPROLOL SUCCINATE 25 MG: 25 TABLET, EXTENDED RELEASE ORAL at 09:41

## 2025-06-19 RX ADMIN — PROPOFOL 20 MG: 10 INJECTION, EMULSION INTRAVENOUS at 14:59

## 2025-06-19 RX ADMIN — AMLODIPINE BESYLATE 5 MG: 5 TABLET ORAL at 08:00

## 2025-06-19 RX ADMIN — AMLODIPINE BESYLATE 5 MG: 5 TABLET ORAL at 09:41

## 2025-06-19 RX ADMIN — HYDRALAZINE HYDROCHLORIDE 100 MG: 25 TABLET ORAL at 08:00

## 2025-06-19 RX ADMIN — ALBUMIN (HUMAN) 500 ML: 12.5 INJECTION, SOLUTION INTRAVENOUS at 23:55

## 2025-06-19 RX ADMIN — LISINOPRIL 40 MG: 20 TABLET ORAL at 08:00

## 2025-06-19 RX ADMIN — ASPIRIN 81 MG CHEWABLE TABLET 81 MG: 81 TABLET CHEWABLE at 08:00

## 2025-06-19 RX ADMIN — SODIUM CHLORIDE: 9 INJECTION, SOLUTION INTRAVENOUS at 14:49

## 2025-06-19 RX ADMIN — CLONIDINE HYDROCHLORIDE 0.3 MG: 0.1 TABLET ORAL at 14:00

## 2025-06-19 RX ADMIN — HYDRALAZINE HYDROCHLORIDE 100 MG: 25 TABLET ORAL at 14:00

## 2025-06-19 RX ADMIN — PANTOPRAZOLE SODIUM 40 MG: 40 INJECTION, POWDER, FOR SOLUTION INTRAVENOUS at 17:47

## 2025-06-19 RX ADMIN — CLONIDINE HYDROCHLORIDE 0.3 MG: 0.1 TABLET ORAL at 08:00

## 2025-06-19 NOTE — CONSULTS
Name: Brad Woodward ADMIT: 2025   : 1940  PCP: Brian Nazario APRN    MRN: [id number] LOS: 0 days   AGE/SEX: 84 y.o. female  ROOM: 300/1   [facility]      Patient Care Team:  Brian Nazario APRN as PCP - Zane Hassan MD as Consulting Physician (Cardiology)  Marnie Brandt APRN as Nurse Practitioner (Cardiology)  Chief Complaint   Patient presents with   • Weakness - Generalized       CC: Renal artery stent    Subjective     Inpatient Vascular Surgery Consult  Consult performed by: Claudia Enamorado APRN  Consult ordered by: Samuel Ornelas MD          History of Present Illness  Brad Woodward is a 84 y.o. female with a past medical history of  hypertension, hyperlipidemia, diabetes mellitus, pacemaker placement, and ESRD on HD, and RYLEY s/p stenting who presented to [facility] on 2025 for dark stools and she was admitted for further workup and care.  On arrival, her hemoglobin was 8.0, on  hemoglobin 6.0.  She was transfused 1 unit PRBC and GI was consulted.  Patient is on aspirin and Plavix due to recent renal artery stenting, vascular surgery was consulted to evaluate stopping these medications in light of possible GI bleed.  Patient underwent left renal artery stent angioplasty on  by Dr. Mckee.  She was last seen in the office on 6/10.  Today patient is fatigued.  She is somnolent this morning.  Daughter is at the bedside.  Blood pressure remains elevated.    Review of Systems   Constitutional:  Positive for fatigue.   Gastrointestinal:  Positive for blood in stool (dark stools).       Past Medical History:   Diagnosis Date   • Diabetes mellitus    • Hyperlipidemia    • Hypertension      Past Surgical History:   Procedure Laterality Date   • ANGIOPLASTY ILIAC ARTERY Left 2025    Procedure: LEFT RENAL ARTERY ANGIOPLASTY WITH STENT;  Surgeon: Ted Mckee II, MD;  Location: [facility];  Service: Vascular;  Laterality: Left;   • CARDIAC  ELECTROPHYSIOLOGY PROCEDURE N/A 4/20/2021    Procedure: Pacemaker DC new;  Surgeon: Yovany Navarrete MD;  Location: [facility] INVASIVE LOCATION;  Service: Cardiovascular;  Laterality: N/A;     Family History   Family history unknown: Yes       Social History     Tobacco Use   • Smoking status: Never     Passive exposure: Never   • Smokeless tobacco: Never   Vaping Use   • Vaping status: Never Used   Substance Use Topics   • Alcohol use: No   • Drug use: No     Medications Prior to Admission   Medication Sig Dispense Refill Last Dose/Taking   • aspirin 81 MG chewable tablet Chew 1 tablet Daily. 270 tablet 2 Taking   • atorvastatin (LIPITOR) 80 MG tablet Take 1 tablet by mouth Daily. 270 tablet 1 Taking   • cloNIDine (CATAPRES) 0.3 MG tablet Take 1 tablet by mouth 2 (Two) Times a Day.   Taking   • clopidogrel (PLAVIX) 75 MG tablet Take 1 tablet by mouth Daily for 30 days. 30 tablet 0 Taking   • Cyanocobalamin (VITAMIN B-12 PO) Take 1 tablet by mouth Daily.   Taking   • docusate sodium (COLACE) 100 MG capsule Take 1 capsule by mouth Every 12 (Twelve) Hours.   Taking   • hydrALAZINE (APRESOLINE) 100 MG tablet Take 1 tablet by mouth 3 times a day for 30 days. 90 tablet 0 Taking   • lisinopril (PRINIVIL,ZESTRIL) 40 MG tablet Take 1 tablet by mouth Daily.   Taking   • metoprolol succinate XL (TOPROL-XL) 25 MG 24 hr tablet Take 1 tablet by mouth Daily for 30 days. 30 tablet 0 Taking   • potassium chloride (KLOR-CON M20) 20 MEQ CR tablet Take 1 tablet by mouth 2 (Two) Times a Day.   Taking     amLODIPine, 5 mg, Oral, Q24H  aspirin, 81 mg, Oral, Daily  atorvastatin, 80 mg, Oral, Daily  cloNIDine, 0.3 mg, Oral, Q8H  clopidogrel, 75 mg, Oral, Daily  epoetin rickie/rickie-epbx, 20,000 Units, Subcutaneous, Once  [START ON 6/20/2025] epoetin rickie/rickie-epbx, 20,000 Units, Intravenous, Once per day on Monday Wednesday Friday  hydrALAZINE, 100 mg, Oral, TID  lisinopril, 40 mg, Oral, Daily  metoprolol succinate XL, 25 mg, Oral,  "Daily  pantoprazole, 40 mg, Intravenous, Q AM         •  acetaminophen **OR** acetaminophen **OR** acetaminophen  •  senna-docusate sodium **AND** polyethylene glycol **AND** bisacodyl **AND** bisacodyl  •  Calcium Replacement - Follow Nurse / BPA Driven Protocol  •  dextrose  •  dextrose  •  glucagon (human recombinant)  •  heparin (porcine)  •  Magnesium Low Dose Replacement - Follow Nurse / BPA Driven Protocol  •  melatonin  •  ondansetron ODT **OR** ondansetron  •  Phosphorus Replacement - Follow Nurse / BPA Driven Protocol  •  Potassium Replacement - Follow Nurse / BPA Driven Protocol  •  sodium chloride  Patient has no known allergies.    Objective     Physical Exam:   NAD, alert and oriented, somnolent  RRR  Lungs clear  Abd soft, benign  Palpable bilateral radial pulses  Right chest TDC    Vital Signs and Labs:  Vital Signs Patient Vitals for the past 24 hrs:   BP Temp Temp src Pulse Resp SpO2 Height Weight   06/19/25 0736 (!) 204/67 98 °F (36.7 °C) Oral 64 15 97 % -- --   06/19/25 0623 (!) 191/67 98.1 °F (36.7 °C) Oral 61 15 97 % -- --   06/19/25 0423 167/56 98.5 °F (36.9 °C) Oral 60 16 95 % -- --   06/18/25 2300 151/45 98 °F (36.7 °C) Oral 60 15 96 % -- --   06/18/25 1900 166/61 -- -- 62 -- 94 % -- --   06/18/25 1831 156/72 97.8 °F (36.6 °C) Oral 60 12 95 % 160 cm (63\") 61.9 kg (136 lb 7.4 oz)   06/18/25 1815 (!) 176/112 -- -- 60 11 -- -- --   06/18/25 1730 138/69 -- -- 60 10 -- -- --   06/18/25 1700 155/76 -- -- 60 14 -- -- --   06/18/25 1630 165/62 -- -- 60 13 -- -- --   06/18/25 1600 151/61 -- -- 60 13 -- -- --   06/18/25 1530 159/60 -- -- 60 11 -- -- --   06/18/25 1500 145/60 -- -- 60 12 -- -- --   06/18/25 1430 163/62 98.4 °F (36.9 °C) -- 60 9 98 % -- --   06/18/25 1400 (!) 182/62 -- -- 60 -- 94 % -- --   06/18/25 1331 168/60 -- -- 60 -- 96 % -- --   06/18/25 1303 147/50 -- -- 60 -- 97 % -- --   06/18/25 1232 162/54 -- -- 63 -- 92 % -- --   06/18/25 1220 157/55 -- -- 67 -- 94 % -- --   06/18/25 1143 " "(!) 213/78 -- -- 77 -- 96 % -- --   06/18/25 1123 (!) 201/82 -- -- 75 -- 97 % -- --   06/18/25 1117 (!) 236/88 -- -- 79 -- 96 % 162.6 cm (64\") 61.2 kg (134 lb 14.7 oz)   06/18/25 1035 (!) 213/69 -- -- 74 -- 96 % -- --   06/18/25 1005 -- 98.4 °F (36.9 °C) -- 80 14 -- -- --   06/18/25 1003 171/70 -- -- -- -- -- -- --     BMI:  Body mass index is 24.17 kg/m².    CBC    Results from last 7 days   Lab Units 06/19/25  0525 06/18/25  1045   WBC 10*3/mm3 3.48 5.00   HEMOGLOBIN g/dL 6.0* 8.0*   PLATELETS 10*3/mm3 135* 205     BMP   Results from last 7 days   Lab Units 06/19/25  0429 06/18/25  1045   SODIUM mmol/L 135* 131*   POTASSIUM mmol/L 3.8 3.6   CHLORIDE mmol/L 97* 93*   CO2 mmol/L 28.3 26.0   BUN mg/dL 21.8 42.1*   CREATININE mg/dL 2.53* 3.79*   GLUCOSE mg/dL 72 117*   MAGNESIUM mg/dL 1.7  --    PHOSPHORUS mg/dL 2.3*  --      Coag   Results from last 7 days   Lab Units 06/18/25  1045   INR  0.93     HbA1C   Lab Results   Component Value Date    HGBA1C 5.40 04/15/2025    HGBA1C 5.9 (H) 04/16/2021     Infection     Radiology(recent) CT Abdomen Pelvis With Contrast  Result Date: 6/18/2025  Impression: Motion-degraded exam. Punctate focus of gas within the urinary bladder. If no recent history of instrumentation, recommend correlation with urinalysis to assess for cystitis. New trace pelvic free fluid, nonspecific. Possible endometrial thickening, though not well assessed on this exam. Consider further evaluation with nonemergent/outpatient pelvic ultrasound. Stable chronic/ancillary findings as above. Electronically Signed: Justin Abraham MD  6/18/2025 12:58 PM EDT  Workstation ID: [id number]     VTE Prophylaxis:  Pharmacologic & mechanical VTE prophylaxis orders are present.        Active Hospital Problems    Diagnosis  POA   • **ESRD (end stage renal disease) on dialysis [N18.6, Z99.2]  Not Applicable      Resolved Hospital Problems   No resolved problems to display.       Assessment & Plan   Assessment / Plan     ESRD " (end stage renal disease) on dialysis      84 y.o. female who presents with 2-week history of dark stools  Found to be anemic on arrival at 8.0, this morning hemoglobin 6.0 and plans to transfuse PRBC  GI consulted for possible GIB  She is on aspirin and Plavix for history of renal artery stent that was placed on 5/21 by Dr. Rafi Duron to hold antiplatelets in the setting of GIB  Recommend restarting as soon as safely possible and cleared by GI    Thank you for this consultation.        CECE Stout  [facility] Surgery  06/19/25   O: [phone]  F: [phone]

## 2025-06-19 NOTE — SIGNIFICANT NOTE
06/19/25 0950   OTHER   Discipline physical therapist   Rehab Time/Intention   Session Not Performed other (see comments)  (Chart reviewed.  Hgb 6.0 and plans for EGD today. Will f/u as appropriate for therapy eval.)   Recommendation   PT - Next Appointment 06/20/25

## 2025-06-19 NOTE — PROGRESS NOTES
[facility] MEDICINE SERVICE  DAILY PROGRESS NOTE    NAME: Brad Woodward  : 1940  MRN: [id number]      LOS: 0 days     PROVIDER OF SERVICE: Kingsley Wooten MD    Chief Complaint: ESRD (end stage renal disease) on dialysis    Subjective:     Interval History:  History taken from: patient chart         History of Present Illness: Brad Woodward is a 84 y.o. female with a CMH of  SSS s/p PPM, ESRD on HD, DM2, HTN, HLD, h/o renal artery stenosis s/p L renal stent who presented to [facility] on 2025 with dark stools x 2 weeks.  Patient is not English-speaking,  was not available. Family assisted with history as patient lethargic after medications (Ativan) in ED). Daughter reports patient had constipation x 1 week which was then followed by dark stool after taking stool softeners.  They also reports chills, fatigue, weakness and episodes of dizziness and lightheadedness.  Denies syncopal episodes or fall. Family reports missing dialysis session today due to coming to ED.  HD schedule is .  Patient noted to still make urine in small amounts.     On ED evaluation, patient was noted to be hypertensive with SBP > 200s.  Patient was also noted to be anxious and was given Ativan with subsequent improvement of BP.  CBC and CMP were unremarkable from baseline.  LFTs WNL.  Lactic acid was 1.9.  Initial troponin 57, repeat 55.  EKG sinus rhythm, RBBB, rate of 69 with prolonged QT at 514.  UA was not concerning for infection.  CT A/P with no acute findings.  Patient was given Zofran with no relief of symptoms. Due to missing dialysis today and intractable nausea, patient to be admitted under hospitalist service.        seen and examined in bed no acute distress, discussed with RN, blood pressure has been elevated.  Family at bedside, hemoglobin 6.0.  GI was consulted: Plan for EGD today for further evaluation of melena and anemia.   Review of Systems  Unable to perform  ROS: Mental status change   Objective:     Vital Signs  Temp:  [97.8 °F (36.6 °C)-98.5 °F (36.9 °C)] 98 °F (36.7 °C)  Heart Rate:  [60-80] 61  Resp:  [9-16] 15  BP: (138-236)/() 192/65   Body mass index is 24.17 kg/m².    Physical Exam   General: 85 yo F, lethargic but arousable, well nourished, no acute distress.  HENT: Normocephalic, normal hearing, moist oral mucosa, no scleral icterus.  Neck: Supple, nontender, no carotid bruits, no JVD, no LAD.  Lungs: Clear to auscultation, nonlabored respiration.  Heart: RRR, no murmur, gallop or edema.  Abdomen: Soft, nontender, nondistended, + bowel sounds.  Musculoskeletal: Normal range of motion and strength, no tenderness or swelling.  Skin: Skin is warm, dry and pink, no rashes or lesions.  Psychiatric: Cooperative, appropriate mood and affect.       Diagnostic Data    Results from last 7 days   Lab Units 06/19/25  0525 06/19/25  0429   WBC 10*3/mm3 3.48  --    HEMOGLOBIN g/dL 6.0*  --    HEMATOCRIT % 18.4*  --    PLATELETS 10*3/mm3 135*  --    GLUCOSE mg/dL  --  72   CREATININE mg/dL  --  2.53*   BUN mg/dL  --  21.8   SODIUM mmol/L  --  135*   POTASSIUM mmol/L  --  3.8   AST (SGOT) U/L  --  31   ALT (SGPT) U/L  --  17   ALK PHOS U/L  --  92   BILIRUBIN mg/dL  --  0.3   ANION GAP mmol/L  --  9.7       CT Abdomen Pelvis With Contrast  Result Date: 6/18/2025  Impression: Motion-degraded exam. Punctate focus of gas within the urinary bladder. If no recent history of instrumentation, recommend correlation with urinalysis to assess for cystitis. New trace pelvic free fluid, nonspecific. Possible endometrial thickening, though not well assessed on this exam. Consider further evaluation with nonemergent/outpatient pelvic ultrasound. Stable chronic/ancillary findings as above. Electronically Signed: Justin Abraham MD  6/18/2025 12:58 PM EDT  Workstation ID: [id number]      Scheduled Meds:[START ON 6/20/2025] amLODIPine, 10 mg, Oral, Q24H  aspirin, 81 mg, Oral,  Daily  atorvastatin, 80 mg, Oral, Daily  cloNIDine, 0.3 mg, Oral, Q8H  clopidogrel, 75 mg, Oral, Daily  [START ON 6/20/2025] epoetin rickie/rickie-epbx, 20,000 Units, Intravenous, Once per day on Monday Wednesday Friday  hydrALAZINE, 100 mg, Oral, TID  [START ON 6/20/2025] losartan, 100 mg, Oral, Q24H  metoprolol succinate XL, 50 mg, Oral, Q12H  pantoprazole, 40 mg, Intravenous, BID AC      Continuous Infusions:   PRN Meds:.  acetaminophen **OR** acetaminophen **OR** acetaminophen    senna-docusate sodium **AND** polyethylene glycol **AND** bisacodyl **AND** bisacodyl    Calcium Replacement - Follow Nurse / BPA Driven Protocol    dextrose    dextrose    glucagon (human recombinant)    heparin (porcine)    Magnesium Low Dose Replacement - Follow Nurse / BPA Driven Protocol    melatonin    ondansetron ODT **OR** ondansetron    Phosphorus Replacement - Follow Nurse / BPA Driven Protocol    Potassium Replacement - Follow Nurse / BPA Driven Protocol    sodium chloride   I reviewed the patient's new clinical results.    Assessment/Plan:     Active and Resolved Problems  Active Hospital Problems    Diagnosis  POA    **ESRD (end stage renal disease) on dialysis [N18.6, Z99.2]  Not Applicable    Melena [K92.1]  Unknown      Resolved Hospital Problems   No resolved problems to display.     ESRD on HD  HD schedule: MWF  Last dialyzed 6/16, missed todays HD   Avoid nephrotoxic medications  Pharmacy to renally dose medication  Nephrology consulted to manage      Generalized weakness   Fatigue  Melena  Nausea/Vomiting  CT A/P with no acute findings  UA with no evidence of infection   FOBT negative in ED   Hgb stable, continue to monitor CBC   Troponin 57-> 55, likely 2/2 underlying ESRD  Prn Zofran   PT/OT to evaluate      SSS s/p PPM  Hypertension   Hyperlipidemia   BP elevated on ED arrival with SBP > 200, improved  Continue Clonidine, Hydralazine, Lisinopril, Metoprolol   Continue ASA, statin, Plavix     Diabetes Mellitus   A1c  5.40  Consistent carb diet  Hypoglycemia protocol      Anemia of chronic disease  Hgb dropped  Continue to monitor CBC  Transfuse if Hgb < 7  GI consult for EGD  Hold aspirin and Plavix consulted cardiology     H/o renal artery stenosis s/p L renal stent      Anxiety-given Ativan IV in ED    Elevated troponin, likely rate related to chronic renal failure   - Serial troponin     VTE Prophylaxis:  Pharmacologic & mechanical VTE prophylaxis orders are present.    Disposition Planning:   Barriers to Discharge:GI bleed  Anticipated Date of Discharge: 6/22  Place of Discharge: [facility]  Time: 35 minutes     Code Status and Medical Interventions: CPR (Attempt to Resuscitate); Full Support   Ordered at: 06/18/25 1332     Code Status (Patient has no pulse and is not breathing):    CPR (Attempt to Resuscitate)     Medical Interventions (Patient has pulse or is breathing):    Full Support       Signature: Electronically signed by Kingsley Wooten MD, 06/19/25, 09:50 EDT.  [facility] Hospitalist Team

## 2025-06-19 NOTE — ANESTHESIA POSTPROCEDURE EVALUATION
Patient: Brad Woodward    Procedure Summary       Date: 06/19/25 Room / Location: [facility] ENDOSCOPY 1 / [facility] ENDOSCOPY    Anesthesia Start: 1449 Anesthesia Stop: 1506    Procedure: ESOPHAGOGASTRODUODENOSCOPY WITH BIOPSY Diagnosis:       Melena      (Melena [K92.1])    Surgeons: RAMON De Leon MD Provider: Peewee Love MD    Anesthesia Type: general ASA Status: 4            Anesthesia Type: general    Vitals  Vitals Value Taken Time   /59 06/19/25 15:21   Temp     Pulse 60 06/19/25 15:22   Resp 10 06/19/25 15:21   SpO2 97 % 06/19/25 15:22   Vitals shown include unfiled device data.        Post Anesthesia Care and Evaluation    Patient location during evaluation: PACU  Patient participation: complete - patient participated  Level of consciousness: awake  Pain scale: See nurse's notes for pain score.  Pain management: adequate    Airway patency: patent  Anesthetic complications: No anesthetic complications  PONV Status: none  Cardiovascular status: acceptable  Respiratory status: acceptable and spontaneous ventilation  Hydration status: acceptable    Comments: Patient seen and examined postoperatively; vital signs stable; SpO2 greater than or equal to 90%; cardiopulmonary status stable; nausea/vomiting adequately controlled; pain adequately controlled; no apparent anesthesia complications; patient discharged from anesthesia care when discharge criteria were met

## 2025-06-19 NOTE — CONSULTS
GI CONSULT  NOTE:    Referring Provider: Dr. King    Chief complaint: Dark tarry stools, low hemoglobin    Subjective .     History of present illness: Brad Woodward is a 84-year-old [place]ian, non-English-speaking female with a past medical history of ESRD on HD (MWF), right bundle branch block s/p pacemaker on aspirin and Plavix, DMII, HLD, and HTN [facility] for complaints of dark tarry stools.  Thus, GI was consulted.   was used during assessment.  Patient reports feeling tired for the past month.  Reports new onset of black and tarry stools around the same time.  Reports 4-5 episodes of melena daily over the past 2 days.  She normally would have a BM every 7-10 days.  Denies prior history of rectal bleeding or abdominal surgeries.  She has not taken Plavix or aspirin in 2 days.  Denies nausea, vomiting, or abdominal pain.  Weight is stable.    Endo History:  No prior EGDs or colonoscopies.    Past Medical History:  Past Medical History:   Diagnosis Date    Diabetes mellitus     Hyperlipidemia     Hypertension        Past Surgical History:  Past Surgical History:   Procedure Laterality Date    ANGIOPLASTY ILIAC ARTERY Left 5/21/2025    Procedure: LEFT RENAL ARTERY ANGIOPLASTY WITH STENT;  Surgeon: Ted Mckee II, MD;  Location: [facility] HYBRID OR;  Service: Vascular;  Laterality: Left;    CARDIAC ELECTROPHYSIOLOGY PROCEDURE N/A 4/20/2021    Procedure: Pacemaker DC new;  Surgeon: Yovany Navarrete MD;  Location: [facility] CATH INVASIVE LOCATION;  Service: Cardiovascular;  Laterality: N/A;       Social History:  Social History     Tobacco Use    Smoking status: Never     Passive exposure: Never    Smokeless tobacco: Never   Vaping Use    Vaping status: Never Used   Substance Use Topics    Alcohol use: No    Drug use: No       Family History:  Family History   Family history unknown: Yes       Medications:  Medications Prior to Admission   Medication Sig Dispense Refill  Last Dose/Taking    aspirin 81 MG chewable tablet Chew 1 tablet Daily. 270 tablet 2 Taking    atorvastatin (LIPITOR) 80 MG tablet Take 1 tablet by mouth Daily. 270 tablet 1 Taking    cloNIDine (CATAPRES) 0.3 MG tablet Take 1 tablet by mouth 2 (Two) Times a Day.   Taking    clopidogrel (PLAVIX) 75 MG tablet Take 1 tablet by mouth Daily for 30 days. 30 tablet 0 Taking    Cyanocobalamin (VITAMIN B-12 PO) Take 1 tablet by mouth Daily.   Taking    docusate sodium (COLACE) 100 MG capsule Take 1 capsule by mouth Every 12 (Twelve) Hours.   Taking    hydrALAZINE (APRESOLINE) 100 MG tablet Take 1 tablet by mouth 3 times a day for 30 days. 90 tablet 0 Taking    lisinopril (PRINIVIL,ZESTRIL) 40 MG tablet Take 1 tablet by mouth Daily.   Taking    metoprolol succinate XL (TOPROL-XL) 25 MG 24 hr tablet Take 1 tablet by mouth Daily for 30 days. 30 tablet 0 Taking    potassium chloride (KLOR-CON M20) 20 MEQ CR tablet Take 1 tablet by mouth 2 (Two) Times a Day.   Taking       Scheduled Meds:[START ON 6/20/2025] amLODIPine, 10 mg, Oral, Q24H  amLODIPine, 5 mg, Oral, Once  aspirin, 81 mg, Oral, Daily  atorvastatin, 80 mg, Oral, Daily  cloNIDine, 0.3 mg, Oral, Q8H  clopidogrel, 75 mg, Oral, Daily  epoetin rickie/rickie-epbx, 20,000 Units, Subcutaneous, Once  [START ON 6/20/2025] epoetin rickie/rickie-epbx, 20,000 Units, Intravenous, Once per day on Monday Wednesday Friday  hydrALAZINE, 100 mg, Oral, TID  [START ON 6/20/2025] losartan, 100 mg, Oral, Q24H  metoprolol succinate XL, 25 mg, Oral, Once  metoprolol succinate XL, 50 mg, Oral, Q12H  pantoprazole, 40 mg, Intravenous, BID AC      Continuous Infusions:   PRN Meds:.  acetaminophen **OR** acetaminophen **OR** acetaminophen    senna-docusate sodium **AND** polyethylene glycol **AND** bisacodyl **AND** bisacodyl    Calcium Replacement - Follow Nurse / BPA Driven Protocol    dextrose    dextrose    glucagon (human recombinant)    heparin (porcine)    Magnesium Low Dose Replacement - Follow  Nurse / BPA Driven Protocol    melatonin    ondansetron ODT **OR** ondansetron    Phosphorus Replacement - Follow Nurse / BPA Driven Protocol    Potassium Replacement - Follow Nurse / BPA Driven Protocol    sodium chloride    ALLERGIES:  Patient has no known allergies.    ROS:  The following systems were reviewed and negative;   Constitution:  No fevers, chills, no unintentional weight loss  Skin: no rash, no jaundice  Eyes:  No blurry vision, no eye pain  HENT:  No change in hearing or smell  Resp:  No dyspnea or cough  CV:  No chest pain or palpitations  :  No dysuria, hematuria  Musculoskeletal:  No leg cramps or arthralgias  Neuro:  No tremor, no numbness  Psych:  No depression or confusion    Objective     Vital Signs:   Vitals:    06/19/25 0423 06/19/25 0623 06/19/25 0736 06/19/25 0839   BP: 167/56 (!) 191/67 (!) 204/67 (!) 192/65   BP Location: Left arm Left arm Left arm Left arm   Patient Position: Lying Lying Lying Lying   Pulse: 60 61 64 61   Resp: 16 15 15    Temp: 98.5 °F (36.9 °C) 98.1 °F (36.7 °C) 98 °F (36.7 °C)    TempSrc: Oral Oral Oral    SpO2: 95% 97% 97% 95%   Weight:       Height:           Physical Exam:       General Appearance:    Awake and alert, in no acute distress, lying in bed   Head:    Normocephalic, without obvious abnormality, atraumatic       Lungs:     Respirations regular, even and unlabored, room air   Chest Wall:    No abnormalities observed   Abdomen:     Soft, non-tender, no rebound or guarding, non-distended       Extremities:   Moves all extremities, no edema, no cyanosis       Skin:   No rash, no jaundice       Neurologic:   Cranial nerves 2 - 12 grossly intact, no asterixis       Results Review:   I reviewed the patient's labs and imaging.  Results from last 7 days   Lab Units 06/19/25  0525 06/18/25  1045   WBC 10*3/mm3 3.48 5.00   HEMOGLOBIN g/dL 6.0* 8.0*   PLATELETS 10*3/mm3 135* 205     Results from last 7 days   Lab Units 06/19/25  0429 06/18/25  1159  06/18/25  1045   SODIUM mmol/L 135*  --  131*   POTASSIUM mmol/L 3.8  --  3.6   CHLORIDE mmol/L 97*  --  93*   CO2 mmol/L 28.3  --  26.0   BUN mg/dL 21.8  --  42.1*   CREATININE mg/dL 2.53*  --  3.79*   GLUCOSE mg/dL 72  --  117*   ALBUMIN g/dL 2.9*  --  3.7   BILIRUBIN mg/dL 0.3  --  0.3   ALK PHOS U/L 92  --  124*   AST (SGOT) U/L 31  --  38*   ALT (SGPT) U/L 17  --  22   INR   --   --  0.93   MAGNESIUM mg/dL 1.7  --   --    PHOSPHORUS mg/dL 2.3*  --   --    LIPASE U/L  --  128*  --      Estimated Creatinine Clearance: 16.2 mL/min (A) (by C-G formula based on SCr of 2.53 mg/dL (H)).  Lab Results   Component Value Date    HGBA1C 5.40 04/15/2025    HGBA1C 5.9 (H) 04/16/2021     Results from last 7 days   Lab Units 06/18/25  1159 06/18/25  1045   HSTROP T ng/L 55* 57*     Infection     UA    Results from last 7 days   Lab Units 06/18/25  1156   NITRITE UA  Negative   WBC UA /HPF 3-5*   BACTERIA UA /HPF None Seen   SQUAM EPITHEL UA /HPF 0-2     Microbiology Results (last 10 days)       ** No results found for the last 240 hours. **          Imaging Results (Last 72 Hours)       Procedure Component Value Units Date/Time    CT Abdomen Pelvis With Contrast [[id number]] Collected: 06/18/25 1235     Updated: 06/18/25 1300    Narrative:      CT ABDOMEN PELVIS W CONTRAST    Date of Exam: 6/18/2025 12:00 PM EDT    Indication: 2 week h/o dark stool, constipation, dry heaving.    Comparison: CTA abdomen pelvis 5/19/2025, CT abdomen 5/18/2025.    Technique: Axial CT images were obtained of the abdomen and pelvis following the uneventful intravenous administration of iodinated contrast. Sagittal and coronal reconstructions were performed.  Automated exposure control and iterative reconstruction   methods were used.    Findings:  Motion-degraded exam.    No focal airspace consolidation in the lung bases. Cardiomegaly. Partially visualized pacemaker leads.    No morphologic changes of chronic liver disease. Hypoattenuating lesions  with nodular enhancement matching blood pool located in the right and left hepatic lobes, each measuring around 15 mm and possibly representative of hemangiomas; of note, the right   hepatic lobe lesion appears present as far back as 2019. There is also a small/subcentimeter cyst in hepatic dome. Borderline distention of the gallbladder without additional findings of acute cholecystitis. Low-grade dilatation of the biliary tree to   the level of the ampulla, unchanged and possibly senescent ectasia. Calcification in the pancreatic body/tail may be sequela of prior inflammation. No findings of acute pancreatitis. Borderline ectatic pancreatic duct without overt dilatation, unchanged.   Spleen is normal in size.    No adrenal nodule. Kidneys are symmetric in size and enhancement without hydronephrosis. Small simple-appearing right renal cyst. Punctate focus of gas within the urinary bladder, which is otherwise unremarkable. Endometrium may be abnormally thickened,   though suboptimally evaluated on this exam. New trace pelvic free fluid, nonspecific. No evidence of bowel obstruction. Normal appendix. No free air.    No new or progressive lymphadenopathy. Atherosclerosis. No abdominal aortic aneurysm. See recent CTA for further vascular assessment. No body wall abnormality. Multilevel spondylosis. No acute or suspicious osseous lesion.      Impression:      Impression:  Motion-degraded exam.    Punctate focus of gas within the urinary bladder. If no recent history of instrumentation, recommend correlation with urinalysis to assess for cystitis.     New trace pelvic free fluid, nonspecific.    Possible endometrial thickening, though not well assessed on this exam. Consider further evaluation with nonemergent/outpatient pelvic ultrasound.    Stable chronic/ancillary findings as above.      Electronically Signed: Justin Abraham MD    6/18/2025 12:58 PM EDT    Workstation ID: [id number]          Patient is a 84-year-old  [place], non-English-speaking female with a past medical history of ESRD on HD (MW), right bundle branch block s/p pacemaker on aspirin and Plavix, DMII, HLD, and HTN who presented to [facility] ED on 6/18/2025 for complaints of dark tarry stools.  Thus, GI was consulted.      ASSESSMENT:  Melena  Normocytic anemia  Thrombocytopenia  Elevated troponin  History of ESRD on HD (MWF)  History of right bundle branch block s/p pacemaker on aspirin and Plavix  History of DMII    PLAN:  - Plan for EGD today for further evaluation of melena and anemia.  - Patient to remain n.p.o. until after endoscopy today.  - Increase pantoprazole 40 mg to twice daily  - Hemoglobin 6 from 8 upon arrival.  Continue to monitor H&H and transfuse as needed for hemoglobin less than 7.  Check anemia labs.  -Platelets 135.  Continue to trend.  - Troponin 55.  INR 0.93.  Lipase 128.  LFTs unremarkable this morning.  UA negative for UTI.  - Creatinine 2.53, BUN 21.8  - CT A/P with contrast-Motion degraded.  Punctate focus of gas in bladder, possible cystitis.  Trace free pelvic fluid.  Questionable endometrial wall thickening.  - Supportive care.    I discussed the patients findings and my recommendations with the patient.    We appreciate the referral    Electronically signed by CECE Patel, 06/19/25, 9:34 AM EDT.

## 2025-06-19 NOTE — CONSULTS
RENAL/[facility]:    Referring Provider: Dr. Wooten  Reason for Consultation: ESRD    Subjective     Chief complaint: Weakness    History of present illness:  Patient is an 85 yo  female with h/o ESRD on MWF HD as well as resistant HTN who presents with weakness.  Hgb 8 on admission and is 6 today.  Family reports dark stools.  She recently had a renal artery stent placed and is on ASA and Plavix at home.  No other complaints.  Had HD yesterday.    History  Past Medical History:   Diagnosis Date    Diabetes mellitus     Hyperlipidemia     Hypertension    ,   Past Surgical History:   Procedure Laterality Date    ANGIOPLASTY ILIAC ARTERY Left 5/21/2025    Procedure: LEFT RENAL ARTERY ANGIOPLASTY WITH STENT;  Surgeon: Ted Mckee II, MD;  Location: [facility] HYBRID OR;  Service: Vascular;  Laterality: Left;    CARDIAC ELECTROPHYSIOLOGY PROCEDURE N/A 4/20/2021    Procedure: Pacemaker DC new;  Surgeon: Yovany Navarrete MD;  Location: [facility] CATH INVASIVE LOCATION;  Service: Cardiovascular;  Laterality: N/A;   ,   Family History   Family history unknown: Yes   ,   Social History     Socioeconomic History    Marital status:    Tobacco Use    Smoking status: Never     Passive exposure: Never    Smokeless tobacco: Never   Vaping Use    Vaping status: Never Used   Substance and Sexual Activity    Alcohol use: No    Drug use: No    Sexual activity: Defer     E-cigarette/Vaping    E-cigarette/Vaping Use Never User     Passive Exposure No     Counseling Given No      E-cigarette/Vaping Substances    Nicotine No     THC No     CBD No     Flavoring No      E-cigarette/Vaping Devices    Disposable No     Pre-filled or Refillable Cartridge No     Refillable Tank No     Pre-filled Pod No          ,   Medications Prior to Admission   Medication Sig Dispense Refill Last Dose/Taking    aspirin 81 MG chewable tablet Chew 1 tablet Daily. 270 tablet 2 Taking    atorvastatin (LIPITOR) 80 MG tablet Take 1 tablet by mouth Daily. 270  tablet 1 Taking    cloNIDine (CATAPRES) 0.3 MG tablet Take 1 tablet by mouth 2 (Two) Times a Day.   Taking    clopidogrel (PLAVIX) 75 MG tablet Take 1 tablet by mouth Daily for 30 days. 30 tablet 0 Taking    Cyanocobalamin (VITAMIN B-12 PO) Take 1 tablet by mouth Daily.   Taking    docusate sodium (COLACE) 100 MG capsule Take 1 capsule by mouth Every 12 (Twelve) Hours.   Taking    hydrALAZINE (APRESOLINE) 100 MG tablet Take 1 tablet by mouth 3 times a day for 30 days. 90 tablet 0 Taking    lisinopril (PRINIVIL,ZESTRIL) 40 MG tablet Take 1 tablet by mouth Daily.   Taking    metoprolol succinate XL (TOPROL-XL) 25 MG 24 hr tablet Take 1 tablet by mouth Daily for 30 days. 30 tablet 0 Taking    potassium chloride (KLOR-CON M20) 20 MEQ CR tablet Take 1 tablet by mouth 2 (Two) Times a Day.   Taking   , Scheduled Meds:  amLODIPine, 5 mg, Oral, Q24H  aspirin, 81 mg, Oral, Daily  atorvastatin, 80 mg, Oral, Daily  cloNIDine, 0.3 mg, Oral, Q8H  clopidogrel, 75 mg, Oral, Daily  epoetin rickie/rickie-epbx, 20,000 Units, Subcutaneous, Once  [START ON 6/20/2025] epoetin rickie/rickie-epbx, 20,000 Units, Intravenous, Once per day on Monday Wednesday Friday  hydrALAZINE, 100 mg, Oral, TID  lisinopril, 40 mg, Oral, Daily  metoprolol succinate XL, 25 mg, Oral, Daily  pantoprazole, 40 mg, Intravenous, Q AM   , Continuous Infusions:   , PRN Meds:    acetaminophen **OR** acetaminophen **OR** acetaminophen    senna-docusate sodium **AND** polyethylene glycol **AND** bisacodyl **AND** bisacodyl    Calcium Replacement - Follow Nurse / BPA Driven Protocol    dextrose    dextrose    glucagon (human recombinant)    heparin (porcine)    Magnesium Low Dose Replacement - Follow Nurse / BPA Driven Protocol    melatonin    ondansetron ODT **OR** ondansetron    Phosphorus Replacement - Follow Nurse / BPA Driven Protocol    Potassium Replacement - Follow Nurse / BPA Driven Protocol    sodium chloride, and Allergies:  Patient has no known allergies.    Review  "of Systems  Pertinent items are noted in HPI    Objective     Vital Signs  Temp:  [97.8 °F (36.6 °C)-98.5 °F (36.9 °C)] 98.1 °F (36.7 °C)  Heart Rate:  [60-80] 61  Resp:  [9-16] 15  BP: (138-236)/() 191/67    No intake/output data recorded.  I/O last 3 completed shifts:  In: 250 [IV Piggyback:250]  Out: 1900     Physical Exam:  GEN: Awake, NAD  ENT: PERRL, EOMI, MMM  NECK: Supple, no JVD  CHEST: CTAB, no W/R/C  CV: RRR, no M/G/R  ABD: Soft, NT, +BS  SKIN: Warm and Dry  NEURO: CN's intact      Results Review:   I reviewed the patient's new clinical results.    WBC WBC   Date Value Ref Range Status   06/19/2025 3.48 3.40 - 10.80 10*3/mm3 Final   06/18/2025 5.00 3.40 - 10.80 10*3/mm3 Final      HGB Hemoglobin   Date Value Ref Range Status   06/19/2025 6.0 (C) 12.0 - 15.9 g/dL Final   06/18/2025 8.0 (L) 12.0 - 15.9 g/dL Final      HCT Hematocrit   Date Value Ref Range Status   06/19/2025 18.4 (C) 34.0 - 46.6 % Final   06/18/2025 24.7 (L) 34.0 - 46.6 % Final      Platlets No results found for: \"LABPLAT\"   MCV MCV   Date Value Ref Range Status   06/19/2025 82.5 79.0 - 97.0 fL Final   06/18/2025 82.6 79.0 - 97.0 fL Final          Sodium Sodium   Date Value Ref Range Status   06/19/2025 135 (L) 136 - 145 mmol/L Final   06/18/2025 131 (L) 136 - 145 mmol/L Final      Potassium Potassium   Date Value Ref Range Status   06/19/2025 3.8 3.5 - 5.2 mmol/L Final   06/18/2025 3.6 3.5 - 5.2 mmol/L Final      Chloride Chloride   Date Value Ref Range Status   06/19/2025 97 (L) 98 - 107 mmol/L Final   06/18/2025 93 (L) 98 - 107 mmol/L Final      CO2 CO2   Date Value Ref Range Status   06/19/2025 28.3 22.0 - 29.0 mmol/L Final   06/18/2025 26.0 22.0 - 29.0 mmol/L Final      BUN BUN   Date Value Ref Range Status   06/19/2025 21.8 8.0 - 23.0 mg/dL Final   06/18/2025 42.1 (H) 8.0 - 23.0 mg/dL Final      Creatinine Creatinine   Date Value Ref Range Status   06/19/2025 2.53 (H) 0.57 - 1.00 mg/dL Final   06/18/2025 3.79 (H) 0.57 - 1.00 " "mg/dL Final      Calcium Calcium   Date Value Ref Range Status   06/19/2025 7.7 (L) 8.6 - 10.5 mg/dL Final   06/18/2025 9.0 8.6 - 10.5 mg/dL Final      PO4 No results found for: \"CAPO4\"   Albumin Albumin   Date Value Ref Range Status   06/18/2025 3.7 3.5 - 5.2 g/dL Final      Magnesium Magnesium   Date Value Ref Range Status   06/19/2025 1.7 1.6 - 2.4 mg/dL Final      Uric Acid No results found for: \"URICACID\"         amLODIPine, 5 mg, Oral, Q24H  aspirin, 81 mg, Oral, Daily  atorvastatin, 80 mg, Oral, Daily  cloNIDine, 0.3 mg, Oral, Q8H  clopidogrel, 75 mg, Oral, Daily  epoetin rickie/rickie-epbx, 20,000 Units, Subcutaneous, Once  [START ON 6/20/2025] epoetin rickie/rickie-epbx, 20,000 Units, Intravenous, Once per day on Monday Wednesday Friday  hydrALAZINE, 100 mg, Oral, TID  lisinopril, 40 mg, Oral, Daily  metoprolol succinate XL, 25 mg, Oral, Daily  pantoprazole, 40 mg, Intravenous, Q AM           Assessment & Plan     ESRD  Severe anemia - possible GI bleed  HTN  Weakness    PLAN: Continue MWF HD.  GI consulted for possible GI bleed.  Transfuse prn and add EPO.  Continue current BP meds and add back Amlodipine.  Titrate as needed.  Will follow closely.          Jim Farooq MD  [facility]  06/19/25  @NOW            "

## 2025-06-19 NOTE — CONSULTS
CARDIOLOGY CONSULT:    Brad Woodward  1940  female  [id number]      Referring Provider: Hospitalist  Reason for Consultation: Black stools    Patient Care Team:  Brian Nazario APRN as PCP - General  Zane Aldridge MD as Consulting Physician (Cardiology)  Marnie Brandt APRN as Nurse Practitioner (Cardiology)    Chief complaint Black stools    Subjective .     History of present illness:  Brad Woodward is a 84 y.o. female with history of sick sinus syndrome status post pacemaker placement hypertension hyperlipidemia diabetes history of end-stage renal disease and renal artery stenosis presented to the hospital with complaints of dark stools for the last 2 weeks.  Patient does not have any symptoms of chest pain or shortness of breath.  No PND or orthopnea.  No palpitation but has been having dizziness with lightheadedness.  No syncope.  In the ER patient's blood pressure was very high and she was admitted ruled out for MI by EKG and enzymes.  Patient seen by gastroenterologist and underwent a procedure.  Patient's history was obtained from her son who speaks English.  Review of Systems   Unable to perform ROS: Acuity of condition       History  Past Medical History:   Diagnosis Date    Diabetes mellitus     Hyperlipidemia     Hypertension        Past Surgical History:   Procedure Laterality Date    ANGIOPLASTY ILIAC ARTERY Left 05/21/2025    Procedure: LEFT RENAL ARTERY ANGIOPLASTY WITH STENT;  Surgeon: Ted Mckee II, MD;  Location: [facility] HYBRID OR;  Service: Vascular;  Laterality: Left;    CARDIAC ELECTROPHYSIOLOGY PROCEDURE N/A 04/20/2021    Procedure: Pacemaker DC new;  Surgeon: Yovany Navarrete MD;  Location: [facility] CATH INVASIVE LOCATION;  Service: Cardiovascular;  Laterality: N/A;    PACEMAKER IMPLANTATION         Family History   Family history unknown: Yes       Social History     Tobacco Use    Smoking status: Never     Passive exposure: Never    Smokeless tobacco: Never   Vaping Use    Vaping  status: Never Used   Substance Use Topics    Alcohol use: No    Drug use: No        Medications Prior to Admission   Medication Sig Dispense Refill Last Dose/Taking    aspirin 81 MG chewable tablet Chew 1 tablet Daily. 270 tablet 2 Taking    atorvastatin (LIPITOR) 80 MG tablet Take 1 tablet by mouth Daily. 270 tablet 1 Taking    cloNIDine (CATAPRES) 0.3 MG tablet Take 1 tablet by mouth 2 (Two) Times a Day.   Taking    clopidogrel (PLAVIX) 75 MG tablet Take 1 tablet by mouth Daily for 30 days. 30 tablet 0 Taking    Cyanocobalamin (VITAMIN B-12 PO) Take 1 tablet by mouth Daily.   Taking    docusate sodium (COLACE) 100 MG capsule Take 1 capsule by mouth Every 12 (Twelve) Hours.   Taking    hydrALAZINE (APRESOLINE) 100 MG tablet Take 1 tablet by mouth 3 times a day for 30 days. 90 tablet 0 Taking    lisinopril (PRINIVIL,ZESTRIL) 40 MG tablet Take 1 tablet by mouth Daily.   Taking    metoprolol succinate XL (TOPROL-XL) 25 MG 24 hr tablet Take 1 tablet by mouth Daily for 30 days. 30 tablet 0 Taking    potassium chloride (KLOR-CON M20) 20 MEQ CR tablet Take 1 tablet by mouth 2 (Two) Times a Day.   Taking       Allergies: Patient has no known allergies.    Scheduled Meds:[START ON 6/20/2025] amLODIPine, 10 mg, Oral, Q24H  [Held by provider] aspirin, 81 mg, Oral, Daily  atorvastatin, 80 mg, Oral, Daily  cloNIDine, 0.3 mg, Oral, Q8H  [Held by provider] clopidogrel, 75 mg, Oral, Daily  [START ON 6/20/2025] epoetin rickie/rickie-epbx, 20,000 Units, Intravenous, Once per day on Monday Wednesday Friday  hydrALAZINE, 100 mg, Oral, TID  [START ON 6/20/2025] losartan, 100 mg, Oral, Q24H  metoprolol succinate XL, 50 mg, Oral, Q12H  pantoprazole, 40 mg, Intravenous, BID AC  sodium-potassium-magnesium sulfates, 1 bottle, Oral, Q12H      Continuous Infusions:   PRN Meds:.  acetaminophen **OR** acetaminophen **OR** acetaminophen    senna-docusate sodium **AND** polyethylene glycol **AND** bisacodyl **AND** bisacodyl    Calcium Replacement -  "Follow Nurse / BPA Driven Protocol    dextrose    dextrose    glucagon (human recombinant)    heparin (porcine)    Magnesium Low Dose Replacement - Follow Nurse / BPA Driven Protocol    melatonin    ondansetron ODT **OR** ondansetron    Phosphorus Replacement - Follow Nurse / BPA Driven Protocol    Potassium Replacement - Follow Nurse / BPA Driven Protocol    sodium chloride    Objective     VITAL SIGNS  Vitals:    06/19/25 1521 06/19/25 1525 06/19/25 1533 06/19/25 1554   BP: 144/59 141/56 150/59 161/65   BP Location: Left arm  Left arm Left arm   Patient Position: Lying  Lying Lying   Pulse: 60 60 60 60   Resp: 10  12 15   Temp:  97.5 °F (36.4 °C)  97.2 °F (36.2 °C)   TempSrc:  Oral  Oral   SpO2: 97% 96% 96% 94%   Weight:       Height:           Flowsheet Rows      Flowsheet Row First Filed Value   Admission Height 162.6 cm (64\") Documented at 06/18/2025 1117   Admission Weight 61.2 kg (134 lb 14.7 oz) Documented at 06/18/2025 1117             TELEMETRY: Pacemaker rhythm    Physical Exam:  Constitutional:       Appearance: Well-developed.   Eyes:      General: No scleral icterus.     Conjunctiva/sclera: Conjunctivae normal.      Pupils: Pupils are equal, round, and reactive to light.   HENT:      Head: Normocephalic and atraumatic.   Neck:      Vascular: No carotid bruit or JVD.   Pulmonary:      Effort: Pulmonary effort is normal.      Breath sounds: Normal breath sounds. No wheezing. No rales.   Cardiovascular:      Normal rate. Regular rhythm.   Pulses:     Intact distal pulses.   Abdominal:      General: Bowel sounds are normal.      Palpations: Abdomen is soft.   Musculoskeletal: Normal range of motion.      Cervical back: Normal range of motion and neck supple. Skin:     General: Skin is warm and dry.      Findings: No rash.   Neurological:      Mental Status: Alert.      Comments: No focal deficits          Results Review:   I reviewed the patient's new clinical results.  Lab Results (last 24 hours)       " Procedure Component Value Units Date/Time    Vitamin B12 [[id number]]  (Abnormal) Collected: 06/18/25 1045    Specimen: Blood Updated: 06/19/25 1705     Vitamin B-12 >2,000 pg/mL     Narrative:      Results may be falsely increased if patient taking Biotin.      Folate [[id number]]  (Normal) Collected: 06/18/25 1045    Specimen: Blood Updated: 06/19/25 1705     Folate 11.80 ng/mL     Narrative:      Results may be falsely increased if patient taking Biotin.      Haptoglobin [[id number]]  (Normal) Collected: 06/19/25 1132    Specimen: Blood Updated: 06/19/25 1650     Haptoglobin 58 mg/dL     POC Glucose Once [[id number]]  (Abnormal) Collected: 06/19/25 1636    Specimen: Blood Updated: 06/19/25 1639     Glucose 125 mg/dL      Comment: Serial Number: [id number]:  [id number]       Tissue Pathology Exam [[id number]] Collected: 06/19/25 1502    Specimen: Tissue from Stomach Updated: 06/19/25 1547    Blood Culture - Blood, Arm, Left [[id number]]  (Normal) Collected: 06/18/25 1146    Specimen: Blood from Arm, Left Updated: 06/19/25 1202     Blood Culture No growth at 24 hours    POC Glucose 4x Daily Before Meals & at Bedtime [[id number]]  (Abnormal) Collected: 06/19/25 1138    Specimen: Blood Updated: 06/19/25 1139     Glucose 116 mg/dL      Comment: Serial Number: [id number]:  [id number]       Blood Culture - Blood, Arm, Left [[id number]]  (Normal) Collected: 06/18/25 1045    Specimen: Blood from Arm, Left Updated: 06/19/25 1115     Blood Culture No growth at 24 hours    High Sensitivity Troponin T [[id number]]  (Abnormal) Collected: 06/19/25 0429    Specimen: Blood Updated: 06/19/25 1031     HS Troponin T 61 ng/L     Narrative:      High Sensitive Troponin T Reference Range:  <14.0 ng/L- Negative Female for AMI  <22.0 ng/L- Negative Male for AMI  >=14 - Abnormal Female indicating possible myocardial injury.  >=22 - Abnormal Male indicating possible myocardial injury.   Clinicians would have to utilize clinical acumen,  EKG, Troponin, and serial changes to determine if it is an Acute Myocardial Infarction or myocardial injury due to an underlying chronic condition.         Bilirubin, Total & Direct [[id number]] Collected: 06/19/25 0429    Specimen: Blood Updated: 06/19/25 1013     Total Bilirubin 0.3 mg/dL      Bilirubin, Direct 0.1 mg/dL      Bilirubin, Indirect 0.2 mg/dL     Ferritin [[id number]]  (Abnormal) Collected: 06/19/25 0429    Specimen: Blood Updated: 06/19/25 1013     Ferritin 296.00 ng/mL     Narrative:      Results may be falsely decreased if patient taking Biotin.      Iron Profile w/o Ferritin [[id number]]  (Abnormal) Collected: 06/19/25 0429    Specimen: Blood Updated: 06/19/25 1013     Iron 79 mcg/dL      Iron Saturation (TSAT) 34 %      Transferrin 158 mg/dL      TIBC 235 mcg/dL     Lactate Dehydrogenase [[id number]]  (Abnormal) Collected: 06/19/25 0429    Specimen: Blood Updated: 06/19/25 1013      U/L     Hepatic Function Panel [[id number]]  (Abnormal) Collected: 06/19/25 0429    Specimen: Blood Updated: 06/19/25 0758     Total Protein 5.2 g/dL      Albumin 2.9 g/dL      ALT (SGPT) 17 U/L      AST (SGOT) 31 U/L      Alkaline Phosphatase 92 U/L      Total Bilirubin 0.3 mg/dL      Bilirubin, Direct 0.1 mg/dL      Bilirubin, Indirect 0.2 mg/dL     POC Glucose 4x Daily Before Meals & at Bedtime [[id number]]  (Normal) Collected: 06/19/25 0728    Specimen: Blood Updated: 06/19/25 0731     Glucose 102 mg/dL      Comment: Serial Number: [id number]:  [id number]       Hepatitis B Surface Antigen [[id number]]  (Normal) Collected: 06/18/25 1045    Specimen: Blood Updated: 06/19/25 0646     Hepatitis B Surface Ag Non-Reactive    CBC & Differential [[id number]]  (Abnormal) Collected: 06/19/25 0525    Specimen: Blood Updated: 06/19/25 0544    Narrative:      The following orders were created for panel order CBC & Differential.  Procedure                               Abnormality         Status                      ---------                               -----------         ------                     CBC Auto Differential[[id number]]        Abnormal            Final result                 Please view results for these tests on the individual orders.    CBC Auto Differential [[id number]]  (Abnormal) Collected: 06/19/25 0525    Specimen: Blood Updated: 06/19/25 0544     WBC 3.48 10*3/mm3      RBC 2.23 10*6/mm3      Hemoglobin 6.0 g/dL      Hematocrit 18.4 %      MCV 82.5 fL      MCH 26.9 pg      MCHC 32.6 g/dL      RDW 17.0 %      RDW-SD 51.0 fl      MPV 9.5 fL      Platelets 135 10*3/mm3      Neutrophil % 39.9 %      Lymphocyte % 37.1 %      Monocyte % 13.2 %      Eosinophil % 8.9 %      Basophil % 0.6 %      Immature Grans % 0.3 %      Neutrophils, Absolute 1.39 10*3/mm3      Lymphocytes, Absolute 1.29 10*3/mm3      Monocytes, Absolute 0.46 10*3/mm3      Eosinophils, Absolute 0.31 10*3/mm3      Basophils, Absolute 0.02 10*3/mm3      Immature Grans, Absolute 0.01 10*3/mm3      nRBC 0.0 /100 WBC     Basic Metabolic Panel [[id number]]  (Abnormal) Collected: 06/19/25 0429    Specimen: Blood Updated: 06/19/25 0520     Glucose 72 mg/dL      BUN 21.8 mg/dL      Creatinine 2.53 mg/dL      Sodium 135 mmol/L      Potassium 3.8 mmol/L      Chloride 97 mmol/L      CO2 28.3 mmol/L      Calcium 7.7 mg/dL      BUN/Creatinine Ratio 8.6     Anion Gap 9.7 mmol/L      eGFR 18.3 mL/min/1.73     Narrative:      GFR Categories in Chronic Kidney Disease (CKD)              GFR Category          GFR (mL/min/1.73)    Interpretation  G1                    90 or greater        Normal or high (1)  G2                    60-89                Mild decrease (1)  G3a                   45-59                Mild to moderate decrease  G3b                   30-44                Moderate to severe decrease  G4                    15-29                Severe decrease  G5                    14 or less           Kidney failure    (1)In the absence of evidence of kidney  disease, neither GFR category G1 or G2 fulfill the criteria for CKD.    eGFR calculation 2021 CKD-EPI creatinine equation, which does not include race as a factor    Magnesium [[id number]]  (Normal) Collected: 06/19/25 0429    Specimen: Blood Updated: 06/19/25 0520     Magnesium 1.7 mg/dL     Phosphorus [[id number]]  (Abnormal) Collected: 06/19/25 0429    Specimen: Blood Updated: 06/19/25 0520     Phosphorus 2.3 mg/dL     POC Glucose Once [[id number]]  (Abnormal) Collected: 06/18/25 2107    Specimen: Blood Updated: 06/18/25 2110     Glucose 223 mg/dL      Comment: Serial Number: [id number]:  [id number]               Imaging Results (Last 24 Hours)       ** No results found for the last 24 hours. **            EKG      I personally viewed and interpreted the patient's EKG/Telemetry data:    ECHOCARDIOGRAM:  Results for orders placed during the hospital encounter of 04/15/21    Adult Transthoracic Echo Complete W/ Cont if Necessary Per Protocol    Interpretation Summary  · Left ventricular ejection fraction appears to be 61 - 65%.  · Left ventricular wall thickness is consistent with moderate basal asymmetric hypertrophy.  · The right ventricular cavity is mildly dilated.  · Mild to moderate aortic valve stenosis is present.  · Moderate mitral valve regurgitation is present.  · No pericardial effusion noted         STRESS MYOVIEW:       CARDIAC CATHETERIZATION:    No results found for this or any previous visit.       OTHER:         MDM      Melena  Patient presented with black stools and had low hemoglobin and underwent a EGD today but she did not have much findings and hence she is having colonoscopy performed tomorrow    End-stage renal disease  Patient is on dialysis and followed by nephrologist    Sick sinus syndrome  Patient had a pacemaker placement done which is working very well and is followed in my office    Hypertension  Patient blood pressure was very high initially but improved with her home medicines  of metoprolol lisinopril hydralazine and clonidine.    Hyperlipidemia  Patient is on statins and the lipid levels are followed by the primary care doctor     Diabetes  Patient hemoglobin A1c is in the normal limits.        Zane Aldridge MD  06/19/25  17:32 EDT

## 2025-06-19 NOTE — ANESTHESIA PREPROCEDURE EVALUATION
Anesthesia Evaluation     Patient summary reviewed and Nursing notes reviewed   NPO Solid Status: > 8 hours  NPO Liquid Status: > 8 hours           Airway   Mallampati: II  TM distance: >3 FB  Neck ROM: full  No difficulty expected  Dental - normal exam     Pulmonary - normal exam    breath sounds clear to auscultation  (+) ,shortness of breath  Cardiovascular - normal exam  Exercise tolerance: unable to assess    ECG reviewed  Rhythm: regular  Rate: normal    (+) hypertension, PVD, hyperlipidemia      Neuro/Psych- negative ROS  GI/Hepatic/Renal/Endo    (+) GI bleeding lower resolved, renal disease- dialysis and ESRD, diabetes mellitus    Musculoskeletal (-) negative ROS    Abdominal  - normal exam   Substance History - negative use     OB/GYN negative ob/gyn ROS         Other - negative ROS       ROS/Med Hx Other:    Patient is a 84-year-old [place]ian, non-English-speaking female with a past medical history of ESRD on HD ([facility]), right bundle branch block s/p pacemaker on aspirin and Plavix, DMII, HLD, and HTN who presented to [facility] ED on 6/18/2025 for complaints of dark tarry stools.  Thus, GI was consulted.       ASSESSMENT:  Melena  Normocytic anemia  Thrombocytopenia  Elevated troponin  History of ESRD on HD ([facility])  History of right bundle branch block s/p pacemaker on aspirin and Plavix  History of DMII     PLAN:  - Plan for EGD today for further evaluation of melena and anemia.  - Patient to remain n.p.o. until after endoscopy today.  - Increase pantoprazole 40 mg to twice daily  - Hemoglobin 6 from 8 upon arrival.  Continue to monitor H&H and transfuse as needed for hemoglobin less than 7.  Check anemia labs.  -Platelets 135.  Continue to trend.  - Troponin 55.  INR 0.93.  Lipase 128.  LFTs unremarkable this morning.  UA negative for UTI.  - Creatinine 2.53, BUN 21.8  - CT A/P with contrast-Motion degraded.  Punctate focus of gas in bladder, possible cystitis.  Trace free pelvic fluid.  Questionable endometrial  wall thickening.  - Supportive care.     I discussed the patients findings and my recommendations with the patient.     We appreciate the referral     Electronically signed by Sherie Ba, CECE, 06/19/25, 9:34 AM EDT.      ESRD on HD  HD schedule: MWF  Last dialyzed 6/16, missed todays HD   Avoid nephrotoxic medications  Pharmacy to renally dose medication  Nephrology consulted to manage      Generalized weakness   Fatigue  Melena  Nausea/Vomiting  CT A/P with no acute findings  UA with no evidence of infection   FOBT negative in ED   Hgb stable, continue to monitor CBC   Troponin 57-> 55, likely 2/2 underlying ESRD  Prn Zofran   PT/OT to evaluate      SSS s/p PPM  Hypertension   Hyperlipidemia   BP elevated on ED arrival with SBP > 200, improved  Continue Clonidine, Hydralazine, Lisinopril, Metoprolol   Continue ASA, statin, Plavix     Diabetes Mellitus   A1c 5.40  Consistent carb diet  Hypoglycemia protocol      Anemia of chronic disease  Hgb dropped  Continue to monitor CBC  Transfuse if Hgb < 7  GI consult for EGD  Hold aspirin and Plavix consulted cardiology     H/o renal artery stenosis s/p L renal stent      Anxiety-given Ativan IV in ED     Elevated troponin, likely rate related to chronic renal failure   - Serial troponin     VTE Prophylaxis:  Pharmacologic & mechanical VTE prophylaxis orders a                    Anesthesia Plan    ASA 4     general     (Melena, ESRD, Elevated Troponin due to ESRD per Primary MD's)  intravenous induction     Anesthetic plan, risks, benefits, and alternatives have been provided, discussed and informed consent has been obtained with: patient.    CODE STATUS:    Code Status (Patient has no pulse and is not breathing): CPR (Attempt to Resuscitate)  Medical Interventions (Patient has pulse or is breathing): Full Support

## 2025-06-19 NOTE — OP NOTE
ESOPHAGOGASTRODUODENOSCOPY Procedure Report    Patient Name:  Brad Woodward  YOB: 1940    Date of Surgery:  6/19/2025     Pre-Op Diagnosis:  Melena [K92.1]       Post-Op Diagnosis Codes:     * Melena [K92.1]    Post Op Diagnosis:  Gastritis  Hiatal hernia    Procedure/CPT® Codes:  AL ESOPHAGOGASTRODUODENOSCOPY TRANSORAL DIAGNOSTIC [29850]    Procedure(s):  ESOPHAGOGASTRODUODENOSCOPY WITH BIOPSY    Staff:  Surgeon(s):  RAMON De Leon MD      Anesthesia: Monitored Anesthesia Care    Code status:  Discussed code status with patient and they will remain full code    Description of Procedure:  A description of the procedure as well as risks, benefits and alternative methods were explained to the patient who voiced understanding and signed the corresponding consent form. A physical exam was performed and vital signs were monitored throughout the procedure.    An upper GI endoscope was placed into the mouth and proceeded through the esophagus, stomach and second portion of the duodenum without difficulty. The scope was then retroflexed and the fundus was visualized. The procedure was not difficult and there were no immediate complications.  There was no blood loss.    EGD Findings:  1.  Normal mucosa of the whole esophagus  2.  Minimal erythema in the stomach antrum consistent with gastritis.  No ulcers or erosions were seen.  No blood in the stomach.  Biopsies were obtained with cold forceps from stomach antrum and body to rule out H. pylori  3.  Small sliding hiatal hernia  4.  Normal mucosa of the duodenal bulb and 2nd and 3rd portion of the duodenum.  There was no blood in the duodenum.    Recommendations:  -Does not appear to have upper GI source of bleeding, recommend proceed with colonoscopy tomorrow if patient is agreeable  -continue to hold Plavix  -Clear liquid diet today, n.p.o. after midnight, prep tonight      THU De Leon MD     Date: 6/19/2025    Time: 15:12 EDT

## 2025-06-19 NOTE — CASE MANAGEMENT/SOCIAL WORK
Discharge Planning Assessment   Zach     Patient Name: Brad Woodward  MRN: [id number]  Today's Date: 6/19/2025    Admit Date: 6/18/2025    Plan: Return home with family.   Discharge Needs Assessment       Row Name 06/19/25 1336       Living Environment    People in Home child(geoffrey), adult    Name(s) of People in Home son Dany and daughter Gin    Current Living Arrangements home    Potentially Unsafe Housing Conditions none    In the past 12 months has the electric, gas, oil, or water company threatened to shut off services in your home? No    Primary Care Provided by self    Provides Primary Care For no one    Family Caregiver if Needed child(geoffrey), adult;grandchild(geoffrey), adult    Family Caregiver Names grandson Leana, son Dany and daughter Gin    Quality of Family Relationships helpful;involved;supportive    Able to Return to Prior Arrangements yes       Resource/Environmental Concerns    Resource/Environmental Concerns none    Transportation Concerns none       Transportation Needs    In the past 12 months, has lack of transportation kept you from medical appointments or from getting medications? no    In the past 12 months, has lack of transportation kept you from meetings, work, or from getting things needed for daily living? No       Food Insecurity    Within the past 12 months, you worried that your food would run out before you got the money to buy more. Never true    Within the past 12 months, the food you bought just didn't last and you didn't have money to get more. Never true       Transition Planning    Patient/Family Anticipates Transition to home with family    Patient/Family Anticipated Services at Transition none    Transportation Anticipated family or friend will provide       Discharge Needs Assessment    Readmission Within the Last 30 Days no previous admission in last 30 days    Equipment Currently Used at Home commode    Concerns to be Addressed discharge planning    Do you want help  finding or keeping work or a job? Patient declined    Do you want help with school or training? For example, starting or completing job training or getting a high school diploma, GED or equivalent Patient declined    Anticipated Changes Related to Illness none    Equipment Needed After Discharge none    Current Discharge Risk dependent with mobility/activities of daily living                   Discharge Plan       Row Name 06/19/25 5175       Plan    Plan Return home with family.    Patient/Family in Agreement with Plan yes    Plan Comments CM met with pt at bedside to discuss discharge needs, pt does not speak english and is asleep in room, but grandson Leana at bedside to answer discharge questions. Pt lives at home with son Dany and daughter Gin- (who provide total care for pt,) does not drive and is dependent with ADLs. PCP and pharmacy verified- pt enrolled in [facility] as requested. No issues stated affording food/ medications or transport, and home environment is safe. Current DME: [facility]. No current HHC and none anticipated on discharge. Family will provide transportation home                   Dialysis/Infusion       Service Provider Services Address Phone Fax Patient Preferred    [facility] [address] [place] [address] [place] [address] [phone] -- Yes                          Demographic Summary       Row Name 06/19/25 2287       General Information    Admission Type observation    Arrived From emergency department    Required Notices Provided Observation Status Notice    Referral Source admission list    Reason for Consult discharge planning    Preferred Language English;other (see comments)  jaycee       Contact Information    Permission Granted to Share Info With                    Functional Status       Row Name 06/19/25 2627       Functional Status    Usual Activity Tolerance fair    Current Activity Tolerance poor       Functional Status, IADL     Medications completely dependent    Meal Preparation completely dependent    Housekeeping completely dependent    Laundry completely dependent    Shopping completely dependent    If for any reason you need help with day-to-day activities such as bathing, preparing meals, shopping, managing finances, etc., do you get the help you need? I get all the help I need       Mental Status    General Appearance WDL WDL       Mental Status Summary    Recent Changes in Mental Status/Cognitive Functioning no changes       Employment/    Current or Previous Occupation not applicable               Meggan Holm RN     Office phone: [phone]  Office fax: [phone]

## 2025-06-19 NOTE — PLAN OF CARE
Problem: Adult Inpatient Plan of Care  Goal: Plan of Care Review  Outcome: Progressing  Goal: Patient-Specific Goal (Individualized)  Outcome: Progressing  Goal: Absence of Hospital-Acquired Illness or Injury  Outcome: Progressing  Intervention: Identify and Manage Fall Risk  Description: Perform standard risk assessment on admission using a validated tool or comprehensive approach appropriate to the patient; reassess fall risk frequently, with change in status or transfer to another level of care.Communicate risk to interprofessional healthcare team; ensure fall risk visible cue.Determine need for increased observation, equipment and environmental modification, as well as use of supportive, nonskid footwear.Adjust safety measures to individual needs and identified risk factors.Reinforce the importance of active participation with fall risk prevention, safety, and physical activity with the patient and family.Perform regular intentional rounding to assess need for position change, pain assessment and personal needs, including assistance with toileting.  Recent Flowsheet Documentation  Taken 6/19/2025 0501 by Nicole Ferrell RN  Safety Promotion/Fall Prevention:   clutter free environment maintained   activity supervised   fall prevention program maintained   lighting adjusted   room organization consistent   safety round/check completed  Taken 6/19/2025 0400 by Nicole Ferrell RN  Safety Promotion/Fall Prevention:   activity supervised   clutter free environment maintained   fall prevention program maintained   lighting adjusted   room organization consistent   safety round/check completed  Taken 6/19/2025 0200 by Nicole Ferrell RN  Safety Promotion/Fall Prevention:   fall prevention program maintained   clutter free environment maintained   activity supervised   safety round/check completed  Taken 6/19/2025 0000 by Nicole Ferrell RN  Safety Promotion/Fall Prevention:   clutter free environment  maintained   fall prevention program maintained   activity supervised   lighting adjusted   room organization consistent   safety round/check completed  Taken 6/18/2025 2200 by Nicole Ferrell RN  Safety Promotion/Fall Prevention:   fall prevention program maintained   clutter free environment maintained   activity supervised   lighting adjusted   room organization consistent   safety round/check completed  Taken 6/18/2025 2000 by Nicole Ferrell RN  Safety Promotion/Fall Prevention:   safety round/check completed   fall prevention program maintained   clutter free environment maintained   activity supervised  Intervention: Prevent Skin Injury  Description: Perform a screening for skin injury risk, such as pressure or moisture-associated skin damage on admission and at regular intervals throughout hospital stay.Keep all areas of skin (especially folds) clean and dry.Maintain adequate skin hydration.Relieve and redistribute pressure and protect bony prominences and skin at risk for injury; implement measures based on patient-specific risk factors.Match turning and repositioning schedule to clinical condition.Encourage weight shift frequently; assist with reposition if unable to complete independently.Float heels off bed; avoid pressure on the Achilles tendon.Keep skin free from extended contact with medical devices.Optimize nutrition and hydration.Encourage functional activity and mobility, as early as tolerated.Use aids (e.g., slide boards, mechanical lift) during transfer.  Recent Flowsheet Documentation  Taken 6/18/2025 2000 by Nicole Ferrell RN  Skin Protection: drying agents applied  Intervention: Prevent and Manage VTE (Venous Thromboembolism) Risk  Description: Assess for VTE (venous thromboembolism) risk.Promote early mobilization; encourage both active and passive leg exercises, if unable to ambulate.Initiate and maintain compression or other therapy, as indicated, based on identified risk in  accordance with organizational protocol and provider order.Recognize the patient's individual risk for bleeding before initiating pharmacologic thromboprophylaxis.  Recent Flowsheet Documentation  Taken 6/18/2025 2000 by Nicole Ferrell RN  VTE Prevention/Management:   patient refused intervention   bilateral  Goal: Optimal Comfort and Wellbeing  Outcome: Progressing  Intervention: Provide Person-Centered Care  Description: Use a family-focused approach to care; encourage support system presence and participation.Develop trust and rapport by proactively providing information, encouraging questions, addressing concerns and offering reassurance.Acknowledge emotional response to hospitalization.Recognize and utilize personal coping strategies and strengths; develop goals via shared decision-making.Honor spiritual and cultural preferences.  Recent Flowsheet Documentation  Taken 6/18/2025 2000 by Nicole Ferrell RN  Trust Relationship/Rapport:   care explained   choices provided   questions encouraged  Goal: Readiness for Transition of Care  Outcome: Progressing  Intervention: Mutually Develop Transition Plan  Description: Identify available resources for support (e.g., family, friends, community).Identify and address barriers to ongoing treatment and home management (e.g., environmental, financial).Provide opportunities to practice self-management skills.Assess and monitor emotional readiness for transition.Establish or reconnect linkage with outpatient providers or community-based services.  Recent Flowsheet Documentation  Taken 6/19/2025 0457 by Nicole Ferrell RN  Equipment Currently Used at Home: none  Taken 6/19/2025 0456 by Nicole Ferrell RN  Transportation Anticipated: family or friend will provide  Patient/Family Anticipated Services at Transition: none  Patient/Family Anticipates Transition to: home     Problem: Skin Injury Risk Increased  Goal: Skin Health and Integrity  Outcome:  Progressing  Intervention: Optimize Skin Protection  Description: Perform a full pressure injury risk assessment, as indicated by screening, upon admission to care unit.Reassess skin (full inspection and injury risk, including skin temperature, consistency and color) frequently (e.g., scheduled interval, with change in condition) to provide optimal early detection and prevention.Maintain adequate tissue perfusion (e.g., encourage fluid balance; avoid crossing legs, constrictive clothing or devices) to promote tissue oxygenation.Maintain head of bed at lowest degree of elevation tolerated, considering medical condition and other restrictions. Use positioning supports to prevent sliding and friction. Consider low friction textiles.Avoid positioning onto an area that remains reddened or on bony prominences.Minimize incontinence and moisture (e.g., toileting schedule; moisture-wicking pad, diaper or incontinence collection device; skin moisture barrier).Cleanse skin promptly and gently, when soiled, utilizing a pH-balanced cleanser.Relieve and redistribute pressure (e.g., scheduled position changes, weight shifts, use of support surface, medical device repositioning, protective dressing application, use of positioning device, microclimate control, use of pressure-injury-monitorEncourage increased activity, such as sitting in a chair at the bedside or early mobilization, when able to tolerate. Avoid prolonged sitting.  Recent Flowsheet Documentation  Taken 6/18/2025 2000 by Nicole Ferrell RN  Pressure Reduction Techniques: frequent weight shift encouraged  Pressure Reduction Devices: pressure-redistributing mattress utilized  Skin Protection: drying agents applied     Problem: Comorbidity Management  Goal: Maintenance of Asthma Control  Outcome: Progressing  Goal: Maintenance of Behavioral Health Symptom Control  Outcome: Progressing  Goal: Maintenance of COPD Symptom Control  Outcome: Progressing  Goal: Blood Glucose  Level Within Target Range  Outcome: Progressing  Goal: Maintenance of Heart Failure Symptom Control  Outcome: Progressing  Goal: Blood Pressure in Desired Range  Outcome: Progressing  Goal: Maintenance of Osteoarthritis Symptom Control  Outcome: Progressing  Goal: Bariatric Home Regimen Maintained  Outcome: Progressing  Goal: Maintenance of Seizure Control  Outcome: Progressing   Goal Outcome Evaluation:

## 2025-06-20 ENCOUNTER — ANESTHESIA EVENT (OUTPATIENT)
Dept: GASTROENTEROLOGY | Facility: HOSPITAL | Age: 85
End: 2025-06-20
Payer: MEDICAID

## 2025-06-20 ENCOUNTER — APPOINTMENT (OUTPATIENT)
Dept: NEPHROLOGY | Facility: HOSPITAL | Age: 85
End: 2025-06-20
Payer: MEDICAID

## 2025-06-20 LAB
ALBUMIN SERPL-MCNC: 3.1 G/DL (ref 3.5–5.2)
ALBUMIN/GLOB SERPL: 1.8 G/DL
ALP SERPL-CCNC: 59 U/L (ref 39–117)
ALT SERPL W P-5'-P-CCNC: 13 U/L (ref 1–33)
ANION GAP SERPL CALCULATED.3IONS-SCNC: 9.9 MMOL/L (ref 5–15)
AST SERPL-CCNC: 24 U/L (ref 1–32)
BASOPHILS # BLD AUTO: 0.01 10*3/MM3 (ref 0–0.2)
BASOPHILS NFR BLD AUTO: 0.2 % (ref 0–1.5)
BH BB BLOOD EXPIRATION DATE: NORMAL
BH BB BLOOD TYPE BARCODE: 7300
BH BB DISPENSE STATUS: NORMAL
BH BB PRODUCT CODE: NORMAL
BH BB UNIT NUMBER: NORMAL
BILIRUB SERPL-MCNC: 0.3 MG/DL (ref 0–1.2)
BUN SERPL-MCNC: 42.5 MG/DL (ref 8–23)
BUN/CREAT SERPL: 12.6 (ref 7–25)
CALCIUM SPEC-SCNC: 7.7 MG/DL (ref 8.6–10.5)
CHLORIDE SERPL-SCNC: 98 MMOL/L (ref 98–107)
CO2 SERPL-SCNC: 25.1 MMOL/L (ref 22–29)
CREAT SERPL-MCNC: 3.36 MG/DL (ref 0.57–1)
CROSSMATCH INTERPRETATION: NORMAL
DEPRECATED RDW RBC AUTO: 47.8 FL (ref 37–54)
EGFRCR SERPLBLD CKD-EPI 2021: 13 ML/MIN/1.73
EOSINOPHIL # BLD AUTO: 0.14 10*3/MM3 (ref 0–0.4)
EOSINOPHIL NFR BLD AUTO: 2.9 % (ref 0.3–6.2)
ERYTHROCYTE [DISTWIDTH] IN BLOOD BY AUTOMATED COUNT: 15.5 % (ref 12.3–15.4)
GLOBULIN UR ELPH-MCNC: 1.7 GM/DL
GLUCOSE BLDC GLUCOMTR-MCNC: 105 MG/DL (ref 70–105)
GLUCOSE BLDC GLUCOMTR-MCNC: 114 MG/DL (ref 70–105)
GLUCOSE BLDC GLUCOMTR-MCNC: 116 MG/DL (ref 70–105)
GLUCOSE BLDC GLUCOMTR-MCNC: 131 MG/DL (ref 70–105)
GLUCOSE SERPL-MCNC: 117 MG/DL (ref 65–99)
HCT VFR BLD AUTO: 18.6 % (ref 34–46.6)
HCT VFR BLD AUTO: 22.6 % (ref 34–46.6)
HCT VFR BLD AUTO: 30 % (ref 34–46.6)
HGB BLD-MCNC: 10.3 G/DL (ref 12–15.9)
HGB BLD-MCNC: 6.2 G/DL (ref 12–15.9)
HGB BLD-MCNC: 7.6 G/DL (ref 12–15.9)
IMM GRANULOCYTES # BLD AUTO: 0 10*3/MM3 (ref 0–0.05)
IMM GRANULOCYTES NFR BLD AUTO: 0 % (ref 0–0.5)
LYMPHOCYTES # BLD AUTO: 1.04 10*3/MM3 (ref 0.7–3.1)
LYMPHOCYTES NFR BLD AUTO: 21.5 % (ref 19.6–45.3)
MAGNESIUM SERPL-MCNC: 1.7 MG/DL (ref 1.6–2.4)
MCH RBC QN AUTO: 28.3 PG (ref 26.6–33)
MCHC RBC AUTO-ENTMCNC: 33.3 G/DL (ref 31.5–35.7)
MCV RBC AUTO: 84.9 FL (ref 79–97)
MONOCYTES # BLD AUTO: 0.35 10*3/MM3 (ref 0.1–0.9)
MONOCYTES NFR BLD AUTO: 7.2 % (ref 5–12)
NEUTROPHILS NFR BLD AUTO: 3.3 10*3/MM3 (ref 1.7–7)
NEUTROPHILS NFR BLD AUTO: 68.2 % (ref 42.7–76)
NRBC BLD AUTO-RTO: 0 /100 WBC (ref 0–0.2)
PHOSPHATE SERPL-MCNC: 4 MG/DL (ref 2.5–4.5)
PLATELET # BLD AUTO: 98 10*3/MM3 (ref 140–450)
PMV BLD AUTO: 9.6 FL (ref 6–12)
POTASSIUM SERPL-SCNC: 4.3 MMOL/L (ref 3.5–5.2)
PROT SERPL-MCNC: 4.8 G/DL (ref 6–8.5)
RBC # BLD AUTO: 2.19 10*6/MM3 (ref 3.77–5.28)
SODIUM SERPL-SCNC: 133 MMOL/L (ref 136–145)
UNIT  ABO: NORMAL
UNIT  RH: NORMAL
WBC NRBC COR # BLD AUTO: 4.84 10*3/MM3 (ref 3.4–10.8)

## 2025-06-20 PROCEDURE — 25010000002 NICARDIPINE 2.5 MG/ML SOLUTION 10 ML VIAL

## 2025-06-20 PROCEDURE — 85014 HEMATOCRIT: CPT

## 2025-06-20 PROCEDURE — 86900 BLOOD TYPING SEROLOGIC ABO: CPT

## 2025-06-20 PROCEDURE — 85018 HEMOGLOBIN: CPT

## 2025-06-20 PROCEDURE — 25010000002 NICARDIPINE 2.5 MG/ML SOLUTION 10 ML VIAL: Performed by: FAMILY MEDICINE

## 2025-06-20 PROCEDURE — 25010000002 HEPARIN (PORCINE) PER 1000 UNITS: Performed by: INTERNAL MEDICINE

## 2025-06-20 PROCEDURE — 82948 REAGENT STRIP/BLOOD GLUCOSE: CPT

## 2025-06-20 PROCEDURE — 25810000003 SODIUM CHLORIDE 0.9 % SOLUTION 250 ML FLEX CONT: Performed by: FAMILY MEDICINE

## 2025-06-20 PROCEDURE — 83735 ASSAY OF MAGNESIUM: CPT

## 2025-06-20 PROCEDURE — 85014 HEMATOCRIT: CPT | Performed by: FAMILY MEDICINE

## 2025-06-20 PROCEDURE — 80053 COMPREHEN METABOLIC PANEL: CPT

## 2025-06-20 PROCEDURE — 85025 COMPLETE CBC W/AUTO DIFF WBC: CPT

## 2025-06-20 PROCEDURE — P9016 RBC LEUKOCYTES REDUCED: HCPCS

## 2025-06-20 PROCEDURE — 25010000002 HYDRALAZINE PER 20 MG: Performed by: FAMILY MEDICINE

## 2025-06-20 PROCEDURE — 25010000002 ONDANSETRON PER 1 MG

## 2025-06-20 PROCEDURE — 97165 OT EVAL LOW COMPLEX 30 MIN: CPT

## 2025-06-20 PROCEDURE — 25810000003 SODIUM CHLORIDE 0.9 % SOLUTION 250 ML FLEX CONT

## 2025-06-20 PROCEDURE — 25010000002 EPOETIN ALFA-EPBX 20000 UNIT/ML SOLUTION: Performed by: INTERNAL MEDICINE

## 2025-06-20 PROCEDURE — 85018 HEMOGLOBIN: CPT | Performed by: FAMILY MEDICINE

## 2025-06-20 PROCEDURE — 99232 SBSQ HOSP IP/OBS MODERATE 35: CPT | Performed by: NURSE PRACTITIONER

## 2025-06-20 PROCEDURE — 99232 SBSQ HOSP IP/OBS MODERATE 35: CPT | Performed by: INTERNAL MEDICINE

## 2025-06-20 PROCEDURE — 25010000002 HYDRALAZINE PER 20 MG: Performed by: INTERNAL MEDICINE

## 2025-06-20 PROCEDURE — 84100 ASSAY OF PHOSPHORUS: CPT

## 2025-06-20 PROCEDURE — 97162 PT EVAL MOD COMPLEX 30 MIN: CPT

## 2025-06-20 RX ORDER — SORBITOL SOLUTION 70 %
30 SOLUTION, ORAL MISCELLANEOUS ONCE
Status: COMPLETED | OUTPATIENT
Start: 2025-06-20 | End: 2025-06-20

## 2025-06-20 RX ORDER — SODIUM, POTASSIUM,MAG SULFATES 17.5-3.13G
1 SOLUTION, RECONSTITUTED, ORAL ORAL EVERY 12 HOURS
Status: DISPENSED | OUTPATIENT
Start: 2025-06-20 | End: 2025-06-21

## 2025-06-20 RX ORDER — HYDROXYZINE HYDROCHLORIDE 25 MG/1
50 TABLET, FILM COATED ORAL 3 TIMES DAILY PRN
Status: DISCONTINUED | OUTPATIENT
Start: 2025-06-20 | End: 2025-06-24 | Stop reason: HOSPADM

## 2025-06-20 RX ORDER — HYDRALAZINE HYDROCHLORIDE 20 MG/ML
10 INJECTION INTRAMUSCULAR; INTRAVENOUS EVERY 4 HOURS PRN
Status: DISCONTINUED | OUTPATIENT
Start: 2025-06-20 | End: 2025-06-24 | Stop reason: HOSPADM

## 2025-06-20 RX ORDER — HYDRALAZINE HYDROCHLORIDE 20 MG/ML
10 INJECTION INTRAMUSCULAR; INTRAVENOUS ONCE
Status: COMPLETED | OUTPATIENT
Start: 2025-06-20 | End: 2025-06-20

## 2025-06-20 RX ORDER — HYDRALAZINE HYDROCHLORIDE 20 MG/ML
10 INJECTION INTRAMUSCULAR; INTRAVENOUS EVERY 6 HOURS PRN
Status: DISCONTINUED | OUTPATIENT
Start: 2025-06-20 | End: 2025-06-24 | Stop reason: HOSPADM

## 2025-06-20 RX ORDER — METOPROLOL TARTRATE 1 MG/ML
10 INJECTION, SOLUTION INTRAVENOUS EVERY 6 HOURS PRN
Status: DISCONTINUED | OUTPATIENT
Start: 2025-06-20 | End: 2025-06-24 | Stop reason: HOSPADM

## 2025-06-20 RX ORDER — SORBITOL SOLUTION 70 %
50 SOLUTION, ORAL MISCELLANEOUS ONCE
Status: COMPLETED | OUTPATIENT
Start: 2025-06-20 | End: 2025-06-20

## 2025-06-20 RX ADMIN — PANTOPRAZOLE SODIUM 40 MG: 40 INJECTION, POWDER, FOR SOLUTION INTRAVENOUS at 09:52

## 2025-06-20 RX ADMIN — SODIUM CHLORIDE 5 MG/HR: 9 INJECTION, SOLUTION INTRAVENOUS at 13:12

## 2025-06-20 RX ADMIN — ONDANSETRON 4 MG: 2 INJECTION, SOLUTION INTRAMUSCULAR; INTRAVENOUS at 19:17

## 2025-06-20 RX ADMIN — HEPARIN SODIUM 5000 UNITS: 1000 INJECTION INTRAVENOUS; SUBCUTANEOUS at 16:46

## 2025-06-20 RX ADMIN — PANTOPRAZOLE SODIUM 40 MG: 40 INJECTION, POWDER, FOR SOLUTION INTRAVENOUS at 17:25

## 2025-06-20 RX ADMIN — METOROPROLOL TARTRATE 10 MG: 5 INJECTION, SOLUTION INTRAVENOUS at 12:32

## 2025-06-20 RX ADMIN — HYDRALAZINE HYDROCHLORIDE 10 MG: 20 INJECTION INTRAMUSCULAR; INTRAVENOUS at 20:38

## 2025-06-20 RX ADMIN — HYDROXYZINE HYDROCHLORIDE 50 MG: 25 TABLET, FILM COATED ORAL at 23:51

## 2025-06-20 RX ADMIN — SORBITOL SOLUTION (BULK) 50 ML: 70 SOLUTION at 11:02

## 2025-06-20 RX ADMIN — Medication 5 MG: at 23:51

## 2025-06-20 RX ADMIN — METOROPROLOL TARTRATE 10 MG: 5 INJECTION, SOLUTION INTRAVENOUS at 19:17

## 2025-06-20 RX ADMIN — EPOETIN ALFA-EPBX 20000 UNITS: 20000 INJECTION, SOLUTION INTRAVENOUS; SUBCUTANEOUS at 16:46

## 2025-06-20 RX ADMIN — NICARDIPINE HYDROCHLORIDE 5 MG/HR: 25 INJECTION, SOLUTION INTRAVENOUS at 21:10

## 2025-06-20 RX ADMIN — SORBITOL SOLUTION (BULK) 30 ML: 70 SOLUTION at 07:18

## 2025-06-20 RX ADMIN — SORBITOL SOLUTION (BULK) 50 ML: 70 SOLUTION at 10:22

## 2025-06-20 RX ADMIN — HYDRALAZINE HYDROCHLORIDE 10 MG: 20 INJECTION INTRAMUSCULAR; INTRAVENOUS at 11:43

## 2025-06-20 RX ADMIN — HYDRALAZINE HYDROCHLORIDE 10 MG: 20 INJECTION INTRAMUSCULAR; INTRAVENOUS at 10:11

## 2025-06-20 NOTE — THERAPY EVALUATION
Patient Name: Brad Woodward  : 1940    MRN: [id number]                              Today's Date: 2025       Admit Date: 2025    Visit Dx:     ICD-10-CM ICD-9-CM   1. Generalized weakness  R53.1 780.79   2. Dark stools  R19.5 792.1   3. Dizziness  R42 780.4   4. Admission for dialysis  Z99.2 V56.0   5. Melena  K92.1 578.1   6. Anemia, unspecified type  D64.9 285.9     Patient Active Problem List   Diagnosis    Mixed hyperlipidemia    Essential hypertension    Type 2 diabetes mellitus    Anemia due to stage 3a chronic kidney disease    CKD (chronic kidney disease) stage 3, GFR 30-59 ml/min    Hypertensive emergency    HFrEF (heart failure with reduced ejection fraction)    Dyspnea    Sick sinus syndrome    Junctional bradycardia    Pacemaker    Anemia    Hypertensive urgency    Renal artery stenosis    ESRD (end stage renal disease) on dialysis    Melena     Past Medical History:   Diagnosis Date    Diabetes mellitus     Hyperlipidemia     Hypertension      Past Surgical History:   Procedure Laterality Date    ANGIOPLASTY ILIAC ARTERY Left 2025    Procedure: LEFT RENAL ARTERY ANGIOPLASTY WITH STENT;  Surgeon: Ted Mckee II, MD;  Location: [facility] HYBRID OR;  Service: Vascular;  Laterality: Left;    CARDIAC ELECTROPHYSIOLOGY PROCEDURE N/A 2021    Procedure: Pacemaker DC new;  Surgeon: Yovany Navarrete MD;  Location: [facility] CATH INVASIVE LOCATION;  Service: Cardiovascular;  Laterality: N/A;    PACEMAKER IMPLANTATION        General Information       Row Name 25 1553          OT Time and Intention    Document Type evaluation  -LS     Mode of Treatment occupational therapy  -LS       Row Name 25 1556          General Information    Patient Profile Reviewed yes  -LS     Prior Level of Function independent:;all household mobility;mod assist:;dressing;bathing  -LS     Existing Precautions/Restrictions fall  hypertensive  -LS     Barriers to Rehab language barrier  -LS       Row Name  06/20/25 1553          Living Environment    Current Living Arrangements home  -LS     People in Home child(geoffrey), adult  -LS       Row Name 06/20/25 1553          Home Main Entrance    Number of Stairs, Main Entrance two  -LS       Row Name 06/20/25 1553          Stairs Within Home, Primary    Number of Stairs, Within Home, Primary none  -LS       Row Name 06/20/25 1553          Cognition    Orientation Status (Cognition) unable/difficult to assess  Language barrier prevented assessment of orientation  -LS       Row Name 06/20/25 1553          Safety Issues/Impairments Affecting Functional Mobility    Impairments Affecting Function (Mobility) endurance/activity tolerance;strength;balance  -               User Key  (r) = Recorded By, (t) = Taken By, (c) = Cosigned By      Initials Name Provider Type    LS Thai Solomon OT Occupational Therapist                     Mobility/ADL's       Row Name 06/20/25 [facility]5          Bed Mobility    Bed Mobility supine-sit-supine  -LS     Supine-Sit-Supine Canadian (Bed Mobility) standby assist  -LS     Assistive Device (Bed Mobility) bed rails  -       Row Name 06/20/25 [facility]5          Transfers    Transfers sit-stand transfer  -     Comment, (Transfers) --  -LS       Row Name 06/20/25 [facility]5          Sit-Stand Transfer    Sit-Stand Canadian (Transfers) contact guard  -       Row Name 06/20/25 1555          Functional Mobility    Functional Mobility- Ind. Level unable to perform  -LS     Patient was able to Ambulate no, other medical factors prevent ambulation  -     Reason Patient was unable to Ambulate Other (Comment)  Hypertension  -       Row Name 06/20/25 1555          Activities of Daily Living    BADL Assessment/Intervention other (see comments)  Unable to formally assess ADL status this date due to hypertension, though did observe patient adjusting socks without difficulty.  -LS               User Key  (r) = Recorded By, (t) = Taken By, (c) = Cosigned By       Initials Name Provider Type    MARY Thai Solomon OT Occupational Therapist                   Obj/Interventions       Row Name 06/20/25 1557          Sensory Assessment (Somatosensory)    Sensory Assessment (Somatosensory) sensation intact  -LS       Row Name 06/20/25 1557          Range of Motion Comprehensive    Comment, General Range of Motion Spontaneous BUE movement appears WFL.  -LS       Row Name 06/20/25 1557          Strength Comprehensive (MMT)    Comment, General Manual Muscle Testing (MMT) Assessment Unable to formally assess due to HTN  -LS       Row Name 06/20/25 1557          Balance    Balance Assessment sitting static balance;sitting dynamic balance;standing static balance  -LS     Static Sitting Balance modified independence  -LS     Dynamic Sitting Balance modified independence  -LS     Position, Sitting Balance sitting edge of bed  -LS     Static Standing Balance standby assist  -LS               User Key  (r) = Recorded By, (t) = Taken By, (c) = Cosigned By      Initials Name Provider Type    LS Thai Solomon OT Occupational Therapist                   Goals/Plan       Row Name 06/20/25 1607          Bed Mobility Goal 1 (OT)    Activity/Assistive Device (Bed Mobility Goal 1, OT) bed mobility activities, all  -LS     [place] Level/Cues Needed (Bed Mobility Goal 1, OT) modified independence  -LS     Time Frame (Bed Mobility Goal 1, OT) long term goal (LTG);2 weeks  -LS       Row Name 06/20/25 1607          Transfer Goal 1 (OT)    Activity/Assistive Device (Transfer Goal 1, OT) transfers, all  -LS     [place] Level/Cues Needed (Transfer Goal 1, OT) modified independence  -LS     Time Frame (Transfer Goal 1, OT) long term goal (LTG);2 weeks  -LS       Row Name 06/20/25 1607          Dressing Goal 1 (OT)    Activity/Device (Dressing Goal 1, OT) dressing skills, all  -LS     [place]say/Cues Needed (Dressing Goal 1, OT) modified independence  -LS     Time Frame (Dressing Goal 1, OT) long  term goal (LTG);2 weeks  -LS       Row Name 06/20/25 1607          Toileting Goal 1 (OT)    Activity/Device (Toileting Goal 1, OT) toileting skills, all  -LS     [place] Level/Cues Needed (Toileting Goal 1, OT) modified independence  -LS     Time Frame (Toileting Goal 1, OT) long term goal (LTG);2 weeks  -LS       Row Name 06/20/25 1608          Therapy Assessment/Plan (OT)    Planned Therapy Interventions (OT) activity tolerance training;BADL retraining;functional balance retraining;IADL retraining;occupation/activity based interventions;ROM/therapeutic exercise;strengthening exercise;transfer/mobility retraining;patient/caregiver education/training;neuromuscular control/coordination retraining  -LS               User Key  (r) = Recorded By, (t) = Taken By, (c) = Cosigned By      Initials Name Provider Type    LS Thai Solomon OT Occupational Therapist                   Clinical Impression       Row Name 06/20/25 155          Pain Assessment    Pretreatment Pain Rating 0/10 - no pain  -LS     Posttreatment Pain Rating 0/10 - no pain  -LS       Row Name 06/20/25 1550          Plan of Care Review    Plan of Care Reviewed With patient;family  -LS     Outcome Evaluation 84 y.o. female with a CMH of  SSS s/p PPM, ESRD on HD, DM2, HTN, HLD, h/o renal artery stenosis s/p L renal stent who presented to [facility] on [facility]18/[facility] with dark stools x 2 weeks. Hgb 6.0. EGD completed which shows no upper GI source of bleeding. Plans to have colonoscopy 6/20/25. Notably, pt is non-english speaking. Family present today assisting with communication. At baseline, pt lives with dtr and LANCE. She is assisted with dressing and bathing, but mobilized independently. Eval very limited today, as pt with elevated BP. She is slightly impulsive, coming to EOB and standing with CGA-SBA despite OT asking her to remain in bed. Family able ot communicate to patient to return to sitting then to supine. /126 to begin, then  230/82 once she had returned to supine. MD arrived during this time, reporting he is ordering BP meds to restart despite NPO status in prep for colonoscopy. Pt noted to be moving well this date, observed spontaneously adjusting socks but no further ADL assessment completed due to HTN. OT will follow to ensure baseline functional mobility level and ADL status, recommending home with family assist at dc.  -LS       Row Name 06/20/25 1555          Therapy Assessment/Plan (OT)    Rehab Potential (OT) good  -LS     Criteria for Skilled Therapeutic Interventions Met (OT) yes;skilled treatment is necessary  -LS     Therapy Frequency (OT) 3 times/wk  -LS     Predicted Duration of Therapy Intervention (OT) until dc  -       Row Name 06/20/25 7538          Therapy Plan Review/Discharge Plan (OT)    Anticipated Discharge Disposition (OT) home with assist  -       Row Name 06/20/25 7600          Vital Signs    Intra Systolic BP Rehab 225  -LS     Intra Treatment Diastolic   -LS     Post Systolic BP Rehab 230  -LS     Post Treatment Diastolic BP 82  -LS     O2 Delivery Pre Treatment room air  -LS     O2 Delivery Intra Treatment room air  -LS     O2 Delivery Post Treatment room air  -LS     Pre Patient Position Supine  -LS     Intra Patient Position Standing  -LS     Post Patient Position Supine  -LS       Row Name 06/20/25 0903          Positioning and Restraints    Pre-Treatment Position in bed  -LS     Post Treatment Position bed  -LS     In Bed notified nsg;call light within reach;encouraged to call for assist;with family/caregiver  -               User Key  (r) = Recorded By, (t) = Taken By, (c) = Cosigned By      Initials Name Provider Type    Thai Ambriz OT Occupational Therapist                   Outcome Measures       Row Name 06/20/25 9272          How much help from another is currently needed...    Putting on and taking off regular lower body clothing? 3  -LS     Bathing (including washing, rinsing,  and drying) 3  -LS     Toileting (which includes using toilet bed pan or urinal) 3  -LS     Putting on and taking off regular upper body clothing 3  -LS     Taking care of personal grooming (such as brushing teeth) 4  -LS     Eating meals 4  -LS     AM-PAC 6 Clicks Score (OT) 20  -LS       Row Name 06/20/25 0830          How much help from another person do you currently need...    Turning from your back to your side while in flat bed without using bedrails? 4  -CF     Moving from lying on back to sitting on the side of a flat bed without bedrails? 4  -CF     Moving to and from a bed to a chair (including a wheelchair)? 3  -CF     Standing up from a chair using your arms (e.g., wheelchair, bedside chair)? 3  -CF     Climbing 3-5 steps with a railing? 2  -CF     To walk in hospital room? 3  -CF     AM-PAC 6 Clicks Score (PT) 19  -CF       Row Name 06/20/25 1607          Functional Assessment    Outcome Measure Options AM-PAC 6 Clicks Daily Activity (OT)  -               User Key  (r) = Recorded By, (t) = Taken By, (c) = Cosigned By      Initials Name Provider Type     Thai Solomon OT Occupational Therapist    Shaq Markham RN Registered Nurse                    Occupational Therapy Education       Title: PT OT SLP Therapies (In Progress)       Topic: Occupational Therapy (Done)       Point: ADL training (Done)       Learning Progress Summary            Patient Acceptance, E,TB, VU by  at 6/20/2025 1607   Family Acceptance, E,TB, VU by  at 6/20/2025 1607                      Point: Precautions (Done)       Learning Progress Summary            Patient Acceptance, E,TB, VU by LS at 6/20/2025 1607   Family Acceptance, E,TB, VU by  at 6/20/2025 1607                      Point: Body mechanics (Done)       Learning Progress Summary            Patient Acceptance, E,TB, VU by  at 6/20/2025 1607   Family Acceptance, E,TB, VU by  at 6/20/2025 1607                                      User Key        Initials Effective Dates Name Provider Type Discipline     09/22/22 -  Thai Solomon OT Occupational Therapist OT                  OT Recommendation and Plan  Planned Therapy Interventions (OT): activity tolerance training, BADL retraining, functional balance retraining, IADL retraining, occupation/activity based interventions, ROM/therapeutic exercise, strengthening exercise, transfer/mobility retraining, patient/caregiver education/training, neuromuscular control/coordination retraining  Therapy Frequency (OT): 3 times/wk  Plan of Care Review  Plan of Care Reviewed With: patient, family  Outcome Evaluation: 84 y.o. female with a CMH of  SSS s/p PPM, ESRD on HD, DM2, HTN, HLD, h/o renal artery stenosis s/p L renal stent who presented to [facility] on 6/18/2025 with dark stools x 2 weeks. Hgb 6.0. EGD completed which shows no upper GI source of bleeding. Plans to have colonoscopy 6/20/25. Notably, pt is non-english speaking. Family present today assisting with communication. At baseline, pt lives with dtr and LANCE. She is assisted with dressing and bathing, but mobilized independently. Eval very limited today, as pt with elevated BP. She is slightly impulsive, coming to EOB and standing with CGA-SBA despite OT asking her to remain in bed. Family able ot communicate to patient to return to sitting then to supine. /126 to begin, then 230/82 once she had returned to supine. MD arrived during this time, reporting he is ordering BP meds to restart despite NPO status in prep for colonoscopy. Pt noted to be moving well this date, observed spontaneously adjusting socks but no further ADL assessment completed due to HTN. OT will follow to ensure baseline functional mobility level and ADL status, recommending home with family assist at MD.     Time Calculation:         Time Calculation- OT       Row Name 06/20/25 1608             Time Calculation- OT    OT Start Time 0921  -LS      OT Stop Time 0932  -LS       OT Time Calculation (min) 11 min  -LS      OT Received On 06/20/25  -LS      OT - Next Appointment 06/23/25  -      OT Goal Re-Cert Due Date 07/04/25  -         Untimed Charges    OT Eval/Re-eval Minutes 11  -LS         Total Minutes    Untimed Charges Total Minutes 11  -LS       Total Minutes 11  -LS                User Key  (r) = Recorded By, (t) = Taken By, (c) = Cosigned By      Initials Name Provider Type     Thai Solomon OT Occupational Therapist                  Therapy Charges for Today       Code Description Service Date Service Provider Modifiers Qty    [id number]  OT EVAL LOW COMPLEXITY 3 6/20/2025 Thai Solomon OT GO 1                 Thai Solomon OT  6/20/2025

## 2025-06-20 NOTE — PROGRESS NOTES
"RENAL/KCC:     LOS: 1 day    Patient Care Team:  Brian Nazario APRN as PCP - General  Zane Aldridge MD as Consulting Physician (Cardiology)  Marnie Brandt APRN as Nurse Practitioner (Cardiology)    Chief Complaint: ESRD    Subjective     Interval History:   Chart reviewed  S/P EGD and biopsies yesterday  Hematemesis yesterday after procedure - required transfusions  NPO at present time  For HD today  BP elevated  Possible C-scope    Objective     Vital Sign Min/Max for last 24 hours  Temp  Min: 97.2 °F (36.2 °C)  Max: 98.7 °F (37.1 °C)   BP  Min: 89/38  Max: 198/72   Pulse  Min: 59  Max: 69   Resp  Min: 10  Max: 20   SpO2  Min: 91 %  Max: 99 %   No data recorded   No data recorded     Flowsheet Rows      Flowsheet Row First Filed Value   Admission Height 162.6 cm (64\") Documented at 06/18/2025 1117   Admission Weight 61.2 kg (134 lb 14.7 oz) Documented at 06/18/2025 1117            No intake/output data recorded.  I/O last 3 completed shifts:  In: 2224.3 [P.O.:953; I.V.:300; Blood:971.3]  Out: -     Physical Exam:  GEN: Awake, NAD  ENT: PERRL, EOMI, MMM  NECK: Supple, no JVD  CHEST: CTAB, no W/R/C  CV: RRR, no M/G/R  ABD: Soft, NT, +BS  SKIN: Warm and Dry  NEURO: CN's intact      WBC WBC   Date Value Ref Range Status   06/20/2025 4.84 3.40 - 10.80 10*3/mm3 Final   06/19/2025 3.48 3.40 - 10.80 10*3/mm3 Final   06/18/2025 5.00 3.40 - 10.80 10*3/mm3 Final      HGB Hemoglobin   Date Value Ref Range Status   06/20/2025 7.6 (L) 12.0 - 15.9 g/dL Final   06/20/2025 6.2 (C) 12.0 - 15.9 g/dL Final   06/19/2025 6.4 (C) 12.0 - 15.9 g/dL Final   06/19/2025 7.8 (L) 12.0 - 15.9 g/dL Final   06/19/2025 6.0 (C) 12.0 - 15.9 g/dL Final   06/18/2025 8.0 (L) 12.0 - 15.9 g/dL Final      HCT Hematocrit   Date Value Ref Range Status   06/20/2025 22.6 (L) 34.0 - 46.6 % Final     Comment:     Result checked     06/20/2025 18.6 (C) 34.0 - 46.6 % Final   06/19/2025 19.7 (C) 34.0 - 46.6 % Final   06/19/2025 23.7 (L) 34.0 - 46.6 % Final " "  06/19/2025 18.4 (C) 34.0 - 46.6 % Final   06/18/2025 24.7 (L) 34.0 - 46.6 % Final      Platlets No results found for: \"LABPLAT\"   MCV MCV   Date Value Ref Range Status   06/20/2025 84.9 79.0 - 97.0 fL Final   06/19/2025 82.5 79.0 - 97.0 fL Final   06/18/2025 82.6 79.0 - 97.0 fL Final          Sodium Sodium   Date Value Ref Range Status   06/20/2025 133 (L) 136 - 145 mmol/L Final   06/19/2025 135 (L) 136 - 145 mmol/L Final   06/18/2025 131 (L) 136 - 145 mmol/L Final      Potassium Potassium   Date Value Ref Range Status   06/20/2025 4.3 3.5 - 5.2 mmol/L Final   06/19/2025 3.8 3.5 - 5.2 mmol/L Final   06/18/2025 3.6 3.5 - 5.2 mmol/L Final      Chloride Chloride   Date Value Ref Range Status   06/20/2025 98 98 - 107 mmol/L Final   06/19/2025 97 (L) 98 - 107 mmol/L Final   06/18/2025 93 (L) 98 - 107 mmol/L Final      CO2 CO2   Date Value Ref Range Status   06/20/2025 25.1 22.0 - 29.0 mmol/L Final   06/19/2025 28.3 22.0 - 29.0 mmol/L Final   06/18/2025 26.0 22.0 - 29.0 mmol/L Final      BUN BUN   Date Value Ref Range Status   06/20/2025 42.5 (H) 8.0 - 23.0 mg/dL Final   06/19/2025 21.8 8.0 - 23.0 mg/dL Final   06/18/2025 42.1 (H) 8.0 - 23.0 mg/dL Final      Creatinine Creatinine   Date Value Ref Range Status   06/20/2025 3.36 (H) 0.57 - 1.00 mg/dL Final   06/19/2025 2.53 (H) 0.57 - 1.00 mg/dL Final   06/18/2025 3.79 (H) 0.57 - 1.00 mg/dL Final      Calcium Calcium   Date Value Ref Range Status   06/20/2025 7.7 (L) 8.6 - 10.5 mg/dL Final   06/19/2025 7.7 (L) 8.6 - 10.5 mg/dL Final   06/18/2025 9.0 8.6 - 10.5 mg/dL Final      PO4 No results found for: \"CAPO4\"   Albumin Albumin   Date Value Ref Range Status   06/20/2025 3.1 (L) 3.5 - 5.2 g/dL Final   06/19/2025 2.9 (L) 3.5 - 5.2 g/dL Final   06/18/2025 3.7 3.5 - 5.2 g/dL Final      Magnesium Magnesium   Date Value Ref Range Status   06/20/2025 1.7 1.6 - 2.4 mg/dL Final   06/19/2025 1.7 1.6 - 2.4 mg/dL Final      Uric Acid No results found for: \"URICACID\"        Results " Review:     I reviewed the patient's new clinical results.    [Held by provider] aspirin, 81 mg, Oral, Daily  [Held by provider] cloNIDine, 0.3 mg, Oral, Q8H  [Held by provider] clopidogrel, 75 mg, Oral, Daily  epoetin rickie/rickie-epbx, 20,000 Units, Intravenous, Once per day on Monday Wednesday Friday  [Held by provider] hydrALAZINE, 100 mg, Oral, TID  [Held by provider] losartan, 100 mg, Oral, Q24H  pantoprazole, 40 mg, Intravenous, BID AC  sodium-potassium-magnesium sulfates, 1 bottle, Oral, Q12H           Medication Review: Reviewed    Assessment & Plan       ESRD (end stage renal disease) on dialysis    Anemia    Melena    Hematemesis    HTN    Plan: HD MWF.  No heparin with HD.  EPO with HD and continue to transfuse prn while checking serial H&H.  GI following - for C-scope eventually.  Add prn IV BP medications while NPO.  Will follow closely.        Jim Farooq MD  [facility]  06/20/25  09:39 EDT

## 2025-06-20 NOTE — PLAN OF CARE
Goal Outcome Evaluation:  Plan of Care Reviewed With: patient           Outcome Evaluation: Pt recieved 2 units of blood during this shift. currently resting with no complaints. Plan of care ongoing.

## 2025-06-20 NOTE — PROGRESS NOTES
[facility] MEDICINE SERVICE  DAILY PROGRESS NOTE    NAME: Brad Woodward  : 1940  MRN: [id number]      LOS: 1 day     PROVIDER OF SERVICE: Ger Echeverria DO    Chief Complaint: ESRD (end stage renal disease) on dialysis    Subjective:     Interval History:  History taken from: patient    Per family at bedside, patient vomited up blood overnight; only tolerated a bit of bowel prep; BP very elevated        Review of Systems:   Review of Systems    Objective:     Vital Signs  Temp:  [97.2 °F (36.2 °C)-98.7 °F (37.1 °C)] (P) 97.4 °F (36.3 °C)  Heart Rate:  [59-81] 81  Resp:  [10-20] 20  BP: ()/() 250/75   Body mass index is 24.17 kg/m².    Physical Exam  Physical Exam  General: Sitting up in bed; NAD  CV: RRR, S1-S2  Lungs: CTA bilaterally  Abdomen: soft, NT, ND        Diagnostic Data    Results from last 7 days   Lab Units 25  0458 25  0114   WBC 10*3/mm3  --  4.84   HEMOGLOBIN g/dL 7.6* 6.2*   HEMATOCRIT % 22.6* 18.6*   PLATELETS 10*3/mm3  --  98*   GLUCOSE mg/dL  --  117*   CREATININE mg/dL  --  3.36*   BUN mg/dL  --  42.5*   SODIUM mmol/L  --  133*   POTASSIUM mmol/L  --  4.3   AST (SGOT) U/L  --  24   ALT (SGPT) U/L  --  13   ALK PHOS U/L  --  59   BILIRUBIN mg/dL  --  0.3   ANION GAP mmol/L  --  9.9       No radiology results for the last day      I reviewed the patient's new clinical results.    Assessment/Plan:     Active and Resolved Problems  Active Hospital Problems    Diagnosis  POA    **ESRD (end stage renal disease) on dialysis [N18.6, Z99.2]  Not Applicable    Melena [K92.1]  Unknown    Anemia [D64.9]  Unknown      Resolved Hospital Problems   No resolved problems to display.       ESRD on HD  HD schedule: MWF  Last dialyzed , missed todays HD   Avoid nephrotoxic medications  Pharmacy to renally dose medication  Continue HD per Nephrology      Generalized weakness   Fatigue  Melena  Nausea/Vomiting  CT A/P with no acute findings  UA with no evidence of infection    FOBT negative in ED   S/p EGD yesterday showing no active bleeding  Vomited blood overnight   Plan for C-scope today, though bowel prep may not be adequate  Defer endoscopies to GI     SSS s/p PPM  Hypertension   Hyperlipidemia   BP elevated on ED arrival with SBP > 200, improved  Still elevated today despite IV hydralazine and metoprolol in addition to meds below  Continue  Norvsc, Hydralazine,  Metoprolol   Start Cardene gtt; was told ok to start on floor  ASA and Plavix on hold GI bleed     Diabetes Mellitus   A1c 5.40  Consistent carb diet  Hypoglycemia protocol      Anemia of chronic disease  Hgb dropped  Continue to monitor CBC  Transfuse if Hgb < 7  GI consult for EGD  Hold aspirin and Plavix consulted cardiology     H/o renal artery stenosis s/p L renal stent   Had stent placed in 5/21; vascular ok with holding DAPT for now, but resume as soon as able      Anxiety-given Ativan IV in ED     Elevated troponin, likely rate related to chronic renal failure   - Serial troponin     VTE Prophylaxis:  Pharmacologic & mechanical VTE prophylaxis orders are present.       VTE Prophylaxis:  Pharmacologic & mechanical VTE prophylaxis orders are present.       Low threshold to move patient to ICU       Disposition Planning:  TBD pending clinical course     I updated family at bedside     Code Status and Medical Interventions: CPR (Attempt to Resuscitate); Full Support   Ordered at: 06/18/25 1332     Code Status (Patient has no pulse and is not breathing):    CPR (Attempt to Resuscitate)     Medical Interventions (Patient has pulse or is breathing):    Full Support       Signature: Electronically signed by Ger Echeverria DO, 06/20/25, 12:46 EDT.  [facility] Hospitalist Team

## 2025-06-20 NOTE — THERAPY EVALUATION
Patient Name: Brad Woodward  : 1940    MRN: [id number]                              Today's Date: 2025       Admit Date: 2025    Visit Dx:     ICD-10-CM ICD-9-CM   1. Generalized weakness  R53.1 780.79   2. Dark stools  R19.5 792.1   3. Dizziness  R42 780.4   4. Admission for dialysis  Z99.2 V56.0   5. Melena  K92.1 578.1   6. Anemia, unspecified type  D64.9 285.9     Patient Active Problem List   Diagnosis    Mixed hyperlipidemia    Essential hypertension    Type 2 diabetes mellitus    Anemia due to stage 3a chronic kidney disease    CKD (chronic kidney disease) stage 3, GFR 30-59 ml/min    Hypertensive emergency    HFrEF (heart failure with reduced ejection fraction)    Dyspnea    Sick sinus syndrome    Junctional bradycardia    Pacemaker    Anemia    Hypertensive urgency    Renal artery stenosis    ESRD (end stage renal disease) on dialysis    Melena     Past Medical History:   Diagnosis Date    Diabetes mellitus     Hyperlipidemia     Hypertension      Past Surgical History:   Procedure Laterality Date    ANGIOPLASTY ILIAC ARTERY Left 2025    Procedure: LEFT RENAL ARTERY ANGIOPLASTY WITH STENT;  Surgeon: Ted Mckee II, MD;  Location: [facility] HYBRID OR;  Service: Vascular;  Laterality: Left;    CARDIAC ELECTROPHYSIOLOGY PROCEDURE N/A 2021    Procedure: Pacemaker DC new;  Surgeon: Yovnay Navarrete MD;  Location: [facility] CATH INVASIVE LOCATION;  Service: Cardiovascular;  Laterality: N/A;    PACEMAKER IMPLANTATION        General Information       Row Name 25 [id number]          Physical Therapy Time and Intention    Document Type evaluation  -AO     Mode of Treatment co-treatment;physical therapy;occupational therapy  -AO       Row Name 25 5411          General Information    Patient Profile Reviewed yes  -AO     Prior Level of Function --  Per pt's son, pt typically independent w/ household ambulation w/ no AD, assist for bathing/dressing from daughter, no falls over the past  year, no home 02  -AO     Existing Precautions/Restrictions fall  HTN  -AO     Barriers to Rehab language barrier  Non-English speaking (family able to interpret if in room)  -AO       Row Name 06/20/25 0920          Living Environment    Current Living Arrangements home  -AO     People in Home child(geoffrey), adult  Lives w/ daughter who is available 24/7 as needed  -AO       Row Name 06/20/25 0920          Home Main Entrance    Number of Stairs, Main Entrance two  -AO     Stair Railings, Main Entrance none  -AO       Row Name 06/20/25 0920          Stairs Within Home, Primary    Stairs, Within Home, Primary Pt resides on main level of home  -AO       Row Name 06/20/25 0920          Cognition    Orientation Status (Cognition) unable/difficult to assess  Did not formally assess 2*/2 language barrier  -AO       Row Name 06/20/25 0920          Safety Issues/Impairments Affecting Functional Mobility    Impairments Affecting Function (Mobility) endurance/activity tolerance;strength;balance  -AO               User Key  (r) = Recorded By, (t) = Taken By, (c) = Cosigned By      Initials Name Provider Type    Brigette Kaplan, PT Physical Therapist                   Mobility       Row Name 06/20/25 0920          Bed Mobility    Bed Mobility supine-sit-supine  -AO     Supine-Sit-Supine [place] (Bed Mobility) standby assist  -AO     Assistive Device (Bed Mobility) bed rails  -AO       Row Name 06/20/25 0920          Bed-Chair Transfer    Bed-Chair [place] (Transfers) unable to assess  -AO       Row Name 06/20/25 0920          Sit-Stand Transfer    Sit-Stand [place] (Transfers) contact guard  -AO       Row Name 06/20/25 0920          Gait/Stairs (Locomotion)    [place] Level (Gait) unable to assess  -AO     Comment, (Gait/Stairs) Unable to assess 2*/2 HTN  -AO               User Key  (r) = Recorded By, (t) = Taken By, (c) = Cosigned By      Initials Name Provider Type    Brigette Kaplan, PT Physical  Therapist                   Obj/Interventions       Row Name 06/20/25 0920          Range of Motion Comprehensive    Comment, General Range of Motion Unable to formally assess 2*/2 episode of HTN sitting/standing EOB, grossly WFL  -AO       Row Name 06/20/25 0920          Strength Comprehensive (MMT)    Comment, General Manual Muscle Testing (MMT) Assessment Unable to formally assess 2*/2 episode of HTN sitting/standing EOB, grossly WFL  -AO       Row Name 06/20/25 0920          Balance    Balance Assessment sitting static balance;sitting dynamic balance;standing static balance  -AO     Static Sitting Balance standby assist  -AO     Dynamic Sitting Balance standby assist  -AO     Position, Sitting Balance unsupported;sitting edge of bed  -AO     Static Standing Balance contact guard  -AO     Position/Device Used, Standing Balance supported  HHA  -AO       Row Name 06/20/25 0920          Sensory Assessment (Somatosensory)    Sensory Assessment (Somatosensory) unable/difficult to assess  -AO               User Key  (r) = Recorded By, (t) = Taken By, (c) = Cosigned By      Initials Name Provider Type    AO Brigette Arshad, PT Physical Therapist                   Goals/Plan       Row Name 06/20/25 0920          Bed Mobility Goal 1 (PT)    Activity/Assistive Device (Bed Mobility Goal 1, PT) bed mobility activities, all  -AO     [place] Level/Cues Needed (Bed Mobility Goal 1, PT) independent  -AO     Time Frame (Bed Mobility Goal 1, PT) long term goal (LTG);2 weeks  -AO     Progress/Outcomes (Bed Mobility Goal 1, PT) goal not met  -AO       [place] Name 06/20/25 0920          Transfer Goal 1 (PT)    Activity/Assistive Device (Transfer Goal 1, PT) transfers, all  -AO     [place] Level/Cues Needed (Transfer Goal 1, PT) independent  -AO     Time Frame (Transfer Goal 1, PT) long term goal (LTG);2 weeks  -AO     Progress/Outcome (Transfer Goal 1, PT) goal met  -AO       [place] Name 06/20/25 0920          Gait Training Goal  1 (PT)    Activity/Assistive Device (Gait Training Goal 1, PT) gait (walking locomotion)  -AO     [place] Level (Gait Training Goal 1, PT) independent  -AO     Distance (Gait Training Goal 1, PT) 150 ft  -AO     Time Frame (Gait Training Goal 1, PT) long term goal (LTG);2 weeks  -AO     Progress/Outcome (Gait Training Goal 1, PT) goal not met  -AO       Row Name 06/20/25 0920          Stairs Goal 1 (PT)    Activity/Assistive Device (Stairs Goal 1, PT) stairs, all skills  -AO     [place] Level/Cues Needed (Stairs Goal 1, PT) supervision required  -AO     Number of Stairs (Stairs Goal 1, PT) 2 steps , no HR  -AO     Time Frame (Stairs Goal 1, PT) long term goal (LTG);2 weeks  -AO     Progress/Outcome (Stairs Goal 1, PT) goal not met  -AO       Row Name 06/20/25 0920          Therapy Assessment/Plan (PT)    Planned Therapy Interventions (PT) balance training;bed mobility training;gait training;home exercise program;neuromuscular re-education;patient/family education;stair training;strengthening;transfer training  -AO               User Key  (r) = Recorded By, (t) = Taken By, (c) = Cosigned By      Initials Name Provider Type    AO Brigette Arshad, PT Physical Therapist                   Clinical Impression       Row Name 06/20/25 0920          Pain    Pretreatment Pain Rating 0/10 - no pain  -AO     Posttreatment Pain Rating 0/10 - no pain  -AO     Pre/Posttreatment Pain Comment Denies pain, reports abdomainl pain yesterday/through the night that has resolved  -AO       Row Name 06/20/25 0920          Plan of Care Review    Plan of Care Reviewed With patient;child  -AO     Progress no change  -AO     Outcome Evaluation Pt is an 83 y/o F who presented to [facility] w/ excessive weakness/hypotension with anemia/melena s/p EGD 6/19/2025 and w/ post-EGD hematemesis requiring blood transfusion, awaiting colonoscopy 6/20/2025. Pt w/ hx of SSS s/p pacemaker placement, HTN, HLD, DM type II, CKD on HD. At baseline, pt lives  "w/ daughter in a multi-level home (though resides on main level only) with 2 TOSHA (no handrail), per pt's son, pt typically independent w/ household ambulation w/ no AD, assist for bathing/dressing from daughter, no falls over the past year, no home 02, and tpically \"does very well\" in/around the home. Eval limited to bed mobility/transfers and EOB only this date, as pt hypertensive w/ supine BP: 214/72 mmHg, standing BP: 225/126 mmHg requiring immediate return to supine, with supine BP: 230/82 mmHg (Dr. Farooq arrives during session and plans to start pt on IV BP meds while NPO). Pt required SBA for supine to/from sit transfer, CGA for sit to/from stand transfer and with no appararent significant strength/balance deficits, though unable to formally assess strength or gait training this date 2*/2 BP. Anticipate pt will be safe to return home w/ continued assist from family, will further assess at ongoing sessions and plan to see 3x/week at [facility] for progression of mobility.  -AO       Row Name 06/20/25 0920          Therapy Assessment/Plan (PT)    Rehab Potential (PT) good  -AO     Criteria for Skilled Interventions Met (PT) yes;meets criteria;skilled treatment is necessary  -AO     Therapy Frequency (PT) 3 times/wk  -AO     Predicted Duration of Therapy Intervention (PT) until d/c  -AO       Row Name 06/20/25 0920          Vital Signs    Pre Systolic BP Rehab 214  -AO     Pre Treatment Diastolic BP 72  -AO     Intra Systolic BP Rehab 225  -AO     Intra Treatment Diastolic   -AO     Post Systolic BP Rehab 230  -AO     Post Treatment Diastolic BP 82  -AO     Recovery Time Sp02 and HR WNL throughout session on RA  -AO       Row Name 06/20/25 0920          Positioning and Restraints    Pre-Treatment Position in bed  -AO     Post Treatment Position bed  -AO     In Bed notified nsg;call light within reach;encouraged to call for assist;with family/caregiver  -AO               User Key  (r) = Recorded By, (t) = Taken " "By, (c) = Cosigned By      Initials Name Provider Type    AO Brigette Arshad, PT Physical Therapist                   Outcome Measures    No documentation.                                Physical Therapy Education       Title: PT OT SLP Therapies (In Progress)       Topic: Physical Therapy (In Progress)       Point: Mobility training (Done)       Learning Progress Summary            Patient Acceptance, E,TB, VU by AO at 6/20/2025 1046                      Point: Home exercise program (Not Started)       Learner Progress:  Not documented in this visit.              Point: Body mechanics (Not Started)       Learner Progress:  Not documented in this visit.              Point: Precautions (Done)       Learning Progress Summary            Patient Acceptance, E,TB, VU by AO at 6/20/2025 1046                                      User Key       Initials Effective Dates Name Provider Type Discipline    AO 06/16/21 -  Brigette Arshad, PT Physical Therapist PT                  PT Recommendation and Plan  Planned Therapy Interventions (PT): balance training, bed mobility training, gait training, home exercise program, neuromuscular re-education, patient/family education, stair training, strengthening, transfer training  Progress: no change  Outcome Evaluation: Pt is an 83 y/o F who presented to [facility] w/ excessive weakness/hypotension with anemia/melena s/p EGD 6/19/2025 and w/ post-EGD hematemesis requiring blood transfusion, awaiting colonoscopy 6/20/2025. Pt w/ hx of SSS s/p pacemaker placement, HTN, HLD, DM type II, CKD on HD. At baseline, pt lives w/ daughter in a multi-level home (though resides on main level only) with 2 TOSHA (no handrail), per pt's son, pt typically independent w/ household ambulation w/ no AD, assist for bathing/dressing from daughter, no falls over the past year, no home 02, and tpically \"does very well\" in/around the home. Eval limited to bed mobility/transfers and EOB only this date, as pt " hypertensive w/ supine BP: 214/72 mmHg, standing BP: 225/126 mmHg requiring immediate return to supine, with supine BP: 230/82 mmHg (Dr. Farooq arrives during session and plans to start pt on IV BP meds while NPO). Pt required SBA for supine to/from sit transfer, CGA for sit to/from stand transfer and with no appararent significant strength/balance deficits, though unable to formally assess strength or gait training this date 2*/2 BP. Anticipate pt will be safe to return home w/ continued assist from family, will further assess at ongoing sessions and plan to see 3x/week at [facility] for progression of mobility.     Time Calculation:   PT Evaluation Complexity  History, PT Evaluation Complexity: 3 or more personal factors and/or comorbidities  Examination of Body Systems (PT Eval Complexity): total of 3 or more elements  Clinical Presentation (PT Evaluation Complexity): evolving  Clinical Decision Making (PT Evaluation Complexity): moderate complexity  Overall Complexity (PT Evaluation Complexity): moderate complexity     PT Charges       Row Name 06/20/25 1047             Time Calculation    Start Time 0920  -AO      Stop Time 0932  -AO      Time Calculation (min) 12 min  -AO      PT Received On 06/20/25  -AO      PT - Next Appointment 06/23/25  -AO      PT Goal Re-Cert Due Date 07/04/25  -AO         Time Calculation- PT    Total Timed Code Minutes- PT 0 minute(s)  -AO                User Key  (r) = Recorded By, (t) = Taken By, (c) = Cosigned By      Initials Name Provider Type    AO Brigette Arshad, PT Physical Therapist                  Therapy Charges for Today       Code Description Service Date Service Provider Modifiers Qty    97188729590  PT EVAL MOD COMPLEXITY 3 6/20/2025 Brigette Arshad, PT GP 1            PT G-Codes  AM-PAC 6 Clicks Score (PT): 10  PT Discharge Summary  Anticipated Discharge Disposition (PT): home with assist    Brigette Arshad PT  6/20/2025

## 2025-06-20 NOTE — NURSING NOTE
"Pt presents with hypotension and excessive weakness. Unable to stand or sit up independently. Pt and family at bedside. Edu on importance of bedrest at this time due to pt condition. Family not receptive to education. Family continues to pick pt up out of bed and onto BSC with 4 assist. Family states \"We can take care of her\".   "

## 2025-06-20 NOTE — PROGRESS NOTES
CARDIOLOGY PROGRESS NOTE:    Brad Woodward  84 y.o.  female  1940  [id number]      Referring Provider: Hospitalist    Reason for follow-up: Black stools     Patient Care Team:  Brian Nazario APRN as PCP - Zane Hassan MD as Consulting Physician (Cardiology)  Marnie Brandt APRN as Nurse Practitioner (Cardiology)    Subjective .  Patient is having colonoscopy today and not having any symptoms    Objective lying in bed comfortably     Review of Systems   Constitutional: Negative for malaise/fatigue.   Cardiovascular:  Negative for chest pain, dyspnea on exertion, leg swelling and palpitations.   Respiratory:  Negative for cough and shortness of breath.    Gastrointestinal:  Negative for abdominal pain, nausea and vomiting.   Neurological:  Negative for dizziness, headaches, light-headedness, numbness and weakness.   All other systems reviewed and are negative.      Allergies: Patient has no known allergies.    Scheduled Meds:[Held by provider] aspirin, 81 mg, Oral, Daily  [Held by provider] cloNIDine, 0.3 mg, Oral, Q8H  [Held by provider] clopidogrel, 75 mg, Oral, Daily  epoetin rickie/rickie-epbx, 20,000 Units, Intravenous, Once per day on Monday Wednesday Friday  [Held by provider] hydrALAZINE, 100 mg, Oral, TID  [Held by provider] losartan, 100 mg, Oral, Q24H  pantoprazole, 40 mg, Intravenous, BID AC  sodium-potassium-magnesium sulfates, 1 bottle, Oral, Q12H  sorbitol, 50 mL, Oral, Once      Continuous Infusions:   PRN Meds:.  acetaminophen **OR** acetaminophen **OR** acetaminophen    senna-docusate sodium **AND** polyethylene glycol **AND** bisacodyl **AND** bisacodyl    Calcium Replacement - Follow Nurse / BPA Driven Protocol    dextrose    dextrose    glucagon (human recombinant)    heparin (porcine)    hydrALAZINE    hydrALAZINE    Magnesium Low Dose Replacement - Follow Nurse / BPA Driven Protocol    melatonin    metoprolol tartrate    ondansetron ODT **OR** ondansetron    Phosphorus Replacement -  "Follow Nurse / BPA Driven Protocol    Potassium Replacement - Follow Nurse / BPA Driven Protocol    sodium chloride        VITAL SIGNS  Vitals:    06/20/25 0330 06/20/25 0400 06/20/25 0700 06/20/25 0745   BP: (!) 187/67 157/62 (!) 149/128 (!) 191/71   BP Location:       Patient Position:       Pulse: 65 64 64 65   Resp:  16  20   Temp:  97.7 °F (36.5 °C)  98.2 °F (36.8 °C)   TempSrc:  Temporal     SpO2: 95% 97% 94% 95%   Weight:       Height:           Flowsheet Rows      Flowsheet Row First Filed Value   Admission Height 162.6 cm (64\") Documented at 06/18/2025 1117   Admission Weight 61.2 kg (134 lb 14.7 oz) Documented at 06/18/2025 1117             TELEMETRY: Sinus rhythm    Physical Exam:  Constitutional:       Appearance: Well-developed.   Eyes:      General: No scleral icterus.     Conjunctiva/sclera: Conjunctivae normal.   HENT:      Head: Normocephalic and atraumatic.   Neck:      Vascular: No carotid bruit or JVD.   Pulmonary:      Effort: Pulmonary effort is normal.      Breath sounds: Normal breath sounds. No wheezing. No rales.   Cardiovascular:      Normal rate. Regular rhythm.   Pulses:     Intact distal pulses.   Abdominal:      General: Bowel sounds are normal.      Palpations: Abdomen is soft.   Musculoskeletal:      Cervical back: Normal range of motion and neck supple. Skin:     General: Skin is warm and dry.      Findings: No rash.   Neurological:      Mental Status: Alert.          Results Review:   I reviewed the patient's new clinical results.  Lab Results (last 24 hours)       Procedure Component Value Units Date/Time    Tissue Pathology Exam [[id number]] Collected: 06/19/25 1502    Specimen: Tissue from Stomach Updated: 06/20/25 0908    Hemoglobin & Hematocrit, Blood [[id number]]  (Abnormal) Collected: 06/20/25 0458    Specimen: Blood Updated: 06/20/25 0519     Hemoglobin 7.6 g/dL      Hematocrit 22.6 %      Comment: Result checked         Phosphorus [[id number]]  (Normal) Collected: 06/20/25 " 0114    Specimen: Blood Updated: 06/20/25 0147     Phosphorus 4.0 mg/dL     Magnesium [[id number]]  (Normal) Collected: 06/20/25 0114    Specimen: Blood Updated: 06/20/25 0147     Magnesium 1.7 mg/dL     Comprehensive Metabolic Panel [[id number]]  (Abnormal) Collected: 06/20/25 0114    Specimen: Blood Updated: 06/20/25 0147     Glucose 117 mg/dL      BUN 42.5 mg/dL      Creatinine 3.36 mg/dL      Sodium 133 mmol/L      Potassium 4.3 mmol/L      Chloride 98 mmol/L      CO2 25.1 mmol/L      Calcium 7.7 mg/dL      Total Protein 4.8 g/dL      Albumin 3.1 g/dL      ALT (SGPT) 13 U/L      AST (SGOT) 24 U/L      Alkaline Phosphatase 59 U/L      Total Bilirubin 0.3 mg/dL      Globulin 1.7 gm/dL      A/G Ratio 1.8 g/dL      BUN/Creatinine Ratio 12.6     Anion Gap 9.9 mmol/L      eGFR 13.0 mL/min/1.73     Narrative:      GFR Categories in Chronic Kidney Disease (CKD)              GFR Category          GFR (mL/min/1.73)    Interpretation  G1                    90 or greater        Normal or high (1)  G2                    60-89                Mild decrease (1)  G3a                   45-59                Mild to moderate decrease  G3b                   30-44                Moderate to severe decrease  G4                    15-29                Severe decrease  G5                    14 or less           Kidney failure    (1)In the absence of evidence of kidney disease, neither GFR category G1 or G2 fulfill the criteria for CKD.    eGFR calculation 2021 CKD-EPI creatinine equation, which does not include race as a factor    CBC & Differential [[id number]]  (Abnormal) Collected: 06/20/25 0114    Specimen: Blood Updated: 06/20/25 0126    Narrative:      The following orders were created for panel order CBC & Differential.  Procedure                               Abnormality         Status                     ---------                               -----------         ------                     CBC Auto Differential[[id number]]         Abnormal            Final result                 Please view results for these tests on the individual orders.    CBC Auto Differential [[id number]]  (Abnormal) Collected: 06/20/25 0114    Specimen: Blood Updated: 06/20/25 0126     WBC 4.84 10*3/mm3      RBC 2.19 10*6/mm3      Hemoglobin 6.2 g/dL      Hematocrit 18.6 %      MCV 84.9 fL      MCH 28.3 pg      MCHC 33.3 g/dL      RDW 15.5 %      RDW-SD 47.8 fl      MPV 9.6 fL      Platelets 98 10*3/mm3      Neutrophil % 68.2 %      Lymphocyte % 21.5 %      Monocyte % 7.2 %      Eosinophil % 2.9 %      Basophil % 0.2 %      Immature Grans % 0.0 %      Neutrophils, Absolute 3.30 10*3/mm3      Lymphocytes, Absolute 1.04 10*3/mm3      Monocytes, Absolute 0.35 10*3/mm3      Eosinophils, Absolute 0.14 10*3/mm3      Basophils, Absolute 0.01 10*3/mm3      Immature Grans, Absolute 0.00 10*3/mm3      nRBC 0.0 /100 WBC     Hemoglobin & Hematocrit, Blood [[id number]]  (Abnormal) Collected: 06/19/25 2035    Specimen: Blood Updated: 06/19/25 2046     Hemoglobin 6.4 g/dL      Hematocrit 19.7 %     Hemoglobin & Hematocrit, Blood [[id number]]  (Abnormal) Collected: 06/19/25 1844    Specimen: Blood Updated: 06/19/25 1914     Hemoglobin 7.8 g/dL      Hematocrit 23.7 %     Vitamin B12 [[id number]]  (Abnormal) Collected: 06/18/25 1045    Specimen: Blood Updated: 06/19/25 1705     Vitamin B-12 >2,000 pg/mL     Narrative:      Results may be falsely increased if patient taking Biotin.      Folate [[id number]]  (Normal) Collected: 06/18/25 1045    Specimen: Blood Updated: 06/19/25 1705     Folate 11.80 ng/mL     Narrative:      Results may be falsely increased if patient taking Biotin.      Haptoglobin [[id number]]  (Normal) Collected: 06/19/25 1132    Specimen: Blood Updated: 06/19/25 1650     Haptoglobin 58 mg/dL     POC Glucose Once [[id number]]  (Abnormal) Collected: 06/19/25 1636    Specimen: Blood Updated: 06/19/25 1639     Glucose 125 mg/dL      Comment: Serial Number: [id number]:   [id number]       Blood Culture - Blood, Arm, Left [[id number]]  (Normal) Collected: 06/18/25 1146    Specimen: Blood from Arm, Left Updated: 06/19/25 1202     Blood Culture No growth at 24 hours    POC Glucose 4x Daily Before Meals & at Bedtime [[id number]]  (Abnormal) Collected: 06/19/25 1138    Specimen: Blood Updated: 06/19/25 1139     Glucose 116 mg/dL      Comment: Serial Number: [id number]:  [id number]       Blood Culture - Blood, Arm, Left [[id number]]  (Normal) Collected: 06/18/25 1045    Specimen: Blood from Arm, Left Updated: 06/19/25 1115     Blood Culture No growth at 24 hours            Imaging Results (Last 24 Hours)       ** No results found for the last 24 hours. **            EKG      I personally viewed and interpreted the patient's EKG/Telemetry data:    ECHOCARDIOGRAM:  Results for orders placed during the hospital encounter of 04/15/21    Adult Transthoracic Echo Complete W/ Cont if Necessary Per Protocol    Interpretation Summary  · Left ventricular ejection fraction appears to be 61 - 65%.  · Left ventricular wall thickness is consistent with moderate basal asymmetric hypertrophy.  · The right ventricular cavity is mildly dilated.  · Mild to moderate aortic valve stenosis is present.  · Moderate mitral valve regurgitation is present.  · No pericardial effusion noted       STRESS MYOVIEW:       CARDIAC CATHETERIZATION:  No results found for this or any previous visit.       OTHER:         Assessment & Plan     Melena  Patient presented with black stools and had EGD which did not show any significant abnormalities but is having a colonoscopy done when she is cleared    End-stage renal disease  Patient needs dialysis as it is due today and is followed by nephrologist    Hypertension  Patient blood pressure very high and will start on IV Cardene along with oral medicines but she needs dialysis    Sick sinus syndrome  Patient is status post pacemaker placement the pacemaker is working very  well    Diabetes  Patient hemoglobin A1c is normal     Hyperlipidemia  Patient on statins.          Zane Aldridge MD  06/20/25  10:36 EDT

## 2025-06-20 NOTE — PLAN OF CARE
Goal Outcome Evaluation:  Plan of Care Reviewed With: patient, family           Outcome Evaluation: 84 y.o. female with a CMH of  SSS s/p PPM, ESRD on HD, DM2, HTN, HLD, h/o renal artery stenosis s/p L renal stent who presented to [facility] on 6/18/2025 with dark stools x 2 weeks. Hgb 6.0. EGD completed which shows no upper GI source of bleeding. Plans to have colonoscopy 6/20/25. Notably, pt is non-english speaking. Family present today assisting with communication. At baseline, pt lives with dtr and LANCE. She is assisted with dressing and bathing, but mobilized independently. Eval very limited today, as pt with elevated BP. She is slightly impulsive, coming to EOB and standing with CGA-SBA despite OT asking her to remain in bed. Family able ot communicate to patient to return to sitting then to supine. /126 to begin, then 230/82 once she had returned to supine. MD arrived during this time, reporting he is ordering BP meds to restart despite NPO status in prep for colonoscopy. Pt noted to be moving well this date, observed spontaneously adjusting socks but no further ADL assessment completed due to HTN. OT will follow to ensure baseline functional mobility level and ADL status, recommending home with family assist at OR.    Anticipated Discharge Disposition (OT): home with assist

## 2025-06-20 NOTE — PLAN OF CARE
"Goal Outcome Evaluation:  Plan of Care Reviewed With: patient, child        Progress: no change  Outcome Evaluation: Pt is an 83 y/o F who presented to [facility] w/ excessive weakness/hypotension with anemia/melena s/p EGD 6/19/2025 and w/ post-EGD hematemesis requiring blood transfusion, awaiting colonoscopy 6/20/2025. Pt w/ hx of SSS s/p pacemaker placement, HTN, HLD, DM type II, CKD on HD. At baseline, pt lives w/ daughter in a multi-level home (though resides on main level only) with 2 TOSHA (no handrail), per pt's son, pt typically independent w/ household ambulation w/ no AD, assist for bathing/dressing from daughter, no falls over the past year, no home 02, and tpically \"does very well\" in/around the home. Eval limited to bed mobility/transfers and EOB only this date, as pt hypertensive w/ supine BP: 214/72 mmHg, standing BP: 225/126 mmHg requiring immediate return to supine, with supine BP: 230/82 mmHg (Dr. Farooq arrives during session and plans to start pt on IV BP meds while NPO). Pt required SBA for supine to/from sit transfer, CGA for sit to/from stand transfer and with no appararent significant strength/balance deficits, though unable to formally assess strength or gait training this date 2*/2 BP. Anticipate pt will be safe to return home w/ continued assist from family, will further assess at ongoing sessions and plan to see 3x/week at [facility] for progression of mobility.    Anticipated Discharge Disposition (PT): home with assist                        "

## 2025-06-20 NOTE — PROGRESS NOTES
[facility] Vascular Surgery Progress Note    Name: Brad Woodward ADMIT: 2025   : 1940  PCP: Brian Nazario APRN    MRN: [id number] LOS: 1 days   AGE/SEX: 84 y.o. female  ROOM: [address] [place] [address]    CC: GI bleed in the setting of recent renal artery stenting    Subjective     No complaints this morning.    Objective     Scheduled Medications:   [Held by provider] aspirin, 81 mg, Oral, Daily  [Held by provider] cloNIDine, 0.3 mg, Oral, Q8H  [Held by provider] clopidogrel, 75 mg, Oral, Daily  epoetin rickie/rickie-epbx, 20,000 Units, Intravenous, Once per day on   [Held by provider] hydrALAZINE, 100 mg, Oral, TID  [Held by provider] losartan, 100 mg, Oral, Q24H  pantoprazole, 40 mg, Intravenous, BID AC  sodium-potassium-magnesium sulfates, 1 bottle, Oral, Q12H        Active Infusions:       As Needed Medications:    acetaminophen **OR** acetaminophen **OR** acetaminophen    senna-docusate sodium **AND** polyethylene glycol **AND** bisacodyl **AND** bisacodyl    Calcium Replacement - Follow Nurse / BPA Driven Protocol    dextrose    dextrose    glucagon (human recombinant)    heparin (porcine)    Magnesium Low Dose Replacement - Follow Nurse / BPA Driven Protocol    melatonin    ondansetron ODT **OR** ondansetron    Phosphorus Replacement - Follow Nurse / BPA Driven Protocol    Potassium Replacement - Follow Nurse / BPA Driven Protocol    sodium chloride    Vital Signs  Vitals:    25 0700   BP: (!) 149/128   Pulse: 64   Resp:    Temp:    SpO2: 94%      Body mass index is 24.17 kg/m².     Physical Exam:  NAD, alert and oriented  RRR  Lungs clear  Abd soft, benign  Palpable bilateral radial pulses  Right chest TDC in place    Results Review:     CBC    Results from last 7 days   Lab Units 25  0458 25  0114 25  2035 25  1844 25  0525 25  1045   WBC 10*3/mm3  --  4.84  --   --  3.48 5.00   HEMOGLOBIN g/dL 7.6* 6.2* 6.4* 7.8* 6.0* 8.0*    PLATELETS 10*3/mm3  --  98*  --   --  135* 205     BMP   Results from last 7 days   Lab Units 06/20/25  0114 06/19/25  0429 06/18/25  1045   SODIUM mmol/L 133* 135* 131*   POTASSIUM mmol/L 4.3 3.8 3.6   CHLORIDE mmol/L 98 97* 93*   CO2 mmol/L 25.1 28.3 26.0   BUN mg/dL 42.5* 21.8 42.1*   CREATININE mg/dL 3.36* 2.53* 3.79*   GLUCOSE mg/dL 117* 72 117*   MAGNESIUM mg/dL 1.7 1.7  --    PHOSPHORUS mg/dL 4.0 2.3*  --      Coag   Results from last 7 days   Lab Units 06/18/25  1045   INR  0.93     HbA1C   Lab Results   Component Value Date    HGBA1C 5.40 04/15/2025    HGBA1C 5.9 (H) 04/16/2021     Infection   Results from last 7 days   Lab Units 06/18/25  1146 06/18/25  1045   BLOODCX  No growth at 24 hours No growth at 24 hours     Radiology(recent) CT Abdomen Pelvis With Contrast  Result Date: 6/18/2025  Impression: Motion-degraded exam. Punctate focus of gas within the urinary bladder. If no recent history of instrumentation, recommend correlation with urinalysis to assess for cystitis. New trace pelvic free fluid, nonspecific. Possible endometrial thickening, though not well assessed on this exam. Consider further evaluation with nonemergent/outpatient pelvic ultrasound. Stable chronic/ancillary findings as above. Electronically Signed: Justin Abraham MD  6/18/2025 12:58 PM EDT  Workstation ID: [id number]     VTE Prophylaxis:  Pharmacologic & mechanical VTE prophylaxis orders are present.         Problems:    Active Hospital Problems:  Active Hospital Problems    Diagnosis  POA    **ESRD (end stage renal disease) on dialysis [N18.6, Z99.2]  Not Applicable    Melena [K92.1]  Unknown    Anemia [D64.9]  Unknown      Resolved Hospital Problems   No resolved problems to display.        Assessment & Plan   Assessment / Plan     ESRD (end stage renal disease) on dialysis    Anemia    Melena      84-year-old female who presents with 2-week history of dark stools  GI following, status post EGD on 6/19 with no source of upper  GIB, plans for colonoscopy today  Hemoglobin up to 7.6 this morning after PRBC transfusion yesterday  Okay to continue holding aspirin and Plavix as needed for GIB  Okay to restart as soon as safely possible, okay to de-escalate to single antiplatelet therapy  No plans for an acute vascular surgery intervention  Will plan to see her as an outpatient as scheduled in July  We will sign off        CECE Stout  [facility] Vascular Surgery  06/20/25   O: [phone]  F: [phone]

## 2025-06-20 NOTE — NURSING NOTE
HD TX this day (orders changed from 6.21.25) per Dr. Farooq via secure chat d/t elevated BP. Pt. To have HD TX via bedside.    How Severe Are Your Spot(S)?: mild Hpi Title: Evaluation of Skin Lesions

## 2025-06-21 ENCOUNTER — ANESTHESIA (OUTPATIENT)
Dept: GASTROENTEROLOGY | Facility: HOSPITAL | Age: 85
End: 2025-06-21
Payer: MEDICAID

## 2025-06-21 LAB
ALBUMIN SERPL-MCNC: 3.5 G/DL (ref 3.5–5.2)
ALBUMIN/GLOB SERPL: 1.4 G/DL
ALP SERPL-CCNC: 82 U/L (ref 39–117)
ALT SERPL W P-5'-P-CCNC: 23 U/L (ref 1–33)
ANION GAP SERPL CALCULATED.3IONS-SCNC: 13.2 MMOL/L (ref 5–15)
AST SERPL-CCNC: 41 U/L (ref 1–32)
BASOPHILS # BLD AUTO: 0.03 10*3/MM3 (ref 0–0.2)
BASOPHILS NFR BLD AUTO: 0.5 % (ref 0–1.5)
BILIRUB SERPL-MCNC: 0.4 MG/DL (ref 0–1.2)
BUN SERPL-MCNC: 26.9 MG/DL (ref 8–23)
BUN/CREAT SERPL: 7.8 (ref 7–25)
CALCIUM SPEC-SCNC: 8.7 MG/DL (ref 8.6–10.5)
CHLORIDE SERPL-SCNC: 100 MMOL/L (ref 98–107)
CO2 SERPL-SCNC: 22.8 MMOL/L (ref 22–29)
CREAT SERPL-MCNC: 3.46 MG/DL (ref 0.57–1)
DEPRECATED RDW RBC AUTO: 48.1 FL (ref 37–54)
DEPRECATED RDW RBC AUTO: 49.9 FL (ref 37–54)
EGFRCR SERPLBLD CKD-EPI 2021: 12.6 ML/MIN/1.73
EOSINOPHIL # BLD AUTO: 0.1 10*3/MM3 (ref 0–0.4)
EOSINOPHIL NFR BLD AUTO: 1.6 % (ref 0.3–6.2)
ERYTHROCYTE [DISTWIDTH] IN BLOOD BY AUTOMATED COUNT: 16 % (ref 12.3–15.4)
ERYTHROCYTE [DISTWIDTH] IN BLOOD BY AUTOMATED COUNT: 16.4 % (ref 12.3–15.4)
GLOBULIN UR ELPH-MCNC: 2.5 GM/DL
GLUCOSE BLDC GLUCOMTR-MCNC: 105 MG/DL (ref 70–105)
GLUCOSE BLDC GLUCOMTR-MCNC: 117 MG/DL (ref 70–105)
GLUCOSE BLDC GLUCOMTR-MCNC: 124 MG/DL (ref 70–105)
GLUCOSE BLDC GLUCOMTR-MCNC: 138 MG/DL (ref 70–105)
GLUCOSE SERPL-MCNC: 130 MG/DL (ref 65–99)
HCT VFR BLD AUTO: 26.1 % (ref 34–46.6)
HCT VFR BLD AUTO: 27.7 % (ref 34–46.6)
HCT VFR BLD AUTO: 28.3 % (ref 34–46.6)
HGB BLD-MCNC: 8.7 G/DL (ref 12–15.9)
HGB BLD-MCNC: 9 G/DL (ref 12–15.9)
HGB BLD-MCNC: 9.2 G/DL (ref 12–15.9)
IMM GRANULOCYTES # BLD AUTO: 0.03 10*3/MM3 (ref 0–0.05)
IMM GRANULOCYTES NFR BLD AUTO: 0.5 % (ref 0–0.5)
LYMPHOCYTES # BLD AUTO: 1.74 10*3/MM3 (ref 0.7–3.1)
LYMPHOCYTES NFR BLD AUTO: 28.5 % (ref 19.6–45.3)
MAGNESIUM SERPL-MCNC: 1.8 MG/DL (ref 1.6–2.4)
MCH RBC QN AUTO: 27.5 PG (ref 26.6–33)
MCH RBC QN AUTO: 28 PG (ref 26.6–33)
MCHC RBC AUTO-ENTMCNC: 33.2 G/DL (ref 31.5–35.7)
MCHC RBC AUTO-ENTMCNC: 33.3 G/DL (ref 31.5–35.7)
MCV RBC AUTO: 82.6 FL (ref 79–97)
MCV RBC AUTO: 84.5 FL (ref 79–97)
MONOCYTES # BLD AUTO: 0.69 10*3/MM3 (ref 0.1–0.9)
MONOCYTES NFR BLD AUTO: 11.3 % (ref 5–12)
NEUTROPHILS NFR BLD AUTO: 3.52 10*3/MM3 (ref 1.7–7)
NEUTROPHILS NFR BLD AUTO: 57.6 % (ref 42.7–76)
NRBC BLD AUTO-RTO: 0 /100 WBC (ref 0–0.2)
PHOSPHATE SERPL-MCNC: 2.6 MG/DL (ref 2.5–4.5)
PLATELET # BLD AUTO: 136 10*3/MM3 (ref 140–450)
PLATELET # BLD AUTO: 145 10*3/MM3 (ref 140–450)
PMV BLD AUTO: 10 FL (ref 6–12)
PMV BLD AUTO: 9.3 FL (ref 6–12)
POTASSIUM SERPL-SCNC: 3.7 MMOL/L (ref 3.5–5.2)
PROT SERPL-MCNC: 6 G/DL (ref 6–8.5)
RBC # BLD AUTO: 3.16 10*6/MM3 (ref 3.77–5.28)
RBC # BLD AUTO: 3.28 10*6/MM3 (ref 3.77–5.28)
SODIUM SERPL-SCNC: 136 MMOL/L (ref 136–145)
TROPONIN T SERPL HS-MCNC: 60 NG/L
WBC NRBC COR # BLD AUTO: 6.11 10*3/MM3 (ref 3.4–10.8)
WBC NRBC COR # BLD AUTO: 8.01 10*3/MM3 (ref 3.4–10.8)

## 2025-06-21 PROCEDURE — 83735 ASSAY OF MAGNESIUM: CPT

## 2025-06-21 PROCEDURE — 25810000003 SODIUM CHLORIDE 0.9 % SOLUTION 250 ML FLEX CONT

## 2025-06-21 PROCEDURE — 25010000002 NICARDIPINE 2.5 MG/ML SOLUTION 10 ML VIAL

## 2025-06-21 PROCEDURE — 82948 REAGENT STRIP/BLOOD GLUCOSE: CPT

## 2025-06-21 PROCEDURE — 85027 COMPLETE CBC AUTOMATED: CPT | Performed by: FAMILY MEDICINE

## 2025-06-21 PROCEDURE — 93010 ELECTROCARDIOGRAM REPORT: CPT | Performed by: INTERNAL MEDICINE

## 2025-06-21 PROCEDURE — 93005 ELECTROCARDIOGRAM TRACING: CPT | Performed by: FAMILY MEDICINE

## 2025-06-21 PROCEDURE — 84484 ASSAY OF TROPONIN QUANT: CPT | Performed by: INTERNAL MEDICINE

## 2025-06-21 PROCEDURE — 85025 COMPLETE CBC W/AUTO DIFF WBC: CPT

## 2025-06-21 PROCEDURE — 80053 COMPREHEN METABOLIC PANEL: CPT | Performed by: FAMILY MEDICINE

## 2025-06-21 PROCEDURE — 84100 ASSAY OF PHOSPHORUS: CPT

## 2025-06-21 PROCEDURE — 85018 HEMOGLOBIN: CPT

## 2025-06-21 PROCEDURE — 85014 HEMATOCRIT: CPT

## 2025-06-21 PROCEDURE — 25010000002 ONDANSETRON PER 1 MG

## 2025-06-21 RX ORDER — POLYETHYLENE GLYCOL 3350 17 G/17G
17 POWDER, FOR SOLUTION ORAL 2 TIMES DAILY
Status: DISPENSED | OUTPATIENT
Start: 2025-06-21 | End: 2025-06-22

## 2025-06-21 RX ORDER — SIMETHICONE 80 MG
80 TABLET,CHEWABLE ORAL
Status: DISCONTINUED | OUTPATIENT
Start: 2025-06-21 | End: 2025-06-24 | Stop reason: HOSPADM

## 2025-06-21 RX ADMIN — HYDROXYZINE HYDROCHLORIDE 50 MG: 25 TABLET, FILM COATED ORAL at 22:22

## 2025-06-21 RX ADMIN — Medication 5 MG: at 22:22

## 2025-06-21 RX ADMIN — NICARDIPINE HYDROCHLORIDE 5 MG/HR: 25 INJECTION, SOLUTION INTRAVENOUS at 01:57

## 2025-06-21 RX ADMIN — NICARDIPINE HYDROCHLORIDE 5 MG/HR: 25 INJECTION, SOLUTION INTRAVENOUS at 19:34

## 2025-06-21 RX ADMIN — NICARDIPINE HYDROCHLORIDE 5 MG/HR: 25 INJECTION, SOLUTION INTRAVENOUS at 07:41

## 2025-06-21 RX ADMIN — SIMETHICONE 80 MG: 80 TABLET, CHEWABLE ORAL at 20:09

## 2025-06-21 RX ADMIN — PANTOPRAZOLE SODIUM 40 MG: 40 INJECTION, POWDER, FOR SOLUTION INTRAVENOUS at 17:33

## 2025-06-21 RX ADMIN — ONDANSETRON 4 MG: 2 INJECTION, SOLUTION INTRAMUSCULAR; INTRAVENOUS at 13:55

## 2025-06-21 RX ADMIN — ONDANSETRON 4 MG: 2 INJECTION, SOLUTION INTRAMUSCULAR; INTRAVENOUS at 19:34

## 2025-06-21 RX ADMIN — PANTOPRAZOLE SODIUM 40 MG: 40 INJECTION, POWDER, FOR SOLUTION INTRAVENOUS at 07:42

## 2025-06-21 RX ADMIN — NICARDIPINE HYDROCHLORIDE 5 MG/HR: 25 INJECTION, SOLUTION INTRAVENOUS at 13:42

## 2025-06-21 RX ADMIN — POLYETHYLENE GLYCOL 3350 17 G: 17 POWDER, FOR SOLUTION ORAL at 19:34

## 2025-06-21 NOTE — PLAN OF CARE
Goal Outcome Evaluation:  Plan of Care Reviewed With: patient        Progress: no change  Outcome Evaluation: Patient alert and oriented per family as she does not speak English. Denies pain. Patient still experiencing bloody stools. Family decided against giving patient bowel prep and colonoscopy. GI was paged but a return call was not received. Patient BP continued to increase throughout the shift. Orders were given to restart Cardene gtt, BP has since improved. Patient and family updated on current plan of care.

## 2025-06-21 NOTE — PROGRESS NOTES
"RENAL/KCC:     LOS: 2 days    Patient Care Team:  Brian Nazario APRN as PCP - General  Zane Aldridge MD as Consulting Physician (Cardiology)  Marnie Brandt APRN as Nurse Practitioner (Cardiology)    Chief Complaint: ESRD    Subjective   Noted comfortable, no new complaint.  Interval History:   Chart reviewed  S/P EGD and biopsies yesterday  Hematemesis yesterday after procedure - required transfusions  NPO at present time  For HD today  BP elevated  Possible C-scope    Objective     Vital Sign Min/Max for last 24 hours  Temp  Min: 97.4 °F (36.3 °C)  Max: 98.8 °F (37.1 °C)   BP  Min: 113/47  Max: 228/72   Pulse  Min: 63  Max: 98   Resp  Min: 12  Max: 19   SpO2  Min: 92 %  Max: 99 %   No data recorded   No data recorded     Flowsheet Rows      Flowsheet Row First Filed Value   Admission Height 162.6 cm (64\") Documented at 06/18/2025 1117   Admission Weight 61.2 kg (134 lb 14.7 oz) Documented at 06/18/2025 1117            No intake/output data recorded.  I/O last 3 completed shifts:  In: 600 [Blood:600]  Out: 3250 [Stool:650]    Physical Exam:  GEN: Awake, NAD  ENT: PERRL, EOMI, MMM  NECK: Supple, no JVD  CHEST: CTAB, no W/R/C  CV: RRR, no M/G/R  ABD: Soft, NT, +BS  SKIN: Warm and Dry  NEURO: CN's intact      WBC WBC   Date Value Ref Range Status   06/21/2025 6.11 3.40 - 10.80 10*3/mm3 Final   06/20/2025 4.84 3.40 - 10.80 10*3/mm3 Final   06/19/2025 3.48 3.40 - 10.80 10*3/mm3 Final      HGB Hemoglobin   Date Value Ref Range Status   06/21/2025 9.0 (L) 12.0 - 15.9 g/dL Final   06/21/2025 8.7 (L) 12.0 - 15.9 g/dL Final   06/20/2025 10.3 (L) 12.0 - 15.9 g/dL Final   06/20/2025 7.6 (L) 12.0 - 15.9 g/dL Final   06/20/2025 6.2 (C) 12.0 - 15.9 g/dL Final   06/19/2025 6.4 (C) 12.0 - 15.9 g/dL Final   06/19/2025 7.8 (L) 12.0 - 15.9 g/dL Final   06/19/2025 6.0 (C) 12.0 - 15.9 g/dL Final      HCT Hematocrit   Date Value Ref Range Status   06/21/2025 28.3 (L) 34.0 - 46.6 % Final   06/21/2025 26.1 (L) 34.0 - 46.6 % Final " "  06/20/2025 30.0 (L) 34.0 - 46.6 % Final   06/20/2025 22.6 (L) 34.0 - 46.6 % Final     Comment:     Result checked     06/20/2025 18.6 (C) 34.0 - 46.6 % Final   06/19/2025 19.7 (C) 34.0 - 46.6 % Final   06/19/2025 23.7 (L) 34.0 - 46.6 % Final   06/19/2025 18.4 (C) 34.0 - 46.6 % Final      Platlets No results found for: \"LABPLAT\"   MCV MCV   Date Value Ref Range Status   06/21/2025 82.6 79.0 - 97.0 fL Final   06/20/2025 84.9 79.0 - 97.0 fL Final   06/19/2025 82.5 79.0 - 97.0 fL Final          Sodium Sodium   Date Value Ref Range Status   06/20/2025 133 (L) 136 - 145 mmol/L Final   06/19/2025 135 (L) 136 - 145 mmol/L Final      Potassium Potassium   Date Value Ref Range Status   06/20/2025 4.3 3.5 - 5.2 mmol/L Final   06/19/2025 3.8 3.5 - 5.2 mmol/L Final      Chloride Chloride   Date Value Ref Range Status   06/20/2025 98 98 - 107 mmol/L Final   06/19/2025 97 (L) 98 - 107 mmol/L Final      CO2 CO2   Date Value Ref Range Status   06/20/2025 25.1 22.0 - 29.0 mmol/L Final   06/19/2025 28.3 22.0 - 29.0 mmol/L Final      BUN BUN   Date Value Ref Range Status   06/20/2025 42.5 (H) 8.0 - 23.0 mg/dL Final   06/19/2025 21.8 8.0 - 23.0 mg/dL Final      Creatinine Creatinine   Date Value Ref Range Status   06/20/2025 3.36 (H) 0.57 - 1.00 mg/dL Final   06/19/2025 2.53 (H) 0.57 - 1.00 mg/dL Final      Calcium Calcium   Date Value Ref Range Status   06/20/2025 7.7 (L) 8.6 - 10.5 mg/dL Final   06/19/2025 7.7 (L) 8.6 - 10.5 mg/dL Final      PO4 No results found for: \"CAPO4\"   Albumin Albumin   Date Value Ref Range Status   06/20/2025 3.1 (L) 3.5 - 5.2 g/dL Final   06/19/2025 2.9 (L) 3.5 - 5.2 g/dL Final      Magnesium Magnesium   Date Value Ref Range Status   06/21/2025 1.8 1.6 - 2.4 mg/dL Final   06/20/2025 1.7 1.6 - 2.4 mg/dL Final   06/19/2025 1.7 1.6 - 2.4 mg/dL Final      Uric Acid No results found for: \"URICACID\"        Results Review:     I reviewed the patient's new clinical results.    [Held by provider] aspirin, 81 mg, " Oral, Daily  [Held by provider] cloNIDine, 0.3 mg, Oral, Q8H  [Held by provider] clopidogrel, 75 mg, Oral, Daily  epoetin rickie/rickie-epbx, 20,000 Units, Intravenous, Once per day on Monday Wednesday Friday  [Held by provider] hydrALAZINE, 100 mg, Oral, TID  [Held by provider] losartan, 100 mg, Oral, Q24H  pantoprazole, 40 mg, Intravenous, BID AC      niCARdipine, 5-15 mg/hr, Last Rate: 5 mg/hr (06/21/25 1342)        Medication Review: Reviewed    Assessment & Plan       ESRD (end stage renal disease) on dialysis    Anemia    Melena    Hematemesis    HTN    Plan: HD MWF.    BMP revealed stable electrolytes  Next HD session on Monday  Volume status euvolemic  Vital stable  Will follow the patient with you    Chayito Laureano MD  [facility]  06/21/25  16:40 EDT

## 2025-06-21 NOTE — PLAN OF CARE
Problem: Adult Inpatient Plan of Care  Goal: Plan of Care Review  Outcome: Progressing  Flowsheets (Taken 6/21/2025 1840)  Outcome Evaluation: Pt will be having a colonscopy tomorrow, gave 1 dose of zofran today. Nausea resolved at this time.  Goal: Patient-Specific Goal (Individualized)  Outcome: Progressing  Goal: Absence of Hospital-Acquired Illness or Injury  Outcome: Progressing  Intervention: Identify and Manage Fall Risk  Description: Perform standard risk assessment on admission using a validated tool or comprehensive approach appropriate to the patient; reassess fall risk frequently, with change in status or transfer to another level of care.Communicate risk to interprofessional healthcare team; ensure fall risk visible cue.Determine need for increased observation, equipment and environmental modification, as well as use of supportive, nonskid footwear.Adjust safety measures to individual needs and identified risk factors.Reinforce the importance of active participation with fall risk prevention, safety, and physical activity with the patient and family.Perform regular intentional rounding to assess need for position change, pain assessment and personal needs, including assistance with toileting.  Recent Flowsheet Documentation  Taken 6/21/2025 1600 by Viviana Monsalve LPN  Safety Promotion/Fall Prevention:   assistive device/personal items within reach   clutter free environment maintained   nonskid shoes/slippers when out of bed   room organization consistent   safety round/check completed  Taken 6/21/2025 1413 by Viviana Monsalve LPN  Safety Promotion/Fall Prevention:   assistive device/personal items within reach   clutter free environment maintained   nonskid shoes/slippers when out of bed   safety round/check completed   room organization consistent  Taken 6/21/2025 1215 by Viviana Monsalve LPN  Safety Promotion/Fall Prevention:   assistive device/personal items within reach   clutter free  environment maintained   nonskid shoes/slippers when out of bed   room organization consistent   safety round/check completed  Taken 6/21/2025 0812 by Viviana Monsalve LPN  Safety Promotion/Fall Prevention:   assistive device/personal items within reach   clutter free environment maintained   fall prevention program maintained   nonskid shoes/slippers when out of bed   room organization consistent   safety round/check completed  Goal: Optimal Comfort and Wellbeing  Outcome: Progressing  Intervention: Provide Person-Centered Care  Description: Use a family-focused approach to care; encourage support system presence and participation.Develop trust and rapport by proactively providing information, encouraging questions, addressing concerns and offering reassurance.Acknowledge emotional response to hospitalization.Recognize and utilize personal coping strategies and strengths; develop goals via shared decision-making.Honor spiritual and cultural preferences.  Recent Flowsheet Documentation  Taken 6/21/2025 0812 by Viviana Monsalve LPN  Trust Relationship/Rapport: care explained  Goal: Readiness for Transition of Care  Outcome: Progressing     Problem: Skin Injury Risk Increased  Goal: Skin Health and Integrity  Outcome: Progressing     Problem: Comorbidity Management  Goal: Maintenance of Asthma Control  Outcome: Progressing  Goal: Maintenance of Behavioral Health Symptom Control  Outcome: Progressing  Goal: Maintenance of COPD Symptom Control  Outcome: Progressing  Goal: Blood Glucose Level Within Target Range  Outcome: Progressing  Goal: Maintenance of Heart Failure Symptom Control  Outcome: Progressing  Goal: Blood Pressure in Desired Range  Outcome: Progressing  Goal: Maintenance of Osteoarthritis Symptom Control  Outcome: Progressing  Goal: Bariatric Home Regimen Maintained  Outcome: Progressing  Goal: Maintenance of Seizure Control  Outcome: Progressing     Problem: Fall Injury Risk  Goal: Absence of Fall and  Fall-Related Injury  Outcome: Progressing  Intervention: Promote Injury-Free Environment  Description: Provide a safe, barrier-free environment that encourages independent activity.Keep care area uncluttered and well-lighted.Determine need for increased observation or monitoring.Avoid use of devices that minimize mobility, such as restraints or indwelling urinary catheter.  Recent Flowsheet Documentation  Taken 6/21/2025 1600 by Viviana Monsalve LPN  Safety Promotion/Fall Prevention:   assistive device/personal items within reach   clutter free environment maintained   nonskid shoes/slippers when out of bed   room organization consistent   safety round/check completed  Taken 6/21/2025 1413 by Viviana Monsalve LPN  Safety Promotion/Fall Prevention:   assistive device/personal items within reach   clutter free environment maintained   nonskid shoes/slippers when out of bed   safety round/check completed   room organization consistent  Taken 6/21/2025 1215 by Viviana Monsalve LPN  Safety Promotion/Fall Prevention:   assistive device/personal items within reach   clutter free environment maintained   nonskid shoes/slippers when out of bed   room organization consistent   safety round/check completed  Taken 6/21/2025 0812 by Vivaina Monsalve LPN  Safety Promotion/Fall Prevention:   assistive device/personal items within reach   clutter free environment maintained   fall prevention program maintained   nonskid shoes/slippers when out of bed   room organization consistent   safety round/check completed   Goal Outcome Evaluation:              Outcome Evaluation: Pt will be having a colonscopy tomorrow, gave 1 dose of zofran today. Nausea resolved at this time.

## 2025-06-21 NOTE — PROGRESS NOTES
[facility] MEDICINE SERVICE  DAILY PROGRESS NOTE    NAME: Brad Woodward  : 1940  MRN: [id number]      LOS: 2 days     PROVIDER OF SERVICE: Paresh Pat MD    Chief Complaint: ESRD (end stage renal disease) on dialysis    Subjective:     Interval History:  History taken from: patient  I have seen and examined the patient bedside.  Patient grandson was also present in the room.    Per grandson patient still has dark stools.  Patient if she is feeling tired overall    Post EGD on   Pending colonoscopy.  Prep for colonoscopy seems to be an issue.  GI following    Reviewed hemoglobin improved to 9.      Review of Systems:   Review of Systems  Review of system negative except as mentioned in HPI  Objective:     Vital Signs  Temp:  [97.4 °F (36.3 °C)-98.8 °F (37.1 °C)] 97.7 °F (36.5 °C)  Heart Rate:  [63-98] 75  Resp:  [12-19] 12  BP: (113-228)/(46-90) 162/66   Body mass index is 24.17 kg/m².    Physical Exam  Physical Exam  General: Sitting up in bed; NAD  CV: RRR, S1-S2  Lungs: CTA bilaterally  Abdomen: soft, NT, ND        Diagnostic Data    Results from last 7 days   Lab Units 25  1249 25  0449 25  0458 25  0114   WBC 10*3/mm3  --  6.11  --  4.84   HEMOGLOBIN g/dL 9.0* 8.7*   < > 6.2*   HEMATOCRIT % 28.3* 26.1*   < > 18.6*   PLATELETS 10*3/mm3  --  136*  --  98*   GLUCOSE mg/dL  --   --   --  117*   CREATININE mg/dL  --   --   --  3.36*   BUN mg/dL  --   --   --  42.5*   SODIUM mmol/L  --   --   --  133*   POTASSIUM mmol/L  --   --   --  4.3   AST (SGOT) U/L  --   --   --  24   ALT (SGPT) U/L  --   --   --  13   ALK PHOS U/L  --   --   --  59   BILIRUBIN mg/dL  --   --   --  0.3   ANION GAP mmol/L  --   --   --  9.9    < > = values in this interval not displayed.       No radiology results for the last day      I reviewed the patient's new clinical results.    Assessment/Plan:     Active and Resolved Problems  Active Hospital Problems    Diagnosis  POA    **ESRD (end stage renal  disease) on dialysis [N18.6, Z99.2]  Not Applicable    Melena [K92.1]  Unknown    Anemia [D64.9]  Unknown      Resolved Hospital Problems   No resolved problems to display.       ESRD on HD  HD schedule: MWF  Last dialyzed 6/16, missed todays HD   Avoid nephrotoxic medications  Pharmacy to renally dose medication  Continue HD per Nephrology      Generalized weakness   Fatigue  Melena  Nausea/Vomiting  CT A/P with no acute findings  UA with no evidence of infection   FOBT negative in ED   S/p EGD on 6/19 shows no active bleeding  Plan for C-scope today, though bowel prep may not be adequate  Defer endoscopies to GI     SSS s/p PPM  Hypertension   Hyperlipidemia   BP elevated on ED arrival with SBP > 200, improved  Still elevated today despite IV hydralazine and metoprolol in addition to meds below  Continue  Norvsc, Hydralazine,  Metoprolol   Start Cardene gtt; was told ok to start on floor  ASA and Plavix on hold GI bleed     Diabetes Mellitus   A1c 5.40  Consistent carb diet  Hypoglycemia protocol      Anemia of chronic disease  Status post packed RBCs  Monitor CBC  Hold aspirin and Plavix consulted cardiology     H/o renal artery stenosis s/p L renal stent   Had stent placed in 5/21; vascular ok with holding DAPT for now, but resume as soon as able      Anxiety-given Ativan IV in ED     Elevated troponin, likely rate related to chronic renal failure   - Serial troponin     VTE Prophylaxis:  Pharmacologic & mechanical VTE prophylaxis orders are present.       VTE Prophylaxis:  Pharmacologic & mechanical VTE prophylaxis orders are present.       Low threshold to move patient to ICU       Disposition Planning:  TBD pending clinical course     I updated family at bedside     Code Status and Medical Interventions: CPR (Attempt to Resuscitate); Full Support   Ordered at: 06/18/25 1332     Code Status (Patient has no pulse and is not breathing):    CPR (Attempt to Resuscitate)     Medical Interventions (Patient has pulse  or is breathing):    Full Support       Signature: Electronically signed by Paresh Pat MD, 06/21/25, 14:26 EDT.  [facility]

## 2025-06-21 NOTE — ANESTHESIA PREPROCEDURE EVALUATION
Anesthesia Evaluation     history of anesthetic complications:  Difficult airway  NPO Solid Status: > 8 hours  NPO Liquid Status: > 8 hours           Airway   Mallampati: II  TM distance: >3 FB  Neck ROM: full  No difficulty expected  Dental - normal exam     Pulmonary - normal exam   (+) ,shortness of breath    ROS comment:   Melena  Patient presented with black stools and had low hemoglobin and underwent a EGD today but she did not have much findings and hence she is having colonoscopy performed tomorrow     End-stage renal disease  Patient is on dialysis and followed by nephrologist     Sick sinus syndrome  Patient had a pacemaker placement done which is working very well and is followed in my office     Hypertension  Patient blood pressure was very high initially but improved with her home medicines of metoprolol lisinopril hydralazine and clonidine.     Hyperlipidemia  Patient is on statins and the lipid levels are followed by the primary care doctor      Diabetes  Patient hemoglobin A1c is in the normal limits.           MD MARGUERITE Bradley  Cardiovascular - normal exam    (+) hypertension, PVD, hyperlipidemia      Neuro/Psych  GI/Hepatic/Renal/Endo    (+) renal disease-, diabetes mellitus    Musculoskeletal     Abdominal  - normal exam    Bowel sounds: normal.   Substance History      OB/GYN          Other        ROS/Med Hx Other: EGD 6/19/2025 PROPOFOL 70MG  ?DIF INTUBATION RENAL ARTERY ANGIOPLASTY 5/2025 SHELL 3 GRIIB  4/2021 TTE EF 61-65 MILD AS PK ANTONIO 2.4, MEAN 13.7 WINSTON 2.3  LAB DOS              Anesthesia Plan    ASA 3     general   total IV anesthesia  intravenous induction     Anesthetic plan, risks, benefits, and alternatives have been provided, discussed and informed consent has been obtained with: patient.  Pre-procedure education provided  Plan discussed with CRNA.    CODE STATUS:    Code Status (Patient has no pulse and is not breathing): CPR (Attempt to Resuscitate)  Medical Interventions  (Patient has pulse or is breathing): Full Support

## 2025-06-21 NOTE — PROGRESS NOTES
LOS: 2 days   Patient Care Team:  Brian Nazario APRN as PCP - General  Zane Aldridge MD as Consulting Physician (Cardiology)  Marnie Brandt APRN as Nurse Practitioner (Cardiology)      Subjective     Interval History:   LABS: Magnesium 1.8, phosphorus 2.6.  WBC 6.11, hemoglobin 9 (8.7), platelets 136.  Patient evidently had vomiting with bowel Prep and grandson was refusing any further prep stating that Traumatized her. Was refusing her to undergo colonoscopy today as he felt she was unstable.   So colonoscopy was canceled. Nursing staff report stool is watery red.     ROS:   No chest pain, shortness of breath, or cough.         Medication Review:     Current Facility-Administered Medications:     acetaminophen (TYLENOL) tablet 650 mg, 650 mg, Oral, Q4H PRN **OR** acetaminophen (TYLENOL) 160 MG/5ML oral solution 650 mg, 650 mg, Oral, Q4H PRN **OR** acetaminophen (TYLENOL) suppository 650 mg, 650 mg, Rectal, Q4H PRN, Marina Torrse PA-C    [Held by provider] aspirin chewable tablet 81 mg, 81 mg, Oral, Daily, Marina Torres PA-C, 81 mg at 06/19/25 0800    sennosides-docusate (PERICOLACE) 8.6-50 MG per tablet 2 tablet, 2 tablet, Oral, BID PRN **AND** polyethylene glycol (MIRALAX) packet 17 g, 17 g, Oral, Daily PRN **AND** bisacodyl (DULCOLAX) EC tablet 5 mg, 5 mg, Oral, Daily PRN **AND** bisacodyl (DULCOLAX) suppository 10 mg, 10 mg, Rectal, Daily PRN, Marina Torres PA-C    Calcium Replacement - Follow Nurse / BPA Driven Protocol, , Not Applicable, PRN, Marina Torres PA-C    [Held by provider] cloNIDine (CATAPRES) tablet 0.3 mg, 0.3 mg, Oral, Q8H, Kingsley Wooten MD, 0.3 mg at 06/19/25 1400    [Held by provider] clopidogrel (PLAVIX) tablet 75 mg, 75 mg, Oral, Daily, Almasri, Sawsan, PA-C, 75 mg at 06/19/25 0800    dextrose (D50W) (25 g/50 mL) IV injection 25 g, 25 g, Intravenous, Q15 Min PRN, Marina Torres PA-C    dextrose (GLUTOSE) oral gel 15 g, 15 g, Oral, Q15 Min PRN, Marina Torres,  CASSIDY    Epoetin Milton-epbx (RETACRIT) injection 20,000 Units, 20,000 Units, Intravenous, Once per day on Monday Wednesday Friday, Jim Farooq MD, 20,000 Units at 06/20/25 1646    glucagon (GLUCAGEN) injection 1 mg, 1 mg, Intramuscular, Q15 Min PRN, Marina Torres PA-C    heparin (porcine) injection 5,000 Units, 5,000 Units, Intracatheter, PRN, Jim Farooq MD, 5,000 Units at 06/20/25 1646    hydrALAZINE (APRESOLINE) injection 10 mg, 10 mg, Intravenous, Q6H PRN, Jim Farooq MD, 10 mg at 06/20/25 1011    hydrALAZINE (APRESOLINE) injection 10 mg, 10 mg, Intravenous, Q4H PRN, Ger Echeverria DO, 10 mg at 06/20/25 2038    [Held by provider] hydrALAZINE (APRESOLINE) tablet 100 mg, 100 mg, Oral, TID, Marina Torres PA-C, 100 mg at 06/19/25 1400    hydrOXYzine (ATARAX) tablet 50 mg, 50 mg, Oral, TID PRN, Maria Luisa oRmero APRN, 50 mg at 06/20/25 2351    [Held by provider] losartan (COZAAR) tablet 100 mg, 100 mg, Oral, Q24H, Kingsley Wooten MD    Magnesium Low Dose Replacement - Follow Nurse / BPA Driven Protocol, , Not Applicable, PRN, Marina Torres PA-C    melatonin tablet 5 mg, 5 mg, Oral, Nightly PRN, Marina Torres PA-C, 5 mg at 06/20/25 2351    metoprolol tartrate (LOPRESSOR) injection 10 mg, 10 mg, Intravenous, Q6H PRN, Jim Farooq MD, 10 mg at 06/20/25 1917    niCARdipine (CARDENE) 25mg in 250mL NS infusion, 5-15 mg/hr, Intravenous, Titrated, Maria Luisa Romero APRN, Last Rate: 50 mL/hr at 06/21/25 1342, 5 mg/hr at 06/21/25 1342    ondansetron ODT (ZOFRAN-ODT) disintegrating tablet 4 mg, 4 mg, Oral, Q6H PRN **OR** ondansetron (ZOFRAN) injection 4 mg, 4 mg, Intravenous, Q6H PRN, Marina Torres PA-C, 4 mg at 06/21/25 1355    pantoprazole (PROTONIX) injection 40 mg, 40 mg, Intravenous, BID NICHOLAS, Sherie Ba, APRN, 40 mg at 06/21/25 1733    Phosphorus Replacement - Follow Nurse / BPA Driven Protocol, , Not Applicable, PRN, Marina Torres PA-C     Potassium Replacement - Follow Nurse / BPA Driven Protocol, , Not Applicable, PRN, Marina Torres PA-C    sodium chloride 0.9 % flush 10 mL, 10 mL, Intravenous, PRN, Micaela Easton APRN      Objective resting in bed.  Daughter and grandson are at bedside.  Grandson serves as .    Vital Signs  Temp:  [97.7 °F (36.5 °C)-98.8 °F (37.1 °C)] 98.8 °F (37.1 °C)  Heart Rate:  [63-98] 76  Resp:  [12-19] 16  BP: (113-228)/(40-90) 141/61  Physical Exam:    General Appearance:    Awake and alert, in no acute distress   Head:    Normocephalic, without obvious abnormality   Eyes:          Conjunctivae normal, anicteric sclerae   Ears:    Hearing intact   Throat:   No oral lesions, no thrush, oral mucosa moist   Neck:   No adenopathy, supple, no JVD   Lungs:     Respirations regular, even and unlabored        Abdomen:     soft, non-tender, no rebound or guarding, non-distended, no hepatosplenomegaly   Rectal:     Deferred   Extremities:   No edema, no cyanosis, no redness   Skin:   No bleeding, bruising or rash, no jaundice     Results Review:    CBC    Results from last 7 days   Lab Units 06/21/25  1249 06/21/25  0449 06/20/25  2058 06/20/25  0458 06/20/25  0114 06/19/25  2035 06/19/25  1844 06/19/25  0525 06/18/25  1045   WBC 10*3/mm3  --  6.11  --   --  4.84  --   --  3.48 5.00   HEMOGLOBIN g/dL 9.0* 8.7* 10.3* 7.6* 6.2* 6.4* 7.8* 6.0* 8.0*   PLATELETS 10*3/mm3  --  136*  --   --  98*  --   --  135* 205     CMP   Results from last 7 days   Lab Units 06/21/25  0449 06/20/25  0114 06/19/25  0429 06/18/25  1159 06/18/25  1045   SODIUM mmol/L  --  133* 135*  --  131*   POTASSIUM mmol/L  --  4.3 3.8  --  3.6   CHLORIDE mmol/L  --  98 97*  --  93*   CO2 mmol/L  --  25.1 28.3  --  26.0   BUN mg/dL  --  42.5* 21.8  --  42.1*   CREATININE mg/dL  --  3.36* 2.53*  --  3.79*   GLUCOSE mg/dL  --  117* 72  --  117*   ALBUMIN g/dL  --  3.1* 2.9*  --  3.7   BILIRUBIN mg/dL  --  0.3 0.3  0.3  --  0.3   ALK PHOS U/L  --  59 92  --   124*   AST (SGOT) U/L  --  24 31  --  38*   ALT (SGPT) U/L  --  13 17  --  22   MAGNESIUM mg/dL 1.8 1.7 1.7  --   --    PHOSPHORUS mg/dL 2.6 4.0 2.3*  --   --    LIPASE U/L  --   --   --  128*  --      Cr Clearance Estimated Creatinine Clearance: 12.2 mL/min (A) (by C-G formula based on SCr of 3.36 mg/dL (H)).  Coag   Results from last 7 days   Lab Units 06/18/25  1045   INR  0.93     HbA1C   Lab Results   Component Value Date    HGBA1C 5.40 04/15/2025    HGBA1C 5.9 (H) 04/16/2021     Blood Glucose   Glucose   Date/Time Value Ref Range Status   06/21/2025 1714 117 (H) 70 - 105 mg/dL Final     Comment:     Serial Number: [id number]:  [id number]   06/21/2025 1154 124 (H) 70 - 105 mg/dL Final     Comment:     Serial Number: [id number]:  [id number]   06/21/2025 0743 105 70 - 105 mg/dL Final     Comment:     Serial Number: [id number]:  [id number]   06/20/2025 2006 114 (H) 70 - 105 mg/dL Final     Comment:     Serial Number: [id number]:  [id number]   06/20/2025 1720 116 (H) 70 - 105 mg/dL Final     Comment:     Serial Number: [id number]:  [id number]   06/20/2025 1155 131 (H) 70 - 105 mg/dL Final     Comment:     Serial Number: [id number]:  [id number]   06/20/2025 0813 105 70 - 105 mg/dL Final     Comment:     Serial Number: [id number]:  [id number]   06/19/2025 1636 125 (H) 70 - 105 mg/dL Final     Comment:     Serial Number: [id number]:  [id number]     Infection   Results from last 7 days   Lab Units 06/18/25  1146 06/18/25  1045   BLOODCX  No growth at 3 days No growth at 3 days     UA    Results from last 7 days   Lab Units 06/18/25  1156   NITRITE UA  Negative   WBC UA /HPF 3-5*   BACTERIA UA /HPF None Seen   SQUAM EPITHEL UA /HPF 0-2     Radiology(recent) No radiology results for the last day          Assessment & Plan   Melena  Normocytic anemia  Thrombocytopenia  Elevated troponin  History of ESRD on HD (MWF)  History of right bundle branch block s/p pacemaker on  aspirin and Plavix  History of DMII    6/19/2025 EGD (Dr. De Leon) gastritis.  Biopsies pending.  Small sliding hiatal hernia.  Normal mucosa in the duodenum.    PLAN:   Patient is a 84-year-old [place]ian, non-English-speaking female with a past medical history of ESRD on HD (MWF), right bundle branch block s/p pacemaker on aspirin and Plavix, DMII, HLD, and HTN who presented to [facility] ED on 6/18/2025 for complaints of dark tarry stools.  Thus, GI was consulted.      Patient underwent EGD on 6/19/2025.  Patient was given bowel purge for colonoscopy.  Patient evidently had some vomiting with the bowel purge and grandjose j refused patient be given any more laxatives as it traumatized her.  Nursing staff reports she is having clear but bloody liquid which would have been okay for colonoscopy today but altaf refused colonoscopy as he felt she was not stable enough for her procedure.  So procedure was canceled today.  Patient was given clear liquid diet.   Hemoglobin is stable at 8.7.  Will add simethicone.    Patient is on Cardene drip.  Plavix and aspirin is being held.  Patient is getting Retacrit.  Continue PPI twice a day.  Discussion with grandjose j and he is now agreeable to colonoscopy but no more prep.  Will force oral intake today of clear liquids and monitor output in the morning.  Tentatively plan for colonoscopy in a.momo Hudson, CECE  06/21/25  18:08 EDT

## 2025-06-22 ENCOUNTER — INPATIENT HOSPITAL (OUTPATIENT)
Dept: URBAN - METROPOLITAN AREA HOSPITAL 84 | Facility: HOSPITAL | Age: 85
End: 2025-06-22
Payer: MEDICARE

## 2025-06-22 DIAGNOSIS — K92.1 MELENA: ICD-10-CM

## 2025-06-22 DIAGNOSIS — D64.9 ANEMIA, UNSPECIFIED: ICD-10-CM

## 2025-06-22 LAB
GEN 5 1HR TROPONIN T REFLEX: 61 NG/L
GLUCOSE BLDC GLUCOMTR-MCNC: 121 MG/DL (ref 70–105)
GLUCOSE BLDC GLUCOMTR-MCNC: 125 MG/DL (ref 70–105)
GLUCOSE BLDC GLUCOMTR-MCNC: 134 MG/DL (ref 70–105)
GLUCOSE BLDC GLUCOMTR-MCNC: 136 MG/DL (ref 70–105)
GLUCOSE BLDC GLUCOMTR-MCNC: 203 MG/DL (ref 70–105)
QT INTERVAL: 391 MS
QTC INTERVAL: 525 MS
TROPONIN T % DELTA: 2
TROPONIN T NUMERIC DELTA: 1 NG/L

## 2025-06-22 PROCEDURE — 84484 ASSAY OF TROPONIN QUANT: CPT | Performed by: INTERNAL MEDICINE

## 2025-06-22 PROCEDURE — 82948 REAGENT STRIP/BLOOD GLUCOSE: CPT

## 2025-06-22 PROCEDURE — 25010000002 LIDOCAINE PF 2% 2 % SOLUTION

## 2025-06-22 PROCEDURE — 25810000003 SODIUM CHLORIDE 0.9 % SOLUTION

## 2025-06-22 PROCEDURE — 25010000002 NICARDIPINE 2.5 MG/ML SOLUTION 10 ML VIAL: Performed by: INTERNAL MEDICINE

## 2025-06-22 PROCEDURE — 25010000002 NICARDIPINE 2.5 MG/ML SOLUTION 10 ML VIAL

## 2025-06-22 PROCEDURE — 25810000003 SODIUM CHLORIDE 0.9 % SOLUTION 250 ML FLEX CONT: Performed by: INTERNAL MEDICINE

## 2025-06-22 PROCEDURE — 25810000003 SODIUM CHLORIDE 0.9 % SOLUTION 250 ML FLEX CONT

## 2025-06-22 PROCEDURE — 0DJD8ZZ INSPECTION OF LOWER INTESTINAL TRACT, VIA NATURAL OR ARTIFICIAL OPENING ENDOSCOPIC: ICD-10-PCS | Performed by: INTERNAL MEDICINE

## 2025-06-22 PROCEDURE — 45378 DIAGNOSTIC COLONOSCOPY: CPT | Performed by: INTERNAL MEDICINE

## 2025-06-22 PROCEDURE — 25010000002 PROPOFOL 10 MG/ML EMULSION

## 2025-06-22 RX ORDER — ONDANSETRON 2 MG/ML
4 INJECTION INTRAMUSCULAR; INTRAVENOUS ONCE AS NEEDED
Status: DISCONTINUED | OUTPATIENT
Start: 2025-06-22 | End: 2025-06-22 | Stop reason: HOSPADM

## 2025-06-22 RX ORDER — ONDANSETRON 2 MG/ML
4 INJECTION INTRAMUSCULAR; INTRAVENOUS EVERY 6 HOURS PRN
Status: DISCONTINUED | OUTPATIENT
Start: 2025-06-22 | End: 2025-06-22

## 2025-06-22 RX ORDER — SODIUM CHLORIDE 9 MG/ML
INJECTION, SOLUTION INTRAVENOUS CONTINUOUS PRN
Status: DISCONTINUED | OUTPATIENT
Start: 2025-06-22 | End: 2025-06-22 | Stop reason: SURG

## 2025-06-22 RX ORDER — IPRATROPIUM BROMIDE AND ALBUTEROL SULFATE 2.5; .5 MG/3ML; MG/3ML
3 SOLUTION RESPIRATORY (INHALATION) ONCE AS NEEDED
Status: DISCONTINUED | OUTPATIENT
Start: 2025-06-22 | End: 2025-06-22 | Stop reason: HOSPADM

## 2025-06-22 RX ORDER — ONDANSETRON 4 MG/1
4 TABLET, ORALLY DISINTEGRATING ORAL EVERY 6 HOURS PRN
Status: DISCONTINUED | OUTPATIENT
Start: 2025-06-22 | End: 2025-06-22

## 2025-06-22 RX ORDER — LIDOCAINE HYDROCHLORIDE 20 MG/ML
INJECTION, SOLUTION EPIDURAL; INFILTRATION; INTRACAUDAL; PERINEURAL AS NEEDED
Status: DISCONTINUED | OUTPATIENT
Start: 2025-06-22 | End: 2025-06-22 | Stop reason: SURG

## 2025-06-22 RX ORDER — PROPOFOL 10 MG/ML
VIAL (ML) INTRAVENOUS AS NEEDED
Status: DISCONTINUED | OUTPATIENT
Start: 2025-06-22 | End: 2025-06-22 | Stop reason: SURG

## 2025-06-22 RX ORDER — HYDRALAZINE HYDROCHLORIDE 20 MG/ML
5 INJECTION INTRAMUSCULAR; INTRAVENOUS
Status: DISCONTINUED | OUTPATIENT
Start: 2025-06-22 | End: 2025-06-22 | Stop reason: HOSPADM

## 2025-06-22 RX ORDER — EPHEDRINE SULFATE 5 MG/ML
5 INJECTION INTRAVENOUS ONCE AS NEEDED
Status: DISCONTINUED | OUTPATIENT
Start: 2025-06-22 | End: 2025-06-22 | Stop reason: HOSPADM

## 2025-06-22 RX ORDER — LABETALOL HYDROCHLORIDE 5 MG/ML
5 INJECTION, SOLUTION INTRAVENOUS
Status: DISCONTINUED | OUTPATIENT
Start: 2025-06-22 | End: 2025-06-22 | Stop reason: HOSPADM

## 2025-06-22 RX ADMIN — LIDOCAINE HYDROCHLORIDE 80 MG: 20 INJECTION, SOLUTION EPIDURAL; INFILTRATION; INTRACAUDAL; PERINEURAL at 13:38

## 2025-06-22 RX ADMIN — NICARDIPINE HYDROCHLORIDE 5 MG/HR: 25 INJECTION, SOLUTION INTRAVENOUS at 00:45

## 2025-06-22 RX ADMIN — NICARDIPINE HYDROCHLORIDE 5 MG/HR: 25 INJECTION, SOLUTION INTRAVENOUS at 06:10

## 2025-06-22 RX ADMIN — PROPOFOL 50 MG: 10 INJECTION, EMULSION INTRAVENOUS at 13:54

## 2025-06-22 RX ADMIN — NICARDIPINE HYDROCHLORIDE 7.5 MG/HR: 25 INJECTION, SOLUTION INTRAVENOUS at 22:15

## 2025-06-22 RX ADMIN — PANTOPRAZOLE SODIUM 40 MG: 40 INJECTION, POWDER, FOR SOLUTION INTRAVENOUS at 16:44

## 2025-06-22 RX ADMIN — HYDROXYZINE HYDROCHLORIDE 50 MG: 25 TABLET, FILM COATED ORAL at 05:39

## 2025-06-22 RX ADMIN — PROPOFOL 50 MG: 10 INJECTION, EMULSION INTRAVENOUS at 13:38

## 2025-06-22 RX ADMIN — PANTOPRAZOLE SODIUM 40 MG: 40 INJECTION, POWDER, FOR SOLUTION INTRAVENOUS at 10:02

## 2025-06-22 RX ADMIN — SODIUM CHLORIDE: 9 INJECTION, SOLUTION INTRAVENOUS at 13:34

## 2025-06-22 RX ADMIN — PROPOFOL 50 MG: 10 INJECTION, EMULSION INTRAVENOUS at 13:46

## 2025-06-22 RX ADMIN — NICARDIPINE HYDROCHLORIDE 7.5 MG/HR: 25 INJECTION, SOLUTION INTRAVENOUS at 16:44

## 2025-06-22 RX ADMIN — SIMETHICONE 80 MG: 80 TABLET, CHEWABLE ORAL at 20:41

## 2025-06-22 NOTE — ANESTHESIA POSTPROCEDURE EVALUATION
Patient: Brad Woodward    Procedure Summary       Date: 06/22/25 Room / Location: [facility] ENDOSCOPY 1 / [facility] ENDOSCOPY    Anesthesia Start: 1334 Anesthesia Stop: 1413    Procedure: COLONOSCOPY Diagnosis:       Melena      Anemia, unspecified type      (Melena [K92.1])      (Anemia, unspecified type [D64.9])    Surgeons: Ramses Ashley MD Provider: Ramses Scruggs MD    Anesthesia Type: general ASA Status: 3            Anesthesia Type: general    Vitals  Vitals Value Taken Time   /56 06/22/25 14:42   Temp     Pulse 74 06/22/25 14:42   Resp 11 06/22/25 14:42   SpO2 100 % 06/22/25 14:42           Post Anesthesia Care and Evaluation    Patient location during evaluation: PACU  Patient participation: complete - patient participated  Level of consciousness: awake  Pain score: 0  Pain management: adequate  Anesthetic complications: No anesthetic complications  PONV Status: none  Cardiovascular status: acceptable  Respiratory status: acceptable  Hydration status: acceptable

## 2025-06-22 NOTE — PROGRESS NOTES
"RENAL/KCC:     LOS: 3 days    Patient Care Team:  Brian Nazario APRN as PCP - General  Zane Aldridge MD as Consulting Physician (Cardiology)  Marnie Brandt APRN as Nurse Practitioner (Cardiology)    Chief Complaint: ESRD    Subjective     Interval History:   Chart reviewed  Remains on Nicardipine  No more bloody BM's reported  For C-scope    Objective     Vital Sign Min/Max for last 24 hours  Temp  Min: 97.7 °F (36.5 °C)  Max: 99.5 °F (37.5 °C)   BP  Min: 123/40  Max: 189/67   Pulse  Min: 64  Max: 108   Resp  Min: 11  Max: 19   SpO2  Min: 92 %  Max: 100 %   Flow (L/min) (Oxygen Therapy)  Min: 2  Max: 2   No data recorded     Flowsheet Rows      Flowsheet Row First Filed Value   Admission Height 162.6 cm (64\") Documented at 06/18/2025 1117   Admission Weight 61.2 kg (134 lb 14.7 oz) Documented at 06/18/2025 1117            No intake/output data recorded.  No intake/output data recorded.    Physical Exam:  GEN: Awake, NAD  ENT: PERRL, EOMI, MMM  NECK: Supple, no JVD  CHEST: CTAB, no W/R/C  CV: RRR, no M/G/R  ABD: Soft, NT, +BS  SKIN: Warm and Dry  NEURO: CN's intact      WBC WBC   Date Value Ref Range Status   06/21/2025 8.01 3.40 - 10.80 10*3/mm3 Final   06/21/2025 6.11 3.40 - 10.80 10*3/mm3 Final   06/20/2025 4.84 3.40 - 10.80 10*3/mm3 Final      HGB Hemoglobin   Date Value Ref Range Status   06/21/2025 9.2 (L) 12.0 - 15.9 g/dL Final   06/21/2025 9.0 (L) 12.0 - 15.9 g/dL Final   06/21/2025 8.7 (L) 12.0 - 15.9 g/dL Final   06/20/2025 10.3 (L) 12.0 - 15.9 g/dL Final   06/20/2025 7.6 (L) 12.0 - 15.9 g/dL Final   06/20/2025 6.2 (C) 12.0 - 15.9 g/dL Final   06/19/2025 6.4 (C) 12.0 - 15.9 g/dL Final   06/19/2025 7.8 (L) 12.0 - 15.9 g/dL Final      HCT Hematocrit   Date Value Ref Range Status   06/21/2025 27.7 (L) 34.0 - 46.6 % Final   06/21/2025 28.3 (L) 34.0 - 46.6 % Final   06/21/2025 26.1 (L) 34.0 - 46.6 % Final   06/20/2025 30.0 (L) 34.0 - 46.6 % Final   06/20/2025 22.6 (L) 34.0 - 46.6 % Final     Comment:     " "Result checked     06/20/2025 18.6 (C) 34.0 - 46.6 % Final   06/19/2025 19.7 (C) 34.0 - 46.6 % Final   06/19/2025 23.7 (L) 34.0 - 46.6 % Final      Platlets No results found for: \"LABPLAT\"   MCV MCV   Date Value Ref Range Status   06/21/2025 84.5 79.0 - 97.0 fL Final   06/21/2025 82.6 79.0 - 97.0 fL Final   06/20/2025 84.9 79.0 - 97.0 fL Final          Sodium Sodium   Date Value Ref Range Status   06/21/2025 136 136 - 145 mmol/L Final   06/20/2025 133 (L) 136 - 145 mmol/L Final      Potassium Potassium   Date Value Ref Range Status   06/21/2025 3.7 3.5 - 5.2 mmol/L Final   06/20/2025 4.3 3.5 - 5.2 mmol/L Final      Chloride Chloride   Date Value Ref Range Status   06/21/2025 100 98 - 107 mmol/L Final   06/20/2025 98 98 - 107 mmol/L Final      CO2 CO2   Date Value Ref Range Status   06/21/2025 22.8 22.0 - 29.0 mmol/L Final   06/20/2025 25.1 22.0 - 29.0 mmol/L Final      BUN BUN   Date Value Ref Range Status   06/21/2025 26.9 (H) 8.0 - 23.0 mg/dL Final   06/20/2025 42.5 (H) 8.0 - 23.0 mg/dL Final      Creatinine Creatinine   Date Value Ref Range Status   06/21/2025 3.46 (H) 0.57 - 1.00 mg/dL Final   06/20/2025 3.36 (H) 0.57 - 1.00 mg/dL Final      Calcium Calcium   Date Value Ref Range Status   06/21/2025 8.7 8.6 - 10.5 mg/dL Final   06/20/2025 7.7 (L) 8.6 - 10.5 mg/dL Final      PO4 No results found for: \"CAPO4\"   Albumin Albumin   Date Value Ref Range Status   06/21/2025 3.5 3.5 - 5.2 g/dL Final   06/20/2025 3.1 (L) 3.5 - 5.2 g/dL Final      Magnesium Magnesium   Date Value Ref Range Status   06/21/2025 1.8 1.6 - 2.4 mg/dL Final   06/20/2025 1.7 1.6 - 2.4 mg/dL Final      Uric Acid No results found for: \"URICACID\"        Results Review:     I reviewed the patient's new clinical results.    [Held by provider] aspirin, 81 mg, Oral, Daily  [Held by provider] cloNIDine, 0.3 mg, Oral, Q8H  [Held by provider] clopidogrel, 75 mg, Oral, Daily  epoetin rickie/rickie-epbx, 20,000 Units, Intravenous, Once per day on Monday " Wednesday Friday  [Held by provider] hydrALAZINE, 100 mg, Oral, TID  [Held by provider] losartan, 100 mg, Oral, Q24H  pantoprazole, 40 mg, Intravenous, BID AC  polyethylene glycol, 17 g, Oral, BID  simethicone, 80 mg, Oral, 4x Daily AC & at Bedtime      niCARdipine, 5-15 mg/hr, Last Rate: 5 mg/hr (06/22/25 0610)        Medication Review: Reviewed    Assessment & Plan       ESRD (end stage renal disease) on dialysis    Anemia    Melena    GIB    HTN    Plan: HD MWF.  No heparin with HD.  EPO with HD.  GI following - for C-scope today.  Wean Nicardipine once PO meds resumed post-procedure.  Will follow closely.        Jim Farooq MD  [facility]  06/22/25  07:51 EDT

## 2025-06-22 NOTE — NURSING NOTE
Went over consent form with family. Grandson helped translate to patient about colonoscopy procedure, while I used [employer]  due to the  service not being available to translate the language (Kinyarwanda) on the weekends.

## 2025-06-22 NOTE — PLAN OF CARE
Problem: Adult Inpatient Plan of Care  Goal: Plan of Care Review  Outcome: Progressing  Flowsheets (Taken 6/22/2025 5878)  Outcome Evaluation: Pt had no bm today. Colonoscopy was normal.  Goal: Patient-Specific Goal (Individualized)  Outcome: Progressing  Goal: Absence of Hospital-Acquired Illness or Injury  Outcome: Progressing  Goal: Optimal Comfort and Wellbeing  Outcome: Progressing  Goal: Readiness for Transition of Care  Outcome: Progressing     Problem: Skin Injury Risk Increased  Goal: Skin Health and Integrity  Outcome: Progressing     Problem: Comorbidity Management  Goal: Maintenance of Asthma Control  Outcome: Progressing  Goal: Maintenance of Behavioral Health Symptom Control  Outcome: Progressing  Goal: Maintenance of COPD Symptom Control  Outcome: Progressing  Goal: Blood Glucose Level Within Target Range  Outcome: Progressing  Goal: Maintenance of Heart Failure Symptom Control  Outcome: Progressing  Goal: Blood Pressure in Desired Range  Outcome: Progressing  Goal: Maintenance of Osteoarthritis Symptom Control  Outcome: Progressing  Goal: Bariatric Home Regimen Maintained  Outcome: Progressing  Goal: Maintenance of Seizure Control  Outcome: Progressing     Problem: Fall Injury Risk  Goal: Absence of Fall and Fall-Related Injury  Outcome: Progressing   Goal Outcome Evaluation:              Outcome Evaluation: Pt had no bm today. Colonoscopy was normal.

## 2025-06-22 NOTE — OP NOTE
COLONOSCOPY Procedure Report    Patient Name:  Brad Woodward  YOB: 1940    Date of Surgery:  6/22/2025     Pre-Op Diagnosis:  Melena [K92.1]  Anemia, unspecified type [D64.9]    Post-Op Diagnosis:  1.  Normal colonoscopy       Procedure/CPT® Codes:  No CPT Code Applied in Case Entry    Procedure(s):  COLONOSCOPY    Staff:  Surgeon(s):  Ramses Ashley MD      Anesthesia: Monitored Anesthesia Care    Description of Procedure:  A description of the procedure as well as risks, benefits and alternative methods were explained to the patient who voiced understanding and signed the corresponding consent form. A physical exam was performed and vital signs were monitored throughout the procedure.    After informed consent was obtained the patient was placed in the left lateral decubitus position and sedated as above.  Digital rectal examination was performed and was normal.  The Olympus video colonoscope was inserted into the rectum and advanced to the cecum without difficulty.  A careful examination of the colon was performed as the colonoscope was slowly withdrawn.  The bowel prep was excellent.  The cecum and appendiceal orifice were identified. The scope was then retroflexed and the distal rectum was visualized. The procedure was not difficult and there were no immediate complications.  There was no blood loss.    Findings:   Normal colonoscopy.  Yellowish-brown fluid noted throughout the colon.  Normal mucosa.  No polyps.  No diverticulosis.  No internal hemorrhoids.    Impression:  1.  Normal colonoscopy    Recommendations:  1.  Continue PPI  2.  Continue iron supplementation  3.  Regular diet  4.  Okay for discharge home  5.  Follow-up in my office in 3 months to recheck hemoglobin  6.  We will see as needed      Ramses Ashley MD     Date: 6/22/2025    Time: 14:06 EDT

## 2025-06-22 NOTE — PROGRESS NOTES
[facility] MEDICINE SERVICE  DAILY PROGRESS NOTE    NAME: Brad Woodward  : 1940  MRN: [id number]      LOS: 3 days     PROVIDER OF SERVICE: Paresh Pat MD    Chief Complaint: ESRD (end stage renal disease) on dialysis    Subjective:     Interval History:  History taken from: patient    No acute events overnight.    Status Post EGD on .    Colonoscopy today.        Review of Systems:   Review of Systems  Review of system negative except as mentioned in HPI  Objective:     Vital Signs  Temp:  [97.4 °F (36.3 °C)-99.5 °F (37.5 °C)] 98.6 °F (37 °C)  Heart Rate:  [] 74  Resp:  [11-19] 11  BP: ()/(47-94) 166/56  Flow (L/min) (Oxygen Therapy):  [2] 2   Body mass index is 24.17 kg/m².    Physical Exam  Physical Exam  General: Sitting up in bed; NAD  CV: RRR, S1-S2  Lungs: CTA bilaterally  Abdomen: soft, NT, ND        Diagnostic Data    Results from last 7 days   Lab Units 25  2221   WBC 10*3/mm3 8.01   HEMOGLOBIN g/dL 9.2*   HEMATOCRIT % 27.7*   PLATELETS 10*3/mm3 145   GLUCOSE mg/dL 130*   CREATININE mg/dL 3.46*   BUN mg/dL 26.9*   SODIUM mmol/L 136   POTASSIUM mmol/L 3.7   AST (SGOT) U/L 41*   ALT (SGPT) U/L 23   ALK PHOS U/L 82   BILIRUBIN mg/dL 0.4   ANION GAP mmol/L 13.2       No radiology results for the last day      I reviewed the patient's new clinical results.    Assessment/Plan:     Active and Resolved Problems  Active Hospital Problems    Diagnosis  POA    **ESRD (end stage renal disease) on dialysis [N18.6, Z99.2]  Not Applicable    Melena [K92.1]  Yes    Anemia [D64.9]  Yes      Resolved Hospital Problems   No resolved problems to display.       ESRD on HD  HD schedule: MWF  Last dialyzed , missed todays HD   Avoid nephrotoxic medications  Pharmacy to renally dose medication  Continue HD per Nephrology      Generalized weakness   Fatigue  Melena  Nausea/Vomiting  CT A/P with no acute findings  UA with no evidence of infection   FOBT negative in ED   S/p EGD on  shows  no active bleeding  Plan for C-scope today, though bowel prep may not be adequate  Defer endoscopies to GI     SSS s/p PPM  Hypertension   Hyperlipidemia   BP elevated on ED arrival with SBP > 200, improved  Still elevated today despite IV hydralazine and metoprolol in addition to meds below  Continue  Norvsc, Hydralazine,  Metoprolol   Start Cardene gtt; was told ok to start on floor  ASA and Plavix on hold GI bleed     Diabetes Mellitus   A1c 5.40  Consistent carb diet  Hypoglycemia protocol      Anemia of chronic disease  Status post packed RBCs  Monitor CBC  Hold aspirin and Plavix consulted cardiology     H/o renal artery stenosis s/p L renal stent   Had stent placed in 5/21; vascular ok with holding DAPT for now, but resume as soon as able      Anxiety-given Ativan IV in ED     Elevated troponin, likely rate related to chronic renal failure   - Serial troponin     VTE Prophylaxis:  Pharmacologic & mechanical VTE prophylaxis orders are present.       VTE Prophylaxis:  Pharmacologic & mechanical VTE prophylaxis orders are present.       Low threshold to move patient to ICU       Disposition Planning:  TBD pending clinical course     I updated family at bedside     Code Status and Medical Interventions: CPR (Attempt to Resuscitate); Full Support   Ordered at: 06/18/25 1332     Code Status (Patient has no pulse and is not breathing):    CPR (Attempt to Resuscitate)     Medical Interventions (Patient has pulse or is breathing):    Full Support       Signature: Electronically signed by Paresh Pat MD, 06/22/25, 15:14 EDT.  [facility]ist Team

## 2025-06-22 NOTE — PLAN OF CARE
Goal Outcome Evaluation:  Plan of Care Reviewed With: patient        Progress: no change  Outcome Evaluation: Patient alert and oriented. Denies pain. Family remains at bedside, involved in care. Patient has not had BM this shift. Patient has had anxiety- meds given per MAR orders. Maryann updated on plan of care.

## 2025-06-23 ENCOUNTER — APPOINTMENT (OUTPATIENT)
Dept: NEPHROLOGY | Facility: HOSPITAL | Age: 85
End: 2025-06-23
Payer: MEDICAID

## 2025-06-23 LAB
ALBUMIN SERPL-MCNC: 3.4 G/DL (ref 3.5–5.2)
ALBUMIN/GLOB SERPL: 1.3 G/DL
ALP SERPL-CCNC: 78 U/L (ref 39–117)
ALT SERPL W P-5'-P-CCNC: 25 U/L (ref 1–33)
ANION GAP SERPL CALCULATED.3IONS-SCNC: 13.6 MMOL/L (ref 5–15)
AST SERPL-CCNC: 46 U/L (ref 1–32)
BACTERIA SPEC AEROBE CULT: NORMAL
BACTERIA SPEC AEROBE CULT: NORMAL
BASOPHILS # BLD AUTO: 0.02 10*3/MM3 (ref 0–0.2)
BASOPHILS NFR BLD AUTO: 0.2 % (ref 0–1.5)
BILIRUB SERPL-MCNC: 0.3 MG/DL (ref 0–1.2)
BUN SERPL-MCNC: 26.7 MG/DL (ref 8–23)
BUN/CREAT SERPL: 7 (ref 7–25)
CALCIUM SPEC-SCNC: 8.3 MG/DL (ref 8.6–10.5)
CHLORIDE SERPL-SCNC: 102 MMOL/L (ref 98–107)
CO2 SERPL-SCNC: 18.4 MMOL/L (ref 22–29)
CREAT SERPL-MCNC: 3.82 MG/DL (ref 0.57–1)
DEPRECATED RDW RBC AUTO: 51.8 FL (ref 37–54)
EGFRCR SERPLBLD CKD-EPI 2021: 11.1 ML/MIN/1.73
EOSINOPHIL # BLD AUTO: 0.32 10*3/MM3 (ref 0–0.4)
EOSINOPHIL NFR BLD AUTO: 3.8 % (ref 0.3–6.2)
ERYTHROCYTE [DISTWIDTH] IN BLOOD BY AUTOMATED COUNT: 16.6 % (ref 12.3–15.4)
GLOBULIN UR ELPH-MCNC: 2.6 GM/DL
GLUCOSE BLDC GLUCOMTR-MCNC: 116 MG/DL (ref 70–105)
GLUCOSE BLDC GLUCOMTR-MCNC: 141 MG/DL (ref 70–105)
GLUCOSE BLDC GLUCOMTR-MCNC: 149 MG/DL (ref 70–105)
GLUCOSE BLDC GLUCOMTR-MCNC: 151 MG/DL (ref 70–105)
GLUCOSE SERPL-MCNC: 87 MG/DL (ref 65–99)
HCT VFR BLD AUTO: 26.4 % (ref 34–46.6)
HGB BLD-MCNC: 8.8 G/DL (ref 12–15.9)
HOLD SPECIMEN: NORMAL
IMM GRANULOCYTES # BLD AUTO: 0.03 10*3/MM3 (ref 0–0.05)
IMM GRANULOCYTES NFR BLD AUTO: 0.4 % (ref 0–0.5)
LAB AP CASE REPORT: NORMAL
LYMPHOCYTES # BLD AUTO: 2.22 10*3/MM3 (ref 0.7–3.1)
LYMPHOCYTES NFR BLD AUTO: 26.1 % (ref 19.6–45.3)
MCH RBC QN AUTO: 28.6 PG (ref 26.6–33)
MCHC RBC AUTO-ENTMCNC: 33.3 G/DL (ref 31.5–35.7)
MCV RBC AUTO: 85.7 FL (ref 79–97)
MONOCYTES # BLD AUTO: 0.72 10*3/MM3 (ref 0.1–0.9)
MONOCYTES NFR BLD AUTO: 8.5 % (ref 5–12)
NEUTROPHILS NFR BLD AUTO: 5.19 10*3/MM3 (ref 1.7–7)
NEUTROPHILS NFR BLD AUTO: 61 % (ref 42.7–76)
NRBC BLD AUTO-RTO: 0 /100 WBC (ref 0–0.2)
PATH REPORT.FINAL DX SPEC: NORMAL
PATH REPORT.GROSS SPEC: NORMAL
PLATELET # BLD AUTO: 164 10*3/MM3 (ref 140–450)
PMV BLD AUTO: 9.9 FL (ref 6–12)
POTASSIUM SERPL-SCNC: 3.6 MMOL/L (ref 3.5–5.2)
PROT SERPL-MCNC: 6 G/DL (ref 6–8.5)
RBC # BLD AUTO: 3.08 10*6/MM3 (ref 3.77–5.28)
SODIUM SERPL-SCNC: 134 MMOL/L (ref 136–145)
WBC NRBC COR # BLD AUTO: 8.5 10*3/MM3 (ref 3.4–10.8)

## 2025-06-23 PROCEDURE — 25010000002 HEPARIN (PORCINE) PER 1000 UNITS: Performed by: INTERNAL MEDICINE

## 2025-06-23 PROCEDURE — 82948 REAGENT STRIP/BLOOD GLUCOSE: CPT | Performed by: INTERNAL MEDICINE

## 2025-06-23 PROCEDURE — 85025 COMPLETE CBC W/AUTO DIFF WBC: CPT | Performed by: INTERNAL MEDICINE

## 2025-06-23 PROCEDURE — 25010000002 EPOETIN ALFA-EPBX 20000 UNIT/ML SOLUTION: Performed by: INTERNAL MEDICINE

## 2025-06-23 PROCEDURE — 80053 COMPREHEN METABOLIC PANEL: CPT | Performed by: FAMILY MEDICINE

## 2025-06-23 PROCEDURE — 25010000002 NICARDIPINE 2.5 MG/ML SOLUTION 10 ML VIAL: Performed by: INTERNAL MEDICINE

## 2025-06-23 PROCEDURE — 25810000003 SODIUM CHLORIDE 0.9 % SOLUTION 250 ML FLEX CONT: Performed by: INTERNAL MEDICINE

## 2025-06-23 PROCEDURE — 82948 REAGENT STRIP/BLOOD GLUCOSE: CPT

## 2025-06-23 RX ORDER — POTASSIUM CHLORIDE 1500 MG/1
20 TABLET, EXTENDED RELEASE ORAL ONCE
Status: DISCONTINUED | OUTPATIENT
Start: 2025-06-23 | End: 2025-06-24 | Stop reason: HOSPADM

## 2025-06-23 RX ADMIN — NICARDIPINE HYDROCHLORIDE 5 MG/HR: 25 INJECTION, SOLUTION INTRAVENOUS at 17:01

## 2025-06-23 RX ADMIN — EPOETIN ALFA-EPBX 20000 UNITS: 20000 INJECTION, SOLUTION INTRAVENOUS; SUBCUTANEOUS at 14:54

## 2025-06-23 RX ADMIN — NICARDIPINE HYDROCHLORIDE 5 MG/HR: 25 INJECTION, SOLUTION INTRAVENOUS at 11:29

## 2025-06-23 RX ADMIN — SIMETHICONE 80 MG: 80 TABLET, CHEWABLE ORAL at 16:51

## 2025-06-23 RX ADMIN — NICARDIPINE HYDROCHLORIDE 7.5 MG/HR: 25 INJECTION, SOLUTION INTRAVENOUS at 06:49

## 2025-06-23 RX ADMIN — HEPARIN SODIUM 4100 UNITS: 1000 INJECTION INTRAVENOUS; SUBCUTANEOUS at 14:54

## 2025-06-23 RX ADMIN — Medication 10 ML: at 08:13

## 2025-06-23 RX ADMIN — HYDRALAZINE HYDROCHLORIDE 100 MG: 25 TABLET ORAL at 22:01

## 2025-06-23 RX ADMIN — SIMETHICONE 80 MG: 80 TABLET, CHEWABLE ORAL at 22:01

## 2025-06-23 RX ADMIN — PANTOPRAZOLE SODIUM 40 MG: 40 INJECTION, POWDER, FOR SOLUTION INTRAVENOUS at 16:51

## 2025-06-23 RX ADMIN — CLONIDINE HYDROCHLORIDE 0.3 MG: 0.1 TABLET ORAL at 22:01

## 2025-06-23 RX ADMIN — NICARDIPINE HYDROCHLORIDE 7.5 MG/HR: 25 INJECTION, SOLUTION INTRAVENOUS at 02:07

## 2025-06-23 RX ADMIN — PANTOPRAZOLE SODIUM 40 MG: 40 INJECTION, POWDER, FOR SOLUTION INTRAVENOUS at 08:13

## 2025-06-23 NOTE — CASE MANAGEMENT/SOCIAL WORK
Continued Stay Note  [facility]     Patient Name: Brad Woodward  MRN: [id number]  Today's Date: 6/23/2025    Admit Date: 6/18/2025     Discharge Plan       Row Name 06/23/25 1534       Plan    Plan Comments Discharge  called in an attempt to schedule a hospital follow up with PCP CECE Johnson. The office had a voicemail, and the mailbox was full, so not able to leave a message or make appointment. Discharge  also attempted to reach Dr. Ashley's office for a 3 month follow up. Hold time was very long, so no apponitment made at this time. Discharge  was successful in securing a hospital follow up with CECE Hsu for Dr. Aldridge on 7/24/25 at 11:30am. AVS updated with instructions to call PCP and Dr. Ashley for a follow up appointment, and also the appointment with cardiology. CM made aware.           Ventura Mccormick, Discharge   [facility]  Care Coordination  [address] [place] [id number]  Office: [phone]  Fax: [phone]

## 2025-06-23 NOTE — PLAN OF CARE
Goal Outcome Evaluation:         Patient alert and oriented. Family present at bedside and assist with patient care. Patient with no complaints. HD today. Cardene gtt discontined at 1800.  Ipad at bedside.

## 2025-06-23 NOTE — DISCHARGE INSTR - APPOINTMENTS
Call your primary care provider for a hosptial follow-up 1 week post discharge.     Call Dr. Ashley's office to make a 3 month follow-up appt to recheck hemoglobin.     Follow up with CECE Hsu for Dr. Aldridge on 7/24/25 at 1130am.  Please arrive 15 minutes early for your appointment.  Bring current photo id, insurance cards, and a list of your medications.

## 2025-06-23 NOTE — PROGRESS NOTES
"RENAL/KCC:     LOS: 4 days    Patient Care Team:  Brian Nazario APRN as PCP - General  Zane Aldridge MD as Consulting Physician (Cardiology)  Marnie Brandt APRN as Nurse Practitioner (Cardiology)    Chief Complaint: ESRD    Subjective     Interval History:   Chart reviewed  Normal C scope yesterday  Resting this AM  For HD today    Objective     Vital Sign Min/Max for last 24 hours  Temp  Min: 97.4 °F (36.3 °C)  Max: 99 °F (37.2 °C)   BP  Min: 127/81  Max: 189/73   Pulse  Min: 73  Max: 116   Resp  Min: 11  Max: 18   SpO2  Min: 92 %  Max: 100 %   Flow (L/min) (Oxygen Therapy)  Min: 2  Max: 2   No data recorded     Flowsheet Rows      Flowsheet Row First Filed Value   Admission Height 162.6 cm (64\") Documented at 06/18/2025 1117   Admission Weight 61.2 kg (134 lb 14.7 oz) Documented at 06/18/2025 1117            No intake/output data recorded.  I/O last 3 completed shifts:  In: 200 [I.V.:200]  Out: -     Physical Exam:  GEN: Awake, NAD  ENT: PERRL, EOMI, MMM  NECK: Supple, no JVD  CHEST: CTAB, no W/R/C  CV: RRR, no M/G/R  ABD: Soft, NT, +BS  SKIN: Warm and Dry  NEURO: CN's intact      WBC WBC   Date Value Ref Range Status   06/23/2025 8.50 3.40 - 10.80 10*3/mm3 Final   06/21/2025 8.01 3.40 - 10.80 10*3/mm3 Final   06/21/2025 6.11 3.40 - 10.80 10*3/mm3 Final      HGB Hemoglobin   Date Value Ref Range Status   06/23/2025 8.8 (L) 12.0 - 15.9 g/dL Final   06/21/2025 9.2 (L) 12.0 - 15.9 g/dL Final   06/21/2025 9.0 (L) 12.0 - 15.9 g/dL Final   06/21/2025 8.7 (L) 12.0 - 15.9 g/dL Final   06/20/2025 10.3 (L) 12.0 - 15.9 g/dL Final      HCT Hematocrit   Date Value Ref Range Status   06/23/2025 26.4 (L) 34.0 - 46.6 % Final   06/21/2025 27.7 (L) 34.0 - 46.6 % Final   06/21/2025 28.3 (L) 34.0 - 46.6 % Final   06/21/2025 26.1 (L) 34.0 - 46.6 % Final   06/20/2025 30.0 (L) 34.0 - 46.6 % Final      Platlets No results found for: \"LABPLAT\"   MCV MCV   Date Value Ref Range Status   06/23/2025 85.7 79.0 - 97.0 fL Final " "  06/21/2025 84.5 79.0 - 97.0 fL Final   06/21/2025 82.6 79.0 - 97.0 fL Final          Sodium Sodium   Date Value Ref Range Status   06/23/2025 134 (L) 136 - 145 mmol/L Final   06/21/2025 136 136 - 145 mmol/L Final      Potassium Potassium   Date Value Ref Range Status   06/23/2025 3.6 3.5 - 5.2 mmol/L Final     Comment:     Specimen hemolyzed.  Result may be falsely elevated.   06/21/2025 3.7 3.5 - 5.2 mmol/L Final      Chloride Chloride   Date Value Ref Range Status   06/23/2025 102 98 - 107 mmol/L Final   06/21/2025 100 98 - 107 mmol/L Final      CO2 CO2   Date Value Ref Range Status   06/23/2025 18.4 (L) 22.0 - 29.0 mmol/L Final   06/21/2025 22.8 22.0 - 29.0 mmol/L Final      BUN BUN   Date Value Ref Range Status   06/23/2025 26.7 (H) 8.0 - 23.0 mg/dL Final   06/21/2025 26.9 (H) 8.0 - 23.0 mg/dL Final      Creatinine Creatinine   Date Value Ref Range Status   06/23/2025 3.82 (H) 0.57 - 1.00 mg/dL Final   06/21/2025 3.46 (H) 0.57 - 1.00 mg/dL Final      Calcium Calcium   Date Value Ref Range Status   06/23/2025 8.3 (L) 8.6 - 10.5 mg/dL Final   06/21/2025 8.7 8.6 - 10.5 mg/dL Final      PO4 No results found for: \"CAPO4\"   Albumin Albumin   Date Value Ref Range Status   06/23/2025 3.4 (L) 3.5 - 5.2 g/dL Final   06/21/2025 3.5 3.5 - 5.2 g/dL Final      Magnesium Magnesium   Date Value Ref Range Status   06/21/2025 1.8 1.6 - 2.4 mg/dL Final      Uric Acid No results found for: \"URICACID\"        Results Review:     I reviewed the patient's new clinical results.    [Held by provider] aspirin, 81 mg, Oral, Daily  [Held by provider] cloNIDine, 0.3 mg, Oral, Q8H  epoetin rickie/rickie-epbx, 20,000 Units, Intravenous, Once per day on Monday Wednesday Friday  [Held by provider] hydrALAZINE, 100 mg, Oral, TID  [Held by provider] losartan, 100 mg, Oral, Q24H  pantoprazole, 40 mg, Intravenous, BID AC  potassium chloride ER, 20 mEq, Oral, Once  simethicone, 80 mg, Oral, 4x Daily AC & at Bedtime      niCARdipine, 5-15 mg/hr, Last " Rate: 7.5 mg/hr (06/23/25 0649)        Medication Review: Reviewed    Assessment & Plan       ESRD (end stage renal disease) on dialysis    Anemia    Melena    GIB    Anemia of CKD    HTN    Plan: HD MWF.  No heparin with HD.  EPO with HD.  GI following - normal C-scope today.  Continue PO BP meds.  Dispo per primary.  Will follow.        Jim Farooq MD  [facility]  06/23/25  07:31 EDT

## 2025-06-23 NOTE — SIGNIFICANT NOTE
06/23/25 1508   OTHER   Discipline physical therapist   Rehab Time/Intention   Session Not Performed patient unavailable for treatment  (Pt receiving dialysis.)   Therapy Assessment/Plan (PT)   Criteria for Skilled Interventions Met (PT) yes;meets criteria   Recommendation   PT - Next Appointment 06/24/25

## 2025-06-23 NOTE — PLAN OF CARE
Goal Outcome Evaluation:  Plan of Care Reviewed With: patient, family        Progress: improving             Pt resting comfortably with no complaints. Family at bedside translating for pt. Cardene gtt infusing. Hemodialysis today. Will continue to monitor...

## 2025-06-23 NOTE — PROGRESS NOTES
[facility] MEDICINE SERVICE  DAILY PROGRESS NOTE    NAME: Brad Woodward  : 1940  MRN: [id number]      LOS: 4 days     PROVIDER OF SERVICE: Rauqel Weber MD    Chief Complaint: ESRD (end stage renal disease) on dialysis    Subjective:     Interval History:  History taken from: patient    No new complaint    Review of Systems:   Review of Systems   All other systems reviewed and are negative.      Objective:     Vital Signs  Temp:  [98.4 °F (36.9 °C)-99 °F (37.2 °C)] 98.9 °F (37.2 °C)  Heart Rate:  [] 99  Resp:  [16-20] 20  BP: (127-189)/(50-97) 147/65   Body mass index is 24.17 kg/m².    Physical Exam  Physical Exam  Constitutional:       Appearance: Normal appearance.   HENT:      Head: Normocephalic and atraumatic.      Nose: Nose normal.      Mouth/Throat:      Mouth: Mucous membranes are moist.   Eyes:      Extraocular Movements: Extraocular movements intact.      Pupils: Pupils are equal, round, and reactive to light.   Cardiovascular:      Rate and Rhythm: Normal rate and regular rhythm.   Pulmonary:      Effort: Pulmonary effort is normal.      Breath sounds: Normal breath sounds.   Abdominal:      General: Abdomen is flat. Bowel sounds are normal.      Palpations: Abdomen is soft.   Musculoskeletal:         General: Normal range of motion.      Cervical back: Normal range of motion and neck supple.   Skin:     General: Skin is warm and dry.   Neurological:      General: No focal deficit present.      Mental Status: She is alert and oriented to person, place, and time.   Psychiatric:         Mood and Affect: Mood normal.         Behavior: Behavior normal.         Thought Content: Thought content normal.         Judgment: Judgment normal.              Diagnostic Data    Results from last 7 days   Lab Units 25  0342 25  0224   WBC 10*3/mm3 8.50  --    HEMOGLOBIN g/dL 8.8*  --    HEMATOCRIT % 26.4*  --    PLATELETS 10*3/mm3 164  --    GLUCOSE mg/dL  --  87   CREATININE mg/dL   --  3.82*   BUN mg/dL  --  26.7*   SODIUM mmol/L  --  134*   POTASSIUM mmol/L  --  3.6   AST (SGOT) U/L  --  46*   ALT (SGPT) U/L  --  25   ALK PHOS U/L  --  78   BILIRUBIN mg/dL  --  0.3   ANION GAP mmol/L  --  13.6       No radiology results for the last day      I reviewed the patient's new clinical results.    Assessment/Plan:     Active and Resolved Problems  Active Hospital Problems    Diagnosis  POA    **ESRD (end stage renal disease) on dialysis [N18.6, Z99.2]  Not Applicable    Melena [K92.1]  Yes    Anemia [D64.9]  Yes      Resolved Hospital Problems   No resolved problems to display.       ESRD on HD  HD schedule: MWF  Last dialyzed 6/16, missed todays HD   Avoid nephrotoxic medications  Pharmacy to renally dose medication  Continue HD per Nephrology      Generalized weakness   Fatigue  Melena  Nausea/Vomiting  CT A/P with no acute findings  UA with no evidence of infection   FOBT negative in ED   S/p EGD on 6/19 shows no active bleeding  Colonoscopy done and this showed   Impression:  1.  Normal colonoscopy     Recommendations:  1.  Continue PPI  2.  Continue iron supplementation  3.  Regular diet          SSS s/p PPM  Hypertension   Hyperlipidemia   BP elevated on ED arrival with SBP > 200, improved  Still elevated today despite IV hydralazine and metoprolol in addition to meds below  Continue  Norvsc, Hydralazine,  Metoprolol   Start Cardene gtt; was told ok to start on floor  ASA and Plavix on hold GI bleed      HTN Urgency  - D/c cardizem drip and restart oral antihypertensive meds     Diabetes Mellitus   A1c 5.40  Consistent carb diet  Hypoglycemia protocol      Anemia of chronic disease  Status post packed RBCs  Monitor CBC  Hold aspirin and Plavix consulted cardiology     H/o renal artery stenosis s/p L renal stent   Had stent placed in 5/21; vascular ok with holding DAPT for now, but resume as soon as able      Anxiety-given Ativan IV in ED     Elevated troponin, likely rate related to chronic  renal failure             VTE Prophylaxis:  Pharmacologic & mechanical VTE prophylaxis orders are present.      Code Status and Medical Interventions: CPR (Attempt to Resuscitate); Full Support   Ordered at: 06/18/25 1332     Code Status (Patient has no pulse and is not breathing):    CPR (Attempt to Resuscitate)     Medical Interventions (Patient has pulse or is breathing):    Full Support       Signature: Electronically signed by Raquel Weber MD, 06/23/25, 17:29 EDT.  [facility] Hospitalist Team

## 2025-06-24 ENCOUNTER — READMISSION MANAGEMENT (OUTPATIENT)
Dept: CALL CENTER | Facility: HOSPITAL | Age: 85
End: 2025-06-24
Payer: MEDICARE

## 2025-06-24 VITALS
HEART RATE: 65 BPM | SYSTOLIC BLOOD PRESSURE: 104 MMHG | HEIGHT: 63 IN | DIASTOLIC BLOOD PRESSURE: 54 MMHG | BODY MASS INDEX: 24.18 KG/M2 | OXYGEN SATURATION: 98 % | RESPIRATION RATE: 12 BRPM | TEMPERATURE: 97.6 F | WEIGHT: 136.47 LBS

## 2025-06-24 LAB
ANION GAP SERPL CALCULATED.3IONS-SCNC: 8.2 MMOL/L (ref 5–15)
BASOPHILS # BLD AUTO: 0.01 10*3/MM3 (ref 0–0.2)
BASOPHILS NFR BLD AUTO: 0.2 % (ref 0–1.5)
BUN SERPL-MCNC: 13.8 MG/DL (ref 8–23)
BUN/CREAT SERPL: 5.1 (ref 7–25)
CALCIUM SPEC-SCNC: 8.2 MG/DL (ref 8.6–10.5)
CHLORIDE SERPL-SCNC: 98 MMOL/L (ref 98–107)
CO2 SERPL-SCNC: 25.8 MMOL/L (ref 22–29)
CREAT SERPL-MCNC: 2.69 MG/DL (ref 0.57–1)
DEPRECATED RDW RBC AUTO: 52.1 FL (ref 37–54)
EGFRCR SERPLBLD CKD-EPI 2021: 17 ML/MIN/1.73
EOSINOPHIL # BLD AUTO: 0.11 10*3/MM3 (ref 0–0.4)
EOSINOPHIL NFR BLD AUTO: 2.5 % (ref 0.3–6.2)
ERYTHROCYTE [DISTWIDTH] IN BLOOD BY AUTOMATED COUNT: 16.9 % (ref 12.3–15.4)
GLUCOSE BLDC GLUCOMTR-MCNC: 121 MG/DL (ref 70–105)
GLUCOSE SERPL-MCNC: 107 MG/DL (ref 65–99)
HCT VFR BLD AUTO: 26.7 % (ref 34–46.6)
HGB BLD-MCNC: 8.7 G/DL (ref 12–15.9)
IMM GRANULOCYTES # BLD AUTO: 0.03 10*3/MM3 (ref 0–0.05)
IMM GRANULOCYTES NFR BLD AUTO: 0.7 % (ref 0–0.5)
LYMPHOCYTES # BLD AUTO: 0.99 10*3/MM3 (ref 0.7–3.1)
LYMPHOCYTES NFR BLD AUTO: 22.4 % (ref 19.6–45.3)
MCH RBC QN AUTO: 28.1 PG (ref 26.6–33)
MCHC RBC AUTO-ENTMCNC: 32.6 G/DL (ref 31.5–35.7)
MCV RBC AUTO: 86.1 FL (ref 79–97)
MONOCYTES # BLD AUTO: 0.37 10*3/MM3 (ref 0.1–0.9)
MONOCYTES NFR BLD AUTO: 8.4 % (ref 5–12)
NEUTROPHILS NFR BLD AUTO: 2.9 10*3/MM3 (ref 1.7–7)
NEUTROPHILS NFR BLD AUTO: 65.8 % (ref 42.7–76)
NRBC BLD AUTO-RTO: 0.5 /100 WBC (ref 0–0.2)
PLATELET # BLD AUTO: 156 10*3/MM3 (ref 140–450)
PMV BLD AUTO: 10.4 FL (ref 6–12)
POTASSIUM SERPL-SCNC: 3.8 MMOL/L (ref 3.5–5.2)
RBC # BLD AUTO: 3.1 10*6/MM3 (ref 3.77–5.28)
SODIUM SERPL-SCNC: 132 MMOL/L (ref 136–145)
WBC NRBC COR # BLD AUTO: 4.41 10*3/MM3 (ref 3.4–10.8)

## 2025-06-24 PROCEDURE — 80048 BASIC METABOLIC PNL TOTAL CA: CPT | Performed by: INTERNAL MEDICINE

## 2025-06-24 PROCEDURE — 85025 COMPLETE CBC W/AUTO DIFF WBC: CPT | Performed by: INTERNAL MEDICINE

## 2025-06-24 PROCEDURE — 82948 REAGENT STRIP/BLOOD GLUCOSE: CPT | Performed by: INTERNAL MEDICINE

## 2025-06-24 RX ORDER — CLOPIDOGREL BISULFATE 75 MG/1
75 TABLET ORAL DAILY
Qty: 30 TABLET | Refills: 0 | Status: ON HOLD | OUTPATIENT
Start: 2025-06-24 | End: 2025-07-24

## 2025-06-24 RX ORDER — PANTOPRAZOLE SODIUM 40 MG/1
40 TABLET, DELAYED RELEASE ORAL DAILY
Qty: 30 TABLET | Refills: 0 | Status: ON HOLD | OUTPATIENT
Start: 2025-06-24 | End: 2025-07-24

## 2025-06-24 RX ORDER — LOSARTAN POTASSIUM 100 MG/1
100 TABLET ORAL
Qty: 30 TABLET | Refills: 0 | Status: ON HOLD | OUTPATIENT
Start: 2025-06-25 | End: 2025-07-25

## 2025-06-24 RX ADMIN — CLONIDINE HYDROCHLORIDE 0.3 MG: 0.1 TABLET ORAL at 05:33

## 2025-06-24 RX ADMIN — PANTOPRAZOLE SODIUM 40 MG: 40 INJECTION, POWDER, FOR SOLUTION INTRAVENOUS at 08:16

## 2025-06-24 RX ADMIN — SIMETHICONE 80 MG: 80 TABLET, CHEWABLE ORAL at 08:16

## 2025-06-24 RX ADMIN — LOSARTAN POTASSIUM 100 MG: 50 TABLET, FILM COATED ORAL at 08:16

## 2025-06-24 RX ADMIN — ASPIRIN 81 MG CHEWABLE TABLET 81 MG: 81 TABLET CHEWABLE at 08:16

## 2025-06-24 NOTE — PLAN OF CARE
Problem: Adult Inpatient Plan of Care  Goal: Absence of Hospital-Acquired Illness or Injury  Intervention: Identify and Manage Fall Risk  Recent Flowsheet Documentation  Taken 6/24/2025 0000 by Raúl Brenner RN  Safety Promotion/Fall Prevention:   safety round/check completed   room organization consistent   nonskid shoes/slippers when out of bed   fall prevention program maintained   clutter free environment maintained   assistive device/personal items within reach  Taken 6/23/2025 2000 by Raúl Brenner RN  Safety Promotion/Fall Prevention:   safety round/check completed   room organization consistent   nonskid shoes/slippers when out of bed   fall prevention program maintained   clutter free environment maintained   assistive device/personal items within reach  Intervention: Prevent Skin Injury  Recent Flowsheet Documentation  Taken 6/24/2025 0000 by Raúl Brenner RN  Body Position: position changed independently  Skin Protection: incontinence pads utilized  Taken 6/23/2025 2000 by Raúl Brenner RN  Body Position: position changed independently  Skin Protection: incontinence pads utilized  Intervention: Prevent and Manage VTE (Venous Thromboembolism) Risk  Recent Flowsheet Documentation  Taken 6/23/2025 2000 by Raúl Brenner RN  VTE Prevention/Management:   SCDs (sequential compression devices) off   patient refused intervention  Intervention: Prevent Infection  Recent Flowsheet Documentation  Taken 6/24/2025 0000 by Raúl Brenner RN  Infection Prevention:   environmental surveillance performed   hand hygiene promoted   personal protective equipment utilized   rest/sleep promoted   single patient room provided  Taken 6/23/2025 2000 by Raúl Brenner RN  Infection Prevention:   environmental surveillance performed   hand hygiene promoted   personal protective equipment utilized   rest/sleep promoted   single patient room provided  Goal: Optimal Comfort and Wellbeing  Intervention:  Provide Person-Centered Care  Recent Flowsheet Documentation  Taken 6/23/2025 2000 by Raúl Brenner RN  Trust Relationship/Rapport: care explained     Problem: Skin Injury Risk Increased  Goal: Skin Health and Integrity  Intervention: Optimize Skin Protection  Recent Flowsheet Documentation  Taken 6/24/2025 0000 by Raúl Brenner RN  Pressure Reduction Techniques: frequent weight shift encouraged  Head of Bed (HOB) Positioning: HOB flat  Pressure Reduction Devices: pressure-redistributing mattress utilized  Skin Protection: incontinence pads utilized  Taken 6/23/2025 2000 by Raúl Brenner RN  Pressure Reduction Techniques: frequent weight shift encouraged  Head of Bed (HOB) Positioning: HOB flat  Pressure Reduction Devices: pressure-redistributing mattress utilized  Skin Protection: incontinence pads utilized     Problem: Comorbidity Management  Goal: Maintenance of Asthma Control  Intervention: Maintain Asthma Symptom Control  Recent Flowsheet Documentation  Taken 6/23/2025 2000 by Raúl Brenner RN  Medication Review/Management: medications reviewed  Goal: Maintenance of Behavioral Health Symptom Control  Intervention: Maintain Behavioral Health Symptom Control  Recent Flowsheet Documentation  Taken 6/23/2025 2000 by Raúl Brenner RN  Medication Review/Management: medications reviewed  Goal: Maintenance of COPD Symptom Control  Intervention: Maintain COPD (Chronic Obstructive Pulmonary Disease) Symptom Control  Recent Flowsheet Documentation  Taken 6/23/2025 2000 by Raúl Brenner RN  Medication Review/Management: medications reviewed  Goal: Blood Glucose Level Within Target Range  Intervention: Monitor and Manage Glycemia  Recent Flowsheet Documentation  Taken 6/23/2025 2000 by Raúl Brenner RN  Medication Review/Management: medications reviewed  Goal: Maintenance of Heart Failure Symptom Control  Intervention: Maintain Heart Failure Management  Recent Flowsheet Documentation  Taken  6/23/2025 2000 by Raúl Brenner RN  Medication Review/Management: medications reviewed  Goal: Blood Pressure in Desired Range  Intervention: Maintain Blood Pressure Management  Recent Flowsheet Documentation  Taken 6/23/2025 2000 by Raúl Brenner RN  Medication Review/Management: medications reviewed  Goal: Maintenance of Osteoarthritis Symptom Control  Intervention: Maintain Osteoarthritis Symptom Control  Recent Flowsheet Documentation  Taken 6/23/2025 2000 by Raúl Brenner RN  Medication Review/Management: medications reviewed  Goal: Bariatric Home Regimen Maintained  Intervention: Maintain and Manage Postbariatric Surgery Care  Recent Flowsheet Documentation  Taken 6/23/2025 2000 by Raúl Brenner RN  Medication Review/Management: medications reviewed  Goal: Maintenance of Seizure Control  Intervention: Maintain Seizure Symptom Control  Recent Flowsheet Documentation  Taken 6/23/2025 2000 by Raúl Brenner RN  Sensory Stimulation Regulation: care clustered  Medication Review/Management: medications reviewed     Problem: Fall Injury Risk  Goal: Absence of Fall and Fall-Related Injury  Intervention: Identify and Manage Contributors  Recent Flowsheet Documentation  Taken 6/23/2025 2000 by Raúl Brenner RN  Medication Review/Management: medications reviewed  Intervention: Promote Injury-Free Environment  Recent Flowsheet Documentation  Taken 6/24/2025 0000 by Raúl Brenner RN  Safety Promotion/Fall Prevention:   safety round/check completed   room organization consistent   nonskid shoes/slippers when out of bed   fall prevention program maintained   clutter free environment maintained   assistive device/personal items within reach  Taken 6/23/2025 2000 by Raúl Brenner RN  Safety Promotion/Fall Prevention:   safety round/check completed   room organization consistent   nonskid shoes/slippers when out of bed   fall prevention program maintained   clutter free environment maintained    assistive device/personal items within reach   Goal Outcome Evaluation:      Patient was taken off a cardene drip yesterday. Patient was given clonidine and hydralazine. After administration of those meds the patient's blood pressure has improved. Patient has been pleasant, calm and cooperative with care. Family assists in care. Nurse was having trouble getting hold of an  last night. Family translated for the patient.

## 2025-06-24 NOTE — DISCHARGE SUMMARY
[facility] [address] Medicine Services  Discharge Summary    Date of Service: 2025  Patient Name: Brad Woodward  : 1940  MRN: [id number]    Date of Admission: 2025  Discharge Diagnosis:     ESRD  Generalized weakness  Melena  Anemia of chronic disease  Sick sinus syndrome status post pacemaker placement  Hypertension  Hyperlipidemia      Date of Discharge: 2025  Primary Care Physician: Brian Nazario APRN      Hospital Course         Hospital Course:    This patient is an 84-year-old lady who presented with generalized weakness and fatigue.  She has been having melena stools.  She was found to be anemic.  Anemia workup revealed anemia of chronic disease.  FOBT done in the ER was negative.  She was seen by gastroenterology and had an EGD and colonoscopy done.  Of note is that upon presentation her hemoglobin was 8 g per DL and then it dropped to 6 g per DL and which necessitated blood transfusion with packed red blood cells. Hemoglobin improved status post packed red blood cell transfusion.  EGD was done which showed no active bleeding with normal mucosa of the whole esophagus and minimal erythema in the stomach and antrum consistent with gastritis.  No ulcers or erosions were seen.  No blood in the stomach.  She was started on Protonix.  She had a colonoscopy done which showed a normal colonoscopy.  She is on dialysis for ESRD and will continue to get erythropoietin with dialysis due to anemia of chronic disease.  Her symptoms are currently resolved.  She has improved overall and is being discharged home.        DISCHARGE Follow Up Recommendations:-Follow-up PCP in 1 week, follow-up with GI in 1 week.      Day of Discharge     Vital Signs:  Temp:  [97.6 °F (36.4 °C)-99.2 °F (37.3 °C)] 97.6 °F (36.4 °C)  Heart Rate:  [] 65  Resp:  [12-20] 12  BP: (104-188)/(47-93) 104/54  Flow (L/min) (Oxygen Therapy):  [2] 2    Physical Exam:  Physical Exam  Constitutional:       Appearance:  Normal appearance.   HENT:      Head: Normocephalic and atraumatic.      Nose: Nose normal.      Mouth/Throat:      Mouth: Mucous membranes are moist.   Eyes:      Extraocular Movements: Extraocular movements intact.      Pupils: Pupils are equal, round, and reactive to light.   Cardiovascular:      Rate and Rhythm: Normal rate and regular rhythm.   Pulmonary:      Effort: Pulmonary effort is normal.      Breath sounds: Normal breath sounds.   Abdominal:      General: Abdomen is flat. Bowel sounds are normal.      Palpations: Abdomen is soft.   Musculoskeletal:         General: Normal range of motion.      Cervical back: Normal range of motion and neck supple.   Skin:     General: Skin is warm and dry.   Neurological:      General: No focal deficit present.      Mental Status: She is alert and oriented to person, place, and time.   Psychiatric:         Mood and Affect: Mood normal.         Behavior: Behavior normal.         Thought Content: Thought content normal.         Judgment: Judgment normal.           Pertinent  and/or Most Recent Results     LAB RESULTS:      Lab 06/24/25  0544 06/23/25  0342 06/21/25  2221 06/21/25  1249 06/21/25  0449 06/20/25  0458 06/20/25  0114 06/19/25  1844 06/19/25  0525 06/19/25  0429 06/18/25  1112 06/18/25  1045   WBC 4.41 8.50 8.01  --  6.11  --  4.84  --  3.48  --   --  5.00   HEMOGLOBIN 8.7* 8.8* 9.2* 9.0* 8.7*   < > 6.2*   < > 6.0*  --   --  8.0*   HEMATOCRIT 26.7* 26.4* 27.7* 28.3* 26.1*   < > 18.6*   < > 18.4*  --   --  24.7*   PLATELETS 156 164 145  --  136*  --  98*  --  135*  --   --  205   NEUTROS ABS 2.90 5.19  --   --  3.52  --  3.30  --  1.39*  --   --  3.28   IMMATURE GRANS (ABS) 0.03 0.03  --   --  0.03  --  0.00  --  0.01  --   --  0.01   LYMPHS ABS 0.99 2.22  --   --  1.74  --  1.04  --  1.29  --   --  0.99   MONOS ABS 0.37 0.72  --   --  0.69  --  0.35  --  0.46  --   --  0.45   EOS ABS 0.11 0.32  --   --  0.10  --  0.14  --  0.31  --   --  0.24   MCV 86.1 85.7  84.5  --  82.6  --  84.9  --  82.5  --   --  82.6   LACTATE  --   --   --   --   --   --   --   --   --   --  1.9  --    LDH  --   --   --   --   --   --   --   --   --  228*  --   --    PROTIME  --   --   --   --   --   --   --   --   --   --   --  12.3    < > = values in this interval not displayed.         Lab 06/24/25  0544 06/23/25 0224 06/21/25 2221 06/21/25 0449 06/20/25 0114 06/19/25  0429 06/18/25  1045   SODIUM 132* 134* 136  --  133* 135* 131*   POTASSIUM 3.8 3.6 3.7  --  4.3 3.8 3.6   CHLORIDE 98 102 100  --  98 97* 93*   CO2 25.8 18.4* 22.8  --  25.1 28.3 26.0   ANION GAP 8.2 13.6 13.2  --  9.9 9.7 12.0   BUN 13.8 26.7* 26.9*  --  42.5* 21.8 42.1*   CREATININE 2.69* 3.82* 3.46*  --  3.36* 2.53* 3.79*   EGFR 17.0* 11.1* 12.6*  --  13.0* 18.3* 11.3*   GLUCOSE 107* 87 130*  --  117* 72 117*   CALCIUM 8.2* 8.3* 8.7  --  7.7* 7.7* 9.0   MAGNESIUM  --   --   --  1.8 1.7 1.7  --    PHOSPHORUS  --   --   --  2.6 4.0 2.3*  --    TSH  --   --   --   --   --   --  6.150*         Lab 06/23/25 0224 06/21/25 2221 06/20/25 0114 06/19/25 0429 06/18/25  1159 06/18/25  1045   TOTAL PROTEIN 6.0 6.0 4.8* 5.2*  --  6.8   ALBUMIN 3.4* 3.5 3.1* 2.9*  --  3.7   GLOBULIN 2.6 2.5 1.7  --   --  3.1   ALT (SGPT) 25 23 13 17  --  22   AST (SGOT) 46* 41* 24 31  --  38*   BILIRUBIN 0.3 0.4 0.3 0.3  0.3  --  0.3   INDIRECT BILIRUBIN  --   --   --  0.2  0.2  --   --    BILIRUBIN DIRECT  --   --   --  0.1  0.1  --   --    ALK PHOS 78 82 59 92  --  124*   LIPASE  --   --   --   --  128*  --          Lab 06/22/25  0013 06/21/25  2221 06/19/25  0429 06/18/25  1159 06/18/25  1045   HSTROP T 61* 60* 61* 55* 57*   PROTIME  --   --   --   --  12.3   INR  --   --   --   --  0.93             Lab 06/19/25  0429 06/18/25  1045   IRON 79  --    IRON SATURATION (TSAT) 34  --    TIBC 235*  --    TRANSFERRIN 158*  --    FERRITIN 296.00*  --    FOLATE  --  11.80   VITAMIN B 12  --  >2,000*   ABO TYPING  --  B   RH TYPING  --  Positive    ANTIBODY SCREEN  --  Negative         Brief Urine Lab Results  (Last result in the past 365 days)        Color   Clarity   Blood   Leuk Est   Nitrite   Protein   CREAT   Urine HCG        06/18/25 1156 Yellow   Clear   Negative   Negative   Negative   >=300 mg/dL (3+)                 Microbiology Results (last 10 days)       Procedure Component Value - Date/Time    Blood Culture - Blood, Arm, Left [[id number]]  (Normal) Collected: 06/18/25 1146    Lab Status: Final result Specimen: Blood from Arm, Left Updated: 06/23/25 1201     Blood Culture No growth at 5 days    Blood Culture - Blood, Arm, Left [[id number]]  (Normal) Collected: 06/18/25 1045    Lab Status: Final result Specimen: Blood from Arm, Left Updated: 06/23/25 1115     Blood Culture No growth at 5 days            CT Abdomen Pelvis With Contrast  Result Date: 6/18/2025  Impression: Impression: Motion-degraded exam. Punctate focus of gas within the urinary bladder. If no recent history of instrumentation, recommend correlation with urinalysis to assess for cystitis. New trace pelvic free fluid, nonspecific. Possible endometrial thickening, though not well assessed on this exam. Consider further evaluation with nonemergent/outpatient pelvic ultrasound. Stable chronic/ancillary findings as above. Electronically Signed: Justin Abraham MD  6/18/2025 12:58 PM EDT  Workstation ID: [id number]      Results for orders placed during the hospital encounter of 05/16/25    Duplex Pseudoaneurysm CAR    Interpretation Summary  •  No evidence of pseudoaneurysm or AVF in bilateral groin.      Results for orders placed during the hospital encounter of 05/16/25    Duplex Pseudoaneurysm CAR    Interpretation Summary  •  No evidence of pseudoaneurysm or AVF in bilateral groin.      Results for orders placed during the hospital encounter of 04/15/21    Adult Transthoracic Echo Complete W/ Cont if Necessary Per Protocol    Interpretation Summary  · Left ventricular ejection fraction  appears to be 61 - 65%.  · Left ventricular wall thickness is consistent with moderate basal asymmetric hypertrophy.  · The right ventricular cavity is mildly dilated.  · Mild to moderate aortic valve stenosis is present.  · Moderate mitral valve regurgitation is present.  · No pericardial effusion noted      Labs Pending at Discharge:  Pending Results       None            Procedures Performed  Procedure(s):  COLONOSCOPY  06/22 1324 Colonoscopy      Consults:   Consults       Date and Time Order Name Status Description    6/19/2025  9:56 AM Inpatient Cardiology Consult      6/19/2025  7:41 AM Inpatient Gastroenterology Consult Completed     6/19/2025  6:09 AM Inpatient Gastroenterology Consult Completed     6/19/2025  6:09 AM Inpatient Vascular Surgery Consult Completed     6/18/2025  1:20 PM Nephrology (on -call MD unless specified) Completed     6/18/2025  1:20 PM Hospitalist (on-call MD unless specified)      5/19/2025  8:11 AM Inpatient Vascular Surgery Consult Completed     5/17/2025  1:01 PM Inpatient Nephrology Consult Completed               Discharge Details        Discharge Medications        New Medications        Instructions Start Date   losartan 100 MG tablet  Commonly known as: COZAAR   100 mg, Oral, Every 24 Hours Scheduled   Start Date: June 25, 2025     pantoprazole 40 MG EC tablet  Commonly known as: Protonix   40 mg, Oral, Daily             Continue These Medications        Instructions Start Date   aspirin 81 MG chewable tablet   81 mg, Oral, Daily      atorvastatin 80 MG tablet  Commonly known as: LIPITOR   80 mg, Oral, Daily      cloNIDine 0.3 MG tablet  Commonly known as: CATAPRES   0.3 mg, 2 Times Daily      clopidogrel 75 MG tablet  Commonly known as: PLAVIX   75 mg, Oral, Daily      docusate sodium 100 MG capsule  Commonly known as: COLACE   1 capsule, Every 12 Hours Scheduled      hydrALAZINE 100 MG tablet  Commonly known as: APRESOLINE   100 mg, Oral, 3 times daily      metoprolol  succinate XL 25 MG 24 hr tablet  Commonly known as: TOPROL-XL   25 mg, Oral, Daily      potassium chloride 20 MEQ CR tablet  Commonly known as: KLOR-CON M20   20 mEq, 2 Times Daily      VITAMIN B-12 PO   1 tablet, Daily             Stop These Medications      lisinopril 40 MG tablet  Commonly known as: PRINIVIL,ZESTRIL              No Known Allergies      Discharge Disposition:   Home or Self Care    Diet: Cardiac diet      Discharge Activity:   Activity Instructions       Activity as Tolerated                CODE STATUS:  Code Status and Medical Interventions: CPR (Attempt to Resuscitate); Full Support   Ordered at: 06/18/25 1332     Code Status (Patient has no pulse and is not breathing):    CPR (Attempt to Resuscitate)     Medical Interventions (Patient has pulse or is breathing):    Full Support         Future Appointments   Date Time Provider Department Center   7/17/2025 10:00 AM [facility]   7/22/2025  2:45 PM Ted Mckee II, MD [facility]   7/24/2025 11:30 AM Marnie Brandt APRN [facility]   12/18/2025 10:00 AM [facility] DEVICE CHECK [facility]   12/18/2025 10:30 AM Zane Aldridge MD [facility]       Additional Instructions for the Follow-ups that You Need to Schedule       Discharge Follow-up with PCP   As directed       Currently Documented PCP:    Brian Nazario APRN    PCP Phone Number:    [phone]     Follow Up Details: 1 week        Discharge Follow-up with Specified Provider: Cardiology; 1 Week   As directed      To: Cardiology   Follow Up: 1 Week        Discharge Follow-up with Specified Provider: GI; 1 Week   As directed      To: GI   Follow Up: 1 Week        Discharge Follow-up with Specified Provider: Nephrology; 1 Week   As directed      To: Nephrology   Follow Up: 1 Week        Discharge Follow-up with Specified Provider: Vascular surgery; 2 Weeks   As directed      To: Vascular surgery   Follow Up: 2 Weeks                 Time spent on Discharge including face to face service:  >30 minutes    Signature: Electronically signed by Raquel Weber MD, 06/24/25, 15:39 EDT.  [facility] Hospitalist Team

## 2025-06-24 NOTE — PROGRESS NOTES
"RENAL/K[facility]:     LOS: 5 days    Patient Care Team:  Brian Nazario APRN as PCP - General  Zane Aldridge MD as Consulting Physician (Cardiology)  Marnie Brandt APRN as Nurse Practitioner (Cardiology)    Chief Complaint: ESRD    Subjective     Interval History:   Chart reviewed  Noted plans for D/C    Objective     Vital Sign Min/Max for last 24 hours  Temp  Min: 97.9 °F (36.6 °C)  Max: 99.2 °F (37.3 °C)   BP  Min: 109/47  Max: 188/78   Pulse  Min: 76  Max: 114   Resp  Min: 14  Max: 20   SpO2  Min: 91 %  Max: 99 %   Flow (L/min) (Oxygen Therapy)  Min: 2  Max: 2   No data recorded     Flowsheet Rows      Flowsheet Row First Filed Value   Admission Height 162.6 cm (64\") Documented at 06/18/2025 1117   Admission Weight 61.2 kg (134 lb 14.7 oz) Documented at 06/18/2025 1117            No intake/output data recorded.  I/O last 3 completed shifts:  In: 240 [P.O.:240]  Out: 3000     Physical Exam:  GEN: Awake, NAD  ENT: PERRL, EOMI, MMM  NECK: Supple, no JVD  CHEST: CTAB, no W/R/C  CV: RRR, no M/G/R  ABD: Soft, NT, +BS  SKIN: Warm and Dry  NEURO: CN's intact      WBC WBC   Date Value Ref Range Status   06/24/2025 4.41 3.40 - 10.80 10*3/mm3 Final   06/23/2025 8.50 3.40 - 10.80 10*3/mm3 Final   06/21/2025 8.01 3.40 - 10.80 10*3/mm3 Final      HGB Hemoglobin   Date Value Ref Range Status   06/24/2025 8.7 (L) 12.0 - 15.9 g/dL Final   06/23/2025 8.8 (L) 12.0 - 15.9 g/dL Final   06/21/2025 9.2 (L) 12.0 - 15.9 g/dL Final   06/21/2025 9.0 (L) 12.0 - 15.9 g/dL Final      HCT Hematocrit   Date Value Ref Range Status   06/24/2025 26.7 (L) 34.0 - 46.6 % Final   06/23/2025 26.4 (L) 34.0 - 46.6 % Final   06/21/2025 27.7 (L) 34.0 - 46.6 % Final   06/21/2025 28.3 (L) 34.0 - 46.6 % Final      Platlets No results found for: \"LABPLAT\"   MCV MCV   Date Value Ref Range Status   06/24/2025 86.1 79.0 - 97.0 fL Final   06/23/2025 85.7 79.0 - 97.0 fL Final   06/21/2025 84.5 79.0 - 97.0 fL Final          Sodium Sodium   Date Value Ref Range " "Status   06/24/2025 132 (L) 136 - 145 mmol/L Final   06/23/2025 134 (L) 136 - 145 mmol/L Final   06/21/2025 136 136 - 145 mmol/L Final      Potassium Potassium   Date Value Ref Range Status   06/24/2025 3.8 3.5 - 5.2 mmol/L Final     Comment:     Specimen hemolyzed.  Result may be falsely elevated.   06/23/2025 3.6 3.5 - 5.2 mmol/L Final     Comment:     Specimen hemolyzed.  Result may be falsely elevated.   06/21/2025 3.7 3.5 - 5.2 mmol/L Final      Chloride Chloride   Date Value Ref Range Status   06/24/2025 98 98 - 107 mmol/L Final   06/23/2025 102 98 - 107 mmol/L Final   06/21/2025 100 98 - 107 mmol/L Final      CO2 CO2   Date Value Ref Range Status   06/24/2025 25.8 22.0 - 29.0 mmol/L Final   06/23/2025 18.4 (L) 22.0 - 29.0 mmol/L Final   06/21/2025 22.8 22.0 - 29.0 mmol/L Final      BUN BUN   Date Value Ref Range Status   06/24/2025 13.8 8.0 - 23.0 mg/dL Final   06/23/2025 26.7 (H) 8.0 - 23.0 mg/dL Final   06/21/2025 26.9 (H) 8.0 - 23.0 mg/dL Final      Creatinine Creatinine   Date Value Ref Range Status   06/24/2025 2.69 (H) 0.57 - 1.00 mg/dL Final   06/23/2025 3.82 (H) 0.57 - 1.00 mg/dL Final   06/21/2025 3.46 (H) 0.57 - 1.00 mg/dL Final      Calcium Calcium   Date Value Ref Range Status   06/24/2025 8.2 (L) 8.6 - 10.5 mg/dL Final   06/23/2025 8.3 (L) 8.6 - 10.5 mg/dL Final   06/21/2025 8.7 8.6 - 10.5 mg/dL Final      PO4 No results found for: \"CAPO4\"   Albumin Albumin   Date Value Ref Range Status   06/23/2025 3.4 (L) 3.5 - 5.2 g/dL Final   06/21/2025 3.5 3.5 - 5.2 g/dL Final      Magnesium No results found for: \"MG\"     Uric Acid No results found for: \"URICACID\"        Results Review:     I reviewed the patient's new clinical results.    aspirin, 81 mg, Oral, Daily  cloNIDine, 0.3 mg, Oral, Q8H  epoetin rickie/rickie-epbx, 20,000 Units, Intravenous, Once per day on Monday Wednesday Friday  hydrALAZINE, 100 mg, Oral, TID  losartan, 100 mg, Oral, Q24H  pantoprazole, 40 mg, Intravenous, BID AC  potassium chloride " ER, 20 mEq, Oral, Once  simethicone, 80 mg, Oral, 4x Daily AC & at Bedtime      Medication Review: Reviewed    Assessment & Plan       ESRD (end stage renal disease) on dialysis    Anemia    Melena    GIB    Anemia of CKD    HTN    Plan: HD MWF.  No heparin with HD.  EPO with HD.  Hgb stable.  Continue PO BP meds.  Dispo per primary.  Will follow.        Jim Farooq MD  [facility]  06/24/25  07:55 EDT

## 2025-06-25 NOTE — CASE MANAGEMENT/SOCIAL WORK
Case Management Discharge Note      Final Note: Home with family                 Transportation Services  Transportation: Private Transportation  Private: Car    Final Discharge Disposition Code: 01 - home or self-care

## 2025-06-25 NOTE — OUTREACH NOTE
Prep Survey      Flowsheet Row Responses   [facility] patient discharged from? [facility]   Is LACE score < 7 ? No   Eligibility Readm Mgmt   Discharge diagnosis weakness, fatigue   Does the patient have one of the following disease processes/diagnoses(primary or secondary)? Other   Does the patient have Home health ordered? No   Is there a DME ordered? No   Prep survey completed? Yes            SUKH WILLIS - Registered Nurse

## 2025-07-02 ENCOUNTER — READMISSION MANAGEMENT (OUTPATIENT)
Dept: CALL CENTER | Facility: HOSPITAL | Age: 85
End: 2025-07-02
Payer: MEDICARE

## 2025-07-02 ENCOUNTER — APPOINTMENT (OUTPATIENT)
Dept: GENERAL RADIOLOGY | Facility: HOSPITAL | Age: 85
End: 2025-07-02
Payer: MEDICARE

## 2025-07-02 ENCOUNTER — HOSPITAL ENCOUNTER (INPATIENT)
Facility: HOSPITAL | Age: 85
LOS: 6 days | Discharge: HOME | End: 2025-07-08
Attending: EMERGENCY MEDICINE | Admitting: STUDENT IN AN ORGANIZED HEALTH CARE EDUCATION/TRAINING PROGRAM
Payer: MEDICAID

## 2025-07-02 DIAGNOSIS — R11.2 NAUSEA AND VOMITING, UNSPECIFIED VOMITING TYPE: Primary | ICD-10-CM

## 2025-07-02 DIAGNOSIS — I10 UNCONTROLLED HYPERTENSION: ICD-10-CM

## 2025-07-02 DIAGNOSIS — R53.1 WEAKNESS: ICD-10-CM

## 2025-07-02 LAB
ALBUMIN SERPL-MCNC: 3.3 G/DL (ref 3.5–5.2)
ALBUMIN/GLOB SERPL: 1.2 G/DL
ALP SERPL-CCNC: 122 U/L (ref 39–117)
ALT SERPL W P-5'-P-CCNC: 23 U/L (ref 1–33)
ANION GAP SERPL CALCULATED.3IONS-SCNC: 12.3 MMOL/L (ref 5–15)
APTT PPP: 29.3 SECONDS (ref 22.7–35.4)
AST SERPL-CCNC: 47 U/L (ref 1–32)
BACTERIA UR QL AUTO: ABNORMAL /HPF
BASOPHILS # BLD AUTO: 0.02 10*3/MM3 (ref 0–0.2)
BASOPHILS NFR BLD AUTO: 0.8 % (ref 0–1.5)
BILIRUB SERPL-MCNC: 0.4 MG/DL (ref 0–1.2)
BILIRUB UR QL STRIP: NEGATIVE
BUN SERPL-MCNC: 64.7 MG/DL (ref 8–23)
BUN/CREAT SERPL: 14.5 (ref 7–25)
BURR CELLS BLD QL SMEAR: NORMAL
CALCIUM SPEC-SCNC: 8.8 MG/DL (ref 8.6–10.5)
CHLORIDE SERPL-SCNC: 93 MMOL/L (ref 98–107)
CLARITY UR: CLEAR
CO2 SERPL-SCNC: 21.7 MMOL/L (ref 22–29)
COLOR UR: YELLOW
CREAT SERPL-MCNC: 4.47 MG/DL (ref 0.57–1)
DEPRECATED RDW RBC AUTO: 56.2 FL (ref 37–54)
EGFRCR SERPLBLD CKD-EPI 2021: 9.2 ML/MIN/1.73
EOSINOPHIL # BLD AUTO: 0.03 10*3/MM3 (ref 0–0.4)
EOSINOPHIL NFR BLD AUTO: 1.2 % (ref 0.3–6.2)
ERYTHROCYTE [DISTWIDTH] IN BLOOD BY AUTOMATED COUNT: 17.9 % (ref 12.3–15.4)
GEN 5 1HR TROPONIN T REFLEX: 124 NG/L
GLOBULIN UR ELPH-MCNC: 2.7 GM/DL
GLUCOSE SERPL-MCNC: 127 MG/DL (ref 65–99)
GLUCOSE UR STRIP-MCNC: ABNORMAL MG/DL
HCT VFR BLD AUTO: 31.3 % (ref 34–46.6)
HGB BLD-MCNC: 9.9 G/DL (ref 12–15.9)
HGB UR QL STRIP.AUTO: NEGATIVE
HOLD SPECIMEN: NORMAL
HOLD SPECIMEN: NORMAL
HYALINE CASTS UR QL AUTO: ABNORMAL /LPF
IMM GRANULOCYTES # BLD AUTO: 0.01 10*3/MM3 (ref 0–0.05)
IMM GRANULOCYTES NFR BLD AUTO: 0.4 % (ref 0–0.5)
INR PPP: 0.98 (ref 0.9–1.1)
KETONES UR QL STRIP: NEGATIVE
LEUKOCYTE ESTERASE UR QL STRIP.AUTO: NEGATIVE
LIPASE SERPL-CCNC: 97 U/L (ref 13–60)
LYMPHOCYTES # BLD AUTO: 0.88 10*3/MM3 (ref 0.7–3.1)
LYMPHOCYTES NFR BLD AUTO: 36.1 % (ref 19.6–45.3)
MAGNESIUM SERPL-MCNC: 1.9 MG/DL (ref 1.6–2.4)
MCH RBC QN AUTO: 27.3 PG (ref 26.6–33)
MCHC RBC AUTO-ENTMCNC: 31.6 G/DL (ref 31.5–35.7)
MCV RBC AUTO: 86.5 FL (ref 79–97)
MONOCYTES # BLD AUTO: 0.34 10*3/MM3 (ref 0.1–0.9)
MONOCYTES NFR BLD AUTO: 13.9 % (ref 5–12)
NEUTROPHILS NFR BLD AUTO: 1.16 10*3/MM3 (ref 1.7–7)
NEUTROPHILS NFR BLD AUTO: 47.6 % (ref 42.7–76)
NITRITE UR QL STRIP: NEGATIVE
NRBC BLD AUTO-RTO: 0 /100 WBC (ref 0–0.2)
PH UR STRIP.AUTO: 8.5 [PH] (ref 5–8)
PLATELET # BLD AUTO: 247 10*3/MM3 (ref 140–450)
PMV BLD AUTO: 9.8 FL (ref 6–12)
POTASSIUM SERPL-SCNC: 5.4 MMOL/L (ref 3.5–5.2)
PROT SERPL-MCNC: 6 G/DL (ref 6–8.5)
PROT UR QL STRIP: ABNORMAL
PROTHROMBIN TIME: 12.9 SECONDS (ref 11.7–14.2)
RBC # BLD AUTO: 3.62 10*6/MM3 (ref 3.77–5.28)
RBC # UR STRIP: ABNORMAL /HPF
REF LAB TEST METHOD: ABNORMAL
SMALL PLATELETS BLD QL SMEAR: ADEQUATE
SODIUM SERPL-SCNC: 127 MMOL/L (ref 136–145)
SP GR UR STRIP: 1.01 (ref 1–1.03)
SQUAMOUS #/AREA URNS HPF: ABNORMAL /HPF
TROPONIN T % DELTA: -5
TROPONIN T NUMERIC DELTA: -6 NG/L
TROPONIN T SERPL HS-MCNC: 130 NG/L
UROBILINOGEN UR QL STRIP: ABNORMAL
WBC # UR STRIP: ABNORMAL /HPF
WBC MORPH BLD: NORMAL
WBC NRBC COR # BLD AUTO: 2.44 10*3/MM3 (ref 3.4–10.8)
WHOLE BLOOD HOLD SPECIMEN: NORMAL

## 2025-07-02 PROCEDURE — 85730 THROMBOPLASTIN TIME PARTIAL: CPT | Performed by: EMERGENCY MEDICINE

## 2025-07-02 PROCEDURE — 99285 EMERGENCY DEPT VISIT HI MDM: CPT

## 2025-07-02 PROCEDURE — P9612 CATHETERIZE FOR URINE SPEC: HCPCS

## 2025-07-02 PROCEDURE — 25010000002 ONDANSETRON PER 1 MG: Performed by: EMERGENCY MEDICINE

## 2025-07-02 PROCEDURE — 36415 COLL VENOUS BLD VENIPUNCTURE: CPT | Performed by: EMERGENCY MEDICINE

## 2025-07-02 PROCEDURE — 93005 ELECTROCARDIOGRAM TRACING: CPT | Performed by: EMERGENCY MEDICINE

## 2025-07-02 PROCEDURE — 25010000002 HYDRALAZINE PER 20 MG: Performed by: EMERGENCY MEDICINE

## 2025-07-02 PROCEDURE — 85610 PROTHROMBIN TIME: CPT | Performed by: EMERGENCY MEDICINE

## 2025-07-02 PROCEDURE — 83735 ASSAY OF MAGNESIUM: CPT | Performed by: EMERGENCY MEDICINE

## 2025-07-02 PROCEDURE — 83690 ASSAY OF LIPASE: CPT | Performed by: EMERGENCY MEDICINE

## 2025-07-02 PROCEDURE — 85025 COMPLETE CBC W/AUTO DIFF WBC: CPT | Performed by: EMERGENCY MEDICINE

## 2025-07-02 PROCEDURE — 85007 BL SMEAR W/DIFF WBC COUNT: CPT | Performed by: EMERGENCY MEDICINE

## 2025-07-02 PROCEDURE — 74018 RADEX ABDOMEN 1 VIEW: CPT

## 2025-07-02 PROCEDURE — 84484 ASSAY OF TROPONIN QUANT: CPT | Performed by: EMERGENCY MEDICINE

## 2025-07-02 PROCEDURE — 80053 COMPREHEN METABOLIC PANEL: CPT | Performed by: EMERGENCY MEDICINE

## 2025-07-02 PROCEDURE — 25810000003 SODIUM CHLORIDE 0.9 % SOLUTION: Performed by: EMERGENCY MEDICINE

## 2025-07-02 PROCEDURE — 81001 URINALYSIS AUTO W/SCOPE: CPT | Performed by: EMERGENCY MEDICINE

## 2025-07-02 PROCEDURE — 25010000002 HYDRALAZINE PER 20 MG

## 2025-07-02 RX ORDER — CLONIDINE HYDROCHLORIDE 0.1 MG/1
0.3 TABLET ORAL 2 TIMES DAILY
Status: DISCONTINUED | OUTPATIENT
Start: 2025-07-02 | End: 2025-07-04

## 2025-07-02 RX ORDER — ONDANSETRON 4 MG/1
4 TABLET, ORALLY DISINTEGRATING ORAL EVERY 6 HOURS PRN
Status: DISCONTINUED | OUTPATIENT
Start: 2025-07-02 | End: 2025-07-08 | Stop reason: HOSPADM

## 2025-07-02 RX ORDER — ONDANSETRON 2 MG/ML
4 INJECTION INTRAMUSCULAR; INTRAVENOUS EVERY 6 HOURS PRN
Status: DISCONTINUED | OUTPATIENT
Start: 2025-07-02 | End: 2025-07-08 | Stop reason: HOSPADM

## 2025-07-02 RX ORDER — DEXTROSE MONOHYDRATE 25 G/50ML
25 INJECTION, SOLUTION INTRAVENOUS
Status: DISCONTINUED | OUTPATIENT
Start: 2025-07-02 | End: 2025-07-08 | Stop reason: HOSPADM

## 2025-07-02 RX ORDER — PANTOPRAZOLE SODIUM 40 MG/1
40 TABLET, DELAYED RELEASE ORAL DAILY
Status: DISCONTINUED | OUTPATIENT
Start: 2025-07-03 | End: 2025-07-07

## 2025-07-02 RX ORDER — ACETAMINOPHEN 650 MG/1
650 SUPPOSITORY RECTAL EVERY 4 HOURS PRN
Status: DISCONTINUED | OUTPATIENT
Start: 2025-07-02 | End: 2025-07-08 | Stop reason: HOSPADM

## 2025-07-02 RX ORDER — ATORVASTATIN CALCIUM 40 MG/1
80 TABLET, FILM COATED ORAL DAILY
Status: DISCONTINUED | OUTPATIENT
Start: 2025-07-03 | End: 2025-07-08 | Stop reason: HOSPADM

## 2025-07-02 RX ORDER — BISACODYL 10 MG
10 SUPPOSITORY, RECTAL RECTAL DAILY PRN
Status: DISCONTINUED | OUTPATIENT
Start: 2025-07-02 | End: 2025-07-08 | Stop reason: HOSPADM

## 2025-07-02 RX ORDER — HYDRALAZINE HYDROCHLORIDE 25 MG/1
100 TABLET, FILM COATED ORAL 3 TIMES DAILY
Status: DISCONTINUED | OUTPATIENT
Start: 2025-07-02 | End: 2025-07-08 | Stop reason: HOSPADM

## 2025-07-02 RX ORDER — PANTOPRAZOLE SODIUM 40 MG/10ML
40 INJECTION, POWDER, LYOPHILIZED, FOR SOLUTION INTRAVENOUS ONCE
Status: DISCONTINUED | OUTPATIENT
Start: 2025-07-02 | End: 2025-07-02

## 2025-07-02 RX ORDER — HYDRALAZINE HYDROCHLORIDE 20 MG/ML
10 INJECTION INTRAMUSCULAR; INTRAVENOUS ONCE
Status: COMPLETED | OUTPATIENT
Start: 2025-07-02 | End: 2025-07-02

## 2025-07-02 RX ORDER — POLYETHYLENE GLYCOL 3350 17 G/17G
17 POWDER, FOR SOLUTION ORAL DAILY PRN
Status: DISCONTINUED | OUTPATIENT
Start: 2025-07-02 | End: 2025-07-08 | Stop reason: HOSPADM

## 2025-07-02 RX ORDER — ASPIRIN 81 MG/1
81 TABLET, CHEWABLE ORAL DAILY
Status: DISCONTINUED | OUTPATIENT
Start: 2025-07-03 | End: 2025-07-08 | Stop reason: HOSPADM

## 2025-07-02 RX ORDER — ACETAMINOPHEN 325 MG/1
650 TABLET ORAL EVERY 4 HOURS PRN
Status: DISCONTINUED | OUTPATIENT
Start: 2025-07-02 | End: 2025-07-08 | Stop reason: HOSPADM

## 2025-07-02 RX ORDER — CLOPIDOGREL BISULFATE 75 MG/1
75 TABLET ORAL DAILY
Status: DISCONTINUED | OUTPATIENT
Start: 2025-07-03 | End: 2025-07-08 | Stop reason: HOSPADM

## 2025-07-02 RX ORDER — ONDANSETRON 2 MG/ML
4 INJECTION INTRAMUSCULAR; INTRAVENOUS ONCE
Status: COMPLETED | OUTPATIENT
Start: 2025-07-02 | End: 2025-07-02

## 2025-07-02 RX ORDER — NICOTINE POLACRILEX 4 MG
15 LOZENGE BUCCAL
Status: DISCONTINUED | OUTPATIENT
Start: 2025-07-02 | End: 2025-07-08 | Stop reason: HOSPADM

## 2025-07-02 RX ORDER — SODIUM CHLORIDE 0.9 % (FLUSH) 0.9 %
10 SYRINGE (ML) INJECTION AS NEEDED
Status: DISCONTINUED | OUTPATIENT
Start: 2025-07-02 | End: 2025-07-08 | Stop reason: HOSPADM

## 2025-07-02 RX ORDER — METOPROLOL SUCCINATE 25 MG/1
25 TABLET, EXTENDED RELEASE ORAL DAILY
Status: DISCONTINUED | OUTPATIENT
Start: 2025-07-03 | End: 2025-07-05

## 2025-07-02 RX ORDER — IBUPROFEN 600 MG/1
1 TABLET ORAL
Status: DISCONTINUED | OUTPATIENT
Start: 2025-07-02 | End: 2025-07-08 | Stop reason: HOSPADM

## 2025-07-02 RX ORDER — DOCUSATE SODIUM 100 MG/1
100 CAPSULE, LIQUID FILLED ORAL EVERY 12 HOURS SCHEDULED
Status: DISCONTINUED | OUTPATIENT
Start: 2025-07-02 | End: 2025-07-08 | Stop reason: HOSPADM

## 2025-07-02 RX ORDER — LOSARTAN POTASSIUM 50 MG/1
100 TABLET ORAL
Status: DISCONTINUED | OUTPATIENT
Start: 2025-07-03 | End: 2025-07-05

## 2025-07-02 RX ORDER — ACETAMINOPHEN 160 MG/5ML
650 SOLUTION ORAL EVERY 4 HOURS PRN
Status: DISCONTINUED | OUTPATIENT
Start: 2025-07-02 | End: 2025-07-08 | Stop reason: HOSPADM

## 2025-07-02 RX ORDER — AMOXICILLIN 250 MG
2 CAPSULE ORAL 2 TIMES DAILY PRN
Status: DISCONTINUED | OUTPATIENT
Start: 2025-07-02 | End: 2025-07-08 | Stop reason: HOSPADM

## 2025-07-02 RX ORDER — HYDRALAZINE HYDROCHLORIDE 20 MG/ML
20 INJECTION INTRAMUSCULAR; INTRAVENOUS EVERY 6 HOURS PRN
Status: DISCONTINUED | OUTPATIENT
Start: 2025-07-02 | End: 2025-07-08 | Stop reason: HOSPADM

## 2025-07-02 RX ORDER — BISACODYL 5 MG/1
5 TABLET, DELAYED RELEASE ORAL DAILY PRN
Status: DISCONTINUED | OUTPATIENT
Start: 2025-07-02 | End: 2025-07-08 | Stop reason: HOSPADM

## 2025-07-02 RX ADMIN — HYDRALAZINE HYDROCHLORIDE 10 MG: 20 INJECTION INTRAMUSCULAR; INTRAVENOUS at 17:45

## 2025-07-02 RX ADMIN — HYDRALAZINE HYDROCHLORIDE 10 MG: 20 INJECTION INTRAMUSCULAR; INTRAVENOUS at 15:45

## 2025-07-02 RX ADMIN — ONDANSETRON 4 MG: 2 INJECTION, SOLUTION INTRAMUSCULAR; INTRAVENOUS at 14:50

## 2025-07-02 RX ADMIN — SODIUM CHLORIDE 250 ML: 9 INJECTION, SOLUTION INTRAVENOUS at 17:43

## 2025-07-02 RX ADMIN — DOCUSATE SODIUM 100 MG: 100 CAPSULE, LIQUID FILLED ORAL at 21:18

## 2025-07-02 RX ADMIN — HYDRALAZINE HYDROCHLORIDE 20 MG: 20 INJECTION INTRAMUSCULAR; INTRAVENOUS at 19:34

## 2025-07-02 RX ADMIN — CLONIDINE HYDROCHLORIDE 0.3 MG: 0.1 TABLET ORAL at 21:18

## 2025-07-02 NOTE — ED PROVIDER NOTES
Subjective   History of Present Illness  Chief complaint: Patient is a 84-year-old who presents with nausea dry heaving and generalized weakness.  She has also had increasing fatigue.  Over last couple of weeks she has had a number of the symptoms.  She has been compliant with her ESRD hemodialysis regimen.  She had a full dialysis on Monday.  She is supposed to dialyze later today.  However today she has been dry heaving.  She really has not been able to keep anything down since her discharge.  She was admitted and had an upper endoscopy performed that was positive for H. pylori.  She persistently nauseated since before her prior admission.  But his symptoms are becoming worse they presented here for further evaluation.  She does not speak English.  Son translates.  They are not even sure if she has been able to keep down her hypertension medications.    Context:    Duration:    Timing:    Severity:    Associated Symptoms:        PCP:  LMP:      Review of Systems   Unable to perform ROS: Acuity of condition       Past Medical History:   Diagnosis Date    Diabetes mellitus     Hyperlipidemia     Hypertension        No Known Allergies    Past Surgical History:   Procedure Laterality Date    ANGIOPLASTY ILIAC ARTERY Left 05/21/2025    Procedure: LEFT RENAL ARTERY ANGIOPLASTY WITH STENT;  Surgeon: Ted Mckee II, MD;  Location: [facility] HYBRID OR;  Service: Vascular;  Laterality: Left;    CARDIAC ELECTROPHYSIOLOGY PROCEDURE N/A 04/20/2021    Procedure: Pacemaker DC new;  Surgeon: Yovany Navarrete MD;  Location: [facility] CATH INVASIVE LOCATION;  Service: Cardiovascular;  Laterality: N/A;    COLONOSCOPY N/A 6/22/2025    Procedure: COLONOSCOPY;  Surgeon: Ramses Ashley MD;  Location: [facility] ENDOSCOPY;  Service: Gastroenterology;  Laterality: N/A;  post: poor prep    ENDOSCOPY N/A 6/19/2025    Procedure: ESOPHAGOGASTRODUODENOSCOPY WITH BIOPSY;  Surgeon: RAMON De Leon MD;  Location: [facility] ENDOSCOPY;  Service:  Gastroenterology;  Laterality: N/A;  GASTRITIS, HIATAL HERNIA    PACEMAKER IMPLANTATION         Family History   Family history unknown: Yes       Social History     Socioeconomic History    Marital status:    Tobacco Use    Smoking status: Never     Passive exposure: Never    Smokeless tobacco: Never   Vaping Use    Vaping status: Never Used   Substance and Sexual Activity    Alcohol use: No    Drug use: No    Sexual activity: Defer           Objective   Physical Exam  Vitals and nursing note reviewed.   HENT:      Head: Normocephalic.   Cardiovascular:      Rate and Rhythm: Normal rate.   Pulmonary:      Effort: Pulmonary effort is normal.   Abdominal:      Palpations: Abdomen is soft.      Tenderness: There is no abdominal tenderness.   Musculoskeletal:         General: No tenderness.   Skin:     General: Skin is warm and dry.   Neurological:      Mental Status: She is alert. Mental status is at baseline.   Psychiatric:         Thought Content: Thought content normal.         Procedures           ED Course                                             Results for orders placed or performed during the hospital encounter of 07/02/25   ECG 12 Lead Other; weakness nausea    Collection Time: 07/02/25  1:49 PM   Result Value Ref Range    QT Interval 486 ms    QTC Interval 504 ms   Gold Top - SST    Collection Time: 07/02/25  2:04 PM   Result Value Ref Range    Extra Tube Hold for add-ons.    CBC Auto Differential    Collection Time: 07/02/25  2:15 PM    Specimen: Blood   Result Value Ref Range    WBC 2.44 (L) 3.40 - 10.80 10*3/mm3    RBC 3.62 (L) 3.77 - 5.28 10*6/mm3    Hemoglobin 9.9 (L) 12.0 - 15.9 g/dL    Hematocrit 31.3 (L) 34.0 - 46.6 %    MCV 86.5 79.0 - 97.0 fL    MCH 27.3 26.6 - 33.0 pg    MCHC 31.6 31.5 - 35.7 g/dL    RDW 17.9 (H) 12.3 - 15.4 %    RDW-SD 56.2 (H) 37.0 - 54.0 fl    MPV 9.8 6.0 - 12.0 fL    Platelets 247 140 - 450 10*3/mm3    Neutrophil % 47.6 42.7 - 76.0 %    Lymphocyte % 36.1 19.6 - 45.3  %    Monocyte % 13.9 (H) 5.0 - 12.0 %    Eosinophil % 1.2 0.3 - 6.2 %    Basophil % 0.8 0.0 - 1.5 %    Immature Grans % 0.4 0.0 - 0.5 %    Neutrophils, Absolute 1.16 (L) 1.70 - 7.00 10*3/mm3    Lymphocytes, Absolute 0.88 0.70 - 3.10 10*3/mm3    Monocytes, Absolute 0.34 0.10 - 0.90 10*3/mm3    Eosinophils, Absolute 0.03 0.00 - 0.40 10*3/mm3    Basophils, Absolute 0.02 0.00 - 0.20 10*3/mm3    Immature Grans, Absolute 0.01 0.00 - 0.05 10*3/mm3    nRBC 0.0 0.0 - 0.2 /100 WBC   Scan Slide    Collection Time: 07/02/25  2:15 PM    Specimen: Blood   Result Value Ref Range    [place] Cells Slight/1+ None Seen    WBC Morphology Normal Normal    Platelet Estimate Adequate Normal   Green Top (Gel)    Collection Time: 07/02/25  2:15 PM   Result Value Ref Range    Extra Tube Hold for add-ons.    Lavender Top    Collection Time: 07/02/25  2:30 PM   Result Value Ref Range    Extra Tube hold for add-on    Urinalysis With Culture If Indicated - Straight Cath    Collection Time: 07/02/25  3:01 PM    Specimen: Straight Cath; Urine   Result Value Ref Range    Color, UA Yellow Yellow, Straw    Appearance, UA Clear Clear    pH, UA 8.5 (H) 5.0 - 8.0    Specific Gravity, UA 1.012 1.005 - 1.030    Glucose,  mg/dL (Trace) (A) Negative    Ketones, UA Negative Negative    Bilirubin, UA Negative Negative    Blood, UA Negative Negative    Protein, UA >=300 mg/dL (3+) (A) Negative    Leuk Esterase, UA Negative Negative    Nitrite, UA Negative Negative    Urobilinogen, UA 0.2 E.U./dL 0.2 - 1.0 E.U./dL   Urinalysis, Microscopic Only - Straight Cath    Collection Time: 07/02/25  3:01 PM    Specimen: Straight Cath; Urine   Result Value Ref Range    RBC, UA 3-5 (A) None Seen, 0-2 /HPF    WBC, UA None Seen None Seen, 0-2 /HPF    Bacteria, UA None Seen None Seen /HPF    Squamous Epithelial Cells, UA 0-2 None Seen, 0-2 /HPF    Hyaline Casts, UA None Seen None Seen /LPF    Methodology Automated Microscopy    Protime-INR    Collection Time: 07/02/25   3:10 PM    Specimen: Blood   Result Value Ref Range    Protime 12.9 11.7 - 14.2 Seconds    INR 0.98 0.90 - 1.10   aPTT    Collection Time: 07/02/25  3:10 PM    Specimen: Blood   Result Value Ref Range    PTT 29.3 22.7 - 35.4 seconds   Comprehensive Metabolic Panel    Collection Time: 07/02/25  3:32 PM    Specimen: Blood   Result Value Ref Range    Glucose 127 (H) 65 - 99 mg/dL    BUN 64.7 (H) 8.0 - 23.0 mg/dL    Creatinine 4.47 (H) 0.57 - 1.00 mg/dL    Sodium 127 (L) 136 - 145 mmol/L    Potassium 5.4 (H) 3.5 - 5.2 mmol/L    Chloride 93 (L) 98 - 107 mmol/L    CO2 21.7 (L) 22.0 - 29.0 mmol/L    Calcium 8.8 8.6 - 10.5 mg/dL    Total Protein 6.0 6.0 - 8.5 g/dL    Albumin 3.3 (L) 3.5 - 5.2 g/dL    ALT (SGPT) 23 1 - 33 U/L    AST (SGOT) 47 (H) 1 - 32 U/L    Alkaline Phosphatase 122 (H) 39 - 117 U/L    Total Bilirubin 0.4 0.0 - 1.2 mg/dL    Globulin 2.7 gm/dL    A/G Ratio 1.2 g/dL    BUN/Creatinine Ratio 14.5 7.0 - 25.0    Anion Gap 12.3 5.0 - 15.0 mmol/L    eGFR 9.2 (L) >60.0 mL/min/1.73   High Sensitivity Troponin T    Collection Time: 07/02/25  3:32 PM    Specimen: Blood   Result Value Ref Range    HS Troponin T 130 (C) <14 ng/L   Magnesium    Collection Time: 07/02/25  3:32 PM    Specimen: Blood   Result Value Ref Range    Magnesium 1.9 1.6 - 2.4 mg/dL   Lipase    Collection Time: 07/02/25  3:32 PM    Specimen: Blood   Result Value Ref Range    Lipase 97 (H) 13 - 60 U/L   High Sensitivity Troponin T 1Hr    Collection Time: 07/02/25  5:17 PM    Specimen: Blood   Result Value Ref Range    HS Troponin T 124 (C) <14 ng/L    Troponin T Numeric Delta -6 ng/L    Troponin T % Delta -5 Abnormal if >/= 20%           No radiology results for the last day      Medical Decision Making  Patient was seen evaluated with above problem    Differential diagnosis includes but is not limited to bowel obstruction, gastroenteritis, medication reaction,    Patient did have scope done that was positive for H. pylori.  She has been taking her  meds but they are not sure how much she has been vomiting up everything at this point.  She has been having dry heaves as well.  She states she is not having chest pain.  Her EKG is paced with a prolonged QT interval.  She has not had dialysis today.  Her troponin is double what it normally is but again she is not having chest pain.  I did consider CT, however she was here prior and her abdomen is soft.  I do not have a definite cause for her vomiting but after medicating patient with no improvement in symptoms.  Will obtain dialysis inpatient.  Will hydrate patient.  She has had antiemetics and medications at this point time very weak.  Will trend troponin.  Spoke withMisbah on-call for the hospitalist who agrees to admit.    Problems Addressed:  Hypertension, unspecified type: complicated acute illness or injury  Nausea and vomiting, unspecified vomiting type: complicated acute illness or injury  Weakness: complicated acute illness or injury    Amount and/or Complexity of Data Reviewed  Labs: ordered. Decision-making details documented in ED Course.     Details: Labs reviewed by myself  Radiology: ordered.  ECG/medicine tests: ordered and independent interpretation performed.     Details: EKG interpreted by itself as above    Risk  Prescription drug management.  Decision regarding hospitalization.        Final diagnoses:   None   Nausea and vomiting  Weakness  Elevated troponin    ED Disposition  ED Disposition       None            No follow-up provider specified.       Medication List      No changes were made to your prescriptions during this visit.            Derek Jeffries,   07/02/25 1801

## 2025-07-02 NOTE — ED NOTES
Nursing report ED to floor  Brad Woodward  84 y.o.  female    HPI:   Chief Complaint   Patient presents with    Vomiting       Admitting doctor:   Tico Coles MD    Admitting diagnosis:   The primary encounter diagnosis was Nausea and vomiting, unspecified vomiting type. Diagnoses of Weakness and Uncontrolled hypertension were also pertinent to this visit.    Code status:   Current Code Status       Date Active Code Status Order ID Comments User Context       7/2/2025 1755 CPR (Attempt to Resuscitate) [id number]  Marina Torres PA-C ED        Question Answer    Code Status (Patient has no pulse and is not breathing) CPR (Attempt to Resuscitate)    Medical Interventions (Patient has pulse or is breathing) Full Support                    Allergies:   Patient has no known allergies.    Isolation:  No active isolations     Fall Risk:  Fall Risk Assessment was completed, and patient is at high risk for falls.   Predictive Model Details         14 (Low) Factor Value    Calculated 7/2/2025 18:24 Age 84    Risk of Fall Model Active Peripheral IV Present     Imaging order in this encounter Present     Magnesium 1.9 mg/dL     Number of Distinct Medication Classes administered 3     Albumin 3.3 g/dL     Chloride 93 mmol/L     [place] Scale not on file     Creatinine 4.47 mg/dL     Calcium 8.8 mg/dL     Diastolic BP 77     Number of administrations of Anti-Hypertensives 2     Respiratory Rate 16     ALT 23 U/L     Potassium 5.4 mmol/L     Duration of Current Encounter 0.216 days     Total Bilirubin 0.4 mg/dL         Weight:       07/02/25  1312   Weight: 62 kg (136 lb 9.6 oz)       Intake and Output  No intake or output data in the 24 hours ending 07/02/25 1830    Diet:   Dietary Orders (From admission, onward)       Start     Ordered    07/02/25 1752  Diet: Renal, Cardiac; Healthy Heart (2-3 Na+); Low Sodium (2-3g), Low Potassium, Low Phosphorus; Fluid Consistency: Thin (IDDSI 0)  Diet Effective Now        References:    Diet  Order Definitions   Question Answer Comment   Diets: Renal    Diets: Cardiac    Cardiac Diet: Healthy Heart (2-3 Na+)    Renal Diet: Low Sodium (2-3g)    Renal Diet: Low Potassium    Renal Diet: Low Phosphorus    Fluid Consistency: Thin (IDDSI 0)        07/02/25 1755                     Most recent vitals:   Vitals:    07/02/25 1803 07/02/25 1812 07/02/25 1817 07/02/25 1822   BP: (!) 206/77 (!) 211/75 (!) 214/85 (!) 212/76   Pulse: 60 60 60 60   Resp:       Temp:       TempSrc:       SpO2: 98% 99% 99% 99%   Weight:       Height:           Active LDAs/IV Access:   Lines, Drains & Airways       Active LDAs       Name Placement date Placement time Site Days    Peripheral IV 07/02/25 1449 22 G Anterior;Left Forearm 07/02/25  1449  Forearm  less than 1    Hemodialysis Cath Double 04/18/25  0852  Chest  75                    Skin Condition:   Skin Assessments (last day)       None             Labs (abnormal labs have a star):   Labs Reviewed   COMPREHENSIVE METABOLIC PANEL - Abnormal; Notable for the following components:       Result Value    Glucose 127 (*)     BUN 64.7 (*)     Creatinine 4.47 (*)     Sodium 127 (*)     Potassium 5.4 (*)     Chloride 93 (*)     CO2 21.7 (*)     Albumin 3.3 (*)     AST (SGOT) 47 (*)     Alkaline Phosphatase 122 (*)     eGFR 9.2 (*)     All other components within normal limits    Narrative:     GFR Categories in Chronic Kidney Disease (CKD)              GFR Category          GFR (mL/min/1.73)    Interpretation  G1                    90 or greater        Normal or high (1)  G2                    60-89                Mild decrease (1)  G3a                   45-59                Mild to moderate decrease  G3b                   30-44                Moderate to severe decrease  G4                    15-29                Severe decrease  G5                    14 or less           Kidney failure    (1)In the absence of evidence of kidney disease, neither GFR category G1 or G2 fulfill the  criteria for CKD.    eGFR calculation 2021 CKD-EPI creatinine equation, which does not include race as a factor   TROPONIN - Abnormal; Notable for the following components:    HS Troponin T 130 (*)     All other components within normal limits    Narrative:     High Sensitive Troponin T Reference Range:  <14.0 ng/L- Negative Female for AMI  <22.0 ng/L- Negative Male for AMI  >=14 - Abnormal Female indicating possible myocardial injury.  >=22 - Abnormal Male indicating possible myocardial injury.   Clinicians would have to utilize clinical acumen, EKG, Troponin, and serial changes to determine if it is an Acute Myocardial Infarction or myocardial injury due to an underlying chronic condition.        URINALYSIS W/ CULTURE IF INDICATED - Abnormal; Notable for the following components:    pH, UA 8.5 (*)     Glucose,  mg/dL (Trace) (*)     Protein, UA >=300 mg/dL (3+) (*)     All other components within normal limits    Narrative:     In absence of clinical symptoms, the presence of pyuria, bacteria, and/or nitrites on the urinalysis result does not correlate with infection.   CBC WITH AUTO DIFFERENTIAL - Abnormal; Notable for the following components:    WBC 2.44 (*)     RBC 3.62 (*)     Hemoglobin 9.9 (*)     Hematocrit 31.3 (*)     RDW 17.9 (*)     RDW-SD 56.2 (*)     Monocyte % 13.9 (*)     Neutrophils, Absolute 1.16 (*)     All other components within normal limits   URINALYSIS, MICROSCOPIC ONLY - Abnormal; Notable for the following components:    RBC, UA 3-5 (*)     All other components within normal limits   LIPASE - Abnormal; Notable for the following components:    Lipase 97 (*)     All other components within normal limits   HIGH SENSITIVITIY TROPONIN T 1HR - Abnormal; Notable for the following components:    HS Troponin T 124 (*)     All other components within normal limits    Narrative:     High Sensitive Troponin T Reference Range:  <14.0 ng/L- Negative Female for AMI  <22.0 ng/L- Negative Male for  AMI  >=14 - Abnormal Female indicating possible myocardial injury.  >=22 - Abnormal Male indicating possible myocardial injury.   Clinicians would have to utilize clinical acumen, EKG, Troponin, and serial changes to determine if it is an Acute Myocardial Infarction or myocardial injury due to an underlying chronic condition.        PROTIME-INR - Normal   APTT - Normal   MAGNESIUM - Normal   SCAN SLIDE   CBC AND DIFFERENTIAL    Narrative:     The following orders were created for panel order CBC & Differential.  Procedure                               Abnormality         Status                     ---------                               -----------         ------                     CBC Auto Differential[[id number]]        Abnormal            Final result               Scan Slide[[id number]]                                       Final result                 Please view results for these tests on the individual orders.   EXTRA TUBES    Narrative:     The following orders were created for panel order Extra Tubes.  Procedure                               Abnormality         Status                     ---------                               -----------         ------                     Gold Top - SST[[id number]]                                   Final result                 Please view results for these tests on the individual orders.   GOLD TOP - SST   EXTRA TUBES    Narrative:     The following orders were created for panel order Extra Tubes.  Procedure                               Abnormality         Status                     ---------                               -----------         ------                     Green Top (Gel)[[id number]]                                  Final result                 Please view results for these tests on the individual orders.   GREEN TOP   EXTRA TUBES    Narrative:     The following orders were created for panel order Extra Tubes.  Procedure                               Abnormality          Status                     ---------                               -----------         ------                     Lavender Top[[id number]]                                     Final result                 Please view results for these tests on the individual orders.   LAVENDER TOP       LOC: Person, Place, Time, and Situation    Telemetry:  Progressive Care    Cardiac Monitoring Ordered: yes    EKG:   ECG 12 Lead Other; weakness nausea   Preliminary Result   HEART RATE=64  bpm   RR Pjlerhil=416  ms   CT Btjisxvm=820  ms   P Horizontal Axis=-64  deg   P Front Axis=  deg   QRSD Seykesbx=841  ms   QT Boymcxab=691  ms   HPmW=191  ms   QRS Axis=45  deg   T Wave Axis=27  deg   - ABNORMAL ECG -   Atrial-paced complexes   Ventricular premature complex   Borderline prolonged CT interval   Right bundle branch block   Prolonged QT interval   Date and Time of Study:2025-07-02 13:49:25          Medications Given in the ED:   Medications   sodium chloride 0.9 % flush 10 mL (has no administration in time range)   Magnesium Standard Dose Replacement - Follow Nurse / BPA Driven Protocol (has no administration in time range)   Phosphorus Replacement - Follow Nurse / BPA Driven Protocol (has no administration in time range)   Calcium Replacement - Follow Nurse / BPA Driven Protocol (has no administration in time range)   sennosides-docusate (PERICOLACE) 8.6-50 MG per tablet 2 tablet (has no administration in time range)     And   polyethylene glycol (MIRALAX) packet 17 g (has no administration in time range)     And   bisacodyl (DULCOLAX) EC tablet 5 mg (has no administration in time range)     And   bisacodyl (DULCOLAX) suppository 10 mg (has no administration in time range)   melatonin tablet 5 mg (has no administration in time range)   acetaminophen (TYLENOL) tablet 650 mg (has no administration in time range)     Or   acetaminophen (TYLENOL) 160 MG/5ML oral solution 650 mg (has no administration in time range)     Or    acetaminophen (TYLENOL) suppository 650 mg (has no administration in time range)   ondansetron ODT (ZOFRAN-ODT) disintegrating tablet 4 mg (has no administration in time range)     Or   ondansetron (ZOFRAN) injection 4 mg (has no administration in time range)   dextrose (GLUTOSE) oral gel 15 g (has no administration in time range)   dextrose (D50W) (25 g/50 mL) IV injection 25 g (has no administration in time range)   glucagon (GLUCAGEN) injection 1 mg (has no administration in time range)   hydrALAZINE (APRESOLINE) injection 20 mg (has no administration in time range)   ondansetron (ZOFRAN) injection 4 mg (4 mg Intravenous Given 7/2/25 1450)   hydrALAZINE (APRESOLINE) injection 10 mg (10 mg Intravenous Given 7/2/25 1545)   sodium chloride 0.9 % bolus 250 mL (250 mL Intravenous New Bag 7/2/25 4003)   hydrALAZINE (APRESOLINE) injection 10 mg (10 mg Intravenous Given 7/2/25 4095)       Imaging results:  No radiology results for the last day    Social issues:   Social History     Socioeconomic History    Marital status:    Tobacco Use    Smoking status: Never     Passive exposure: Never    Smokeless tobacco: Never   Vaping Use    Vaping status: Never Used   Substance and Sexual Activity    Alcohol use: No    Drug use: No    Sexual activity: Defer       NIH Stroke Scale:  Interval: (not recorded)  1a. Level of Consciousness: (not recorded)  1b. LOC Questions: (not recorded)  1c. LOC Commands: (not recorded)  2. Best Gaze: (not recorded)  3. Visual: (not recorded)  4. Facial Palsy: (not recorded)  5a. Motor Arm, Left: (not recorded)  5b. Motor Arm, Right: (not recorded)  6a. Motor Leg, Left: (not recorded)  6b. Motor Leg, Right: (not recorded)  7. Limb Ataxia: (not recorded)  8. Sensory: (not recorded)  9. Best Language: (not recorded)  10. Dysarthria: (not recorded)  11. Extinction and Inattention (formerly Neglect): (not recorded)    Total (NIH Stroke Scale): (not recorded)     Additional notable assessment  information:     Nursing report ED to floor:  SAVITA Acevedo RN   07/02/25 18:30 EDT

## 2025-07-02 NOTE — H&P
[facility] Medicine Services  History & Physical    Patient Name: Brad Woodward  : 1940  MRN: [id number]  Primary Care Physician:  Brian Nazario APRN  Date of admission: 2025  Date and Time of Service: 2025 at 1755    Subjective      Chief Complaint: weakness    History of Present Illness: Brad Woodward is a 84 y.o. female with a CMH of  SSS s/p PPM, ESRD on HD, DM2, HTN, HLD, h/o renal artery stenosis s/p L renal stent who presented to [facility] on 2025 with generalized weakness.  Daughter is at bedside who assists with history.  Daughter states the patient was doing well after recent admission.  However patient has since been generally weak, unable to walk, lethargy and decreased appetite.  Denies recent falls.  Denies reports of pain including chest pain, abdominal pain, dysuria.  Denies fever and chills.  Daughter also reports constipation and nausea with no bowel movement in 10 days but states patient has passed flatus.  Patient was due for dialysis today patient was brought to hospital instead per family.    On ED evaluation, patient was noted to be hypertensive with SBP > 200s. CMP was pertinent for Na 127, K 5.4, Cr 4.47, GFR 9.2, BUN 64.7.  CBC with leukopenia with WBC 2.4, hemoglobin 9.9. Initial troponin 130, repeat 120.  EKG was sinus rhythm with APCs and PVCs, rate of 64, no evidence of acute ischemia. UA was not concerning for infection. Hospitalist service to admit for further management.    Review of Systems   Constitutional:  Positive for appetite change. Negative for chills, fatigue and fever.   Respiratory:  Negative for shortness of breath.    Cardiovascular:  Negative for chest pain.   Gastrointestinal:  Positive for constipation and nausea. Negative for abdominal pain and vomiting.   Genitourinary:  Negative for dysuria.   Neurological:  Positive for weakness.   All other systems reviewed and are negative.      Personal History     Past Medical History:    Diagnosis Date   • Diabetes mellitus    • Hyperlipidemia    • Hypertension        Past Surgical History:   Procedure Laterality Date   • ANGIOPLASTY ILIAC ARTERY Left 05/21/2025    Procedure: LEFT RENAL ARTERY ANGIOPLASTY WITH STENT;  Surgeon: Ted Mckee II, MD;  Location: [facility] HYBRID OR;  Service: Vascular;  Laterality: Left;   • CARDIAC ELECTROPHYSIOLOGY PROCEDURE N/A 04/20/2021    Procedure: Pacemaker DC new;  Surgeon: Yovany Navarrete MD;  Location: [facility] CATH INVASIVE LOCATION;  Service: Cardiovascular;  Laterality: N/A;   • COLONOSCOPY N/A 6/22/2025    Procedure: COLONOSCOPY;  Surgeon: Ramses Ashley MD;  Location: [facility] ENDOSCOPY;  Service: Gastroenterology;  Laterality: N/A;  post: poor prep   • ENDOSCOPY N/A 6/19/2025    Procedure: ESOPHAGOGASTRODUODENOSCOPY WITH BIOPSY;  Surgeon: RAMON De Leon MD;  Location: [facility] ENDOSCOPY;  Service: Gastroenterology;  Laterality: N/A;  GASTRITIS, HIATAL HERNIA   • PACEMAKER IMPLANTATION         Family History: Family history is unknown by patient. Otherwise pertinent FHx was reviewed and not pertinent to current issue.    Social History:  reports that she has never smoked. She has never been exposed to tobacco smoke. She has never used smokeless tobacco. She reports that she does not drink alcohol and does not use drugs.    Home Medications:  Prior to Admission Medications       Prescriptions Last Dose Informant Patient Reported? Taking?    aspirin 81 MG chewable tablet   No Yes    Chew 1 tablet Daily.    atorvastatin (LIPITOR) 80 MG tablet   No Yes    Take 1 tablet by mouth Daily.    Bismuth 262 MG chewable tablet   No Yes    Chew 2 tablets 2 (Two) Times a Day for 14 days.    Bismuth/Metronidaz/Tetracyclin 140-125-125 MG capsule   No Yes    Take 3 Capfuls by mouth 4 (Four) Times a Day for 10 days.    cloNIDine (CATAPRES) 0.3 MG tablet   Yes Yes    Take 1 tablet by mouth 2 (Two) Times a Day.    clopidogrel (PLAVIX) 75 MG tablet   No Yes    Take 1  tablet by mouth Daily for 30 days.    Cyanocobalamin (VITAMIN B-12 PO)   Yes Yes    Take 1 tablet by mouth Daily.    docusate sodium (COLACE) 100 MG capsule   Yes Yes    Take 1 capsule by mouth Every 12 (Twelve) Hours.    hydrALAZINE (APRESOLINE) 100 MG tablet   No Yes    Take 1 tablet by mouth 3 times a day for 30 days.    losartan (COZAAR) 100 MG tablet   No Yes    Take 1 tablet by mouth Daily for 30 days.    metoprolol succinate XL (TOPROL-XL) 25 MG 24 hr tablet   No Yes    Take 1 tablet by mouth Daily for 30 days.    metroNIDAZOLE (FLAGYL) 250 MG tablet   No Yes    Take 1.5 tablets by mouth 4 (Four) Times a Day.    omeprazole (priLOSEC) 20 MG capsule   No Yes    Take 1 capsule by mouth 2 (Two) Times a Day Before Meals for 14 days.    pantoprazole (Protonix) 40 MG EC tablet   No Yes    Take 1 tablet by mouth Daily for 30 days.    potassium chloride (KLOR-CON M20) 20 MEQ CR tablet   Yes Yes    Take 1 tablet by mouth 2 (Two) Times a Day.    tetracycline (ACHROMYCIN,SUMYCIN) 500 MG capsule   No Yes    Take 1 capsule by mouth 4 (Four) Times a Day as directed for 14 days.              Allergies:  No Known Allergies    Objective      Vitals:   Temp:  [97.5 °F (36.4 °C)] 97.5 °F (36.4 °C)  Heart Rate:  [60-70] 60  Resp:  [16] 16  BP: (131-219)/(35-77) 212/74  Body mass index is 24.2 kg/m².    Physical Exam  General: 85 yo F, lethargic but arousable, well nourished, no acute distress.  HENT: Normocephalic, normal hearing, moist oral mucosa, no scleral icterus.  Neck: Supple, nontender, no carotid bruits, no JVD, no LAD.  Lungs: Clear to auscultation, nonlabored respiration.  Heart: RRR, no murmur, gallop or edema.  Abdomen: Soft, nontender, nondistended, + bowel sounds.  Musculoskeletal: Normal range of motion and strength, no tenderness or swelling.  Skin: Skin is warm, dry and pink, no rashes or lesions.  Psychiatric: Cooperative, appropriate mood and affect.      Diagnostic Data:  Lab Results (last 24 hours)        Procedure Component Value Units Date/Time    High Sensitivity Troponin T 1Hr [[id number]]  (Abnormal) Collected: 07/02/25 1717    Specimen: Blood Updated: 07/02/25 1747     HS Troponin T 124 ng/L      Troponin T Numeric Delta -6 ng/L      Troponin T % Delta -5    Narrative:      High Sensitive Troponin T Reference Range:  <14.0 ng/L- Negative Female for AMI  <22.0 ng/L- Negative Male for AMI  >=14 - Abnormal Female indicating possible myocardial injury.  >=22 - Abnormal Male indicating possible myocardial injury.   Clinicians would have to utilize clinical acumen, EKG, Troponin, and serial changes to determine if it is an Acute Myocardial Infarction or myocardial injury due to an underlying chronic condition.         High Sensitivity Troponin T [[id number]]  (Abnormal) Collected: 07/02/25 1532    Specimen: Blood Updated: 07/02/25 1605     HS Troponin T 130 ng/L     Narrative:      High Sensitive Troponin T Reference Range:  <14.0 ng/L- Negative Female for AMI  <22.0 ng/L- Negative Male for AMI  >=14 - Abnormal Female indicating possible myocardial injury.  >=22 - Abnormal Male indicating possible myocardial injury.   Clinicians would have to utilize clinical acumen, EKG, Troponin, and serial changes to determine if it is an Acute Myocardial Infarction or myocardial injury due to an underlying chronic condition.         Comprehensive Metabolic Panel [[id number]]  (Abnormal) Collected: 07/02/25 1532    Specimen: Blood Updated: 07/02/25 1605     Glucose 127 mg/dL      BUN 64.7 mg/dL      Creatinine 4.47 mg/dL      Sodium 127 mmol/L      Potassium 5.4 mmol/L      Chloride 93 mmol/L      CO2 21.7 mmol/L      Calcium 8.8 mg/dL      Total Protein 6.0 g/dL      Albumin 3.3 g/dL      ALT (SGPT) 23 U/L      AST (SGOT) 47 U/L      Alkaline Phosphatase 122 U/L      Total Bilirubin 0.4 mg/dL      Globulin 2.7 gm/dL      A/G Ratio 1.2 g/dL      BUN/Creatinine Ratio 14.5     Anion Gap 12.3 mmol/L      eGFR 9.2 mL/min/1.73      Narrative:      GFR Categories in Chronic Kidney Disease (CKD)              GFR Category          GFR (mL/min/1.73)    Interpretation  G1                    90 or greater        Normal or high (1)  G2                    60-89                Mild decrease (1)  G3a                   45-59                Mild to moderate decrease  G3b                   30-44                Moderate to severe decrease  G4                    15-29                Severe decrease  G5                    14 or less           Kidney failure    (1)In the absence of evidence of kidney disease, neither GFR category G1 or G2 fulfill the criteria for CKD.    eGFR calculation 2021 CKD-EPI creatinine equation, which does not include race as a factor    Magnesium [[id number]]  (Normal) Collected: 07/02/25 1532    Specimen: Blood Updated: 07/02/25 1605     Magnesium 1.9 mg/dL     Lipase [[id number]]  (Abnormal) Collected: 07/02/25 1532    Specimen: Blood Updated: 07/02/25 1605     Lipase 97 U/L     Protime-INR [[id number]]  (Normal) Collected: 07/02/25 1510    Specimen: Blood Updated: 07/02/25 1530     Protime 12.9 Seconds      INR 0.98    aPTT [[id number]]  (Normal) Collected: 07/02/25 1510    Specimen: Blood Updated: 07/02/25 1530     PTT 29.3 seconds     Urinalysis With Culture If Indicated - Straight Cath [[id number]]  (Abnormal) Collected: 07/02/25 1501    Specimen: Urine from Straight Cath Updated: 07/02/25 1509     Color, UA Yellow     Appearance, UA Clear     pH, UA 8.5     Specific Gravity, UA 1.012     Glucose,  mg/dL (Trace)     Ketones, UA Negative     Bilirubin, UA Negative     Blood, UA Negative     Protein, UA >=300 mg/dL (3+)     Leuk Esterase, UA Negative     Nitrite, UA Negative     Urobilinogen, UA 0.2 E.U./dL    Narrative:      In absence of clinical symptoms, the presence of pyuria, bacteria, and/or nitrites on the urinalysis result does not correlate with infection.    Urinalysis, Microscopic Only - Straight Cath [[id number]]   (Abnormal) Collected: 07/02/25 1501    Specimen: Urine from Straight Cath Updated: 07/02/25 1509     RBC, UA 3-5 /HPF      WBC, UA None Seen /HPF      Comment: Urine culture not indicated.        Bacteria, UA None Seen /HPF      Squamous Epithelial Cells, UA 0-2 /HPF      Hyaline Casts, UA None Seen /LPF      Methodology Automated Microscopy    CBC & Differential [[id number]]  (Abnormal) Collected: 07/02/25 1415    Specimen: Blood Updated: 07/02/25 1504    Narrative:      The following orders were created for panel order CBC & Differential.  Procedure                               Abnormality         Status                     ---------                               -----------         ------                     CBC Auto Differential[[id number]]        Abnormal            Final result               Scan Slide[[id number]]                                       Final result                 Please view results for these tests on the individual orders.    CBC Auto Differential [[id number]]  (Abnormal) Collected: 07/02/25 1415    Specimen: Blood Updated: 07/02/25 1504     WBC 2.44 10*3/mm3      RBC 3.62 10*6/mm3      Hemoglobin 9.9 g/dL      Hematocrit 31.3 %      MCV 86.5 fL      MCH 27.3 pg      MCHC 31.6 g/dL      RDW 17.9 %      RDW-SD 56.2 fl      MPV 9.8 fL      Platelets 247 10*3/mm3      Neutrophil % 47.6 %      Lymphocyte % 36.1 %      Monocyte % 13.9 %      Eosinophil % 1.2 %      Basophil % 0.8 %      Immature Grans % 0.4 %      Neutrophils, Absolute 1.16 10*3/mm3      Lymphocytes, Absolute 0.88 10*3/mm3      Monocytes, Absolute 0.34 10*3/mm3      Eosinophils, Absolute 0.03 10*3/mm3      Basophils, Absolute 0.02 10*3/mm3      Immature Grans, Absolute 0.01 10*3/mm3      nRBC 0.0 /100 WBC     Scan Slide [[id number]] Collected: 07/02/25 1415    Specimen: Blood Updated: 07/02/25 1504     Gardenia Cells Slight/1+     WBC Morphology Normal     Platelet Estimate Adequate    Extra Tubes [[id number]] Collected: 07/02/25 1430     Specimen: Blood, Venous Line Updated: 07/02/25 1445    Narrative:      The following orders were created for panel order Extra Tubes.  Procedure                               Abnormality         Status                     ---------                               -----------         ------                     Lavender Top[[id number]]                                     Final result                 Please view results for these tests on the individual orders.    Lavender Top [[id number]] Collected: 07/02/25 1430    Specimen: Blood Updated: 07/02/25 1445     Extra Tube hold for add-on     Comment: Auto resulted       Extra Tubes [[id number]] Collected: 07/02/25 1415    Specimen: Blood, Venous Line Updated: 07/02/25 1430    Narrative:      The following orders were created for panel order Extra Tubes.  Procedure                               Abnormality         Status                     ---------                               -----------         ------                     Green Top (Gel)[[id number]]                                  Final result                 Please view results for these tests on the individual orders.    Green Top (Gel) [[id number]] Collected: 07/02/25 1415    Specimen: Blood Updated: 07/02/25 1430     Extra Tube Hold for add-ons.     Comment: Auto resulted.       Extra Tubes [[id number]] Collected: 07/02/25 1404    Specimen: Blood, Venous Line Updated: 07/02/25 1415    Narrative:      The following orders were created for panel order Extra Tubes.  Procedure                               Abnormality         Status                     ---------                               -----------         ------                     Gold Top - SST[[id number]]                                   Final result                 Please view results for these tests on the individual orders.    Gold Top - SST [[id number]] Collected: 07/02/25 1404    Specimen: Blood Updated: 07/02/25 1415     Extra Tube Hold for add-ons.      Comment: Auto resulted.                Imaging Results (Last 24 Hours)       ** No results found for the last 24 hours. **              Assessment & Plan        This is a 84 y.o. female with:    Active and Resolved Problems  Active Hospital Problems    Diagnosis  POA   • **Generalized weakness [R53.1]  Yes      Resolved Hospital Problems   No resolved problems to display.     Generalized weakness  Elevated troponin  Hyponatremia  Hyperkalemia  ESRD on HD  H/o renal artery stenosis s/p L renal stent   Na 127, K 5.4, Cr 4.47, GFR 9.2, BUN 64.7  Troponin 130-> 124, likely 2/2 underlying ESRD  Echo pending   HD schedule: MWF  Last dialyzed  6/30, missed todays HD   Avoid nephrotoxic medications  Pharmacy to renally dose medication  Nephrology consulted to manage   PT/OT consulted     Constipation  Nausea  KUB pending  Bowel regimen    Leukopenia  WBC 2.4, previously 8-10   No concern for infection   Continue to monitor CBC      Anemia of chronic disease  Previously admitted with melena  Underwent EGD 6/19/25 /Colonoscopy 6/22/25 no acute findings but biopsies positive for H. Pylori  Hgb stable  Continue to monitor CBC  Transfuse if Hgb < 7  Continue treatment once reconciled      SSS s/p PPM  Hypertension   Hyperlipidemia   BP elevated on ED arrival with SBP > 200  Hydralazine as needed with parameters  Continue Clonidine, Hydralazine, Lisinopril, Metoprolol   Continue ASA, statin, Plavix     Diabetes Mellitus   A1c 5.40  Consistent carb diet  Hypoglycemia protocol          VTE Prophylaxis:  Mechanical VTE prophylaxis orders are present.        The patient desires to be as follows:    CODE STATUS:    Code Status (Patient has no pulse and is not breathing): CPR (Attempt to Resuscitate)  Medical Interventions (Patient has pulse or is breathing): Full Support        Leana Cr, who can be contacted at [phone], is the designated person to make medical decisions on the patient's behalf if She is incapable of doing so.  This was clarified with patient and/or next of kin on 7/2/2025 during the course of this H&P.    Admission Status:  I believe this patient meets inpatient status.    Expected Length of Stay: 2-3 days    PDMP and Medication Dispenses via Sidebar reviewed and consistent with patient reported medications.    I discussed the patient's findings and my recommendations with family.      Signature:     This document has been electronically signed by Marina Torres PA-C on July 2, 2025 17:55 EDT   [facility] Hospitalist Team

## 2025-07-02 NOTE — OUTREACH NOTE
Medical Week 2 Survey      Flowsheet Row Responses   [facility] [place] by [facility] facility patient discharged from? [facility]   Does the patient have one of the following disease processes/diagnoses(primary or secondary)? Other   Week 2 attempt successful? No   Unsuccessful attempts Attempt 1   Revoke Readmitted            Janee FOY - Registered Nurse

## 2025-07-03 ENCOUNTER — APPOINTMENT (OUTPATIENT)
Dept: CARDIOLOGY | Facility: HOSPITAL | Age: 85
End: 2025-07-03
Payer: MEDICAID

## 2025-07-03 ENCOUNTER — APPOINTMENT (OUTPATIENT)
Dept: NEPHROLOGY | Facility: HOSPITAL | Age: 85
End: 2025-07-03
Payer: MEDICARE

## 2025-07-03 LAB
ANION GAP SERPL CALCULATED.3IONS-SCNC: 11.8 MMOL/L (ref 5–15)
BASOPHILS # BLD AUTO: 0.04 10*3/MM3 (ref 0–0.2)
BASOPHILS NFR BLD AUTO: 1.1 % (ref 0–1.5)
BUN SERPL-MCNC: 74.3 MG/DL (ref 8–23)
BUN/CREAT SERPL: 15 (ref 7–25)
CALCIUM SPEC-SCNC: 8.5 MG/DL (ref 8.6–10.5)
CHLORIDE SERPL-SCNC: 94 MMOL/L (ref 98–107)
CO2 SERPL-SCNC: 19.2 MMOL/L (ref 22–29)
CREAT SERPL-MCNC: 4.96 MG/DL (ref 0.57–1)
DEPRECATED RDW RBC AUTO: 55.9 FL (ref 37–54)
EGFRCR SERPLBLD CKD-EPI 2021: 8.1 ML/MIN/1.73
EOSINOPHIL # BLD AUTO: 0.1 10*3/MM3 (ref 0–0.4)
EOSINOPHIL NFR BLD AUTO: 2.9 % (ref 0.3–6.2)
ERYTHROCYTE [DISTWIDTH] IN BLOOD BY AUTOMATED COUNT: 17.5 % (ref 12.3–15.4)
GLUCOSE SERPL-MCNC: 116 MG/DL (ref 65–99)
HCT VFR BLD AUTO: 31 % (ref 34–46.6)
HGB BLD-MCNC: 9.8 G/DL (ref 12–15.9)
IMM GRANULOCYTES # BLD AUTO: 0.01 10*3/MM3 (ref 0–0.05)
IMM GRANULOCYTES NFR BLD AUTO: 0.3 % (ref 0–0.5)
LYMPHOCYTES # BLD AUTO: 1.31 10*3/MM3 (ref 0.7–3.1)
LYMPHOCYTES NFR BLD AUTO: 37.5 % (ref 19.6–45.3)
MAGNESIUM SERPL-MCNC: 2 MG/DL (ref 1.6–2.4)
MCH RBC QN AUTO: 27.5 PG (ref 26.6–33)
MCHC RBC AUTO-ENTMCNC: 31.6 G/DL (ref 31.5–35.7)
MCV RBC AUTO: 87.1 FL (ref 79–97)
MONOCYTES # BLD AUTO: 0.47 10*3/MM3 (ref 0.1–0.9)
MONOCYTES NFR BLD AUTO: 13.5 % (ref 5–12)
NEUTROPHILS NFR BLD AUTO: 1.56 10*3/MM3 (ref 1.7–7)
NEUTROPHILS NFR BLD AUTO: 44.7 % (ref 42.7–76)
NRBC BLD AUTO-RTO: 0 /100 WBC (ref 0–0.2)
PHOSPHATE SERPL-MCNC: 7.1 MG/DL (ref 2.5–4.5)
PLATELET # BLD AUTO: 206 10*3/MM3 (ref 140–450)
PMV BLD AUTO: 10.6 FL (ref 6–12)
POTASSIUM SERPL-SCNC: 4.9 MMOL/L (ref 3.5–5.2)
QT INTERVAL: 486 MS
QTC INTERVAL: 504 MS
RBC # BLD AUTO: 3.56 10*6/MM3 (ref 3.77–5.28)
SODIUM SERPL-SCNC: 125 MMOL/L (ref 136–145)
WBC NRBC COR # BLD AUTO: 3.49 10*3/MM3 (ref 3.4–10.8)

## 2025-07-03 PROCEDURE — 97162 PT EVAL MOD COMPLEX 30 MIN: CPT

## 2025-07-03 PROCEDURE — 84100 ASSAY OF PHOSPHORUS: CPT

## 2025-07-03 PROCEDURE — 85025 COMPLETE CBC W/AUTO DIFF WBC: CPT

## 2025-07-03 PROCEDURE — 80048 BASIC METABOLIC PNL TOTAL CA: CPT

## 2025-07-03 PROCEDURE — 93306 TTE W/DOPPLER COMPLETE: CPT | Performed by: INTERNAL MEDICINE

## 2025-07-03 PROCEDURE — 93306 TTE W/DOPPLER COMPLETE: CPT

## 2025-07-03 PROCEDURE — 5A1D70Z PERFORMANCE OF URINARY FILTRATION, INTERMITTENT, LESS THAN 6 HOURS PER DAY: ICD-10-PCS | Performed by: INTERNAL MEDICINE

## 2025-07-03 PROCEDURE — 97165 OT EVAL LOW COMPLEX 30 MIN: CPT

## 2025-07-03 PROCEDURE — 25810000003 SODIUM CHLORIDE 0.9 % SOLUTION: Performed by: INTERNAL MEDICINE

## 2025-07-03 PROCEDURE — 25010000002 EPOETIN ALFA-EPBX 10000 UNIT/ML SOLUTION: Performed by: INTERNAL MEDICINE

## 2025-07-03 PROCEDURE — 83735 ASSAY OF MAGNESIUM: CPT

## 2025-07-03 PROCEDURE — 25010000002 HYDRALAZINE PER 20 MG

## 2025-07-03 PROCEDURE — 93356 MYOCRD STRAIN IMG SPCKL TRCK: CPT | Performed by: INTERNAL MEDICINE

## 2025-07-03 PROCEDURE — 25010000002 HEPARIN (PORCINE) PER 1000 UNITS: Performed by: INTERNAL MEDICINE

## 2025-07-03 PROCEDURE — 93356 MYOCRD STRAIN IMG SPCKL TRCK: CPT

## 2025-07-03 RX ORDER — ALBUMIN (HUMAN) 12.5 G/50ML
25 SOLUTION INTRAVENOUS ONCE
Status: DISCONTINUED | OUTPATIENT
Start: 2025-07-03 | End: 2025-07-03

## 2025-07-03 RX ORDER — ALBUMIN HUMAN 50 G/1000ML
25 SOLUTION INTRAVENOUS AS NEEDED
Status: DISCONTINUED | OUTPATIENT
Start: 2025-07-03 | End: 2025-07-08 | Stop reason: HOSPADM

## 2025-07-03 RX ORDER — TETRACYCLINE HYDROCHLORIDE 500 MG/1
500 CAPSULE ORAL EVERY 24 HOURS
Status: DISCONTINUED | OUTPATIENT
Start: 2025-07-03 | End: 2025-07-03

## 2025-07-03 RX ORDER — BISMUTH SUBSALICYLATE 262 MG
524 TABLET,CHEWABLE ORAL 2 TIMES DAILY
Status: DISCONTINUED | OUTPATIENT
Start: 2025-07-03 | End: 2025-07-07

## 2025-07-03 RX ORDER — TETRACYCLINE HYDROCHLORIDE 500 MG/1
500 CAPSULE ORAL DAILY
Status: DISCONTINUED | OUTPATIENT
Start: 2025-07-03 | End: 2025-07-08 | Stop reason: HOSPADM

## 2025-07-03 RX ORDER — BISMUTH SUBSALICYLATE 262 MG
2 TABLET,CHEWABLE ORAL 2 TIMES DAILY
Status: DISCONTINUED | OUTPATIENT
Start: 2025-07-03 | End: 2025-07-03

## 2025-07-03 RX ORDER — ALBUMIN (HUMAN) 12.5 G/50ML
25 SOLUTION INTRAVENOUS ONCE
Status: CANCELLED | OUTPATIENT
Start: 2025-07-03 | End: 2025-07-03

## 2025-07-03 RX ORDER — SODIUM CHLORIDE 9 MG/ML
75 INJECTION, SOLUTION INTRAVENOUS CONTINUOUS
Status: DISCONTINUED | OUTPATIENT
Start: 2025-07-03 | End: 2025-07-04

## 2025-07-03 RX ORDER — HEPARIN SODIUM 1000 [USP'U]/ML
5000 INJECTION, SOLUTION INTRAVENOUS; SUBCUTANEOUS AS NEEDED
Status: DISCONTINUED | OUTPATIENT
Start: 2025-07-03 | End: 2025-07-08 | Stop reason: HOSPADM

## 2025-07-03 RX ORDER — METRONIDAZOLE 500 MG/1
375 TABLET ORAL 4 TIMES DAILY
Status: DISCONTINUED | OUTPATIENT
Start: 2025-07-03 | End: 2025-07-08 | Stop reason: HOSPADM

## 2025-07-03 RX ADMIN — HYDRALAZINE HYDROCHLORIDE 100 MG: 25 TABLET ORAL at 14:03

## 2025-07-03 RX ADMIN — Medication 10 ML: at 20:30

## 2025-07-03 RX ADMIN — CLONIDINE HYDROCHLORIDE 0.3 MG: 0.1 TABLET ORAL at 11:08

## 2025-07-03 RX ADMIN — CLONIDINE HYDROCHLORIDE 0.3 MG: 0.1 TABLET ORAL at 20:28

## 2025-07-03 RX ADMIN — DOCUSATE SODIUM 100 MG: 100 CAPSULE, LIQUID FILLED ORAL at 11:08

## 2025-07-03 RX ADMIN — CLOPIDOGREL BISULFATE 75 MG: 75 TABLET, FILM COATED ORAL at 11:08

## 2025-07-03 RX ADMIN — ASPIRIN 81 MG CHEWABLE TABLET 81 MG: 81 TABLET CHEWABLE at 11:08

## 2025-07-03 RX ADMIN — METOPROLOL SUCCINATE 25 MG: 25 TABLET, EXTENDED RELEASE ORAL at 11:07

## 2025-07-03 RX ADMIN — HEPARIN SODIUM 5000 UNITS: 1000 INJECTION INTRAVENOUS; SUBCUTANEOUS at 09:49

## 2025-07-03 RX ADMIN — HYDRALAZINE HYDROCHLORIDE 100 MG: 25 TABLET ORAL at 20:28

## 2025-07-03 RX ADMIN — METRONIDAZOLE 375 MG: 500 TABLET ORAL at 17:26

## 2025-07-03 RX ADMIN — HYDRALAZINE HYDROCHLORIDE 20 MG: 20 INJECTION INTRAMUSCULAR; INTRAVENOUS at 06:02

## 2025-07-03 RX ADMIN — EPOETIN ALFA-EPBX 10000 UNITS: 10000 INJECTION, SOLUTION INTRAVENOUS; SUBCUTANEOUS at 07:31

## 2025-07-03 RX ADMIN — METRONIDAZOLE 375 MG: 500 TABLET ORAL at 20:28

## 2025-07-03 RX ADMIN — SODIUM CHLORIDE 75 ML/HR: 9 INJECTION, SOLUTION INTRAVENOUS at 11:08

## 2025-07-03 RX ADMIN — DOCUSATE SODIUM 100 MG: 100 CAPSULE, LIQUID FILLED ORAL at 20:28

## 2025-07-03 RX ADMIN — LOSARTAN POTASSIUM 100 MG: 50 TABLET, FILM COATED ORAL at 11:07

## 2025-07-03 RX ADMIN — BISMUTH SUBSALICYLATE 524 MG: 262 TABLET, CHEWABLE ORAL at 20:27

## 2025-07-03 RX ADMIN — TETRACYCLINE HYDROCHLORIDE 500 MG: 500 CAPSULE ORAL at 16:41

## 2025-07-03 RX ADMIN — PANTOPRAZOLE SODIUM 40 MG: 40 TABLET, DELAYED RELEASE ORAL at 11:08

## 2025-07-03 RX ADMIN — ATORVASTATIN CALCIUM 80 MG: 40 TABLET, FILM COATED ORAL at 11:08

## 2025-07-03 NOTE — PLAN OF CARE
Goal Outcome Evaluation:  Plan of Care Reviewed With: patient, family           Outcome Evaluation: Pt is a 83 y/o female admitted to [facility] on 7/2/25 with c/o generalized weakness. Pt recently admitted 6/18/2025 with melena. Underwent EGD 6/19/25 /Colonoscopy 6/22/25 no acute findings but biopsies positive for H. Pylori. Daughter reports pt with constipation and nausea with no BM ~10 days. In ED, pt HTN SBP >200. Initial troponin 130, repeat 120. HD on MWF. PMHx significant for SSS s/p PPM, ESRD on HD, DM2, HTN, HLD, h/o renal artery stenosis s/p L renal stent. At baseline, pt resides with family in a [facility] with x3 TOSHA. Pt requires assist for ADLs and ambulates with HHA. Family present today assisting with communication. Upon assessment, pt A&O x4 with no reports of pain, though reports fatigue. She completed bed mobility, all functional transfers and ambulation with CGA and HHA. No LOB noted and VSS. Pt on RA satting from % SpO2. She demos 5/5 strength in BUE and AROM WNLs. Pt with great family support and able to provide 24/7 supervision. No current skilled OT needs identified at this time. OT will sign-off and complete orders. Please re-consult if pt has a change in status.    Anticipated Discharge Disposition (OT): home with assist

## 2025-07-03 NOTE — CASE MANAGEMENT/SOCIAL WORK
Discharge Planning Assessment   [facility]     Patient Name: Brad Woodward  MRN: [id number]  Today's Date: 7/3/2025    Admit Date: 7/2/2025    Plan: Anticipate routine home with family. Current OP HD [facility] MWF.   Discharge Needs Assessment       Row Name 07/03/25 1105       Living Environment    People in Home child(geoffrey), adult    Current Living Arrangements home    Potentially Unsafe Housing Conditions none    In the past 12 months has the electric, gas, oil, or water company threatened to shut off services in your home? No    Provides Primary Care For no one, unable/limited ability to care for self    Family Caregiver if Needed child(geoffrey), adult;grandchild(geoffrey), adult    Quality of Family Relationships helpful;involved    Able to Return to Prior Arrangements yes       Resource/Environmental Concerns    Resource/Environmental Concerns none    Transportation Concerns none       Transportation Needs    In the past 12 months, has lack of transportation kept you from medical appointments or from getting medications? no    In the past 12 months, has lack of transportation kept you from meetings, work, or from getting things needed for daily living? No       Food Insecurity    Within the past 12 months, you worried that your food would run out before you got the money to buy more. Never true    Within the past 12 months, the food you bought just didn't last and you didn't have money to get more. Never true       Transition Planning    Patient/Family Anticipates Transition to home with family    Patient/Family Anticipated Services at Transition none    Transportation Anticipated family or friend will provide       Discharge Needs Assessment    Equipment Currently Used at Home walker, rolling;commode    Concerns to be Addressed denies needs/concerns at this time    Anticipated Changes Related to Illness none    Equipment Needed After Discharge none                   Discharge Plan       Row Name 07/03/25 1103        Plan    Plan Anticipate routine home with family. Current OP HD [facility] MWF.    Patient/Family in Agreement with Plan yes    Plan Comments Patient [facility] for HD. CM contacted patient's grandson, Leana ([phone]), by phone. Patient lives with daughter who provides 24/7 care and family provides transportation. PCP (Brian Nazario) and pharmacy ([facility]) verified-denies any difficulty affording meds. Grandson reports he spoke to palliative care team this morning regarding extra support at home, CM will follow for needs. Grandson denies any dc needs at this time and family will transport at dc. Barrier to D/C: HD today, sodium monitoring (Na 125), palliative care consult.                      Expected Discharge Date and Time       Expected Discharge Date Expected Discharge Time    Jul 5, 2025            Demographic Summary       Row Name 07/03/25 1106       General Information    Admission Type inpatient    Arrived From emergency department    Referral Source admission list    Reason for Consult discharge planning    Preferred Language English       Contact Information    Permission Granted to Share Info With                    Functional Status       Row Name 07/03/25 1105       Functional Status    Usual Activity Tolerance fair    Current Activity Tolerance fair       Functional Status, IADL    Medications completely dependent    Meal Preparation completely dependent    Housekeeping completely dependent    Laundry completely dependent    Shopping completely dependent       Mental Status Summary    Recent Changes in Mental Status/Cognitive Functioning unable to assess    Mental Status Comments patient ALVARO for HD, spoke to grandson by phone                       Patient Forms       Row Name 07/03/25 1108       Patient Forms    Important Message from Medicare (IMM) Delivered  7/2/25 per registration                  Phone communication or documentation only - no physical contact with patient or  family.     MAXINE WaldronN, [place] [address]    Office: [phone]  Fax: [phone]

## 2025-07-03 NOTE — SIGNIFICANT NOTE
07/03/25 1528   Living Situation   Current Living Arrangements home   Potentially Unsafe Housing Conditions none   Food Insecurity   Within the past 12 months, you worried that your food would run out before you got the money to buy more. Never true   Within the past 12 months, the food you bought just didn't last and you didn't have money to get more. Never true   Transportation Needs   In the past 12 months, has lack of transportation kept you from medical appointments or from getting medications? no   In the past 12 months, has lack of transportation kept you from meetings, work, or from getting things needed for daily living? No   Utilities   In the past 12 months has the electric, gas, oil, or water company threatened to shut off services in your home? No   Abuse Screen (yes response referral indicated)   Feels Unsafe at Home or Work/School unable to answer (comment required)  (patient [facility]O for HD, spoke to grandson by phone, denies any needs/safety concerns)   Feels Threatened by Someone unable to answer (comment required)   Does Anyone Try to Keep You From Having Contact with Others or Doing Things Outside Your Home? unable to answer (comment required)   Physical Signs of Abuse Present patient unable to answer   Financial Resource Strain   How hard is it for you to pay for the very basics like food, housing, medical care, and heating? Not very   Employment   Do you want help finding or keeping work or a job? Patient unable to answer   Family and Community Support   If for any reason you need help with day-to-day activities such as bathing, preparing meals, shopping, managing finances, etc., do you get the help you need? I get all the help I need   How often do you feel lonely or isolated from those around you? Patient unable to answer   Education   Preferred Language English   Do you want help with school or training? For example, starting or completing job training or getting a high school diploma, GED or  equivalent No   Physical Activity   On average, how many days per week do you engage in moderate to strenuous exercise (like a brisk walk)? 0 days   On average, how many minutes do you engage in exercise at this level? 0 min   Number of minutes of exercise per week (!) 0   Alcohol Use   Q1: How often do you have a drink containing alcohol? Never   Q2: How many drinks containing alcohol do you have on a typical day when you are drinking? None   Q3: How often do you have six or more drinks on one occasion? Never   Stress   Do you feel stress - tense, restless, nervous, or anxious, or unable to sleep at night because your mind is troubled all the time - these days? Pt Unable   Mental Health   Why did the patient not complete the Depression Screen questions? Patient unable to answer   Disabilities   Difficulty Concentrating, Remembering or Making Decisions no   Difficulty Managing Errands Independently yes   Errands Management family assists with daily care and ADLs     Phone communication or documentation only - no physical contact with patient or family.     LASHAUN Waldron, [place] [address]    Office: [phone]  Fax: [phone]

## 2025-07-03 NOTE — THERAPY EVALUATION
Patient Name: Brad Woodward  : 1940    MRN: [id number]                              Today's Date: 7/3/2025       Admit Date: 2025    Visit Dx:     ICD-10-CM ICD-9-CM   1. Nausea and vomiting, unspecified vomiting type  R11.2 787.01   2. Weakness  R53.1 780.79   3. Uncontrolled hypertension  I10 401.9     Patient Active Problem List   Diagnosis    Mixed hyperlipidemia    Essential hypertension    Type 2 diabetes mellitus    Anemia due to stage 3a chronic kidney disease    CKD (chronic kidney disease) stage 3, GFR 30-59 ml/min    Hypertensive emergency    HFrEF (heart failure with reduced ejection fraction)    Dyspnea    Sick sinus syndrome    Junctional bradycardia    Pacemaker    Anemia    Hypertensive urgency    Renal artery stenosis    ESRD (end stage renal disease) on dialysis    Melena    Generalized weakness     Past Medical History:   Diagnosis Date    Diabetes mellitus     Hyperlipidemia     Hypertension      Past Surgical History:   Procedure Laterality Date    ANGIOPLASTY ILIAC ARTERY Left 2025    Procedure: LEFT RENAL ARTERY ANGIOPLASTY WITH STENT;  Surgeon: Ted Mckee II, MD;  Location: [facility] HYBRID OR;  Service: Vascular;  Laterality: Left;    CARDIAC ELECTROPHYSIOLOGY PROCEDURE N/A 2021    Procedure: Pacemaker DC new;  Surgeon: Yovany Navarrete MD;  Location: [facility] CATH INVASIVE LOCATION;  Service: Cardiovascular;  Laterality: N/A;    COLONOSCOPY N/A 2025    Procedure: COLONOSCOPY;  Surgeon: Ramses Ashley MD;  Location: [facility] ENDOSCOPY;  Service: Gastroenterology;  Laterality: N/A;  post: poor prep    ENDOSCOPY N/A 2025    Procedure: ESOPHAGOGASTRODUODENOSCOPY WITH BIOPSY;  Surgeon: RAMON De Leon MD;  Location: [facility] ENDOSCOPY;  Service: Gastroenterology;  Laterality: N/A;  GASTRITIS, HIATAL HERNIA    PACEMAKER IMPLANTATION        General Information       Row Name 25 1504          Physical Therapy Time and Intention    Document Type evaluation   -CL     Mode of Treatment physical therapy  -CL       Row Name 07/03/25 1504          General Information    Patient Profile Reviewed yes  -CL     Prior Level of Function min assist:;gait;transfer;ADL's  -CL     Existing Precautions/Restrictions fall  -CL     Barriers to Rehab medically complex;language barrier  pt prefers family to interpret  -CL       Row Name 07/03/25 1504          Living Environment    Current Living Arrangements home  -CL     People in Home child(geoffrey), adult;grandchild(geoffrey)  -CL       Row Name 07/03/25 1504          Home Main Entrance    Number of Stairs, Main Entrance three  -CL       Row Name 07/03/25 1504          Stairs Within Home, Primary    Number of Stairs, Within Home, Primary none  -CL       Row Name 07/03/25 1504          Cognition    Orientation Status (Cognition) oriented x 4  -CL               User Key  (r) = Recorded By, (t) = Taken By, (c) = Cosigned By      Initials Name Provider Type    Mandy Small PT Physical Therapist                   Mobility       Row Name 07/03/25 1506          Bed Mobility    Bed Mobility supine-sit  -CL     Supine-Sit [place] (Bed Mobility) contact guard  -CL       Row Name 07/03/25 1506          Bed-Chair Transfer    Bed-Chair [place] (Transfers) contact guard  -CL       Row Name 07/03/25 1506          Sit-Stand Transfer    Sit-Stand [place] (Transfers) contact guard  -CL       Row Name 07/03/25 1506          Gait/Stairs (Locomotion)    [place] Level (Gait) contact guard  -CL     Patient was able to Ambulate yes  -CL     Distance in Feet (Gait) 15  -CL               User Key  (r) = Recorded By, (t) = Taken By, (c) = Cosigned By      Initials Name Provider Type    CL Mandy Clancy PT Physical Therapist                   Obj/Interventions       Row Name 07/03/25 1507          Range of Motion Comprehensive    General Range of Motion no range of motion deficits identified  -CL       Row Name 07/03/25 1507           Strength Comprehensive (MMT)    General Manual Muscle Testing (MMT) Assessment no strength deficits identified  -CL       Row Name 07/03/25 1507          Balance    Balance Assessment sitting static balance;sitting dynamic balance;standing static balance;standing dynamic balance  -CL     Static Sitting Balance independent  -CL     Dynamic Sitting Balance independent  -CL     Position, Sitting Balance unsupported;sitting edge of bed  -CL     Static Standing Balance supervision  -CL     Dynamic Standing Balance contact guard  -CL     Position/Device Used, Standing Balance unsupported  -CL       Row Name 07/03/25 1507          Sensory Assessment (Somatosensory)    Sensory Assessment (Somatosensory) sensation intact  -CL               User Key  (r) = Recorded By, (t) = Taken By, (c) = Cosigned By      Initials Name Provider Type    CL Mandy Clancy, PT Physical Therapist                   Goals/Plan       Row Name 07/03/25 1513          Bed Mobility Goal 1 (PT)    Activity/Assistive Device (Bed Mobility Goal 1, PT) bed mobility activities, all  -CL     [place] Level/Cues Needed (Bed Mobility Goal 1, PT) independent  -CL     Time Frame (Bed Mobility Goal 1, PT) long term goal (LTG);2 weeks  -CL       Row Name 07/03/25 1513          Transfer Goal 1 (PT)    Activity/Assistive Device (Transfer Goal 1, PT) sit-to-stand/stand-to-sit;bed-to-chair/chair-to-bed  -CL     [place] Level/Cues Needed (Transfer Goal 1, PT) modified independence  -CL     Time Frame (Transfer Goal 1, PT) long term goal (LTG);2 weeks  -CL       Row Name 07/03/25 1513          Gait Training Goal 1 (PT)    Activity/Assistive Device (Gait Training Goal 1, PT) gait (walking locomotion);assistive device use  -CL     [place] Level (Gait Training Goal 1, PT) modified independence  -CL     Distance (Gait Training Goal 1, PT) 100  -CL     Time Frame (Gait Training Goal 1, PT) long term goal (LTG);2 weeks  -CL       Row Name 07/03/25 1513           Stairs Goal 1 (PT)    Activity/Assistive Device (Stairs Goal 1, PT) ascending stairs;descending stairs  -CL     [place] Level/Cues Needed (Stairs Goal 1, PT) modified independence  -CL     Number of Stairs (Stairs Goal 1, PT) 3  -CL     Time Frame (Stairs Goal 1, PT) long term goal (LTG);2 weeks  -CL       Row Name 07/03/25 2007          Therapy Assessment/Plan (PT)    Planned Therapy Interventions (PT) balance training;bed mobility training;gait training;patient/family education;strengthening;stair training;transfer training  -CL               User Key  (r) = Recorded By, (t) = Taken By, (c) = Cosigned By      Initials Name Provider Type    CL Mandy Clancy, PT Physical Therapist                   Clinical Impression       Row Name 07/03/25 1501          Pain    Pretreatment Pain Rating 0/10 - no pain  -CL     Posttreatment Pain Rating 0/10 - no pain  -CL       Row Name 07/03/25 1503          Plan of Care Review    Plan of Care Reviewed With patient;family  -CL     Outcome Evaluation Brad Woodward is an 84 y.o. female with medical hx of SSS s/p PPM, ESRD on HD, DM, HTN, HLD, renal stenosis with stent who presents with generalized weakness, elevated troponin. At baseline, the patient lives with her daughter and son and has multiple family members who assist.  She has 2-3 TOSHA and lives on 1 floor.  Pt reports that over the past few months she has been increasingly weak and needed more assist from family. At time of evaluation, she is A&O x 4 with no c/o pain.  She exhibits good strength. She performs bed mobility with CGA and ambulates in room with CGA.  She would benefit from  PT at discharge. She and family are agreeable.  -CL       Row Name 07/03/25 6023          Therapy Assessment/Plan (PT)    Rehab Potential (PT) good  -CL     Criteria for Skilled Interventions Met (PT) yes;skilled treatment is necessary  -CL     Therapy Frequency (PT) 3 times/wk  -CL     Predicted Duration of Therapy Intervention (PT)  Until discharge  -CL       Row Name 07/03/25 1507          Vital Signs    Pre Systolic BP Rehab 142  -CL     Pre Treatment Diastolic   -CL     Intra Systolic BP Rehab 122  -CL     Intra Treatment Diastolic BP 55  -CL     Post Systolic BP Rehab 127  -CL     Post Treatment Diastolic BP 53  -CL     Pre SpO2 (%) 99  -CL     O2 Delivery Pre Treatment room air  -CL     Intra SpO2 (%) 99  -CL     O2 Delivery Intra Treatment room air  -CL     Post SpO2 (%) 100  -CL     O2 Delivery Post Treatment room air  -CL     Pre Patient Position Supine  -CL     Intra Patient Position Sitting  -CL     Post Patient Position Standing  -CL       Row Name 07/03/25 1507          Positioning and Restraints    Pre-Treatment Position in bed  -CL     Post Treatment Position chair  -CL     In Chair notified nsg;sitting;call light within reach;encouraged to call for assist;exit alarm on;with family/caregiver  -CL               User Key  (r) = Recorded By, (t) = Taken By, (c) = Cosigned By      Initials Name Provider Type    Mandy Small, PT Physical Therapist                   Outcome Measures       Row Name 07/03/25 1514 07/03/25 1200       How much help from another person do you currently need...    Turning from your back to your side while in flat bed without using bedrails? 4  -CL 3  -KT    Moving from lying on back to sitting on the side of a flat bed without bedrails? 3  -CL 3  -KT    Moving to and from a bed to a chair (including a wheelchair)? 3  -CL 3  -KT    Standing up from a chair using your arms (e.g., wheelchair, bedside chair)? 3  -CL 3  -KT    Climbing 3-5 steps with a railing? 3  -CL 2  -KT    To walk in hospital room? 3  -CL 2  -KT    AM-PAC 6 Clicks Score (PT) 19  -CL 16  -KT    Highest Level of Mobility Goal Walk 10 Steps or More-6  -CL Stand (1 or More Minutes)-5  -KT      Row Name 07/03/25 1514 07/03/25 1511       Functional Assessment    Outcome Measure Options AM-PAC 6 Clicks Basic Mobility (PT)  -CL AM-PAC  6 Clicks Daily Activity (OT)  -SP              User Key  (r) = Recorded By, (t) = Taken By, (c) = Cosigned By      Initials Name Provider Type    Ira Lomax, RN Registered Nurse    CL Mandy Clancy, PT Physical Therapist    SP Patricio Alvarez, OT Occupational Therapist                                 Physical Therapy Education       Title: PT OT SLP Therapies (In Progress)       Topic: Physical Therapy (In Progress)       Point: Mobility training (Done)       Learning Progress Summary            Patient Acceptance, E,TB, VU by  at 7/3/2025 1514   Family Acceptance, E,TB, VU by  at 7/3/2025 1514                      Point: Home exercise program (Not Started)       Learner Progress:  Not documented in this visit.              Point: Body mechanics (Not Started)       Learner Progress:  Not documented in this visit.              Point: Precautions (Not Started)       Learner Progress:  Not documented in this visit.                              User Key       Initials Effective Dates Name Provider Type Discipline     03/02/22 -  Mandy Clancy, PT Physical Therapist PT                  PT Recommendation and Plan  Planned Therapy Interventions (PT): balance training, bed mobility training, gait training, patient/family education, strengthening, stair training, transfer training  Outcome Evaluation: Brad Woodward is an 84 y.o. female with medical hx of SSS s/p PPM, ESRD on HD, DM, HTN, HLD, renal stenosis with stent who presents with generalized weakness, elevated troponin. At baseline, the patient lives with her daughter and son and has multiple family members who assist.  She has 2-3 TOSHA and lives on 1 floor.  Pt reports that over the past few months she has been increasingly weak and needed more assist from family. At time of evaluation, she is A&O x 4 with no c/o pain.  She exhibits good strength. She performs bed mobility with CGA and ambulates in room with CGA.  She would benefit from HH PT at  discharge. She and family are agreeable.     Time Calculation:   PT Evaluation Complexity  History, PT Evaluation Complexity: 3 or more personal factors and/or comorbidities  Examination of Body Systems (PT Eval Complexity): total of 3 or more elements  Clinical Presentation (PT Evaluation Complexity): evolving  Clinical Decision Making (PT Evaluation Complexity): moderate complexity  Overall Complexity (PT Evaluation Complexity): moderate complexity     PT Charges       Row Name 07/03/25 1515             Time Calculation    Start Time 1354  -CL      Stop Time 1411  -CL      Time Calculation (min) 17 min  -CL      PT Received On 07/03/25  -CL      PT - Next Appointment 07/07/25  -CL      PT Goal Re-Cert Due Date 07/17/25  -CL         Time Calculation- PT    Total Timed Code Minutes- PT 0 minute(s)  -CL                User Key  (r) = Recorded By, (t) = Taken By, (c) = Cosigned By      Initials Name Provider Type    CL Mandy Clancy, PT Physical Therapist                  Therapy Charges for Today       Code Description Service Date Service Provider Modifiers Qty    [id number] HC PT EVAL MOD COMPLEXITY 3 7/3/2025 Mandy Clancy, PT GP 1            PT G-Codes  Outcome Measure Options: AM-PAC 6 Clicks Basic Mobility (PT)  AM-PAC 6 Clicks Score (PT): 19  AM-PAC 6 Clicks Score (OT): 20  PT Discharge Summary  Anticipated Discharge Disposition (PT): home with home health    Mandy Clancy PT  7/3/2025

## 2025-07-03 NOTE — THERAPY EVALUATION
Patient Name: Brad Woodward  : 1940    MRN: [id number]                              Today's Date: 7/3/2025       Admit Date: 2025    Visit Dx:     ICD-10-CM ICD-9-CM   1. Nausea and vomiting, unspecified vomiting type  R11.2 787.01   2. Weakness  R53.1 780.79   3. Uncontrolled hypertension  I10 401.9     Patient Active Problem List   Diagnosis    Mixed hyperlipidemia    Essential hypertension    Type 2 diabetes mellitus    Anemia due to stage 3a chronic kidney disease    CKD (chronic kidney disease) stage 3, GFR 30-59 ml/min    Hypertensive emergency    HFrEF (heart failure with reduced ejection fraction)    Dyspnea    Sick sinus syndrome    Junctional bradycardia    Pacemaker    Anemia    Hypertensive urgency    Renal artery stenosis    ESRD (end stage renal disease) on dialysis    Melena    Generalized weakness     Past Medical History:   Diagnosis Date    Diabetes mellitus     Hyperlipidemia     Hypertension      Past Surgical History:   Procedure Laterality Date    ANGIOPLASTY ILIAC ARTERY Left 2025    Procedure: LEFT RENAL ARTERY ANGIOPLASTY WITH STENT;  Surgeon: Ted Mckee II, MD;  Location: [facility] HYBRID OR;  Service: Vascular;  Laterality: Left;    CARDIAC ELECTROPHYSIOLOGY PROCEDURE N/A 2021    Procedure: Pacemaker DC new;  Surgeon: Yovany Navarrete MD;  Location: [facility] CATH INVASIVE LOCATION;  Service: Cardiovascular;  Laterality: N/A;    COLONOSCOPY N/A 2025    Procedure: COLONOSCOPY;  Surgeon: Ramses Ashley MD;  Location: [facility] ENDOSCOPY;  Service: Gastroenterology;  Laterality: N/A;  post: poor prep    ENDOSCOPY N/A 2025    Procedure: ESOPHAGOGASTRODUODENOSCOPY WITH BIOPSY;  Surgeon: RAMON De Leon MD;  Location: [facility] ENDOSCOPY;  Service: Gastroenterology;  Laterality: N/A;  GASTRITIS, HIATAL HERNIA    PACEMAKER IMPLANTATION        General Information       Row Name 25 1505          OT Time and Intention    Subjective Information no complaints   -SP     Document Type evaluation  -SP     Mode of Treatment occupational therapy  -SP       Row Name 07/03/25 1505          General Information    Patient Profile Reviewed yes  -SP     Prior Level of Function min assist:;ADL's;all household mobility;home management  -SP     Existing Precautions/Restrictions fall  -SP     Barriers to Rehab previous functional deficit  -SP       Row Name 07/03/25 1505          Living Environment    Current Living Arrangements home  -SP     People in Home child(geoffrey), adult  -SP       Row Name 07/03/25 1505          Home Main Entrance    Number of Stairs, Main Entrance three  -SP       Row Name 07/03/25 1505          Stairs Within Home, Primary    Number of Stairs, Within Home, Primary none  -SP       Row Name 07/03/25 1505          Cognition    Orientation Status (Cognition) oriented x 4  -SP       Row Name 07/03/25 1505          Safety Issues/Impairments Affecting Functional Mobility    Impairments Affecting Function (Mobility) endurance/activity tolerance;balance  -SP               User Key  (r) = Recorded By, (t) = Taken By, (c) = Cosigned By      Initials Name Provider Type    SP Patricio Alvarez OT Occupational Therapist                     Mobility/ADL's       Row Name 07/03/25 1507          Bed Mobility    Bed Mobility bed mobility (all) activities  -SP     All Activities, Dickinson (Bed Mobility) contact guard  -SP       Row Name 07/03/25 1507          Transfers    Transfers sit-stand transfer;bed-chair transfer  -SP       Row Name 07/03/25 1507          Bed-Chair Transfer    Bed-Chair [place] (Transfers) contact guard  -SP       Row Name 07/03/25 1507          Sit-Stand Transfer    Sit-Stand Dickinson (Transfers) contact guard  -SP       Row Name 07/03/25 1507          Functional Mobility    Functional Mobility- Ind. Level contact guard assist  -SP     Functional Mobility- Comment HHA  -SP     Patient was able to Ambulate yes  -SP               User Key  (r) =  Recorded By, (t) = Taken By, (c) = Cosigned By      Initials Name Provider Type    SP Patricio Alvarez OT Occupational Therapist                   Obj/Interventions       [facility] Name 07/03/25 1508          Sensory Assessment (Somatosensory)    Sensory Assessment (Somatosensory) UE sensation intact  -SP       [facility] Name 07/03/25 1508          Range of Motion Comprehensive    General Range of Motion no range of motion deficits identified  -SP       [facility] Name 07/03/25 1508          Strength Comprehensive (MMT)    General Manual Muscle Testing (MMT) Assessment no strength deficits identified  -SP       [facility] Name 07/03/25 1508          Balance    Balance Assessment sitting dynamic balance;sit to stand dynamic balance;standing dynamic balance  -SP     Dynamic Sitting Balance independent  -SP     Position, Sitting Balance unsupported;sitting edge of bed  -SP     Sit to Stand Dynamic Balance contact guard  -SP     Dynamic Standing Balance contact guard  -SP     Position/Device Used, Standing Balance unsupported  -SP               User Key  (r) = Recorded By, (t) = Taken By, (c) = Cosigned By      Initials Name Provider Type    SP Patricio Alvarez OT Occupational Therapist                   Goals/Plan    No documentation.                  Clinical Impression       [facility] Name 07/03/25 1510          Pain Assessment    Pretreatment Pain Rating 0/10 - no pain  -SP     Posttreatment Pain Rating 0/10 - no pain  -SP       [facility] Name 07/03/25 1510          Plan of Care Review    Plan of Care Reviewed With patient;family  -SP     Outcome Evaluation Pt is a 85 y/o female admitted to [facility] on 7/2/25 with c/o generalized weakness. Pt recently admitted 6/18/2025 with melena. Underwent EGD 6/19/25 /Colonoscopy 6/22/25 no acute findings but biopsies positive for H. Pylori. Daughter reports pt with constipation and nausea with no BM ~10 days. In ED, pt HTN SBP >200. Initial troponin 130, repeat 120. HD on MWF. PMHx significant for SSS s/p PPM, ESRD on  HD, DM2, HTN, HLD, h/o renal artery stenosis s/p L renal stent. At baseline, pt resides with family in a [facility] with x3 TOSHA. Pt requires assist for ADLs and ambulates with HHA. Family present today assisting with communication. Upon assessment, pt A&O x4 with no reports of pain, though reports fatigue. She completed bed mobility, all functional transfers and ambulation with CGA and HHA. No LOB noted and VSS. Pt on RA satting from % SpO2. She demos 5/5 strength in BUE and AROM WNLs. Pt with great family support and able to provide 24/7 supervision. No current skilled OT needs identified at this time. OT will sign-off and complete orders. Please re-consult if pt has a change in status.  -SP       Row Name 07/03/25 1510          Therapy Assessment/Plan (OT)    Criteria for Skilled Therapeutic Interventions Met (OT) no  -SP     Therapy Frequency (OT) evaluation only  -SP       Row Name 07/03/25 1510          Therapy Plan Review/Discharge Plan (OT)    Anticipated Discharge Disposition (OT) home with assist  -SP       Row Name 07/03/25 1510          Vital Signs    Intra Systolic BP Rehab 122  -SP     Intra Treatment Diastolic BP 55  -SP     Post Systolic BP Rehab 127  -SP     Post Treatment Diastolic BP 53  -SP     Pre SpO2 (%) 99  -SP     O2 Delivery Pre Treatment room air  -SP     O2 Delivery Intra Treatment room air  -SP     Post SpO2 (%) 100  -SP     O2 Delivery Post Treatment room air  -SP     Pre Patient Position Supine  -SP     Intra Patient Position Standing  -SP     Post Patient Position Sitting  -SP       Row Name 07/03/25 1510          Positioning and Restraints    Pre-Treatment Position in bed  -SP     Post Treatment Position chair  -SP     In Chair notified nsg;sitting;call light within reach;encouraged to call for assist;exit alarm on;with family/caregiver  -SP               User Key  (r) = Recorded By, (t) = Taken By, (c) = Cosigned By      Initials Name Provider Type    Patricio Arizmendi, OT  Occupational Therapist                   Outcome Measures       Row Name 07/03/25 1511          How much help from another is currently needed...    Putting on and taking off regular lower body clothing? 3  -SP     Bathing (including washing, rinsing, and drying) 3  -SP     Toileting (which includes using toilet bed pan or urinal) 3  -SP     Putting on and taking off regular upper body clothing 3  -SP     Taking care of personal grooming (such as brushing teeth) 4  -SP     Eating meals 4  -SP     AM-PAC 6 Clicks Score (OT) 20  -SP       Row Name 07/03/25 1200          How much help from another person do you currently need...    Turning from your back to your side while in flat bed without using bedrails? 3  -KT     Moving from lying on back to sitting on the side of a flat bed without bedrails? 3  -KT     Moving to and from a bed to a chair (including a wheelchair)? 3  -KT     Standing up from a chair using your arms (e.g., wheelchair, bedside chair)? 3  -KT     Climbing 3-5 steps with a railing? 2  -KT     To walk in hospital room? 2  -KT     AM-PAC 6 Clicks Score (PT) 16  -KT       Row Name 07/03/25 1511          Functional Assessment    Outcome Measure Options AM-PAC 6 Clicks Daily Activity (OT)  -SP               User Key  (r) = Recorded By, (t) = Taken By, (c) = Cosigned By      Initials Name Provider Type    Ira Lomax, RN Registered Nurse    Patricio Arizmendi, SKYLA Occupational Therapist                    Occupational Therapy Education       Title: PT OT SLP Therapies (In Progress)       Topic: Occupational Therapy (Done)       Point: ADL training (Done)       Learning Progress Summary            Patient Acceptance, E,TB, VU by SP at 7/3/2025 1511                      Point: Home exercise program (Done)       Learning Progress Summary            Patient Acceptance, E,TB, VU by SP at 7/3/2025 1511                      Point: Precautions (Done)       Learning Progress Summary            Patient  Acceptance, E,TB, VU by SP at 7/3/2025 1511                      Point: Body mechanics (Done)       Learning Progress Summary            Patient Acceptance, E,TB, VU by SP at 7/3/2025 1511                                      User Key       Initials Effective Dates Name Provider Type Discipline    SP 11/15/23 -  Patricio Alvarez, SKYLA Occupational Therapist OT                  OT Recommendation and Plan  Therapy Frequency (OT): evaluation only  Plan of Care Review  Plan of Care Reviewed With: patient, family  Outcome Evaluation: Pt is a 83 y/o female admitted to [facility] on 7/2/25 with c/o generalized weakness. Pt recently admitted 6/18/2025 with melena. Underwent EGD 6/19/25 /Colonoscopy 6/22/25 no acute findings but biopsies positive for H. Pylori. Daughter reports pt with constipation and nausea with no BM ~10 days. In ED, pt HTN SBP >200. Initial troponin 130, repeat 120. HD on MWF. PMHx significant for SSS s/p PPM, ESRD on HD, DM2, HTN, HLD, h/o renal artery stenosis s/p L renal stent. At baseline, pt resides with family in a [facility] with x3 TOSHA. Pt requires assist for ADLs and ambulates with HHA. Family present today assisting with communication. Upon assessment, pt A&O x4 with no reports of pain, though reports fatigue. She completed bed mobility, all functional transfers and ambulation with CGA and HHA. No LOB noted and VSS. Pt on RA satting from % SpO2. She demos 5/5 strength in BUE and AROM WNLs. Pt with great family support and able to provide 24/7 supervision. No current skilled OT needs identified at this time. OT will sign-off and complete orders. Please re-consult if pt has a change in status.     Time Calculation:         Time Calculation- OT       Row Name 07/03/25 1511             Time Calculation- OT    OT Start Time 1332  -SP      OT Stop Time 1350  -SP      OT Time Calculation (min) 18 min  -SP      OT Received On 07/03/25  -SP                User Key  (r) = Recorded By, (t) = Taken By, (c) = Cosigned  By      Initials Name Provider Type    SP Patricio Alvarez OT Occupational Therapist                  Therapy Charges for Today       Code Description Service Date Service Provider Modifiers Qty    10328676088 HC OT EVAL LOW COMPLEXITY 3 7/3/2025 Patricio Alvarez OT GO 1                 Patricio Alvarez OT  7/3/2025

## 2025-07-03 NOTE — SIGNIFICANT NOTE
07/03/25 1110   Readmission Indications   Is the patient and/or family able to complete the readmission assessment questions? Yes  (Leana solitario)   Is this hospitalization related to the prior hospital diagnosis? Yes   Recommendation for rehospitalization   Did you speak with your physician prior to coming to the hospital No   Follow-up Appointments   Do you have a PCP? Yes   Did you have an appointment with PCP after your hospitalization? Yes   When was your appointment scheduled? 06/26/25   Did you go to appointment? Yes   Did you have an appointment with a Specialist? Yes   When was your appointment scheduled? 07/22/25  (Vascular; 7/24-Cardiology)   Did you go to appointment? No  (admitted prior to appts)   Are you current with the Pulmonary Clinic? No   Are you current with the CHF Clinic? No   Medications   Did you have newly prescribed medications at discharge? Yes   Did you understand the reasons for your medications at discharge and how to take them? Yes   Did you understand the side effects of your medications? Yes   Are you taking all of you prescribed medications? Yes   What pharmacy was used to fill prescription(s)? no new meds at ID   Were medications picked up?   (n/a)   Discharge Instructions   Did you understand your discharge instructions? Yes   Did your family/caregiver hear your instructions? Yes   Were you told to eat a special diet? Yes  (cardiac)   Did you adhere to the diet? Yes   Were you given a number of someone to call if you had questions or concerns? Yes   Index discharge location/services   Where did you go upon discharge? Home   Do you have supportive family or friends in the home? Yes   Discharge Readiness   On a scale of 1-5 (5 being well prepared), how ready were you for discharge 3   Recommendation based on interview Additional caregiver support   Palliative Care/Hospice   Are you current with Palliative Care? No   Are you current with Hospice Care? No   Advance Directives  (For Healthcare)   Pre-existing AND/MOST/POLST Order No   Advance Directive Status Patient does not have advance directive   Have you reviewed your Advance Directive and is it valid for this stay? Not applicable   Literature Provided on Advance Directives No   Patient Requests Assistance on Advance Directives Patient Declined   Readmission Assessment Final Comments   Final Comments Previous admission: 6/18-6/24/25, melena and anemia. ESRD/ HD. GI and nephrology consulted. EGD and colonoscopy done-positive for H. Pylori. Current admission: 7/2/25-present. Weakness, hyponatremia, hyperkalemia and elevated troponin. Severe HTN, IV hydralazine prn x4. Nephrology and palliative care consult     Phone communication or documentation only - no physical contact with patient or family.     MAXINE WaldronN, RN, [place] [address]    Office: [phone]  Fax: [phone]

## 2025-07-03 NOTE — CONSULTS
Palliative Care Social Work Progress Note    Code Status:full code    Goals of Care: Full Treatment    Narrative: Palliative care  met with patient, son, and daughter at bedside to discuss goals of care. I had conversation with son and daughter at bedside as patient does not speak English. Family reports patient lives at home with family support and reliable transportation to dialysis. Family states they wish to continue dialysis and aggressive treatment. Family affirmed full code with full intervention code status. Patient does not have a living will. They report patient is weak, PT eval pending. We discussed outpatient palliative care for home support. Family agreeable to referral. Referral placed to G[facility] with [facility]. RN and CM updated.     Plan: [facility] referral          Mallories Marchal

## 2025-07-03 NOTE — PLAN OF CARE
Goal Outcome Evaluation:  Plan of Care Reviewed With: patient, family           Outcome Evaluation: Brad Woodward is an 84 y.o. female with medical hx of SSS s/p PPM, ESRD on HD, DM, HTN, HLD, renal stenosis with stent who presents with generalized weakness, elevated troponin. At baseline, the patient lives with her daughter and son and has multiple family members who assist.  She has 2-3 TOSHA and lives on 1 floor.  Pt reports that over the past few months she has been increasingly weak and needed more assist from family. At time of evaluation, she is A&O x 4 with no c/o pain.  She exhibits good strength. She performs bed mobility with CGA and ambulates in room with CGA.  She would benefit from HH PT at discharge. She and family are agreeable.    Anticipated Discharge Disposition (PT): home with home health

## 2025-07-03 NOTE — CONSULTS
RENAL/KCC:    Referring Provider: Dr. Kennedy  Reason for Consultation: ESRD    Subjective     Chief complaint: Nausea, dry heaving, abdominal pain    History of present illness:  Patient is an 83 yo female with h/o ESRD on MWF HD.  Also with severe HTN and recent admission for GI bleed and gastritis with diagnosis of H. Pylori.  She presents now with abdominal pain, N/V and weakness.  Has not been able to tolerate PO meds well.  Missed HD yesterday.  Discussed with family at bedside.    History  Past Medical History:   Diagnosis Date    Diabetes mellitus     Hyperlipidemia     Hypertension    ,   Past Surgical History:   Procedure Laterality Date    ANGIOPLASTY ILIAC ARTERY Left 05/21/2025    Procedure: LEFT RENAL ARTERY ANGIOPLASTY WITH STENT;  Surgeon: Ted Mckee II, MD;  Location: [facility] HYBRID OR;  Service: Vascular;  Laterality: Left;    CARDIAC ELECTROPHYSIOLOGY PROCEDURE N/A 04/20/2021    Procedure: Pacemaker DC new;  Surgeon: Yovany Navarrete MD;  Location: [facility] CATH INVASIVE LOCATION;  Service: Cardiovascular;  Laterality: N/A;    COLONOSCOPY N/A 6/22/2025    Procedure: COLONOSCOPY;  Surgeon: Ramses Ashley MD;  Location: [facility] ENDOSCOPY;  Service: Gastroenterology;  Laterality: N/A;  post: poor prep    ENDOSCOPY N/A 6/19/2025    Procedure: ESOPHAGOGASTRODUODENOSCOPY WITH BIOPSY;  Surgeon: RAMON De Leon MD;  Location: [facility] ENDOSCOPY;  Service: Gastroenterology;  Laterality: N/A;  GASTRITIS, HIATAL HERNIA    PACEMAKER IMPLANTATION     ,   Family History   Family history unknown: Yes   ,   Social History     Socioeconomic History    Marital status:    Tobacco Use    Smoking status: Never     Passive exposure: Never    Smokeless tobacco: Never   Vaping Use    Vaping status: Never Used   Substance and Sexual Activity    Alcohol use: No    Drug use: No    Sexual activity: Defer     E-cigarette/Vaping    E-cigarette/Vaping Use Never User     Passive Exposure No     Counseling Given No       E-cigarette/Vaping Substances    Nicotine No     THC No     CBD No     Flavoring No      E-cigarette/Vaping Devices    Disposable No     Pre-filled or Refillable Cartridge No     Refillable Tank No     Pre-filled Pod No          ,   Medications Prior to Admission   Medication Sig Dispense Refill Last Dose/Taking    aspirin 81 MG chewable tablet Chew 1 tablet Daily. 270 tablet 2 Taking    atorvastatin (LIPITOR) 80 MG tablet Take 1 tablet by mouth Daily. 270 tablet 1 Taking    Bismuth 262 MG chewable tablet Chew 2 tablets 2 (Two) Times a Day for 14 days. 56 tablet 0 Taking    Bismuth/Metronidaz/Tetracyclin 140-125-125 MG capsule Take 3 Capfuls by mouth 4 (Four) Times a Day for 10 days. 120 capsule 0 Taking    cloNIDine (CATAPRES) 0.3 MG tablet Take 1 tablet by mouth 2 (Two) Times a Day.   Taking    clopidogrel (PLAVIX) 75 MG tablet Take 1 tablet by mouth Daily for 30 days. 30 tablet 0 Taking    Cyanocobalamin (VITAMIN B-12 PO) Take 1 tablet by mouth Daily.   Taking    docusate sodium (COLACE) 100 MG capsule Take 1 capsule by mouth Every 12 (Twelve) Hours.   Taking    hydrALAZINE (APRESOLINE) 100 MG tablet Take 1 tablet by mouth 3 times a day for 30 days. 90 tablet 0 Taking    losartan (COZAAR) 100 MG tablet Take 1 tablet by mouth Daily for 30 days. 30 tablet 0 Taking    metoprolol succinate XL (TOPROL-XL) 25 MG 24 hr tablet Take 1 tablet by mouth Daily for 30 days. 30 tablet 0 Taking    metroNIDAZOLE (FLAGYL) 250 MG tablet Take 1.5 tablets by mouth 4 (Four) Times a Day. 84 tablet 0 Taking    omeprazole (priLOSEC) 20 MG capsule Take 1 capsule by mouth 2 (Two) Times a Day Before Meals for 14 days. 28 capsule 0 Taking    pantoprazole (Protonix) 40 MG EC tablet Take 1 tablet by mouth Daily for 30 days. 30 tablet 0 Taking    potassium chloride (KLOR-CON M20) 20 MEQ CR tablet Take 1 tablet by mouth 2 (Two) Times a Day.   Taking    tetracycline (ACHROMYCIN,SUMYCIN) 500 MG capsule Take 1 capsule by mouth 4 (Four) Times a  Day as directed for 14 days. 56 capsule 0 Taking   , Scheduled Meds:  aspirin, 81 mg, Oral, Daily  atorvastatin, 80 mg, Oral, Daily  cloNIDine, 0.3 mg, Oral, BID  clopidogrel, 75 mg, Oral, Daily  docusate sodium, 100 mg, Oral, Q12H  epoetin rickie/rickie-epbx, 10,000 Units, Intravenous, Once  hydrALAZINE, 100 mg, Oral, TID  losartan, 100 mg, Oral, Q24H  metoprolol succinate XL, 25 mg, Oral, Daily  pantoprazole, 40 mg, Oral, Daily   , Continuous Infusions:  sodium chloride, 75 mL/hr   , PRN Meds:    acetaminophen **OR** acetaminophen **OR** acetaminophen    senna-docusate sodium **AND** polyethylene glycol **AND** bisacodyl **AND** bisacodyl    Calcium Replacement - Follow Nurse / BPA Driven Protocol    dextrose    dextrose    glucagon (human recombinant)    hydrALAZINE    Magnesium Standard Dose Replacement - Follow Nurse / BPA Driven Protocol    melatonin    ondansetron ODT **OR** ondansetron    Phosphorus Replacement - Follow Nurse / BPA Driven Protocol    [COMPLETED] Insert Peripheral IV **AND** sodium chloride, and Allergies:  Patient has no known allergies.    Review of Systems  Pertinent items are noted in HPI    Objective     Vital Signs  Temp:  [97.4 °F (36.3 °C)-97.6 °F (36.4 °C)] 97.5 °F (36.4 °C)  Heart Rate:  [60-70] 60  Resp:  [13-16] 13  BP: (131-227)/(35-85) 187/76    No intake/output data recorded.  No intake/output data recorded.    Physical Exam:  GEN: Awake, NAD  ENT: PERRL, EOMI, MMM  NECK: Supple, no JVD  CHEST: CTAB, no W/R/C  CV: RRR, no M/G/R  ABD: Soft, NT, +BS  SKIN: Warm and Dry  NEURO: CN's intact      Results Review:   I reviewed the patient's new clinical results.    WBC WBC   Date Value Ref Range Status   07/03/2025 3.49 3.40 - 10.80 10*3/mm3 Final   07/02/2025 2.44 (L) 3.40 - 10.80 10*3/mm3 Final      HGB Hemoglobin   Date Value Ref Range Status   07/03/2025 9.8 (L) 12.0 - 15.9 g/dL Final   07/02/2025 9.9 (L) 12.0 - 15.9 g/dL Final      HCT Hematocrit   Date Value Ref Range Status  "  07/03/2025 31.0 (L) 34.0 - 46.6 % Final   07/02/2025 31.3 (L) 34.0 - 46.6 % Final      Platlets No results found for: \"LABPLAT\"   MCV MCV   Date Value Ref Range Status   07/03/2025 87.1 79.0 - 97.0 fL Final   07/02/2025 86.5 79.0 - 97.0 fL Final          Sodium Sodium   Date Value Ref Range Status   07/03/2025 125 (L) 136 - 145 mmol/L Final   07/02/2025 127 (L) 136 - 145 mmol/L Final      Potassium Potassium   Date Value Ref Range Status   07/03/2025 4.9 3.5 - 5.2 mmol/L Final   07/02/2025 5.4 (H) 3.5 - 5.2 mmol/L Final      Chloride Chloride   Date Value Ref Range Status   07/03/2025 94 (L) 98 - 107 mmol/L Final   07/02/2025 93 (L) 98 - 107 mmol/L Final      CO2 CO2   Date Value Ref Range Status   07/03/2025 19.2 (L) 22.0 - 29.0 mmol/L Final   07/02/2025 21.7 (L) 22.0 - 29.0 mmol/L Final      BUN BUN   Date Value Ref Range Status   07/03/2025 74.3 (H) 8.0 - 23.0 mg/dL Final   07/02/2025 64.7 (H) 8.0 - 23.0 mg/dL Final      Creatinine Creatinine   Date Value Ref Range Status   07/03/2025 4.96 (H) 0.57 - 1.00 mg/dL Final   07/02/2025 4.47 (H) 0.57 - 1.00 mg/dL Final      Calcium Calcium   Date Value Ref Range Status   07/03/2025 8.5 (L) 8.6 - 10.5 mg/dL Final   07/02/2025 8.8 8.6 - 10.5 mg/dL Final      PO4 No results found for: \"CAPO4\"   Albumin Albumin   Date Value Ref Range Status   07/02/2025 3.3 (L) 3.5 - 5.2 g/dL Final      Magnesium Magnesium   Date Value Ref Range Status   07/03/2025 2.0 1.6 - 2.4 mg/dL Final   07/02/2025 1.9 1.6 - 2.4 mg/dL Final      Uric Acid No results found for: \"URICACID\"         aspirin, 81 mg, Oral, Daily  atorvastatin, 80 mg, Oral, Daily  cloNIDine, 0.3 mg, Oral, BID  clopidogrel, 75 mg, Oral, Daily  docusate sodium, 100 mg, Oral, Q12H  epoetin rickie/rickie-epbx, 10,000 Units, Intravenous, Once  hydrALAZINE, 100 mg, Oral, TID  losartan, 100 mg, Oral, Q24H  metoprolol succinate XL, 25 mg, Oral, Daily  pantoprazole, 40 mg, Oral, Daily      sodium chloride, 75 mL/hr        Assessment & " Plan     ESRD  Hyponatremia  HTN  H Pylori  Weakness  N/V/Abd pain  Anemia of CKD  Met acidosis    PLAN: HD today.  Resume home meds.  Gentle IVF x 1L.  EPO with HD.  ABX for H Pylori per primary.  Family requesting Palliative consultation for symptom management.  Will follow.      Jim Farooq MD  [facility]  07/03/25  @NOW

## 2025-07-03 NOTE — PROGRESS NOTES
[facility] MEDICINE SERVICE  DAILY PROGRESS NOTE    NAME: Brad Woodward  : 1940  MRN: [id number]      LOS: 1 day     PROVIDER OF SERVICE: Kaitlin Kennedy MD    Chief Complaint: Generalized weakness    Subjective:     Interval History: Patient does not speak English however family in room.  Patient remains very weak.  Nephrology has been consulted for dialysis.  PT OT has been consulted.  Family states she has been having diarrhea about 10-12 episodes, will check C. difficile today.  Also asking to resume her Flagyl that she has been taking for H. pylori infection.        Review of Systems:   Review of Systems  10 point ROS is negative other than what is stated positive above  Objective:     Vital Signs  Temp:  [97.4 °F (36.3 °C)-97.6 °F (36.4 °C)] 97.6 °F (36.4 °C)  Heart Rate:  [60-70] 60  Resp:  [9-16] 12  BP: ()/(35-85) 160/62   Body mass index is 24.33 kg/m².    Physical Exam  Physical Exam  General: 85 yo F, lethargic but arousable, well nourished, no acute distress.  HENT: Normocephalic, normal hearing, moist oral mucosa, no scleral icterus.  Neck: Supple, nontender, no carotid bruits, no JVD, no LAD.  Lungs: Clear to auscultation, nonlabored respiration.  Heart: RRR, no murmur, gallop or edema.  Abdomen: Soft, nontender, nondistended, + bowel sounds.  Musculoskeletal: Normal range of motion and strength, no tenderness or swelling.  Skin: Skin is warm, dry and pink, no rashes or lesions.  Psychiatric: Cooperative, appropriate mood and affec      Diagnostic Data    Results from last 7 days   Lab Units 25  0538 25  1532   WBC 10*3/mm3 3.49  --    HEMOGLOBIN g/dL 9.8*  --    HEMATOCRIT % 31.0*  --    PLATELETS 10*3/mm3 206  --    GLUCOSE mg/dL 116* 127*   CREATININE mg/dL 4.96* 4.47*   BUN mg/dL 74.3* 64.7*   SODIUM mmol/L 125* 127*   POTASSIUM mmol/L 4.9 5.4*   AST (SGOT) U/L  --  47*   ALT (SGPT) U/L  --  23   ALK PHOS U/L  --  122*   BILIRUBIN mg/dL  --  0.4   ANION GAP mmol/L 11.8  12.3       XR Abdomen KUB  Result Date: 7/2/2025  Impression: Nonobstructive bowel gas pattern. Electronically Signed: Deondre Long MD  7/2/2025 9:26 PM EDT  Workstation ID: [id number]        I reviewed the patient's new clinical results.    Assessment/Plan:     Active and Resolved Problems  Active Hospital Problems    Diagnosis  POA    **Generalized weakness [R53.1]  Yes      Resolved Hospital Problems   No resolved problems to display.     Generalized weakness  Elevated troponin  Hyponatremia  Hyperkalemia  ESRD on HD  H/o renal artery stenosis s/p L renal stent     Sodium 125 today, down from 127.  Troponins initially mildly elevated  Nephrology consulted.  Plan for dialysis.  Hemodialysis schedule Monday Wednesday Friday.  Trend sodium after dialysis  Hyperkalemia has resolved    Echo pending  Avoid nephrotoxic medications  Pharmacy to renally dose medication  Nephrology consulted to manage   PT/OT consulted      Constipation however family stating patient has been having 10-12 bowel movements  Nausea  Zofran as needed       Leukopenia  WBC 2.4, previously 8-10   No concern for infection   Continue to monitor CBC      Anemia of chronic disease  Previously admitted with melena  Underwent EGD 6/19/25 /Colonoscopy 6/22/25 no acute findings but biopsies positive for H. Pylori  Hgb stable  Continue to monitor CBC  Transfuse if Hgb < 7  Continue treatment once reconciled      SSS s/p PPM  Hypertensive urgency  Hyperlipidemia   BP elevated on ED arrival with SBP > 200  Hydralazine as needed with parameters  Continue Clonidine, Hydralazine, Lisinopril, Metoprolol --blood pressure better as compared to yesterday  Continue ASA, statin, Plavix     Diabetes Mellitus   A1c 5.40  Consistent carb diet  Hypoglycemia protocol     Has been on Flagyl p.o. at home for H. pylori, resumed.      VTE Prophylaxis:  Pharmacologic & mechanical VTE prophylaxis orders are present.             Disposition Planning:         Time: 35 minutes      Code Status and Medical Interventions: CPR (Attempt to Resuscitate); Full Support   Ordered at: 07/02/25 1755     Code Status (Patient has no pulse and is not breathing):    CPR (Attempt to Resuscitate)     Medical Interventions (Patient has pulse or is breathing):    Full Support       Signature: Electronically signed by Kaitlin Kennedy MD, 07/03/25, 13:05 EDT.  [facility]

## 2025-07-03 NOTE — PLAN OF CARE
Problem: Adult Inpatient Plan of Care  Goal: Absence of Hospital-Acquired Illness or Injury  Intervention: Identify and Manage Fall Risk  Recent Flowsheet Documentation  Taken 7/3/2025 0600 by Eder Byers, RN  Safety Promotion/Fall Prevention:   safety round/check completed   room organization consistent   nonskid shoes/slippers when out of bed   fall prevention program maintained   clutter free environment maintained   assistive device/personal items within reach   activity supervised  Taken 7/3/2025 0408 by Eder Byers RN  Safety Promotion/Fall Prevention:   room organization consistent   safety round/check completed   nonskid shoes/slippers when out of bed   fall prevention program maintained   clutter free environment maintained   assistive device/personal items within reach   activity supervised  Taken 7/3/2025 0200 by Eder Byers, RN  Safety Promotion/Fall Prevention:   safety round/check completed   room organization consistent   nonskid shoes/slippers when out of bed   fall prevention program maintained   clutter free environment maintained   assistive device/personal items within reach   activity supervised  Taken 7/3/2025 0009 by Eder Byers, RN  Safety Promotion/Fall Prevention:   room organization consistent   safety round/check completed   nonskid shoes/slippers when out of bed   fall prevention program maintained   clutter free environment maintained   assistive device/personal items within reach   activity supervised  Taken 7/2/2025 2200 by Eder Byers, RN  Safety Promotion/Fall Prevention:   safety round/check completed   room organization consistent   nonskid shoes/slippers when out of bed   fall prevention program maintained   clutter free environment maintained   assistive device/personal items within reach   activity supervised  Taken 7/2/2025 1941 by Eder Byers, RN  Safety Promotion/Fall Prevention:   room organization consistent   safety round/check completed    nonskid shoes/slippers when out of bed   fall prevention program maintained   clutter free environment maintained   assistive device/personal items within reach   activity supervised  Intervention: Prevent Skin Injury  Recent Flowsheet Documentation  Taken 7/3/2025 0408 by Eder Byers RN  Body Position: supine  Skin Protection:   drying agents applied   incontinence pads utilized   skin sealant/moisture barrier applied   transparent dressing maintained  Taken 7/3/2025 0009 by Eder Byers RN  Body Position: supine  Skin Protection:   drying agents applied   incontinence pads utilized   skin sealant/moisture barrier applied   transparent dressing maintained  Taken 7/2/2025 1941 by Eder Byers RN  Body Position: supine  Skin Protection:   drying agents applied   incontinence pads utilized   skin sealant/moisture barrier applied   transparent dressing maintained  Intervention: Prevent and Manage VTE (Venous Thromboembolism) Risk  Recent Flowsheet Documentation  Taken 7/2/2025 1941 by Eder Byers RN  VTE Prevention/Management:   SCDs (sequential compression devices) on   bilateral  Intervention: Prevent Infection  Recent Flowsheet Documentation  Taken 7/3/2025 0600 by Eder Byers RN  Infection Prevention:   single patient room provided   rest/sleep promoted   hand hygiene promoted   environmental surveillance performed  Taken 7/3/2025 0408 by Eder Byers RN  Infection Prevention:   single patient room provided   rest/sleep promoted   hand hygiene promoted   environmental surveillance performed  Taken 7/3/2025 0200 by Eder Byers RN  Infection Prevention:   single patient room provided   rest/sleep promoted   hand hygiene promoted   environmental surveillance performed  Taken 7/3/2025 0009 by Eder Byers RN  Infection Prevention:   hand hygiene promoted   rest/sleep promoted   single patient room provided   environmental surveillance performed  Taken 7/2/2025 2200 by Modesta  Eder, RN  Infection Prevention:   single patient room provided   rest/sleep promoted   hand hygiene promoted   environmental surveillance performed  Taken 7/2/2025 1941 by Eder Byers, RN  Infection Prevention:   single patient room provided   rest/sleep promoted   hand hygiene promoted   environmental surveillance performed   Goal Outcome Evaluation:

## 2025-07-03 NOTE — NURSING NOTE
Treatment completed. UF limited to 1L due to asymptomatic hypotension.  Aox4, not in distress at the end of treatment.  Access is intact, heparin locked.   Report given to primary RN.

## 2025-07-03 NOTE — PLAN OF CARE
Problem: Adult Inpatient Plan of Care  Goal: Plan of Care Review  Outcome: Progressing  Goal: Patient-Specific Goal (Individualized)  Outcome: Progressing  Goal: Absence of Hospital-Acquired Illness or Injury  Outcome: Progressing  Intervention: Identify and Manage Fall Risk  Recent Flowsheet Documentation  Taken 7/3/2025 1403 by Ira Lopez RN  Safety Promotion/Fall Prevention:   activity supervised   assistive device/personal items within reach   clutter free environment maintained   fall prevention program maintained   gait belt   nonskid shoes/slippers when out of bed   safety round/check completed   room organization consistent  Taken 7/3/2025 1200 by Ira Lopez RN  Safety Promotion/Fall Prevention:   activity supervised   assistive device/personal items within reach   clutter free environment maintained   fall prevention program maintained   nonskid shoes/slippers when out of bed   room organization consistent   safety round/check completed  Taken 7/3/2025 1000 by Ira Lopez RN  Safety Promotion/Fall Prevention: patient off unit  Taken 7/3/2025 0800 by Ira Lopez RN  Safety Promotion/Fall Prevention: (pt in dialysis) patient off unit  Intervention: Prevent Skin Injury  Recent Flowsheet Documentation  Taken 7/3/2025 1403 by Ira Lopez RN  Body Position: position changed independently  Taken 7/3/2025 1200 by Ira Lopez RN  Body Position: position changed independently  Skin Protection:   incontinence pads utilized   transparent dressing maintained  Intervention: Prevent Infection  Recent Flowsheet Documentation  Taken 7/3/2025 1403 by Ira Lopez RN  Infection Prevention:   hand hygiene promoted   personal protective equipment utilized   single patient room provided  Taken 7/3/2025 1200 by Ira Lopez RN  Infection Prevention:   hand hygiene promoted   personal protective equipment utilized   single patient room provided  Goal: Optimal Comfort and Wellbeing  Outcome:  Progressing  Intervention: Provide Person-Centered Care  Recent Flowsheet Documentation  Taken 7/3/2025 1200 by Ira Lopez RN  Trust Relationship/Rapport:   care explained   choices provided   questions answered   questions encouraged  Goal: Readiness for Transition of Care  Outcome: Progressing     Problem: Fall Injury Risk  Goal: Absence of Fall and Fall-Related Injury  Outcome: Progressing  Intervention: Promote Injury-Free Environment  Recent Flowsheet Documentation  Taken 7/3/2025 1403 by Ira Lopez, RN  Safety Promotion/Fall Prevention:   activity supervised   assistive device/personal items within reach   clutter free environment maintained   fall prevention program maintained   gait belt   nonskid shoes/slippers when out of bed   safety round/check completed   room organization consistent  Taken 7/3/2025 1200 by Ira Lopez RN  Safety Promotion/Fall Prevention:   activity supervised   assistive device/personal items within reach   clutter free environment maintained   fall prevention program maintained   nonskid shoes/slippers when out of bed   room organization consistent   safety round/check completed  Taken 7/3/2025 1000 by Ira Lopez, RN  Safety Promotion/Fall Prevention: patient off unit  Taken 7/3/2025 0800 by Ira Lopez, RN  Safety Promotion/Fall Prevention: (pt in dialysis) patient off unit   Goal Outcome Evaluation:

## 2025-07-04 LAB
ANION GAP SERPL CALCULATED.3IONS-SCNC: 11.8 MMOL/L (ref 5–15)
BASOPHILS # BLD AUTO: 0.02 10*3/MM3 (ref 0–0.2)
BASOPHILS NFR BLD AUTO: 0.6 % (ref 0–1.5)
BUN SERPL-MCNC: 34.4 MG/DL (ref 8–23)
BUN/CREAT SERPL: 11.4 (ref 7–25)
CALCIUM SPEC-SCNC: 8 MG/DL (ref 8.6–10.5)
CHLORIDE SERPL-SCNC: 97 MMOL/L (ref 98–107)
CO2 SERPL-SCNC: 21.2 MMOL/L (ref 22–29)
CREAT SERPL-MCNC: 3.03 MG/DL (ref 0.57–1)
DEPRECATED RDW RBC AUTO: 54.1 FL (ref 37–54)
EGFRCR SERPLBLD CKD-EPI 2021: 14.7 ML/MIN/1.73
EOSINOPHIL # BLD AUTO: 0.07 10*3/MM3 (ref 0–0.4)
EOSINOPHIL NFR BLD AUTO: 1.9 % (ref 0.3–6.2)
ERYTHROCYTE [DISTWIDTH] IN BLOOD BY AUTOMATED COUNT: 17.5 % (ref 12.3–15.4)
GLUCOSE SERPL-MCNC: 101 MG/DL (ref 65–99)
HCT VFR BLD AUTO: 29.2 % (ref 34–46.6)
HGB BLD-MCNC: 9.4 G/DL (ref 12–15.9)
IMM GRANULOCYTES # BLD AUTO: 0.01 10*3/MM3 (ref 0–0.05)
IMM GRANULOCYTES NFR BLD AUTO: 0.3 % (ref 0–0.5)
LYMPHOCYTES # BLD AUTO: 1.17 10*3/MM3 (ref 0.7–3.1)
LYMPHOCYTES NFR BLD AUTO: 32.6 % (ref 19.6–45.3)
MAGNESIUM SERPL-MCNC: 1.9 MG/DL (ref 1.6–2.4)
MCH RBC QN AUTO: 27.6 PG (ref 26.6–33)
MCHC RBC AUTO-ENTMCNC: 32.2 G/DL (ref 31.5–35.7)
MCV RBC AUTO: 85.9 FL (ref 79–97)
MONOCYTES # BLD AUTO: 0.38 10*3/MM3 (ref 0.1–0.9)
MONOCYTES NFR BLD AUTO: 10.6 % (ref 5–12)
NEUTROPHILS NFR BLD AUTO: 1.94 10*3/MM3 (ref 1.7–7)
NEUTROPHILS NFR BLD AUTO: 54 % (ref 42.7–76)
NRBC BLD AUTO-RTO: 0 /100 WBC (ref 0–0.2)
PHOSPHATE SERPL-MCNC: 5.5 MG/DL (ref 2.5–4.5)
PLATELET # BLD AUTO: 196 10*3/MM3 (ref 140–450)
PMV BLD AUTO: 9.7 FL (ref 6–12)
POTASSIUM SERPL-SCNC: 4.5 MMOL/L (ref 3.5–5.2)
RBC # BLD AUTO: 3.4 10*6/MM3 (ref 3.77–5.28)
SODIUM SERPL-SCNC: 130 MMOL/L (ref 136–145)
WBC NRBC COR # BLD AUTO: 3.59 10*3/MM3 (ref 3.4–10.8)

## 2025-07-04 PROCEDURE — 85025 COMPLETE CBC W/AUTO DIFF WBC: CPT

## 2025-07-04 PROCEDURE — 83735 ASSAY OF MAGNESIUM: CPT

## 2025-07-04 PROCEDURE — 80048 BASIC METABOLIC PNL TOTAL CA: CPT

## 2025-07-04 PROCEDURE — 25010000002 HYDRALAZINE PER 20 MG

## 2025-07-04 PROCEDURE — 25010000002 EPOETIN ALFA-EPBX 10000 UNIT/ML SOLUTION: Performed by: INTERNAL MEDICINE

## 2025-07-04 PROCEDURE — 84100 ASSAY OF PHOSPHORUS: CPT

## 2025-07-04 PROCEDURE — 25010000002 HEPARIN (PORCINE) PER 1000 UNITS: Performed by: INTERNAL MEDICINE

## 2025-07-04 RX ORDER — CLONIDINE HYDROCHLORIDE 0.1 MG/1
0.3 TABLET ORAL EVERY 8 HOURS SCHEDULED
Status: DISCONTINUED | OUTPATIENT
Start: 2025-07-04 | End: 2025-07-05

## 2025-07-04 RX ADMIN — HYDRALAZINE HYDROCHLORIDE 20 MG: 20 INJECTION INTRAMUSCULAR; INTRAVENOUS at 11:04

## 2025-07-04 RX ADMIN — METRONIDAZOLE 375 MG: 500 TABLET ORAL at 08:29

## 2025-07-04 RX ADMIN — TETRACYCLINE HYDROCHLORIDE 500 MG: 500 CAPSULE ORAL at 08:31

## 2025-07-04 RX ADMIN — METRONIDAZOLE 375 MG: 500 TABLET ORAL at 11:04

## 2025-07-04 RX ADMIN — METRONIDAZOLE 375 MG: 500 TABLET ORAL at 18:21

## 2025-07-04 RX ADMIN — HEPARIN SODIUM 4100 UNITS: 1000 INJECTION INTRAVENOUS; SUBCUTANEOUS at 16:15

## 2025-07-04 RX ADMIN — LOSARTAN POTASSIUM 100 MG: 50 TABLET, FILM COATED ORAL at 08:29

## 2025-07-04 RX ADMIN — BISMUTH SUBSALICYLATE 524 MG: 262 TABLET, CHEWABLE ORAL at 21:22

## 2025-07-04 RX ADMIN — ATORVASTATIN CALCIUM 80 MG: 40 TABLET, FILM COATED ORAL at 08:28

## 2025-07-04 RX ADMIN — HYDRALAZINE HYDROCHLORIDE 100 MG: 25 TABLET ORAL at 08:28

## 2025-07-04 RX ADMIN — DOCUSATE SODIUM 100 MG: 100 CAPSULE, LIQUID FILLED ORAL at 21:22

## 2025-07-04 RX ADMIN — CLONIDINE HYDROCHLORIDE 0.3 MG: 0.1 TABLET ORAL at 08:31

## 2025-07-04 RX ADMIN — ASPIRIN 81 MG CHEWABLE TABLET 81 MG: 81 TABLET CHEWABLE at 08:31

## 2025-07-04 RX ADMIN — HYDRALAZINE HYDROCHLORIDE 20 MG: 20 INJECTION INTRAMUSCULAR; INTRAVENOUS at 03:27

## 2025-07-04 RX ADMIN — METRONIDAZOLE 375 MG: 500 TABLET ORAL at 21:22

## 2025-07-04 RX ADMIN — PANTOPRAZOLE SODIUM 40 MG: 40 TABLET, DELAYED RELEASE ORAL at 08:29

## 2025-07-04 RX ADMIN — HYDRALAZINE HYDROCHLORIDE 100 MG: 25 TABLET ORAL at 15:47

## 2025-07-04 RX ADMIN — EPOETIN ALFA-EPBX 10000 UNITS: 10000 INJECTION, SOLUTION INTRAVENOUS; SUBCUTANEOUS at 16:15

## 2025-07-04 RX ADMIN — CLOPIDOGREL BISULFATE 75 MG: 75 TABLET, FILM COATED ORAL at 08:28

## 2025-07-04 RX ADMIN — METOPROLOL SUCCINATE 25 MG: 25 TABLET, EXTENDED RELEASE ORAL at 08:28

## 2025-07-04 RX ADMIN — HYDRALAZINE HYDROCHLORIDE 100 MG: 25 TABLET ORAL at 21:21

## 2025-07-04 RX ADMIN — DOCUSATE SODIUM 100 MG: 100 CAPSULE, LIQUID FILLED ORAL at 08:28

## 2025-07-04 RX ADMIN — CLONIDINE HYDROCHLORIDE 0.3 MG: 0.1 TABLET ORAL at 21:22

## 2025-07-04 NOTE — PROGRESS NOTES
"RENAL/KCC:     LOS: 2 days    Patient Care Team:  Brian Nazario APRN as PCP - General  Zane Aldridge MD as Consulting Physician (Cardiology)  Marnie Brandt APRN as Nurse Practitioner (Cardiology)    Chief Complaint:  Abd pain, N/V    Subjective     Interval History:   Chart reviewed  Feels much better this AM  For HD    Objective     Vital Sign Min/Max for last 24 hours  Temp  Min: 97.5 °F (36.4 °C)  Max: 97.8 °F (36.6 °C)   BP  Min: 86/43  Max: 189/84   Pulse  Min: 60  Max: 73   Resp  Min: 9  Max: 14   SpO2  Min: 96 %  Max: 100 %   No data recorded   Weight  Min: 60.8 kg (134 lb 0.6 oz)  Max: 60.8 kg (134 lb 0.6 oz)     Flowsheet Rows      Flowsheet Row First Filed Value   Admission Height 160 cm (63\") Documented at 07/02/2025 1312   Admission Weight 62 kg (136 lb 9.6 oz) Documented at 07/02/2025 1312            No intake/output data recorded.  I/O last 3 completed shifts:  In: 960 [P.O.:960]  Out: 1000     Physical Exam:  GEN: Awake, NAD  ENT: PERRL, EOMI, MMM  NECK: Supple, no JVD  CHEST: CTAB, no W/R/C  CV: RRR, no M/G/R  ABD: Soft, NT, +BS  SKIN: Warm and Dry  NEURO: CN's intact      WBC WBC   Date Value Ref Range Status   07/04/2025 3.59 3.40 - 10.80 10*3/mm3 Final   07/03/2025 3.49 3.40 - 10.80 10*3/mm3 Final   07/02/2025 2.44 (L) 3.40 - 10.80 10*3/mm3 Final      HGB Hemoglobin   Date Value Ref Range Status   07/04/2025 9.4 (L) 12.0 - 15.9 g/dL Final   07/03/2025 9.8 (L) 12.0 - 15.9 g/dL Final   07/02/2025 9.9 (L) 12.0 - 15.9 g/dL Final      HCT Hematocrit   Date Value Ref Range Status   07/04/2025 29.2 (L) 34.0 - 46.6 % Final   07/03/2025 31.0 (L) 34.0 - 46.6 % Final   07/02/2025 31.3 (L) 34.0 - 46.6 % Final      Platlets No results found for: \"LABPLAT\"   MCV MCV   Date Value Ref Range Status   07/04/2025 85.9 79.0 - 97.0 fL Final   07/03/2025 87.1 79.0 - 97.0 fL Final   07/02/2025 86.5 79.0 - 97.0 fL Final          Sodium Sodium   Date Value Ref Range Status   07/04/2025 130 (L) 136 - 145 mmol/L " "Final   07/03/2025 125 (L) 136 - 145 mmol/L Final   07/02/2025 127 (L) 136 - 145 mmol/L Final      Potassium Potassium   Date Value Ref Range Status   07/04/2025 4.5 3.5 - 5.2 mmol/L Final   07/03/2025 4.9 3.5 - 5.2 mmol/L Final   07/02/2025 5.4 (H) 3.5 - 5.2 mmol/L Final      Chloride Chloride   Date Value Ref Range Status   07/04/2025 97 (L) 98 - 107 mmol/L Final   07/03/2025 94 (L) 98 - 107 mmol/L Final   07/02/2025 93 (L) 98 - 107 mmol/L Final      CO2 CO2   Date Value Ref Range Status   07/04/2025 21.2 (L) 22.0 - 29.0 mmol/L Final   07/03/2025 19.2 (L) 22.0 - 29.0 mmol/L Final   07/02/2025 21.7 (L) 22.0 - 29.0 mmol/L Final      BUN BUN   Date Value Ref Range Status   07/04/2025 34.4 (H) 8.0 - 23.0 mg/dL Final   07/03/2025 74.3 (H) 8.0 - 23.0 mg/dL Final   07/02/2025 64.7 (H) 8.0 - 23.0 mg/dL Final      Creatinine Creatinine   Date Value Ref Range Status   07/04/2025 3.03 (H) 0.57 - 1.00 mg/dL Final   07/03/2025 4.96 (H) 0.57 - 1.00 mg/dL Final   07/02/2025 4.47 (H) 0.57 - 1.00 mg/dL Final      Calcium Calcium   Date Value Ref Range Status   07/04/2025 8.0 (L) 8.6 - 10.5 mg/dL Final   07/03/2025 8.5 (L) 8.6 - 10.5 mg/dL Final   07/02/2025 8.8 8.6 - 10.5 mg/dL Final      PO4 No results found for: \"CAPO4\"   Albumin Albumin   Date Value Ref Range Status   07/02/2025 3.3 (L) 3.5 - 5.2 g/dL Final      Magnesium Magnesium   Date Value Ref Range Status   07/04/2025 1.9 1.6 - 2.4 mg/dL Final   07/03/2025 2.0 1.6 - 2.4 mg/dL Final   07/02/2025 1.9 1.6 - 2.4 mg/dL Final      Uric Acid No results found for: \"URICACID\"        Results Review:     I reviewed the patient's new clinical results.    aspirin, 81 mg, Oral, Daily  atorvastatin, 80 mg, Oral, Daily  Bismuth, 524 mg, Oral, BID  cloNIDine, 0.3 mg, Oral, BID  clopidogrel, 75 mg, Oral, Daily  docusate sodium, 100 mg, Oral, Q12H  hydrALAZINE, 100 mg, Oral, TID  losartan, 100 mg, Oral, Q24H  metoprolol succinate XL, 25 mg, Oral, Daily  metroNIDAZOLE, 375 mg, Oral, 4x " Daily  pantoprazole, 40 mg, Oral, Daily  tetracycline, 500 mg, Oral, Daily      Medication Review: Reviewed    Assessment & Plan     ESRD  Hyponatremia  HTN  H Pylori  Weakness  N/V/Abd pain  Anemia of CKD  Met acidosis     PLAN: HD today to resume MWF schedule.  ABX for H Pylori per primary.  Feeling much better.  Would be OK for D/C after HD from a renal standpoint.        Jim Farooq MD  [facility]  07/04/25  07:14 EDT

## 2025-07-04 NOTE — PLAN OF CARE
Problem: Adult Inpatient Plan of Care  Goal: Absence of Hospital-Acquired Illness or Injury  Intervention: Identify and Manage Fall Risk  Recent Flowsheet Documentation  Taken 7/4/2025 0401 by Amanda Kent, RN  Safety Promotion/Fall Prevention:   assistive device/personal items within reach   clutter free environment maintained   fall prevention program maintained   nonskid shoes/slippers when out of bed   room organization consistent   safety round/check completed  Taken 7/4/2025 0206 by Amanda Kent, RN  Safety Promotion/Fall Prevention:   assistive device/personal items within reach   clutter free environment maintained   fall prevention program maintained   nonskid shoes/slippers when out of bed   room organization consistent   safety round/check completed  Taken 7/4/2025 0200 by Amanda Kent, RN  Safety Promotion/Fall Prevention:   assistive device/personal items within reach   clutter free environment maintained   fall prevention program maintained   nonskid shoes/slippers when out of bed   room organization consistent   safety round/check completed  Taken 7/4/2025 0001 by Amanda Kent, RN  Safety Promotion/Fall Prevention:   assistive device/personal items within reach   clutter free environment maintained   fall prevention program maintained   nonskid shoes/slippers when out of bed   room organization consistent   safety round/check completed  Taken 7/3/2025 2200 by Amanda Kent, RN  Safety Promotion/Fall Prevention:   assistive device/personal items within reach   clutter free environment maintained   fall prevention program maintained   nonskid shoes/slippers when out of bed   room organization consistent   safety round/check completed  Taken 7/3/2025 2001 by Amanda Kent, RN  Safety Promotion/Fall Prevention:   assistive device/personal items within reach   clutter free environment maintained   fall prevention program maintained   nonskid shoes/slippers when out of bed   room  organization consistent   safety round/check completed  Intervention: Prevent Skin Injury  Recent Flowsheet Documentation  Taken 7/3/2025 2001 by Amanda Kent RN  Body Position: position changed independently  Intervention: Prevent and Manage VTE (Venous Thromboembolism) Risk  Recent Flowsheet Documentation  Taken 7/4/2025 0401 by Amanda Kent RN  VTE Prevention/Management: SCDs (sequential compression devices) on  Taken 7/4/2025 0001 by Amanda Kent RN  VTE Prevention/Management: SCDs (sequential compression devices) on  Taken 7/3/2025 2001 by Amanda Kent RN  VTE Prevention/Management: SCDs (sequential compression devices) on  Intervention: Prevent Infection  Recent Flowsheet Documentation  Taken 7/3/2025 2001 by Amanda Kent RN  Infection Prevention:   environmental surveillance performed   hand hygiene promoted   personal protective equipment utilized   single patient room provided  Goal: Optimal Comfort and Wellbeing  Intervention: Provide Person-Centered Care  Recent Flowsheet Documentation  Taken 7/3/2025 2001 by Amanda Kent RN  Trust Relationship/Rapport:   care explained   choices provided   questions encouraged     Problem: Fall Injury Risk  Goal: Absence of Fall and Fall-Related Injury  Intervention: Identify and Manage Contributors  Recent Flowsheet Documentation  Taken 7/3/2025 2001 by Amanda Kent RN  Medication Review/Management: medications reviewed  Intervention: Promote Injury-Free Environment  Recent Flowsheet Documentation  Taken 7/4/2025 0401 by Amanda Kent RN  Safety Promotion/Fall Prevention:   assistive device/personal items within reach   clutter free environment maintained   fall prevention program maintained   nonskid shoes/slippers when out of bed   room organization consistent   safety round/check completed  Taken 7/4/2025 0206 by Amanda Kent RN  Safety Promotion/Fall Prevention:   assistive device/personal items within reach   clutter free  environment maintained   fall prevention program maintained   nonskid shoes/slippers when out of bed   room organization consistent   safety round/check completed  Taken 7/4/2025 0200 by Amanda Kent, RN  Safety Promotion/Fall Prevention:   assistive device/personal items within reach   clutter free environment maintained   fall prevention program maintained   nonskid shoes/slippers when out of bed   room organization consistent   safety round/check completed  Taken 7/4/2025 0001 by Amanda Kent, RN  Safety Promotion/Fall Prevention:   assistive device/personal items within reach   clutter free environment maintained   fall prevention program maintained   nonskid shoes/slippers when out of bed   room organization consistent   safety round/check completed  Taken 7/3/2025 2200 by Amanda Kent, RN  Safety Promotion/Fall Prevention:   assistive device/personal items within reach   clutter free environment maintained   fall prevention program maintained   nonskid shoes/slippers when out of bed   room organization consistent   safety round/check completed  Taken 7/3/2025 2001 by Amanda Kent, RN  Safety Promotion/Fall Prevention:   assistive device/personal items within reach   clutter free environment maintained   fall prevention program maintained   nonskid shoes/slippers when out of bed   room organization consistent   safety round/check completed   Goal Outcome Evaluation:

## 2025-07-04 NOTE — PLAN OF CARE
Problem: Adult Inpatient Plan of Care  Goal: Plan of Care Review  Outcome: Progressing  Goal: Patient-Specific Goal (Individualized)  Outcome: Progressing  Goal: Absence of Hospital-Acquired Illness or Injury  Outcome: Progressing  Intervention: Identify and Manage Fall Risk  Recent Flowsheet Documentation  Taken 7/4/2025 1000 by Phylicia Cameron RN  Safety Promotion/Fall Prevention:   assistive device/personal items within reach   activity supervised   clutter free environment maintained   fall prevention program maintained   nonskid shoes/slippers when out of bed   room organization consistent   safety round/check completed  Taken 7/4/2025 0800 by Phylicia Cameron RN  Safety Promotion/Fall Prevention:   assistive device/personal items within reach   activity supervised   clutter free environment maintained   fall prevention program maintained   nonskid shoes/slippers when out of bed   room organization consistent   safety round/check completed  Intervention: Prevent Skin Injury  Recent Flowsheet Documentation  Taken 7/4/2025 0800 by Phylicia Cameron RN  Body Position: position changed independently  Skin Protection: incontinence pads utilized  Intervention: Prevent and Manage VTE (Venous Thromboembolism) Risk  Recent Flowsheet Documentation  Taken 7/4/2025 0800 by Phylicia Cameron RN  VTE Prevention/Management: SCDs (sequential compression devices) on  Intervention: Prevent Infection  Recent Flowsheet Documentation  Taken 7/4/2025 1000 by Phylicia Cameron RN  Infection Prevention:   hand hygiene promoted   personal protective equipment utilized   rest/sleep promoted   single patient room provided   environmental surveillance performed  Taken 7/4/2025 0800 by Phylicia Cameron RN  Infection Prevention:   hand hygiene promoted   personal protective equipment utilized   rest/sleep promoted   single patient room provided   environmental surveillance performed  Goal: Optimal Comfort and Wellbeing  Outcome:  Patient returned call and was given information and verbalized understanding. Progressing  Goal: Readiness for Transition of Care  Outcome: Progressing     Problem: Fall Injury Risk  Goal: Absence of Fall and Fall-Related Injury  Outcome: Progressing  Intervention: Identify and Manage Contributors  Recent Flowsheet Documentation  Taken 7/4/2025 1000 by Phylicia Cameron RN  Medication Review/Management: medications reviewed  Taken 7/4/2025 0800 by Phylicia Cameron RN  Medication Review/Management: medications reviewed  Intervention: Promote Injury-Free Environment  Recent Flowsheet Documentation  Taken 7/4/2025 1000 by Phylicia Cameron, RN  Safety Promotion/Fall Prevention:   assistive device/personal items within reach   activity supervised   clutter free environment maintained   fall prevention program maintained   nonskid shoes/slippers when out of bed   room organization consistent   safety round/check completed  Taken 7/4/2025 0800 by Phylicia Cameron RN  Safety Promotion/Fall Prevention:   assistive device/personal items within reach   activity supervised   clutter free environment maintained   fall prevention program maintained   nonskid shoes/slippers when out of bed   room organization consistent   safety round/check completed   Goal Outcome Evaluation:

## 2025-07-04 NOTE — PROGRESS NOTES
[facility] MEDICINE SERVICE  DAILY PROGRESS NOTE    NAME: Brad Woodward  : 1940  MRN: [id number]      LOS: 2 days     PROVIDER OF SERVICE: Kaitlin Kennedy MD    Chief Complaint: Generalized weakness    Subjective:     Interval History: Patient does not speak English however family in room.  Patient remains very weak.  Nephrology has been consulted for dialysis.  PT OT has been consulted.  Family states she has been having diarrhea about 10-12 episodes, will check C. difficile today.  Also asking to resume her Flagyl that she has been taking for H. pylori infection.    7/patient feeling better today however still has elevated blood pressure.  Adjusted the dose of clonidine.  Continue PT OT.        Review of Systems:   Review of Systems  10 point ROS is negative other than what is stated positive above  Objective:     Vital Signs  Temp:  [97.5 °F (36.4 °C)-97.8 °F (36.6 °C)] 97.6 °F (36.4 °C)  Heart Rate:  [60-75] 61  Resp:  [11-18] 18  BP: (132-199)/(48-84) 199/72   Body mass index is 23.74 kg/m².    Physical Exam  Physical Exam  General: 83 yo F, lethargic but arousable, well nourished, no acute distress.  HENT: Normocephalic, normal hearing, moist oral mucosa, no scleral icterus.  Neck: Supple, nontender, no carotid bruits, no JVD, no LAD.  Lungs: Clear to auscultation, nonlabored respiration.  Heart: RRR, no murmur, gallop or edema.  Abdomen: Soft, nontender, nondistended, + bowel sounds.  Musculoskeletal: Normal range of motion and strength, no tenderness or swelling.  Skin: Skin is warm, dry and pink, no rashes or lesions.  Psychiatric: Cooperative, appropriate mood and affec      Diagnostic Data    Results from last 7 days   Lab Units 25  0120 25  0538 25  1532   WBC 10*3/mm3 3.59   < >  --    HEMOGLOBIN g/dL 9.4*   < >  --    HEMATOCRIT % 29.2*   < >  --    PLATELETS 10*3/mm3 196   < >  --    GLUCOSE mg/dL 101*   < > 127*   CREATININE mg/dL 3.03*   < > 4.47*   BUN mg/dL 34.4*   < >  64.7*   SODIUM mmol/L 130*   < > 127*   POTASSIUM mmol/L 4.5   < > 5.4*   AST (SGOT) U/L  --   --  47*   ALT (SGPT) U/L  --   --  23   ALK PHOS U/L  --   --  122*   BILIRUBIN mg/dL  --   --  0.4   ANION GAP mmol/L 11.8   < > 12.3    < > = values in this interval not displayed.       XR Abdomen KUB  Result Date: 7/2/2025  Impression: Nonobstructive bowel gas pattern. Electronically Signed: Deondre Long MD  7/2/2025 9:26 PM EDT  Workstation ID: [id number]        I reviewed the patient's new clinical results.    Assessment/Plan:     Active and Resolved Problems  Active Hospital Problems    Diagnosis  POA    **Generalized weakness [R53.1]  Yes      Resolved Hospital Problems   No resolved problems to display.     Generalized weakness  Elevated troponin  Hyponatremia  Hyperkalemia  ESRD on HD  H/o renal artery stenosis s/p L renal stent     Hyponatremia improving -troponins initially mildly elevated  Nephrology consulted.  Plan for dialysis.  Hemodialysis schedule Monday Wednesday Friday.    Hyperkalemia has resolved    Hypertensive urgency   echo pending  Adjusted blood pressure medications, increase clonidine to 0.3 p.o. 3 times daily from twice daily dosing.  Avoid nephrotoxic medications  Pharmacy to renally dose medication  Nephrology consulted to manage   PT/OT consulted      Constipation however family stating patient has been having 10-12 bowel movements  Ordered C. difficile however not done yet resolved  Nausea  Zofran as needed       Leukopenia  Resolved  Continue to monitor CBC      Anemia of chronic disease  Previously admitted with melena  Underwent EGD 6/19/25 /Colonoscopy 6/22/25 no acute findings but biopsies positive for H. Pylori  Hgb stable  Continue to monitor CBC  Transfuse if Hgb < 7  Continue treatment once reconciled      SSS s/p PPM  Hypertensive urgency  Hyperlipidemia   BP elevated on ED arrival with SBP > 200  Hydralazine as needed with parameters  Continue Clonidine, Hydralazine,  Lisinopril, Metoprolol --blood pressure better as compared to yesterday-- increased  clonidine to 3 times daily from twice daily dosing  Continue ASA, statin, Plavix     Diabetes Mellitus   A1c 5.40  Consistent carb diet  Hypoglycemia protocol     Has been on Flagyl p.o. at home for H. pylori, resumed.      VTE Prophylaxis:  Pharmacologic & mechanical VTE prophylaxis orders are present.             Disposition Planning:   Possible discharge tomorrow.  PT OT eval        Time: 35 minutes     Code Status and Medical Interventions: CPR (Attempt to Resuscitate); Full Support   Ordered at: 07/02/25 1751     Code Status (Patient has no pulse and is not breathing):    CPR (Attempt to Resuscitate)     Medical Interventions (Patient has pulse or is breathing):    Full Support       Signature: Electronically signed by Kaitlin Kennedy MD, 07/04/25, 11:06 EDT.  [facility] Hospitalist Team

## 2025-07-04 NOTE — CONSULTS
Patient Name: Brad Woodward  YOB: 1940  MRN: [id number]  Admission date: 7/2/2025  Reason for Encounter: MST 2-3 or Nursing Admission Screen    [facility] Clinical Nutrition Assessment     Subjective    Subjective Information     7/4: Pt was admitted with weakness. Nephrology is following for HD. RD was unable to visit with pt in person today. Per chart review, pt has lost 10# over 4 months (7% loss). So far this admission, intakes are adequate per RN charting. Family is also bringing in food.      Assessment    H&P and Current Problems      H&P  Past Medical History:   Diagnosis Date    Diabetes mellitus     Hyperlipidemia     Hypertension       Past Surgical History:   Procedure Laterality Date    ANGIOPLASTY ILIAC ARTERY Left 05/21/2025    Procedure: LEFT RENAL ARTERY ANGIOPLASTY WITH STENT;  Surgeon: Ted Mckee II, MD;  Location: [facility] HYBRID OR;  Service: Vascular;  Laterality: Left;    CARDIAC ELECTROPHYSIOLOGY PROCEDURE N/A 04/20/2021    Procedure: Pacemaker DC new;  Surgeon: Yovany Navarrete MD;  Location: [facility] CATH INVASIVE LOCATION;  Service: Cardiovascular;  Laterality: N/A;    COLONOSCOPY N/A 6/22/2025    Procedure: COLONOSCOPY;  Surgeon: Ramses Ashley MD;  Location: [facility] ENDOSCOPY;  Service: Gastroenterology;  Laterality: N/A;  post: poor prep    ENDOSCOPY N/A 6/19/2025    Procedure: ESOPHAGOGASTRODUODENOSCOPY WITH BIOPSY;  Surgeon: RAMON De Leon MD;  Location: [facility] ENDOSCOPY;  Service: Gastroenterology;  Laterality: N/A;  GASTRITIS, HIATAL HERNIA    PACEMAKER IMPLANTATION        Current Problems   Admission Diagnosis:  Weakness [R53.1]  Uncontrolled hypertension [I10]  Generalized weakness [R53.1]  Nausea and vomiting, unspecified vomiting type [R11.2]    Problem List:    Generalized weakness      Other Applicable Nutrition Information:   --Nephrology is following for HD needs      Anthropometrics      BMI, Height, Weight Estimated body mass index is 23.74 kg/m²  "as calculated from the following:    Height as of this encounter: 160 cm (63\").    Weight as of this encounter: 60.8 kg (134 lb 0.6 oz).    Weight Method: Bed scale       Trending Weight Changes 7/4: 134#   10# loss over 4 months (7% loss)        Weight History  Wt Readings from Last 20 Encounters:   07/04/25 0530 60.8 kg (134 lb 0.6 oz)   07/03/25 0445 62.3 kg (137 lb 5.6 oz)   07/02/25 1928 62.3 kg (137 lb 5.6 oz)   07/02/25 1312 62 kg (136 lb 9.6 oz)   06/18/25 1831 61.9 kg (136 lb 7.4 oz)   06/18/25 1117 61.2 kg (134 lb 14.7 oz)   06/10/25 1252 61.2 kg (135 lb)   06/05/25 0926 61.3 kg (135 lb 4 oz)   05/22/25 0400 62.2 kg (137 lb 2 oz)   05/20/25 0400 63.8 kg (140 lb 10.5 oz)   05/19/25 0400 64.1 kg (141 lb 5 oz)   05/18/25 0359 62 kg (136 lb 11 oz)   05/17/25 0427 62 kg (136 lb 11 oz)   05/16/25 2300 62 kg (136 lb 11 oz)   05/16/25 1725 62.1 kg (136 lb 14.5 oz)   04/29/25 0036 64.8 kg (142 lb 13.7 oz)   04/15/25 1243 65.7 kg (144 lb 13.5 oz)   04/04/24 1601 64.9 kg (143 lb)   03/20/23 1337 63.5 kg (140 lb)   07/18/22 1032 65.8 kg (145 lb)   06/13/22 1250 65.5 kg (144 lb 8 oz)   11/15/21 1348 68.5 kg (151 lb)   05/03/21 1259 68 kg (150 lb)   04/21/21 0447 71 kg (156 lb 8.4 oz)   04/20/21 1950 71 kg (156 lb 8.4 oz)   04/20/21 0408 71.4 kg (157 lb 8 oz)   04/19/21 0331 69.9 kg (154 lb 3.2 oz)   04/18/21 0410 70.9 kg (156 lb 4.8 oz)   04/17/21 0522 67.9 kg (149 lb 12.8 oz)   04/16/21 0857 71.7 kg (158 lb)   04/16/21 0416 71.9 kg (158 lb 8 oz)   04/15/21 1816 68.7 kg (151 lb 7.3 oz)   09/23/19 1152 61.2 kg (134 lb 14.7 oz)        Labs      Comment: Hyponatremia management per MD      Results from last 7 days   Lab Units 07/04/25  0120 07/03/25  0538 07/02/25  1532   SODIUM mmol/L 130* 125* 127*   POTASSIUM mmol/L 4.5 4.9 5.4*   GLUCOSE mg/dL 101* 116* 127*   BUN mg/dL 34.4* 74.3* 64.7*   CREATININE mg/dL 3.03* 4.96* 4.47*   CALCIUM mg/dL 8.0* 8.5* 8.8   PHOSPHORUS mg/dL 5.5* 7.1*  --    MAGNESIUM mg/dL 1.9 2.0 1.9 "   ALBUMIN g/dL  --   --  3.3*   BILIRUBIN mg/dL  --   --  0.4   ALK PHOS U/L  --   --  122*   AST (SGOT) U/L  --   --  47*   ALT (SGPT) U/L  --   --  23     Results from last 7 days   Lab Units 07/04/25  0120 07/03/25  0538 07/02/25  1415   PLATELETS 10*3/mm3 196 206 247   HEMOGLOBIN g/dL 9.4* 9.8* 9.9*   HEMATOCRIT % 29.2* 31.0* 31.3*     Lab Results   Component Value Date    HGBA1C 5.40 04/15/2025          Medications       Scheduled Medications aspirin, 81 mg, Oral, Daily  atorvastatin, 80 mg, Oral, Daily  Bismuth, 524 mg, Oral, BID  cloNIDine, 0.3 mg, Oral, Q8H  clopidogrel, 75 mg, Oral, Daily  docusate sodium, 100 mg, Oral, Q12H  epoetin rickie/rickie-epbx, 10,000 Units, Intravenous, Once  hydrALAZINE, 100 mg, Oral, TID  losartan, 100 mg, Oral, Q24H  metoprolol succinate XL, 25 mg, Oral, Daily  metroNIDAZOLE, 375 mg, Oral, 4x Daily  pantoprazole, 40 mg, Oral, Daily  tetracycline, 500 mg, Oral, Daily        Infusions      PRN Medications   acetaminophen **OR** acetaminophen **OR** acetaminophen    albumin human    senna-docusate sodium **AND** polyethylene glycol **AND** bisacodyl **AND** bisacodyl    Calcium Replacement - Follow Nurse / BPA Driven Protocol    dextrose    dextrose    glucagon (human recombinant)    heparin (porcine)    hydrALAZINE    Magnesium Standard Dose Replacement - Follow Nurse / BPA Driven Protocol    melatonin    ondansetron ODT **OR** ondansetron    Phosphorus Replacement - Follow Nurse / BPA Driven Protocol    [COMPLETED] Insert Peripheral IV **AND** sodium chloride     Physical Findings      Chewing/Swallowing  Teeth Status: Mouth/Teeth WDL: .WDL except, teeth Teeth Symptoms: dental appliance present  Chewing/Swallowing Issues: No issues identified at this time   Edema            Leg, Left Edema: 0 (None Present) Leg, Right Edema: 0 (None Present)             Bowel Function  Stool Output  Perineal Care: perineum cleansed (07/02/25 2100)  Last Bowel Movement: 06/22/25   I/Os  Intake &  Output (last 3 days)         07/01 0701  07/02 0700 07/02 0701  07/03 0700 07/03 0701  07/04 0700 07/04 0701  07/05 0700    P.O.   960     Total Intake(mL/kg)   960 (15.8)     Dialysis   1000     Total Output   1000     Net   -40             Urine Unmeasured Occurrence  1 x             Lines, Drains, Airways, & Wounds       Active LDAs       Name Placement date Placement time Site Days Last dressing change    Peripheral IV 07/02/25 1449 22 G Anterior;Left Forearm 07/02/25  1449  Forearm  1     Hemodialysis Cath Double 04/18/25  0852  Chest  77 07/03/25 0700 (31.11 hrs)                      Nutrition Focused Physical Exam     Trending NFPE 7/4: unable to complete      Malnutrition Severity Assessment            (1)   Current Nutrition Orders & Evaluation of Intake      Oral Nutrition     Food Allergies  and Intolerances NKFA   Current PO Diet Diet: Renal, Cardiac; Healthy Heart (2-3 Na+); Low Sodium (2-3g), Low Potassium, Low Phosphorus; Fluid Consistency: Thin (IDDSI 0)   Oral Nutrition Supplement None     Trending % PO Intake 7/4: 100%    (2)  Assessment & Plan   Nutrition Diagnosis and Goals       Nutrition Diagnosis 1 Unintended Weight Loss  related to a decreased appetite as evidneced by 10# loss over 4 months (7% loss)       Nutrition Diagnosis 2 None        Goal(s) Average Meal Intake at Least %     Nutrition Intervention and Prescription       Intervention  Continue with current interventions, will consider nutrition supplements if intake declines       Diet Prescription Renal, cardiac     Supplement Prescription None     Education Provided  N/A   (3)  Monitoring/Evaluation       Monitor/Evaluation PO Intake     RD Follow-Up Encounter 3-5 days     Electronically signed by:  Tressa Mejia RD  07/04/25 14:06 EDT

## 2025-07-05 LAB
ANION GAP SERPL CALCULATED.3IONS-SCNC: 9 MMOL/L (ref 5–15)
BASOPHILS # BLD AUTO: 0.02 10*3/MM3 (ref 0–0.2)
BASOPHILS NFR BLD AUTO: 0.5 % (ref 0–1.5)
BUN SERPL-MCNC: 27 MG/DL (ref 8–23)
BUN/CREAT SERPL: 10.5 (ref 7–25)
CALCIUM SPEC-SCNC: 7.8 MG/DL (ref 8.6–10.5)
CHLORIDE SERPL-SCNC: 97 MMOL/L (ref 98–107)
CO2 SERPL-SCNC: 24 MMOL/L (ref 22–29)
CREAT SERPL-MCNC: 2.57 MG/DL (ref 0.57–1)
DEPRECATED RDW RBC AUTO: 53.2 FL (ref 37–54)
EGFRCR SERPLBLD CKD-EPI 2021: 17.9 ML/MIN/1.73
EOSINOPHIL # BLD AUTO: 0.07 10*3/MM3 (ref 0–0.4)
EOSINOPHIL NFR BLD AUTO: 1.6 % (ref 0.3–6.2)
ERYTHROCYTE [DISTWIDTH] IN BLOOD BY AUTOMATED COUNT: 17.4 % (ref 12.3–15.4)
GLUCOSE SERPL-MCNC: 95 MG/DL (ref 65–99)
HCT VFR BLD AUTO: 29.9 % (ref 34–46.6)
HGB BLD-MCNC: 9.9 G/DL (ref 12–15.9)
IMM GRANULOCYTES # BLD AUTO: 0.01 10*3/MM3 (ref 0–0.05)
IMM GRANULOCYTES NFR BLD AUTO: 0.2 % (ref 0–0.5)
LYMPHOCYTES # BLD AUTO: 1.34 10*3/MM3 (ref 0.7–3.1)
LYMPHOCYTES NFR BLD AUTO: 30.5 % (ref 19.6–45.3)
MAGNESIUM SERPL-MCNC: 2 MG/DL (ref 1.6–2.4)
MCH RBC QN AUTO: 27.7 PG (ref 26.6–33)
MCHC RBC AUTO-ENTMCNC: 33.1 G/DL (ref 31.5–35.7)
MCV RBC AUTO: 83.5 FL (ref 79–97)
MONOCYTES # BLD AUTO: 0.54 10*3/MM3 (ref 0.1–0.9)
MONOCYTES NFR BLD AUTO: 12.3 % (ref 5–12)
NEUTROPHILS NFR BLD AUTO: 2.41 10*3/MM3 (ref 1.7–7)
NEUTROPHILS NFR BLD AUTO: 54.9 % (ref 42.7–76)
NRBC BLD AUTO-RTO: 0 /100 WBC (ref 0–0.2)
PHOSPHATE SERPL-MCNC: 4.3 MG/DL (ref 2.5–4.5)
PLATELET # BLD AUTO: 178 10*3/MM3 (ref 140–450)
PMV BLD AUTO: 9.4 FL (ref 6–12)
POTASSIUM SERPL-SCNC: 4.1 MMOL/L (ref 3.5–5.2)
RBC # BLD AUTO: 3.58 10*6/MM3 (ref 3.77–5.28)
SODIUM SERPL-SCNC: 130 MMOL/L (ref 136–145)
WBC NRBC COR # BLD AUTO: 4.39 10*3/MM3 (ref 3.4–10.8)

## 2025-07-05 PROCEDURE — C1751 CATH, INF, PER/CENT/MIDLINE: HCPCS

## 2025-07-05 PROCEDURE — 80048 BASIC METABOLIC PNL TOTAL CA: CPT

## 2025-07-05 PROCEDURE — 83735 ASSAY OF MAGNESIUM: CPT

## 2025-07-05 PROCEDURE — 84100 ASSAY OF PHOSPHORUS: CPT

## 2025-07-05 PROCEDURE — 25010000002 HYDRALAZINE PER 20 MG

## 2025-07-05 PROCEDURE — 63710000001 ONDANSETRON ODT 4 MG TABLET DISPERSIBLE

## 2025-07-05 PROCEDURE — 85025 COMPLETE CBC W/AUTO DIFF WBC: CPT

## 2025-07-05 PROCEDURE — 25010000002 ONDANSETRON PER 1 MG

## 2025-07-05 RX ORDER — METOPROLOL SUCCINATE 25 MG/1
25 TABLET, EXTENDED RELEASE ORAL DAILY
Status: DISCONTINUED | OUTPATIENT
Start: 2025-07-05 | End: 2025-07-06

## 2025-07-05 RX ORDER — CLONIDINE 0.3 MG/24H
2 PATCH, EXTENDED RELEASE TRANSDERMAL WEEKLY
Status: DISCONTINUED | OUTPATIENT
Start: 2025-07-05 | End: 2025-07-08 | Stop reason: HOSPADM

## 2025-07-05 RX ORDER — POLYETHYLENE GLYCOL 3350 17 G/17G
17 POWDER, FOR SOLUTION ORAL ONCE
Status: COMPLETED | OUTPATIENT
Start: 2025-07-05 | End: 2025-07-05

## 2025-07-05 RX ORDER — CLONIDINE HYDROCHLORIDE 0.1 MG/1
0.3 TABLET ORAL EVERY 8 HOURS SCHEDULED
Status: DISCONTINUED | OUTPATIENT
Start: 2025-07-05 | End: 2025-07-05

## 2025-07-05 RX ORDER — NIFEDIPINE 30 MG/1
30 TABLET, EXTENDED RELEASE ORAL
Status: DISCONTINUED | OUTPATIENT
Start: 2025-07-05 | End: 2025-07-06

## 2025-07-05 RX ORDER — LOSARTAN POTASSIUM 50 MG/1
100 TABLET ORAL
Status: DISCONTINUED | OUTPATIENT
Start: 2025-07-05 | End: 2025-07-08 | Stop reason: HOSPADM

## 2025-07-05 RX ORDER — ALUMINA, MAGNESIA, AND SIMETHICONE 2400; 2400; 240 MG/30ML; MG/30ML; MG/30ML
15 SUSPENSION ORAL ONCE
Status: COMPLETED | OUTPATIENT
Start: 2025-07-05 | End: 2025-07-05

## 2025-07-05 RX ADMIN — BISMUTH SUBSALICYLATE 524 MG: 262 TABLET, CHEWABLE ORAL at 12:19

## 2025-07-05 RX ADMIN — Medication 5 MG: at 21:56

## 2025-07-05 RX ADMIN — CLONIDINE 2 PATCH: 0.3 PATCH TRANSDERMAL at 12:21

## 2025-07-05 RX ADMIN — ONDANSETRON 4 MG: 2 INJECTION, SOLUTION INTRAMUSCULAR; INTRAVENOUS at 12:24

## 2025-07-05 RX ADMIN — HYDRALAZINE HYDROCHLORIDE 100 MG: 25 TABLET ORAL at 21:55

## 2025-07-05 RX ADMIN — CLONIDINE HYDROCHLORIDE 0.3 MG: 0.1 TABLET ORAL at 05:20

## 2025-07-05 RX ADMIN — NIFEDIPINE 30 MG: 30 TABLET, EXTENDED RELEASE ORAL at 12:19

## 2025-07-05 RX ADMIN — Medication 10 ML: at 10:20

## 2025-07-05 RX ADMIN — HYDRALAZINE HYDROCHLORIDE 20 MG: 20 INJECTION INTRAMUSCULAR; INTRAVENOUS at 07:32

## 2025-07-05 RX ADMIN — TETRACYCLINE HYDROCHLORIDE 500 MG: 500 CAPSULE ORAL at 10:20

## 2025-07-05 RX ADMIN — CLOPIDOGREL BISULFATE 75 MG: 75 TABLET, FILM COATED ORAL at 10:20

## 2025-07-05 RX ADMIN — METRONIDAZOLE 375 MG: 500 TABLET ORAL at 21:56

## 2025-07-05 RX ADMIN — ALUMINUM HYDROXIDE, MAGNESIUM HYDROXIDE, AND DIMETHICONE 15 ML: 400; 400; 40 SUSPENSION ORAL at 12:28

## 2025-07-05 RX ADMIN — ONDANSETRON 4 MG: 4 TABLET, ORALLY DISINTEGRATING ORAL at 21:56

## 2025-07-05 RX ADMIN — HYDRALAZINE HYDROCHLORIDE 20 MG: 20 INJECTION INTRAMUSCULAR; INTRAVENOUS at 01:04

## 2025-07-05 RX ADMIN — Medication 10 ML: at 21:56

## 2025-07-05 RX ADMIN — METRONIDAZOLE 375 MG: 500 TABLET ORAL at 10:22

## 2025-07-05 RX ADMIN — HYDRALAZINE HYDROCHLORIDE 100 MG: 25 TABLET ORAL at 15:44

## 2025-07-05 RX ADMIN — METRONIDAZOLE 375 MG: 500 TABLET ORAL at 14:54

## 2025-07-05 RX ADMIN — HYDRALAZINE HYDROCHLORIDE 100 MG: 25 TABLET ORAL at 10:19

## 2025-07-05 RX ADMIN — ASPIRIN 81 MG CHEWABLE TABLET 81 MG: 81 TABLET CHEWABLE at 10:19

## 2025-07-05 RX ADMIN — ATORVASTATIN CALCIUM 80 MG: 40 TABLET, FILM COATED ORAL at 10:19

## 2025-07-05 RX ADMIN — BISMUTH SUBSALICYLATE 524 MG: 262 TABLET, CHEWABLE ORAL at 22:34

## 2025-07-05 RX ADMIN — DOCUSATE SODIUM 100 MG: 100 CAPSULE, LIQUID FILLED ORAL at 21:56

## 2025-07-05 RX ADMIN — METRONIDAZOLE 375 MG: 500 TABLET ORAL at 04:10

## 2025-07-05 RX ADMIN — PANTOPRAZOLE SODIUM 40 MG: 40 TABLET, DELAYED RELEASE ORAL at 10:20

## 2025-07-05 RX ADMIN — DOCUSATE SODIUM 100 MG: 100 CAPSULE, LIQUID FILLED ORAL at 10:19

## 2025-07-05 RX ADMIN — LOSARTAN POTASSIUM 100 MG: 50 TABLET, FILM COATED ORAL at 04:10

## 2025-07-05 RX ADMIN — METOPROLOL SUCCINATE 25 MG: 25 TABLET, EXTENDED RELEASE ORAL at 04:10

## 2025-07-05 RX ADMIN — POLYETHYLENE GLYCOL 3350 17 G: 17 POWDER, FOR SOLUTION ORAL at 12:19

## 2025-07-05 NOTE — PROGRESS NOTES
[facility] MEDICINE SERVICE  DAILY PROGRESS NOTE    NAME: Brad Woodward  : 1940  MRN: [id number]      LOS: 3 days     PROVIDER OF SERVICE: Kaitlin Kennedy MD    Chief Complaint: Generalized weakness    Subjective:     Interval History: Patient does not speak English however family in room.  Patient remains very weak.  Nephrology has been consulted for dialysis.  PT OT has been consulted.  Family states she has been having diarrhea about 10-12 episodes, will check C. difficile today.  Also asking to resume her Flagyl that she has been taking for H. pylori infection.    7/patient feeling better today however still has elevated blood pressure.  Adjusted the dose of clonidine.  Continue PT OT.     blood pressure remains elevated.   will discuss with nephrology        Review of Systems:   Review of Systems  10 point ROS is negative other than what is stated positive above  Objective:     Vital Signs  Temp:  [97.7 °F (36.5 °C)-98.5 °F (36.9 °C)] 98.3 °F (36.8 °C)  Heart Rate:  [60-75] 61  Resp:  [10-18] 15  BP: (128-224)/(51-81) 224/78   Body mass index is 23.55 kg/m².    Physical Exam  Physical Exam  General: 85 yo F, lethargic but arousable, well nourished, no acute distress.  HENT: Normocephalic, normal hearing, moist oral mucosa, no scleral icterus.  Neck: Supple, nontender, no carotid bruits, no JVD, no LAD.  Lungs: Clear to auscultation, nonlabored respiration.  Heart: RRR, no murmur, gallop or edema.  Abdomen: Soft, nontender, nondistended, + bowel sounds.  Musculoskeletal: Normal range of motion and strength, no tenderness or swelling.  Skin: Skin is warm, dry and pink, no rashes or lesions.  Psychiatric: Cooperative, appropriate mood and affec      Diagnostic Data    Results from last 7 days   Lab Units 25  0930 25  0538 25  1532   WBC 10*3/mm3 4.39   < >  --    HEMOGLOBIN g/dL 9.9*   < >  --    HEMATOCRIT % 29.9*   < >  --    PLATELETS 10*3/mm3 178   < >  --    GLUCOSE mg/dL 95   <  > 127*   CREATININE mg/dL 2.57*   < > 4.47*   BUN mg/dL 27.0*   < > 64.7*   SODIUM mmol/L 130*   < > 127*   POTASSIUM mmol/L 4.1   < > 5.4*   AST (SGOT) U/L  --   --  47*   ALT (SGPT) U/L  --   --  23   ALK PHOS U/L  --   --  122*   BILIRUBIN mg/dL  --   --  0.4   ANION GAP mmol/L 9.0   < > 12.3    < > = values in this interval not displayed.       No radiology results for the last day        I reviewed the patient's new clinical results.    Assessment/Plan:     Active and Resolved Problems  Active Hospital Problems    Diagnosis  POA    **Generalized weakness [R53.1]  Yes      Resolved Hospital Problems   No resolved problems to display.     Generalized weakness  Elevated troponin  Hyponatremia  Hyperkalemia  ESRD on HD  H/o renal artery stenosis s/p L renal stent     Hyponatremia improving -troponins initially mildly elevated  Nephrology consulted.  Plan for dialysis.  Hemodialysis schedule Monday Wednesday Friday.    Hyperkalemia has resolved    Hypertensive urgency --continues to have significantly elevated blood pressures  echo read pending  Adjusted blood pressure medications, increase clonidine to 0.3 p.o. 3 times daily from twice daily dosing.  - Will discuss with nephrology about further adjustment of blood pressure medications given continued elevated blood pressure and patient is already on clonidine hydralazine losartan and metoprolol.  Avoid nephrotoxic medications  Pharmacy to renally dose medication  Nephrology consulted to manage   PT/OT consulted      Constipation however family stating patient has been having 10-12 bowel movements  Ordered C. difficile however not done yet resolved  Nausea  Zofran as needed       Leukopenia  Resolved  Continue to monitor CBC      Anemia of chronic disease  Previously admitted with melena  Underwent EGD 6/19/25 /Colonoscopy 6/22/25 no acute findings but biopsies positive for H. Pylori  Hgb stable  Continue to monitor CBC  Transfuse if Hgb < 7  Continue treatment once  reconciled      SSS s/p PPM  Hypertensive urgency  Hyperlipidemia   BP elevated on ED arrival with SBP > 200  Hydralazine as needed with parameters  Continue Clonidine, Hydralazine, Lisinopril, Metoprolol --blood pressure better as compared to yesterday-- increased  clonidine to 3 times daily from twice daily dosing  Continue ASA, statin, Plavix     Diabetes Mellitus   A1c 5.40  Consistent carb diet  Hypoglycemia protocol     Has been on Flagyl p.o. at home for H. pylori, resumed.      VTE Prophylaxis:  Pharmacologic & mechanical VTE prophylaxis orders are present.             Disposition Planning:   Possible discharge tomorrow.  PT OT eval        Time: 35 minutes     Code Status and Medical Interventions: CPR (Attempt to Resuscitate); Full Support   Ordered at: 07/02/25 3980     Code Status (Patient has no pulse and is not breathing):    CPR (Attempt to Resuscitate)     Medical Interventions (Patient has pulse or is breathing):    Full Support       Signature: Electronically signed by Kaitlin Kennedy MD, 07/05/25, 10:49 EDT.  [facility] Hospitalist Team

## 2025-07-05 NOTE — PLAN OF CARE
Problem: Adult Inpatient Plan of Care  Goal: Plan of Care Review  Outcome: Progressing  Flowsheets (Taken 7/5/2025 8821)  Outcome Evaluation: pt cont with nausea  zofran given.  BP cont to be elevated.  MD notified  new orders for  clonidine patchx2 and procardia..  Pt also given miralax for bowel protocol .  will cont to monitor.  fm at bedside for communication needs.   Goal Outcome Evaluation:              Outcome Evaluation: pt cont with nausea  zofran given.  BP cont to be elevated.  MD notified  new orders for  clonidine patchx2 and procardia..  Pt also given miralax for bowel protocol .  will cont to monitor.  fm at bedside for communication needs.                              Assessment:  1  Chronic pain syndrome    2  Acute exacerbation of chronic low back pain    3  Chronic left-sided low back pain without sciatica    4  Lumbar radiculopathy    5  Lumbar disc herniation    6  Spinal stenosis of lumbar region with neurogenic claudication        Plan:  While the patient was in the office today, I did have a thorough conversation with the patient regarding their chronic pain syndrome, symptoms, and medication regimen/treatment plan  I discussed with the patient that at this point time since there is moderate improvement but he still having back pain and radicular symptoms I do feel would be beneficial to proceed with a repeat injection but we will proceed with an L5-S1 interlaminar lumbar epidural steroid injection with Dr Isela Bueno to try to see if we can treat both the back pain and the radicular pain symptoms down the leg  However, after this injection the patient understands that we would need to try to hold off any repeat injections for at least the next 2 to 3 months, if not longer  The patient was agreeable and verbalized an understanding  Complete risks and benefits including bleeding, infection, tissue reaction, nerve injury and allergic reaction were discussed  The approach was demonstrated using models and literature was provided  Verbal and written consent was obtained  With regards to his medication regimen, I did discuss with the patient that it could be the combination of the methocarbamol and the increase Lyrica as we did increase both them at the same time that is causing some of the dizziness or the feeling of being spacey and I advised him to decrease the methocarbamol from 4 times daily dosing down to 3 times daily dosing to see if that helps and continue with the Lyrica and the as needed Norco as prescribed for now  The hope is that he will not need the as needed [place] at his next office visit    I discussed with the patient that they should not drive or operate machinery until they see how the medication changes affects them and if they have any side effects or issues, they are to call our office and make us aware so we can take the appropriate action and provide changes to the medications or treatment plan  The patient was agreeable and verbalized an understanding  I did discuss with the patient that since he feels things are starting to improve that it may be beneficial to proceed with a repeat course of physical therapy and stressed the importance of the home exercise instruction program, slowly and steadily increasing his activity, as tolerated, allowing pain to be his guide  The patient was agreeable and verbalized an understanding  [facility] Prescription Drug Monitoring Program report was reviewed and was appropriate     There are risks associated with opioid medications, including dependence, addiction and tolerance  The patient understands and agrees to use these medications only as prescribed  Potential side effects of the medications include, but are not limited to, constipation, drowsiness, addiction, impaired judgment and risk of fatal overdose if not taken as prescribed  The patient was warned against driving while taking sedation medications  Sharing medications is a felony  At this point in time, the patient is showing no signs of addiction, abuse, diversion or suicidal ideation  The patient will follow-up in 6-7 weeks for medication prescription refill and reevaluation from his repeat injection  The patient was advised to contact the office should their symptoms worsen in the interim  The patient was agreeable and verbalized an understanding  History of Present Illness: The patient is a 70 y o  male last seen on 3/6/2023 who presents for a follow up office visit in regards to chronic pain syndrome, as the patient's pain has been ongoing for greater than a year, secondary to lumbar disc herniation with radiculopathy and stenosis    The patient currently reports that at this point time as he is status post a left L3-L4 transforaminal epidural steroid injection with Dr Jermaine See on March 6, 2023 that there is definitely some improvement in his pain symptoms as he feels the left-sided low back pain has significantly improved and initially there did seem to be some improvement in the left leg pain but he continues with the numbness tingling, burning, and weakness in the left leg and now he is noticing more pain on the right in his low back as well  The patient reports that overall his pain is at least more manageable and tolerable although initially when we increase the Lyrica to the 100 mg 3 times daily from the 75 mg dose he did feel that it definitely made him feel spacey and although sometimes he still feels that way it is getting better and he does feel that there is some improvement in his pain as he also continues with the methocarbamol and the as needed Norco as needed he is hoping that in time he can stop the as needed Norco   The patient also wanted to know if may be now that things are improving could be consider retrying physical therapy as he is trying to do everything he can to manage or treat his pain conservatively before he considers the surgical options  The patient presents today for reevaluation and to discuss his treatment plan options  Current pain medications includes: Lyrica 100 mg 3 times daily, methocarbamol 500 mg 4 times daily as needed for pain and spasms, and Norco 5/325, half a tablet twice daily as needed for pain, which the patient reports he typically has been taking 3 times daily to 4 times daily depending on the pain  The patient reports that this regimen is providing 50% pain relief  The patient is reporting dizziness from this pain medication regimen      I have personally reviewed and/or updated the patient's past medical history, past surgical history, family history, social history, current medications, allergies, and vital signs today  Review of Systems:    Review of Systems   Respiratory: Negative for shortness of breath  Cardiovascular: Negative for chest pain  Gastrointestinal: Negative for constipation, diarrhea, nausea and vomiting  Musculoskeletal: Positive for gait problem and joint swelling  Negative for arthralgias and myalgias  Skin: Negative for rash  Neurological: Negative for dizziness, seizures and weakness  All other systems reviewed and are negative          Past Medical History:   Diagnosis Date   • Acute medial meniscus tear    • Allergic rhinitis    • Allergic rhinitis     last assessed 5/8/14   • Anxiety    • Appendicitis    • Arthritis    • Benign essential hypertension     last assessed 1/6/14   • Biceps tendon tear    • Cancer (HCC)     skin   • Cervical radiculopathy     and lumbar   • Chronic pain disorder     knees and hips    • Colon polyps    • CPAP (continuous positive airway pressure) dependence    • Depression    • Depression with anxiety    • Dyskinesia of esophagus    • Erythema migrans (Lyme disease)     last assessed 10/16/12   • Fatigue    • GERD (gastroesophageal reflux disease)    • Heart murmur 1951   • Heel spur, right    • Herpes    • Herpes zoster    • Hyperlipidemia    • Hyperplastic colon polyp     last assessed 11/14/14   • Joint pain    • Kidney stone 1995   • Kidney stones    • Lyme disease    • Myocardial infarction [facility])    • Nephrolithiasis    • Panic disorder with agoraphobia     last assessed 8/7/13   • [facility] )    • Shingles 2015   • Sleep apnea    • Vertigo    • Vitamin D deficiency        Past Surgical History:   Procedure Laterality Date   • APPENDECTOMY  08/01/2003   • BICEPS TENDON REPAIR     • CARDIAC OTHER      stents x2   • KNEE ARTHROSCOPY      with medial meniscus repair, resolved 01/01/05   • KNEE CARTILAGE SURGERY     • OTHER SURGICAL HISTORY      distal reinsertion of ruptured biceps tendon   • [place] ARTHRS KNE SURG W/MENISCECTOMY MED/LAT W/SHVG Right 2019    Procedure: RIGHT KNEE ARTHROSCOPY, MEDIAL MENISCECTOMY;  Surgeon: Sarah Marsh DO;  Location: [address] [place] [address];  Service: Orthopedics   • TOOTH EXTRACTION      Middle of 2019   • VASECTOMY         Family History   Problem Relation Age of Onset   • Diabetes Mother    • Coronary artery disease Father    • Kidney disease Father    • Heart disease Father    • Cancer Father         kidney cancer       Social History     Occupational History   • Not on file   Tobacco Use   • Smoking status: Former     Packs/day: 1 00     Types: Cigarettes, Cigars     Start date: 11/15/1967     Quit date: 3/1/1987     Years since quittin 0   • Smokeless tobacco: Never   Vaping Use   • Vaping Use: Never used   Substance and Sexual Activity   • Alcohol use:  Yes     Alcohol/week: 1 0 standard drink     Types: 1 Glasses of wine per week   • Drug use: No   • Sexual activity: Not on file         Current Outpatient Medications:   •  ALPRAZolam (Xanax) 0 25 mg tablet, Take 1 tablet (0 25 mg total) by mouth 3 (three) times a day as needed for anxiety, Disp: 30 tablet, Rfl: 0  •  amoxicillin-clavulanate (AUGMENTIN) 875-125 mg per tablet, Take 1 tablet by mouth every 12 (twelve) hours for 10 days, Disp: 20 tablet, Rfl: 0  •  aspirin 81 mg chewable tablet, every 24 hours, Disp: , Rfl:   •  atorvastatin (LIPITOR) 80 mg tablet, every 24 hours, Disp: , Rfl:   •  butalbital-acetaminophen-caffeine (FIORICET,ESGIC) -40 mg per tablet, Take 1 tablet by mouth every 4 (four) hours as needed for headaches, Disp: 30 tablet, Rfl: 0  •  cholecalciferol (VITAMIN D3) 1,000 units tablet, Take 1,000 Units by mouth daily, Disp: , Rfl:   •  CHONDROITIN SULFATE A PO, Take by mouth in the morning, Disp: , Rfl:   •  Coenzyme Q10 (CoQ-10) 100 MG CAPS, every 24 hours, Disp: , Rfl:   •  Diclofenac Sodium (VOLTAREN) 1 %, Apply 2 g topically 4 (four) times a day, Disp: 50 g, Rfl: 0  •  ezetimibe "(ZETIA) 10 mg tablet, 1 tablet Orally Once a day 90 days, Disp: , Rfl:   •  GLUCOSAMINE-CALCIUM-VIT D PO, Take 1,200 mg by mouth 1200mg, Disp: , Rfl:   •  HYDROcodone-acetaminophen (Norco) 5-325 mg per tablet, Take 1/2 tablet TID PRN for pain , Disp: 45 tablet, Rfl: 0  •  Hyoscyamine Sulfate SL 0 125 MG SUBL, Place 0 125 mg under the tongue 3 (three) times a day as needed (as needed), Disp: 30 tablet, Rfl: 1  •  Ibuprofen 200 MG CAPS, 3 (three) times a day, Disp: , Rfl:   •  lisinopril (ZESTRIL) 2 5 mg tablet, every 24 hours, Disp: , Rfl:   •  methocarbamol (ROBAXIN) 500 mg tablet, Take 1 in AM and 2 PO HS , Disp: 90 tablet, Rfl: 1  •  Omega-3 Fatty Acids (Fish Oil) 1200 MG CAPS, every 24 hours, Disp: , Rfl:   •  pantoprazole (PROTONIX) 40 mg tablet, ONE TABLET DAILY for GI issue, Disp: , Rfl:   •  pregabalin (LYRICA) 100 mg capsule, Take 1 PO TID , Disp: 90 capsule, Rfl: 1  •  vilazodone (VIIBRYD) 20 mg tablet, Take 1 tablet (20 mg total) by mouth daily with breakfast, Disp: 30 tablet, Rfl: 1  •  doxycycline hyclate (VIBRAMYCIN) 100 mg capsule, , Disp: , Rfl:   •  Omega-3 Fatty Acids (Fish Oil) 1200 MG CAPS, 1 capsule every 24 hours, Disp: , Rfl:     Allergies   Allergen Reactions   • Pseudoephedrine Hives and Dizziness     Reaction Date: 16Apr2011;    • Sulfa Antibiotics Dizziness, Hives and Other (See Comments)       Physical Exam:    /78 (BP Location: Left arm, Patient Position: Sitting, Cuff Size: Large)   Pulse 76   Temp (!) 97 2 °F (36 2 °C)   Ht 6' 1\" (1 854 m)   Wt 118 kg (260 lb)   BMI 34 30 kg/m²     Constitutional:normal, well developed, well nourished, alert, in no distress and non-toxic and no overt pain behavior   and overweight  Eyes:anicteric  HEENT:grossly intact  Neck:supple, symmetric, trachea midline and no masses   Pulmonary:even and unlabored  Cardiovascular:No edema or pitting edema present  Skin:Normal without rashes or lesions and well hydrated  Psychiatric:Mood and affect " appropriate  Neurologic:Cranial Nerves II-XII grossly intact  Musculoskeletal:The patient's gait is slightly antalgic, but steady without the use of any assistive devices        Imaging  FL spine and pain procedure    (Results Pending)         Orders Placed This Encounter   Procedures   • FL spine and pain procedure   • Ambulatory referral to Physical Therapy

## 2025-07-05 NOTE — PLAN OF CARE
Goal Outcome Evaluation:                    PT BP remains elevated 200's/70-80's, PA was notified, see new orders, VSS, family remains at bedside, plan of care ongoing.

## 2025-07-05 NOTE — PROGRESS NOTES
"RENAL/KCC:     LOS: 3 days    Patient Care Team:  Brian Nazario APRN as PCP - General  Zane Aldridge MD as Consulting Physician (Cardiology)  Marnie Brandt APRN as Nurse Practitioner (Cardiology)    Chief Complaint:  Abd pain, N/V    Subjective     Interval History:   Chart reviewed  Still with nausea and inability to take much PO  Has not had a BM in a few days  SBP remains very high    Objective     Vital Sign Min/Max for last 24 hours  Temp  Min: 97.6 °F (36.4 °C)  Max: 98.5 °F (36.9 °C)   BP  Min: 128/55  Max: 221/77   Pulse  Min: 60  Max: 75   Resp  Min: 10  Max: 18   SpO2  Min: 97 %  Max: 99 %   No data recorded   Weight  Min: 60.3 kg (132 lb 15 oz)  Max: 60.3 kg (132 lb 15 oz)     Flowsheet Rows      Flowsheet Row First Filed Value   Admission Height 160 cm (63\") Documented at 07/02/2025 1312   Admission Weight 62 kg (136 lb 9.6 oz) Documented at 07/02/2025 1312            No intake/output data recorded.  I/O last 3 completed shifts:  In: 960 [P.O.:960]  Out: 1000     Physical Exam:  GEN: Awake, NAD  ENT: PERRL, EOMI, MMM  NECK: Supple, no JVD  CHEST: CTAB, no W/R/C  CV: RRR, no M/G/R  ABD: Soft, NT, +BS  SKIN: Warm and Dry  NEURO: CN's intact      WBC WBC   Date Value Ref Range Status   07/04/2025 3.59 3.40 - 10.80 10*3/mm3 Final   07/03/2025 3.49 3.40 - 10.80 10*3/mm3 Final   07/02/2025 2.44 (L) 3.40 - 10.80 10*3/mm3 Final      HGB Hemoglobin   Date Value Ref Range Status   07/04/2025 9.4 (L) 12.0 - 15.9 g/dL Final   07/03/2025 9.8 (L) 12.0 - 15.9 g/dL Final   07/02/2025 9.9 (L) 12.0 - 15.9 g/dL Final      HCT Hematocrit   Date Value Ref Range Status   07/04/2025 29.2 (L) 34.0 - 46.6 % Final   07/03/2025 31.0 (L) 34.0 - 46.6 % Final   07/02/2025 31.3 (L) 34.0 - 46.6 % Final      Platlets No results found for: \"LABPLAT\"   MCV MCV   Date Value Ref Range Status   07/04/2025 85.9 79.0 - 97.0 fL Final   07/03/2025 87.1 79.0 - 97.0 fL Final   07/02/2025 86.5 79.0 - 97.0 fL Final          Sodium Sodium " "  Date Value Ref Range Status   07/04/2025 130 (L) 136 - 145 mmol/L Final   07/03/2025 125 (L) 136 - 145 mmol/L Final   07/02/2025 127 (L) 136 - 145 mmol/L Final      Potassium Potassium   Date Value Ref Range Status   07/04/2025 4.5 3.5 - 5.2 mmol/L Final   07/03/2025 4.9 3.5 - 5.2 mmol/L Final   07/02/2025 5.4 (H) 3.5 - 5.2 mmol/L Final      Chloride Chloride   Date Value Ref Range Status   07/04/2025 97 (L) 98 - 107 mmol/L Final   07/03/2025 94 (L) 98 - 107 mmol/L Final   07/02/2025 93 (L) 98 - 107 mmol/L Final      CO2 CO2   Date Value Ref Range Status   07/04/2025 21.2 (L) 22.0 - 29.0 mmol/L Final   07/03/2025 19.2 (L) 22.0 - 29.0 mmol/L Final   07/02/2025 21.7 (L) 22.0 - 29.0 mmol/L Final      BUN BUN   Date Value Ref Range Status   07/04/2025 34.4 (H) 8.0 - 23.0 mg/dL Final   07/03/2025 74.3 (H) 8.0 - 23.0 mg/dL Final   07/02/2025 64.7 (H) 8.0 - 23.0 mg/dL Final      Creatinine Creatinine   Date Value Ref Range Status   07/04/2025 3.03 (H) 0.57 - 1.00 mg/dL Final   07/03/2025 4.96 (H) 0.57 - 1.00 mg/dL Final   07/02/2025 4.47 (H) 0.57 - 1.00 mg/dL Final      Calcium Calcium   Date Value Ref Range Status   07/04/2025 8.0 (L) 8.6 - 10.5 mg/dL Final   07/03/2025 8.5 (L) 8.6 - 10.5 mg/dL Final   07/02/2025 8.8 8.6 - 10.5 mg/dL Final      PO4 No results found for: \"CAPO4\"   Albumin Albumin   Date Value Ref Range Status   07/02/2025 3.3 (L) 3.5 - 5.2 g/dL Final      Magnesium Magnesium   Date Value Ref Range Status   07/04/2025 1.9 1.6 - 2.4 mg/dL Final   07/03/2025 2.0 1.6 - 2.4 mg/dL Final   07/02/2025 1.9 1.6 - 2.4 mg/dL Final      Uric Acid No results found for: \"URICACID\"        Results Review:     I reviewed the patient's new clinical results.    aspirin, 81 mg, Oral, Daily  atorvastatin, 80 mg, Oral, Daily  Bismuth, 524 mg, Oral, BID  cloNIDine, 0.3 mg, Oral, Q8H  clopidogrel, 75 mg, Oral, Daily  docusate sodium, 100 mg, Oral, Q12H  hydrALAZINE, 100 mg, Oral, TID  losartan, 100 mg, Oral, Q24H  metoprolol " succinate XL, 25 mg, Oral, Daily  metroNIDAZOLE, 375 mg, Oral, 4x Daily  pantoprazole, 40 mg, Oral, Daily  tetracycline, 500 mg, Oral, Daily      Medication Review: Reviewed    Assessment & Plan     ESRD  HTN  H Pylori  Weakness  N/V/Abd pain  Anemia of CKD  Met acidosis  Constipation     PLAN: HD per MWF schedule.  Continue ABX for H Pylori.  SBP remains elevated - resume CBB with Nifedipine and titrate as needed.  Will also switch Clonidine from PO to patch to decrease pill burden.  GI cocktail x 1 and give Miralax today.  Will follow.      Jim Farooq MD  [facility]  07/05/25  05:54 EDT

## 2025-07-06 PROCEDURE — 99223 1ST HOSP IP/OBS HIGH 75: CPT | Performed by: STUDENT IN AN ORGANIZED HEALTH CARE EDUCATION/TRAINING PROGRAM

## 2025-07-06 PROCEDURE — 25010000002 ONDANSETRON PER 1 MG

## 2025-07-06 PROCEDURE — 25010000002 HYDRALAZINE PER 20 MG

## 2025-07-06 RX ORDER — SPIRONOLACTONE 25 MG/1
25 TABLET ORAL
Status: DISCONTINUED | OUTPATIENT
Start: 2025-07-06 | End: 2025-07-06

## 2025-07-06 RX ORDER — SPIRONOLACTONE 25 MG/1
50 TABLET ORAL
Status: DISCONTINUED | OUTPATIENT
Start: 2025-07-06 | End: 2025-07-08 | Stop reason: HOSPADM

## 2025-07-06 RX ORDER — NIFEDIPINE 60 MG/1
60 TABLET, EXTENDED RELEASE ORAL
Status: DISCONTINUED | OUTPATIENT
Start: 2025-07-06 | End: 2025-07-08 | Stop reason: HOSPADM

## 2025-07-06 RX ORDER — CARVEDILOL 25 MG/1
25 TABLET ORAL 2 TIMES DAILY WITH MEALS
Status: DISCONTINUED | OUTPATIENT
Start: 2025-07-06 | End: 2025-07-08 | Stop reason: HOSPADM

## 2025-07-06 RX ORDER — CARVEDILOL 6.25 MG/1
6.25 TABLET ORAL 2 TIMES DAILY WITH MEALS
Status: DISCONTINUED | OUTPATIENT
Start: 2025-07-06 | End: 2025-07-06

## 2025-07-06 RX ADMIN — ASPIRIN 81 MG CHEWABLE TABLET 81 MG: 81 TABLET CHEWABLE at 10:05

## 2025-07-06 RX ADMIN — ONDANSETRON 4 MG: 2 INJECTION, SOLUTION INTRAMUSCULAR; INTRAVENOUS at 20:11

## 2025-07-06 RX ADMIN — HYDRALAZINE HYDROCHLORIDE 100 MG: 25 TABLET ORAL at 10:05

## 2025-07-06 RX ADMIN — NIFEDIPINE 60 MG: 60 TABLET, EXTENDED RELEASE ORAL at 10:07

## 2025-07-06 RX ADMIN — SPIRONOLACTONE 50 MG: 25 TABLET ORAL at 18:04

## 2025-07-06 RX ADMIN — HYDRALAZINE HYDROCHLORIDE 20 MG: 20 INJECTION INTRAMUSCULAR; INTRAVENOUS at 22:21

## 2025-07-06 RX ADMIN — POLYETHYLENE GLYCOL 3350 17 G: 17 POWDER, FOR SOLUTION ORAL at 18:09

## 2025-07-06 RX ADMIN — HYDRALAZINE HYDROCHLORIDE 100 MG: 25 TABLET ORAL at 15:20

## 2025-07-06 RX ADMIN — ONDANSETRON 4 MG: 2 INJECTION, SOLUTION INTRAMUSCULAR; INTRAVENOUS at 10:35

## 2025-07-06 RX ADMIN — DOCUSATE SODIUM 100 MG: 100 CAPSULE, LIQUID FILLED ORAL at 10:06

## 2025-07-06 RX ADMIN — TETRACYCLINE HYDROCHLORIDE 500 MG: 500 CAPSULE ORAL at 10:06

## 2025-07-06 RX ADMIN — SPIRONOLACTONE 25 MG: 25 TABLET ORAL at 11:59

## 2025-07-06 RX ADMIN — CLOPIDOGREL BISULFATE 75 MG: 75 TABLET, FILM COATED ORAL at 10:05

## 2025-07-06 RX ADMIN — PANTOPRAZOLE SODIUM 40 MG: 40 TABLET, DELAYED RELEASE ORAL at 10:05

## 2025-07-06 RX ADMIN — HYDRALAZINE HYDROCHLORIDE 100 MG: 25 TABLET ORAL at 20:20

## 2025-07-06 RX ADMIN — LOSARTAN POTASSIUM 100 MG: 50 TABLET, FILM COATED ORAL at 10:05

## 2025-07-06 RX ADMIN — METRONIDAZOLE 375 MG: 500 TABLET ORAL at 18:04

## 2025-07-06 RX ADMIN — METRONIDAZOLE 375 MG: 500 TABLET ORAL at 21:07

## 2025-07-06 RX ADMIN — CARVEDILOL 25 MG: 25 TABLET, FILM COATED ORAL at 20:20

## 2025-07-06 RX ADMIN — BISMUTH SUBSALICYLATE 524 MG: 262 TABLET, CHEWABLE ORAL at 10:07

## 2025-07-06 RX ADMIN — ATORVASTATIN CALCIUM 80 MG: 40 TABLET, FILM COATED ORAL at 10:05

## 2025-07-06 RX ADMIN — METRONIDAZOLE 375 MG: 500 TABLET ORAL at 11:59

## 2025-07-06 RX ADMIN — METRONIDAZOLE 375 MG: 500 TABLET ORAL at 10:06

## 2025-07-06 RX ADMIN — Medication 10 ML: at 10:08

## 2025-07-06 RX ADMIN — METOPROLOL SUCCINATE 25 MG: 25 TABLET, EXTENDED RELEASE ORAL at 10:06

## 2025-07-06 NOTE — PROGRESS NOTES
"RENAL/KCC:     LOS: 4 days    Patient Care Team:  Brian Nazario APRN as PCP - General  Zane Aldridge MD as Consulting Physician (Cardiology)  Marnie Brandt APRN as Nurse Practitioner (Cardiology)    Chief Complaint:  Abd pain, N/V    Subjective     Interval History:   Chart reviewed  SBP remains very high  Constipated    Objective     Vital Sign Min/Max for last 24 hours  Temp  Min: 97.4 °F (36.3 °C)  Max: 98.5 °F (36.9 °C)   BP  Min: 155/55  Max: 224/78   Pulse  Min: 60  Max: 65   Resp  Min: 11  Max: 15   SpO2  Min: 96 %  Max: 98 %   No data recorded   No data recorded     Flowsheet Rows      Flowsheet Row First Filed Value   Admission Height 160 cm (63\") Documented at 07/02/2025 1312   Admission Weight 62 kg (136 lb 9.6 oz) Documented at 07/02/2025 1312            I/O this shift:  In: 480 [P.O.:480]  Out: -   I/O last 3 completed shifts:  In: 360 [P.O.:360]  Out: -     Physical Exam:  GEN: Awake, NAD  ENT: PERRL, EOMI, MMM  NECK: Supple, no JVD  CHEST: CTAB, no W/R/C  CV: RRR, no M/G/R  ABD: Soft, NT, +BS  SKIN: Warm and Dry  NEURO: CN's intact      WBC WBC   Date Value Ref Range Status   07/05/2025 4.39 3.40 - 10.80 10*3/mm3 Final   07/04/2025 3.59 3.40 - 10.80 10*3/mm3 Final      HGB Hemoglobin   Date Value Ref Range Status   07/05/2025 9.9 (L) 12.0 - 15.9 g/dL Final   07/04/2025 9.4 (L) 12.0 - 15.9 g/dL Final      HCT Hematocrit   Date Value Ref Range Status   07/05/2025 29.9 (L) 34.0 - 46.6 % Final   07/04/2025 29.2 (L) 34.0 - 46.6 % Final      Platlets No results found for: \"LABPLAT\"   MCV MCV   Date Value Ref Range Status   07/05/2025 83.5 79.0 - 97.0 fL Final   07/04/2025 85.9 79.0 - 97.0 fL Final          Sodium Sodium   Date Value Ref Range Status   07/05/2025 130 (L) 136 - 145 mmol/L Final   07/04/2025 130 (L) 136 - 145 mmol/L Final      Potassium Potassium   Date Value Ref Range Status   07/05/2025 4.1 3.5 - 5.2 mmol/L Final     Comment:     Specimen hemolyzed.  Result may be falsely elevated. " "  07/04/2025 4.5 3.5 - 5.2 mmol/L Final      Chloride Chloride   Date Value Ref Range Status   07/05/2025 97 (L) 98 - 107 mmol/L Final   07/04/2025 97 (L) 98 - 107 mmol/L Final      CO2 CO2   Date Value Ref Range Status   07/05/2025 24.0 22.0 - 29.0 mmol/L Final   07/04/2025 21.2 (L) 22.0 - 29.0 mmol/L Final      BUN BUN   Date Value Ref Range Status   07/05/2025 27.0 (H) 8.0 - 23.0 mg/dL Final   07/04/2025 34.4 (H) 8.0 - 23.0 mg/dL Final      Creatinine Creatinine   Date Value Ref Range Status   07/05/2025 2.57 (H) 0.57 - 1.00 mg/dL Final   07/04/2025 3.03 (H) 0.57 - 1.00 mg/dL Final      Calcium Calcium   Date Value Ref Range Status   07/05/2025 7.8 (L) 8.6 - 10.5 mg/dL Final   07/04/2025 8.0 (L) 8.6 - 10.5 mg/dL Final      PO4 No results found for: \"CAPO4\"   Albumin No results found for: \"ALBUMIN\"     Magnesium Magnesium   Date Value Ref Range Status   07/05/2025 2.0 1.6 - 2.4 mg/dL Final   07/04/2025 1.9 1.6 - 2.4 mg/dL Final      Uric Acid No results found for: \"URICACID\"        Results Review:     I reviewed the patient's new clinical results.    aspirin, 81 mg, Oral, Daily  atorvastatin, 80 mg, Oral, Daily  Bismuth, 524 mg, Oral, BID  cloNIDine, 2 patch, Transdermal, Weekly  clopidogrel, 75 mg, Oral, Daily  docusate sodium, 100 mg, Oral, Q12H  hydrALAZINE, 100 mg, Oral, TID  losartan, 100 mg, Oral, Q24H  metoprolol succinate XL, 25 mg, Oral, Daily  metroNIDAZOLE, 375 mg, Oral, 4x Daily  NIFEdipine XL, 60 mg, Oral, Q24H  pantoprazole, 40 mg, Oral, Daily  tetracycline, 500 mg, Oral, Daily      Medication Review: Reviewed    Assessment & Plan     ESRD  HTN  H Pylori  Weakness  N/V/Abd pain  Anemia of CKD  Met acidosis  Constipation     PLAN: HD per MWF schedule.  Continue ABX for H Pylori.  SBP remains elevated - titrate Nifedipine and add Aldactone for resistant HTN.  Bowel regimen.  Will follow.      Jim Farooq MD  [facility]  07/06/25  06:12 EDT      "

## 2025-07-06 NOTE — PROGRESS NOTES
[facility] MEDICINE SERVICE  DAILY PROGRESS NOTE    NAME: Brad Woodward  : 1940  MRN: [id number]      LOS: 4 days     PROVIDER OF SERVICE: Kaitlin Kennedy MD    Chief Complaint: Generalized weakness    Subjective:     Interval History: Patient does not speak English however family in room.  Patient remains very weak.  Nephrology has been consulted for dialysis.  PT OT has been consulted.  Family states she has been having diarrhea about 10-12 episodes, will check C. difficile today.  Also asking to resume her Flagyl that she has been taking for H. pylori infection.    7/patient feeling better today however still has elevated blood pressure.  Adjusted the dose of clonidine.  Continue PT OT.     blood pressure remains elevated.   will discuss with nephrology     blood pressure was somewhat controlled yesterday however again elevated today.  Aldactone being added today.  Nifedipine was added yesterday  We will also give enema today        Review of Systems:   Review of Systems  10 point ROS is negative other than what is stated positive above  Objective:     Vital Signs  Temp:  [97.4 °F (36.3 °C)-98.5 °F (36.9 °C)] 98.2 °F (36.8 °C)  Heart Rate:  [60-62] 60  Resp:  [11-15] 11  BP: (155-209)/(55-76) 195/69   Body mass index is 23.59 kg/m².    Physical Exam  Physical Exam  General: 83 yo F, lethargic but arousable, well nourished, no acute distress.  HENT: Normocephalic, normal hearing, moist oral mucosa, no scleral icterus.  Neck: Supple, nontender, no carotid bruits, no JVD, no LAD.  Lungs: Clear to auscultation, nonlabored respiration.  Heart: RRR, no murmur, gallop or edema.  Abdomen: Soft, nontender, nondistended, + bowel sounds.  Musculoskeletal: Normal range of motion and strength, no tenderness or swelling.  Skin: Skin is warm, dry and pink, no rashes or lesions.  Psychiatric: Cooperative, appropriate mood and affec      Diagnostic Data    Results from last 7 days   Lab Units 25  0930  07/03/25  0538 07/02/25  1532   WBC 10*3/mm3 4.39   < >  --    HEMOGLOBIN g/dL 9.9*   < >  --    HEMATOCRIT % 29.9*   < >  --    PLATELETS 10*3/mm3 178   < >  --    GLUCOSE mg/dL 95   < > 127*   CREATININE mg/dL 2.57*   < > 4.47*   BUN mg/dL 27.0*   < > 64.7*   SODIUM mmol/L 130*   < > 127*   POTASSIUM mmol/L 4.1   < > 5.4*   AST (SGOT) U/L  --   --  47*   ALT (SGPT) U/L  --   --  23   ALK PHOS U/L  --   --  122*   BILIRUBIN mg/dL  --   --  0.4   ANION GAP mmol/L 9.0   < > 12.3    < > = values in this interval not displayed.       No radiology results for the last day        I reviewed the patient's new clinical results.    Assessment/Plan:     Active and Resolved Problems  Active Hospital Problems    Diagnosis  POA    **Generalized weakness [R53.1]  Yes      Resolved Hospital Problems   No resolved problems to display.     Generalized weakness  Elevated troponin  Hyponatremia  Hyperkalemia  ESRD on HD  H/o renal artery stenosis s/p L renal stent     Hyponatremia improving -troponins initially mildly elevated  Nephrology consulted.  Plan for dialysis.  Hemodialysis schedule Monday Wednesday Friday.    Hyperkalemia has resolved    Hypertensive urgency --continues to have significantly elevated blood pressures  echo read pending  Adjusted blood pressure medications  - Discussed with nephrology  about further adjustment of blood pressure medications given continued elevated blood pressure   - Patient is already on clonidine patch hydralazine losartan metoprolol nifedipine, Aldactone will be added today.  Monitor blood pressure.   clonidine has been changed to patch from p.o. by cardiology  Avoid nephrotoxic medications  Pharmacy to renally dose medication  Nephrology consulted to manage   PT/OT consulted      Constipation however family stating patient has been having 10-12 bowel movements  Ordered C. difficile however not done yet resolved  Nausea  Zofran as needed       Leukopenia  Resolved  Continue to monitor CBC       Anemia of chronic disease  Previously admitted with melena  Underwent EGD 6/19/25 /Colonoscopy 6/22/25 no acute findings but biopsies positive for H. Pylori  Hgb stable  Continue to monitor CBC  Transfuse if Hgb < 7  Continue treatment once reconciled      SSS s/p PPM  Hypertensive urgency  Hyperlipidemia   BP elevated on ED arrival with SBP > 200  Hydralazine as needed with parameters  Continue Clonidine, Hydralazine, Lisinopril, Metoprolol --blood pressure better as compared to yesterday-- increased  clonidine to 3 times daily from twice daily dosing  Continue ASA, statin, Plavix     Diabetes Mellitus   A1c 5.40  Consistent carb diet  Hypoglycemia protocol     Has been on Flagyl p.o. at home for H. pylori, resumed.      VTE Prophylaxis:  Pharmacologic & mechanical VTE prophylaxis orders are present.             Disposition Planning:   Possible discharge tomorrow.  PT OT eval        Time: 35 minutes     Code Status and Medical Interventions: CPR (Attempt to Resuscitate); Full Support   Ordered at: 07/02/25 1755     Code Status (Patient has no pulse and is not breathing):    CPR (Attempt to Resuscitate)     Medical Interventions (Patient has pulse or is breathing):    Full Support       Signature: Electronically signed by Kaitlin Kennedy MD, 07/06/25, 10:54 EDT.  [facility] Hospitalist Team

## 2025-07-06 NOTE — PLAN OF CARE
Problem: Adult Inpatient Plan of Care  Goal: Plan of Care Review  Outcome: Progressing  Flowsheets (Taken 7/6/2025 3216)  Outcome Evaluation: pt BP still elevated.  MDs aware.  changing meds around  will cont to monitor.  Pt had BM today before enema.   Goal Outcome Evaluation:              Outcome Evaluation: pt BP still elevated.  MDs aware.  changing meds around  will cont to monitor.  Pt had BM today before enema.

## 2025-07-06 NOTE — CONSULTS
Cardiology attending:  Seen and examined.  Chart and labs reviewed. Independent interpretations of cardiac testing was performed. History and exam findings are verified.  Assessment and plan notated by APC after being formulated by attending consultant.  Note that greater than 50% of the time spent in care of the patient was provided by attending consultant.    Patient is a 84-year-old female with history of sick sinus syndrome status post permanent pacemaker placement, renal artery stenosis status post left renal stent, end-stage renal disease on hemodialysis, hypertension.  She was initially admitted with nausea and vomiting.  She was found to have H. pylori infection and started about therapy.  Cardiology consulted due to uncontrolled hypertension.  During my evaluation bedside she denied any chest pain or shortness of breath.  Blood pressure was in the low 200s over 90s.  I agree with adding spironolactone  Increase carvedilol dose to 25 mg  Continue losartan 100  Currently on nifedipine 60 and clonidine patch.  Continue clopidogrel, aspirin and high intensity statins.    Electronically signed by Doc Adam MD, 07/06/25, 5:00 PM EDT.      CARDIOLOGY CONSULT      Referring Provider: Kaitlin Kennedy MD    Reason for Consultation:      Uncontrolled hypertension      Patient Care Team:  Brian Nazario APRN as PCP - General  Zane Aldridge MD as Consulting Physician (Cardiology)  Marnie Brandt APRN as Nurse Practitioner (Cardiology)      SUBJECTIVE     Chief Complaint: Uncontrolled hypertension    History of present illness:  Brad Woodward is a 84 y.o. female with a history of hypertension, sick sinus syndrome who presented to [facility] with complaint of generalized weakness malaise.     Patient is an 84-year-old female who was admitted to [facility] on July 2, 2025 she presented with nausea, vomiting and generalized weakness.    Patient is known to have hypertension,  end-stage renal disease requiring hemodialysis  Type 2 diabetes, dyslipidemia, renal artery stenosis with previous left renal stent, sick sinus syndrome with dual-chamber pacemaker.    Patient's son is at the bedside.  She does not speak English and he is acting as .    Cardiology was consulted today due to uncontrolled hypertension.    Patient routinely follows with Dr. Mejia.  Review of recent cardiac testing includes echocardiogram repeat that is pending previous echo in 2021 showed her ejection fraction to be 60 to 65% with moderate asymmetric hypertrophy mild to moderate AAS, moderate MR.  According to the patient's son she is not complaining of chest pain or shortness of breath progressive weakness has been her main complaint.  Patient has been followed by nephrology as well as the hospitalist.  She is undergoing hemodialysis.    Review of systems:    Unable to complete due to language barrier patient drowsy    Personal History:      Past Medical History:   Diagnosis Date    Diabetes mellitus     Hyperlipidemia     Hypertension        Past Surgical History:   Procedure Laterality Date    ANGIOPLASTY ILIAC ARTERY Left 05/21/2025    Procedure: LEFT RENAL ARTERY ANGIOPLASTY WITH STENT;  Surgeon: Ted Mckee II, MD;  Location: [facility] HYBRID OR;  Service: Vascular;  Laterality: Left;    CARDIAC ELECTROPHYSIOLOGY PROCEDURE N/A 04/20/2021    Procedure: Pacemaker DC new;  Surgeon: Yovany Navarrete MD;  Location: [facility] CATH INVASIVE LOCATION;  Service: Cardiovascular;  Laterality: N/A;    COLONOSCOPY N/A 6/22/2025    Procedure: COLONOSCOPY;  Surgeon: Ramses Ashley MD;  Location: [facility] ENDOSCOPY;  Service: Gastroenterology;  Laterality: N/A;  post: poor prep    ENDOSCOPY N/A 6/19/2025    Procedure: ESOPHAGOGASTRODUODENOSCOPY WITH BIOPSY;  Surgeon: RAMON De Leon MD;  Location: [facility] ENDOSCOPY;  Service: Gastroenterology;  Laterality: N/A;  GASTRITIS, HIATAL HERNIA    PACEMAKER IMPLANTATION          Family History   Family history unknown: Yes       Social History     Tobacco Use    Smoking status: Never     Passive exposure: Never    Smokeless tobacco: Never   Vaping Use    Vaping status: Never Used   Substance Use Topics    Alcohol use: No    Drug use: No        Home meds:  Prior to Admission medications    Medication Sig Start Date End Date Taking? Authorizing Provider   aspirin 81 MG chewable tablet Chew 1 tablet Daily. 4/4/24  Yes Zane Aldridge MD   atorvastatin (LIPITOR) 80 MG tablet Take 1 tablet by mouth Daily. 4/4/24  Yes Zane Aldridge MD   Bismuth 262 MG chewable tablet Chew 2 tablets 2 (Two) Times a Day for 14 days. 6/25/25 7/9/25 Yes RAMON De Leon MD   Bismuth/Metronidaz/Tetracyclin 140-125-125 MG capsule Take 3 Capfuls by mouth 4 (Four) Times a Day for 10 days. 6/25/25 7/5/25 Yes    cloNIDine (CATAPRES) 0.3 MG tablet Take 1 tablet by mouth 2 (Two) Times a Day.   Yes Harry Hernández MD   clopidogrel (PLAVIX) 75 MG tablet Take 1 tablet by mouth Daily for 30 days. 6/24/25 7/24/25 Yes Raquel Weber MD   Cyanocobalamin (VITAMIN B-12 PO) Take 1 tablet by mouth Daily.   Yes Harry Hernández MD   docusate sodium (COLACE) 100 MG capsule Take 1 capsule by mouth Every 12 (Twelve) Hours. 5/26/25  Yes Harry Hernández MD   hydrALAZINE (APRESOLINE) 100 MG tablet Take 1 tablet by mouth 3 times a day for 30 days. 4/24/25 7/2/25 Yes Harjinder Rosenbaum MD   losartan (COZAAR) 100 MG tablet Take 1 tablet by mouth Daily for 30 days. 6/25/25 7/25/25 Yes Raquel Weber MD   metoprolol succinate XL (TOPROL-XL) 25 MG 24 hr tablet Take 1 tablet by mouth Daily for 30 days. 5/22/25 7/2/25 Yes Helder Marcum MD   metroNIDAZOLE (FLAGYL) 250 MG tablet Take 1.5 tablets by mouth 4 (Four) Times a Day. 6/25/25  Yes RAMON De Leon MD   omeprazole (priLOSEC) 20 MG capsule Take 1 capsule by mouth 2 (Two) Times a Day Before Meals for 14 days. 6/25/25 7/10/25 Yes RAMON De Leon MD    pantoprazole (Protonix) 40 MG EC tablet Take 1 tablet by mouth Daily for 30 days. 6/24/25 7/24/25 Yes Raquel Weber MD   potassium chloride (KLOR-CON M20) 20 MEQ CR tablet Take 1 tablet by mouth 2 (Two) Times a Day.   Yes Provider, MD Harry   tetracycline (ACHROMYCIN,SUMYCIN) 500 MG capsule Take 1 capsule by mouth 4 (Four) Times a Day as directed for 14 days. 6/25/25 7/10/25 Yes        Allergies:     Patient has no known allergies.    Scheduled Meds:aspirin, 81 mg, Oral, Daily  atorvastatin, 80 mg, Oral, Daily  Bismuth, 524 mg, Oral, BID  cloNIDine, 2 patch, Transdermal, Weekly  clopidogrel, 75 mg, Oral, Daily  docusate sodium, 100 mg, Oral, Q12H  hydrALAZINE, 100 mg, Oral, TID  losartan, 100 mg, Oral, Q24H  metoprolol succinate XL, 25 mg, Oral, Daily  metroNIDAZOLE, 375 mg, Oral, 4x Daily  NIFEdipine XL, 60 mg, Oral, Q24H  pantoprazole, 40 mg, Oral, Daily  spironolactone, 25 mg, Oral, BID Diuretics  tetracycline, 500 mg, Oral, Daily      Continuous Infusions:   PRN Meds:  acetaminophen **OR** acetaminophen **OR** acetaminophen    albumin human    senna-docusate sodium **AND** polyethylene glycol **AND** bisacodyl **AND** bisacodyl    Calcium Replacement - Follow Nurse / BPA Driven Protocol    dextrose    dextrose    glucagon (human recombinant)    heparin (porcine)    hydrALAZINE    Magnesium Standard Dose Replacement - Follow Nurse / BPA Driven Protocol    melatonin    ondansetron ODT **OR** ondansetron    Phosphorus Replacement - Follow Nurse / BPA Driven Protocol    [COMPLETED] Insert Peripheral IV **AND** sodium chloride      OBJECTIVE    Vital Signs  Vitals:    07/06/25 1130 07/06/25 1159 07/06/25 1200 07/06/25 1230   BP: (!) 196/74 (!) 196/74 (!) 221/79 (!) 192/76   BP Location:       Patient Position:       Pulse: 60 60 60 60   Resp:       Temp:       TempSrc:       SpO2: 96%  97% 97%   Weight:       Height:           Flowsheet Rows      Flowsheet Row First Filed Value   Admission Height 160  "cm (63\") Documented at 07/02/2025 1312   Admission Weight 62 kg (136 lb 9.6 oz) Documented at 07/02/2025 1312              Intake/Output Summary (Last 24 hours) at 7/6/2025 1335  Last data filed at 7/5/2025 2001  Gross per 24 hour   Intake 720 ml   Output --   Net 720 ml        Telemetry: Atrial paced rhythm    Physical Exam:  The patient is drowsy  Vital signs as noted above.  Head and neck revealed no carotid bruits or jugular venous distention.    Lungs clear.  Diminished in the bases.  Heart: Normal first and second heart sounds.  Systolic murmur noted   abdomen: Soft and nontender.  Extremities with good peripheral pulses without any pedal edema.  Skin: Warm and dry.  Musculoskeletal system is grossly normal.  CNS grossly normal.       Results Review:  I have personally reviewed the results from the time of this admission to 7/6/2025 13:35 EDT and agree with these findings:  []  Laboratory  []  Microbiology  []  Radiology  []  EKG/Telemetry   []  Cardiology/Vascular   []  Pathology  []  Old records  []  Other:    Most notable findings include:     Lab Results (last 24 hours)       ** No results found for the last 24 hours. **            Imaging Results (Last 24 Hours)       ** No results found for the last 24 hours. **            LAB RESULTS (LAST 7 DAYS)    CBC  Results from last 7 days   Lab Units 07/05/25 0930 07/04/25 0120 07/03/25 0538 07/02/25  1415   WBC 10*3/mm3 4.39 3.59 3.49 2.44*   RBC 10*6/mm3 3.58* 3.40* 3.56* 3.62*   HEMOGLOBIN g/dL 9.9* 9.4* 9.8* 9.9*   HEMATOCRIT % 29.9* 29.2* 31.0* 31.3*   MCV fL 83.5 85.9 87.1 86.5   PLATELETS 10*3/mm3 178 196 206 247       BMP  Results from last 7 days   Lab Units 07/05/25 0930 07/04/25 0120 07/03/25 0538 07/02/25  1532   SODIUM mmol/L 130* 130* 125* 127*   POTASSIUM mmol/L 4.1 4.5 4.9 5.4*   CHLORIDE mmol/L 97* 97* 94* 93*   CO2 mmol/L 24.0 21.2* 19.2* 21.7*   BUN mg/dL 27.0* 34.4* 74.3* 64.7*   CREATININE mg/dL 2.57* 3.03* 4.96* 4.47*   GLUCOSE mg/dL " 95 101* 116* 127*   MAGNESIUM mg/dL 2.0 1.9 2.0 1.9   PHOSPHORUS mg/dL 4.3 5.5* 7.1*  --        CMP   Results from last 7 days   Lab Units 07/05/25  0930 07/04/25  0120 07/03/25  0538 07/02/25  1532   SODIUM mmol/L 130* 130* 125* 127*   POTASSIUM mmol/L 4.1 4.5 4.9 5.4*   CHLORIDE mmol/L 97* 97* 94* 93*   CO2 mmol/L 24.0 21.2* 19.2* 21.7*   BUN mg/dL 27.0* 34.4* 74.3* 64.7*   CREATININE mg/dL 2.57* 3.03* 4.96* 4.47*   GLUCOSE mg/dL 95 101* 116* 127*   ALBUMIN g/dL  --   --   --  3.3*   BILIRUBIN mg/dL  --   --   --  0.4   ALK PHOS U/L  --   --   --  122*   AST (SGOT) U/L  --   --   --  47*   ALT (SGPT) U/L  --   --   --  23   LIPASE U/L  --   --   --  97*       BNP        TROPONIN  Results from last 7 days   Lab Units 07/02/25  1717   HSTROP T ng/L 124*       CoAg  Results from last 7 days   Lab Units 07/02/25  1510   INR  0.98   APTT seconds 29.3       Creatinine Clearance  Estimated Creatinine Clearance: 15.5 mL/min (A) (by C-G formula based on SCr of 2.57 mg/dL (H)).    ABG          Radiology  No radiology results for the last day      EKG  I personally viewed and interpreted the patient's EKG/Telemetry data:  ECG 12 Lead Other; weakness nausea   Final Result   HEART RATE=64  bpm   RR Acrxhvad=449  ms   UT Rntnnoyt=554  ms   P Horizontal Axis=-64  deg   P Front Axis=  deg   QRSD Mrgjpvjo=411  ms   QT Rdxeokdb=577  ms   SHxJ=058  ms   QRS Axis=45  deg   T Wave Axis=27  deg   - ABNORMAL ECG -   Atrial-paced complexes   Ventricular premature complex   Borderline  prolonged UT interval   Right bundle branch block   Prolonged QT interval   When compared with ECG of 21-Jun-2025 21:43:46,   Significant change in rhythm   Significant axis, voltage or hypertrophy change   Electronically Signed By: Surendra Terrazas ([facility]) 2025-07-03 06:36:38   Date and Time of Study:2025-07-02 13:49:25                  Echocardiogram:    Results for orders placed during the hospital encounter of 04/15/21    Adult Transthoracic Echo Complete W/  Cont if Necessary Per Protocol 04/16/2021 2019    Interpretation Summary  · Left ventricular ejection fraction appears to be 61 - 65%.  · Left ventricular wall thickness is consistent with moderate basal asymmetric hypertrophy.  · The right ventricular cavity is mildly dilated.  · Mild to moderate aortic valve stenosis is present.  · Moderate mitral valve regurgitation is present.  · No pericardial effusion noted        Stress Test:        Cardiac Catheterization:  No results found for this or any previous visit.        Other:      ASSESSMENT & PLAN:    Principal Problem:    Generalized weakness    Uncontrolled hypertension  Longstanding hypertension  Most recent blood pressure is 221/79  Current medical treatment includes clonidine 0.3 mg / 24 hours 2 patches weekly, hydralazine 100 mg 3 times daily, losartan 100 mg daily, Toprol-XL 25 mg daily, nifedipine XL 60 mg daily, spironolactone 25 mg twice daily  Will stop metoprolol XL and add carvedilol  Additional changes made by nephrology today including addition of nifedipine and change of clonidine from p.o. to patch  Will titrate carvedilol slowly to avoid hypotension    End-stage renal disease  Undergoing hemodialysis  History of renal artery stenosis with previous intervention    Sick sinus syndrome/dual-chamber pacemaker  [employer] device    Generalized weakness  Managed by primary team      Blood pressure elevated  Will change metoprolol XL to carvedilol and uptitrate as needed  Recent in office device interrogation shows stable device function with no sustained atrial arrhythmias  Repeat echocardiogram pending  Additional recommendations per Dr. Kia Messina, CECE  07/06/25  13:35 EDT

## 2025-07-07 ENCOUNTER — TELEPHONE (OUTPATIENT)
Dept: CARDIOLOGY | Facility: CLINIC | Age: 85
End: 2025-07-07
Payer: MEDICARE

## 2025-07-07 ENCOUNTER — APPOINTMENT (OUTPATIENT)
Dept: NEPHROLOGY | Facility: HOSPITAL | Age: 85
End: 2025-07-07
Payer: MEDICAID

## 2025-07-07 LAB
ANION GAP SERPL CALCULATED.3IONS-SCNC: 8.6 MMOL/L (ref 5–15)
AORTIC DIMENSIONLESS INDEX: 0.62 (DI)
AV MEAN PRESS GRAD SYS DOP V1V2: 6 MMHG
AV VMAX SYS DOP: 166 CM/SEC
BASOPHILS # BLD AUTO: 0.02 10*3/MM3 (ref 0–0.2)
BASOPHILS NFR BLD AUTO: 0.7 % (ref 0–1.5)
BH CV ECHO LEFT VENTRICLE GLOBAL LONGITUDINAL STRAIN: -13.5 %
BH CV ECHO MEAS - ACS: 2 CM
BH CV ECHO MEAS - AI P1/2T: 467.5 MSEC
BH CV ECHO MEAS - AO MAX PG: 11 MMHG
BH CV ECHO MEAS - AO V2 VTI: 46.6 CM
BH CV ECHO MEAS - AVA(I,D): 1.94 CM2
BH CV ECHO MEAS - EDV(CUBED): 46.7 ML
BH CV ECHO MEAS - EDV(MOD-SP4): 92.7 ML
BH CV ECHO MEAS - EF(MOD-SP4): 60.3 %
BH CV ECHO MEAS - ESV(CUBED): 13.8 ML
BH CV ECHO MEAS - ESV(MOD-SP4): 36.8 ML
BH CV ECHO MEAS - FS: 33.3 %
BH CV ECHO MEAS - IVS/LVPW: 1 CM
BH CV ECHO MEAS - IVSD: 2.2 CM
BH CV ECHO MEAS - LA DIMENSION: 3.3 CM
BH CV ECHO MEAS - LAT PEAK E' VEL: 6.3 CM/SEC
BH CV ECHO MEAS - LV DIASTOLIC VOL/BSA (35-75): 56.3 CM2
BH CV ECHO MEAS - LV MASS(C)D: 387.8 GRAMS
BH CV ECHO MEAS - LV MAX PG: 4.8 MMHG
BH CV ECHO MEAS - LV MEAN PG: 3 MMHG
BH CV ECHO MEAS - LV SYSTOLIC VOL/BSA (12-30): 22.3 CM2
BH CV ECHO MEAS - LV V1 MAX: 110 CM/SEC
BH CV ECHO MEAS - LV V1 VTI: 28.8 CM
BH CV ECHO MEAS - LVIDD: 3.6 CM
BH CV ECHO MEAS - LVIDS: 2.4 CM
BH CV ECHO MEAS - LVOT AREA: 3.1 CM2
BH CV ECHO MEAS - LVOT DIAM: 2 CM
BH CV ECHO MEAS - LVPWD: 2.2 CM
BH CV ECHO MEAS - MED PEAK E' VEL: 5.3 CM/SEC
BH CV ECHO MEAS - MR MAX PG: 183.3 MMHG
BH CV ECHO MEAS - MR MAX VEL: 677 CM/SEC
BH CV ECHO MEAS - MR MEAN PG: 104 MMHG
BH CV ECHO MEAS - MR MEAN VEL: 479 CM/SEC
BH CV ECHO MEAS - MR VTI: 288 CM
BH CV ECHO MEAS - MV A MAX VEL: 122 CM/SEC
BH CV ECHO MEAS - MV DEC SLOPE: 457 CM/SEC2
BH CV ECHO MEAS - MV DEC TIME: 0.33 SEC
BH CV ECHO MEAS - MV E MAX VEL: 71.8 CM/SEC
BH CV ECHO MEAS - MV E/A: 0.59
BH CV ECHO MEAS - MV MAX PG: 6 MMHG
BH CV ECHO MEAS - MV MEAN PG: 3 MMHG
BH CV ECHO MEAS - MV P1/2T: 54.3 MSEC
BH CV ECHO MEAS - MV V2 VTI: 35.1 CM
BH CV ECHO MEAS - MVA(P1/2T): 4.1 CM2
BH CV ECHO MEAS - MVA(VTI): 2.6 CM2
BH CV ECHO MEAS - PA ACC TIME: 0.17 SEC
BH CV ECHO MEAS - PA V2 MAX: 88.4 CM/SEC
BH CV ECHO MEAS - PI END-D VEL: 138 CM/SEC
BH CV ECHO MEAS - RV MAX PG: 2.6 MMHG
BH CV ECHO MEAS - RV V1 MAX: 80.6 CM/SEC
BH CV ECHO MEAS - RV V1 VTI: 22.4 CM
BH CV ECHO MEAS - SV(LVOT): 90.5 ML
BH CV ECHO MEAS - SV(MOD-SP4): 55.9 ML
BH CV ECHO MEAS - SVI(LVOT): 54.9 ML/M2
BH CV ECHO MEAS - SVI(MOD-SP4): 33.9 ML/M2
BH CV ECHO MEAS - TAPSE (>1.6): 1.6 CM
BH CV ECHO MEAS - TR MAX PG: 4.4 MMHG
BH CV ECHO MEAS - TR MAX VEL: 105 CM/SEC
BH CV ECHO MEAS RV FREE WALL STRAIN: -21.6 %
BH CV ECHO MEASUREMENTS AVERAGE E/E' RATIO: 12.38
BH CV XLRA - RV BASE: 3.3 CM
BH CV XLRA - RV LENGTH: 6.6 CM
BH CV XLRA - RV MID: 2.3 CM
BH CV XLRA - TDI S': 8.1 CM/SEC
BUN SERPL-MCNC: 46 MG/DL (ref 8–23)
BUN/CREAT SERPL: 13.4 (ref 7–25)
CALCIUM SPEC-SCNC: 6.8 MG/DL (ref 8.6–10.5)
CHLORIDE SERPL-SCNC: 98 MMOL/L (ref 98–107)
CO2 SERPL-SCNC: 19.4 MMOL/L (ref 22–29)
CREAT SERPL-MCNC: 3.43 MG/DL (ref 0.57–1)
DEPRECATED RDW RBC AUTO: 53.7 FL (ref 37–54)
EGFRCR SERPLBLD CKD-EPI 2021: 12.7 ML/MIN/1.73
EOSINOPHIL # BLD AUTO: 0.02 10*3/MM3 (ref 0–0.4)
EOSINOPHIL NFR BLD AUTO: 0.7 % (ref 0.3–6.2)
ERYTHROCYTE [DISTWIDTH] IN BLOOD BY AUTOMATED COUNT: 17.7 % (ref 12.3–15.4)
GLUCOSE SERPL-MCNC: 145 MG/DL (ref 65–99)
HCT VFR BLD AUTO: 26.8 % (ref 34–46.6)
HGB BLD-MCNC: 8.9 G/DL (ref 12–15.9)
IMM GRANULOCYTES # BLD AUTO: 0 10*3/MM3 (ref 0–0.05)
IMM GRANULOCYTES NFR BLD AUTO: 0 % (ref 0–0.5)
IVRT: 83 MS
LV EF 3D SEGMENTATION: 62 %
LYMPHOCYTES # BLD AUTO: 1.11 10*3/MM3 (ref 0.7–3.1)
LYMPHOCYTES NFR BLD AUTO: 39.6 % (ref 19.6–45.3)
MCH RBC QN AUTO: 27.8 PG (ref 26.6–33)
MCHC RBC AUTO-ENTMCNC: 33.2 G/DL (ref 31.5–35.7)
MCV RBC AUTO: 83.8 FL (ref 79–97)
MONOCYTES # BLD AUTO: 0.31 10*3/MM3 (ref 0.1–0.9)
MONOCYTES NFR BLD AUTO: 11.1 % (ref 5–12)
NEUTROPHILS NFR BLD AUTO: 1.34 10*3/MM3 (ref 1.7–7)
NEUTROPHILS NFR BLD AUTO: 47.9 % (ref 42.7–76)
NRBC BLD AUTO-RTO: 0 /100 WBC (ref 0–0.2)
PLATELET # BLD AUTO: 187 10*3/MM3 (ref 140–450)
PMV BLD AUTO: 10.2 FL (ref 6–12)
POTASSIUM SERPL-SCNC: 3.3 MMOL/L (ref 3.5–5.2)
RBC # BLD AUTO: 3.2 10*6/MM3 (ref 3.77–5.28)
SINUS: 3.3 CM
SODIUM SERPL-SCNC: 126 MMOL/L (ref 136–145)
STJ: 3 CM
WBC NRBC COR # BLD AUTO: 2.8 10*3/MM3 (ref 3.4–10.8)

## 2025-07-07 PROCEDURE — 85025 COMPLETE CBC W/AUTO DIFF WBC: CPT | Performed by: INTERNAL MEDICINE

## 2025-07-07 PROCEDURE — 99232 SBSQ HOSP IP/OBS MODERATE 35: CPT

## 2025-07-07 PROCEDURE — 25010000002 ONDANSETRON PER 1 MG

## 2025-07-07 PROCEDURE — 80048 BASIC METABOLIC PNL TOTAL CA: CPT | Performed by: INTERNAL MEDICINE

## 2025-07-07 RX ORDER — POTASSIUM CHLORIDE 7.45 MG/ML
10 INJECTION INTRAVENOUS
Status: ACTIVE | OUTPATIENT
Start: 2025-07-07 | End: 2025-07-07

## 2025-07-07 RX ORDER — BISMUTH SUBSALICYLATE 262 MG/1
524 TABLET, CHEWABLE ORAL 2 TIMES DAILY
Status: DISCONTINUED | OUTPATIENT
Start: 2025-07-07 | End: 2025-07-08 | Stop reason: HOSPADM

## 2025-07-07 RX ORDER — PANTOPRAZOLE SODIUM 40 MG/1
40 TABLET, DELAYED RELEASE ORAL
Status: DISCONTINUED | OUTPATIENT
Start: 2025-07-07 | End: 2025-07-08 | Stop reason: HOSPADM

## 2025-07-07 RX ADMIN — HYDRALAZINE HYDROCHLORIDE 100 MG: 25 TABLET ORAL at 20:41

## 2025-07-07 RX ADMIN — METRONIDAZOLE 375 MG: 500 TABLET ORAL at 17:32

## 2025-07-07 RX ADMIN — BISMUTH SUBSALICYLATE 524 MG: 262 TABLET, CHEWABLE ORAL at 20:43

## 2025-07-07 RX ADMIN — ATORVASTATIN CALCIUM 80 MG: 40 TABLET, FILM COATED ORAL at 08:41

## 2025-07-07 RX ADMIN — ASPIRIN 81 MG CHEWABLE TABLET 81 MG: 81 TABLET CHEWABLE at 08:41

## 2025-07-07 RX ADMIN — HYDRALAZINE HYDROCHLORIDE 100 MG: 25 TABLET ORAL at 17:32

## 2025-07-07 RX ADMIN — BISMUTH SUBSALICYLATE 524 MG: 262 TABLET, CHEWABLE ORAL at 09:07

## 2025-07-07 RX ADMIN — SPIRONOLACTONE 50 MG: 25 TABLET ORAL at 08:41

## 2025-07-07 RX ADMIN — PANTOPRAZOLE SODIUM 40 MG: 40 TABLET, DELAYED RELEASE ORAL at 17:32

## 2025-07-07 RX ADMIN — METRONIDAZOLE 375 MG: 500 TABLET ORAL at 08:41

## 2025-07-07 RX ADMIN — PANTOPRAZOLE SODIUM 40 MG: 40 TABLET, DELAYED RELEASE ORAL at 08:42

## 2025-07-07 RX ADMIN — CLOPIDOGREL BISULFATE 75 MG: 75 TABLET, FILM COATED ORAL at 08:41

## 2025-07-07 RX ADMIN — ONDANSETRON 4 MG: 2 INJECTION, SOLUTION INTRAMUSCULAR; INTRAVENOUS at 08:41

## 2025-07-07 RX ADMIN — CARVEDILOL 25 MG: 25 TABLET, FILM COATED ORAL at 08:41

## 2025-07-07 RX ADMIN — NIFEDIPINE 60 MG: 60 TABLET, EXTENDED RELEASE ORAL at 08:41

## 2025-07-07 RX ADMIN — HYDRALAZINE HYDROCHLORIDE 100 MG: 25 TABLET ORAL at 08:41

## 2025-07-07 RX ADMIN — DOCUSATE SODIUM 100 MG: 100 CAPSULE, LIQUID FILLED ORAL at 08:42

## 2025-07-07 RX ADMIN — DOCUSATE SODIUM 100 MG: 100 CAPSULE, LIQUID FILLED ORAL at 20:41

## 2025-07-07 RX ADMIN — Medication 10 ML: at 20:41

## 2025-07-07 RX ADMIN — METRONIDAZOLE 375 MG: 500 TABLET ORAL at 11:25

## 2025-07-07 RX ADMIN — LOSARTAN POTASSIUM 100 MG: 50 TABLET, FILM COATED ORAL at 08:42

## 2025-07-07 RX ADMIN — ONDANSETRON 4 MG: 2 INJECTION, SOLUTION INTRAMUSCULAR; INTRAVENOUS at 20:41

## 2025-07-07 RX ADMIN — SPIRONOLACTONE 50 MG: 25 TABLET ORAL at 17:32

## 2025-07-07 RX ADMIN — TETRACYCLINE HYDROCHLORIDE 500 MG: 500 CAPSULE ORAL at 08:47

## 2025-07-07 RX ADMIN — CARVEDILOL 25 MG: 25 TABLET, FILM COATED ORAL at 17:32

## 2025-07-07 RX ADMIN — METRONIDAZOLE 375 MG: 500 TABLET ORAL at 20:41

## 2025-07-07 NOTE — PROGRESS NOTES
CARDIOLOGY PROGRESS NOTE:    Brad Woodward  84 y.o.  female  1940  [id number]      Referring Provider: Dr. Farooq    Reason for follow-up: Uncontrolled hypertension     Patient Care Team:  Brian Nazario APRN as PCP - General  Zane Aldridge MD as Consulting Physician (Cardiology)  Marnie Brandt APRN as Nurse Practitioner (Cardiology)    Subjective .  Patient seen and examined.  Labs reviewed.  Chart reviewed.  Patient doing well from cardiology standpoint she denies chest pain, shortness of breath.  Ongoing generalized weakness    Objective  Patient lying in bed resting comfortably with family at bedside     Review of Systems   Constitutional: Positive for malaise/fatigue. Negative for chills and fever.   Cardiovascular:  Negative for chest pain, leg swelling, near-syncope, palpitations and syncope.   Respiratory:  Negative for cough and shortness of breath.    Gastrointestinal:  Negative for abdominal pain and vomiting.   Neurological:  Positive for weakness. Negative for dizziness, headaches and light-headedness.   Psychiatric/Behavioral:  Negative for altered mental status.        Allergies: Patient has no known allergies.    Scheduled Meds:aspirin, 81 mg, Oral, Daily  atorvastatin, 80 mg, Oral, Daily  Bismuth, 524 mg, Oral, BID  carvedilol, 25 mg, Oral, BID With Meals  cloNIDine, 2 patch, Transdermal, Weekly  clopidogrel, 75 mg, Oral, Daily  docusate sodium, 100 mg, Oral, Q12H  hydrALAZINE, 100 mg, Oral, TID  losartan, 100 mg, Oral, Q24H  metroNIDAZOLE, 375 mg, Oral, 4x Daily  NIFEdipine XL, 60 mg, Oral, Q24H  pantoprazole, 40 mg, Oral, Daily  spironolactone, 50 mg, Oral, BID Diuretics  tetracycline, 500 mg, Oral, Daily      Continuous Infusions:   PRN Meds:.  acetaminophen **OR** acetaminophen **OR** acetaminophen    albumin human    senna-docusate sodium **AND** polyethylene glycol **AND** bisacodyl **AND** bisacodyl    Calcium Replacement - Follow Nurse / BPA Driven Protocol    dextrose    dextrose     "glucagon (human recombinant)    heparin (porcine)    hydrALAZINE    Magnesium Standard Dose Replacement - Follow Nurse / BPA Driven Protocol    melatonin    ondansetron ODT **OR** ondansetron    Phosphorus Replacement - Follow Nurse / BPA Driven Protocol    [COMPLETED] Insert Peripheral IV **AND** sodium chloride        VITAL SIGNS  Vitals:    07/07/25 0013 07/07/25 0300 07/07/25 0524 07/07/25 0700   BP: 151/63 137/59 120/48 169/64   BP Location: Left arm Left arm Left arm Left arm   Patient Position: Lying Lying Lying Lying   Pulse: 61  64 60   Resp: 13  12 14   Temp: 97.8 °F (36.6 °C)  97.2 °F (36.2 °C) 97.9 °F (36.6 °C)   TempSrc: Oral  Axillary Oral   SpO2: 97%  97% 96%   Weight:   64.3 kg (141 lb 12.1 oz)    Height:           Flowsheet Rows      Flowsheet Row First Filed Value   Admission Height 160 cm (63\") Documented at 07/02/2025 1312   Admission Weight 62 kg (136 lb 9.6 oz) Documented at 07/02/2025 1312             TELEMETRY: Paced rhythm    Physical Exam:  Vitals reviewed.   Constitutional:       Appearance: Not in distress.   Eyes:      Pupils: Pupils are equal, round, and reactive to light.   HENT:      Nose: Nose normal.   Pulmonary:      Effort: Pulmonary effort is normal.      Breath sounds: Normal breath sounds.   Cardiovascular:      Normal rate. Regular rhythm.   Pulses:     Intact distal pulses.   Edema:     Peripheral edema absent.   Abdominal:      Palpations: Abdomen is soft.   Musculoskeletal: Normal range of motion.      Cervical back: Normal range of motion and neck supple. Skin:     General: Skin is warm and dry.   Neurological:      General: No focal deficit present.      Mental Status: Alert.          Results Review:   I reviewed the patient's new clinical results.  Lab Results (last 24 hours)       Procedure Component Value Units Date/Time    Basic Metabolic Panel [[id number]]  (Abnormal) Collected: 07/07/25 0428    Specimen: Blood from Arm, Right Updated: 07/07/25 0500     Glucose 145 " mg/dL      BUN 46.0 mg/dL      Creatinine 3.43 mg/dL      Sodium 126 mmol/L      Potassium 3.3 mmol/L      Chloride 98 mmol/L      CO2 19.4 mmol/L      Calcium 6.8 mg/dL      BUN/Creatinine Ratio 13.4     Anion Gap 8.6 mmol/L      eGFR 12.7 mL/min/1.73     Narrative:      GFR Categories in Chronic Kidney Disease (CKD)              GFR Category          GFR (mL/min/1.73)    Interpretation  G1                    90 or greater        Normal or high (1)  G2                    60-89                Mild decrease (1)  G3a                   45-59                Mild to moderate decrease  G3b                   30-44                Moderate to severe decrease  G4                    15-29                Severe decrease  G5                    14 or less           Kidney failure    (1)In the absence of evidence of kidney disease, neither GFR category G1 or G2 fulfill the criteria for CKD.    eGFR calculation 2021 CKD-EPI creatinine equation, which does not include race as a factor    CBC & Differential [[id number]]  (Abnormal) Collected: 07/07/25 0428    Specimen: Blood from Arm, Right Updated: 07/07/25 0440    Narrative:      The following orders were created for panel order CBC & Differential.  Procedure                               Abnormality         Status                     ---------                               -----------         ------                     CBC Auto Differential[[id number]]        Abnormal            Final result                 Please view results for these tests on the individual orders.    CBC Auto Differential [[id number]]  (Abnormal) Collected: 07/07/25 0428    Specimen: Blood from Arm, Right Updated: 07/07/25 0440     WBC 2.80 10*3/mm3      RBC 3.20 10*6/mm3      Hemoglobin 8.9 g/dL      Hematocrit 26.8 %      MCV 83.8 fL      MCH 27.8 pg      MCHC 33.2 g/dL      RDW 17.7 %      RDW-SD 53.7 fl      MPV 10.2 fL      Platelets 187 10*3/mm3      Neutrophil % 47.9 %      Lymphocyte % 39.6 %       Monocyte % 11.1 %      Eosinophil % 0.7 %      Basophil % 0.7 %      Immature Grans % 0.0 %      Neutrophils, Absolute 1.34 10*3/mm3      Lymphocytes, Absolute 1.11 10*3/mm3      Monocytes, Absolute 0.31 10*3/mm3      Eosinophils, Absolute 0.02 10*3/mm3      Basophils, Absolute 0.02 10*3/mm3      Immature Grans, Absolute 0.00 10*3/mm3      nRBC 0.0 /100 WBC             Imaging Results (Last 24 Hours)       ** No results found for the last 24 hours. **            EKG        I personally viewed and interpreted the patient's EKG/Telemetry data:    ECHOCARDIOGRAM:  Results for orders placed during the hospital encounter of 04/15/21    Adult Transthoracic Echo Complete W/ Cont if Necessary Per Protocol 04/16/2021  8:19 PM    Interpretation Summary  · Left ventricular ejection fraction appears to be 61 - 65%.  · Left ventricular wall thickness is consistent with moderate basal asymmetric hypertrophy.  · The right ventricular cavity is mildly dilated.  · Mild to moderate aortic valve stenosis is present.  · Moderate mitral valve regurgitation is present.  · No pericardial effusion noted       STRESS MYOVIEW:       CARDIAC CATHETERIZATION:  No results found for this or any previous visit.       OTHER:         Assessment & Plan     Principal Problem:    Generalized weakness       Uncontrolled hypertension  Longstanding hypertension  Blood pressure improving   Repeat echocardiogram pending review  Metoprolol succinate transitioned to carvedilol  Nephrology added nifedipine and spironolactone  Hydralazine 100 mg TID  Clonidine patch    Sick sinus syndrome  Status post dual-chamber pacemaker    Dyslipidemia  High intensity statin therapy    ESRD  Renal artery stenosis status post intervention  HD MWF  Nephrology following    Generalized weakness  Managed per admitting  PT/OT  Palliative consulted for goals of care discussion  Undergoing treatment for H. pylori infection      Marnie Brandt, CECE  07/07/25  10:55 EDT

## 2025-07-07 NOTE — PROGRESS NOTES
"RENAL/KCC:     LOS: 5 days    Patient Care Team:  Brian Nazario APRN as PCP - General  Zane Aldridge MD as Consulting Physician (Cardiology)  Marnie Brandt APRN as Nurse Practitioner (Cardiology)    Chief Complaint:  Abd pain, N/V    Subjective     Interval History:   Chart reviewed  Seen on HD. Tolerating it well. No new complaint.  Objective     Vital Sign Min/Max for last 24 hours  Temp  Min: 97.2 °F (36.2 °C)  Max: 97.9 °F (36.6 °C)   BP  Min: 120/48  Max: 244/146   Pulse  Min: 60  Max: 64   Resp  Min: 10  Max: 14   SpO2  Min: 96 %  Max: 98 %   No data recorded   Weight  Min: 64.3 kg (141 lb 12.1 oz)  Max: 64.3 kg (141 lb 12.1 oz)     Flowsheet Rows      Flowsheet Row First Filed Value   Admission Height 160 cm (63\") Documented at 07/02/2025 1312   Admission Weight 62 kg (136 lb 9.6 oz) Documented at 07/02/2025 1312            No intake/output data recorded.  I/O last 3 completed shifts:  In: 480 [P.O.:480]  Out: 300 [Urine:300]    Physical Exam:  GEN: Awake, NAD  ENT: PERRL, EOMI, MMM  NECK: Supple, no JVD  CHEST: CTAB, no W/R/C  CV: RRR, no M/G/R  ABD: Soft, NT, +BS  SKIN: Warm and Dry  NEURO: CN's intact      WBC WBC   Date Value Ref Range Status   07/07/2025 2.80 (L) 3.40 - 10.80 10*3/mm3 Final   07/05/2025 4.39 3.40 - 10.80 10*3/mm3 Final      HGB Hemoglobin   Date Value Ref Range Status   07/07/2025 8.9 (L) 12.0 - 15.9 g/dL Final   07/05/2025 9.9 (L) 12.0 - 15.9 g/dL Final      HCT Hematocrit   Date Value Ref Range Status   07/07/2025 26.8 (L) 34.0 - 46.6 % Final   07/05/2025 29.9 (L) 34.0 - 46.6 % Final      Platlets No results found for: \"LABPLAT\"   MCV MCV   Date Value Ref Range Status   07/07/2025 83.8 79.0 - 97.0 fL Final   07/05/2025 83.5 79.0 - 97.0 fL Final          Sodium Sodium   Date Value Ref Range Status   07/07/2025 126 (L) 136 - 145 mmol/L Final   07/05/2025 130 (L) 136 - 145 mmol/L Final      Potassium Potassium   Date Value Ref Range Status   07/07/2025 3.3 (L) 3.5 - 5.2 mmol/L " "Final   07/05/2025 4.1 3.5 - 5.2 mmol/L Final     Comment:     Specimen hemolyzed.  Result may be falsely elevated.      Chloride Chloride   Date Value Ref Range Status   07/07/2025 98 98 - 107 mmol/L Final   07/05/2025 97 (L) 98 - 107 mmol/L Final      CO2 CO2   Date Value Ref Range Status   07/07/2025 19.4 (L) 22.0 - 29.0 mmol/L Final   07/05/2025 24.0 22.0 - 29.0 mmol/L Final      BUN BUN   Date Value Ref Range Status   07/07/2025 46.0 (H) 8.0 - 23.0 mg/dL Final   07/05/2025 27.0 (H) 8.0 - 23.0 mg/dL Final      Creatinine Creatinine   Date Value Ref Range Status   07/07/2025 3.43 (H) 0.57 - 1.00 mg/dL Final   07/05/2025 2.57 (H) 0.57 - 1.00 mg/dL Final      Calcium Calcium   Date Value Ref Range Status   07/07/2025 6.8 (L) 8.6 - 10.5 mg/dL Final   07/05/2025 7.8 (L) 8.6 - 10.5 mg/dL Final      PO4 No results found for: \"CAPO4\"   Albumin No results found for: \"ALBUMIN\"     Magnesium Magnesium   Date Value Ref Range Status   07/05/2025 2.0 1.6 - 2.4 mg/dL Final      Uric Acid No results found for: \"URICACID\"        Results Review:     I reviewed the patient's new clinical results.    aspirin, 81 mg, Oral, Daily  atorvastatin, 80 mg, Oral, Daily  Bismuth, 524 mg, Oral, BID  carvedilol, 25 mg, Oral, BID With Meals  cloNIDine, 2 patch, Transdermal, Weekly  clopidogrel, 75 mg, Oral, Daily  docusate sodium, 100 mg, Oral, Q12H  hydrALAZINE, 100 mg, Oral, TID  losartan, 100 mg, Oral, Q24H  metroNIDAZOLE, 375 mg, Oral, 4x Daily  NIFEdipine XL, 60 mg, Oral, Q24H  pantoprazole, 40 mg, Oral, Daily  spironolactone, 50 mg, Oral, BID Diuretics  tetracycline, 500 mg, Oral, Daily      Medication Review: Reviewed    Assessment & Plan     ESRD  HTN  H Pylori  Weakness  N/V/Abd pain  Anemia of CKD  Met acidosis  Constipation     PLAN: HD per MWF schedule.    Continue ABX for H Pylori.    Na 126 ... Should get better UF today.  Bicaronate should get with HD too.   BP controlled now ... Continue current regime  Will follow     Chayito " MD Georgi  [facility]  07/07/25  09:22 EDT

## 2025-07-07 NOTE — PLAN OF CARE
Goal Outcome Evaluation:  Plan of Care Reviewed With: patient, family           Outcome Evaluation: Patient currently in bed sleeping between care. BP was 170-190s systolic early in shift. Patient given iv hydralazine with improved bp. Patient bp now 130-150s systolic. Patient remains paced on telemetry with rate at 60. Patient received iv zofran last night for nausea without emesis. Family remains at bedside and assisting patient with activity. No new complaints.

## 2025-07-07 NOTE — PROGRESS NOTES
[facility] MEDICINE SERVICE  DAILY PROGRESS NOTE    NAME: Brad Woodward  : 1940  MRN: [id number]      LOS: 5 days     PROVIDER OF SERVICE: Kaitlin Kennedy MD    Chief Complaint: Generalized weakness    Subjective:     Interval History: Patient does not speak English however family in room.  Patient remains very weak.  Nephrology has been consulted for dialysis.  PT OT has been consulted.  Family states she has been having diarrhea about 10-12 episodes, will check C. difficile today.  Also asking to resume her Flagyl that she has been taking for H. pylori infection.    7/patient feeling better today however still has elevated blood pressure.  Adjusted the dose of clonidine.  Continue PT OT.     blood pressure remains elevated.   will discuss with nephrology     blood pressure was somewhat controlled yesterday however again elevated today.  Aldactone being added today.  Nifedipine was added yesterday  We will also give enema today     blood pressure somewhat better controlled today as compared to yesterday.  Patient  still continues to have poor appetite  But did have a bowel movement yesterday         Review of Systems:   Review of Systems  10 point ROS is negative other than what is stated positive above  Objective:     Vital Signs  Temp:  [97.2 °F (36.2 °C)-97.9 °F (36.6 °C)] 97.7 °F (36.5 °C)  Heart Rate:  [60-64] 61  Resp:  [10-15] 15  BP: (120-197)/(48-77) 144/59   Body mass index is 25.11 kg/m².    Physical Exam  Physical Exam  General: 83 yo F, well nourished, no acute distress.  HENT: Normocephalic, normal hearing, moist oral mucosa, no scleral icterus.  Neck: Supple, nontender, no carotid bruits, no JVD, no LAD.  Lungs: Clear to auscultation, nonlabored respiration.  Heart: RRR, no murmur, gallop or edema.  Abdomen: Soft, nontender, nondistended, + bowel sounds.  Musculoskeletal: Normal range of motion and strength, no tenderness or swelling.  Skin: Skin is warm, dry and pink, no rashes or  lesions.  Psychiatric: Cooperative, appropriate mood and affec      Diagnostic Data    Results from last 7 days   Lab Units 07/07/25  0428 07/03/25  0538 07/02/25  1532   WBC 10*3/mm3 2.80*   < >  --    HEMOGLOBIN g/dL 8.9*   < >  --    HEMATOCRIT % 26.8*   < >  --    PLATELETS 10*3/mm3 187   < >  --    GLUCOSE mg/dL 145*   < > 127*   CREATININE mg/dL 3.43*   < > 4.47*   BUN mg/dL 46.0*   < > 64.7*   SODIUM mmol/L 126*   < > 127*   POTASSIUM mmol/L 3.3*   < > 5.4*   AST (SGOT) U/L  --   --  47*   ALT (SGPT) U/L  --   --  23   ALK PHOS U/L  --   --  122*   BILIRUBIN mg/dL  --   --  0.4   ANION GAP mmol/L 8.6   < > 12.3    < > = values in this interval not displayed.       No radiology results for the last day        I reviewed the patient's new clinical results.    Assessment/Plan:     Active and Resolved Problems  Active Hospital Problems    Diagnosis  POA    **Generalized weakness [R53.1]  Yes      Resolved Hospital Problems   No resolved problems to display.     Generalized weakness  Elevated troponin  Hyponatremia  Hyperkalemia  ESRD on HD  H/o renal artery stenosis s/p L renal stent     Hyponatremia improving -troponins initially mildly elevated  Nephrology consulted.  Plan for dialysis.  Hemodialysis schedule Monday Wednesday Friday.    Hyperkalemia has resolved  Hypokalemic today, will replete    Hypertensive urgency --getting somewhat better.  Cardiology and nephrology following.  Adjusting blood pressure medications.    -Now on Coreg clonidine patch hydralazine losartan nifedipine Aldactone     - Continue monitoring   -avoid nephrotoxic medications  Pharmacy to renally dose medication  Nephrology consulted to manage   PT/OT consulted      Constipation   resolving       Leukopenia  Resolved  Continue to monitor CBC      Anemia of chronic disease  Previously admitted with melena  Underwent EGD 6/19/25 /Colonoscopy 6/22/25 no acute findings but biopsies positive for H. Pylori  Hgb stable  Continue to monitor  CBC  Transfuse if Hgb < 7  Continue treatment once reconciled      SSS s/p PPM  Hypertensive urgency  Hyperlipidemia   BP elevated on ED arrival with SBP > 200  Hydralazine as needed with parameters  Continue Clonidine, Hydralazine, Lisinopril, Metoprolol --blood pressure better as compared to yesterday-- increased  clonidine to 3 times daily from twice daily dosing  Continue ASA, statin, Plavix     Diabetes Mellitus   A1c 5.40  Consistent carb diet  Hypoglycemia protocol     Has been on Flagyl p.o. at home for H. pylori, resumed.      VTE Prophylaxis:  Pharmacologic & mechanical VTE prophylaxis orders are present.             Disposition Planning:   Possible discharge tomorrow.  PT OT eval        Time: 35 minutes     Code Status and Medical Interventions: CPR (Attempt to Resuscitate); Full Support   Ordered at: 07/02/25 8423     Code Status (Patient has no pulse and is not breathing):    CPR (Attempt to Resuscitate)     Medical Interventions (Patient has pulse or is breathing):    Full Support       Signature: Electronically signed by Kaitlin Kennedy MD, 07/07/25, 13:09 EDT.  [facility], [place] Hospitalist Team

## 2025-07-07 NOTE — CASE MANAGEMENT/SOCIAL WORK
Continued Stay Note   Zach     Patient Name: Brad Woodward  MRN: [id number]  Today's Date: 7/7/2025    Admit Date: 7/2/2025    Plan: Anticipate routine home with family. Current OP HD [facility]. Interim Palliative Care referral 7/3.   Discharge Plan       Row Name 07/07/25 1327       Plan    Plan Anticipate routine home with family. Current OP HD [facility]. Interim Palliative Care referral 7/3.    Plan Comments DC Barriers: BP monitoring (/64), IV potassium replacement ordered (K 3.3).                  Angela Xie RN     Office Phone: [phone]  Office Cell: [phone]

## 2025-07-08 ENCOUNTER — READMISSION MANAGEMENT (OUTPATIENT)
Dept: CALL CENTER | Facility: HOSPITAL | Age: 85
End: 2025-07-08
Payer: MEDICARE

## 2025-07-08 VITALS
RESPIRATION RATE: 12 BRPM | HEART RATE: 60 BPM | DIASTOLIC BLOOD PRESSURE: 61 MMHG | HEIGHT: 63 IN | WEIGHT: 139.11 LBS | BODY MASS INDEX: 24.65 KG/M2 | TEMPERATURE: 97.6 F | OXYGEN SATURATION: 98 % | SYSTOLIC BLOOD PRESSURE: 158 MMHG

## 2025-07-08 LAB
ANION GAP SERPL CALCULATED.3IONS-SCNC: 7.7 MMOL/L (ref 5–15)
BASOPHILS # BLD AUTO: 0.01 10*3/MM3 (ref 0–0.2)
BASOPHILS NFR BLD AUTO: 0.2 % (ref 0–1.5)
BUN SERPL-MCNC: 37.7 MG/DL (ref 8–23)
BUN/CREAT SERPL: 12.3 (ref 7–25)
CALCIUM SPEC-SCNC: 7.7 MG/DL (ref 8.6–10.5)
CHLORIDE SERPL-SCNC: 94 MMOL/L (ref 98–107)
CO2 SERPL-SCNC: 27.3 MMOL/L (ref 22–29)
CREAT SERPL-MCNC: 3.06 MG/DL (ref 0.57–1)
DEPRECATED RDW RBC AUTO: 53.2 FL (ref 37–54)
EGFRCR SERPLBLD CKD-EPI 2021: 14.5 ML/MIN/1.73
EOSINOPHIL # BLD AUTO: 0.08 10*3/MM3 (ref 0–0.4)
EOSINOPHIL NFR BLD AUTO: 1.6 % (ref 0.3–6.2)
ERYTHROCYTE [DISTWIDTH] IN BLOOD BY AUTOMATED COUNT: 18 % (ref 12.3–15.4)
GLUCOSE SERPL-MCNC: 110 MG/DL (ref 65–99)
HCT VFR BLD AUTO: 27.8 % (ref 34–46.6)
HGB BLD-MCNC: 9.3 G/DL (ref 12–15.9)
IMM GRANULOCYTES # BLD AUTO: 0.02 10*3/MM3 (ref 0–0.05)
IMM GRANULOCYTES NFR BLD AUTO: 0.4 % (ref 0–0.5)
LYMPHOCYTES # BLD AUTO: 1.1 10*3/MM3 (ref 0.7–3.1)
LYMPHOCYTES NFR BLD AUTO: 22.3 % (ref 19.6–45.3)
MCH RBC QN AUTO: 27.8 PG (ref 26.6–33)
MCHC RBC AUTO-ENTMCNC: 33.5 G/DL (ref 31.5–35.7)
MCV RBC AUTO: 83.2 FL (ref 79–97)
MONOCYTES # BLD AUTO: 0.49 10*3/MM3 (ref 0.1–0.9)
MONOCYTES NFR BLD AUTO: 9.9 % (ref 5–12)
NEUTROPHILS NFR BLD AUTO: 3.24 10*3/MM3 (ref 1.7–7)
NEUTROPHILS NFR BLD AUTO: 65.6 % (ref 42.7–76)
NRBC BLD AUTO-RTO: 0 /100 WBC (ref 0–0.2)
PLATELET # BLD AUTO: 191 10*3/MM3 (ref 140–450)
PMV BLD AUTO: 9.8 FL (ref 6–12)
POTASSIUM SERPL-SCNC: 4 MMOL/L (ref 3.5–5.2)
RBC # BLD AUTO: 3.34 10*6/MM3 (ref 3.77–5.28)
SODIUM SERPL-SCNC: 129 MMOL/L (ref 136–145)
WBC NRBC COR # BLD AUTO: 4.94 10*3/MM3 (ref 3.4–10.8)

## 2025-07-08 PROCEDURE — 80048 BASIC METABOLIC PNL TOTAL CA: CPT | Performed by: FAMILY MEDICINE

## 2025-07-08 PROCEDURE — 99232 SBSQ HOSP IP/OBS MODERATE 35: CPT

## 2025-07-08 PROCEDURE — 25010000002 ONDANSETRON PER 1 MG

## 2025-07-08 PROCEDURE — 85025 COMPLETE CBC W/AUTO DIFF WBC: CPT | Performed by: FAMILY MEDICINE

## 2025-07-08 RX ORDER — CLONIDINE 0.3 MG/24H
2 PATCH, EXTENDED RELEASE TRANSDERMAL WEEKLY
Qty: 8 PATCH | Refills: 0 | Status: SHIPPED | OUTPATIENT
Start: 2025-07-12

## 2025-07-08 RX ORDER — PANTOPRAZOLE SODIUM 40 MG/1
40 TABLET, DELAYED RELEASE ORAL
Qty: 60 TABLET | Refills: 0 | Status: SHIPPED | OUTPATIENT
Start: 2025-07-08

## 2025-07-08 RX ORDER — SPIRONOLACTONE 50 MG/1
50 TABLET, FILM COATED ORAL 2 TIMES DAILY
Qty: 60 TABLET | Refills: 0 | Status: SHIPPED | OUTPATIENT
Start: 2025-07-08

## 2025-07-08 RX ORDER — CARVEDILOL 25 MG/1
25 TABLET ORAL 2 TIMES DAILY WITH MEALS
Qty: 60 TABLET | Refills: 0 | Status: SHIPPED | OUTPATIENT
Start: 2025-07-08

## 2025-07-08 RX ADMIN — CLOPIDOGREL BISULFATE 75 MG: 75 TABLET, FILM COATED ORAL at 09:26

## 2025-07-08 RX ADMIN — ATORVASTATIN CALCIUM 80 MG: 40 TABLET, FILM COATED ORAL at 09:26

## 2025-07-08 RX ADMIN — HYDRALAZINE HYDROCHLORIDE 100 MG: 25 TABLET ORAL at 09:25

## 2025-07-08 RX ADMIN — METRONIDAZOLE 375 MG: 500 TABLET ORAL at 09:26

## 2025-07-08 RX ADMIN — LOSARTAN POTASSIUM 100 MG: 50 TABLET, FILM COATED ORAL at 09:26

## 2025-07-08 RX ADMIN — NIFEDIPINE 60 MG: 60 TABLET, EXTENDED RELEASE ORAL at 10:48

## 2025-07-08 RX ADMIN — ASPIRIN 81 MG CHEWABLE TABLET 81 MG: 81 TABLET CHEWABLE at 09:25

## 2025-07-08 RX ADMIN — CARVEDILOL 25 MG: 25 TABLET, FILM COATED ORAL at 09:26

## 2025-07-08 RX ADMIN — BISMUTH SUBSALICYLATE 524 MG: 262 TABLET, CHEWABLE ORAL at 09:25

## 2025-07-08 RX ADMIN — ONDANSETRON 4 MG: 2 INJECTION, SOLUTION INTRAMUSCULAR; INTRAVENOUS at 09:20

## 2025-07-08 RX ADMIN — PANTOPRAZOLE SODIUM 40 MG: 40 TABLET, DELAYED RELEASE ORAL at 09:27

## 2025-07-08 RX ADMIN — TETRACYCLINE HYDROCHLORIDE 500 MG: 500 CAPSULE ORAL at 09:28

## 2025-07-08 RX ADMIN — SPIRONOLACTONE 50 MG: 25 TABLET ORAL at 09:26

## 2025-07-08 RX ADMIN — DOCUSATE SODIUM 100 MG: 100 CAPSULE, LIQUID FILLED ORAL at 09:27

## 2025-07-08 RX ADMIN — METRONIDAZOLE 375 MG: 500 TABLET ORAL at 13:01

## 2025-07-08 NOTE — PLAN OF CARE
Goal Outcome Evaluation:  Plan of Care Reviewed With: patient, family        Progress: improving        Pt's BP has been doing well overnight. Family has remained at the bedside and is supportive with care. Plan is for possible d/c today.

## 2025-07-08 NOTE — DISCHARGE SUMMARY
[facility] Medicine Services  Discharge Summary    Date of Service: 2025  Patient Name: Brad Woodward  : 1940  MRN: [id number]    Date of Admission: 2025  Discharge Diagnosis: Generalized weakness  Date of Discharge: 2025  Primary Care Physician: Brian Nazario APRN      Presenting Problem:   Weakness [R53.1]  Uncontrolled hypertension [I10]  Generalized weakness [R53.1]  Nausea and vomiting, unspecified vomiting type [R11.2]    Active and Resolved Hospital Problems:  Active Hospital Problems    Diagnosis POA   • **Generalized weakness [R53.1] Yes      Resolved Hospital Problems   No resolved problems to display.         Hospital Course     HPI:  Brad Woodward is a 84 y.o. female with a CMH of  SSS s/p PPM, ESRD on HD, DM2, HTN, HLD, h/o renal artery stenosis s/p L renal stent who presented to [facility] on 2025 with generalized weakness.  Daughter is at bedside who assists with history.  Daughter states the patient was doing well after recent admission.  However patient has since been generally weak, unable to walk, lethargy and decreased appetite.  Denies recent falls.  Denies reports of pain including chest pain, abdominal pain, dysuria.  Denies fever and chills.  Daughter also reports constipation and nausea with no bowel movement in 10 days but states patient has passed flatus.  Patient was due for dialysis today patient was brought to hospital instead per family.     On ED evaluation, patient was noted to be hypertensive with SBP > 200s. CMP was pertinent for Na 127, K 5.4, Cr 4.47, GFR 9.2, BUN 64.7.  CBC with leukopenia with WBC 2.4, hemoglobin 9.9. Initial troponin 130, repeat 120.  EKG was sinus rhythm with APCs and PVCs, rate of 64, no evidence of acute ischemia. UA was not concerning for infection. Hospitalist service to admit for further management.    See below for  details of her hospital course:          Hospital Course:  Generalized weakness  Elevated  troponin  Hyponatremia  Hyperkalemia  ESRD on HD  H/o renal artery stenosis s/p L renal stent      Hyponatremia much improved  Nephrology consulted and patient started on HD  Continue  Hemodialysis Monday Wednesday Friday.     Hyperkalemia has resolved  Hypokalemia; repleted      Hypertensive urgency --getting somewhat better.  Cardiology and nephrology following. BP meds adjusted   -Now on Coreg clonidine patch hydralazine losartan nifedipine Aldactone  - has hx of RYLEY s/p stent placement         Constipation   resolving        Leukopenia  Resolved  Continue to monitor CBC      Anemia of chronic disease  Previously admitted with melena  Underwent EGD 6/19/25 /Colonoscopy 6/22/25 no acute findings but biopsies positive for H. Pylori  Hgb stable  Continue to monitor CBC closely as an outpatient      SSS s/p PPM  Hyperlipidemia   Continue ASA, statin, Plavix     Diabetes Mellitus   A1c 5.40       Has been on Flagyl p.o. at home for H. pylori,    Patient is medically stable for discharge home with family today.  They plan to meet with palliative care time once they get home.          VTE Prophylaxis:  Pharmacologic & mechanical VTE prophylaxis orders are present.                 Disposition Planning:   Possible discharge tomorrow.  PT OT eval                Day of Discharge     Vital Signs:  Temp:  [96.6 °F (35.9 °C)-97.8 °F (36.6 °C)] 97.8 °F (36.6 °C)  Heart Rate:  [60-63] 62  Resp:  [3-17] 11  BP: ()/(52-88) 161/64    Physical Exam:  Physical Exam   General: Sitting up in bed; NAD  CV: RRR; S1-S2  Lungs: CTA bilaterally  Abdomen: soft, NT, ND       Pertinent  and/or Most Recent Results     LAB RESULTS:      Lab 07/08/25  0747 07/07/25  0428 07/05/25  0930 07/04/25  0120 07/03/25  0538 07/02/25  1510   WBC 4.94 2.80* 4.39 3.59 3.49  --    HEMOGLOBIN 9.3* 8.9* 9.9* 9.4* 9.8*  --    HEMATOCRIT 27.8* 26.8* 29.9* 29.2* 31.0*  --    PLATELETS 191 187 178 196 206  --    NEUTROS ABS 3.24 1.34* 2.41 1.94 1.56*  --     IMMATURE GRANS (ABS) 0.02 0.00 0.01 0.01 0.01  --    LYMPHS ABS 1.10 1.11 1.34 1.17 1.31  --    MONOS ABS 0.49 0.31 0.54 0.38 0.47  --    EOS ABS 0.08 0.02 0.07 0.07 0.10  --    MCV 83.2 83.8 83.5 85.9 87.1  --    PROTIME  --   --   --   --   --  12.9   APTT  --   --   --   --   --  29.3         Lab 07/08/25  0747 07/07/25  0428 07/05/25  0930 07/04/25  0120 07/03/25  0538 07/02/25  1532   SODIUM 129* 126* 130* 130* 125* 127*   POTASSIUM 4.0 3.3* 4.1 4.5 4.9 5.4*   CHLORIDE 94* 98 97* 97* 94* 93*   CO2 27.3 19.4* 24.0 21.2* 19.2* 21.7*   ANION GAP 7.7 8.6 9.0 11.8 11.8 12.3   BUN 37.7* 46.0* 27.0* 34.4* 74.3* 64.7*   CREATININE 3.06* 3.43* 2.57* 3.03* 4.96* 4.47*   EGFR 14.5* 12.7* 17.9* 14.7* 8.1* 9.2*   GLUCOSE 110* 145* 95 101* 116* 127*   CALCIUM 7.7* 6.8* 7.8* 8.0* 8.5* 8.8   MAGNESIUM  --   --  2.0 1.9 2.0 1.9   PHOSPHORUS  --   --  4.3 5.5* 7.1*  --          Lab 07/02/25  1532   TOTAL PROTEIN 6.0   ALBUMIN 3.3*   GLOBULIN 2.7   ALT (SGPT) 23   AST (SGOT) 47*   BILIRUBIN 0.4   ALK PHOS 122*   LIPASE 97*         Lab 07/02/25  1717 07/02/25  1532 07/02/25  1510   HSTROP T 124* 130*  --    PROTIME  --   --  12.9   INR  --   --  0.98                 Brief Urine Lab Results  (Last result in the past 365 days)        Color   Clarity   Blood   Leuk Est   Nitrite   Protein   CREAT   Urine HCG        07/02/25 1501 Yellow   Clear   Negative   Negative   Negative   >=300 mg/dL (3+)                 Microbiology Results (last 10 days)       ** No results found for the last 240 hours. **            XR Abdomen KUB  Result Date: 7/2/2025  Impression: Impression: Nonobstructive bowel gas pattern. Electronically Signed: Deondre Long MD  7/2/2025 9:26 PM EDT  Workstation ID: [id number]    CT Abdomen Pelvis With Contrast  Result Date: 6/18/2025  Impression: Impression: Motion-degraded exam. Punctate focus of gas within the urinary bladder. If no recent history of instrumentation, recommend correlation with urinalysis to  assess for cystitis. New trace pelvic free fluid, nonspecific. Possible endometrial thickening, though not well assessed on this exam. Consider further evaluation with nonemergent/outpatient pelvic ultrasound. Stable chronic/ancillary findings as above. Electronically Signed: Justin Abraham MD  6/18/2025 12:58 PM EDT  Workstation ID: [id number]      Results for orders placed during the hospital encounter of 05/16/25    Duplex Pseudoaneurysm CAR 05/22/2025 11:40 AM    Interpretation Summary  •  No evidence of pseudoaneurysm or AVF in bilateral groin.      Results for orders placed during the hospital encounter of 05/16/25    Duplex Pseudoaneurysm CAR 05/22/2025 11:40 AM    Interpretation Summary  •  No evidence of pseudoaneurysm or AVF in bilateral groin.      Results for orders placed during the hospital encounter of 07/02/25    Adult Transthoracic Echo Complete W/ Cont if Necessary Per Protocol 07/07/2025 12:46 PM    Interpretation Summary  •  Left ventricular systolic function is normal. Left ventricular ejection fraction appears to be 61 - 65%.  •  Left ventricular wall thickness is consistent with moderate concentric hypertrophy.  •  Left ventricular diastolic function is consistent with (grade I) impaired relaxation.      Labs Pending at Discharge:  Pending Results       None            Procedures Performed           Consults:   Consults       Date and Time Order Name Status Description    7/6/2025 10:50 AM Inpatient Cardiology Consult Completed     7/3/2025  7:05 AM Inpatient Palliative Care MD Consult Completed     7/2/2025  5:22 PM Nephrology (on -call MD unless specified) Completed     7/2/2025  5:16 PM Hospitalist (on-call MD unless specified)      6/19/2025  7:41 AM Inpatient Gastroenterology Consult Completed     6/19/2025  6:09 AM Inpatient Gastroenterology Consult Completed     6/19/2025  6:09 AM Inpatient Vascular Surgery Consult Completed     6/18/2025  1:20 PM Nephrology (on -call MD unless specified)  Completed               Discharge Details        Discharge Medications        New Medications        Instructions Start Date   carvedilol 25 MG tablet  Commonly known as: COREG   25 mg, Oral, 2 Times Daily With Meals      cloNIDine 0.3 MG/24HR patch  Commonly known as: CATAPRES-TTS   2 patches, Transdermal, Weekly   Start Date: July 12, 2025     NIFEdipine CC 60 MG 24 hr tablet  Commonly known as: ADALAT CC   60 mg, Oral, Every 24 Hours Scheduled   Start Date: July 9, 2025     spironolactone 50 MG tablet  Commonly known as: ALDACTONE   50 mg, Oral, 2 Times Daily             Changes to Medications        Instructions Start Date   pantoprazole 40 MG EC tablet  Commonly known as: Protonix  What changed: when to take this   40 mg, Oral, 2 Times Daily Before Meals             Continue These Medications        Instructions Start Date   aspirin 81 MG chewable tablet   81 mg, Oral, Daily      atorvastatin 80 MG tablet  Commonly known as: LIPITOR   80 mg, Oral, Daily      Bismuth 262 MG chewable tablet   524 mg, Oral, 2 Times Daily      clopidogrel 75 MG tablet  Commonly known as: PLAVIX   75 mg, Oral, Daily      docusate sodium 100 MG capsule  Commonly known as: COLACE   1 capsule, Every 12 Hours Scheduled      hydrALAZINE 100 MG tablet  Commonly known as: APRESOLINE   100 mg, Oral, 3 times daily      losartan 100 MG tablet  Commonly known as: COZAAR   100 mg, Oral, Every 24 Hours Scheduled      tetracycline 500 MG capsule  Commonly known as: ACHROMYCIN,SUMYCIN   Take 1 capsule by mouth 4 (Four) Times a Day as directed for 14 days.             Stop These Medications      Bismuth/Metronidaz/Tetracyclin 140-125-125 MG capsule     cloNIDine 0.3 MG tablet  Commonly known as: CATAPRES     metoprolol succinate XL 25 MG 24 hr tablet  Commonly known as: TOPROL-XL     metroNIDAZOLE 250 MG tablet  Commonly known as: FLAGYL     omeprazole 20 MG capsule  Commonly known as: priLOSEC     potassium chloride 20 MEQ CR tablet  Commonly  known as: KLOR-CON M20     VITAMIN B-12 PO              No Known Allergies      Discharge Disposition:   Home or Self Care    Diet:  Hospital:  Diet Order   Procedures   • Diet: Renal, Cardiac; Healthy Heart (2-3 Na+); Low Sodium (2-3g), Low Potassium, Low Phosphorus; Fluid Consistency: Thin (IDDSI 0)         Discharge Activity:   Activity Instructions       Activity as Tolerated                CODE STATUS:  Code Status and Medical Interventions: CPR (Attempt to Resuscitate); Full Support   Ordered at: 07/02/25 1755     Code Status (Patient has no pulse and is not breathing):    CPR (Attempt to Resuscitate)     Medical Interventions (Patient has pulse or is breathing):    Full Support         Future Appointments   Date Time Provider Department Center   7/17/2025 10:00 AM [facility]   7/22/2025  2:45 PM Ted Mckee II, MD [facility]   7/24/2025 11:30 AM Marnie Brandt APRN [facility]   12/18/2025 10:00 AM [facility] DEVICE CHECK [facility]   12/18/2025 10:30 AM Zane Aldridge MD [facility]       Additional Instructions for the Follow-ups that You Need to Schedule       Discharge Follow-up with PCP   As directed       Currently Documented PCP:    Brian Nazario APRN    PCP Phone Number:    [phone]     Follow Up Details: In 1-2 weeks        Discharge Follow-up with Specified Provider: Follow-up with Nephrology Dr Farooq or primary Nephrologist in 2-3 weeks or as previously scheduled; 3 Weeks   As directed      To: Follow-up with Nephrology Dr Farooq or primary Nephrologist in 2-3 weeks or as previously scheduled   Follow Up: 3 Weeks                Time spent on Discharge including face to face service:  35 minutes    Signature: Electronically signed by Ger Echeverria DO, 07/08/25, 12:03 EDT.  [facility] Hospitalist Team

## 2025-07-08 NOTE — PLAN OF CARE
Goal Outcome Evaluation:               Goals of care met, patient discharging home with family. IV site discontinued. Tunnel cath dressing clean, dry and intact. Education completed with family. Discharge instructions read and understood. Follow up appointments reviewed with patient. W/C transport to [facility]. Stable upon transfer. Family to transport home.

## 2025-07-08 NOTE — PROGRESS NOTES
"RENAL/KCC:     LOS: 6 days    Patient Care Team:  Brian Nazario APRN as PCP - General  Zane Aldridge MD as Consulting Physician (Cardiology)  Marnie Brandt APRN as Nurse Practitioner (Cardiology)    Chief Complaint:  Abd pain, N/V    Subjective     Interval History:   Chart reviewed  SBP improved    Objective     Vital Sign Min/Max for last 24 hours  Temp  Min: 96.6 °F (35.9 °C)  Max: 97.8 °F (36.6 °C)   BP  Min: 94/52  Max: 144/59   Pulse  Min: 60  Max: 63   Resp  Min: 3  Max: 17   SpO2  Min: 91 %  Max: 97 %   No data recorded   Weight  Min: 63.1 kg (139 lb 1.8 oz)  Max: 63.1 kg (139 lb 1.8 oz)     Flowsheet Rows      Flowsheet Row First Filed Value   Admission Height 160 cm (63\") Documented at 07/02/2025 1312   Admission Weight 62 kg (136 lb 9.6 oz) Documented at 07/02/2025 1312            No intake/output data recorded.  I/O last 3 completed shifts:  In: 240 [P.O.:240]  Out: 2300 [Urine:300]    Physical Exam:  GEN: Awake, NAD  ENT: PERRL, EOMI, MMM  NECK: Supple, no JVD  CHEST: CTAB, no W/R/C  CV: RRR, no M/G/R  ABD: Soft, NT, +BS  SKIN: Warm and Dry  NEURO: CN's intact      WBC WBC   Date Value Ref Range Status   07/08/2025 4.94 3.40 - 10.80 10*3/mm3 Final   07/07/2025 2.80 (L) 3.40 - 10.80 10*3/mm3 Final   07/05/2025 4.39 3.40 - 10.80 10*3/mm3 Final      HGB Hemoglobin   Date Value Ref Range Status   07/08/2025 9.3 (L) 12.0 - 15.9 g/dL Final   07/07/2025 8.9 (L) 12.0 - 15.9 g/dL Final   07/05/2025 9.9 (L) 12.0 - 15.9 g/dL Final      HCT Hematocrit   Date Value Ref Range Status   07/08/2025 27.8 (L) 34.0 - 46.6 % Final   07/07/2025 26.8 (L) 34.0 - 46.6 % Final   07/05/2025 29.9 (L) 34.0 - 46.6 % Final      Platlets No results found for: \"LABPLAT\"   MCV MCV   Date Value Ref Range Status   07/08/2025 83.2 79.0 - 97.0 fL Final   07/07/2025 83.8 79.0 - 97.0 fL Final   07/05/2025 83.5 79.0 - 97.0 fL Final          Sodium Sodium   Date Value Ref Range Status   07/07/2025 126 (L) 136 - 145 mmol/L Final " "  07/05/2025 130 (L) 136 - 145 mmol/L Final      Potassium Potassium   Date Value Ref Range Status   07/07/2025 3.3 (L) 3.5 - 5.2 mmol/L Final   07/05/2025 4.1 3.5 - 5.2 mmol/L Final     Comment:     Specimen hemolyzed.  Result may be falsely elevated.      Chloride Chloride   Date Value Ref Range Status   07/07/2025 98 98 - 107 mmol/L Final   07/05/2025 97 (L) 98 - 107 mmol/L Final      CO2 CO2   Date Value Ref Range Status   07/07/2025 19.4 (L) 22.0 - 29.0 mmol/L Final   07/05/2025 24.0 22.0 - 29.0 mmol/L Final      BUN BUN   Date Value Ref Range Status   07/07/2025 46.0 (H) 8.0 - 23.0 mg/dL Final   07/05/2025 27.0 (H) 8.0 - 23.0 mg/dL Final      Creatinine Creatinine   Date Value Ref Range Status   07/07/2025 3.43 (H) 0.57 - 1.00 mg/dL Final   07/05/2025 2.57 (H) 0.57 - 1.00 mg/dL Final      Calcium Calcium   Date Value Ref Range Status   07/07/2025 6.8 (L) 8.6 - 10.5 mg/dL Final   07/05/2025 7.8 (L) 8.6 - 10.5 mg/dL Final      PO4 No results found for: \"CAPO4\"   Albumin No results found for: \"ALBUMIN\"     Magnesium Magnesium   Date Value Ref Range Status   07/05/2025 2.0 1.6 - 2.4 mg/dL Final      Uric Acid No results found for: \"URICACID\"        Results Review:     I reviewed the patient's new clinical results.    aspirin, 81 mg, Oral, Daily  atorvastatin, 80 mg, Oral, Daily  bismuth subsalicylate, 524 mg, Oral, BID  carvedilol, 25 mg, Oral, BID With Meals  cloNIDine, 2 patch, Transdermal, Weekly  clopidogrel, 75 mg, Oral, Daily  docusate sodium, 100 mg, Oral, Q12H  hydrALAZINE, 100 mg, Oral, TID  losartan, 100 mg, Oral, Q24H  metroNIDAZOLE, 375 mg, Oral, 4x Daily  NIFEdipine XL, 60 mg, Oral, Q24H  pantoprazole, 40 mg, Oral, BID AC  spironolactone, 50 mg, Oral, BID Diuretics  tetracycline, 500 mg, Oral, Daily      Medication Review: Reviewed    Assessment & Plan     ESRD  HTN  H Pylori  Weakness  N/V/Abd pain  Anemia of CKD  Met acidosis  Constipation     PLAN: HD per MWF schedule.  Continue ABX for H Pylori.  " BP much better with Aldactone.  OK for D/C.  Will follow.      Jim Farooq MD  [facility]  07/08/25  08:08 EDT

## 2025-07-08 NOTE — OUTREACH NOTE
Prep Survey      Flowsheet Row Responses   [facility] facility patient discharged from? [facility]   Is LACE score < 7 ? No   Eligibility Not Eligible   What are the reasons patient is not eligible? Hospice/Pallative Care   Does the patient have one of the following disease processes/diagnoses(primary or secondary)? Other   Prep survey completed? Yes            AHMET FRAUSTO - Registered Nurse

## 2025-07-08 NOTE — PROGRESS NOTES
Patient Name: Brad Woodward  YOB: 1940  MRN: [id number]  Admission date: 7/2/2025  Reason for Encounter: Follow-up/Progress Note      [facility] Clinical Nutrition Progress Note       Nutrition Intervention Updates: Continue current diet and encourage good PO intakes.        Subjective: Check on for PO intakes + NFPE.  HD yesterday.  RD visited patient at bedside.  Grandson helpful in answering questions.  Reports patient has been eating well (patient prefers only food from home), nausea with no vomiting, unsure of recent weight changes.  NFPE completed and not consistent with nutrition diagnosis of malnutrition at this time using AND/ASPEN criteria.        PO Diet: Diet: Renal, Cardiac; Healthy Heart (2-3 Na+); Low Sodium (2-3g), Low Potassium, Low Phosphorus; Fluid Consistency: Thin (IDDSI 0)   PO Supplements: None    PO Intake:  Frequently 100%, mixed with skipped meals (food brought in my family)       Current nutrition support:    Nutrition support review:        Labs: Hyponatremia  Hypokalemia - no plans to replace (Nephrology following, previously elevated)       GI Function:  Last documented BM 7/7 (yesterday)        Brief Weight Review:    Wt Readings from Last 1 Encounters:   07/08/25 0445 63.1 kg (139 lb 1.8 oz)   07/07/25 0524 64.3 kg (141 lb 12.1 oz)   07/06/25 0400 60.4 kg (133 lb 2.5 oz)   07/05/25 0356 60.3 kg (132 lb 15 oz)   07/04/25 0530 60.8 kg (134 lb 0.6 oz)   07/03/25 0445 62.3 kg (137 lb 5.6 oz)   07/02/25 1928 62.3 kg (137 lb 5.6 oz)   07/02/25 1312 62 kg (136 lb 9.6 oz)        Results from last 7 days   Lab Units 07/07/25  0428 07/05/25  0930 07/04/25  0120 07/03/25  0538 07/02/25  1532   SODIUM mmol/L 126* 130* 130*   < > 127*   POTASSIUM mmol/L 3.3* 4.1 4.5   < > 5.4*   CHLORIDE mmol/L 98 97* 97*   < > 93*   CO2 mmol/L 19.4* 24.0 21.2*   < > 21.7*   BUN mg/dL 46.0* 27.0* 34.4*   < > 64.7*   CREATININE mg/dL 3.43* 2.57* 3.03*   < > 4.47*   CALCIUM mg/dL 6.8* 7.8* 8.0*   < >  8.8   BILIRUBIN mg/dL  --   --   --   --  0.4   ALK PHOS U/L  --   --   --   --  122*   ALT (SGPT) U/L  --   --   --   --  23   AST (SGOT) U/L  --   --   --   --  47*   GLUCOSE mg/dL 145* 95 101*   < > 127*    < > = values in this interval not displayed.     Results from last 7 days   Lab Units 07/08/25  0747 07/07/25  0428 07/05/25  0930 07/04/25  0120 07/03/25  0538   MAGNESIUM mg/dL  --   --  2.0 1.9 2.0   PHOSPHORUS mg/dL  --   --  4.3 5.5* 7.1*   PLATELETS 10*3/mm3 191   < > 178 196 206   HEMOGLOBIN g/dL 9.3*   < > 9.9* 9.4* 9.8*   HEMATOCRIT % 27.8*   < > 29.9* 29.2* 31.0*    < > = values in this interval not displayed.     Lab Results   Component Value Date    HGBA1C 5.40 04/15/2025       Electronically signed by:  Nichole Maldonado RD  07/08/25 08:08 EDT

## 2025-07-08 NOTE — PROGRESS NOTES
CARDIOLOGY PROGRESS NOTE:    Brad Woodward  84 y.o.  female  1940  [id number]      Referring Provider: Dr. Farooq    Reason for follow-up: Uncontrolled hypertension     Patient Care Team:  Brian Nzaario APRN as PCP - General  Zane Aldridge MD as Consulting Physician (Cardiology)  Marnie Brandt APRN as Nurse Practitioner (Cardiology)    Subjective .  Patient seen and examined.  Labs reviewed.  Chart reviewed.  Patient doing well from cardiology standpoint she denies chest pain, shortness of breath.      Objective  Patient lying in bed resting comfortably with family at bedside     Review of Systems   Constitutional: Negative for chills and fever.   Cardiovascular:  Negative for chest pain, leg swelling, near-syncope, palpitations and syncope.   Respiratory:  Negative for cough and shortness of breath.    Gastrointestinal:  Negative for abdominal pain and vomiting.   Neurological:  Positive for weakness. Negative for dizziness, headaches and light-headedness.   Psychiatric/Behavioral:  Negative for altered mental status.        Allergies: Patient has no known allergies.    Scheduled Meds:aspirin, 81 mg, Oral, Daily  atorvastatin, 80 mg, Oral, Daily  bismuth subsalicylate, 524 mg, Oral, BID  carvedilol, 25 mg, Oral, BID With Meals  cloNIDine, 2 patch, Transdermal, Weekly  clopidogrel, 75 mg, Oral, Daily  docusate sodium, 100 mg, Oral, Q12H  hydrALAZINE, 100 mg, Oral, TID  losartan, 100 mg, Oral, Q24H  metroNIDAZOLE, 375 mg, Oral, 4x Daily  NIFEdipine XL, 60 mg, Oral, Q24H  pantoprazole, 40 mg, Oral, BID AC  spironolactone, 50 mg, Oral, BID Diuretics  tetracycline, 500 mg, Oral, Daily      Continuous Infusions:   PRN Meds:.  acetaminophen **OR** acetaminophen **OR** acetaminophen    albumin human    senna-docusate sodium **AND** polyethylene glycol **AND** bisacodyl **AND** bisacodyl    Calcium Replacement - Follow Nurse / BPA Driven Protocol    dextrose    dextrose    glucagon (human recombinant)    heparin  "(porcine)    hydrALAZINE    Magnesium Standard Dose Replacement - Follow Nurse / BPA Driven Protocol    melatonin    ondansetron ODT **OR** ondansetron    Phosphorus Replacement - Follow Nurse / BPA Driven Protocol    [COMPLETED] Insert Peripheral IV **AND** sodium chloride        VITAL SIGNS  Vitals:    07/07/25 2338 07/08/25 0445 07/08/25 0810 07/08/25 0925   BP: 135/53 142/61 174/69 161/64   BP Location: Left arm Left arm Left arm    Patient Position: Lying Lying Lying    Pulse: 60 60 60 62   Resp: 17 12 11    Temp: 97.6 °F (36.4 °C) 97.8 °F (36.6 °C) 97.8 °F (36.6 °C)    TempSrc: Oral Oral Oral    SpO2: 96% 97% 96%    Weight:  63.1 kg (139 lb 1.8 oz)     Height:           Flowsheet Rows      Flowsheet Row First Filed Value   Admission Height 160 cm (63\") Documented at 07/02/2025 1312   Admission Weight 62 kg (136 lb 9.6 oz) Documented at 07/02/2025 1312             TELEMETRY: Paced rhythm    Physical Exam:  Vitals reviewed.   Constitutional:       Appearance: Not in distress.   Eyes:      Pupils: Pupils are equal, round, and reactive to light.   HENT:      Nose: Nose normal.   Pulmonary:      Effort: Pulmonary effort is normal.      Breath sounds: Normal breath sounds.   Cardiovascular:      Normal rate. Regular rhythm.   Pulses:     Intact distal pulses.   Edema:     Peripheral edema absent.   Abdominal:      Palpations: Abdomen is soft.   Musculoskeletal: Normal range of motion.      Cervical back: Normal range of motion and neck supple. Skin:     General: Skin is warm and dry.   Neurological:      General: No focal deficit present.      Mental Status: Alert.          Results Review:   I reviewed the patient's new clinical results.  Lab Results (last 24 hours)       Procedure Component Value Units Date/Time    Basic Metabolic Panel [[id number]]  (Abnormal) Collected: 07/08/25 0747    Specimen: Blood Updated: 07/08/25 0820     Glucose 110 mg/dL      BUN 37.7 mg/dL      Creatinine 3.06 mg/dL      Sodium 129 " mmol/L      Potassium 4.0 mmol/L      Chloride 94 mmol/L      CO2 27.3 mmol/L      Calcium 7.7 mg/dL      BUN/Creatinine Ratio 12.3     Anion Gap 7.7 mmol/L      eGFR 14.5 mL/min/1.73     Narrative:      GFR Categories in Chronic Kidney Disease (CKD)              GFR Category          GFR (mL/min/1.73)    Interpretation  G1                    90 or greater        Normal or high (1)  G2                    60-89                Mild decrease (1)  G3a                   45-59                Mild to moderate decrease  G3b                   30-44                Moderate to severe decrease  G4                    15-29                Severe decrease  G5                    14 or less           Kidney failure    (1)In the absence of evidence of kidney disease, neither GFR category G1 or G2 fulfill the criteria for CKD.    eGFR calculation 2021 CKD-EPI creatinine equation, which does not include race as a factor    CBC & Differential [[id number]]  (Abnormal) Collected: 07/08/25 0747    Specimen: Blood Updated: 07/08/25 0800    Narrative:      The following orders were created for panel order CBC & Differential.  Procedure                               Abnormality         Status                     ---------                               -----------         ------                     CBC Auto Differential[[id number]]        Abnormal            Final result                 Please view results for these tests on the individual orders.    CBC Auto Differential [[id number]]  (Abnormal) Collected: 07/08/25 0747    Specimen: Blood Updated: 07/08/25 0800     WBC 4.94 10*3/mm3      RBC 3.34 10*6/mm3      Hemoglobin 9.3 g/dL      Hematocrit 27.8 %      MCV 83.2 fL      MCH 27.8 pg      MCHC 33.5 g/dL      RDW 18.0 %      RDW-SD 53.2 fl      MPV 9.8 fL      Platelets 191 10*3/mm3      Neutrophil % 65.6 %      Lymphocyte % 22.3 %      Monocyte % 9.9 %      Eosinophil % 1.6 %      Basophil % 0.2 %      Immature Grans % 0.4 %      Neutrophils,  Absolute 3.24 10*3/mm3      Lymphocytes, Absolute 1.10 10*3/mm3      Monocytes, Absolute 0.49 10*3/mm3      Eosinophils, Absolute 0.08 10*3/mm3      Basophils, Absolute 0.01 10*3/mm3      Immature Grans, Absolute 0.02 10*3/mm3      nRBC 0.0 /100 WBC             Imaging Results (Last 24 Hours)       ** No results found for the last 24 hours. **            EKG        I personally viewed and interpreted the patient's EKG/Telemetry data:    ECHOCARDIOGRAM:  Results for orders placed during the hospital encounter of 07/02/25    Adult Transthoracic Echo Complete W/ Cont if Necessary Per Protocol 07/07/2025 12:46 PM    Interpretation Summary    Left ventricular systolic function is normal. Left ventricular ejection fraction appears to be 61 - 65%.    Left ventricular wall thickness is consistent with moderate concentric hypertrophy.    Left ventricular diastolic function is consistent with (grade I) impaired relaxation.       STRESS MYOVIEW:       CARDIAC CATHETERIZATION:  No results found for this or any previous visit.       OTHER:         Assessment & Plan     Principal Problem:    Generalized weakness       Uncontrolled hypertension  Longstanding hypertension  Blood pressure improving   Echocardiogram with LVEF 61 to 65%, moderate concentric LVH, grade 1 diastolic dysfunction  Metoprolol succinate transitioned to carvedilol 25 mg   Nephrology added nifedipine and spironolactone  Hydralazine 100 mg TID  Losartan 100 mg   Clonidine patch    Sick sinus syndrome  Status post dual-chamber pacemaker    Dyslipidemia  High intensity statin therapy    ESRD  Renal artery stenosis status post intervention  HD MWF  Nephrology following    Generalized weakness  Managed per admitting  PT/OT  Palliative consulted for goals of care discussion  Undergoing treatment for H. pylori infection      Patient is okay to discharge from cardiology standpoint and follow-up patient in next scheduled visit.    Marnie Brandt, CECE  07/08/25  10:13  EDT

## 2025-07-08 NOTE — CASE MANAGEMENT/SOCIAL WORK
Case Management Discharge Note                Selected Continued Care - Discharged on 7/8/2025 Admission date: 7/2/2025 - Discharge disposition: Home or Self Care        Dialysis/Infusion       Service Provider Services Address Phone Fax Patient Preferred    [facility] [address] [place] [address] [place] [id number] [phone] -- --               Transportation Services  Transportation: Private Transportation  Private: Car    Final Discharge Disposition Code: 01 - home or self-care

## 2025-07-15 LAB — GLUCOSE BLDC GLUCOMTR-MCNC: 129 MG/DL (ref 70–105)

## 2025-07-17 ENCOUNTER — HOSPITAL ENCOUNTER (OUTPATIENT)
Dept: CARDIOLOGY | Facility: HOSPITAL | Age: 85
Discharge: HOME | End: 2025-07-17
Admitting: STUDENT IN AN ORGANIZED HEALTH CARE EDUCATION/TRAINING PROGRAM
Payer: MEDICARE

## 2025-07-17 DIAGNOSIS — I70.1 RENAL ARTERY STENOSIS: ICD-10-CM

## 2025-07-17 PROCEDURE — 93975 VASCULAR STUDY: CPT

## 2025-07-17 RX ORDER — LOSARTAN POTASSIUM 100 MG/1
100 TABLET ORAL
Qty: 30 TABLET | Refills: 0 | Status: CANCELLED | OUTPATIENT
Start: 2025-07-17 | End: 2025-08-16

## 2025-07-18 LAB
BH CV ECHO MEAS - DIST REN A EDV LEFT: 7 CM/S
BH CV ECHO MEAS - DIST REN A PSV LEFT: 61 CM/S
BH CV ECHO MEAS - MID REN A EDV LEFT: 7 CM/S
BH CV ECHO MEAS - MID REN A PSV LEFT: 67 CM/S
BH CV ECHO MEAS - PROX REN A EDV LEFT: 8 CM/S
BH CV ECHO MEAS - PROX REN A PSV LEFT: 76 CM/S
BH CV VAS KIDNEY HEIGHT LEFT: 4.5 CM
BH CV VAS RENAL AORTIC MID PSV: 120 CM/S
BH CV VAS RENAL HILUM LEFT EDV: 5 CM/S
BH CV VAS RENAL HILUM LEFT PSV: 34 CM/S
BH CV VAS RENAL HILUM RIGHT EDV: 5 CM/S
BH CV VAS RENAL HILUM RIGHT PSV: 25 CM/S
BH CV XLRA MEAS - KID L LEFT: 8.4 CM
BH CV XLRA MEAS DIST REN A EDV RIGHT: 6 CM/S
BH CV XLRA MEAS DIST REN A PSV RIGHT: 33 CM/S
BH CV XLRA MEAS KID H RIGHT: 4.4 CM
BH CV XLRA MEAS KID L RIGHT: 8.5 CM
BH CV XLRA MEAS MID REN A EDV RIGHT: 7 CM/S
BH CV XLRA MEAS MID REN A PSV RIGHT: 33 CM/S
BH CV XLRA MEAS PROX REN A EDV RIGHT: 5 CM/S
BH CV XLRA MEAS PROX REN A PSV RIGHT: 41 CM/S
BH CV XLRA MEAS RAR LEFT: 0.63
BH CV XLRA MEAS RAR RIGHT: 0.37
BH CV XLRA MEAS RENAL A ORG EDV LEFT: 7 CM/S
BH CV XLRA MEAS RENAL A ORG EDV RIGHT: 4 CM/S
BH CV XLRA MEAS RENAL A ORG PSV LEFT: 70 CM/S
BH CV XLRA MEAS RENAL A ORG PSV RIGHT: 44 CM/S
LEFT RENAL UPPER PARENCHYMA MAX: 34 CM/S
LEFT RENAL UPPER PARENCHYMA MIN: 4 CM/S
LEFT RENAL UPPER PARENCHYMA RI: 0.88
RIGHT RENAL UPPER PARENCHYMA MAX: 13 CM/S
RIGHT RENAL UPPER PARENCHYMA MIN: 4 CM/S
RIGHT RENAL UPPER PARENCHYMA RI: 0.69

## 2025-07-21 NOTE — PROGRESS NOTES
Subjective:     Encounter Date:07/24/2025      Patient ID: Brad Woodward is a 84 y.o. female.    Chief Complaint:  History of Present Illness    Brad Woodward is a pleasant 84 y.o. female with history of hypertension, dyslipidemia, sick sinus syndrome status post permanent pacemaker, ESRD on dialysis who presents to the office today accompanied by her son for hospital discharge follow-up for which she was evaluated by cardiology for hypertensive emergency.  Patient's medications were adjusted and she was discharged home.  Of note she was previously hospitalized prior to most recent admission for concerns for GI bleed and underwent EGD with biopsy which was positive for H. pylori.  She has recently finished treatment.  Patient seen and examined, labs and chart reviewed. Patient states she is doing well cardiology standpoint she denies chest pain, shortness of breath, palpitations, numbness/tingling, nausea, vomiting, edema.  Her only complaint today is generalized weakness.  Patient's vital signs are stable today.  She takes all medications as prescribed.  She has never been a smoker.      The following portions of the patient's history were reviewed and updated as appropriate: allergies, current medications, past family history, past medical history, past social history, past surgical history, and problem list.    Past Medical History:   Diagnosis Date    Diabetes mellitus     Hyperlipidemia     Hypertension      Past Surgical History:   Procedure Laterality Date    ANGIOPLASTY ILIAC ARTERY Left 05/21/2025    Procedure: LEFT RENAL ARTERY ANGIOPLASTY WITH STENT;  Surgeon: Ted Mckee II, MD;  Location: [facility] HYBRID OR;  Service: Vascular;  Laterality: Left;    CARDIAC ELECTROPHYSIOLOGY PROCEDURE N/A 04/20/2021    Procedure: Pacemaker DC new;  Surgeon: Yovany Navarrete MD;  Location: [facility] CATH INVASIVE LOCATION;  Service: Cardiovascular;  Laterality: N/A;    COLONOSCOPY N/A 6/22/2025    Procedure: COLONOSCOPY;   "Surgeon: Ramses Ashley MD;  Location: [facility] ENDOSCOPY;  Service: Gastroenterology;  Laterality: N/A;  post: poor prep    ENDOSCOPY N/A 6/19/2025    Procedure: ESOPHAGOGASTRODUODENOSCOPY WITH BIOPSY;  Surgeon: RAMON De eLon MD;  Location: [facility] ENDOSCOPY;  Service: Gastroenterology;  Laterality: N/A;  GASTRITIS, HIATAL HERNIA    PACEMAKER IMPLANTATION       /58 (BP Location: Left arm, Patient Position: Sitting, Cuff Size: Adult)   Pulse 61   Ht 160 cm (63\")   Wt 58.5 kg (129 lb)   SpO2 96%   BMI 22.85 kg/m²     Family History   Family history unknown: Yes       Current Outpatient Medications:     aspirin 81 MG chewable tablet, Chew 1 tablet Daily., Disp: 270 tablet, Rfl: 2    atorvastatin (LIPITOR) 80 MG tablet, Take 1 tablet by mouth Daily., Disp: 270 tablet, Rfl: 1    carvedilol (COREG) 25 MG tablet, Take 1 tablet by mouth 2 (Two) Times a Day With Meals., Disp: 60 tablet, Rfl: 0    cloNIDine (CATAPRES-TTS) 0.3 MG/24HR patch, Place 2 patches on the skin as directed by provider 1 (One) Time Per Week., Disp: 8 patch, Rfl: 0    clopidogrel (PLAVIX) 75 MG tablet, Take 1 tablet by mouth Daily for 30 days., Disp: 30 tablet, Rfl: 0    docusate sodium (COLACE) 100 MG capsule, Take 1 capsule by mouth Every 12 (Twelve) Hours., Disp: , Rfl:     losartan (COZAAR) 100 MG tablet, Take 1 tablet by mouth Daily for 30 days., Disp: 30 tablet, Rfl: 0    NIFEdipine XL (ADALAT CC) 60 MG 24 hr tablet, Take 1 tablet by mouth Daily., Disp: 30 tablet, Rfl: 0    pantoprazole (Protonix) 40 MG EC tablet, Take 1 tablet by mouth 2 (Two) Times a Day Before Meals., Disp: 60 tablet, Rfl: 0    spironolactone (ALDACTONE) 50 MG tablet, Take 1 tablet by mouth 2 (Two) Times a Day., Disp: 60 tablet, Rfl: 0    hydrALAZINE (APRESOLINE) 100 MG tablet, Take 1 tablet by mouth 3 times a day for 30 days., Disp: 90 tablet, Rfl: 0    No Known Allergies    Social History     Socioeconomic History    Marital status:    Tobacco Use "    Smoking status: Never     Passive exposure: Never    Smokeless tobacco: Never   Vaping Use    Vaping status: Never Used   Substance and Sexual Activity    Alcohol use: No    Drug use: No    Sexual activity: Defer     Review of Systems   Constitutional: Negative for chills, fever and malaise/fatigue.   Cardiovascular:  Negative for chest pain, leg swelling, palpitations and syncope.   Respiratory:  Negative for cough and shortness of breath.    Gastrointestinal:  Negative for abdominal pain, nausea and vomiting.   Neurological:  Positive for weakness. Negative for dizziness and light-headedness.              Objective:     Vitals reviewed.   Constitutional:       Appearance: Well-developed and not in distress.   Eyes:      Pupils: Pupils are equal, round, and reactive to light.   HENT:      Nose: Nose normal.    Mouth/Throat:      Pharynx: Oropharynx is clear.   Pulmonary:      Effort: Pulmonary effort is normal.      Breath sounds: Normal breath sounds.   Cardiovascular:      Normal rate. Regular rhythm.   Pulses:     Intact distal pulses.   Edema:     Peripheral edema absent.   Abdominal:      Palpations: Abdomen is soft.   Musculoskeletal: Normal range of motion.      Cervical back: Normal range of motion and neck supple. Skin:     General: Skin is warm and dry.   Neurological:      General: No focal deficit present.      Mental Status: Alert.         Procedures      LAB RESULTS (LAST 7 DAYS)  Lab Review:   Echocardiogram   Results for orders placed during the hospital encounter of 07/02/25    Adult Transthoracic Echo Complete W/ Cont if Necessary Per Protocol 07/07/2025 12:46 PM    Interpretation Summary    Left ventricular systolic function is normal. Left ventricular ejection fraction appears to be 61 - 65%.    Left ventricular wall thickness is consistent with moderate concentric hypertrophy.    Left ventricular diastolic function is consistent with (grade I) impaired relaxation.      Cardiac  Catheterization   No results found for this or any previous visit.      CBC        BMP        CMP         BNP        TROPONIN        CoAg        Creatinine Clearance  Estimated Creatinine Clearance: 12.6 mL/min (A) (by C-G formula based on SCr of 3.06 mg/dL (H)).    ABG        Radiology  No radiology results for the last day          Assessment    Diagnoses and all orders for this visit:    1. Hospital discharge follow-up (Primary)    2. Hypertensive emergency    3. Essential hypertension    4. Mixed hyperlipidemia    5. ESRD (end stage renal disease) on dialysis    6. Sick sinus syndrome  Overview:  Added automatically from request for surgery [id number]      7. Pacemaker    8. H. pylori infection                  MDM    Hospital discharge follow up  Longstanding hypertension  Recent hospitalization for uncontrolled hypertension, hypertension emergency  Blood pressure 128/58   Echocardiogram with LVEF 61 to 65%, moderate concentric LVH, grade 1 diastolic dysfunction  Carvedilol 25 mg twice daily  Clonidine patch  Hydralazine 100 mg  Losartan 100 mg  Nifedipine 60 mg  spirinolactone 50 mg    Sick sinus syndrome  Status post dual-chamber pacemaker     Dyslipidemia  High intensity statin therapy     ESRD  Renal artery stenosis status post intervention, following with vascular surgery   HD MWF   Managed by Dr. Farooq     Generalized weakness  Recommend PT/OT  Recently completed treatment for H. pylori infection    Patient's previous medical records, labs, and EKG were reviewed and discussed with the patient at today's visit.     Patient will be seen as needed or at next scheduled follow-up with cardiology    Electronically signed by CECE Hsu, 07/24/25, 11:53 AM EDT.

## 2025-07-22 ENCOUNTER — OFFICE VISIT (OUTPATIENT)
Age: 85
End: 2025-07-22
Payer: MEDICARE

## 2025-07-22 VITALS
HEART RATE: 60 BPM | WEIGHT: 130 LBS | BODY MASS INDEX: 23.04 KG/M2 | OXYGEN SATURATION: 99 % | DIASTOLIC BLOOD PRESSURE: 54 MMHG | TEMPERATURE: 97.8 F | HEIGHT: 63 IN | SYSTOLIC BLOOD PRESSURE: 112 MMHG

## 2025-07-22 DIAGNOSIS — Z99.2 ESRD (END STAGE RENAL DISEASE) ON DIALYSIS: ICD-10-CM

## 2025-07-22 DIAGNOSIS — I70.1 RENAL ARTERY STENOSIS: Primary | ICD-10-CM

## 2025-07-22 DIAGNOSIS — N18.6 ESRD (END STAGE RENAL DISEASE) ON DIALYSIS: ICD-10-CM

## 2025-07-22 NOTE — PROGRESS NOTES
"Chief Complaint  Follow-up (Renal artery stent )    Follow-up for renal artery stent    Subjective        Brad Woodward presents to [facility] VASCULAR SURGERY  History of Present Illness    Patient is a very pleasant 84-year-old lady with hypertension, chronic kidney disease, sick sinus syndrome status post pacemaker, diabetes and renal artery stenosis.  Patient had poorly controlled blood pressure despite 4 antihypertensive medications and on 5/21/2025 was taken for left renal artery stent placement.   Patient was also initiated on hemodialysis on that admission.    She has a left sided pacemaker.     7/22/2025:  Patient returns today for follow up with renal duplex.   Patient has been persistently fatigued, but better than when she was recently hospitalized.  They have now decided to stick with hemodialysis rather than peritoneal dialysis.  She is still using a right sided tunneled dialysis catheter for dialysis  Blood pressure has been significantly better controlled lately according to patient's son.  Systolic blood pressure here today is 112      Objective   Vital Signs:  /54 (BP Location: Right arm, Patient Position: Sitting, Cuff Size: Adult)   Pulse 60   Temp 97.8 °F (36.6 °C) (Infrared)   Ht 160 cm (63\")   Wt 59 kg (130 lb)   SpO2 99%   BMI 23.03 kg/m²   Estimated body mass index is 23.03 kg/m² as calculated from the following:    Height as of this encounter: 160 cm (63\").    Weight as of this encounter: 59 kg (130 lb).         BMI is within normal parameters. No other follow-up for BMI required.         Physical Exam   Elderly and frail appearing  Respirations unlabored  R TDC in place with dressing intact.     Result Review :                                       Assessment and Plan     84-year-old lady with renovascular hypertension status post left renal artery stent placement, diabetes, sick sinus syndrome status post left-sided pacemaker, end-stage renal disease on dialysis " here for follow-up after renal artery stent placement with renal artery duplex.   Renal artery duplex shows low flow bilaterally, likely related to progressive end-stage renal disease.  I do not think there is any indication for any further workup or intervention or really even surveillance of her renal arteries at this point.  I discussed dialysis access with the patient's son and the benefits of pursuing fistula or graft rather than the tunneled dialysis catheter.  They are amenable to moving forward with permanent internal dialysis access placement in the right arm.  She has a left-sided pacemaker.  Vein mapping suggest she may be a candidate for right arm fistula.  Will schedule patient for right arm fistula versus graft at her earliest convenience.          Diagnoses and all orders for this visit:    1. Renal artery stenosis (Primary)    2. ESRD (end stage renal disease) on dialysis  -     Case Request; Standing  -     ceFAZolin (ANCEF) 2,000 mg in sodium chloride 0.9 % 100 mL IVPB  -     Case Request    Other orders  -     Follow Anesthesia Guidelines / Protocol; Future  -     Follow Anesthesia Guidelines / Protocol; Standing  -     Verify NPO Status; Standing  -     Clip Operative Site; Standing  -     Have Patient Void Prior to Procedure; Standing  -     Verify / Perform Chlorhexidine Skin Prep; Standing  -     Verify NPO Status; Standing  -     Clip Operative Site; Standing  -     Patient to Void Prior to Transfer to OR; Standing  -     No Hand or Wrist IV Placement, Place IV in AC; Standing  -     Verify / Perform Chlorhexidine Skin Prep; Standing  -     Provide NPO Instructions to Patient; Future  -     Chlorhexidine Skin Prep; Future  -     Provide Patient With Instructions on NPO Status; Future  -     Provide Chlorhexidine Skin Prep Wipes and Instructions; Future  -     Place Sequential Compression Device; Standing  -     Maintain Sequential Compression Device; Standing                Patient BMI noted.  Educational material for patient for health risks of being overweight added to patient's after visit summary.           Follow Up     No follow-ups on file.  Patient was given instructions and counseling regarding her condition or for health maintenance advice. Please see specific information pulled into the AVS if appropriate.

## 2025-07-24 ENCOUNTER — OFFICE VISIT (OUTPATIENT)
Dept: CARDIOLOGY | Facility: CLINIC | Age: 85
End: 2025-07-24
Payer: MEDICARE

## 2025-07-24 ENCOUNTER — TELEPHONE (OUTPATIENT)
Age: 85
End: 2025-07-24
Payer: MEDICARE

## 2025-07-24 VITALS
HEART RATE: 61 BPM | HEIGHT: 63 IN | WEIGHT: 129 LBS | SYSTOLIC BLOOD PRESSURE: 128 MMHG | OXYGEN SATURATION: 96 % | DIASTOLIC BLOOD PRESSURE: 58 MMHG | BODY MASS INDEX: 22.86 KG/M2

## 2025-07-24 DIAGNOSIS — Z99.2 ESRD (END STAGE RENAL DISEASE) ON DIALYSIS: ICD-10-CM

## 2025-07-24 DIAGNOSIS — I16.1 HYPERTENSIVE EMERGENCY: ICD-10-CM

## 2025-07-24 DIAGNOSIS — E78.2 MIXED HYPERLIPIDEMIA: Chronic | ICD-10-CM

## 2025-07-24 DIAGNOSIS — I10 ESSENTIAL HYPERTENSION: Chronic | ICD-10-CM

## 2025-07-24 DIAGNOSIS — N18.6 ESRD (END STAGE RENAL DISEASE) ON DIALYSIS: ICD-10-CM

## 2025-07-24 DIAGNOSIS — A04.8 H. PYLORI INFECTION: ICD-10-CM

## 2025-07-24 DIAGNOSIS — Z95.0 PACEMAKER: ICD-10-CM

## 2025-07-24 DIAGNOSIS — I49.5 SICK SINUS SYNDROME: ICD-10-CM

## 2025-07-24 DIAGNOSIS — Z09 HOSPITAL DISCHARGE FOLLOW-UP: Primary | ICD-10-CM

## 2025-07-24 NOTE — TELEPHONE ENCOUNTER
Patient's grandson called and cancelled surgery for 7-2p-25.  The patient has decided to go with PT dialysis

## 2025-08-19 ENCOUNTER — OFFICE (OUTPATIENT)
Dept: URBAN - METROPOLITAN AREA CLINIC 64 | Facility: CLINIC | Age: 85
End: 2025-08-19
Payer: MEDICARE

## 2025-08-19 VITALS
DIASTOLIC BLOOD PRESSURE: 54 MMHG | HEART RATE: 72 BPM | WEIGHT: 128 LBS | HEIGHT: 64 IN | SYSTOLIC BLOOD PRESSURE: 93 MMHG

## 2025-08-19 DIAGNOSIS — K92.1 MELENA: ICD-10-CM

## 2025-08-19 DIAGNOSIS — A04.8 OTHER SPECIFIED BACTERIAL INTESTINAL INFECTIONS: ICD-10-CM

## 2025-08-19 DIAGNOSIS — Z79.02 LONG TERM (CURRENT) USE OF ANTITHROMBOTICS/ANTIPLATELETS: ICD-10-CM

## 2025-08-19 PROCEDURE — 99213 OFFICE O/P EST LOW 20 MIN: CPT | Performed by: NURSE PRACTITIONER

## 2025-08-20 LAB
H PYLORI BREATH TEST: NEGATIVE
H. PYLORI BREATH COLLECTION: (no result)

## (undated) DEVICE — RADIFOCUS GLIDEWIRE: Brand: GLIDEWIRE

## (undated) DEVICE — DEV INFL COMPAK W/ACCESSPLUS IN4530

## (undated) DEVICE — IMPLANTABLE DEVICE: Type: IMPLANTABLE DEVICE | Site: KIDNEY | Status: NON-FUNCTIONAL

## (undated) DEVICE — PERCLOSE™ PROSTYLE™ SUTURE-MEDIATED CLOSURE AND REPAIR SYSTEM: Brand: PERCLOSE™ PROSTYLE™

## (undated) DEVICE — INTRO SHEATH PRELUDE SNAP .038 6F 13CM W/SDPRT

## (undated) DEVICE — 3M™ PATIENT PLATE, CORDED, SPLIT, LARGE, 40 PER CASE, 1179: Brand: 3M™

## (undated) DEVICE — GUIDEWIRE WITH ICE™ HYDROPHILIC COATING: Brand: V-18™ CONTROL WIRE™

## (undated) DEVICE — Device

## (undated) DEVICE — SLV SCD CALF HEMOFORCE DVT THERP REPROC MD

## (undated) DEVICE — PINNACLE R/O II INTRODUCER SHEATH WITH RADIOPAQUE MARKER: Brand: PINNACLE

## (undated) DEVICE — INTRO SHEATH PRELUDE SNAP .038 9F 13CM W/SDPRT BLK

## (undated) DEVICE — ELECTRD DEFIB M/FUNC PROPADZ RADIOL 2PK

## (undated) DEVICE — IRRIGATOR BULB ASEPTO 60CC STRL

## (undated) DEVICE — SUT SILK 2/0 FS BLK 18IN 685G

## (undated) DEVICE — UNDYED BRAIDED (POLYGLACTIN 910), SYNTHETIC ABSORBABLE SUTURE: Brand: COATED VICRYL

## (undated) DEVICE — SYR LUERLOK 30CC

## (undated) DEVICE — GLV SURG BIOGEL LTX PF 7 1/2

## (undated) DEVICE — LN INJ CONTRST FLXCIL HP F/M LL 1200PSI72

## (undated) DEVICE — VIOLET BRAIDED (POLYGLACTIN 910), SYNTHETIC ABSORBABLE SUTURE: Brand: COATED VICRYL

## (undated) DEVICE — IR MAJOR VASCULAR PACK: Brand: MEDLINE INDUSTRIES, INC.

## (undated) DEVICE — EXTENSION SET, MALE LUER LOCK ADAPTER WITH RETRACTABLE COLLAR

## (undated) DEVICE — SINGLE-USE BIOPSY FORCEPS: Brand: RADIAL JAW 4

## (undated) DEVICE — ANTIBACTERIAL UNDYED BRAIDED (POLYGLACTIN 910), SYNTHETIC ABSORBABLE SUTURE: Brand: COATED VICRYL

## (undated) DEVICE — PINNACLE INTRODUCER SHEATH: Brand: PINNACLE

## (undated) DEVICE — KT SURG TURNOVER 050

## (undated) DEVICE — C-ARM: Brand: UNBRANDED

## (undated) DEVICE — PTA BALLOON DILATATION CATHETER: Brand: MUSTANG™

## (undated) DEVICE — SYR LL TP 10ML STRL

## (undated) DEVICE — SHEATH STEER INTRO TOURGUIDE 7F 9MM 45CM

## (undated) DEVICE — ELECTRD BLD EZ CLN MOD XLNG 2.75IN

## (undated) DEVICE — BOWL PLSTC MD 16OZ BLU STRL

## (undated) DEVICE — NAVICROSS SUPPORT CATHETER: Brand: NAVICROSS

## (undated) DEVICE — CVR PROB ULTRASND CIVFLEX GEN/PURP TELESCP/FOLD 5.5X96IN LF

## (undated) DEVICE — RADIFOCUS GLIDEWIRE ADVANTAGE GUIDEWIRE: Brand: GLIDEWIRE ADVANTAGE

## (undated) DEVICE — BITEBLOCK ENDO W/STRAP 60F A/ LF DISP

## (undated) DEVICE — CVR HNDL LT SURG ACCSSRY BLU STRL

## (undated) DEVICE — SUT PROLN 5/0 C1 D/A 36IN 8720H

## (undated) DEVICE — PACEMAKER CDS: Brand: MEDLINE INDUSTRIES, INC.

## (undated) DEVICE — SYR LUERLOK 20CC BX/50

## (undated) DEVICE — ST ACC MICROPUNCTURE STFF/CANN PLAT/TP 4F 21G 40CM

## (undated) DEVICE — UNDRGLV SURG BIOGEL PIMICROINDICATOR SYNTH SZ7.5PF STRL PR/2

## (undated) DEVICE — PENCL HND ROCKRSWTCH HOLSTR EZ CLEAN TP CRD 10FT

## (undated) DEVICE — PTA BALLOON DILATATION CATHETER: Brand: STERLING™

## (undated) DEVICE — RADIFOCUS TORQUE DEVICE MULTI-TORQUE VISE: Brand: RADIFOCUS TORQUE DEVICE

## (undated) DEVICE — GOWN,SIRUS,POLYRNF,BRTHSLV,2XL,18/CS: Brand: MEDLINE

## (undated) DEVICE — ADAPT CHECKFLO PERFORMER ASSEMBL

## (undated) DEVICE — CABL BIPOL W/ALLGTR CLIP/SM 12FT

## (undated) DEVICE — CATH OMNI FLUSH 5FR

## (undated) DEVICE — IMPERVIOUS STOCKINETTE: Brand: DEROYAL

## (undated) DEVICE — SNAR AMPLTZ GOOSENECK 25MM 120CM 6FR

## (undated) DEVICE — 3M™ STERI-STRIP™ REINFORCED ADHESIVE SKIN CLOSURES, R1547, 1/2 IN X 4 IN (12 MM X 100 MM), 6 STRIPS/ENVELOPE: Brand: 3M™ STERI-STRIP™

## (undated) DEVICE — CVR PROB 96IN LF STRL

## (undated) DEVICE — PK ENDO GI 50

## (undated) DEVICE — MICRO TIP WIPE: Brand: DEVON

## (undated) DEVICE — DRSNG SURESITE WNDW 4X4.5